# Patient Record
Sex: FEMALE | Race: WHITE | NOT HISPANIC OR LATINO | Employment: OTHER | URBAN - METROPOLITAN AREA
[De-identification: names, ages, dates, MRNs, and addresses within clinical notes are randomized per-mention and may not be internally consistent; named-entity substitution may affect disease eponyms.]

---

## 2017-07-25 ENCOUNTER — HISTORICAL (OUTPATIENT)
Dept: CARDIOLOGY | Facility: HOSPITAL | Age: 82
End: 2017-07-25

## 2022-04-11 ENCOUNTER — HISTORICAL (OUTPATIENT)
Dept: ADMINISTRATIVE | Facility: HOSPITAL | Age: 87
End: 2022-04-11

## 2022-04-29 VITALS
BODY MASS INDEX: 31.24 KG/M2 | HEIGHT: 64 IN | DIASTOLIC BLOOD PRESSURE: 84 MMHG | SYSTOLIC BLOOD PRESSURE: 118 MMHG | WEIGHT: 183 LBS | OXYGEN SATURATION: 98 %

## 2022-07-11 LAB
LEFT EYE DM RETINOPATHY: NEGATIVE
RIGHT EYE DM RETINOPATHY: NEGATIVE

## 2022-07-12 ENCOUNTER — DOCUMENTATION ONLY (OUTPATIENT)
Dept: INTERNAL MEDICINE | Facility: CLINIC | Age: 87
End: 2022-07-12

## 2022-07-12 LAB
LEFT EYE DM RETINOPATHY: NEGATIVE
RIGHT EYE DM RETINOPATHY: NEGATIVE

## 2022-09-01 DIAGNOSIS — M19.90 OSTEOARTHRITIS, UNSPECIFIED OSTEOARTHRITIS TYPE, UNSPECIFIED SITE: ICD-10-CM

## 2022-09-01 DIAGNOSIS — E11.9 TYPE 2 DIABETES MELLITUS WITHOUT COMPLICATION, UNSPECIFIED WHETHER LONG TERM INSULIN USE: ICD-10-CM

## 2022-09-01 DIAGNOSIS — E78.5 HYPERLIPIDEMIA, UNSPECIFIED HYPERLIPIDEMIA TYPE: ICD-10-CM

## 2022-09-01 DIAGNOSIS — E53.8 B12 DEFICIENCY: Primary | ICD-10-CM

## 2022-09-01 DIAGNOSIS — I10 HYPERTENSION, UNSPECIFIED TYPE: ICD-10-CM

## 2022-09-01 DIAGNOSIS — M10.9 GOUT, UNSPECIFIED CAUSE, UNSPECIFIED CHRONICITY, UNSPECIFIED SITE: ICD-10-CM

## 2022-09-12 ENCOUNTER — DOCUMENTATION ONLY (OUTPATIENT)
Dept: INTERNAL MEDICINE | Facility: CLINIC | Age: 87
End: 2022-09-12
Payer: MEDICARE

## 2022-09-13 ENCOUNTER — OFFICE VISIT (OUTPATIENT)
Dept: INTERNAL MEDICINE | Facility: CLINIC | Age: 87
End: 2022-09-13
Payer: MEDICARE

## 2022-09-13 VITALS
OXYGEN SATURATION: 97 % | DIASTOLIC BLOOD PRESSURE: 78 MMHG | BODY MASS INDEX: 29.23 KG/M2 | SYSTOLIC BLOOD PRESSURE: 124 MMHG | RESPIRATION RATE: 18 BRPM | HEIGHT: 63 IN | HEART RATE: 69 BPM | WEIGHT: 165 LBS

## 2022-09-13 DIAGNOSIS — I10 HYPERTENSION, UNSPECIFIED TYPE: ICD-10-CM

## 2022-09-13 DIAGNOSIS — E03.9 HYPOTHYROIDISM, UNSPECIFIED TYPE: ICD-10-CM

## 2022-09-13 DIAGNOSIS — M10.9 GOUT, UNSPECIFIED CAUSE, UNSPECIFIED CHRONICITY, UNSPECIFIED SITE: ICD-10-CM

## 2022-09-13 DIAGNOSIS — Z00.00 WELLNESS EXAMINATION: Primary | ICD-10-CM

## 2022-09-13 DIAGNOSIS — E11.9 TYPE 2 DIABETES MELLITUS WITHOUT COMPLICATION, UNSPECIFIED WHETHER LONG TERM INSULIN USE: ICD-10-CM

## 2022-09-13 DIAGNOSIS — E78.5 HYPERLIPIDEMIA, UNSPECIFIED HYPERLIPIDEMIA TYPE: ICD-10-CM

## 2022-09-13 DIAGNOSIS — I50.9 CONGESTIVE HEART FAILURE, UNSPECIFIED HF CHRONICITY, UNSPECIFIED HEART FAILURE TYPE: ICD-10-CM

## 2022-09-13 PROCEDURE — G0439 PPPS, SUBSEQ VISIT: HCPCS | Mod: ,,, | Performed by: INTERNAL MEDICINE

## 2022-09-13 PROCEDURE — G0439 PR MEDICARE ANNUAL WELLNESS SUBSEQUENT VISIT: ICD-10-PCS | Mod: ,,, | Performed by: INTERNAL MEDICINE

## 2022-09-13 RX ORDER — CARVEDILOL 12.5 MG/1
12.5 TABLET ORAL 2 TIMES DAILY
COMMUNITY
Start: 2022-09-05

## 2022-09-13 RX ORDER — TRAMADOL HYDROCHLORIDE 50 MG/1
50 TABLET ORAL EVERY 12 HOURS PRN
Qty: 30 TABLET | Refills: 5 | Status: ON HOLD | OUTPATIENT
Start: 2022-09-13 | End: 2023-03-16 | Stop reason: HOSPADM

## 2022-09-13 RX ORDER — LEVOTHYROXINE SODIUM 137 UG/1
137 TABLET ORAL DAILY
COMMUNITY
Start: 2022-03-21 | End: 2023-06-29 | Stop reason: SDUPTHER

## 2022-09-13 RX ORDER — WARFARIN 1 MG/1
TABLET ORAL
Status: ON HOLD | COMMUNITY
Start: 2022-08-05 | End: 2023-03-16 | Stop reason: HOSPADM

## 2022-09-13 RX ORDER — TRAMADOL HYDROCHLORIDE 50 MG/1
50 TABLET ORAL 2 TIMES DAILY PRN
COMMUNITY
Start: 2022-08-05 | End: 2022-09-13 | Stop reason: SDUPTHER

## 2022-09-13 RX ORDER — CYANOCOBALAMIN 1000 UG/ML
INJECTION, SOLUTION INTRAMUSCULAR; SUBCUTANEOUS
Qty: 1 ML | Refills: 11 | Status: SHIPPED | OUTPATIENT
Start: 2022-09-13 | End: 2023-09-07

## 2022-09-13 RX ORDER — ALLOPURINOL 100 MG/1
100 TABLET ORAL DAILY
Qty: 90 TABLET | Refills: 11 | Status: SHIPPED | OUTPATIENT
Start: 2022-09-13

## 2022-09-13 RX ORDER — LEVOTHYROXINE SODIUM 150 UG/1
150 TABLET ORAL DAILY
COMMUNITY
Start: 2022-09-05 | End: 2023-02-13

## 2022-09-13 RX ORDER — LOVASTATIN 20 MG/1
20 TABLET ORAL DAILY
COMMUNITY
Start: 2022-03-21

## 2022-09-13 RX ORDER — SACUBITRIL AND VALSARTAN 24; 26 MG/1; MG/1
1 TABLET, FILM COATED ORAL 2 TIMES DAILY
COMMUNITY
Start: 2022-09-05 | End: 2023-03-30

## 2022-09-13 NOTE — PROGRESS NOTES
"Subjective:      Patient Care Team:  Franklyn Brito MD as PCP - General (Internal Medicine)  Franklyn Brito MD      Patient ID: Homa Jaquez is a 86 y.o. female.    Chief Complaint: Follow-up      THE PATIENT IS 87-YEAR-OLD WOMAN IN FOR WELLNESS CHECK.  SHE FEELS OKAY CURRENTLY.  SHE DID SUFFER A FALL A FEW WEEKS BACK IN THE SHOWER.  SHE WAS ASSESSED AND HAD NO BROKEN BONES AND JUST HAD BUMPS AND BRUISES HAS RECOVERED COMPLETELY.  SHE FEELS THAT SHE HAS DONE WELL CONSIDERING HER AGE AND MULTIPLE MEDICAL PROBLEMS.    Follow-up    Review of Systems     Current Outpatient Medications on File Prior to Visit   Medication Sig Dispense Refill    carvediloL (COREG) 12.5 MG tablet Take 12.5 mg by mouth 2 (two) times daily.      ENTRESTO 24-26 mg per tablet Take 1 tablet by mouth 2 (two) times daily.      levothyroxine (SYNTHROID) 137 MCG Tab tablet Take 137 mcg by mouth once daily.      levothyroxine (SYNTHROID) 150 MCG tablet Take 150 mcg by mouth once daily.      lovastatin (MEVACOR) 20 MG tablet Take 20 mg by mouth once daily.      warfarin (COUMADIN) 1 MG tablet Take by mouth.      warfarin sodium (COUMADIN ORAL) Take 2 mg by mouth.      [DISCONTINUED] cyanocobalamin 1,000 mcg/mL injection 1ML (1000MCG) INTRAMUSCULARY ONCE MONTHLY 1 mL 11    [DISCONTINUED] traMADoL (ULTRAM) 50 mg tablet Take 50 mg by mouth 2 (two) times daily as needed.       No current facility-administered medications on file prior to visit.       Patient Reported Health Risk Assessment       Opioid Screening: Patient medication list reviewed, patient is not taking prescription opioids. Patient is using additional opioids than prescribed. Patient is not at low risk of substance abuse based on this opioid use history.       Objective:      /78 (BP Location: Right arm, Patient Position: Sitting, BP Method: Large (Manual))   Pulse 69   Resp 18   Ht 5' 3" (1.6 m)   Wt 74.8 kg (165 lb)   SpO2 97%   BMI 29.23 kg/m²     Physical " Exam  Vitals reviewed.   Constitutional:       Appearance: Normal appearance.   HENT:      Head: Normocephalic.      Nose: Nose normal.      Mouth/Throat:      Pharynx: Oropharynx is clear.   Eyes:      Pupils: Pupils are equal, round, and reactive to light.   Neck:      Thyroid: No thyromegaly.   Cardiovascular:      Rate and Rhythm: Normal rate. Rhythm irregular.      Pulses: Normal pulses.      Comments: HEART RHYTHM IS IRREGULARLY IRREGULAR.  NO S3 GALLOP APPRECIATED.  Abdominal:      General: Abdomen is flat. Bowel sounds are normal.      Palpations: Abdomen is soft. There is no hepatomegaly, splenomegaly or mass.      Tenderness: There is no abdominal tenderness.   Musculoskeletal:      Cervical back: Neck supple.   Lymphadenopathy:      Cervical: No cervical adenopathy.   Skin:     General: Skin is warm and dry.   Neurological:      Mental Status: She is alert.       LABORATORY STUDIES REVIEWED.  NUMBERS OKAY EXCEPT FOR LOW ALBUMIN.    No flowsheet data found.  Fall Risk Assessment - Outpatient 9/13/2022   Mobility Status Ambulatory w/ assistance   Number of falls 1   Identified as fall risk 0                Assessment:       1. Wellness examination    2. Type 2 diabetes mellitus without complication, unspecified whether long term insulin use    3. Gout, unspecified cause, unspecified chronicity, unspecified site    4. Hyperlipidemia, unspecified hyperlipidemia type    5. Hypertension, unspecified type    6. Congestive heart failure, unspecified HF chronicity, unspecified heart failure type    7. Hypothyroidism, unspecified type        2. EXCELLENT CONTROL OFF MEDS     3. MILD AND STABLE.      4. CONTROLLED    5. HYPERTENSION CONTROLLED    6. SYSTOLIC DYSFUNCTION.  DOING WELL ON ENTRESTO    7. HYPERTHYROIDISM.  CORRECTED     8. GOUT WITH RECENT ATTACK.  THIS MAY BE WHY SHE IS LIMITING HER MEAT AND HIGH PROTEIN IN THE DIET.    9. PERNICIOUS ANEMIA.  STABLE  Plan:       RECOMMEND THAT SHE TAKE ALLOPURINOL  DAILY.  INCREASE PROTEIN IN DIET.  FOLLOW-UP WITH ME 6 MONTHS WITH CBC CMP LIPID TSH A1C PRIOR.  WILL ALSO CHECK A URIC ACID THEN.        Medicare Annual Wellness and Personalized Prevention Plan:   Fall Risk + Home Safety + Hearing Impairment + Depression Screen + Cognitive Impairment Screen + Health Risk Assessment all reviewed.       Health Maintenance Topics with due status: Not Due       Topic Last Completion Date    Lipid Panel 03/19/2022      The patient's Health Maintenance was reviewed and the following appears to be due at this time:   Health Maintenance Due   Topic Date Due    TETANUS VACCINE  Never done    DEXA Scan  Never done    Shingles Vaccine (1 of 2) Never done    Pneumococcal Vaccines (Age 65+) (2 - PCV) 02/20/2016    COVID-19 Vaccine (2 - Pfizer series) 09/08/2021    Influenza Vaccine (1) 09/01/2022         Advance Care Planning   I attest to discussing Advance Care Planning with patient and/or family member.  Education was provided including the importance of the Health Care Power of , Advance Directives, and/or [place] documentation.  The patient expressed understanding to the importance of this information and discussion.  Length of ACP conversation in minutes:          Follow up in about 6 months (around 3/13/2023). In addition to their scheduled follow up, the patient has also been instructed to follow up on as needed basis.

## 2023-02-13 DIAGNOSIS — E03.9 HYPOTHYROIDISM, UNSPECIFIED TYPE: Primary | ICD-10-CM

## 2023-02-13 RX ORDER — LEVOTHYROXINE SODIUM 150 UG/1
TABLET ORAL
Qty: 30 TABLET | Refills: 11 | Status: ON HOLD | OUTPATIENT
Start: 2023-02-13 | End: 2023-03-16 | Stop reason: HOSPADM

## 2023-03-01 ENCOUNTER — TELEPHONE (OUTPATIENT)
Dept: INTERNAL MEDICINE | Facility: CLINIC | Age: 88
End: 2023-03-01
Payer: MEDICARE

## 2023-03-01 NOTE — TELEPHONE ENCOUNTER
Spoke to son, Av, regarding patient's issues with R hip pain. States pain starts in the R LB and radiates to knee. Requesting guidance on pain management. She takes Tylenol and very occ ibuprofen.  Also, taking Tramadol. Requesting PT referral per HH due to inc sedentary lifestyle. Will refer to ATTILA Meyer, please refer patient.

## 2023-03-02 DIAGNOSIS — R62.7 FAILURE TO THRIVE IN ADULT: Primary | ICD-10-CM

## 2023-03-02 DIAGNOSIS — I50.9 CONGESTIVE HEART FAILURE, UNSPECIFIED HF CHRONICITY, UNSPECIFIED HEART FAILURE TYPE: ICD-10-CM

## 2023-03-02 DIAGNOSIS — R53.1 WEAK: ICD-10-CM

## 2023-03-02 NOTE — TELEPHONE ENCOUNTER
Patient has not been seen in office since September of last year--Next OV 03/13/23.  She may have to be seen before [facility] will accept her due to chart notes not within 30-90 days

## 2023-03-06 ENCOUNTER — TELEPHONE (OUTPATIENT)
Dept: INTERNAL MEDICINE | Facility: CLINIC | Age: 88
End: 2023-03-06
Payer: MEDICARE

## 2023-03-06 NOTE — TELEPHONE ENCOUNTER
----- Message from Kenisha Pearson LPN sent at 3/6/2023  1:27 PM CST -----  Regarding: chata Vallejo 3/13 @1:20  Fasting labs needed.

## 2023-03-07 PROCEDURE — G0180 PR HOME HEALTH MD CERTIFICATION: ICD-10-PCS | Mod: ,,, | Performed by: INTERNAL MEDICINE

## 2023-03-07 PROCEDURE — G0180 MD CERTIFICATION HHA PATIENT: HCPCS | Mod: ,,, | Performed by: INTERNAL MEDICINE

## 2023-03-10 LAB
CHOLEST SERPL-MSCNC: 114 MG/DL (ref 0–200)
HBA1C MFR BLD: 6.5 % (ref 4–6)
HDLC SERPL-MCNC: 38 MG/DL (ref 35–70)
LDLC SERPL CALC-MCNC: 60 MG/DL (ref 0–160)
TRIGL SERPL-MCNC: 81 MG/DL (ref 40–160)

## 2023-03-13 ENCOUNTER — HOSPITAL ENCOUNTER (INPATIENT)
Facility: HOSPITAL | Age: 88
LOS: 3 days | Discharge: HOME HEALTH | DRG: 552 | End: 2023-03-16
Attending: EMERGENCY MEDICINE | Admitting: INTERNAL MEDICINE
Payer: MEDICARE

## 2023-03-13 DIAGNOSIS — R52 PAIN: ICD-10-CM

## 2023-03-13 DIAGNOSIS — M54.50 ACUTE RIGHT-SIDED LOW BACK PAIN, UNSPECIFIED WHETHER SCIATICA PRESENT: ICD-10-CM

## 2023-03-13 DIAGNOSIS — M25.551 RIGHT HIP PAIN: Primary | ICD-10-CM

## 2023-03-13 DIAGNOSIS — R26.89 DECREASED FUNCTIONAL MOBILITY: ICD-10-CM

## 2023-03-13 LAB
ALBUMIN SERPL-MCNC: 3.1 G/DL (ref 3.4–4.8)
ALBUMIN/GLOB SERPL: 0.8 RATIO (ref 1.1–2)
ALP SERPL-CCNC: 119 UNIT/L (ref 40–150)
ALT SERPL-CCNC: 11 UNIT/L (ref 0–55)
AST SERPL-CCNC: 27 UNIT/L (ref 5–34)
BASOPHILS # BLD AUTO: 0.04 X10(3)/MCL (ref 0–0.2)
BASOPHILS NFR BLD AUTO: 0.6 %
BILIRUBIN DIRECT+TOT PNL SERPL-MCNC: 1.8 MG/DL
BUN SERPL-MCNC: 20 MG/DL (ref 9.8–20.1)
CALCIUM SERPL-MCNC: 9 MG/DL (ref 8.4–10.2)
CHLORIDE SERPL-SCNC: 103 MMOL/L (ref 98–107)
CO2 SERPL-SCNC: 26 MMOL/L (ref 23–31)
CREAT SERPL-MCNC: 1.13 MG/DL (ref 0.55–1.02)
EOSINOPHIL # BLD AUTO: 0.11 X10(3)/MCL (ref 0–0.9)
EOSINOPHIL NFR BLD AUTO: 1.8 %
ERYTHROCYTE [DISTWIDTH] IN BLOOD BY AUTOMATED COUNT: 14.8 % (ref 11.5–17)
GFR SERPLBLD CREATININE-BSD FMLA CKD-EPI: 47 MLS/MIN/1.73/M2
GLOBULIN SER-MCNC: 4 GM/DL (ref 2.4–3.5)
GLUCOSE SERPL-MCNC: 148 MG/DL (ref 82–115)
HCT VFR BLD AUTO: 50.1 % (ref 37–47)
HGB BLD-MCNC: 16.1 G/DL (ref 12–16)
IMM GRANULOCYTES # BLD AUTO: 0.02 X10(3)/MCL (ref 0–0.04)
IMM GRANULOCYTES NFR BLD AUTO: 0.3 %
LYMPHOCYTES # BLD AUTO: 0.91 X10(3)/MCL (ref 0.6–4.6)
LYMPHOCYTES NFR BLD AUTO: 14.7 %
MCH RBC QN AUTO: 31.3 PG
MCHC RBC AUTO-ENTMCNC: 32.1 G/DL (ref 33–36)
MCV RBC AUTO: 97.3 FL (ref 80–94)
MONOCYTES # BLD AUTO: 0.71 X10(3)/MCL (ref 0.1–1.3)
MONOCYTES NFR BLD AUTO: 11.5 %
NEUTROPHILS # BLD AUTO: 4.41 X10(3)/MCL (ref 2.1–9.2)
NEUTROPHILS NFR BLD AUTO: 71.1 %
NRBC BLD AUTO-RTO: 0 %
PLATELET # BLD AUTO: 176 X10(3)/MCL (ref 130–400)
PMV BLD AUTO: 10.6 FL (ref 7.4–10.4)
POTASSIUM SERPL-SCNC: 4.6 MMOL/L (ref 3.5–5.1)
PROT SERPL-MCNC: 7.1 GM/DL (ref 5.8–7.6)
RBC # BLD AUTO: 5.15 X10(6)/MCL (ref 4.2–5.4)
SODIUM SERPL-SCNC: 138 MMOL/L (ref 136–145)
WBC # SPEC AUTO: 6.2 X10(3)/MCL (ref 4.5–11.5)

## 2023-03-13 PROCEDURE — 11000001 HC ACUTE MED/SURG PRIVATE ROOM

## 2023-03-13 PROCEDURE — 85025 COMPLETE CBC W/AUTO DIFF WBC: CPT | Performed by: EMERGENCY MEDICINE

## 2023-03-13 PROCEDURE — 25000003 PHARM REV CODE 250: Performed by: EMERGENCY MEDICINE

## 2023-03-13 PROCEDURE — 99285 EMERGENCY DEPT VISIT HI MDM: CPT | Mod: 25

## 2023-03-13 PROCEDURE — 80053 COMPREHEN METABOLIC PANEL: CPT | Performed by: EMERGENCY MEDICINE

## 2023-03-13 RX ORDER — SODIUM CHLORIDE 0.9 % (FLUSH) 0.9 %
10 SYRINGE (ML) INJECTION
Status: DISCONTINUED | OUTPATIENT
Start: 2023-03-13 | End: 2023-03-16 | Stop reason: HOSPADM

## 2023-03-13 RX ORDER — SODIUM CHLORIDE 9 MG/ML
1000 INJECTION, SOLUTION INTRAVENOUS
Status: COMPLETED | OUTPATIENT
Start: 2023-03-13 | End: 2023-03-14

## 2023-03-13 RX ORDER — TALC
6 POWDER (GRAM) TOPICAL NIGHTLY PRN
Status: DISCONTINUED | OUTPATIENT
Start: 2023-03-13 | End: 2023-03-16 | Stop reason: HOSPADM

## 2023-03-13 RX ORDER — TRAMADOL HYDROCHLORIDE 50 MG/1
50 TABLET ORAL
Status: COMPLETED | OUTPATIENT
Start: 2023-03-13 | End: 2023-03-13

## 2023-03-13 RX ORDER — SODIUM CHLORIDE 9 MG/ML
INJECTION, SOLUTION INTRAVENOUS CONTINUOUS
Status: DISCONTINUED | OUTPATIENT
Start: 2023-03-13 | End: 2023-03-14

## 2023-03-13 RX ADMIN — SODIUM CHLORIDE: 9 INJECTION, SOLUTION INTRAVENOUS at 11:03

## 2023-03-13 RX ADMIN — SODIUM CHLORIDE 1000 ML: 9 INJECTION, SOLUTION INTRAVENOUS at 02:03

## 2023-03-13 RX ADMIN — TRAMADOL HYDROCHLORIDE 50 MG: 50 TABLET, COATED ORAL at 02:03

## 2023-03-13 NOTE — ED PROVIDER NOTES
Encounter Date: 3/13/2023    SCRIBE #1 NOTE: I, Radha Epstein, am scribing for, and in the presence of,  Franklyn Ventura MD. I have scribed the following portions of the note - Other sections scribed: HPI, ROS, PE.     History     Chief Complaint   Patient presents with    Hip Pain     Pt c/o severe right hip pain that radiates to back x 1 month.  Pt states worse over the last 2 days and unable to ambulate today.      87 year old female w/ hx of graves disease, HTN, hypothyroidism, Afib, and spinal stenosis presents to ED for hip pain. Pt states for the past 3-4 weeks she had pain in her her R hip joint that has been worsening to the point where her son who's a PA got her a cortisone shot. She states the shot relieved the pain for ~1 day before returning. Now, she says the pain is also in her back and she cant even get out of bed or walk. She last walked yesterday to get to the bathroom. She expresses back and hip pain when her daughter rolls her over to change her diaper. She states she fell in the shower slipping on soap and her  had to call 911 to pick her back up. She states she last had tramadol at 0930 this morning, had some relief with it.     Her PCP is Franklyn Brito MD.    The history is provided by the patient. No  was used.   Hip Pain  Pertinent negatives include no chest pain, no abdominal pain and no shortness of breath.   Review of patient's allergies indicates:   Allergen Reactions    Cefadroxil      Other reaction(s): Nausea or vomiting    Cefuroxime axetil     Cephalexin      Other reaction(s): NAUSEA AND VOMITING    Ciprofloxacin     Enoxaparin     Methenamine hippurate      Other reaction(s): Unknown    Promethazine      Past Medical History:   Diagnosis Date    A-fib     Chronic cystitis     GERD (gastroesophageal reflux disease)     Graves disease     Hypertension     Hypothyroidism, unspecified     Spinal stenosis      Past Surgical History:   Procedure  Laterality Date    APPENDECTOMY      BLADDER SUSPENSION      CARDIOVERSION  04/01/2015    CATARACT EXTRACTION      HYSTERECTOMY      LAPAROSCOPIC CHOLECYSTECTOMY  01/12/2016    SPHINCTEROTOMY OF URETHRA  01/11/2016    TONSILLECTOMY AND ADENOIDECTOMY       Family History   Problem Relation Age of Onset    Cancer Father     Heart attack Father     Stroke Father      Social History     Tobacco Use    Smoking status: Former     Types: Cigarettes    Smokeless tobacco: Never   Substance Use Topics    Alcohol use: Yes    Drug use: Never     Review of Systems   Constitutional:  Negative for chills and fever.   HENT:  Negative for sinus pain.    Respiratory:  Negative for cough, chest tightness and shortness of breath.    Cardiovascular:  Negative for chest pain.   Gastrointestinal:  Negative for abdominal pain, diarrhea, nausea and vomiting.   Genitourinary:  Negative for dysuria.   Musculoskeletal:  Positive for arthralgias (R hip pain), back pain and gait problem.   Skin:  Negative for rash.   Neurological:  Negative for syncope and weakness.   All other systems reviewed and are negative.    Physical Exam     Initial Vitals [03/13/23 1309]   BP Pulse Resp Temp SpO2   (!) 163/80 (!) 58 18 98.1 °F (36.7 °C) 95 %      MAP       --         Physical Exam    Nursing note and vitals reviewed.  Constitutional: She appears well-developed and well-nourished.   Frail female.   HENT:   Head: Normocephalic and atraumatic.   Mouth/Throat: Oropharynx is clear and moist. Mucous membranes are dry.   Eyes: Pupils are equal, round, and reactive to light.   Neck: Neck supple.   Normal range of motion.  Cardiovascular:  Normal rate, regular rhythm, normal heart sounds and intact distal pulses.           Pulses:       Dorsalis pedis pulses are 2+ on the right side and 2+ on the left side.   Pulmonary/Chest: Breath sounds normal.   Abdominal: Abdomen is soft. Bowel sounds are normal. There is no abdominal tenderness. There is no rebound.    Musculoskeletal:         General: No tenderness. Normal range of motion.      Cervical back: Normal range of motion and neck supple.      Comments: Tenderness to the R lower lumbar/sacral area.  Active and passive ROM intact of the R hip.      Neurological: She is alert and oriented to person, place, and time. She has normal strength. No sensory deficit. GCS score is 15. GCS eye subscore is 4. GCS verbal subscore is 5. GCS motor subscore is 6.   Skin: Skin is warm and dry.   Psychiatric: She has a normal mood and affect.       ED Course   Procedures  Labs Reviewed   COMPREHENSIVE METABOLIC PANEL - Abnormal; Notable for the following components:       Result Value    Glucose Level 148 (*)     Creatinine 1.13 (*)     Albumin Level 3.1 (*)     Globulin 4.0 (*)     Albumin/Globulin Ratio 0.8 (*)     Bilirubin Total 1.8 (*)     All other components within normal limits   CBC WITH DIFFERENTIAL - Abnormal; Notable for the following components:    Hgb 16.1 (*)     Hct 50.1 (*)     MCV 97.3 (*)     MCHC 32.1 (*)     MPV 10.6 (*)     All other components within normal limits   CBC W/ AUTO DIFFERENTIAL    Narrative:     The following orders were created for panel order CBC auto differential.  Procedure                               Abnormality         Status                     ---------                               -----------         ------                     CBC with Differential[[id number]]        Abnormal            Final result                 Please view results for these tests on the individual orders.   URINALYSIS, REFLEX TO URINE CULTURE          Imaging Results              CT Lumbar Spine Without Contrast (In process)                      CT Hip Without Contrast Right (In process)  Result time 03/13/23 16:52:13                     X-Ray Lumbar Spine Ap And Lateral (Final result)  Result time 03/13/23 15:31:59      Final result by Fabian Dior MD (03/13/23 15:31:59)                   Impression:      Scoliosis  and degenerative changes common acute fracture is not demonstrated.      Electronically signed by: Fabian Dior MD  Date:    03/13/2023  Time:    15:31               Narrative:    EXAMINATION:  XR LUMBAR SPINE AP AND LATERAL    CLINICAL HISTORY:  Severe pain;    TECHNIQUE:  AP, lateral and spot images were performed of the lumbar spine.    COMPARISON:  None    FINDINGS:  There is scoliosis present with convexity to the left.  There degenerative changes at L1-2.  There is dense vascular calcification noted.  There degenerative changes at L4-5 and L5-S1                                       X-Ray Hip 2 or 3 views Right (with Pelvis when performed) (Final result)  Result time 03/13/23 15:31:12      Final result by Fabian Dior MD (03/13/23 15:31:12)                   Impression:      No acute abnormalities are seen      Electronically signed by: Fabian Dior MD  Date:    03/13/2023  Time:    15:31               Narrative:    EXAMINATION:  XR HIP WITH PELVIS WHEN PERFORMED, 2 OR 3  VIEWS RIGHT    CLINICAL HISTORY:  Pain, unspecified    TECHNIQUE:  AP view of the pelvis and frog leg lateral view of the right hip were performed.    COMPARISON:  None    FINDINGS:  There are no acute fractures seen.  There is no dislocation.  There are prominent lateral margins of the acetabulum bilaterally.                                       Medications   traMADoL tablet 50 mg (50 mg Oral Given 3/13/23 1417)   0.9%  NaCl infusion (1,000 mLs Intravenous New Bag 3/13/23 1417)        Medical Decision Making  Differential diagnosis include, but are not limited to: degenerative joint disease of the hip, degenerative joint disease of the spine, hip fracture, pelvic fracture, sciatica, lumbar radiculopathy, lumbar compression.     Amount and/or Complexity of Data Reviewed  Labs: ordered. Decision-making details documented in ED Course.     Details: Patient with slightly elevated creatinine  Radiology: ordered. Decision-making details  documented in ED Course.    Risk  Decision regarding hospitalization.  Risk Details: Discussed with on-call for Dr. Brito, Dr. Begum. Will admit, she will let him know of the admission to see this evening.  Patient with such impaired mobility at this time, she is not able to stay at home, despite unremarkable x-rays.  CT scans are pending, patient will at least need some PT evaluation possible further consultation and or workup.              Scribe Attestation:   Scribe #1: I performed the above scribed service and the documentation accurately describes the services I performed. I attest to the accuracy of the note.    Attending Attestation:           Physician Attestation for Scribe:  Physician Attestation Statement for Scribe #1: I, Franklyn Ventura MD, reviewed documentation, as scribed by Radha Epstein in my presence, and it is both accurate and complete.           ED Course as of 03/13/23 1658   Mon Mar 13, 2023   1638 Paged Dr. Brito.  [AA]      ED Course User Index  [AA] Lucian Garcia                 Clinical Impression:   Final diagnoses:  [R52] Pain  [M25.551] Right hip pain (Primary)  [M54.50] Acute right-sided low back pain, unspecified whether sciatica present  [R26.89] Decreased functional mobility        ED Disposition Condition    Admit Stable                Franklyn Ventura MD  03/13/23 1700

## 2023-03-14 ENCOUNTER — DOCUMENTATION ONLY (OUTPATIENT)
Dept: INTERNAL MEDICINE | Facility: CLINIC | Age: 88
End: 2023-03-14

## 2023-03-14 PROBLEM — I10 PRIMARY HYPERTENSION: Status: ACTIVE | Noted: 2023-03-14

## 2023-03-14 PROBLEM — E53.8 COBALAMIN DEFICIENCY: Status: ACTIVE | Noted: 2023-03-14

## 2023-03-14 PROBLEM — I65.29 STENOSIS OF CAROTID ARTERY: Status: ACTIVE | Noted: 2023-03-14

## 2023-03-14 PROBLEM — M25.551 RIGHT HIP PAIN: Status: ACTIVE | Noted: 2023-03-14

## 2023-03-14 PROBLEM — M10.9 GOUT: Status: ACTIVE | Noted: 2023-03-14

## 2023-03-14 PROBLEM — E78.5 HYPERLIPIDEMIA: Status: ACTIVE | Noted: 2023-03-14

## 2023-03-14 PROBLEM — M19.90 OSTEOARTHRITIS: Status: ACTIVE | Noted: 2023-03-14

## 2023-03-14 PROBLEM — I48.91 ATRIAL FIBRILLATION: Status: ACTIVE | Noted: 2023-03-14

## 2023-03-14 PROBLEM — E11.9 DIABETES MELLITUS: Status: ACTIVE | Noted: 2023-03-14

## 2023-03-14 PROBLEM — I50.23 ACUTE ON CHRONIC SYSTOLIC HEART FAILURE: Status: ACTIVE | Noted: 2023-03-14

## 2023-03-14 LAB
ANION GAP SERPL CALC-SCNC: 9 MEQ/L
BASOPHILS # BLD AUTO: 0.06 X10(3)/MCL (ref 0–0.2)
BASOPHILS NFR BLD AUTO: 0.9 %
BUN SERPL-MCNC: 16.6 MG/DL (ref 9.8–20.1)
CALCIUM SERPL-MCNC: 9.2 MG/DL (ref 8.4–10.2)
CHLORIDE SERPL-SCNC: 105 MMOL/L (ref 98–107)
CO2 SERPL-SCNC: 26 MMOL/L (ref 23–31)
CREAT SERPL-MCNC: 0.9 MG/DL (ref 0.55–1.02)
CREAT/UREA NIT SERPL: 18
EOSINOPHIL # BLD AUTO: 0.17 X10(3)/MCL (ref 0–0.9)
EOSINOPHIL NFR BLD AUTO: 2.4 %
ERYTHROCYTE [DISTWIDTH] IN BLOOD BY AUTOMATED COUNT: 14.7 % (ref 11.5–17)
GFR SERPLBLD CREATININE-BSD FMLA CKD-EPI: >60 MLS/MIN/1.73/M2
GLUCOSE SERPL-MCNC: 127 MG/DL (ref 82–115)
HCT VFR BLD AUTO: 51.3 % (ref 37–47)
HGB BLD-MCNC: 16.1 G/DL (ref 12–16)
IMM GRANULOCYTES # BLD AUTO: 0.03 X10(3)/MCL (ref 0–0.04)
IMM GRANULOCYTES NFR BLD AUTO: 0.4 %
INR BLD: 2.87 (ref 0–1.3)
LYMPHOCYTES # BLD AUTO: 0.97 X10(3)/MCL (ref 0.6–4.6)
LYMPHOCYTES NFR BLD AUTO: 13.8 %
MCH RBC QN AUTO: 31.1 PG
MCHC RBC AUTO-ENTMCNC: 31.4 G/DL (ref 33–36)
MCV RBC AUTO: 99 FL (ref 80–94)
MONOCYTES # BLD AUTO: 0.82 X10(3)/MCL (ref 0.1–1.3)
MONOCYTES NFR BLD AUTO: 11.7 %
NEUTROPHILS # BLD AUTO: 4.97 X10(3)/MCL (ref 2.1–9.2)
NEUTROPHILS NFR BLD AUTO: 70.8 %
NRBC BLD AUTO-RTO: 0 %
PLATELET # BLD AUTO: 183 X10(3)/MCL (ref 130–400)
PMV BLD AUTO: 10.6 FL (ref 7.4–10.4)
POTASSIUM SERPL-SCNC: 4.5 MMOL/L (ref 3.5–5.1)
PROTHROMBIN TIME: 29.5 SECONDS (ref 12.5–14.5)
RBC # BLD AUTO: 5.18 X10(6)/MCL (ref 4.2–5.4)
SODIUM SERPL-SCNC: 140 MMOL/L (ref 136–145)
TSH SERPL-ACNC: 3.85 UIU/ML (ref 0.35–4.94)
WBC # SPEC AUTO: 7 X10(3)/MCL (ref 4.5–11.5)

## 2023-03-14 PROCEDURE — 11000001 HC ACUTE MED/SURG PRIVATE ROOM

## 2023-03-14 PROCEDURE — 99223 PR INITIAL HOSPITAL CARE,LEVL III: ICD-10-PCS | Mod: ,,, | Performed by: ORTHOPAEDIC SURGERY

## 2023-03-14 PROCEDURE — 85025 COMPLETE CBC W/AUTO DIFF WBC: CPT | Performed by: INTERNAL MEDICINE

## 2023-03-14 PROCEDURE — 99223 1ST HOSP IP/OBS HIGH 75: CPT | Mod: AI,,, | Performed by: INTERNAL MEDICINE

## 2023-03-14 PROCEDURE — 99223 1ST HOSP IP/OBS HIGH 75: CPT | Mod: ,,, | Performed by: ORTHOPAEDIC SURGERY

## 2023-03-14 PROCEDURE — 84443 ASSAY THYROID STIM HORMONE: CPT | Performed by: INTERNAL MEDICINE

## 2023-03-14 PROCEDURE — 99223 PR INITIAL HOSPITAL CARE,LEVL III: ICD-10-PCS | Mod: AI,,, | Performed by: INTERNAL MEDICINE

## 2023-03-14 PROCEDURE — 94761 N-INVAS EAR/PLS OXIMETRY MLT: CPT

## 2023-03-14 PROCEDURE — 25000003 PHARM REV CODE 250: Performed by: INTERNAL MEDICINE

## 2023-03-14 PROCEDURE — 63600175 PHARM REV CODE 636 W HCPCS: Performed by: INTERNAL MEDICINE

## 2023-03-14 PROCEDURE — C9113 INJ PANTOPRAZOLE SODIUM, VIA: HCPCS | Performed by: INTERNAL MEDICINE

## 2023-03-14 PROCEDURE — 80048 BASIC METABOLIC PNL TOTAL CA: CPT | Performed by: INTERNAL MEDICINE

## 2023-03-14 PROCEDURE — 85610 PROTHROMBIN TIME: CPT | Performed by: INTERNAL MEDICINE

## 2023-03-14 RX ORDER — SENNOSIDES 8.6 MG/1
8.6 TABLET ORAL DAILY PRN
Status: DISCONTINUED | OUTPATIENT
Start: 2023-03-14 | End: 2023-03-16 | Stop reason: HOSPADM

## 2023-03-14 RX ORDER — ONDANSETRON 2 MG/ML
4 INJECTION INTRAMUSCULAR; INTRAVENOUS EVERY 6 HOURS PRN
Status: DISCONTINUED | OUTPATIENT
Start: 2023-03-14 | End: 2023-03-16 | Stop reason: HOSPADM

## 2023-03-14 RX ORDER — ONDANSETRON 4 MG/1
4 TABLET, ORALLY DISINTEGRATING ORAL EVERY 6 HOURS PRN
Status: DISCONTINUED | OUTPATIENT
Start: 2023-03-14 | End: 2023-03-16 | Stop reason: HOSPADM

## 2023-03-14 RX ORDER — ACETAMINOPHEN 325 MG/1
650 TABLET ORAL 3 TIMES DAILY
Status: DISCONTINUED | OUTPATIENT
Start: 2023-03-14 | End: 2023-03-16 | Stop reason: HOSPADM

## 2023-03-14 RX ORDER — LEVOTHYROXINE SODIUM 150 UG/1
150 TABLET ORAL DAILY
Status: DISCONTINUED | OUTPATIENT
Start: 2023-03-14 | End: 2023-03-14

## 2023-03-14 RX ORDER — FAMOTIDINE 20 MG/1
20 TABLET, FILM COATED ORAL
Status: COMPLETED | OUTPATIENT
Start: 2023-03-14 | End: 2023-03-14

## 2023-03-14 RX ORDER — CARVEDILOL 12.5 MG/1
12.5 TABLET ORAL 2 TIMES DAILY
Status: DISCONTINUED | OUTPATIENT
Start: 2023-03-14 | End: 2023-03-16 | Stop reason: HOSPADM

## 2023-03-14 RX ORDER — PANTOPRAZOLE SODIUM 40 MG/10ML
40 INJECTION, POWDER, LYOPHILIZED, FOR SOLUTION INTRAVENOUS DAILY
Status: DISCONTINUED | OUTPATIENT
Start: 2023-03-14 | End: 2023-03-16

## 2023-03-14 RX ORDER — FAMOTIDINE 20 MG/1
20 TABLET, FILM COATED ORAL 2 TIMES DAILY
Status: DISCONTINUED | OUTPATIENT
Start: 2023-03-14 | End: 2023-03-14

## 2023-03-14 RX ORDER — HYDRALAZINE HYDROCHLORIDE 20 MG/ML
10 INJECTION INTRAMUSCULAR; INTRAVENOUS
Status: DISCONTINUED | OUTPATIENT
Start: 2023-03-14 | End: 2023-03-16 | Stop reason: HOSPADM

## 2023-03-14 RX ORDER — TRAMADOL HYDROCHLORIDE 50 MG/1
50 TABLET ORAL EVERY 6 HOURS PRN
Status: DISCONTINUED | OUTPATIENT
Start: 2023-03-14 | End: 2023-03-16 | Stop reason: HOSPADM

## 2023-03-14 RX ORDER — PRAVASTATIN SODIUM 10 MG/1
20 TABLET ORAL NIGHTLY
Status: DISCONTINUED | OUTPATIENT
Start: 2023-03-14 | End: 2023-03-16 | Stop reason: HOSPADM

## 2023-03-14 RX ORDER — HEPARIN SODIUM 5000 [USP'U]/ML
5000 INJECTION, SOLUTION INTRAVENOUS; SUBCUTANEOUS EVERY 8 HOURS
Status: DISCONTINUED | OUTPATIENT
Start: 2023-03-14 | End: 2023-03-15

## 2023-03-14 RX ORDER — FAMOTIDINE 20 MG/1
20 TABLET, FILM COATED ORAL DAILY
Status: DISCONTINUED | OUTPATIENT
Start: 2023-03-14 | End: 2023-03-16 | Stop reason: HOSPADM

## 2023-03-14 RX ADMIN — SACUBITRIL AND VALSARTAN 1 TABLET: 24; 26 TABLET, FILM COATED ORAL at 09:03

## 2023-03-14 RX ADMIN — CARVEDILOL 12.5 MG: 12.5 TABLET, FILM COATED ORAL at 09:03

## 2023-03-14 RX ADMIN — PRAVASTATIN SODIUM 20 MG: 10 TABLET ORAL at 09:03

## 2023-03-14 RX ADMIN — HEPARIN SODIUM 5000 UNITS: 5000 INJECTION, SOLUTION INTRAVENOUS; SUBCUTANEOUS at 09:03

## 2023-03-14 RX ADMIN — ONDANSETRON 4 MG: 2 INJECTION INTRAMUSCULAR; INTRAVENOUS at 05:03

## 2023-03-14 RX ADMIN — LEVOTHYROXINE SODIUM 137 MCG: 25 TABLET ORAL at 09:03

## 2023-03-14 RX ADMIN — ACETAMINOPHEN 325MG 650 MG: 325 TABLET ORAL at 09:03

## 2023-03-14 RX ADMIN — ACETAMINOPHEN 325MG 650 MG: 325 TABLET ORAL at 02:03

## 2023-03-14 RX ADMIN — HEPARIN SODIUM 5000 UNITS: 5000 INJECTION, SOLUTION INTRAVENOUS; SUBCUTANEOUS at 02:03

## 2023-03-14 RX ADMIN — TRAMADOL HYDROCHLORIDE 50 MG: 50 TABLET, COATED ORAL at 09:03

## 2023-03-14 RX ADMIN — FAMOTIDINE 20 MG: 20 TABLET, FILM COATED ORAL at 03:03

## 2023-03-14 RX ADMIN — TRAMADOL HYDROCHLORIDE 50 MG: 50 TABLET, COATED ORAL at 05:03

## 2023-03-14 RX ADMIN — FAMOTIDINE 20 MG: 20 TABLET, FILM COATED ORAL at 09:03

## 2023-03-14 RX ADMIN — PANTOPRAZOLE SODIUM 40 MG: 40 INJECTION, POWDER, FOR SOLUTION INTRAVENOUS at 09:03

## 2023-03-14 NOTE — PT/OT/SLP PROGRESS
Physical Therapy      Patient Name:  Homa Jaquez   MRN:  [id number]    Patient not seen today secondary to MD hold (Comment) (bone scan today to assess possible fx, patient for sx tomorrow pending scans). Will follow-up when appropriate.

## 2023-03-14 NOTE — PLAN OF CARE
03/14/23 1128   Discharge Assessment   Assessment Type Discharge Planning Assessment   Confirmed/corrected address, phone number and insurance Yes   Confirmed Demographics Correct on Facesheet   Source of Information patient   Communicated AURELIO with patient/caregiver Date not available/Unable to determine   Reason For Admission Dx: Severe right hip pain that radiates to back x 1 month.  Pt states worse over the last 2 days and unable to ambulate today. 87 year old female w/ hx of graves disease, HTN, hypothyroidism, Afib, and spinal stenosis presents to ED for hip pain. Pt states for the past 3-4 weeks she had pain in her her R hip joint that has been worsening to the point where her son who's a PA got her a cortisone shot.   People in Home spouse  (: Levi Jaquez [phone] and Adult Son: Av Jaquez: [phone].)   Facility Arrived From: Private residence.   Do you expect to return to your current living situation? Yes   Do you have help at home or someone to help you manage your care at home? Yes   Who are your caregiver(s) and their phone number(s)? : Levi Jaquez [phone] and Adult Son: Av Jaquez: [phone].   Prior to hospitilization cognitive status: Alert/Oriented   Current cognitive status: Alert/Oriented   Walking or Climbing Stairs ambulation difficulty, requires equipment   Mobility Management Uses a rollator for ambulation.   Dressing/Bathing bathing difficulty, assistance 1 person   Dressing/Bathing Management Family assists with bathing & showering needs PRN.   Home Accessibility stairs within home   Stairs, Within Home, Primary There is a ramp upon entering her home with an upstairs & downstairs. States she lives downstairs.   Number of Stairs, Within Home, Primary eight   Surface of Stairs, Within Home, Primary hardwood   Stair Railings, Within Home, Primary railings safe and in good condition   Stairs Comment, Within Home, Primary States she lives down  stairs and does not climb stairs to go up to the 2nd level.   Home Layout Able to live on 1st floor   Equipment Currently Used at Home bath bench;shower chair;rollator;wheelchair   Readmission within 30 days? No   Patient currently being followed by outpatient case management? No   Do you currently have service(s) that help you manage your care at home? Yes   Name and Contact number of agency [facility] states she has only received 1 visit.   Is the pt/caregiver preference to resume services with current agency Yes  ([facility] [place]   Do you take prescription medications? Yes   Do you have prescription coverage? Yes   Coverage Payor:  MEDICARE - MEDICARE PART A & B   Do you have any problems affording any of your prescribed medications? No   Is the patient taking medications as prescribed? yes   Who is going to help you get home at discharge? : Levi Jaquez [phone] and Adult Son: Av Jaquez: [phone]. Also, presently active with [facility].   How do you get to doctors appointments? family or friend will provide   Are you on dialysis? No   Do you take coumadin? No   Discharge Plan A Home Health   Discharge Plan B Home with family   DME Needed Upon Discharge  none   Discharge Plan discussed with: Patient   Discharge Barriers Identified None   Social Connections   Are you , , , , never , or living with a partner?   ( x65 years.)   Alcohol Use   Q1: How often do you have a drink containing alcohol? 4 or more ti  (States she has 1-2 Amaretto drinks 2-3 times/week.)   Q2: How many drinks containing alcohol do you have on a typical day when you are drinking? 1 or 2   Q3: How often do you have six or more drinks on one occasion? Daily   OTHER   Name(s) of People in Home : Levi Jaquez [phone] and Adult Son: Av Jaquez: [phone].

## 2023-03-14 NOTE — PROGRESS NOTES
Pt having Right hip pain. Bone scan today. CT scan shows subtle signs of fracture. We will evaluate pt for possible surgery.  OR would be tomorrow if bone scan is positive.     NPO midnight    Full consult to follow    Stan

## 2023-03-14 NOTE — PROGRESS NOTES
Pharmacist Renal Dose Adjustment Note    Homa Jaquez is a 87 y.o. female being treated with the medication famotidine    Patient Data:    Vital Signs (Most Recent):  Temp: 97.7 °F (36.5 °C) (03/14/23 0444)  Pulse: 76 (03/14/23 0444)  Resp: 18 (03/13/23 1417)  BP: (!) 160/92 (03/14/23 0444)  SpO2: (!) 92 % (03/14/23 0444)   Vital Signs (72h Range):  Temp:  [97.7 °F (36.5 °C)-98.1 °F (36.7 °C)]   Pulse:  [58-88]   Resp:  [18]   BP: (107-168)/()   SpO2:  [92 %-96 %]      Recent Labs   Lab 03/13/23  1407   CREATININE 1.13*     Serum creatinine: 1.13 mg/dL (H) 03/13/23 1407  Estimated creatinine clearance: 35.7 mL/min (A)    Medication:famotidine dose: 20mg frequency q12h will be changed to medication:famotidine dose:20mg frequency:q24h    Pharmacist's Name: Iraj He  Pharmacist's Extension: 0413

## 2023-03-14 NOTE — CONSULTS
[facility]  Orthopedic Trauma  Consult Note    Patient Name: Homa Jaquez  MRN: [id number]  Admission Date: 3/13/2023  Hospital Length of Stay: 1 days  Attending Provider: Franklyn Brito MD  Primary Care Provider: Franklyn Brito MD        Inpatient consult to Orthopedics  Consult performed by: Steve Pierce DO  Consult ordered by: Franklyn Brito MD      Subjective:         Chief Complaint:   Chief Complaint   Patient presents with    Hip Pain     Pt c/o severe right hip pain that radiates to back x 1 month.  Pt states worse over the last 2 days and unable to ambulate today.         HPI:  Patient is a 7-year-old female with right sided pain that radiates past her hip.  Consulted for possible occult femoral neck fracture.  No fracture seen.  No no groin pain dull achy pain that originates up in her spine.  No bowel or bladder incontinence no numbness tingling    Past Medical History:   Diagnosis Date    A-fib     Chronic cystitis     GERD (gastroesophageal reflux disease)     Graves disease     Hypertension     Hypothyroidism, unspecified     Spinal stenosis        Past Surgical History:   Procedure Laterality Date    APPENDECTOMY      BLADDER SUSPENSION      CARDIOVERSION  04/01/2015    CATARACT EXTRACTION      HYSTERECTOMY      LAPAROSCOPIC CHOLECYSTECTOMY  01/12/2016    SPHINCTEROTOMY OF URETHRA  01/11/2016    TONSILLECTOMY AND ADENOIDECTOMY         Review of patient's allergies indicates:   Allergen Reactions    Cefadroxil      Other reaction(s): Nausea or vomiting    Cefuroxime axetil     Cephalexin      Other reaction(s): NAUSEA AND VOMITING    Ciprofloxacin     Enoxaparin     Methenamine hippurate      Other reaction(s): Unknown    Promethazine        Current Facility-Administered Medications   Medication    acetaminophen tablet 650 mg    carvediloL tablet 12.5 mg    famotidine tablet 20 mg    heparin (porcine) injection 5,000 Units    hydrALAZINE  "injection 10 mg    levothyroxine tablet 137 mcg    melatonin tablet 6 mg    ondansetron disintegrating tablet 4 mg    ondansetron injection 4 mg    pantoprazole injection 40 mg    pravastatin tablet 20 mg    sacubitriL-valsartan 24-26 mg per tablet 1 tablet    senna tablet 8.6 mg    sodium chloride 0.9% flush 10 mL    traMADoL tablet 50 mg     Family History       Problem Relation (Age of Onset)    Cancer Father    Heart attack Father    Stroke Father          Tobacco Use    Smoking status: Former     Types: Cigarettes    Smokeless tobacco: Never   Substance and Sexual Activity    Alcohol use: Yes    Drug use: Never    Sexual activity: Not Currently       ROS:  Constitutional: Denies fever chills  Eyes: No change in vision  ENT: No ringing or current infections  CV: No chest pain  Resp: No labored breathing  MSK: Pain evident at site of injury located in HPI,   Integ: No signs of abrasions or lacerations  Neuro: No numbness or tingling  Lymphatic: No swelling outside the area of injury   Objective:     Vital Signs (Most Recent):  Temp: 97.5 °F (36.4 °C) (03/14/23 1135)  Pulse: 61 (03/14/23 1135)  Resp: 16 (03/14/23 1135)  BP: (!) 144/73 (03/14/23 1135)  SpO2: (!) 93 % (03/14/23 0828)   Vital Signs (24h Range):  Temp:  [97.5 °F (36.4 °C)-97.9 °F (36.6 °C)] 97.5 °F (36.4 °C)  Pulse:  [61-88] 61  Resp:  [16-18] 16  SpO2:  [92 %-96 %] 93 %  BP: (107-168)/() 144/73     Weight: 70 kg (154 lb 5.2 oz)  Height: 5' 5" (165.1 cm)  Body mass index is 25.68 kg/m².      Intake/Output Summary (Last 24 hours) at 3/14/2023 1512  Last data filed at 3/14/2023 0400  Gross per 24 hour   Intake --   Output 200 ml   Net -200 ml       Ortho/SPM Exam  General the patient is alert and oriented x3 no acute distress nontoxic-appearing appropriate affect.    Constitutional: Vital signs are examined and stable.  Resp: No signs of labored breathing               LLE: -Skin:No signs of new abrasions or lacerations, no scars           -MSK: " Hip and Knee F/E, EHL/FHL, Gastroc/Tib anterior Strength 5/5           -Neuro:  Sensation intact to light touch L3-S1 dermatomes           -Lymphatic: No signs of lymphadenopathy           -CV: Capillary refill is less than 2 seconds. DP/PT pulses 2/4. Compartments soft and compressible                      RLE: -Skin: no signs of new abrasions or lacerations, no scars           -MSK: : Hip and Knee F/E, EHL/FHL, Gastroc/Tib anterior Strength 5/5           -Neuro:  Sensation intact to light touch L3-S1 dermatomes           -Lymphatic: No signs of lymphadenopathy           -CV: Capillary refill is less than 2 seconds. DP/PT pulses  2/4. Compartments soft and compressible.     Significant Labs:   Recent Lab Results         03/14/23  0825        Anion Gap 9.0       Baso # 0.06       Basophil % 0.9       BUN 16.6       BUN/CREAT RATIO 18       Calcium 9.2       Chloride 105       CO2 26       Creatinine 0.90       eGFR >60       Eos # 0.17       Eosinophil % 2.4       Glucose 127       Hematocrit 51.3       Hemoglobin 16.1       Immature Grans (Abs) 0.03       Immature Granulocytes 0.4       INR 2.87       Lymph # 0.97       LYMPH % 13.8       MCH 31.1       MCHC 31.4       MCV 99.0       Mono # 0.82       Mono % 11.7       MPV 10.6       Neut # 4.97       Neut % 70.8       nRBC 0.0       Platelets 183       Potassium 4.5       Protime 29.5       RBC 5.18       RDW 14.7       Sodium 140       Thyroid Stimulating Hormone 3.849       WBC 7.0             All pertinent labs within the past 24 hours have been reviewed.  Recent Lab Results         03/14/23  0825   03/13/23  1407        Albumin/Globulin Ratio   0.8       Albumin   3.1       Alkaline Phosphatase   119       ALT   11       Anion Gap 9.0         AST   27       Baso # 0.06   0.04       Basophil % 0.9   0.6       BILIRUBIN TOTAL   1.8       BUN 16.6   20.0       BUN/CREAT RATIO 18         Calcium 9.2   9.0       Chloride 105   103       CO2 26   26       Creatinine  0.90   1.13       eGFR >60   47       Eos # 0.17   0.11       Eosinophil % 2.4   1.8       Globulin, Total   4.0       Glucose 127   148       Hematocrit 51.3   50.1       Hemoglobin 16.1   16.1       Immature Grans (Abs) 0.03   0.02       Immature Granulocytes 0.4   0.3       INR 2.87         Lymph # 0.97   0.91       LYMPH % 13.8   14.7       MCH 31.1   31.3       MCHC 31.4   32.1       MCV 99.0   97.3       Mono # 0.82   0.71       Mono % 11.7   11.5       MPV 10.6   10.6       Neut # 4.97   4.41       Neut % 70.8   71.1       nRBC 0.0   0.0       Platelets 183   176       Potassium 4.5   4.6       PROTEIN TOTAL   7.1       Protime 29.5         RBC 5.18   5.15       RDW 14.7   14.8       Sodium 140   138       Thyroid Stimulating Hormone 3.849         WBC 7.0   6.2                Significant Imaging: I have reviewed all pertinent imaging results/findings.  X-Ray Lumbar Spine Ap And Lateral    Result Date: 3/13/2023  EXAMINATION: XR LUMBAR SPINE AP AND LATERAL CLINICAL HISTORY: Severe pain; TECHNIQUE: AP, lateral and spot images were performed of the lumbar spine. COMPARISON: None FINDINGS: There is scoliosis present with convexity to the left.  There degenerative changes at L1-2.  There is dense vascular calcification noted.  There degenerative changes at L4-5 and L5-S1     Scoliosis and degenerative changes common acute fracture is not demonstrated. Electronically signed by: Fabian Dior MD Date:    03/13/2023 Time:    15:31    X-Ray Hip 2 or 3 views Right (with Pelvis when performed)    Result Date: 3/13/2023  EXAMINATION: XR HIP WITH PELVIS WHEN PERFORMED, 2 OR 3  VIEWS RIGHT CLINICAL HISTORY: Pain, unspecified TECHNIQUE: AP view of the pelvis and frog leg lateral view of the right hip were performed. COMPARISON: None FINDINGS: There are no acute fractures seen.  There is no dislocation.  There are prominent lateral margins of the acetabulum bilaterally.     No acute abnormalities are seen Electronically  signed by: Fabian Dior MD Date:    03/13/2023 Time:    15:31    CT Lumbar Spine Without Contrast    Result Date: 3/13/2023  EXAMINATION: CT LUMBAR SPINE WITHOUT CONTRAST CLINICAL HISTORY: Lumbar radiculopathy, symptoms persist with conservative treatment; TECHNIQUE: Low-dose axial images with sagittal and coronal reformations are obtained through the lumbar spine.  Contrast was not administered.  Automatic exposure control (AEC) is utilized to reduce patient radiation exposure. COMPARISON: Plain film of the lumbar spine from 03/13/2023 FINDINGS: The vertebral body heights are well maintained.  There is evidence of levoscoliosis of the lumbar spine.  The bones are diffusely osteoporotic.  No acute fracture is seen.  No dislocation is seen. At the level of L1-L2 there is endplate sclerosis and loss of disc height.  There is moderate facet arthropathy seen.  There is a broad-based disc osteophyte complex seen which causes bilateral foraminal stenosis. At L2-L3 there is endplate sclerosis and some loss of disc height.  There is moderate facet arthropathy bilaterally.  There appears to be a broad-based disc osteophyte complex which causes bilateral foraminal stenosis. At L3-L4 moderate facet arthropathy seen bilaterally.  Another broad-based disc bulge is seen.  There is bowel foraminal stenosis. At L4-5 there is endplate sclerosis seen.  Moderate to severe facet arthropathy seen bilaterally.  There is a broad-based disc osteophyte complex seen.  There is bilateral foraminal stenosis. At L5-S1 mild facet arthropathy is seen bilaterally.  There is some foraminal stenosis seen on the left.     Multilevel degenerative changes seen as outlined above Levoscoliosis Electronically signed by: Godfrey Archer Date:    03/13/2023 Time:    17:43    NM Bone Scan Whole Body    Result Date: 3/14/2023  EXAMINATION: NM BONE SCAN WHOLE BODY CLINICAL HISTORY: Bone mass or bone pain, leg, benign features on xray; TECHNIQUE: Whole body  skeletal scintigraphy was performed following intravenous administration of 26.9 mCi of technetium-99m MDP. COMPARISON: CT of right hip and lumbar spine March 13, 2023. FINDINGS: Skeletal scintigraphy is without abnormal uptake within the right proximal femoral metaphysis with abnormality described on the previous CT report.  There is lumbar levoscoliotic curvature and multilevel degenerative related related uptakes. There is more diffuse uptake within T9 or T10 vertebral body with pattern which suggests underlying compression deformity of indeterminate cause.  There is also thoracic kyphoscoliosis and additional mild degenerative related uptakes.     1. No right proximal femoral abnormality with bone scintigraphy identified. 2. Thoracolumbar compound scoliosis and multilevel degenerative related uptakes 3. T9 or T10 vertebral body more diffuse uptake suggests underlying compression deformity of indeterminate cause.  Please correlate clinically and further assess with CT of the thoracic spine. Electronically signed by: Omid Rodriguez Date:    03/14/2023 Time:    14:05    CT Hip Without Contrast Right    Result Date: 3/13/2023  EXAMINATION: CT HIP WITHOUT CONTRAST RIGHT CLINICAL HISTORY: Hip pain, stress fracture suspected, neg xray; TECHNIQUE: Multiple axial images were obtained of the right hip and sagittal and coronal reconstructions were performed. COMPARISON: None FINDINGS: The bones are diffusely osteoporotic.  No obvious fracture seen but is cortical lucency seen in the proximal femoral metaphysis posteriorly and some periosteal elevation the proximal femoral metaphysis posteriorly.  Bone scan correlation is recommended.  No mass is seen.  No fluid collection is seen.  No abnormal lymphadenopathy seen.     Cortical irregularity and some mild periosteal elevation seen at the proximal femoral metaphysis posteriorly.  Bone scan or MRI correlation is recommended to exclude inflammatory/infectious etiology. This  report was flagged in Epic as abnormal. Electronically signed by: Godfrey Archer Date:    03/13/2023 Time:    16:59       Assessment/Plan:     Active Diagnoses:    Diagnosis Date Noted POA    Right hip pain [M25.551] 03/14/2023 Unknown    Diabetes mellitus [E11.9] 03/14/2023 Yes    Atrial fibrillation [I48.91] 03/14/2023 Yes    Primary hypertension [I10] 03/14/2023 Yes      Problems Resolved During this Admission:     Patient is a 87-year-old female bone scan is negative for orthopedic problem.  Recommend evaluation for her spine.  Patient is able to straight leg raise off the bed no groin pain.  Weightbearing as tolerated physical therapy non op.                    This note/OR report was created with the assistance of  voice recognition software or phone  dictation.  There may be transcription errors as a result of using this technology however minimal. Effort has been made to assure accuracy of transcription but any obvious errors or omissions should be clarified with the author of the document.       Steve Pierce, DO   Orthopedic Trauma Surgery  [facility] - 4th Floor Medical Telemetry

## 2023-03-14 NOTE — PROGRESS NOTES
Inpatient Nutrition Assessment    Admit Date: 3/13/2023   Total duration of encounter: 1 day     Nutrition Recommendation/Prescription     Continue oral diet as tolerated.  Encouraged intake.  Add Boost Glucose Control (provides 250 kcal, 14 g protein per serving).    Communication of Recommendations: reviewed with patient/caregiver and reviewed with nurse    Nutrition Assessment     Malnutrition Assessment/Nutrition-Focused Physical Exam    Malnutrition in the context of chronic illness  Degree of Malnutrition: non-severe (moderate) malnutrition  Energy Intake: </= 75% of estimated energy requirement for >/= 1 month  Interpretation of Weight Loss: 20% in 1 year  Body Fat: mild depletion  Area of Body Fat Loss: orbital region  and upper arm region - triceps / biceps  Muscle Mass Loss: mild depletion  Area of Muscle Mass Loss: temple region - temporalis muscle and clavicle bone region - pectoralis major, deltoid, trapezius muscles  Fluid Accumulation: does not meet criteria  Edema: does not meet criteria  Reduced  Strength: unable to obtain  A minimum of two characteristics is recommended for diagnosis of either severe or non-severe malnutrition.    Chart Review    Reason Seen: continuous nutrition monitoring and malnutrition screening tool (MST)    Malnutrition Screening Tool Results   Have you recently lost weight without trying?: Yes: 34 lbs or more  Have you been eating poorly because of a decreased appetite?: Yes   MST Score: 5     Diagnosis:  Hip pain with suspected nondisplaced fracture  Heart disease  Controlled hypertension  Corrected hypothyroidism  Recent nausea, vomiting, poor appetite    Relevant Medical History: chronic atrial fibrillation, congestive heart failure, pacemaker, HTN, hyperlipidemia, DM, Graves disease, gout, pernicious anemia    Nutrition-Related Medications: famotidine, pantoprazole  Calorie Containing IV Medications: no significant kcals from medications at this  "time    Nutrition-Related Labs:  3/14/23 Glu 127    Diet/PN Order: Diet Adult Regular  Diet NPO  Oral Supplement Order: none  Tube Feeding Order: none  Appetite/Oral Intake: fair/25-50% of meals  Factors Affecting Nutritional Intake: decreased appetite  Food/Moravian/Cultural Preferences: none reported  Food Allergies: no known food allergies       Wound(s):   none noted    Comments    3/14/23 Patient reports decreased appetite and significant (20%) weight loss over the past year, drinks vanilla [place] Instant Breakfast at home, agreeable to Boost.    Anthropometrics    Height: 5' 5" (165.1 cm)    Last Weight: 70 kg (154 lb 5.2 oz) (23 1042) Weight Method: Standard Scale  BMI (Calculated): 25.7  BMI Classification: overweight (BMI 25-29.9)     Ideal Body Weight (IBW), Female: 125 lb     % Ideal Body Weight, Female (lb): 128 %                    Usual Body Weight (UBW), k.72 kg  % Usual Body Weight: 80.17     Usual Weight Provided By: patient    Wt Readings from Last 5 Encounters:   23 70 kg (154 lb 5.2 oz)   22 74.8 kg (165 lb)   21 83 kg (182 lb 15.7 oz)     Weight Change(s) Since Admission:  Admit Weight: 72.6 kg (160 lb) (23 1309)    Estimated Needs    Weight Used For Calorie Calculations: 70 kg (154 lb 5.2 oz)  Energy Calorie Requirements (kcal): 1597, 1.4 stress factor  Energy Need Method: Hale- Jeor  Weight Used For Protein Calculations: 70 kg (154 lb 5.2 oz)  Protein Requirements: 70-84 g, 1.0-1.2 g/kg  Fluid Requirements (mL): 1597, 1 ml/kcal  Temp: 97.9 °F (36.6 °C)       Enteral Nutrition    Patient not receiving enteral nutrition at this time.    Parenteral Nutrition    Patient not receiving parenteral nutrition support at this time.    Evaluation of Received Nutrient Intake    Calories: not meeting estimated needs  Protein: not meeting estimated needs    Patient Education    Not applicable.    Nutrition Diagnosis     PES: Malnutrition related to inadequate " energy intake as evidenced by <75% needs met >1 month, mild fat depletion, mild muscle depletion, and 20% weight loss in 1 year. (new)    Interventions/Goals     Intervention(s): general/healthful diet, commercial beverage, and collaboration with other providers  Goal: Meet greater than 75% of nutritional needs by follow-up. (new)    Monitoring & Evaluation     Dietitian will monitor food and beverage intake, weight, and glucose/endocrine profile.  Nutrition Risk/Follow-Up: high (follow-up in 1-4 days)   Please consult if re-assessment needed sooner.

## 2023-03-14 NOTE — H&P
Complaint:   debilitating pain in right hip and lower back    The patient is an 87-year-old woman with numerous medical problems who began having right-sided hip pain about 3 weeks ago.  Her son who is a physician's assistant gave an injection of lidocaine and Celestone and that seemed to help for a few days.  He thought she had a trochanteric bursitis.  The pain however progressed and began to involve the lower back as well and became completely debilitating.  She was no longer able to get out of bed and walk.  Prior to that she was walking around with a rolling walker all around her house.  She is also had a reduction in appetite as well as the development of some nausea and occasional vomiting in the past few days.  There was no recent fall in the only documented fall was some 6 months ago that did not seem to involve her hip.  Workup in the emergency department was suspicious for a nondisplaced fracture of the right femur.  The patient has a pacemaker defibrillator in an MRI is contraindicated.    Home medications are Coumadin 1 or 2 mg daily based on pro times, Synthroid 137 mcg daily, lovastatin 20 at bedtime, B12 1 cc IM monthly, tramadol 50 b.I.d. p.r.n. pain, Senokot daily, per DM daily, Coreg 12.5 b.I.d., Entresto 24-26 1 tablet b.I.d..      Allergies to Lovenox which caused a local reaction of her skin, Cipro, Phenergan, Keflex and cephalosporins, and  methenamine hippurate    Past medical history notable for congestive heart failure.  She has a history of systolic dysfunction and chronic atrial fibrillation.  Years ago she had a left atrial thrombus.  She is status post pacemaker defibrillator placement.  She has a history of hypertension and hyperlipidemia.    She has a history of mild diabetes mellitus no longer requiring medication.Also history of Graves disease status post radiation therapy now on replacement hormone.  History of gout and pernicious anemia.  She is status post pacemaker defibrillator  placement.  Status post cholecystectomy appendectomy hysterectomy bladder surgery.    She does not smoke nor drink alcohol.      He lives at home with her  and their son's house.  Her  is failing as well in general.    Review of systems.  She is not sure if he is lost weight.  No fevers chills or sweats.  No trouble swallowing and no history of unusual headaches.  No recent chest pain.  No undue shortness of breath.  No orthopnea PND.  She does have dependent rubor at least cyanotic toes when her down and they pink and when they are up.  Minimal cough.  No wheezing.  Some nausea and vomiting recently of yellowish mucus or liquid.  Some constipation recently.  She takes laxative chronically.  No blood in the stools or melanotic stools.      Physical exam reveals stable vital signs.  Most recently blood pressure 160/92 heart rate 76 and respiratory rate 18.  O2 sat 92% on room air.  General appearance is a frail elderly woman of average build in no distress.  HEENT exam was grossly unremarkable.  Neck is supple without thyromegaly or adenopathy.  Carotid pulses 1+ bilaterally.  ( Does have history of carotid stenosis).  Heart has a slightly irregular rhythm with normal rate.  No S3 gallop heard.  Lungs clear.  Abdomen nontender.  Extremities without clubbing cyanosis or edema.  Foot pulses normal.  Some minor varicosities noted.    Laboratory studies include an unremarkable CBC except for some mild hemoconcentration, chemistry profile notable for slightly high creatinine at 1.13, blood sugar 148, albumin low at 3.1.    X-rays of the hip and lumbar spine .  CT lumbar spine show degenerative changes.  CT of the hip was suspicious for possible hip fracture or infection or tumor.    Assessment:  1.  Debilitating hip pain with suspected nondisplaced fracture.      2.  History of heart disease including chronic atrial fibrillation, congestive heart failure from systolic dysfunction, with pacemaker  defibrillator    3. Controlled hypertension     4.  Mild diabetes mellitus     5.  Corrected hypothyroidism (history of Graves disease status post radiation treatment)     6. History of pernicious anemia.  Stable on treatment    7. History of gout    8. Recent nausea vomiting poor appetite etc..     Plan:  Will proceed with a nuclear bone scan to better assess the abnormality seen on CT scanning.  MRI included because of the pacemaker defibrillator.  We will get Orthopedic consultation.  We will get Physical therapy involved.  Will add proton pump inhibitor therapy as well as H2 blocker therapy because of the nausea vomiting.  Will also add Zofran.  Will also hold her anticoagulation with Coumadin and replace it temporarily with  heparin in case something surgical needs to be done.

## 2023-03-15 LAB
ANION GAP SERPL CALC-SCNC: 10 MEQ/L
BASOPHILS # BLD AUTO: 0.06 X10(3)/MCL (ref 0–0.2)
BASOPHILS NFR BLD AUTO: 1.6 %
BNP BLD-MCNC: 848.3 PG/ML
BUN SERPL-MCNC: 17.2 MG/DL (ref 9.8–20.1)
CALCIUM SERPL-MCNC: 9.2 MG/DL (ref 8.4–10.2)
CHLORIDE SERPL-SCNC: 101 MMOL/L (ref 98–107)
CO2 SERPL-SCNC: 28 MMOL/L (ref 23–31)
CREAT SERPL-MCNC: 0.93 MG/DL (ref 0.55–1.02)
CREAT/UREA NIT SERPL: 18
EOSINOPHIL # BLD AUTO: 0.17 X10(3)/MCL (ref 0–0.9)
EOSINOPHIL NFR BLD AUTO: 4.6 %
ERYTHROCYTE [DISTWIDTH] IN BLOOD BY AUTOMATED COUNT: 14.6 % (ref 11.5–17)
ERYTHROCYTE [SEDIMENTATION RATE] IN BLOOD: 87 MM/HR (ref 0–20)
GFR SERPLBLD CREATININE-BSD FMLA CKD-EPI: 60 MLS/MIN/1.73/M2
GLUCOSE SERPL-MCNC: 92 MG/DL (ref 82–115)
HCT VFR BLD AUTO: 49.1 % (ref 37–47)
HGB BLD-MCNC: 15.7 G/DL (ref 12–16)
IMM GRANULOCYTES # BLD AUTO: 0.01 X10(3)/MCL (ref 0–0.04)
IMM GRANULOCYTES NFR BLD AUTO: 0.3 %
INR BLD: 3.1 (ref 0–1.3)
LYMPHOCYTES # BLD AUTO: 0.99 X10(3)/MCL (ref 0.6–4.6)
LYMPHOCYTES NFR BLD AUTO: 26.8 %
MCH RBC QN AUTO: 31.7 PG
MCHC RBC AUTO-ENTMCNC: 32 G/DL (ref 33–36)
MCV RBC AUTO: 99 FL (ref 80–94)
MONOCYTES # BLD AUTO: 0.4 X10(3)/MCL (ref 0.1–1.3)
MONOCYTES NFR BLD AUTO: 10.8 %
NEUTROPHILS # BLD AUTO: 2.07 X10(3)/MCL (ref 2.1–9.2)
NEUTROPHILS NFR BLD AUTO: 55.9 %
NRBC BLD AUTO-RTO: 0 %
PLATELET # BLD AUTO: 179 X10(3)/MCL (ref 130–400)
PMV BLD AUTO: 10.7 FL (ref 7.4–10.4)
POTASSIUM SERPL-SCNC: 4.6 MMOL/L (ref 3.5–5.1)
PROTHROMBIN TIME: 31.3 SECONDS (ref 12.5–14.5)
RBC # BLD AUTO: 4.96 X10(6)/MCL (ref 4.2–5.4)
SODIUM SERPL-SCNC: 139 MMOL/L (ref 136–145)
WBC # SPEC AUTO: 3.7 X10(3)/MCL (ref 4.5–11.5)

## 2023-03-15 PROCEDURE — 63600175 PHARM REV CODE 636 W HCPCS: Performed by: INTERNAL MEDICINE

## 2023-03-15 PROCEDURE — 85610 PROTHROMBIN TIME: CPT | Performed by: INTERNAL MEDICINE

## 2023-03-15 PROCEDURE — 97166 OT EVAL MOD COMPLEX 45 MIN: CPT

## 2023-03-15 PROCEDURE — 83880 ASSAY OF NATRIURETIC PEPTIDE: CPT | Performed by: INTERNAL MEDICINE

## 2023-03-15 PROCEDURE — 99233 PR SUBSEQUENT HOSPITAL CARE,LEVL III: ICD-10-PCS | Mod: ,,, | Performed by: INTERNAL MEDICINE

## 2023-03-15 PROCEDURE — C9113 INJ PANTOPRAZOLE SODIUM, VIA: HCPCS | Performed by: INTERNAL MEDICINE

## 2023-03-15 PROCEDURE — 25000003 PHARM REV CODE 250: Performed by: INTERNAL MEDICINE

## 2023-03-15 PROCEDURE — 85025 COMPLETE CBC W/AUTO DIFF WBC: CPT | Performed by: INTERNAL MEDICINE

## 2023-03-15 PROCEDURE — 80048 BASIC METABOLIC PNL TOTAL CA: CPT | Performed by: INTERNAL MEDICINE

## 2023-03-15 PROCEDURE — 97535 SELF CARE MNGMENT TRAINING: CPT

## 2023-03-15 PROCEDURE — 85651 RBC SED RATE NONAUTOMATED: CPT | Performed by: INTERNAL MEDICINE

## 2023-03-15 PROCEDURE — 97162 PT EVAL MOD COMPLEX 30 MIN: CPT

## 2023-03-15 PROCEDURE — 11000001 HC ACUTE MED/SURG PRIVATE ROOM

## 2023-03-15 PROCEDURE — 99233 SBSQ HOSP IP/OBS HIGH 50: CPT | Mod: ,,, | Performed by: INTERNAL MEDICINE

## 2023-03-15 RX ORDER — WARFARIN 1 MG/1
1 TABLET ORAL DAILY
Status: DISCONTINUED | OUTPATIENT
Start: 2023-03-15 | End: 2023-03-16 | Stop reason: HOSPADM

## 2023-03-15 RX ADMIN — PANTOPRAZOLE SODIUM 40 MG: 40 INJECTION, POWDER, FOR SOLUTION INTRAVENOUS at 08:03

## 2023-03-15 RX ADMIN — WARFARIN SODIUM 1 MG: 1 TABLET ORAL at 04:03

## 2023-03-15 RX ADMIN — HEPARIN SODIUM 5000 UNITS: 5000 INJECTION, SOLUTION INTRAVENOUS; SUBCUTANEOUS at 06:03

## 2023-03-15 RX ADMIN — ONDANSETRON 4 MG: 4 TABLET, ORALLY DISINTEGRATING ORAL at 10:03

## 2023-03-15 RX ADMIN — SACUBITRIL AND VALSARTAN 1 TABLET: 49; 51 TABLET, FILM COATED ORAL at 09:03

## 2023-03-15 RX ADMIN — ACETAMINOPHEN 325MG 650 MG: 325 TABLET ORAL at 04:03

## 2023-03-15 RX ADMIN — SACUBITRIL AND VALSARTAN 1 TABLET: 49; 51 TABLET, FILM COATED ORAL at 08:03

## 2023-03-15 RX ADMIN — LEVOTHYROXINE SODIUM 137 MCG: 25 TABLET ORAL at 06:03

## 2023-03-15 RX ADMIN — ACETAMINOPHEN 325MG 650 MG: 325 TABLET ORAL at 09:03

## 2023-03-15 RX ADMIN — FAMOTIDINE 20 MG: 20 TABLET, FILM COATED ORAL at 08:03

## 2023-03-15 RX ADMIN — PRAVASTATIN SODIUM 20 MG: 10 TABLET ORAL at 09:03

## 2023-03-15 RX ADMIN — ACETAMINOPHEN 325MG 650 MG: 325 TABLET ORAL at 08:03

## 2023-03-15 RX ADMIN — CARVEDILOL 12.5 MG: 12.5 TABLET, FILM COATED ORAL at 08:03

## 2023-03-15 NOTE — NURSING
Nurses Note -- 4 Eyes      3/15/2023   12:47 PM      Skin assessed during: Daily Assessment      [] No Pressure Injuries Present    []Prevention Measures Documented      [] Yes- Altered Skin Integrity Present or Discovered   [x] LDA Added if Not in Epic (Describe Wound)   [x] New Altered Skin Integrity was Present on Admit and Documented in LDA   [] Wound Image Taken  *Pt has multiple bruises all over body*     Wound Care Consulted? No    Attending Nurse:  Jen Rincon LPN     Second RN/Staff Member:  Monisha Devi RN

## 2023-03-15 NOTE — PT/OT/SLP EVAL
Occupational Therapy   Evaluation    Name: Homa Jaquez  MRN: [id number]  Admitting Diagnosis: <principal problem not specified>  Recent Surgery: * No surgery found *      Recommendations:     Discharge Recommendations: home with home health  Discharge Equipment Recommendations:     Barriers to discharge:       Assessment:     Homa Jaquez is a 87 y.o. female with a medical diagnosis of debilitating back and hip pain.  She presents with the following performance deficits affecting function: weakness, impaired endurance, impaired self care skills, impaired functional mobility, gait instability, impaired balance.    Pt lives at home with , uses a rollator, slight assist for ADLs from . Pt ambulated to bathroom with RW, increased time to ambulate, no LOB. Recommend home with HH.     Rehab Prognosis: Good; patient would benefit from acute skilled OT services to address these deficits and reach maximum level of function.       Plan:     Patient to be seen 5 x/week to address the above listed problems via self-care/home management, therapeutic activities, therapeutic exercises  Plan of Care Expires:    Plan of Care Reviewed with: patient, spouse    Subjective       Occupational Profile:  Living Environment: 1 story, walk in shower  Previous level of function: slight assist for ADLs  Roles and Routines: -  Equipment Used at Home: rollator, bath bench, shower chair, wheelchair  Assistance upon Discharge:     Pain/Comfort:  Pain Rating 1: 3/10  Location - Side 1: Right  Location 1:  (buttocks)  Pain Addressed 1: Reposition    Patients cultural, spiritual, Denominational conflicts given the current situation:      Objective:     Communicated with: nrsg prior to session.  Patient found up in chair with   upon OT entry to room.    General Precautions: Standard,    Orthopedic Precautions:    Braces:    Respiratory Status: Room air    Occupational Performance:    Bed Mobility:    Patient completed Sit to  Supine with minimum assistance    Functional Mobility/Transfers:  Patient completed Toilet Transfer Step Transfer technique with contact guard assistance with  rolling walker  Functional Mobility: pt ambulated to bathroom with CGA using RW; no LOB; increased time     Activities of Daily Living:  Lower Body Dressing: moderate assistance -pt unable to thread BLE socks/underwear/pants  Toileting: contact guard assistance using grab bars and RW    Cognitive/Visual Perceptual:  Cognitive/Psychosocial Skills:     -       Oriented to: Person, Place, Time, and Situation     Physical Exam:  Upper Extremity Strength:    -       Right Upper Extremity: WFL  -       Left Upper Extremity: WFL        Patient left HOB elevated with all lines intact, call button in reach, and  present    GOALS:   Multidisciplinary Problems       Occupational Therapy Goals          Problem: Occupational Therapy    Goal Priority Disciplines Outcome Interventions   Occupational Therapy Goal     OT, PT/OT Ongoing, Progressing    Description: Goals to be met by: 3/29/2023     Patient will increase functional independence with ADLs by performing:    LE Dressing with Modified [place].  Grooming while standing with Modified [place].  Toileting from toilet with Modified [place] for hygiene and clothing management.   Toilet transfer to toilet with Modified [place].                         History:     Past Medical History:   Diagnosis Date    A-fib     Chronic cystitis     GERD (gastroesophageal reflux disease)     Graves disease     Hypertension     Hypothyroidism, unspecified     Spinal stenosis          Past Surgical History:   Procedure Laterality Date    APPENDECTOMY      BLADDER SUSPENSION      CARDIOVERSION  04/01/2015    CATARACT EXTRACTION      HYSTERECTOMY      LAPAROSCOPIC CHOLECYSTECTOMY  01/12/2016    SPHINCTEROTOMY OF URETHRA  01/11/2016    TONSILLECTOMY AND ADENOIDECTOMY         Time Tracking:     OT Date of  Treatment:    OT Start Time: 1324  OT Stop Time: 1355  OT Total Time (min): 31 min    Billable Minutes:Evaluation 21  Self Care/Home Management 10    3/15/2023

## 2023-03-15 NOTE — PROGRESS NOTES
Subjective:  The patient has no complaints this morning.  She did move much yesterday except for her studies.  Therapy was not done because of those pending results and concerns for fracture.  The family states that she does seem better on a routine schedule a pain medicines.  They also state she had 1 brief episode of confusion yesterday.      Objective:  She is afebrile.  Blood pressure has been consistently but mildly elevated over the past several checks.  Otherwise vital signs stable with normal O2 sats    General appearance is unremarkable.  She is alert and pleasant.  She remains hard of hearing.  She has hearing aids but does not use them.  Heart has an irregular rhythm with normal rate.  Lungs clear anteriorly.  Abdomen nontender.  Extremities without clubbing cyanosis or edema.    No new laboratory results to review.  Bone scan was done yesterday to assess the possible fracture of the right hip.  It was negative for any type of abnormality there but there was increase intake at T9 or T10 suspicious for possible fracture.  CT thoracic spine was recommended and performed and showed no evidence of fracture but only diffuse degenerative disc disease and arthritic changes.    Assessment:  1. Debilitating back and hip pain.  Likely arthritic in origin.  No evidence of fracture infection or tumor.    2. Hypertension.  Mildly elevated     3. History of heart failure with systolic dysfunction.      4. Chronic atrial fib with pacemaker defibrillator    5. Mild diabetes mellitus.  On no therapy     6. Corrected hypothyroidism    7. Recent nausea poor appetite etc..  That seems to be improving     Plan:  We will resume physical therapy today and see how she performs.  Hopefully her activity levels will improve the point that we can send her home in the near future.    I will increase the dose of Entresto because of the relatively high blood pressure.  I will discontinue the heparin injections and resume Coumadin since  there be no surgical intervention.

## 2023-03-15 NOTE — PLAN OF CARE
Problem: Occupational Therapy  Goal: Occupational Therapy Goal  Description: Goals to be met by: 3/29/2023     Patient will increase functional independence with ADLs by performing:    LE Dressing with Modified [place].  Grooming while standing with Modified [place].  Toileting from toilet with Modified [place] for hygiene and clothing management.   Toilet transfer to toilet with Modified [place].    Outcome: Ongoing, Progressing

## 2023-03-15 NOTE — PHYSICIAN QUERY
"PT Name: Homa Jaquez  MR #: [id number]    DOCUMENTATION CLARIFICATION     CDS/: Abhi Maier, RN, BSN   Contact information: [email or web address]     This form is a permanent document in the medical record.     Query Date: March 15, 2023    By submitting this query, we are merely seeking further clarification of documentation.. Please utilize your independent clinical judgment when addressing the question(s) below.    The medical record contains the following:   Indicators  Supporting Clinical Findings Location in Medical Record   X Energy Intake Energy Intake: </= 75% of estimated energy requirement for >/= 1 month Registered Dietitian PN 3/14/2023     X Weight Loss Interpretation of Weight Loss: 20% in 1 year Registered Dietitian PN 3/14/2023     X Fat Loss Body Fat: mild depletion  Area of Body Fat Loss: orbital region  and upper arm region - triceps / biceps   Registered Dietitian PN 3/14/2023     X Muscle Loss Muscle Mass Loss: mild depletion  Area of Muscle Mass Loss: temple region - temporalis muscle and clavicle bone region - pectoralis major, deltoid, trapezius muscles   Registered Dietitian PN 3/14/2023      Edema/Fluid Accumulation      Reduced  Strength (by dynamometer)     X Weight, BMI, Usual Body Weight Anthropometrics     Height: 5' 5" (165.1 cm)    Last Weight: 70 kg (154 lb 5.2 oz) (23 1042)     BMI (Calculated): 25.7  BMI Classification: overweight (BMI 25-29.9)    Ideal Body Weight (IBW), Female: 125 lb  % Ideal Body Weight, Female (lb): 128 %  Usual Body Weight (UBW), k.72 kg  % Usual Body Weight: 80.17  Usual Weight Provided By: patient     Wt Readings from Last 5 Encounters:   23 70 kg (154 lb 5.2 oz)   22 74.8 kg (165 lb)   21 83 kg (182 lb 15.7 oz)      Weight Change(s) Since Admission:  Admit Weight: 72.6 kg (160 lb) (23 1309)   Registered Dietitian PN 3/14/2023      Delayed Wound Healing     X Registered Dietician Diagnosis Malnutrition in " the context of chronic illness  Degree of Malnutrition: non-severe (moderate) malnutrition    PES: Malnutrition related to inadequate energy intake as evidenced by <75% needs met >1 month, mild fat depletion, mild muscle depletion, and 20% weight loss in 1 year. (new)     Registered Dietitian PN 3/14/2023     X Acute or Chronic Illness Diagnosis:  Hip pain with suspected nondisplaced fracture  Heart disease  Controlled hypertension  Corrected hypothyroidism  Recent nausea, vomiting, poor appetite     Relevant Medical History: chronic atrial fibrillation, congestive heart failure, pacemaker, HTN, hyperlipidemia, DM, Graves disease, gout, pernicious anemia   Registered Dietitian PN 3/14/2023      Social or Environmental Circumstances     X Treatment Nutrition recommendation/Prescription:  Continue oral diet as tolerated.  Encouraged intake.  Add Boost Glucose Control (provides 250 kcal, 14 g protein per serving).    Dietitian will monitor food and beverage intake, weight, and glucose/endocrine profile.  Nutrition Risk/Follow-Up: high (follow-up in 1-4 days)    Registered Dietitian PN 3/14/2023     X Other  -- She is also had a reduction in appetite as well as the development of some nausea and occasional vomiting in the past few days.       -- albumin low at 3.1.     -- Will add proton pump inhibitor therapy as well as H2 blocker therapy because of the nausea vomiting.  Will also add Zofran.                  -- Appetite/Oral Intake: fair/25-50% of meals  Factors Affecting Nutritional Intake: decreased appetite   -- Patient reports decreased appetite and significant (20%) weight loss over the past year, drinks vanilla [place] Instant Breakfast at home, agreeable to Boost.         Interventions/Goals:  Intervention(s): general/healthful diet, commercial beverage, and collaboration with other providers  Goal: Meet greater than 75% of nutritional needs by follow-up. (new)          Recent nausea poor appetite etc..  That  seems to be improving    Hospital Medicine H&P  3/14/2023                              Registered Dietitian PN 3/14/2023                    Registered Dietitian PN 3/14/2023                Hospital Medicine PN  3/15/2023       Academy of Nutrition and Dietetics (Academy) and the American Society for Parenteral and Enteral Nutrition (A.S.P.E.N.) Clinical Characteristics to support Malnutrition   Malnutrition in the Context of Acute Illness or Injury Malnutrition in the Context of Chronic Illness or Injury Malnutrition in the Context of Social or Environmental Circumstances   Malnutrition Level Moderate Severe Moderate Severe   Moderate   Severe   Energy Intake <75%                   >7 days <50%                 >5 days <75%           >1 month <75%                      >1 month   <75% for >3 months   <50% for >1 month   Weight Loss   1-2% in 1 week >2% in 1 week 5% in 1 month >5% in 1 month 5% in 1 month >5% in 1 month    5% in 1 month >5% in 1 month 7.5% in 3 months >7.5% in 3 months 7.5% in 3 months >7.5% in 3 months    7.5% in 3 months >7.5% in 3 months 10% in 6 months >10% in 6 months 10% in 6 months >10% in 6 months        20% in 1 year                    >20% in 1 year                                                                  20% in 1 year                            >20% in 1 year                                                  Subcutaneous Fat Loss Mild  Moderate  Mild  Severe    Mild   Severe   Muscle Loss Mild  Moderate  Mild  Severe    Mild   Severe   Edema/Fluid Accumulation Mild Moderate to severe  Mild  Severe   Mild   Severe   Reduced  Strength         (based on standards supplied by  of dynamometer) N/A Measurably reduced N/A Measurably reduced N/A Measurably reduced     Criteria for mild malnutrition is defined as 1 characteristic outlined above within the established moderate or severe parameters.  A minimum of 2 out of the 6 characteristics noted above are recommended for a  diagnosis of moderate or severe malnutrition.  Chronic illness/injury is a disease/condition lasting 3 months or longer.    The noted clinical guidelines are only system guidelines and do not replace the providers clinical judgment.      Provider, please specify diagnosis or diagnoses associated with above clinical findings:      [  ] Mild Malnutrition - 1 characteristic outlined above within the established moderate or severe parameters     [  x] Moderate Malnutrition - a minimum of 2 of the 6 moderate malnutrition characteristics noted above      [  ] Severe Malnutrition - a minimum of 2 of the 6 severe malnutrition characteristics noted above      [  ] Malnutrition, Unspecified degree     [  ] Other Nutritional Diagnosis (please specify): _______________________________     [  ] Clinically Undetermined     Please document in your progress notes daily for the duration of treatment until resolved and  include in your discharge summary.      References:    BERTHA Gurrola, & DAVID Love (2022, April). Assessment and management of anorexia and cachexia in palliative care. Retrieved May 23, 2022, from https://www.[employer]/contents/assessment-and-management-of-anorexia-and-cachexia-in-palliative-care?qcbemCci=2338&source=see_link     DUC Grimaldo, PhD, RD, Atul CARSON P., PhD, RN, JOSEFA Marie MD, PhD, Lou RICHARDSON A., MS, RD, [facility], ALEA Mendoza, MS, RD, The Academy Malnutrition Work Group, The A.S.P.E.N. Board of Directors. (2012). Consensus Statement: Academy of Nutrition and Dietetics and American Society for Parenteral and Enteral Nutrition: Characteristics Recommended for the Identification and Documentation of Adult Malnutrition (Undernutrition). Journal of Parenteral and Enteral Nutrition, 36(3), 275-283. doi:10.1177/3449900783411284     Form No. 17719

## 2023-03-15 NOTE — PT/OT/SLP EVAL
Physical Therapy Evaluation/Re-Evaluation    Patient Name:  Homa Jaquez   MRN:  [id number]    Recommendations:     Discharge Recommendations: home with home health   Discharge Equipment Recommendations: walker, rolling   Barriers to discharge:  pain, impaired mobility    Assessment:     Homa Jaquez is a 87 y.o. female admitted with a medical diagnosis of severe R hip pain.  She presents with the following impairments/functional limitations: impaired endurance, impaired functional mobility, gait instability, impaired balance, pain, decreased safety awareness. Patient tolerated PT evaluation well, mobilizing with Tate and RW. Patient with limited endurance, required seated rest after approx. 30ft. Pt is appropriate for continued acute PT services with recommended d/c home with family care and HH PT services.     Rehab Prognosis: Good; patient would benefit from acute skilled PT services to address these deficits and reach maximum level of function.    Recent Surgery: * No surgery found *      Plan:     During this hospitalization, patient to be seen 5 x/week to address the identified rehab impairments via gait training and progress toward the following goals:    Plan of Care Expires:  04/14/23    Subjective     Chief Complaint: none stated  Patient/Family Comments/goals: to get stronger  Pain/Comfort:  Pain Rating 1: 6/10  Location - Side 1: Right  Location 1: hip    Patients cultural, spiritual, Anabaptism conflicts given the current situation: no    Living Environment:  Pt lives with  in [facility] with no steps to enter.  Prior to admission, patients level of function was independent.  Equipment used at home: rollator.  DME owned (not currently used): none.  Upon discharge, patient will have assistance from family.    Objective:     Communicated with RN prior to session. Patient found supine with peripheral IV, SCD  upon PT entry to room.    General Precautions: Standard, fall  Orthopedic Precautions:Full  weight bearing   Braces: N/A  Respiratory Status: Room air      Exams:  Cognitive Exam:  Patient is oriented to Person, Place, Time, and Situation  RLE ROM: WNL  RLE Strength: WNL  LLE ROM: WNL  LLE Strength: WNL  Skin integrity: Visible skin intact      Functional Mobility:  Bed Mobility:  Supine to Sit: minimum assistance  Transfers:  Sit to Stand:  minimum assistance with rolling walker  Gait: pt ambulated 30ft with Tate and RW, decreased gait speed, step-to gait pattern.       AM-PAC 6 CLICK MOBILITY  Total Score:18       Treatment & Education:    Patient provided with verbal education regarding PT POC, safety with mobility.  Understanding was verbalized.     Patient left up in chair with all lines intact, call button in reach, and RN notified.    GOALS:   Multidisciplinary Problems       Physical Therapy Goals          Problem: Physical Therapy    Goal Priority Disciplines Outcome Goal Variances Interventions   Physical Therapy Goal     PT, PT/OT Ongoing, Progressing     Description: Goals to be met by: 04/15/23     Patient will increase functional independence with mobility by performin. Supine to sit with Modified [place]  2. Sit to stand transfer with Modified [place]  3. Gait  x 200 feet with Modified [place] using Rolling Walker.                          History:     Past Medical History:   Diagnosis Date    A-fib     Chronic cystitis     GERD (gastroesophageal reflux disease)     Graves disease     Hypertension     Hypothyroidism, unspecified     Spinal stenosis        Past Surgical History:   Procedure Laterality Date    APPENDECTOMY      BLADDER SUSPENSION      CARDIOVERSION  2015    CATARACT EXTRACTION      HYSTERECTOMY      LAPAROSCOPIC CHOLECYSTECTOMY  2016    SPHINCTEROTOMY OF URETHRA  2016    TONSILLECTOMY AND ADENOIDECTOMY         Time Tracking:     PT Received On: 03/15/23  PT Start Time: 1032     PT Stop Time: 1050  PT Total Time (min): 18 min     Billable  Minutes: Evaluation Mod complexity      03/15/2023

## 2023-03-15 NOTE — PLAN OF CARE
Problem: Physical Therapy  Goal: Physical Therapy Goal  Description: Goals to be met by: 04/15/23     Patient will increase functional independence with mobility by performin. Supine to sit with Modified [place]  2. Sit to stand transfer with Modified [place]  3. Gait  x 200 feet with Modified [place] using Rolling Walker.     Outcome: Ongoing, Progressing

## 2023-03-16 VITALS
OXYGEN SATURATION: 95 % | BODY MASS INDEX: 25.71 KG/M2 | HEIGHT: 65 IN | TEMPERATURE: 98 F | WEIGHT: 154.31 LBS | HEART RATE: 78 BPM | DIASTOLIC BLOOD PRESSURE: 67 MMHG | RESPIRATION RATE: 18 BRPM | SYSTOLIC BLOOD PRESSURE: 145 MMHG

## 2023-03-16 PROBLEM — R26.89 DECREASED FUNCTIONAL MOBILITY: Status: ACTIVE | Noted: 2023-03-16

## 2023-03-16 LAB
INR BLD: 3.31 (ref 0–1.3)
PROTHROMBIN TIME: 32.9 SECONDS (ref 12.5–14.5)

## 2023-03-16 PROCEDURE — 99239 PR HOSPITAL DISCHARGE DAY,>30 MIN: ICD-10-PCS | Mod: ,,, | Performed by: INTERNAL MEDICINE

## 2023-03-16 PROCEDURE — 85610 PROTHROMBIN TIME: CPT | Performed by: INTERNAL MEDICINE

## 2023-03-16 PROCEDURE — 99239 HOSP IP/OBS DSCHRG MGMT >30: CPT | Mod: ,,, | Performed by: INTERNAL MEDICINE

## 2023-03-16 PROCEDURE — 97530 THERAPEUTIC ACTIVITIES: CPT

## 2023-03-16 PROCEDURE — 25000003 PHARM REV CODE 250: Performed by: INTERNAL MEDICINE

## 2023-03-16 PROCEDURE — 97535 SELF CARE MNGMENT TRAINING: CPT | Mod: CO

## 2023-03-16 RX ORDER — SENNOSIDES 8.6 MG/1
1 TABLET ORAL DAILY PRN
Qty: 30 TABLET | Refills: 1
Start: 2023-03-16

## 2023-03-16 RX ORDER — ACETAMINOPHEN 500 MG
1000 TABLET ORAL 3 TIMES DAILY
Qty: 180 TABLET | Refills: 11
Start: 2023-03-16

## 2023-03-16 RX ORDER — TRAMADOL HYDROCHLORIDE 50 MG/1
50 TABLET ORAL EVERY 6 HOURS PRN
Qty: 40 TABLET | Refills: 1 | Status: SHIPPED | OUTPATIENT
Start: 2023-03-16

## 2023-03-16 RX ORDER — BISACODYL 10 MG
10 SUPPOSITORY, RECTAL RECTAL DAILY PRN
Status: DISCONTINUED | OUTPATIENT
Start: 2023-03-16 | End: 2023-03-16 | Stop reason: HOSPADM

## 2023-03-16 RX ORDER — ONDANSETRON 8 MG/1
8 TABLET, ORALLY DISINTEGRATING ORAL EVERY 8 HOURS PRN
Qty: 15 TABLET | Refills: 3 | Status: SHIPPED | OUTPATIENT
Start: 2023-03-16

## 2023-03-16 RX ORDER — PANTOPRAZOLE SODIUM 40 MG/1
40 TABLET, DELAYED RELEASE ORAL DAILY
Status: DISCONTINUED | OUTPATIENT
Start: 2023-03-16 | End: 2023-03-16 | Stop reason: HOSPADM

## 2023-03-16 RX ORDER — PANTOPRAZOLE SODIUM 40 MG/1
40 TABLET, DELAYED RELEASE ORAL DAILY
Qty: 30 TABLET | Refills: 11 | Status: SHIPPED | OUTPATIENT
Start: 2023-03-16 | End: 2024-03-15

## 2023-03-16 RX ADMIN — SACUBITRIL AND VALSARTAN 1 TABLET: 49; 51 TABLET, FILM COATED ORAL at 09:03

## 2023-03-16 RX ADMIN — LEVOTHYROXINE SODIUM 137 MCG: 25 TABLET ORAL at 06:03

## 2023-03-16 RX ADMIN — FAMOTIDINE 20 MG: 20 TABLET, FILM COATED ORAL at 08:03

## 2023-03-16 RX ADMIN — CARVEDILOL 12.5 MG: 12.5 TABLET, FILM COATED ORAL at 08:03

## 2023-03-16 RX ADMIN — ACETAMINOPHEN 325MG 650 MG: 325 TABLET ORAL at 08:03

## 2023-03-16 NOTE — PT/OT/SLP PROGRESS
Physical Therapy Treatment    Patient Name:  Homa Jaquez   MRN:  [id number]    Recommendations:     Discharge Recommendations: home health PT  Discharge Equipment Recommendations: walker, rolling  Barriers to discharge:  impaired mobility    Assessment:     Homa Jaquez is a 87 y.o. female admitted with a medical diagnosis of R hip pain.  She presents with the following impairments/functional limitations: weakness, impaired self care skills, impaired functional mobility, gait instability, decreased lower extremity function, pain. Patient tolerated PT treatment session well. Patient able to perform bed mobility and toilet transfer with Tate. Patient unable to complete BM during session despite increased time on toilet, CNA present to assist patient as needed. Patient continues to be appropriate for skilled acute PT with PT recommending home with home health and RW upon discharge.     Rehab Prognosis: Good; patient would benefit from acute skilled PT services to address these deficits and reach maximum level of function.    Recent Surgery: * No surgery found *      Plan:     During this hospitalization, patient to be seen 5 x/week to address the identified rehab impairments via gait training, therapeutic activities, therapeutic exercises, neuromuscular re-education and progress toward the following goals:    Plan of Care Expires:  04/16/23    Subjective     Chief Complaint: pain in RLE with transfers  Patient/Family Comments/goals: to go home  Pain/Comfort:  Pain Rating 1: 0/10 (at rest, increase in R hip pain with transfers)      Objective:     Communicated with RN prior to session.  Patient found HOB elevated with PureWick, peripheral IV, telemetry upon PT entry to room.     General Precautions: Standard, fall  Orthopedic Precautions: Full weight bearing  Braces: N/A  Respiratory Status: Room air     AM-PAC 6 CLICK MOBILITY  Turning over in bed (including adjusting bedclothes, sheets and blankets)?:  3  Sitting down on and standing up from a chair with arms (e.g., wheelchair, bedside commode, etc.): 3  Moving from lying on back to sitting on the side of the bed?: 3  Moving to and from a bed to a chair (including a wheelchair)?: 3  Need to walk in hospital room?: 3  Climbing 3-5 steps with a railing?: 3  Basic Mobility Total Score: 18       Treatment & Education:  Patient tolerated PT treatment session well. Patient reported no hip pain in supine position. Patient able to roll to B directions with Tate for BLE placement. Patient transitioned supine<>sit with Tate and multiple breaks with repositioning due to increased R hip pain. Patient required moderate verbal cues to achieve upright sitting EOB position due to increased pain in R hip in sitting position. Patient transitioned sit<>stand with Tate and verbal cues for hand placement on bed and RW to increase efficiency and independence with transfer. Patient ambulated x12ft to bathroom to perform stepping transfer to toilet, patient ambulated with RW, Tate, and shortened/cautious steps with BLEs. Patient performed sit<>stand x3 from toilet to readjust and increase ability to perform BM. Patient constantly re-adjusting seated position on toilet due to increased R hip pain in this position. Patient unable to complete BM during session, CNA present to assist patient as needed. Patient continues to be appropriate for skilled acute PT to increase independence with functional mobility.     Patient left  on toilet  with  RN notified and CNA present.    GOALS:   Multidisciplinary Problems       Physical Therapy Goals          Problem: Physical Therapy    Goal Priority Disciplines Outcome Goal Variances Interventions   Physical Therapy Goal     PT, PT/OT Ongoing, Progressing     Description: Goals to be met by: 04/15/23     Patient will increase functional independence with mobility by performin. Supine to sit with Modified [place]  2. Sit to stand transfer  with Modified [place]  3. Gait  x 200 feet with Modified [place] using Rolling Walker.                          Time Tracking:     PT Received On: 03/16/23  PT Start Time: 1049     PT Stop Time: 1123  PT Total Time (min): 34 min     Billable Minutes: Therapeutic Activity 34 minutes (2 units)    Note signed by supervising PT: Francine Espino PT.     Treatment Type: Treatment  PT/PTA: PT     Number of PTA visits since last PT visit: 1 03/16/2023

## 2023-03-16 NOTE — DISCHARGE SUMMARY
The patient is an 87-year-old woman who presented to the emergency room with debilitating pain in her right hip and right lower back.  She would had chronic problems there for several weeks but became debilitating a few days prior to admission.  She became totally bed-bound.  She began having problems with poor appetite poor oral intake as well as nausea.  There been no recent falls to account for the worsening pain.  Physical exam at time of admission revealed no acute findings.  For further details of her presenting signs and symptoms I refer the reader to my admit note dictated 03/14/2023.    Laboratory studies included unremarkable CBC except for high red cell count.  It was 16 and 50 H&H on admission and on the day prior to discharge down to 15.7 and 49.  White count was normal and platelet count was normal.  INR was 2.87 sed rate was 87.  Electrolytes unremarkable.  BUN creatinine unremarkable except for creatinine slightly high 1.13 and with hydration went up to 0.9.  Blood sugar was essentially normal.  Albumin was low at 3.1.  BNP was elevated at 848.  TSH 3.8.  That was normal.    X-ray studies included plain x-rays of the hips and spine which showed evidence of degenerative changes only.  A CT of the hip without contrast did show cortical irregularity with some periosteal elevation consistent with fracture versus inflammatory changes.  A nuclear bone scan was recommended than done and showed no increased uptake in the region of the hip but did show increased uptake at either T9 or T10.  A CT scan of the thoracic spine was done and showed scoliosis and multilevel degenerative changes but no evidence of fracture or other pathology in the spine.      The patient was seen by the orthopedic doctors who recommended conservative treatment.  She received Tylenol round-the-clock and p.r.n. tramadol.  She was treated with physical therapy and occupational therapy.  She did improve significantly.      She was also  treated with Protonix for possible reflux and p.r.n. Zofran.  Her nausea improved the vomiting resolved.  She began to eat more.      She did improve with physical therapy and overall is doing much better.  She will be discharged back to home today after therapy is done.  She will follow-up with me in about 2 weeks.  She will continue to have home health and they will provide physical therapy and occupational therapy.    Of note is the fact that her blood pressure was somewhat elevated during the hospital stay.  I did increase her Entresto dose for few days and a pressure did improve but we are reluctant to keep her on the high dose for now as her blood pressure at home prior to admission was consistently normal.    Overall she is stable and discharged home later today.      Discharge diagnosis:  1. Debilitating low back and right hip pain secondary to severe arthritis.  There is no evidence of fracture tumor or infection.  She has clinically improved     2. Nausea and poor appetite.  Likewise improving.  Likely some degree of gastritis    3. Compensated congestive heart failure.  She has a history of systolic dysfunction as well as chronic atrial fibrillation with pacemaker defibrillator     4. Hypertension.  Somewhat elevated during this stay, now improved will continue to monitor at home and if it goes back up will increase the Entresto again.      5. Diet-controlled diabetes mellitus    6. Corrected hypothyroidism    7. History of gout    9. History of pernicious anemia on B12 treatment    Discharge meds will be Tylenol 1000 mg t.i.d. around the clock and tramadol 50 Q 6 hours p.r.n. pain.  She will also have Zofran 8 mg ODT q.8 hours as needed, Protonix 40 daily and senna tablets for constipation as needed.  She will continue Coumadin at her prior dose, Synthroid 137 mcg daily, allopurinol 100 daily, Coreg 12.5 b.i.d., lovastatin 20 at bedtime, Entresto 04/20/2026 b.i.d., B12 1000 mcg IM monthly.      Diet  low-sodium.  Activity as tolerated.  Follow-up with me 2 weeks.

## 2023-03-16 NOTE — PLAN OF CARE
03/16/23 0955   Final Note   Assessment Type Final Discharge Note   Anticipated Discharge Disposition Home-Health  ([facility] services.)   Hospital Resources/Appts/Education Provided Post-Acute resouces added to AVS   Post-Acute Status   Post-Acute Authorization Home Health   Home Health Status Set-up Complete/Auth obtained   Discharge Delays None known at this time     D/C info. Sent to [facility] ED via [email or web address].

## 2023-03-16 NOTE — PT/OT/SLP PROGRESS
Occupational Therapy  Treatment    Homa Jaquez   MRN: [id number]   Admitting Diagnosis: Decreased functional mobility    OT Date of Treatment: 03/16/23   OT Start Time: 1429  OT Stop Time: 1452  OT Total Time (min): 23 min     Billable Minutes:  Self Care/Home Management 23  Total Minutes: 23     OT/EVY: EVY     Number of EVY visits since last OT visit: 1    General Precautions: Standard, fall  Orthopedic Precautions: Full weight bearing  Braces:      Spiritual, Cultural Beliefs, Gnosticism Practices, Values that Affect Care: no    Subjective:  Communicated with RN prior to session.  Pt UIC upon entry and agreeable to OT session.      Objective:  Patient found with: telemetry, peripheral IV    Functional Mobility:  Bed Mobility:   Supine to sit: Activity did not occur   Sit to supine: Activity did not occur   Rolling: Activity did not occur   Scooting: Activity did not occur    Grooming:  Pt completed hand washing standing at sink with CGA for balance.     Toilet Training:  Sit>stand from BS chair Min A. Functional mobility to bathroom with CGA. Completed toilet t/f and brief management in standing with CGA and good standing balance noted.     Balance:   Static Sit: NORMAL: No deviations seen in posture held statically  Dynamic Sit:  GOOD+: Maintains balance through MAXIMAL excursions of active trunk motion  Static Stand: GOOD-: Takes MODERATE challenges from all directions inconsistently  Dynamic stand: FAIR: Needs CONTACT GUARD during gait    Additional Treatment:      Patient left up in chair with all lines intact, call button in reach, and family present    ASSESSMENT:  Homa Jaquez is a 87 y.o. female with a medical diagnosis of Decreased functional mobility and presents with good ax tolerance. Provided education on scope of OT. Discussed DME and areas of concern before possible d/c today. Pt reported having help at home and the needed equipment to complete ADLs safely. Pt and family reported no concerns.      Rehab potential is excellent    Activity tolerance: Excellent    Discharge recommendations: home with home health     Equipment recommendations:       GOALS:   Multidisciplinary Problems       Occupational Therapy Goals          Problem: Occupational Therapy    Goal Priority Disciplines Outcome Interventions   Occupational Therapy Goal     OT, PT/OT Ongoing, Progressing    Description: Goals to be met by: 3/29/2023     Patient will increase functional independence with ADLs by performing:    LE Dressing with Modified [place].  Grooming while standing with Modified [place].  Toileting from toilet with Modified [place] for hygiene and clothing management.   Toilet transfer to toilet with Modified [place].                         Plan:  Patient to be seen 5 x/week to address the above listed problems via self-care/home management, therapeutic activities, therapeutic exercises  Plan of Care expires:    Plan of Care reviewed with: patient, family         03/16/2023

## 2023-03-16 NOTE — NURSING
Pt and pt family at bedside at time of discharge. Discharge instructions and medications disclosed to family. All questions and concerns discussed with family and pt. Pt discharged with home health to home via private vehicle in no acute distress.

## 2023-03-17 ENCOUNTER — PATIENT OUTREACH (OUTPATIENT)
Dept: ADMINISTRATIVE | Facility: CLINIC | Age: 88
End: 2023-03-17
Payer: MEDICARE

## 2023-03-17 NOTE — PROGRESS NOTES
C3 nurse spoke with patient's  for a TCC post hospital discharge follow up call. The patient has a scheduled HOSFU appointment with Franklyn Brito MD  on 03/31/2023 @ 0940 am.  The patient does not have a scheduled HOSFU appointment with Franklyn Brito MD  within 5-7 days post hospital discharge date 03/16/2023.     Message sent to PCP staff requesting they contact patient and schedule follow up appointment.

## 2023-03-23 ENCOUNTER — TELEPHONE (OUTPATIENT)
Dept: INTERNAL MEDICINE | Facility: CLINIC | Age: 88
End: 2023-03-23
Payer: MEDICARE

## 2023-03-23 NOTE — TELEPHONE ENCOUNTER
----- Message from Kenisha Pearson LPN sent at 3/22/2023  7:38 AM CDT -----  Regarding: chata Vallejo 3/30 @2:40  No labs needed.

## 2023-03-30 ENCOUNTER — OFFICE VISIT (OUTPATIENT)
Dept: INTERNAL MEDICINE | Facility: CLINIC | Age: 88
End: 2023-03-30
Payer: MEDICARE

## 2023-03-30 VITALS
HEIGHT: 65 IN | DIASTOLIC BLOOD PRESSURE: 70 MMHG | WEIGHT: 156.81 LBS | OXYGEN SATURATION: 96 % | BODY MASS INDEX: 26.13 KG/M2 | SYSTOLIC BLOOD PRESSURE: 130 MMHG | HEART RATE: 76 BPM

## 2023-03-30 DIAGNOSIS — I10 HYPERTENSION, UNSPECIFIED TYPE: ICD-10-CM

## 2023-03-30 DIAGNOSIS — R62.7 FAILURE TO THRIVE IN ADULT: ICD-10-CM

## 2023-03-30 DIAGNOSIS — E78.5 HYPERLIPIDEMIA, UNSPECIFIED HYPERLIPIDEMIA TYPE: ICD-10-CM

## 2023-03-30 DIAGNOSIS — I50.9 CONGESTIVE HEART FAILURE, UNSPECIFIED HF CHRONICITY, UNSPECIFIED HEART FAILURE TYPE: Primary | ICD-10-CM

## 2023-03-30 DIAGNOSIS — E11.9 TYPE 2 DIABETES MELLITUS WITHOUT COMPLICATION, WITHOUT LONG-TERM CURRENT USE OF INSULIN: ICD-10-CM

## 2023-03-30 DIAGNOSIS — M47.816 LUMBAR SPONDYLOSIS: ICD-10-CM

## 2023-03-30 DIAGNOSIS — E03.9 HYPOTHYROIDISM, UNSPECIFIED TYPE: ICD-10-CM

## 2023-03-30 PROCEDURE — 99214 OFFICE O/P EST MOD 30 MIN: CPT | Mod: ,,, | Performed by: INTERNAL MEDICINE

## 2023-03-30 PROCEDURE — 99214 PR OFFICE/OUTPT VISIT, EST, LEVL IV, 30-39 MIN: ICD-10-PCS | Mod: ,,, | Performed by: INTERNAL MEDICINE

## 2023-03-30 RX ORDER — SACUBITRIL AND VALSARTAN 49; 51 MG/1; MG/1
1 TABLET, FILM COATED ORAL 2 TIMES DAILY
Qty: 60 TABLET | Refills: 11 | Status: SHIPPED | OUTPATIENT
Start: 2023-03-30 | End: 2024-02-07

## 2023-03-30 NOTE — PROGRESS NOTES
Subjective:       Patient ID: Homa Jaquez is a 87 y.o. female.    Chief Complaint: Hospital Follow Up (Right hip pain is about the same as it was prior. Still taking the tylenol and ultram. Has been taking BP at home brought paper. )      The patient is an 87-year-old woman who seen in hospital follow-up.  She was hospitalized about 3 weeks ago for debilitating back pain and profound weakness.  She is also having nausea and vomiting.  She had a fairly extensive workup for the back pain including plain x-rays, CT scans of thoracic and lumbar spine, and nuclear bone scans.  There were early concerns that might be a compression fracture.  This was not borne out by further studies.  The pain appear to be a result of severe degenerative disc disease as well as arthritic changes of the spine along with severe foraminal stenosis at multiple levels.  No compression fractures or significant spinal stenosis noted.    Her abdominal pain abated with the use of proton pump inhibitor therapy.  It was thought there was probably some degree of gastritis.      Her heart failure remain controlled but her blood pressure was consistently elevated in the hospital.  She did tolerate the increased dose of Entresto there but I went back to the home dose because of reports of normal blood pressure consistently at home prior to admission.  However since discharge from the hospital blood pressure has remained generally mildly elevated.    Overall she is improved or at least stable from time of discharge.  No further nausea or vomiting and she is eating well.  However the back pain continues and worse when she moves.  It is not bad when she is at rest including sitting and laying down.    Review of Systems     Current Outpatient Medications on File Prior to Visit   Medication Sig Dispense Refill    acetaminophen (TYLENOL EXTRA STRENGTH) 500 MG tablet Take 2 tablets (1,000 mg total) by mouth 3 (three) times daily. 180 tablet 11     "allopurinoL (ZYLOPRIM) 100 MG tablet Take 1 tablet (100 mg total) by mouth once daily. 90 tablet 11    carvediloL (COREG) 12.5 MG tablet Take 12.5 mg by mouth 2 (two) times daily.      cyanocobalamin 1,000 mcg/mL injection 1ML (1000MCG) INTRAMUSCULARY ONCE MONTHLY  Strength: 1,000 mcg/mL 1 mL 11    levothyroxine (SYNTHROID) 137 MCG Tab tablet Take 137 mcg by mouth once daily.      lovastatin (MEVACOR) 20 MG tablet Take 20 mg by mouth once daily.      ondansetron (ZOFRAN-ODT) 8 MG TbDL Take 1 tablet (8 mg total) by mouth every 8 (eight) hours as needed. 15 tablet 3    pantoprazole (PROTONIX) 40 MG tablet Take 1 tablet (40 mg total) by mouth once daily. 30 tablet 11    senna (SENOKOT) 8.6 mg tablet Take 1 tablet by mouth daily as needed for Constipation. 30 tablet 1    traMADoL (ULTRAM) 50 mg tablet Take 1 tablet (50 mg total) by mouth every 6 (six) hours as needed for Pain. 40 tablet 1    warfarin sodium (COUMADIN ORAL) Take 2 mg by mouth.      [DISCONTINUED] ENTRESTO 24-26 mg per tablet Take 1 tablet by mouth 2 (two) times daily.       No current facility-administered medications on file prior to visit.     Objective:      /70   Pulse 76   Ht 5' 5" (1.651 m)   Wt 71.1 kg (156 lb 12.8 oz)   SpO2 96%   BMI 26.09 kg/m²     Physical Exam  Vitals reviewed.   Constitutional:       Appearance: Normal appearance.   HENT:      Head: Normocephalic.      Nose: Nose normal.      Mouth/Throat:      Pharynx: Oropharynx is clear.   Eyes:      Pupils: Pupils are equal, round, and reactive to light.   Neck:      Thyroid: No thyromegaly.   Cardiovascular:      Rate and Rhythm: Normal rate and regular rhythm.      Pulses: Normal pulses.      Comments: Heart rhythm is irregular.  Rate is normal.  Abdominal:      General: Abdomen is flat. Bowel sounds are normal.      Palpations: Abdomen is soft. There is no hepatomegaly, splenomegaly or mass.      Tenderness: There is no abdominal tenderness.   Musculoskeletal:      Cervical " back: Neck supple.      Comments: No edema.   Lymphadenopathy:      Cervical: No cervical adenopathy.   Skin:     General: Skin is warm and dry.   Neurological:      Mental Status: She is alert.     Protime done last week at home was 2.6.  She self monitors  Assessment:        1. Status post hospital stay for debilitating back pain along with nausea vomiting and generalized weakness.  Overall much improved .  She is being treated conservatively with Tylenol tramadol physical therapy etc..    2. Compensated congestive heart failure.  She has systolic dysfunction     3. Hypertension.  Blood pressure remains mildly elevated     4. Lumbar spondylosis    5. Diet-controlled diabetes mellitus.  No need for meds     6. Corrected hypothyroidism.    7. Adult failure to thrive.  Somewhat improved but prognosis guarded       Plan:     1. Increase Entresto to 49/51, 1 p.o. b.i.d.    2. Home health to check CBC BMP BNP in about 2 weeks     3. Follow-up with me 2 months

## 2023-04-26 ENCOUNTER — EXTERNAL HOME HEALTH (OUTPATIENT)
Dept: HOME HEALTH SERVICES | Facility: HOSPITAL | Age: 88
End: 2023-04-26
Payer: MEDICARE

## 2023-04-27 ENCOUNTER — DOCUMENT SCAN (OUTPATIENT)
Dept: HOME HEALTH SERVICES | Facility: HOSPITAL | Age: 88
End: 2023-04-27
Payer: MEDICARE

## 2023-05-01 ENCOUNTER — TELEPHONE (OUTPATIENT)
Dept: INTERNAL MEDICINE | Facility: CLINIC | Age: 88
End: 2023-05-01
Payer: MEDICARE

## 2023-05-01 RX ORDER — HYDROCODONE BITARTRATE AND ACETAMINOPHEN 5; 325 MG/1; MG/1
TABLET ORAL
Qty: 20 TABLET | Refills: 0 | Status: SHIPPED | OUTPATIENT
Start: 2023-05-01

## 2023-05-01 NOTE — TELEPHONE ENCOUNTER
Called and spoke to Patient and  that a Rx of norco was sent to Pharmacy to be taken 1 hour before therapy only. Verbalized understanding and thanked for calling

## 2023-05-06 PROCEDURE — G0179 PR HOME HEALTH MD RECERTIFICATION: ICD-10-PCS | Mod: ,,, | Performed by: INTERNAL MEDICINE

## 2023-05-06 PROCEDURE — G0179 MD RECERTIFICATION HHA PT: HCPCS | Mod: ,,, | Performed by: INTERNAL MEDICINE

## 2023-05-19 ENCOUNTER — DOCUMENT SCAN (OUTPATIENT)
Dept: HOME HEALTH SERVICES | Facility: HOSPITAL | Age: 88
End: 2023-05-19
Payer: MEDICARE

## 2023-05-22 ENCOUNTER — TELEPHONE (OUTPATIENT)
Dept: INTERNAL MEDICINE | Facility: CLINIC | Age: 88
End: 2023-05-22
Payer: MEDICARE

## 2023-05-22 NOTE — TELEPHONE ENCOUNTER
----- Message from Kenisha Pearson LPN sent at 5/22/2023  1:17 PM CDT -----  Regarding: chata Vallejo 5/30 @2:20  Non fasting labs needed.

## 2023-06-15 ENCOUNTER — EXTERNAL HOME HEALTH (OUTPATIENT)
Dept: HOME HEALTH SERVICES | Facility: HOSPITAL | Age: 88
End: 2023-06-15
Payer: MEDICARE

## 2023-06-22 ENCOUNTER — TELEPHONE (OUTPATIENT)
Dept: INTERNAL MEDICINE | Facility: CLINIC | Age: 88
End: 2023-06-22
Payer: MEDICARE

## 2023-06-22 NOTE — TELEPHONE ENCOUNTER
----- Message from Kenisha Pearson LPN sent at 6/20/2023  9:42 AM CDT -----  Regarding: chata Vallejo 6/29 @3:00  Non fasting labs needed.

## 2023-06-28 ENCOUNTER — DOCUMENTATION ONLY (OUTPATIENT)
Dept: INTERNAL MEDICINE | Facility: CLINIC | Age: 88
End: 2023-06-28
Payer: MEDICARE

## 2023-06-29 ENCOUNTER — OFFICE VISIT (OUTPATIENT)
Dept: INTERNAL MEDICINE | Facility: CLINIC | Age: 88
End: 2023-06-29
Payer: MEDICARE

## 2023-06-29 VITALS
SYSTOLIC BLOOD PRESSURE: 127 MMHG | HEIGHT: 65 IN | WEIGHT: 147.81 LBS | RESPIRATION RATE: 18 BRPM | OXYGEN SATURATION: 95 % | HEART RATE: 83 BPM | BODY MASS INDEX: 24.63 KG/M2 | DIASTOLIC BLOOD PRESSURE: 81 MMHG

## 2023-06-29 DIAGNOSIS — I48.91 ATRIAL FIBRILLATION, UNSPECIFIED TYPE: ICD-10-CM

## 2023-06-29 DIAGNOSIS — I10 HYPERTENSION, UNSPECIFIED TYPE: ICD-10-CM

## 2023-06-29 DIAGNOSIS — E03.9 HYPOTHYROIDISM, UNSPECIFIED TYPE: ICD-10-CM

## 2023-06-29 DIAGNOSIS — I50.9 CONGESTIVE HEART FAILURE, UNSPECIFIED HF CHRONICITY, UNSPECIFIED HEART FAILURE TYPE: Primary | ICD-10-CM

## 2023-06-29 DIAGNOSIS — E11.9 TYPE 2 DIABETES MELLITUS WITHOUT COMPLICATION, WITHOUT LONG-TERM CURRENT USE OF INSULIN: ICD-10-CM

## 2023-06-29 DIAGNOSIS — E78.5 HYPERLIPIDEMIA, UNSPECIFIED HYPERLIPIDEMIA TYPE: ICD-10-CM

## 2023-06-29 DIAGNOSIS — R62.7 FAILURE TO THRIVE IN ADULT: ICD-10-CM

## 2023-06-29 PROCEDURE — 99214 PR OFFICE/OUTPT VISIT, EST, LEVL IV, 30-39 MIN: ICD-10-PCS | Mod: ,,, | Performed by: INTERNAL MEDICINE

## 2023-06-29 PROCEDURE — 99214 OFFICE O/P EST MOD 30 MIN: CPT | Mod: ,,, | Performed by: INTERNAL MEDICINE

## 2023-06-29 RX ORDER — LEVOTHYROXINE SODIUM 125 UG/1
125 TABLET ORAL DAILY
Qty: 90 TABLET | Refills: 3 | Status: SHIPPED | OUTPATIENT
Start: 2023-06-29

## 2023-06-29 NOTE — PROGRESS NOTES
Subjective:       Patient ID: Homa Jaquez is a 87 y.o. female.    Chief Complaint: Follow-up      The patient is an 87-year-old woman in for follow-up of multiple problems.  She was here about 3 months ago we increased her dose of Entresto.  She does have systolic dysfunction and has been responding well to Entresto.  Her blood pressure at home has been normal and occasionally mildly elevated.  Swelling and breathing issues are stable.  She really had no complaints.  She did see her electrophysiologist recently and he thought she was doing well and schedule her for follow-up in 1 year.  She does have a defibrillator pacemaker and has chronic atrial fib.    Follow-up    Review of Systems     Current Outpatient Medications on File Prior to Visit   Medication Sig Dispense Refill    acetaminophen (TYLENOL EXTRA STRENGTH) 500 MG tablet Take 2 tablets (1,000 mg total) by mouth 3 (three) times daily. 180 tablet 11    allopurinoL (ZYLOPRIM) 100 MG tablet Take 1 tablet (100 mg total) by mouth once daily. 90 tablet 11    carvediloL (COREG) 12.5 MG tablet Take 12.5 mg by mouth 2 (two) times daily.      cyanocobalamin 1,000 mcg/mL injection 1ML (1000MCG) INTRAMUSCULARY ONCE MONTHLY  Strength: 1,000 mcg/mL 1 mL 11    HYDROcodone-acetaminophen (NORCO) 5-325 mg per tablet To be taken one hour before physical therapy only 20 tablet 0    lovastatin (MEVACOR) 20 MG tablet Take 20 mg by mouth once daily.      ondansetron (ZOFRAN-ODT) 8 MG TbDL Take 1 tablet (8 mg total) by mouth every 8 (eight) hours as needed. 15 tablet 3    pantoprazole (PROTONIX) 40 MG tablet Take 1 tablet (40 mg total) by mouth once daily. 30 tablet 11    sacubitriL-valsartan (ENTRESTO) 49-51 mg per tablet Take 1 tablet by mouth 2 (two) times daily. 60 tablet 11    senna (SENOKOT) 8.6 mg tablet Take 1 tablet by mouth daily as needed for Constipation. 30 tablet 1    traMADoL (ULTRAM) 50 mg tablet Take 1 tablet (50 mg total) by mouth every 6 (six) hours as  "needed for Pain. 40 tablet 1    warfarin sodium (COUMADIN ORAL) Take 2 mg by mouth.      [DISCONTINUED] levothyroxine (SYNTHROID) 137 MCG Tab tablet Take 137 mcg by mouth once daily.       No current facility-administered medications on file prior to visit.     Objective:      /81 (BP Location: Right arm, Patient Position: Sitting, BP Method: Large (Manual))   Pulse 83   Resp 18   Ht 5' 5" (1.651 m)   Wt 67 kg (147 lb 12.8 oz)   SpO2 95%   BMI 24.60 kg/m²     Physical Exam  Vitals reviewed.   Constitutional:       Appearance: Normal appearance.   HENT:      Head: Normocephalic.      Nose: Nose normal.      Mouth/Throat:      Pharynx: Oropharynx is clear.   Eyes:      Pupils: Pupils are equal, round, and reactive to light.   Neck:      Thyroid: No thyromegaly.      Comments: No jugular venous distention.  Cardiovascular:      Rate and Rhythm: Normal rate. Rhythm irregular.      Pulses: Normal pulses.   Abdominal:      General: Abdomen is flat. Bowel sounds are normal.      Palpations: Abdomen is soft. There is no hepatomegaly, splenomegaly or mass.      Tenderness: There is no abdominal tenderness.   Musculoskeletal:      Cervical back: Neck supple.      Comments: There is no edema.   Lymphadenopathy:      Cervical: No cervical adenopathy.   Skin:     General: Skin is warm and dry.   Neurological:      Mental Status: She is alert.       Assessment:       1. Congestive heart failure, unspecified HF chronicity, unspecified heart failure type    2. Hypertension, unspecified type    3. Type 2 diabetes mellitus without complication, without long-term current use of insulin    4. Hypothyroidism, unspecified type    5. Hyperlipidemia, unspecified hyperlipidemia type    6. Failure to thrive in adult    7. Atrial fibrillation, unspecified type      1. Chronic heart failure with systolic dysfunction.  Improved on Entresto    2. Hypertension.  Overall acceptable control    3. Diet-controlled diabetes mellitus    4. " Over corrected hypothyroidism.      5. Hyperlipidemia.  Stable     6. Chronic atrial fib with pacemaker defibrillator.    Plan:     Reduce Synthroid to 125 mcg daily.  New prescription sent to pharmacy.  Follow-up with me 3 months with CBC CMP lipid A1c TSH prior

## 2023-08-22 ENCOUNTER — TELEPHONE (OUTPATIENT)
Dept: INTERNAL MEDICINE | Facility: CLINIC | Age: 88
End: 2023-08-22
Payer: MEDICARE

## 2023-08-22 NOTE — TELEPHONE ENCOUNTER
----- Message from Juliana Gamez sent at 8/22/2023  9:58 AM CDT -----  .Type:  Needs Medical Advice    Who Called: pt  Symptoms (please be specific):    How long has patient had these symptoms:    Pharmacy name and phone #:    Would the patient rather a call back or a response via [facility]?   Best Call Back Number: [phone]  Additional Information: pt needs copy for last month lab results  please fax top him

## 2023-09-07 DIAGNOSIS — R53.1 WEAK: Primary | ICD-10-CM

## 2023-09-07 RX ORDER — CYANOCOBALAMIN 1000 UG/ML
INJECTION, SOLUTION INTRAMUSCULAR; SUBCUTANEOUS
Qty: 1 ML | Refills: 11 | Status: SHIPPED | OUTPATIENT
Start: 2023-09-07

## 2023-10-18 ENCOUNTER — TELEPHONE (OUTPATIENT)
Dept: INTERNAL MEDICINE | Facility: CLINIC | Age: 88
End: 2023-10-18
Payer: MEDICARE

## 2023-10-18 NOTE — TELEPHONE ENCOUNTER
----- Message from Kenisha Pearson LPN sent at 10/18/2023 11:21 AM CDT -----  Regarding: chata Vallejo 10/26 @2:20  Fasting labs needed.

## 2023-10-25 ENCOUNTER — TELEPHONE (OUTPATIENT)
Dept: INTERNAL MEDICINE | Facility: CLINIC | Age: 88
End: 2023-10-25
Payer: MEDICARE

## 2023-10-25 NOTE — TELEPHONE ENCOUNTER
----- Message from Kenisha Pearson LPN sent at 10/25/2023 11:57 AM CDT -----  Regarding: chata Liu 11/2 @3:40  Fasting labs needed.

## 2024-01-03 ENCOUNTER — DOCUMENTATION ONLY (OUTPATIENT)
Dept: INTERNAL MEDICINE | Facility: CLINIC | Age: 89
End: 2024-01-03
Payer: MEDICARE

## 2024-01-03 LAB
CHOLEST SERPL-MSCNC: 143 MG/DL (ref 0–200)
CHOLEST SERPL-MSCNC: 143 MG/DL (ref 0–200)
HBA1C MFR BLD: 5.6 % (ref 4–6)
HBA1C MFR BLD: 5.6 % (ref 4–6)
HDLC SERPL-MCNC: 52 MG/DL (ref 35–70)
HDLC SERPL-MCNC: 52 MG/DL (ref 35–70)
LDLC SERPL CALC-MCNC: 70 MG/DL (ref 0–160)
LDLC SERPL CALC-MCNC: 70 MG/DL (ref 0–160)
TRIGL SERPL-MCNC: 107 MG/DL (ref 40–160)
TRIGL SERPL-MCNC: 107 MG/DL (ref 40–160)

## 2024-01-09 ENCOUNTER — TELEPHONE (OUTPATIENT)
Dept: INTERNAL MEDICINE | Facility: CLINIC | Age: 89
End: 2024-01-09
Payer: MEDICARE

## 2024-01-09 NOTE — TELEPHONE ENCOUNTER
----- Message from Kenisha Pearson LPN sent at 1/9/2024 10:42 AM CST -----  Regarding: chata Vallejo 1/17 @3:00  No labs needed.

## 2024-01-18 ENCOUNTER — DOCUMENTATION ONLY (OUTPATIENT)
Dept: INTERNAL MEDICINE | Facility: CLINIC | Age: 89
End: 2024-01-18
Payer: MEDICARE

## 2024-02-07 DIAGNOSIS — I50.23 ACUTE ON CHRONIC SYSTOLIC HEART FAILURE: Primary | ICD-10-CM

## 2024-02-07 RX ORDER — SACUBITRIL AND VALSARTAN 49; 51 MG/1; MG/1
1 TABLET, FILM COATED ORAL 2 TIMES DAILY
Qty: 45 TABLET | Refills: 13 | Status: SHIPPED | OUTPATIENT
Start: 2024-02-07

## 2024-06-07 DIAGNOSIS — E03.9 HYPOTHYROIDISM, UNSPECIFIED TYPE: Primary | ICD-10-CM

## 2024-06-10 RX ORDER — LEVOTHYROXINE SODIUM 125 UG/1
125 TABLET ORAL
Qty: 16 TABLET | Refills: 13 | Status: SHIPPED | OUTPATIENT
Start: 2024-06-10

## 2024-07-02 ENCOUNTER — HOSPITAL ENCOUNTER (INPATIENT)
Facility: HOSPITAL | Age: 89
LOS: 7 days | Discharge: NURSING, REHAB OR LONG-TERM CARE | DRG: 480 | End: 2024-07-09
Attending: EMERGENCY MEDICINE | Admitting: INTERNAL MEDICINE
Payer: MEDICARE

## 2024-07-02 DIAGNOSIS — Z01.818 PRE-OP TESTING: ICD-10-CM
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
## 2024-07-02 DIAGNOSIS — S72.142A CLOSED DISPLACED INTERTROCHANTERIC FRACTURE OF LEFT FEMUR, INITIAL ENCOUNTER: Primary | ICD-10-CM

## 2024-07-02 DIAGNOSIS — S72.142A CLOSED COMMINUTED INTERTROCHANTERIC FRACTURE OF LEFT FEMUR, INITIAL ENCOUNTER: ICD-10-CM

## 2024-07-02 DIAGNOSIS — Z01.818 PRE-OP EVALUATION: ICD-10-CM

## 2024-07-02 LAB
ALBUMIN SERPL-MCNC: 3.7 G/DL (ref 3.4–4.8)
ALBUMIN/GLOB SERPL: 0.9 RATIO (ref 1.1–2)
ALP SERPL-CCNC: 122 UNIT/L (ref 40–150)
ALT SERPL-CCNC: 16 UNIT/L (ref 0–55)
ANION GAP SERPL CALC-SCNC: 12 MEQ/L
APTT PPP: 37.2 SECONDS (ref 23.2–33.7)
AST SERPL-CCNC: 29 UNIT/L (ref 5–34)
BASOPHILS # BLD AUTO: 0.07 X10(3)/MCL
BASOPHILS NFR BLD AUTO: 1.3 %
BILIRUB SERPL-MCNC: 0.6 MG/DL
BUN SERPL-MCNC: 25.6 MG/DL (ref 9.8–20.1)
CALCIUM SERPL-MCNC: 9.2 MG/DL (ref 8.4–10.2)
CHLORIDE SERPL-SCNC: 110 MMOL/L (ref 98–107)
CO2 SERPL-SCNC: 19 MMOL/L (ref 23–31)
CREAT SERPL-MCNC: 1.45 MG/DL (ref 0.55–1.02)
CREAT/UREA NIT SERPL: 18
EOSINOPHIL # BLD AUTO: 0.21 X10(3)/MCL (ref 0–0.9)
EOSINOPHIL NFR BLD AUTO: 3.8 %
ERYTHROCYTE [DISTWIDTH] IN BLOOD BY AUTOMATED COUNT: 13.9 % (ref 11.5–17)
GFR SERPLBLD CREATININE-BSD FMLA CKD-EPI: 35 ML/MIN/1.73/M2
GLOBULIN SER-MCNC: 4.3 GM/DL (ref 2.4–3.5)
GLUCOSE SERPL-MCNC: 147 MG/DL (ref 82–115)
GROUP & RH: NORMAL
HCT VFR BLD AUTO: 48.7 % (ref 37–47)
HGB BLD-MCNC: 15.8 G/DL (ref 12–16)
IMM GRANULOCYTES # BLD AUTO: 0.02 X10(3)/MCL (ref 0–0.04)
IMM GRANULOCYTES NFR BLD AUTO: 0.4 %
INDIRECT COOMBS: NORMAL
INR PPP: 2.5
LYMPHOCYTES # BLD AUTO: 1.38 X10(3)/MCL (ref 0.6–4.6)
LYMPHOCYTES NFR BLD AUTO: 24.7 %
MCH RBC QN AUTO: 32.4 PG (ref 27–31)
MCHC RBC AUTO-ENTMCNC: 32.4 G/DL (ref 33–36)
MCV RBC AUTO: 100 FL (ref 80–94)
MONOCYTES # BLD AUTO: 0.5 X10(3)/MCL (ref 0.1–1.3)
MONOCYTES NFR BLD AUTO: 8.9 %
NEUTROPHILS # BLD AUTO: 3.41 X10(3)/MCL (ref 2.1–9.2)
NEUTROPHILS NFR BLD AUTO: 60.9 %
NRBC BLD AUTO-RTO: 0 %
OHS QRS DURATION: 126 MS
OHS QTC CALCULATION: 492 MS
PLATELET # BLD AUTO: 165 X10(3)/MCL (ref 130–400)
PMV BLD AUTO: 10.3 FL (ref 7.4–10.4)
POTASSIUM SERPL-SCNC: 4 MMOL/L (ref 3.5–5.1)
PROT SERPL-MCNC: 8 GM/DL (ref 5.8–7.6)
PROTHROMBIN TIME: 27.4 SECONDS (ref 12.5–14.5)
RBC # BLD AUTO: 4.87 X10(6)/MCL (ref 4.2–5.4)
SODIUM SERPL-SCNC: 141 MMOL/L (ref 136–145)
SPECIMEN OUTDATE: NORMAL
WBC # BLD AUTO: 5.59 X10(3)/MCL (ref 4.5–11.5)

## 2024-07-02 PROCEDURE — 63600175 PHARM REV CODE 636 W HCPCS: Performed by: EMERGENCY MEDICINE

## 2024-07-02 PROCEDURE — 85610 PROTHROMBIN TIME: CPT | Performed by: EMERGENCY MEDICINE

## 2024-07-02 PROCEDURE — 85025 COMPLETE CBC W/AUTO DIFF WBC: CPT | Performed by: PHYSICIAN ASSISTANT

## 2024-07-02 PROCEDURE — 93010 ELECTROCARDIOGRAM REPORT: CPT | Mod: ,,, | Performed by: INTERNAL MEDICINE

## 2024-07-02 PROCEDURE — 99285 EMERGENCY DEPT VISIT HI MDM: CPT | Mod: 25

## 2024-07-02 PROCEDURE — 80053 COMPREHEN METABOLIC PANEL: CPT | Performed by: PHYSICIAN ASSISTANT

## 2024-07-02 PROCEDURE — 51702 INSERT TEMP BLADDER CATH: CPT

## 2024-07-02 PROCEDURE — 96374 THER/PROPH/DIAG INJ IV PUSH: CPT

## 2024-07-02 PROCEDURE — 11000001 HC ACUTE MED/SURG PRIVATE ROOM

## 2024-07-02 PROCEDURE — 85730 THROMBOPLASTIN TIME PARTIAL: CPT | Performed by: EMERGENCY MEDICINE

## 2024-07-02 PROCEDURE — 86900 BLOOD TYPING SEROLOGIC ABO: CPT | Performed by: ORTHOPAEDIC SURGERY

## 2024-07-02 PROCEDURE — 93005 ELECTROCARDIOGRAM TRACING: CPT

## 2024-07-02 RX ORDER — HYDROMORPHONE HYDROCHLORIDE 2 MG/ML
0.5 INJECTION, SOLUTION INTRAMUSCULAR; INTRAVENOUS; SUBCUTANEOUS EVERY 4 HOURS PRN
Status: DISCONTINUED | OUTPATIENT
Start: 2024-07-02 | End: 2024-07-09 | Stop reason: HOSPADM

## 2024-07-02 RX ORDER — TALC
6 POWDER (GRAM) TOPICAL NIGHTLY PRN
Status: DISCONTINUED | OUTPATIENT
Start: 2024-07-02 | End: 2024-07-09 | Stop reason: HOSPADM

## 2024-07-02 RX ORDER — HYDROCODONE BITARTRATE AND ACETAMINOPHEN 7.5; 325 MG/1; MG/1
1 TABLET ORAL EVERY 8 HOURS PRN
Status: DISCONTINUED | OUTPATIENT
Start: 2024-07-03 | End: 2024-07-03

## 2024-07-02 RX ORDER — FENTANYL CITRATE 50 UG/ML
50 INJECTION, SOLUTION INTRAMUSCULAR; INTRAVENOUS
Status: COMPLETED | OUTPATIENT
Start: 2024-07-02 | End: 2024-07-02

## 2024-07-02 RX ORDER — METHOCARBAMOL 100 MG/ML
1000 INJECTION, SOLUTION INTRAMUSCULAR; INTRAVENOUS ONCE
Status: COMPLETED | OUTPATIENT
Start: 2024-07-02 | End: 2024-07-02

## 2024-07-02 RX ORDER — SODIUM CHLORIDE 9 MG/ML
INJECTION, SOLUTION INTRAVENOUS CONTINUOUS
Status: DISCONTINUED | OUTPATIENT
Start: 2024-07-03 | End: 2024-07-08

## 2024-07-02 RX ORDER — HYDROMORPHONE HYDROCHLORIDE 2 MG/ML
1 INJECTION, SOLUTION INTRAMUSCULAR; INTRAVENOUS; SUBCUTANEOUS EVERY 4 HOURS PRN
Status: DISCONTINUED | OUTPATIENT
Start: 2024-07-02 | End: 2024-07-09 | Stop reason: HOSPADM

## 2024-07-02 RX ORDER — SODIUM CHLORIDE 0.9 % (FLUSH) 0.9 %
10 SYRINGE (ML) INJECTION
Status: DISCONTINUED | OUTPATIENT
Start: 2024-07-02 | End: 2024-07-09 | Stop reason: HOSPADM

## 2024-07-02 RX ORDER — ONDANSETRON HYDROCHLORIDE 2 MG/ML
4 INJECTION, SOLUTION INTRAVENOUS EVERY 8 HOURS PRN
Status: DISCONTINUED | OUTPATIENT
Start: 2024-07-02 | End: 2024-07-09 | Stop reason: HOSPADM

## 2024-07-02 RX ADMIN — SODIUM CHLORIDE: 9 INJECTION, SOLUTION INTRAVENOUS at 11:07

## 2024-07-02 RX ADMIN — HYDROCODONE BITARTRATE AND ACETAMINOPHEN 1 TABLET: 7.5; 325 TABLET ORAL at 11:07

## 2024-07-02 RX ADMIN — FENTANYL CITRATE 50 MCG: 50 INJECTION, SOLUTION INTRAMUSCULAR; INTRAVENOUS at 06:07

## 2024-07-02 RX ADMIN — HYDROMORPHONE HYDROCHLORIDE 1 MG: 2 INJECTION INTRAMUSCULAR; INTRAVENOUS; SUBCUTANEOUS at 08:07

## 2024-07-02 RX ADMIN — METHOCARBAMOL 1000 MG: 100 INJECTION INTRAMUSCULAR; INTRAVENOUS at 10:07

## 2024-07-02 RX ADMIN — ONDANSETRON 4 MG: 2 INJECTION INTRAMUSCULAR; INTRAVENOUS at 10:07

## 2024-07-02 NOTE — ED PROVIDER NOTES
Encounter Date: 7/2/2024    SCRIBE #1 NOTE: I, Amanda Luz, am scribing for, and in the presence of,  Inderjit Humphrey MD. I have scribed the following portions of the note - Other sections scribed: HPI, ROS, PE.       History     Chief Complaint   Patient presents with    Fall     Trip and fall at [facility]. Complaints of L hip pain. L leg shortened and rotated externally. +2 pedal pulse noted.      Patient is a 88 year old female with a hx of a-fib, GERD, chronic cystitis, HTN, spinal stenosis, and hypothyroidism presents to the ED following a fall PTA. Patient reports she lost control of her walker and fell away from it. Patient reports she fell on her left side and reports left hip pain. She says her left hip has always been the weakest. Patient reports she taking coumadin. Her PCP is Dr. Brito. Her cardiologist is Dr. Mukherjee.   Patient's caregiver reports that patient cannot tolerate Lovenox or Eliquis. Caregiver reports patient is allergic to phenergan.     The history is provided by the patient and medical records. No  was used.     Review of patient's allergies indicates:   Allergen Reactions    Cefadroxil      Other reaction(s): Nausea or vomiting    Cefuroxime axetil     Cephalexin      Other reaction(s): NAUSEA AND VOMITING    Ciprofloxacin     Enoxaparin     Methenamine hippurate      Other reaction(s): Unknown    Promethazine      Past Medical History:   Diagnosis Date    A-fib     Chronic cystitis     GERD (gastroesophageal reflux disease)     Graves disease     Hypertension     Hypothyroidism, unspecified     Spinal stenosis      Past Surgical History:   Procedure Laterality Date    APPENDECTOMY      BLADDER SUSPENSION      CARDIOVERSION  04/01/2015    CATARACT EXTRACTION      HYSTERECTOMY      LAPAROSCOPIC CHOLECYSTECTOMY  01/12/2016    SPHINCTEROTOMY OF URETHRA  01/11/2016    TONSILLECTOMY AND ADENOIDECTOMY       Family History   Problem Relation Name Age of Onset     Cancer Father      Heart attack Father      Stroke Father       Social History     Tobacco Use    Smoking status: Former     Types: Cigarettes    Smokeless tobacco: Never   Substance Use Topics    Alcohol use: Yes    Drug use: Never     Review of Systems   Constitutional:  Negative for fatigue, fever and unexpected weight change.   HENT:  Negative for congestion and rhinorrhea.    Eyes:  Negative for pain.   Respiratory:  Negative for chest tightness, shortness of breath and wheezing.    Cardiovascular:  Negative for chest pain.   Gastrointestinal:  Negative for abdominal pain, constipation, diarrhea, nausea and vomiting.   Genitourinary:  Negative for dysuria.   Musculoskeletal:  Negative for back pain and neck pain.        Positive for left hip pain   Skin:  Negative for rash.   Allergic/Immunologic: Negative for environmental allergies, food allergies and immunocompromised state.   Neurological:  Positive for weakness (left hip). Negative for dizziness and speech difficulty.   Hematological:  Does not bruise/bleed easily.   Psychiatric/Behavioral:  Negative for sleep disturbance and suicidal ideas.        Physical Exam     Initial Vitals [07/02/24 1738]   BP Pulse Resp Temp SpO2   (!) 160/86 75 16 97.7 °F (36.5 °C) (!) 94 %      MAP       --         Physical Exam    Nursing note and vitals reviewed.  Constitutional: She appears well-developed and well-nourished.   HENT:   Head: Normocephalic and atraumatic.   Right Ear: External ear normal.   Left Ear: External ear normal.   Eyes: Conjunctivae and EOM are normal. Pupils are equal, round, and reactive to light.   Neck: Neck supple.   Normal range of motion.  Cardiovascular:  Normal rate, normal heart sounds and intact distal pulses. A regularly irregular rhythm present.           Pulmonary/Chest: Breath sounds normal.   Abdominal: Abdomen is soft. Bowel sounds are normal.   Musculoskeletal:         General: Normal range of motion.      Cervical back: Normal  range of motion and neck supple.      Comments: Shortened externally rotated left leg; Left hip tenderness to palpation      Neurological: She is alert and oriented to person, place, and time. GCS score is 15. GCS eye subscore is 4. GCS verbal subscore is 5. GCS motor subscore is 6.   Skin: Skin is warm and dry. Capillary refill takes less than 2 seconds.   Psychiatric: She has a normal mood and affect. Her behavior is normal. Judgment and thought content normal.         ED Course   Procedures  Labs Reviewed   COMPREHENSIVE METABOLIC PANEL - Abnormal; Notable for the following components:       Result Value    Chloride 110 (*)     CO2 19 (*)     Glucose 147 (*)     Blood Urea Nitrogen 25.6 (*)     Creatinine 1.45 (*)     Protein Total 8.0 (*)     Globulin 4.3 (*)     Albumin/Globulin Ratio 0.9 (*)     All other components within normal limits   CBC WITH DIFFERENTIAL - Abnormal; Notable for the following components:    Hct 48.7 (*)     .0 (*)     MCH 32.4 (*)     MCHC 32.4 (*)     All other components within normal limits   CBC W/ AUTO DIFFERENTIAL    Narrative:     The following orders were created for panel order CBC auto differential.  Procedure                               Abnormality         Status                     ---------                               -----------         ------                     CBC with Differential[[id number]]       Abnormal            Final result                 Please view results for these tests on the individual orders.   APTT   PROTIME-INR   TYPE & SCREEN     EKG Readings: (Independently Interpreted)   Initial Reading: No STEMI. Rhythm: Atrial Fibrillation. Heart Rate: 78. Ectopy: PVCs. Conduction: nonspecific intraventricular conduction delay. ST Segments: Normal ST Segments. T Waves Flipped: I, AVL and V6. Axis: Normal. Clinical Impression: Atrial Fibrillation with PVCs       Imaging Results              X-Ray Chest 1 View (Final result)  Result time 07/02/24 19:06:12       Final result by Karla Sims MD (07/02/24 19:06:12)                   Impression:      Prominence of the interstitium may be related to interstitial edema in the setting of enlarged cardiac silhouette.      Electronically signed by: Karla Sims  Date:    07/02/2024  Time:    19:06               Narrative:    EXAMINATION:  XR CHEST 1 VIEW    CLINICAL HISTORY:  Unspecified fall, initial encounter    TECHNIQUE:  Single frontal view of the chest was performed.    COMPARISON:  10/22/2018    FINDINGS:  LINES AND TUBES: Left subclavian defibrillator lead projects over the right ventricle.    MEDIASTINUM AND GINO: Cardiac silhouette is enlarged. Aortic atherosclerosis.    LUNGS: Mild prominence of the interstitium.    PLEURA:No pleural effusion. No pneumothorax.    OTHER: Probable mild asymmetry of the right 1st rib costochondral junction.  Suggest follow-up PA and lateral radiographs after resolution of patient's acute illness to reassess and exclude underlying nodule.                                       X-Ray Hip 2 or 3 views Left with Pelvis when performed (Final result)  Result time 07/02/24 19:04:28      Final result by Karla Sims MD (07/02/24 19:04:28)                   Impression:      Left femur intertrochanteric fracture      Electronically signed by: Karla Sims  Date:    07/02/2024  Time:    19:04               Narrative:    EXAMINATION:  XR HIP WITH PELVIS WHEN PERFORMED 2 OR 3 VIEWS LEFT    CLINICAL HISTORY:  Unspecified fall, initial encounter    TECHNIQUE:  AP view of the pelvis and AP and frog leg lateral view of the left hip were performed.    COMPARISON:  03/13/2023    FINDINGS:  BONE: Mildly comminuted intertrochanteric fracture of the left femur with mild displacement.    SOFT TISSUES: Unremarkable.                                       Medications   sodium chloride 0.9% flush 10 mL (has no administration in time range)   melatonin tablet 6 mg (has no administration  in time range)   HYDROmorphone (PF) injection 0.5 mg (has no administration in time range)   HYDROmorphone (PF) injection 1 mg (has no administration in time range)   ondansetron injection 4 mg (has no administration in time range)   fentaNYL injection 50 mcg (50 mcg Intravenous Given 7/2/24 1849)     Medical Decision Making  The differential diagnosis includes, but is not limited to, fracture, dislocation, and muscle strain.    Amount and/or Complexity of Data Reviewed  Independent Historian: caregiver     Details: Patient's caregiver reports that patient cannot tolerate Lovenox or Eliquis. Caregiver reports patient is allergic to phenergan.   Labs: ordered.  Radiology: ordered and independent interpretation performed.    Risk  Prescription drug management.  Decision regarding hospitalization.            Scribe Attestation:   Scribe #1: I performed the above scribed service and the documentation accurately describes the services I performed. I attest to the accuracy of the note.    Attending Attestation:           Physician Attestation for Scribe:  Physician Attestation Statement for Scribe #1: I, Inderjit Humphrey MD, reviewed documentation, as scribed by Amanda Luz in my presence, and it is both accurate and complete.             ED Course as of 07/02/24 1952 Tue Jul 02, 2024 1904 Paged on call for Dr. Brito [MB]   1931 Discussed with on call for Dr. Brito [MB]   1933 I notified Dr. Pierce (ortho) regarding injury.  I spoke with Dr. Dubose (on call for Dr. Brito) regarding admission. [CL]      ED Course User Index  [CL] Inderjit Humphrey MD  [MB] Amanda Luz                           Clinical Impression:  Final diagnoses:  [W19.XXXA] Fall  [S72.142A] Closed displaced intertrochanteric fracture of left femur, initial encounter (Primary)  [Z01.818] Pre-op testing          ED Disposition Condition    Admit Stable                Inderjit Humphrey MD  07/02/24 1937        Inderjit Humphrey MD  07/02/24 1952

## 2024-07-02 NOTE — FIRST PROVIDER EVALUATION
"Medical screening examination initiated.  I have conducted a focused provider triage encounter, findings are as follows:  Chief Complaint   Patient presents with    Fall     Trip and fall at [facility]. Complaints of L hip pain. L leg shortened and rotated externally. +2 pedal pulse noted.        Brief history of present illness:  88-year-old female presents to ED for evaluation after trip and fall.  Complains of left hip pain.  Left leg shortened and rotated    Vitals:    07/02/24 1738   BP: (!) 160/86   Pulse: 75   Resp: 16   Temp: 97.7 °F (36.5 °C)   TempSrc: Oral   SpO2: (!) 94%   Weight: 70.3 kg (155 lb)   Height: 5' 2" (1.575 m)       Pertinent physical exam:  Patient lying on stretcher awake with left leg shortened and rotated    Brief workup plan:  Labs, x-ray    Preliminary workup initiated; this workup will be continued and followed by the physician or advanced practice provider that is assigned to the patient when roomed.  "

## 2024-07-03 ENCOUNTER — ANESTHESIA (OUTPATIENT)
Dept: SURGERY | Facility: HOSPITAL | Age: 89
End: 2024-07-03
Payer: MEDICARE

## 2024-07-03 ENCOUNTER — ANESTHESIA EVENT (OUTPATIENT)
Dept: SURGERY | Facility: HOSPITAL | Age: 89
End: 2024-07-03
Payer: MEDICARE
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
## 2024-07-03 PROBLEM — S72.142A CLOSED COMMINUTED INTERTROCHANTERIC FRACTURE OF LEFT FEMUR: Status: ACTIVE | Noted: 2024-07-03

## 2024-07-03 PROCEDURE — 99223 1ST HOSP IP/OBS HIGH 75: CPT | Mod: 57,ICN,, | Performed by: ORTHOPAEDIC SURGERY

## 2024-07-03 PROCEDURE — C1769 GUIDE WIRE: HCPCS | Performed by: ORTHOPAEDIC SURGERY

## 2024-07-03 PROCEDURE — 63600175 PHARM REV CODE 636 W HCPCS

## 2024-07-03 PROCEDURE — 99223 1ST HOSP IP/OBS HIGH 75: CPT | Mod: ,,, | Performed by: INTERNAL MEDICINE

## 2024-07-03 PROCEDURE — 25000003 PHARM REV CODE 250

## 2024-07-03 PROCEDURE — 25000003 PHARM REV CODE 250: Performed by: PHYSICIAN ASSISTANT

## 2024-07-03 PROCEDURE — 63600175 PHARM REV CODE 636 W HCPCS: Mod: JZ,JG | Performed by: INTERNAL MEDICINE

## 2024-07-03 PROCEDURE — 36000711: Performed by: ORTHOPAEDIC SURGERY

## 2024-07-03 PROCEDURE — 63600175 PHARM REV CODE 636 W HCPCS: Performed by: ANESTHESIOLOGY

## 2024-07-03 PROCEDURE — 25000003 PHARM REV CODE 250: Performed by: ORTHOPAEDIC SURGERY

## 2024-07-03 PROCEDURE — 27201423 OPTIME MED/SURG SUP & DEVICES STERILE SUPPLY: Performed by: ORTHOPAEDIC SURGERY

## 2024-07-03 PROCEDURE — 25000003 PHARM REV CODE 250: Performed by: INTERNAL MEDICINE

## 2024-07-03 PROCEDURE — 0QS706Z REPOSITION LEFT UPPER FEMUR WITH INTRAMEDULLARY INTERNAL FIXATION DEVICE, OPEN APPROACH: ICD-10-PCS | Performed by: ORTHOPAEDIC SURGERY

## 2024-07-03 PROCEDURE — P9047 ALBUMIN (HUMAN), 25%, 50ML: HCPCS | Mod: JZ,JG

## 2024-07-03 PROCEDURE — 27245 TREAT THIGH FRACTURE: CPT | Mod: AS,LT,, | Performed by: PHYSICIAN ASSISTANT

## 2024-07-03 PROCEDURE — 63600175 PHARM REV CODE 636 W HCPCS: Mod: JZ,JG | Performed by: PHYSICIAN ASSISTANT

## 2024-07-03 PROCEDURE — 27245 TREAT THIGH FRACTURE: CPT | Mod: LT,,, | Performed by: ORTHOPAEDIC SURGERY

## 2024-07-03 PROCEDURE — 36000710: Performed by: ORTHOPAEDIC SURGERY

## 2024-07-03 PROCEDURE — 37000009 HC ANESTHESIA EA ADD 15 MINS: Performed by: ORTHOPAEDIC SURGERY

## 2024-07-03 PROCEDURE — 37000008 HC ANESTHESIA 1ST 15 MINUTES: Performed by: ORTHOPAEDIC SURGERY

## 2024-07-03 PROCEDURE — C1713 ANCHOR/SCREW BN/BN,TIS/BN: HCPCS | Performed by: ORTHOPAEDIC SURGERY

## 2024-07-03 PROCEDURE — 11000001 HC ACUTE MED/SURG PRIVATE ROOM

## 2024-07-03 PROCEDURE — 71000033 HC RECOVERY, INTIAL HOUR: Performed by: ORTHOPAEDIC SURGERY

## 2024-07-03 PROCEDURE — 63600175 PHARM REV CODE 636 W HCPCS: Performed by: ORTHOPAEDIC SURGERY

## 2024-07-03 DEVICE — BLADE HELICAL PERF GOLD 100MM
Type: IMPLANTABLE DEVICE | Site: FEMUR | Status: NON-FUNCTIONAL
Removed: 2024-07-15

## 2024-07-03 DEVICE — SCREW XL25 IM NAIL 42X5MM
Type: IMPLANTABLE DEVICE | Site: FEMUR | Status: NON-FUNCTIONAL
Removed: 2024-07-15

## 2024-07-03 DEVICE — SCREW LOCK XL25 5X46MM
Type: IMPLANTABLE DEVICE | Site: FEMUR | Status: NON-FUNCTIONAL
Removed: 2024-07-15

## 2024-07-03 DEVICE — IMPLANTABLE DEVICE
Type: IMPLANTABLE DEVICE | Site: FEMUR | Status: NON-FUNCTIONAL
Removed: 2024-07-15

## 2024-07-03 RX ORDER — DEXAMETHASONE SODIUM PHOSPHATE 4 MG/ML
INJECTION, SOLUTION INTRA-ARTICULAR; INTRALESIONAL; INTRAMUSCULAR; INTRAVENOUS; SOFT TISSUE
Status: DISCONTINUED | OUTPATIENT
Start: 2024-07-03 | End: 2024-07-03

## 2024-07-03 RX ORDER — ONDANSETRON HYDROCHLORIDE 2 MG/ML
INJECTION, SOLUTION INTRAVENOUS
Status: DISCONTINUED | OUTPATIENT
Start: 2024-07-03 | End: 2024-07-03

## 2024-07-03 RX ORDER — ONDANSETRON HYDROCHLORIDE 2 MG/ML
4 INJECTION, SOLUTION INTRAVENOUS DAILY PRN
Status: DISCONTINUED | OUTPATIENT
Start: 2024-07-03 | End: 2024-07-03 | Stop reason: HOSPADM

## 2024-07-03 RX ORDER — ALBUMIN HUMAN 250 G/1000ML
SOLUTION INTRAVENOUS
Status: DISCONTINUED | OUTPATIENT
Start: 2024-07-03 | End: 2024-07-03

## 2024-07-03 RX ORDER — SODIUM CHLORIDE, SODIUM GLUCONATE, SODIUM ACETATE, POTASSIUM CHLORIDE AND MAGNESIUM CHLORIDE 30; 37; 368; 526; 502 MG/100ML; MG/100ML; MG/100ML; MG/100ML; MG/100ML
INJECTION, SOLUTION INTRAVENOUS CONTINUOUS
Status: CANCELLED | OUTPATIENT
Start: 2024-07-03 | End: 2024-08-02

## 2024-07-03 RX ORDER — MORPHINE SULFATE 4 MG/ML
4 INJECTION, SOLUTION INTRAMUSCULAR; INTRAVENOUS EVERY 6 HOURS PRN
Status: DISCONTINUED | OUTPATIENT
Start: 2024-07-03 | End: 2024-07-09 | Stop reason: HOSPADM

## 2024-07-03 RX ORDER — METHOCARBAMOL 100 MG/ML
500 INJECTION, SOLUTION INTRAMUSCULAR; INTRAVENOUS ONCE
Status: COMPLETED | OUTPATIENT
Start: 2024-07-03 | End: 2024-07-03

## 2024-07-03 RX ORDER — MIDAZOLAM HYDROCHLORIDE 2 MG/2ML
2 INJECTION, SOLUTION INTRAMUSCULAR; INTRAVENOUS ONCE AS NEEDED
Status: CANCELLED | OUTPATIENT
Start: 2024-07-03 | End: 2035-11-29

## 2024-07-03 RX ORDER — HYDROMORPHONE HYDROCHLORIDE 2 MG/ML
0.2 INJECTION, SOLUTION INTRAMUSCULAR; INTRAVENOUS; SUBCUTANEOUS EVERY 5 MIN PRN
Status: DISCONTINUED | OUTPATIENT
Start: 2024-07-03 | End: 2024-07-03 | Stop reason: HOSPADM

## 2024-07-03 RX ORDER — SODIUM CHLORIDE 0.9 % (FLUSH) 0.9 %
10 SYRINGE (ML) INJECTION
Status: DISCONTINUED | OUTPATIENT
Start: 2024-07-03 | End: 2024-07-03 | Stop reason: HOSPADM

## 2024-07-03 RX ORDER — POLYETHYLENE GLYCOL 3350 17 G/17G
17 POWDER, FOR SOLUTION ORAL 2 TIMES DAILY
Status: DISCONTINUED | OUTPATIENT
Start: 2024-07-03 | End: 2024-07-09 | Stop reason: HOSPADM

## 2024-07-03 RX ORDER — METHOCARBAMOL 500 MG/1
500 TABLET, FILM COATED ORAL 3 TIMES DAILY
Status: DISCONTINUED | OUTPATIENT
Start: 2024-07-03 | End: 2024-07-09 | Stop reason: HOSPADM

## 2024-07-03 RX ORDER — OXYCODONE AND ACETAMINOPHEN 5; 325 MG/1; MG/1
1 TABLET ORAL EVERY 4 HOURS PRN
Status: DISCONTINUED | OUTPATIENT
Start: 2024-07-03 | End: 2024-07-09 | Stop reason: HOSPADM

## 2024-07-03 RX ORDER — ACETAMINOPHEN 500 MG
1000 TABLET ORAL 3 TIMES DAILY
Status: DISCONTINUED | OUTPATIENT
Start: 2024-07-03 | End: 2024-07-09 | Stop reason: HOSPADM

## 2024-07-03 RX ORDER — PROPOFOL 10 MG/ML
VIAL (ML) INTRAVENOUS
Status: DISCONTINUED | OUTPATIENT
Start: 2024-07-03 | End: 2024-07-03

## 2024-07-03 RX ORDER — TRANEXAMIC ACID 100 MG/ML
INJECTION, SOLUTION INTRAVENOUS
Status: DISCONTINUED | OUTPATIENT
Start: 2024-07-03 | End: 2024-07-03

## 2024-07-03 RX ORDER — VASOPRESSIN 20 [USP'U]/ML
INJECTION, SOLUTION INTRAMUSCULAR; SUBCUTANEOUS
Status: DISCONTINUED | OUTPATIENT
Start: 2024-07-03 | End: 2024-07-03

## 2024-07-03 RX ORDER — KETOROLAC TROMETHAMINE 30 MG/ML
INJECTION, SOLUTION INTRAMUSCULAR; INTRAVENOUS
Status: DISCONTINUED | OUTPATIENT
Start: 2024-07-03 | End: 2024-07-03

## 2024-07-03 RX ORDER — CARVEDILOL 12.5 MG/1
12.5 TABLET ORAL 2 TIMES DAILY
Status: DISCONTINUED | OUTPATIENT
Start: 2024-07-03 | End: 2024-07-09 | Stop reason: HOSPADM

## 2024-07-03 RX ORDER — OXYCODONE AND ACETAMINOPHEN 10; 325 MG/1; MG/1
1 TABLET ORAL EVERY 4 HOURS PRN
Status: DISCONTINUED | OUTPATIENT
Start: 2024-07-03 | End: 2024-07-09 | Stop reason: HOSPADM

## 2024-07-03 RX ORDER — ROCURONIUM BROMIDE 10 MG/ML
INJECTION, SOLUTION INTRAVENOUS
Status: DISCONTINUED | OUTPATIENT
Start: 2024-07-03 | End: 2024-07-03

## 2024-07-03 RX ORDER — SODIUM CHLORIDE, SODIUM LACTATE, POTASSIUM CHLORIDE, CALCIUM CHLORIDE 600; 310; 30; 20 MG/100ML; MG/100ML; MG/100ML; MG/100ML
INJECTION, SOLUTION INTRAVENOUS CONTINUOUS
Status: CANCELLED | OUTPATIENT
Start: 2024-07-03

## 2024-07-03 RX ORDER — ACETAMINOPHEN 10 MG/ML
INJECTION, SOLUTION INTRAVENOUS
Status: DISCONTINUED | OUTPATIENT
Start: 2024-07-03 | End: 2024-07-03

## 2024-07-03 RX ORDER — ESMOLOL HYDROCHLORIDE 10 MG/ML
INJECTION INTRAVENOUS
Status: DISCONTINUED | OUTPATIENT
Start: 2024-07-03 | End: 2024-07-03

## 2024-07-03 RX ORDER — SODIUM CHLORIDE, SODIUM GLUCONATE, SODIUM ACETATE, POTASSIUM CHLORIDE AND MAGNESIUM CHLORIDE 30; 37; 368; 526; 502 MG/100ML; MG/100ML; MG/100ML; MG/100ML; MG/100ML
INJECTION, SOLUTION INTRAVENOUS CONTINUOUS
Status: DISCONTINUED | OUTPATIENT
Start: 2024-07-03 | End: 2024-07-08

## 2024-07-03 RX ORDER — ATORVASTATIN CALCIUM 10 MG/1
20 TABLET, FILM COATED ORAL DAILY
Status: DISCONTINUED | OUTPATIENT
Start: 2024-07-04 | End: 2024-07-09 | Stop reason: HOSPADM

## 2024-07-03 RX ORDER — LEVOTHYROXINE SODIUM 125 UG/1
125 TABLET ORAL
Status: DISCONTINUED | OUTPATIENT
Start: 2024-07-04 | End: 2024-07-09 | Stop reason: HOSPADM

## 2024-07-03 RX ORDER — CLINDAMYCIN PHOSPHATE 900 MG/50ML
900 INJECTION, SOLUTION INTRAVENOUS
Status: COMPLETED | OUTPATIENT
Start: 2024-07-03 | End: 2024-07-04

## 2024-07-03 RX ORDER — MUPIROCIN 20 MG/G
OINTMENT TOPICAL 2 TIMES DAILY
Status: DISPENSED | OUTPATIENT
Start: 2024-07-03 | End: 2024-07-08

## 2024-07-03 RX ORDER — FENTANYL CITRATE 50 UG/ML
INJECTION, SOLUTION INTRAMUSCULAR; INTRAVENOUS
Status: DISCONTINUED | OUTPATIENT
Start: 2024-07-03 | End: 2024-07-03

## 2024-07-03 RX ORDER — PHENYLEPHRINE HYDROCHLORIDE 10 MG/ML
INJECTION INTRAVENOUS
Status: DISCONTINUED | OUTPATIENT
Start: 2024-07-03 | End: 2024-07-03

## 2024-07-03 RX ADMIN — SACUBITRIL AND VALSARTAN 1 TABLET: 49; 51 TABLET, FILM COATED ORAL at 08:07

## 2024-07-03 RX ADMIN — SUGAMMADEX 200 MG: 100 INJECTION, SOLUTION INTRAVENOUS at 10:07

## 2024-07-03 RX ADMIN — PHENYLEPHRINE HYDROCHLORIDE 100 MCG: 10 INJECTION INTRAVENOUS at 10:07

## 2024-07-03 RX ADMIN — HYDROMORPHONE HYDROCHLORIDE 0.2 MG: 2 INJECTION, SOLUTION INTRAMUSCULAR; INTRAVENOUS; SUBCUTANEOUS at 11:07

## 2024-07-03 RX ADMIN — ESMOLOL HYDROCHLORIDE 5 MG: 100 INJECTION, SOLUTION INTRAVENOUS at 09:07

## 2024-07-03 RX ADMIN — CARVEDILOL 12.5 MG: 12.5 TABLET, FILM COATED ORAL at 08:07

## 2024-07-03 RX ADMIN — VASOPRESSIN 1 UNITS: 20 INJECTION INTRAVENOUS at 10:07

## 2024-07-03 RX ADMIN — ROCURONIUM BROMIDE 50 MG: 10 SOLUTION INTRAVENOUS at 09:07

## 2024-07-03 RX ADMIN — SODIUM CHLORIDE, SODIUM GLUCONATE, SODIUM ACETATE, POTASSIUM CHLORIDE AND MAGNESIUM CHLORIDE: 526; 502; 368; 37; 30 INJECTION, SOLUTION INTRAVENOUS at 09:07

## 2024-07-03 RX ADMIN — PHENYLEPHRINE HYDROCHLORIDE 100 MCG: 10 INJECTION INTRAVENOUS at 09:07

## 2024-07-03 RX ADMIN — METHOCARBAMOL 500 MG: 500 TABLET ORAL at 08:07

## 2024-07-03 RX ADMIN — POLYETHYLENE GLYCOL 3350 17 G: 17 POWDER, FOR SOLUTION ORAL at 08:07

## 2024-07-03 RX ADMIN — ACETAMINOPHEN 1000 MG: 10 INJECTION, SOLUTION INTRAVENOUS at 10:07

## 2024-07-03 RX ADMIN — ESMOLOL HYDROCHLORIDE 2.5 MG: 100 INJECTION, SOLUTION INTRAVENOUS at 10:07

## 2024-07-03 RX ADMIN — PHENYLEPHRINE HYDROCHLORIDE 50 MCG: 10 INJECTION INTRAVENOUS at 10:07

## 2024-07-03 RX ADMIN — MUPIROCIN: 20 OINTMENT TOPICAL at 12:07

## 2024-07-03 RX ADMIN — TRANEXAMIC ACID 700 MG: 100 INJECTION, SOLUTION INTRAVENOUS at 09:07

## 2024-07-03 RX ADMIN — KETOROLAC TROMETHAMINE 15 MG: 30 INJECTION, SOLUTION INTRAMUSCULAR; INTRAVENOUS at 10:07

## 2024-07-03 RX ADMIN — CLINDAMYCIN PHOSPHATE 900 MG: 900 INJECTION, SOLUTION INTRAVENOUS at 09:07

## 2024-07-03 RX ADMIN — ONDANSETRON 4 MG: 2 INJECTION INTRAMUSCULAR; INTRAVENOUS at 10:07

## 2024-07-03 RX ADMIN — ALBUMIN (HUMAN) 100 ML: 12.5 SOLUTION INTRAVENOUS at 09:07

## 2024-07-03 RX ADMIN — CLINDAMYCIN PHOSPHATE 900 MG: 900 INJECTION, SOLUTION INTRAVENOUS at 08:07

## 2024-07-03 RX ADMIN — FENTANYL CITRATE 12.5 MCG: 50 INJECTION, SOLUTION INTRAMUSCULAR; INTRAVENOUS at 10:07

## 2024-07-03 RX ADMIN — SODIUM CHLORIDE: 9 INJECTION, SOLUTION INTRAVENOUS at 08:07

## 2024-07-03 RX ADMIN — ACETAMINOPHEN 1000 MG: 500 TABLET ORAL at 08:07

## 2024-07-03 RX ADMIN — MUPIROCIN: 20 OINTMENT TOPICAL at 08:07

## 2024-07-03 RX ADMIN — DEXAMETHASONE SODIUM PHOSPHATE 4 MG: 4 INJECTION, SOLUTION INTRA-ARTICULAR; INTRALESIONAL; INTRAMUSCULAR; INTRAVENOUS; SOFT TISSUE at 09:07

## 2024-07-03 RX ADMIN — FENTANYL CITRATE 25 MCG: 50 INJECTION, SOLUTION INTRAMUSCULAR; INTRAVENOUS at 09:07

## 2024-07-03 RX ADMIN — METHOCARBAMOL 500 MG: 100 INJECTION, SOLUTION INTRAMUSCULAR; INTRAVENOUS at 11:07

## 2024-07-03 RX ADMIN — PROPOFOL 50 MG: 10 INJECTION, EMULSION INTRAVENOUS at 09:07

## 2024-07-03 NOTE — ANESTHESIA PROCEDURE NOTES
Intubation    Date/Time: 7/3/2024 9:46 AM    Performed by: Radha Sam CRNA  Authorized by: Jayde Kaur MD    Intubation:     Induction:  Intravenous    Intubated:  Postinduction    Mask Ventilation:  Easy mask    Attempts:  1    Attempted By:  Student    Method of Intubation:  Direct    Blade:  Rhea 3    Laryngeal View Grade: Grade I - full view of cords      Difficult Airway Encountered?: No      Complications:  None    Airway Device:  Oral endotracheal tube    Airway Device Size:  7.0    Style/Cuff Inflation:  Cuffed (inflated to minimal occlusive pressure)    Inflation Amount (mL):  6    Tube secured:  21    Secured at:  The lips    Placement Verified By:  Capnometry    Complicating Factors:  None    Findings Post-Intubation:  BS equal bilateral and atraumatic/condition of teeth unchanged

## 2024-07-03 NOTE — PROGRESS NOTES
Pt has left hip fx. We will take pt to OR tomorrow for fixation    This note/OR report was created with the assistance of  voice recognition software or phone  dictation.  There may be transcription errors as a result of using this technology however minimal. Effort has been made to assure accuracy of transcription but any obvious errors or omissions should be clarified with the author of the document.       Steve Pierce, DO  Orthopedic Trauma Surgery

## 2024-07-03 NOTE — ASSESSMENT & PLAN NOTE
Currently rate controlled   Normally she is on Coumadin  Will follow up on restart Coumadin postop.

## 2024-07-03 NOTE — PT/OT/SLP PROGRESS
"Physical Therapy      Patient Name:  Homa Jaquez   MRN:  [id number]    Patient declined PT eval 2/2 still feeling effects from anesthesia. Reported having "foggy head". Requested therapy to follow up tomorrow. Will follow-up then.     "

## 2024-07-03 NOTE — TRANSFER OF CARE
"Anesthesia Transfer of Care Note    Patient: Homa Jaquez    Procedure(s) Performed: Procedure(s) (LRB):  INSERTION, INTRAMEDULLARY MELANIE, FEMUR (Left)    Patient location: PACU    Anesthesia Type: general    Transport from OR: Transported from OR on room air with adequate spontaneous ventilation    Post pain: adequate analgesia    Post assessment: no apparent anesthetic complications    Post vital signs: stable    Level of consciousness: awake, alert and oriented    Nausea/Vomiting: no nausea/vomiting    Complications: none    Transfer of care protocol was followed      Last vitals: Visit Vitals  /68 (BP Location: Right arm, Patient Position: Lying)   Pulse 89   Temp 36.2 °C (97.2 °F) (Skin)   Resp 17   Ht 5' 2.01" (1.575 m)   Wt 70.3 kg (155 lb)   SpO2 96%   BMI 28.34 kg/m²     "

## 2024-07-03 NOTE — SUBJECTIVE & OBJECTIVE
Past Medical History:   Diagnosis Date    A-fib     Chronic cystitis     GERD (gastroesophageal reflux disease)     Graves disease     Hypertension     Hypothyroidism, unspecified     Spinal stenosis        Past Surgical History:   Procedure Laterality Date    APPENDECTOMY      BLADDER SUSPENSION      CARDIOVERSION  04/01/2015    CATARACT EXTRACTION      HYSTERECTOMY      LAPAROSCOPIC CHOLECYSTECTOMY  01/12/2016    SPHINCTEROTOMY OF URETHRA  01/11/2016    TONSILLECTOMY AND ADENOIDECTOMY         Review of patient's allergies indicates:   Allergen Reactions    Cefadroxil      Other reaction(s): Nausea or vomiting    Cefuroxime axetil     Cephalexin      Other reaction(s): NAUSEA AND VOMITING    Ciprofloxacin     Enoxaparin     Methenamine hippurate      Other reaction(s): Unknown    Promethazine        No current facility-administered medications on file prior to encounter.     Current Outpatient Medications on File Prior to Encounter   Medication Sig    acetaminophen (TYLENOL EXTRA STRENGTH) 500 MG tablet Take 2 tablets (1,000 mg total) by mouth 3 (three) times daily.    allopurinoL (ZYLOPRIM) 100 MG tablet Take 1 tablet (100 mg total) by mouth once daily.    carvediloL (COREG) 12.5 MG tablet Take 12.5 mg by mouth 2 (two) times daily.    cyanocobalamin 1,000 mcg/mL injection INJECT 1000MCG (1ML) INTRAMUSCULARY ONCE MONTHLY    ENTRESTO 49-51 mg per tablet GIVE ONE TABLET BY MOUTH TWICE DAILY    HYDROcodone-acetaminophen (NORCO) 5-325 mg per tablet To be taken one hour before physical therapy only    levothyroxine (SYNTHROID) 125 MCG tablet TAKE 1 TABLET BY MOUTH ONCE DAILY    lovastatin (MEVACOR) 20 MG tablet Take 20 mg by mouth once daily.    ondansetron (ZOFRAN-ODT) 8 MG TbDL Take 1 tablet (8 mg total) by mouth every 8 (eight) hours as needed.    pantoprazole (PROTONIX) 40 MG tablet Take 1 tablet (40 mg total) by mouth once daily.    senna (SENOKOT) 8.6 mg tablet Take 1 tablet by mouth daily as needed for  Constipation.    traMADoL (ULTRAM) 50 mg tablet Take 1 tablet (50 mg total) by mouth every 6 (six) hours as needed for Pain.    warfarin sodium (COUMADIN ORAL) Take 2 mg by mouth.     Family History       Problem Relation (Age of Onset)    Cancer Father    Heart attack Father    Stroke Father          Tobacco Use    Smoking status: Former     Types: Cigarettes    Smokeless tobacco: Never   Substance and Sexual Activity    Alcohol use: Yes    Drug use: Never    Sexual activity: Not Currently     Review of Systems   Constitutional: Negative.  Negative for chills and fever.   HENT: Negative.     Eyes: Negative.    Respiratory: Negative.  Negative for cough and shortness of breath.    Cardiovascular: Negative.  Negative for chest pain, palpitations and leg swelling.   Gastrointestinal: Negative.  Negative for abdominal distention, abdominal pain, constipation, diarrhea, nausea and vomiting.   Endocrine: Negative.    Genitourinary: Negative.  Negative for dysuria and hematuria.   Musculoskeletal:  Positive for arthralgias and joint swelling. Negative for back pain.   Skin: Negative.  Negative for rash.   Allergic/Immunologic: Negative.    Neurological: Negative.  Negative for dizziness, seizures and headaches.   Hematological: Negative.    Psychiatric/Behavioral: Negative.       Objective:     Vital Signs (Most Recent):  Temp: 98 °F (36.7 °C) (07/03/24 1545)  Pulse: 76 (07/03/24 1545)  Resp: 18 (07/03/24 1639)  BP: 107/68 (07/03/24 1545)  SpO2: 96 % (07/03/24 1545) Vital Signs (24h Range):  Temp:  [97.2 °F (36.2 °C)-98 °F (36.7 °C)] 98 °F (36.7 °C)  Pulse:  [70-96] 76  Resp:  [10-24] 18  SpO2:  [86 %-100 %] 96 %  BP: ()/() 107/68     Weight: 70.3 kg (155 lb)  Body mass index is 28.34 kg/m².     Physical Exam  Eyes:      Pupils: Pupils are equal, round, and reactive to light.   Cardiovascular:      Rate and Rhythm: Normal rate.      Pulses: Normal pulses.      Heart sounds: No murmur heard.  Pulmonary:       Effort: Pulmonary effort is normal.      Breath sounds: Normal breath sounds.   Abdominal:      General: Abdomen is flat. Bowel sounds are normal. There is no distension.      Palpations: Abdomen is soft.      Tenderness: There is no guarding.   Musculoskeletal:         General: Normal range of motion.      Cervical back: Normal range of motion and neck supple.   Skin:     General: Skin is warm.   Neurological:      General: No focal deficit present.      Mental Status: She is alert.   Psychiatric:         Mood and Affect: Mood normal.         Behavior: Behavior normal.              CRANIAL NERVES     CN III, IV, VI   Pupils are equal, round, and reactive to light.       Significant Labs: All pertinent labs within the past 24 hours have been reviewed.  Recent Lab Results         07/02/24 2012 07/02/24  1938   07/02/24  1913   07/02/24  1804        Albumin/Globulin Ratio       0.9       Albumin       3.7       ALP       122       ALT       16       Anion Gap       12.0       PTT   37.2  Comment: For Minimal Heparin Infusion, the goal aPTT 64-85 seconds corresponds to an anti-Xa of 0.3-0.5.    For Low Intensity and High Intensity Heparin, the goal aPTT  seconds corresponds to an anti-Xa of 0.3-0.7           AST       29       Baso #       0.07       Basophil %       1.3       BILIRUBIN TOTAL       0.6       BUN       25.6       BUN/CREAT RATIO       18       Calcium       9.2       Chloride       110       CO2       19       Creatinine       1.45       eGFR       35       Eos #       0.21       Eos %       3.8       Globulin, Total       4.3       Glucose       147       Group & Rh A POS             Hematocrit       48.7       Hemoglobin       15.8       Immature Grans (Abs)       0.02       Immature Granulocytes       0.4       Indirect Debra GEL NEG             INR   2.5           Lymph #       1.38       LYMPH %       24.7       MCH       32.4       MCHC       32.4       MCV       100.0       Mono #        0.50       Mono %       8.9       MPV       10.3       Neut #       3.41       Neut %       60.9       nRBC       0.0       QRS Duration     126         [facility] QTC Calculation     492         Platelet Count       165       Potassium       4.0       PROTEIN TOTAL       8.0       PT   27.4           RBC       4.87       RDW       13.9       Sodium       141       Specimen Outdate 07/05/2024 23:59             WBC       5.59               Significant Imaging: I have reviewed all pertinent imaging results/findings within the past 24 hours.

## 2024-07-03 NOTE — CONSULTS
[facility] - Orth[facility] Neuro  Orthopedic Trauma  Consult Note    Patient Name: Homa Jaquez  MRN: [id number]  Admission Date: 7/2/2024  Hospital Length of Stay: 1 days  Attending Provider: Franklyn Brito MD  Primary Care Provider: Franklyn Brito MD        Inpatient consult to Orthopedic Surgery  Consult performed by: Steve Pierce DO  Consult ordered by: Inderjit Humphrey MD        Subjective:         Chief Complaint:   Chief Complaint   Patient presents with    Fall     Trip and fall at [facility]. Complaints of L hip pain. L leg shortened and rotated externally. +2 pedal pulse noted.         HPI:  Patient has Left hip pain status post ground level fall.  She is diagnosed with a intertrochanteric proximal femur fracture dull achy pain to the hip without radiation no previous injury.  Patient has no numbness or tingling.  Pain medication makes it better rest makes it better movement makes it worse.    Past Medical History:   Diagnosis Date    A-fib     Chronic cystitis     GERD (gastroesophageal reflux disease)     Graves disease     Hypertension     Hypothyroidism, unspecified     Spinal stenosis        Past Surgical History:   Procedure Laterality Date    APPENDECTOMY      BLADDER SUSPENSION      CARDIOVERSION  04/01/2015    CATARACT EXTRACTION      HYSTERECTOMY      LAPAROSCOPIC CHOLECYSTECTOMY  01/12/2016    SPHINCTEROTOMY OF URETHRA  01/11/2016    TONSILLECTOMY AND ADENOIDECTOMY         Review of patient's allergies indicates:   Allergen Reactions    Cefadroxil      Other reaction(s): Nausea or vomiting    Cefuroxime axetil     Cephalexin      Other reaction(s): NAUSEA AND VOMITING    Ciprofloxacin     Enoxaparin     Methenamine hippurate      Other reaction(s): Unknown    Promethazine        Current Facility-Administered Medications   Medication    0.9%  NaCl infusion    HYDROcodone-acetaminophen 7.5-325 mg per tablet 1 tablet    HYDROmorphone (PF) injection 0.5 mg     "HYDROmorphone (PF) injection 1 mg    melatonin tablet 6 mg    ondansetron injection 4 mg    sodium chloride 0.9% flush 10 mL     Family History       Problem Relation (Age of Onset)    Cancer Father    Heart attack Father    Stroke Father          Tobacco Use    Smoking status: Former     Types: Cigarettes    Smokeless tobacco: Never   Substance and Sexual Activity    Alcohol use: Yes    Drug use: Never    Sexual activity: Not Currently       ROS:  Constitutional: Denies fever chills  Eyes: No change in vision  ENT: No ringing or current infections  CV: No chest pain  Resp: No labored breathing  MSK: Pain evident at site of injury located in HPI,   Integ: No signs of abrasions or lacerations  Neuro: No numbness or tingling  Lymphatic: No swelling outside the area of injury   Objective:     Vital Signs (Most Recent):  Temp: 97.6 °F (36.4 °C) (07/03/24 0507)  Pulse: 87 (07/03/24 0507)  Resp: 16 (07/03/24 0507)  BP: 96/62 (07/03/24 0507)  SpO2: 97 % (07/03/24 0507) Vital Signs (24h Range):  Temp:  [97.6 °F (36.4 °C)-98 °F (36.7 °C)] 97.6 °F (36.4 °C)  Pulse:  [70-96] 87  Resp:  [12-24] 16  SpO2:  [94 %-100 %] 97 %  BP: ()/() 96/62     Weight: 70.3 kg (155 lb)  Height: 5' 2.01" (157.5 cm)  Body mass index is 28.34 kg/m².      Intake/Output Summary (Last 24 hours) at 7/3/2024 0713  Last data filed at 7/3/2024 0536  Gross per 24 hour   Intake 310.04 ml   Output --   Net 310.04 ml       Ortho/SPM Exam  General the patient is alert and oriented x3 no acute distress nontoxic-appearing appropriate affect.    Constitutional: Vital signs are examined and stable.  Resp: No signs of labored breathing               LLE: -Skin:  Painful logroll painful axial compression test           -MSK:  EHL/FHL, Gastroc/Tib anterior Strength 5/5           -Neuro:  Sensation intact to light touch L3-S1 dermatomes           -Lymphatic: No signs of lymphadenopathy           -CV: Capillary refill is less than 2 seconds. DP/PT pulses " 2/4. Compartments soft and compressible                          Significant Labs:   Recent Lab Results         07/02/24 2012 07/02/24 1938 07/02/24 1913 07/02/24  1804        Albumin/Globulin Ratio       0.9       Albumin       3.7       ALP       122       ALT       16       Anion Gap       12.0       PTT   37.2  Comment: For Minimal Heparin Infusion, the goal aPTT 64-85 seconds corresponds to an anti-Xa of 0.3-0.5.    For Low Intensity and High Intensity Heparin, the goal aPTT  seconds corresponds to an anti-Xa of 0.3-0.7           AST       29       Baso #       0.07       Basophil %       1.3       BILIRUBIN TOTAL       0.6       BUN       25.6       BUN/CREAT RATIO       18       Calcium       9.2       Chloride       110       CO2       19       Creatinine       1.45       eGFR       35       Eos #       0.21       Eos %       3.8       Globulin, Total       4.3       Glucose       147       Group & Rh A POS             Hematocrit       48.7       Hemoglobin       15.8       Immature Grans (Abs)       0.02       Immature Granulocytes       0.4       Indirect Debra GEL NEG             INR   2.5           Lymph #       1.38       LYMPH %       24.7       MCH       32.4       MCHC       32.4       MCV       100.0       Mono #       0.50       Mono %       8.9       MPV       10.3       Neut #       3.41       Neut %       60.9       nRBC       0.0       QRS Duration     126         [facility] QTC Calculation     492         Platelet Count       165       Potassium       4.0       PROTEIN TOTAL       8.0       PT   27.4           RBC       4.87       RDW       13.9       Sodium       141       Specimen Outdate 07/05/2024 23:59             WBC       5.59             All pertinent labs within the past 24 hours have been reviewed.  Recent Lab Results         07/02/24 2012 07/02/24 1938 07/02/24 1913 07/02/24  1804        Albumin/Globulin Ratio       0.9       Albumin       3.7       ALP       122        ALT       16       Anion Gap       12.0       PTT   37.2  Comment: For Minimal Heparin Infusion, the goal aPTT 64-85 seconds corresponds to an anti-Xa of 0.3-0.5.    For Low Intensity and High Intensity Heparin, the goal aPTT  seconds corresponds to an anti-Xa of 0.3-0.7           AST       29       Baso #       0.07       Basophil %       1.3       BILIRUBIN TOTAL       0.6       BUN       25.6       BUN/CREAT RATIO       18       Calcium       9.2       Chloride       110       CO2       19       Creatinine       1.45       eGFR       35       Eos #       0.21       Eos %       3.8       Globulin, Total       4.3       Glucose       147       Group & Rh A POS             Hematocrit       48.7       Hemoglobin       15.8       Immature Grans (Abs)       0.02       Immature Granulocytes       0.4       Indirect Debra GEL NEG             INR   2.5           Lymph #       1.38       LYMPH %       24.7       MCH       32.4       MCHC       32.4       MCV       100.0       Mono #       0.50       Mono %       8.9       MPV       10.3       Neut #       3.41       Neut %       60.9       nRBC       0.0       QRS Duration     126         [facility] QTC Calculation     492         Platelet Count       165       Potassium       4.0       PROTEIN TOTAL       8.0       PT   27.4           RBC       4.87       RDW       13.9       Sodium       141       Specimen Outdate 07/05/2024 23:59             WBC       5.59                Significant Imaging: I have reviewed all pertinent imaging results/findings.  X-Ray Chest 1 View    Result Date: 7/2/2024  EXAMINATION: XR CHEST 1 VIEW CLINICAL HISTORY: Unspecified fall, initial encounter TECHNIQUE: Single frontal view of the chest was performed. COMPARISON: 10/22/2018 FINDINGS: LINES AND TUBES: Left subclavian defibrillator lead projects over the right ventricle. MEDIASTINUM AND GINO: Cardiac silhouette is enlarged. Aortic atherosclerosis. LUNGS: Mild prominence of the  interstitium. PLEURA:No pleural effusion. No pneumothorax. OTHER: Probable mild asymmetry of the right 1st rib costochondral junction.  Suggest follow-up PA and lateral radiographs after resolution of patient's acute illness to reassess and exclude underlying nodule.     Prominence of the interstitium may be related to interstitial edema in the setting of enlarged cardiac silhouette. Electronically signed by: Karla Sims Date:    07/02/2024 Time:    19:06    X-Ray Hip 2 or 3 views Left with Pelvis when performed    Result Date: 7/2/2024  EXAMINATION: XR HIP WITH PELVIS WHEN PERFORMED 2 OR 3 VIEWS LEFT CLINICAL HISTORY: Unspecified fall, initial encounter TECHNIQUE: AP view of the pelvis and AP and frog leg lateral view of the left hip were performed. COMPARISON: 03/13/2023 FINDINGS: BONE: Mildly comminuted intertrochanteric fracture of the left femur with mild displacement. SOFT TISSUES: Unremarkable.     Left femur intertrochanteric fracture Electronically signed by: Karla Sims Date:    07/02/2024 Time:    19:04       Assessment/Plan:     Active Diagnoses:    Diagnosis Date Noted POA    PRINCIPAL PROBLEM:  Closed comminuted intertrochanteric fracture of left femur [S72.142A] 07/03/2024 Yes      Problems Resolved During this Admission:         Independent Radiology ordered by other provider:  Three views left hip  Left intertrochanteric femur fracture with displacement    Pt has acute injury with risk of severe bodily function with their injury.            Patient has a left intertrochanteric femur fracture meeting surgical indications for stabilization.  Patient and family understand risks best procedure stated below.  This will allow immediate weight-bearing and excellent pain control.  We will get the patient to surgery within 24 hour window.   I explained that surgery and the nature of their condition are not without risks. These include, but are not limited to, bleeding, infection, neurovascular  compromise, malunion, nonunion, hardware complications, wound complications, scarring, cosmetic defects, need for later and/or repeated surgeries, pain, loss of ROM, loss of function, PTOA, deformity, stance/gait and/or functional abnormalities, thromboembolic complications, compartment syndrome, loss of limb, loss of life, anesthetic complications, and other imponderables. I explained that these can occur despite the adequacy of treatments rendered, and that their risks are heightened given the nature of their condition. They verbalized understanding. They would like to continue with surgery at this time. If appropriate family was involved with surgical discussion.           This note/OR report was created with the assistance of  voice recognition software or phone  dictation.  There may be transcription errors as a result of using this technology however minimal. Effort has been made to assure accuracy of transcription but any obvious errors or omissions should be clarified with the author of the document.       Steve Pierce, DO   Orthopedic Trauma Surgery  [facility] - [facility]

## 2024-07-03 NOTE — ASSESSMENT & PLAN NOTE
Chronic, controlled. Latest blood pressure and vitals reviewed-     Temp:  [97.2 °F (36.2 °C)-98 °F (36.7 °C)]   Pulse:  [70-96]   Resp:  [10-24]   BP: ()/()   SpO2:  [86 %-100 %] .   Home meds for hypertension were reviewed and noted below.   Hypertension Medications               carvediloL (COREG) 12.5 MG tablet Take 12.5 mg by mouth 2 (two) times daily.    ENTRESTO 49-51 mg per tablet GIVE ONE TABLET BY MOUTH TWICE DAILY            While in the hospital, will manage blood pressure as follows; Continue home antihypertensive regimen    Will utilize p.r.n. blood pressure medication only if patient's blood pressure greater than 180/110 and she develops symptoms such as worsening chest pain or shortness of breath.

## 2024-07-03 NOTE — PT/OT/SLP PROGRESS
"Occupational Therapy      Patient Name:  Homa Jaquez   MRN:  [id number]    Patient declined OT eval 2/2 still feeling effects from anesthesia. Reported having "foggy head". Requested therapy to follow up tomorrow. Will follow-up then.    7/3/2024  "

## 2024-07-03 NOTE — NURSING
Nurses Note -- 4 Eyes      7/3/2024   2:26 AM      Skin assessed during: Admit      [] No Altered Skin Integrity Present    []Prevention Measures Documented      [x] Yes- Altered Skin Integrity Present or Discovered   [x] LDA Added if Not in Epic (Describe Wound)   [x] New Altered Skin Integrity was Present on Admit and Documented in LDA   [x] Wound Image Taken    Wound Care Consulted? Yes    Attending Nurse:  Frances Quiroz RN/Staff Member:   LAURA Olsen

## 2024-07-03 NOTE — PROGRESS NOTES
"Inpatient Nutrition Evaluation    Admit Date: 7/2/2024   Total duration of encounter: 1 day    Nutrition Recommendation/Prescription     Advance oral diet as medically appropriate; goal diet regular    Nutrition Assessment     Chart Review    Reason Seen: continuous nutrition monitoring    Malnutrition Screening Tool Results   Have you recently lost weight without trying?: No  Have you been eating poorly because of a decreased appetite?: No   MST Score: 0     Diagnosis:  Closed comminuted intertrochanteric fracture of left femur     Relevant Medical History:    A-fib      Chronic cystitis      GERD (gastroesophageal reflux disease)      Graves disease      Hypertension      Hypothyroidism, unspecified      Spinal stenosis        Nutrition-Related Medications: Scheduled Medications:  clindamycin IV (PEDS and ADULTS), 900 mg, Q8H  methocarbamoL, 500 mg, TID  polyethylene glycol, 17 g, BID    Continuous Infusions:  0.9% NaCl, Last Rate: 50 mL/hr at 07/03/24 0536    PRN Medications:    clindamycin in D5W 900 mg/50 mL IVPB 900 mg    methocarbamoL tablet 500 mg    polyethylene glycol packet 17 g        Nutrition-Related Labs:  Recent Labs   Lab 07/02/24  1804      K 4.0   CALCIUM 9.2   CO2 19*   BUN 25.6*   CREATININE 1.45*   EGFRNORACEVR 35   GLUCOSE 147*   BILITOT 0.6   ALKPHOS 122   ALT 16   AST 29   ALBUMIN 3.7   WBC 5.59   HGB 15.8   HCT 48.7*        Diet Order: Diet Clear Liquid  Oral Supplement Order: none  Appetite/Oral Intake: not applicable/not applicable  Factors Affecting Nutritional Intake: NPO  Food/Scientologist/Cultural Preferences: unable to obtain  Food Allergies: no known food allergies    Skin Integrity: bruised (ecchymotic), skin tear  Wound(s):       Comments    7/3/24; pt NPO, out of room for surgery. No weight loss or appetite loss reported prior to admit; weight appears overall stable in EMR    Anthropometrics    Height: 5' 2.01" (157.5 cm) Height Method: Stated  Last Weight: 70.3 kg (155 lb) " (07/03/24 0126) Weight Method: Stated  BMI (Calculated): 28.3  BMI Classification: overweight (BMI 25-29.9)     Ideal Body Weight (IBW), Female: 110.05 lb     % Ideal Body Weight, Female (lb): 140.85 %                             Usual Weight Provided By: EMR weight history    Wt Readings from Last 5 Encounters:   07/03/24 70.3 kg (155 lb)   06/29/23 67 kg (147 lb 12.8 oz)   03/30/23 71.1 kg (156 lb 12.8 oz)   03/14/23 70 kg (154 lb 5.2 oz)   09/13/22 74.8 kg (165 lb)     Weight Change(s) Since Admission:  Admit Weight: 70.3 kg (155 lb) (07/02/24 3958)      Patient Education    Not applicable.    Monitoring & Evaluation     Dietitian will monitor food and beverage intake and weight change.  Nutrition Risk/Follow-Up: low (follow-up in 5-7 days)  Patients assigned 'low nutrition risk' status do not qualify for a full nutritional assessment but will be monitored and re-evaluated in a 5-7 day time period. Please consult if re-evaluation needed sooner.

## 2024-07-03 NOTE — OP NOTE
OPERATIVE REPORT    Patient: Homa Jaquez   : 1935    MRN: [id number]  Date: 2024      Surgeon:Steve Pierce DO  Assistant: Mitch Davis was essential, part of the procedure including deep hardware placement and deep closure.  No senior assistant was availible  Preoperative Diagnosis: : Closed left intertrochanteric femur fracture with severe comminution  Postoperative Diagnosis: Same  Procedure:  Treatment left intertrochanteric femur fracture with intramedullary nail CPT 51049  Anesthesiologist: Jayde Kaur MD  OR Staff: * No surgical staff found *  Implants:   Implant Name Type Inv. Item Serial No.  Lot No. LRB No. Used Action   MELANIE REAM BALL TP [id number] - [id number]  MELANIE REAM BALL TP [id number]  [employer] [id number] Left 1 Implanted and Explanted   WIRE GUIDE 3.2MM 400MM - [id number]  WIRE GUIDE 3.2MM 400MM  [employer]  Left 2 Implanted and Explanted   NAIL IM ADEEL 130 DEG 12X360 L - [id number]  NAIL IM ADEEL 130 DEG 12X360 L  [employer] [id number] Left 1 Implanted   BLADE HELICAL PERF GOLD 100MM - [id number]  BLADE HELICAL PERF GOLD 100MM  [employer] [id number] Left 1 Implanted   SCREW XL25 IM NAIL 38X5MM - [id number]  SCREW XL25 IM NAIL 38X5MM  [employer] [id number] Left 1 Implanted and Explanted   SCREW XL25 IM NAIL 42X5MM - [id number]  SCREW XL25 IM NAIL 42X5MM  [employer] [id number] Left 1 Implanted   SCREW LOCK XL25 5X46MM - [id number]  SCREW LOCK XL25 5X46MM  [employer] [id number] Left 1 Implanted     EBL: 250cc  Complications: None  Disposition: To PACU, stable    Indications: Homa Jaquez is a 88 y.o. female presenting with the aforementioned injuries. The procedure is indicated to best obtain and maintain stability of the femur while allowing early ROM.  The patient is awake and alert. After thorough discussion of the risks, benefits, expected outcomes, and alternatives to surgical intervention, the patient agreed to proceed with surgical treatment.   Specific risks discussed included, but were not limited to: superficial or deep infection, wound healing complications, DVT/PE, significant bleeding requiring transfusion, damage to named anatomic structures in the immediate area including named neurovascular structures, implant failure, and general risks of anesthesia.  The patient voiced understanding and written as well as verbal consent was obtained by myself prior to the procedure.    Patient has severe comminution of the left proximal femur fracture and displacement.  I do have concerns is leading to total hip arthroplasty in the future.  We will proceed with the reduction and fixation today.    Procedure in Detail:  The patient's left lower extremity was marked by me in the preoperative area.  She was taken to the operating room, and after satisfactory induction of anesthesia, the patient was placed in the supine position with a small bump under the ipsilateral hip.   The ipsilateral lower extremity was then sterilely prepped and draped in the usual sterile fashion.  Standard time out procedure was performed confirming the correct surgeon, site, side, patient ID, procedure, and administration of antibiotics.       A 3 cm longitudinal incision was made just proximal to the greater trochanter.  The subcutaneous tissue and gluteal fascia were incised.  We used a reduction device medially to hold the femoral neck and position.  A threaded guide pin was then placed in the tip of the greater trochanter and extended and introduced into the proximal femur under AP and lateral fluoroscopy.  The Synthes one-step reamer was then used to ream proximally. A ball-tipped guide azael was then placed through the entry site down to the physeal scar of the femur.  This was measured and then was reamed .  A Synthes TFN  appropiate size  was then passed over the guidewire, which was subsequently removed.  The proximal interlocking device was then placed and a 2-cm longitudinal  incision was made over the lateral aspect of the proximal thigh and the fascia edis was incised.  A threaded guide pin was then placed through the lateral cortex of the femur through the femoral neck and into the femoral head in the center-center position.  A helical blade was then placed under fluoroscopy and satisfactory position was noted on AP and lateral fluoroscopy and the setscrew was then set. The proximal interlocking device was then removed.    Distal interlocking screws were done with  two single lateral to medial interlocking screw under fluoroscopic guidance.  All wounds were then thoroughly irrigated.  Subcutaneous tissues were closed with interrupted inverted of 2-0 Monocryl.  Skin was approximated using skin staples.  Sterile dressing was applied.  General anesthesia was reversed and she was returned to the Postanesthesia Care Unit in stable condition.      All sponge and needle counts were correct at the end of the case.  I was present and participated in all aspects of the procedure.    Prognosis:  The patient will be kept WBAT on the ipsilateral extremity.  DVT prophylaxis is indicated for this patient and procedure.  The patient is at risk of pain, infection, and nonunion, and we will watch for all of these, amongst other issues, as the patient continues to heal.  Patient is a risk of nonunion and loss of reduction. Pt also at risk for hematoma.      This note/OR report was created with the assistance of  voice recognition software or phone  dictation.  There may be transcription errors as a result of using this technology however minimal. Effort has been made to assure accuracy of transcription but any obvious errors or omissions should be clarified with the author of the document.       Steve Pierce,   Orthopedic Trauma Surgery

## 2024-07-03 NOTE — ANESTHESIA PREPROCEDURE EVALUATION
"                                                                                                             07/03/2024  Homa Jaquez is a 88 y.o., female with   -------------------------------------    A-fib    Chronic cystitis    GERD (gastroesophageal reflux disease)    Graves disease    Hypertension    Hypothyroidism, unspecified    Spinal stenosis       And   ----------------------------    Appendectomy    Bladder suspension    Cardioversion    Cataract extraction    Hysterectomy    Laparoscopic cholecystectomy    Sphincterotomy of urethra    Tonsillectomy and adenoidectomy       Presents for left femur IM Kevin admitted through ER s/p fall  She is on coumadin  "     Expand All Collapse All    Encounter Date: 7/2/2024     SCRIBE #1 NOTE: I, Amanda Luz, am scribing for, and in the presence of,  Inderjit Humphrey MD. I have scribed the following portions of the note - Other sections scribed: HPI, ROS, PE.         History           Chief Complaint   Patient presents with    Fall       Trip and fall at [facility]. Complaints of L hip pain. L leg shortened and rotated externally. +2 pedal pulse noted.       Patient is a 88 year old female with a hx of a-fib, GERD, chronic cystitis, HTN, spinal stenosis, and hypothyroidism presents to the ED following a fall PTA. Patient reports she lost control of her walker and fell away from it. Patient reports she fell on her left side and reports left hip pain. She says her left hip has always been the weakest. Patient reports she taking coumadin. Her PCP is Dr. Brito. Her cardiologist is Dr. Mukherjee.   Patient's caregiver reports that patient cannot tolerate Lovenox or Eliquis. Caregiver reports patient is allergic to phenergan.          "   Latest Reference Range & Units Most Recent   WBC 4.50 - 11.50 x10(3)/mcL 5.59  7/2/24 18:04   Hemoglobin 12.0 - 16.0 g/dL 15.8  7/2/24 18:04   Hematocrit 37.0 - 47.0 % 48.7 (H)  7/2/24 18:04   Platelet Count 130 - 400 " x10(3)/mcL 165  7/2/24 18:04   PT 12.5 - 14.5 seconds 27.4 (H)  7/2/24 19:38   INR <=1.3  2.5 (H)  7/2/24 19:38   PTT 23.2 - 33.7 seconds 37.2 (H)  7/2/24 19:38   Sodium 136 - 145 mmol/L 141  7/2/24 18:04   Potassium 3.5 - 5.1 mmol/L 4.0  7/2/24 18:04   Chloride 98 - 107 mmol/L 110 (H)  7/2/24 18:04   CO2 23 - 31 mmol/L 19 (L)  7/2/24 18:04   Anion Gap mEq/L 12.0  7/2/24 18:04   BUN 9.8 - 20.1 mg/dL 25.6 (H)  7/2/24 18:04   Creatinine 0.55 - 1.02 mg/dL 1.45 (H)  7/2/24 18:04   BUN/CREAT RATIO  18  7/2/24 18:04   eGFR mL/min/1.73/m2 35  7/2/24 18:04   eGFR if non African American mL/min/1.73 m2 39  10/24/18 04:50   eGFR if African American mL/min/1.73 m2 47  10/24/18 04:50   Glucose 82 - 115 mg/dL 147 (H)  7/2/24 18:04   (H): Data is abnormally high  (L): Data is abnormally low      Pre-op Assessment    I have reviewed the NPO Status.      Review of Systems  Cardiovascular:     Hypertension                                  Hypertension     Atrial Fibrillation     Hepatic/GI:     GERD      Gerd          Musculoskeletal:  Arthritis        Arthritis          Neurological:      Arthritis                           Endocrine:  Diabetes Hypothyroidism   Diabetes                   Hypothyroidism              Physical Exam  General: Well nourished, Cooperative, Alert and Oriented  Frail hard of hearing but answers all questions appropriately  Airway:  Mallampati: II   Mouth Opening: Normal  TM Distance: Normal  Tongue: Normal  Neck ROM: Normal ROM    Dental:  Intact    Chest/Lungs:  Clear to auscultation, Normal Respiratory Rate    Heart:  Rate: Normal  Rhythm: Regular Rhythm        Anesthesia Plan  Type of Anesthesia, risks & benefits discussed:    Anesthesia Type: Gen ETT  Intra-op Monitoring Plan: Standard ASA Monitors  Post Op Pain Control Plan: IV/PO Opioids PRN and multimodal analgesia  Induction:  IV  Airway Plan: Direct  Informed Consent: Informed consent signed with the Patient and all parties understand the risks  and agree with anesthesia plan.  All questions answered. Patient consented to blood products? No  ASA Score: 3  Day of Surgery Review of History & Physical: H&P Update referred to the surgeon/provider.  Anesthesia Plan Notes: Premedication with Midazolam  Technique: GETA - pt on coumadin and elevated INR so is not a candidate for spinal block  ofirmev and toradol 15mg IV if none in last 4-6 hours   PONV Prophylaxis   PACU Postop       Ready For Surgery From Anesthesia Perspective.     .

## 2024-07-03 NOTE — ANESTHESIA POSTPROCEDURE EVALUATION
Anesthesia Post Evaluation    Patient: Homa Jaquez    Procedure(s) Performed: Procedure(s) (LRB):  INSERTION, INTRAMEDULLARY MELANIE, FEMUR (Left)    Final Anesthesia Type: general      Patient location during evaluation: PACU  Patient participation: Yes- Able to Participate  Level of consciousness: awake and alert  Post-procedure vital signs: reviewed and stable  Pain management: adequate  Airway patency: patent    PONV status at discharge: No PONV  Anesthetic complications: no      Cardiovascular status: hemodynamically stable  Respiratory status: unassisted  Hydration status: euvolemic  Follow-up not needed.              Vitals Value Taken Time   BP 93/51 07/03/24 1151   Temp 36.3 °C (97.4 °F) 07/03/24 1150   Pulse 80 07/03/24 1151   Resp 26 07/03/24 1151   SpO2 99 % 07/03/24 1151   Vitals shown include unfiled device data.      Event Time   Out of Recovery 07/03/2024 11:50:00         Pain/Mo Score: Pain Rating Prior to Med Admin: 8 (7/3/2024 11:20 AM)  Pain Rating Post Med Admin: 1 (7/3/2024 12:59 AM)  Mo Score: 10 (7/3/2024 11:50 AM)

## 2024-07-03 NOTE — NURSING
Initial visit attempted  regarding consult for LUE wound and buttocks bruising. Introduced self to patient and family members X 2 at bedside, explained purpose of visit. Son at bedside reports he is a PA and provides care for his mother, and that the bruising over her buttocks is due to her sitting too much and being on coumadin, noting also LUE dressed with tranpsarent dressing over gauze and is clean and dry. Son declined assessment of buttocks and LUE, as it would be too painful to mobilize patient at this time for assessment as she is awaiting call to OR  for surgical repair of hip fracture. Discussed same with assigned nurse Ivonne SANCHEZ, recommended wound care to LUE when appropriate and pressure injury prevention measures to include use of low air loss bed as appropriate . Will revisit post op.

## 2024-07-03 NOTE — HPI
The patient was an 88-year-old female patient of Dr. Brito's had an accidental trip and fall prior to admission.  She was walking with a walker and stated that she made in left pivot turn in the momentum just kept her going.  She has a history of corrected hypothyroidism compensated heart failure on Entresto and beta-blocker.  She is anticoagulated as well.  Coumadin currently on hold.  She underwent ORIF this morning good spirits currently currently pain-free she was awake alert oriented x3 her vital signs are little soft.  Her blood pressure is little on the low side but she is asymptomatic.  Otherwise being admitted with accidental fall with left hip fracture.

## 2024-07-03 NOTE — H&P
[facility] Medicine  History & Physical    Patient Name: Homa Jaquez  MRN: [id number]  Patient Class: IP- Inpatient  Admission Date: 7/2/2024  Attending Physician: Franklyn Brito MD   Primary Care Provider: Franklyn Brito MD         Patient information was obtained from relative(s) and ER records.     Subjective:     Principal Problem:Closed comminuted intertrochanteric fracture of left femur    Chief Complaint:   Chief Complaint   Patient presents with    Fall     Trip and fall at [facility]. Complaints of L hip pain. L leg shortened and rotated externally. +2 pedal pulse noted.         HPI: The patient was an 88-year-old female patient of Dr. Brito's had an accidental trip and fall prior to admission.  She was walking with a walker and stated that she made in left pivot turn in the momentum just kept her going.  She has a history of corrected hypothyroidism compensated heart failure on Entresto and beta-blocker.  She is anticoagulated as well.  Coumadin currently on hold.  She underwent ORIF this morning good spirits currently currently pain-free she was awake alert oriented x3 her vital signs are little soft.  Her blood pressure is little on the low side but she is asymptomatic.  Otherwise being admitted with accidental fall with left hip fracture.    Past Medical History:   Diagnosis Date    A-fib     Chronic cystitis     GERD (gastroesophageal reflux disease)     Graves disease     Hypertension     Hypothyroidism, unspecified     Spinal stenosis        Past Surgical History:   Procedure Laterality Date    APPENDECTOMY      BLADDER SUSPENSION      CARDIOVERSION  04/01/2015    CATARACT EXTRACTION      HYSTERECTOMY      LAPAROSCOPIC CHOLECYSTECTOMY  01/12/2016    SPHINCTEROTOMY OF URETHRA  01/11/2016    TONSILLECTOMY AND ADENOIDECTOMY         Review of patient's allergies indicates:   Allergen Reactions    Cefadroxil      Other reaction(s): Nausea or  vomiting    Cefuroxime axetil     Cephalexin      Other reaction(s): NAUSEA AND VOMITING    Ciprofloxacin     Enoxaparin     Methenamine hippurate      Other reaction(s): Unknown    Promethazine        No current facility-administered medications on file prior to encounter.     Current Outpatient Medications on File Prior to Encounter   Medication Sig    acetaminophen (TYLENOL EXTRA STRENGTH) 500 MG tablet Take 2 tablets (1,000 mg total) by mouth 3 (three) times daily.    allopurinoL (ZYLOPRIM) 100 MG tablet Take 1 tablet (100 mg total) by mouth once daily.    carvediloL (COREG) 12.5 MG tablet Take 12.5 mg by mouth 2 (two) times daily.    cyanocobalamin 1,000 mcg/mL injection INJECT 1000MCG (1ML) INTRAMUSCULARY ONCE MONTHLY    ENTRESTO 49-51 mg per tablet GIVE ONE TABLET BY MOUTH TWICE DAILY    HYDROcodone-acetaminophen (NORCO) 5-325 mg per tablet To be taken one hour before physical therapy only    levothyroxine (SYNTHROID) 125 MCG tablet TAKE 1 TABLET BY MOUTH ONCE DAILY    lovastatin (MEVACOR) 20 MG tablet Take 20 mg by mouth once daily.    ondansetron (ZOFRAN-ODT) 8 MG TbDL Take 1 tablet (8 mg total) by mouth every 8 (eight) hours as needed.    pantoprazole (PROTONIX) 40 MG tablet Take 1 tablet (40 mg total) by mouth once daily.    senna (SENOKOT) 8.6 mg tablet Take 1 tablet by mouth daily as needed for Constipation.    traMADoL (ULTRAM) 50 mg tablet Take 1 tablet (50 mg total) by mouth every 6 (six) hours as needed for Pain.    warfarin sodium (COUMADIN ORAL) Take 2 mg by mouth.     Family History       Problem Relation (Age of Onset)    Cancer Father    Heart attack Father    Stroke Father          Tobacco Use    Smoking status: Former     Types: Cigarettes    Smokeless tobacco: Never   Substance and Sexual Activity    Alcohol use: Yes    Drug use: Never    Sexual activity: Not Currently     Review of Systems   Constitutional: Negative.  Negative for chills and fever.   HENT: Negative.     Eyes: Negative.     Respiratory: Negative.  Negative for cough and shortness of breath.    Cardiovascular: Negative.  Negative for chest pain, palpitations and leg swelling.   Gastrointestinal: Negative.  Negative for abdominal distention, abdominal pain, constipation, diarrhea, nausea and vomiting.   Endocrine: Negative.    Genitourinary: Negative.  Negative for dysuria and hematuria.   Musculoskeletal:  Positive for arthralgias and joint swelling. Negative for back pain.   Skin: Negative.  Negative for rash.   Allergic/Immunologic: Negative.    Neurological: Negative.  Negative for dizziness, seizures and headaches.   Hematological: Negative.    Psychiatric/Behavioral: Negative.       Objective:     Vital Signs (Most Recent):  Temp: 98 °F (36.7 °C) (07/03/24 1545)  Pulse: 76 (07/03/24 1545)  Resp: 18 (07/03/24 1639)  BP: 107/68 (07/03/24 1545)  SpO2: 96 % (07/03/24 1545) Vital Signs (24h Range):  Temp:  [97.2 °F (36.2 °C)-98 °F (36.7 °C)] 98 °F (36.7 °C)  Pulse:  [70-96] 76  Resp:  [10-24] 18  SpO2:  [86 %-100 %] 96 %  BP: ()/() 107/68     Weight: 70.3 kg (155 lb)  Body mass index is 28.34 kg/m².     Physical Exam  Eyes:      Pupils: Pupils are equal, round, and reactive to light.   Cardiovascular:      Rate and Rhythm: Normal rate.      Pulses: Normal pulses.      Heart sounds: No murmur heard.  Pulmonary:      Effort: Pulmonary effort is normal.      Breath sounds: Normal breath sounds.   Abdominal:      General: Abdomen is flat. Bowel sounds are normal. There is no distension.      Palpations: Abdomen is soft.      Tenderness: There is no guarding.   Musculoskeletal:         General: Normal range of motion.      Cervical back: Normal range of motion and neck supple.   Skin:     General: Skin is warm.   Neurological:      General: No focal deficit present.      Mental Status: She is alert.   Psychiatric:         Mood and Affect: Mood normal.         Behavior: Behavior normal.              CRANIAL NERVES     CN III,  IV, VI   Pupils are equal, round, and reactive to light.       Significant Labs: All pertinent labs within the past 24 hours have been reviewed.  Recent Lab Results         07/02/24 2012 07/02/24  1938   07/02/24  1913   07/02/24  1804        Albumin/Globulin Ratio       0.9       Albumin       3.7       ALP       122       ALT       16       Anion Gap       12.0       PTT   37.2  Comment: For Minimal Heparin Infusion, the goal aPTT 64-85 seconds corresponds to an anti-Xa of 0.3-0.5.    For Low Intensity and High Intensity Heparin, the goal aPTT  seconds corresponds to an anti-Xa of 0.3-0.7           AST       29       Baso #       0.07       Basophil %       1.3       BILIRUBIN TOTAL       0.6       BUN       25.6       BUN/CREAT RATIO       18       Calcium       9.2       Chloride       110       CO2       19       Creatinine       1.45       eGFR       35       Eos #       0.21       Eos %       3.8       Globulin, Total       4.3       Glucose       147       Group & Rh A POS             Hematocrit       48.7       Hemoglobin       15.8       Immature Grans (Abs)       0.02       Immature Granulocytes       0.4       Indirect Debra GEL NEG             INR   2.5           Lymph #       1.38       LYMPH %       24.7       MCH       32.4       MCHC       32.4       MCV       100.0       Mono #       0.50       Mono %       8.9       MPV       10.3       Neut #       3.41       Neut %       60.9       nRBC       0.0       QRS Duration     126         [facility] QTC Calculation     492         Platelet Count       165       Potassium       4.0       PROTEIN TOTAL       8.0       PT   27.4           RBC       4.87       RDW       13.9       Sodium       141       Specimen Outdate 07/05/2024 23:59             WBC       5.59               Significant Imaging: I have reviewed all pertinent imaging results/findings within the past 24 hours.  Assessment/Plan:     * Closed comminuted intertrochanteric fracture of left  femur  Accidental trip and fall   Status post ORIF of the left hip today.    Will continue physical therapy and may need rehab stay.    Will follow up on when to restart Coumadin.      Fall  With left hip fracture.  No syncope or near-syncope  Mechanical accidental fall.      Primary hypertension  Chronic, controlled. Latest blood pressure and vitals reviewed-     Temp:  [97.2 °F (36.2 °C)-98 °F (36.7 °C)]   Pulse:  [70-96]   Resp:  [10-24]   BP: ()/()   SpO2:  [86 %-100 %] .   Home meds for hypertension were reviewed and noted below.   Hypertension Medications               carvediloL (COREG) 12.5 MG tablet Take 12.5 mg by mouth 2 (two) times daily.    ENTRESTO 49-51 mg per tablet GIVE ONE TABLET BY MOUTH TWICE DAILY            While in the hospital, will manage blood pressure as follows; Continue home antihypertensive regimen    Will utilize p.r.n. blood pressure medication only if patient's blood pressure greater than 180/110 and she develops symptoms such as worsening chest pain or shortness of breath.    Atrial fibrillation  Currently rate controlled   Normally she is on Coumadin  Will follow up on restart Coumadin postop.    Acute on chronic systolic heart failure  Compensated continue current therapy with Entresto        VTE Risk Mitigation (From admission, onward)           Ordered     IP VTE HIGH RISK PATIENT  Once         07/02/24 1938     Place sequential compression device  Until discontinued         07/02/24 1938     Reason for No Pharmacological VTE Prophylaxis  Once        Question:  Reasons:  Answer:  Already adequately anticoagulated on oral Anticoagulants    07/02/24 1938                               Inpatient Nutrition Evaluation    Admit Date: 7/2/2024   Total duration of encounter: 1 day    Nutrition Recommendation/Prescription     Advance oral diet as medically appropriate; goal diet regular    Nutrition Assessment     Chart Review    Reason Seen: continuous nutrition  "monitoring    Malnutrition Screening Tool Results   Have you recently lost weight without trying?: No  Have you been eating poorly because of a decreased appetite?: No   MST Score: 0     Diagnosis:  Closed comminuted intertrochanteric fracture of left femur     Relevant Medical History:    A-fib      Chronic cystitis      GERD (gastroesophageal reflux disease)      Graves disease      Hypertension      Hypothyroidism, unspecified      Spinal stenosis        Nutrition-Related Medications: Scheduled Medications:  clindamycin IV (PEDS and ADULTS), 900 mg, Q8H  methocarbamoL, 500 mg, TID  polyethylene glycol, 17 g, BID    Continuous Infusions:  0.9% NaCl, Last Rate: 50 mL/hr at 07/03/24 0536    PRN Medications:    clindamycin in D5W 900 mg/50 mL IVPB 900 mg    methocarbamoL tablet 500 mg    polyethylene glycol packet 17 g        Nutrition-Related Labs:  Recent Labs   Lab 07/02/24  1804      K 4.0   CALCIUM 9.2   CO2 19*   BUN 25.6*   CREATININE 1.45*   EGFRNORACEVR 35   GLUCOSE 147*   BILITOT 0.6   ALKPHOS 122   ALT 16   AST 29   ALBUMIN 3.7   WBC 5.59   HGB 15.8   HCT 48.7*        Diet Order: Diet Clear Liquid  Oral Supplement Order: none  Appetite/Oral Intake: not applicable/not applicable  Factors Affecting Nutritional Intake: NPO  Food/Voodoo/Cultural Preferences: unable to obtain  Food Allergies: no known food allergies    Skin Integrity: bruised (ecchymotic), skin tear  Wound(s):       Comments    7/3/24; pt NPO, out of room for surgery. No weight loss or appetite loss reported prior to admit; weight appears overall stable in EMR    Anthropometrics    Height: 5' 2.01" (157.5 cm) Height Method: Stated  Last Weight: 70.3 kg (155 lb) (07/03/24 0126) Weight Method: Stated  BMI (Calculated): 28.3  BMI Classification: overweight (BMI 25-29.9)     Ideal Body Weight (IBW), Female: 110.05 lb     % Ideal Body Weight, Female (lb): 140.85 %                             Usual Weight Provided By: EMR weight " history    Wt Readings from Last 5 Encounters:   07/03/24 70.3 kg (155 lb)   06/29/23 67 kg (147 lb 12.8 oz)   03/30/23 71.1 kg (156 lb 12.8 oz)   03/14/23 70 kg (154 lb 5.2 oz)   09/13/22 74.8 kg (165 lb)     Weight Change(s) Since Admission:  Admit Weight: 70.3 kg (155 lb) (07/02/24 1738)      Patient Education    Not applicable.    Monitoring & Evaluation     Dietitian will monitor food and beverage intake and weight change.  Nutrition Risk/Follow-Up: low (follow-up in 5-7 days)  Patients assigned 'low nutrition risk' status do not qualify for a full nutritional assessment but will be monitored and re-evaluated in a 5-7 day time period. Please consult if re-evaluation needed sooner.        ABIMBOLA FISHER MD  Department of Hospital Medicine  [facility]

## 2024-07-03 NOTE — PLAN OF CARE
07/03/24 1520   Discharge Assessment   Assessment Type Discharge Planning Assessment   Confirmed/corrected address, phone number and insurance Yes   Confirmed Demographics Correct on Facesheet   Source of Information patient   Communicated AURELIO with patient/caregiver Yes   Reason For Admission fall   People in Home spouse   Do you expect to return to your current living situation? Other (see comments)  (tbd)   Do you have help at home or someone to help you manage your care at home? Yes   Who are your caregiver(s) and their phone number(s)? yann dailey, spouse, [phone]   Prior to hospitilization cognitive status: Alert/Oriented   Current cognitive status: Alert/Oriented   Home Accessibility wheelchair accessible   Home Layout Able to live on 1st floor   Equipment Currently Used at Home wheelchair;rollator;shower chair   Readmission within 30 days? No   Patient currently being followed by outpatient case management? No   Do you currently have service(s) that help you manage your care at home? No   Do you take prescription medications? Yes   Do you have prescription coverage? Yes   Coverage mcr a&b, BCBS mcr   Do you have any problems affording any of your prescribed medications? No   Is the patient taking medications as prescribed? yes   Who is going to help you get home at discharge? family   How do you get to doctors appointments? family or friend will provide   Are you on dialysis? No   Do you take coumadin? No   Discharge Plan A Other  (tbd)   Discharge Plan B Other  (tbd)   DME Needed Upon Discharge  other (see comments)  (tbd)   Discharge Plan discussed with: Patient   Transition of Care Barriers None   OTHER   Name(s) of People in Home spouse     Completed assessment with pt at bedside. Introduced self and explained role as SW. Pt verb understanding to all questions asked. PCP is Franklyn Brito and pharmacy is [facility]. D/c dispo pending post-therapy evdinesh.    Marie Gomez LCSW

## 2024-07-03 NOTE — ASSESSMENT & PLAN NOTE
Accidental trip and fall   Status post ORIF of the left hip today.    Will continue physical therapy and may need rehab stay.    Will follow up on when to restart Coumadin.

## 2024-07-04 LAB
ALBUMIN SERPL-MCNC: 3.1 G/DL (ref 3.4–4.8)
ALBUMIN/GLOB SERPL: 1.3 RATIO (ref 1.1–2)
ALP SERPL-CCNC: 76 UNIT/L (ref 40–150)
ALT SERPL-CCNC: 11 UNIT/L (ref 0–55)
ANION GAP SERPL CALC-SCNC: 12 MEQ/L
AST SERPL-CCNC: 19 UNIT/L (ref 5–34)
BASOPHILS # BLD AUTO: 0.04 X10(3)/MCL
BASOPHILS NFR BLD AUTO: 0.4 %
BILIRUB SERPL-MCNC: 0.7 MG/DL
BUN SERPL-MCNC: 38.9 MG/DL (ref 9.8–20.1)
CALCIUM SERPL-MCNC: 7.9 MG/DL (ref 8.4–10.2)
CHLORIDE SERPL-SCNC: 113 MMOL/L (ref 98–107)
CO2 SERPL-SCNC: 16 MMOL/L (ref 23–31)
CREAT SERPL-MCNC: 2.17 MG/DL (ref 0.55–1.02)
CREAT/UREA NIT SERPL: 18
EOSINOPHIL # BLD AUTO: 0 X10(3)/MCL (ref 0–0.9)
EOSINOPHIL NFR BLD AUTO: 0 %
ERYTHROCYTE [DISTWIDTH] IN BLOOD BY AUTOMATED COUNT: 14.2 % (ref 11.5–17)
GFR SERPLBLD CREATININE-BSD FMLA CKD-EPI: 22 ML/MIN/1.73/M2
GLOBULIN SER-MCNC: 2.4 GM/DL (ref 2.4–3.5)
GLUCOSE SERPL-MCNC: 192 MG/DL (ref 82–115)
HCT VFR BLD AUTO: 27.7 % (ref 37–47)
HGB BLD-MCNC: 9 G/DL (ref 12–16)
IMM GRANULOCYTES # BLD AUTO: 0.09 X10(3)/MCL (ref 0–0.04)
IMM GRANULOCYTES NFR BLD AUTO: 0.8 %
LYMPHOCYTES # BLD AUTO: 0.9 X10(3)/MCL (ref 0.6–4.6)
LYMPHOCYTES NFR BLD AUTO: 8.1 %
MCH RBC QN AUTO: 32.6 PG (ref 27–31)
MCHC RBC AUTO-ENTMCNC: 32.5 G/DL (ref 33–36)
MCV RBC AUTO: 100.4 FL (ref 80–94)
MONOCYTES # BLD AUTO: 0.84 X10(3)/MCL (ref 0.1–1.3)
MONOCYTES NFR BLD AUTO: 7.5 %
NEUTROPHILS # BLD AUTO: 9.3 X10(3)/MCL (ref 2.1–9.2)
NEUTROPHILS NFR BLD AUTO: 83.2 %
NRBC BLD AUTO-RTO: 0 %
PLATELET # BLD AUTO: 105 X10(3)/MCL (ref 130–400)
PLATELETS.RETICULATED NFR BLD AUTO: 4.5 % (ref 0.9–11.2)
PMV BLD AUTO: 10.6 FL (ref 7.4–10.4)
POTASSIUM SERPL-SCNC: 4.9 MMOL/L (ref 3.5–5.1)
PROT SERPL-MCNC: 5.5 GM/DL (ref 5.8–7.6)
RBC # BLD AUTO: 2.76 X10(6)/MCL (ref 4.2–5.4)
SODIUM SERPL-SCNC: 141 MMOL/L (ref 136–145)
WBC # BLD AUTO: 11.17 X10(3)/MCL (ref 4.5–11.5)

## 2024-07-04 PROCEDURE — 85025 COMPLETE CBC W/AUTO DIFF WBC: CPT | Performed by: INTERNAL MEDICINE

## 2024-07-04 PROCEDURE — 99900035 HC TECH TIME PER 15 MIN (STAT)

## 2024-07-04 PROCEDURE — 25000003 PHARM REV CODE 250: Performed by: INTERNAL MEDICINE

## 2024-07-04 PROCEDURE — 99233 SBSQ HOSP IP/OBS HIGH 50: CPT | Mod: 95,,, | Performed by: INTERNAL MEDICINE

## 2024-07-04 PROCEDURE — 11000001 HC ACUTE MED/SURG PRIVATE ROOM

## 2024-07-04 PROCEDURE — 36415 COLL VENOUS BLD VENIPUNCTURE: CPT | Performed by: INTERNAL MEDICINE

## 2024-07-04 PROCEDURE — 63600175 PHARM REV CODE 636 W HCPCS: Mod: JZ,JG | Performed by: INTERNAL MEDICINE

## 2024-07-04 PROCEDURE — 94799 UNLISTED PULMONARY SVC/PX: CPT

## 2024-07-04 PROCEDURE — 97166 OT EVAL MOD COMPLEX 45 MIN: CPT

## 2024-07-04 PROCEDURE — 97162 PT EVAL MOD COMPLEX 30 MIN: CPT

## 2024-07-04 PROCEDURE — 80053 COMPREHEN METABOLIC PANEL: CPT | Performed by: INTERNAL MEDICINE

## 2024-07-04 RX ORDER — WARFARIN 1 MG/1
2 TABLET ORAL DAILY
Status: DISCONTINUED | OUTPATIENT
Start: 2024-07-04 | End: 2024-07-06

## 2024-07-04 RX ADMIN — LEVOTHYROXINE SODIUM 125 MCG: 125 TABLET ORAL at 06:07

## 2024-07-04 RX ADMIN — MUPIROCIN: 20 OINTMENT TOPICAL at 09:07

## 2024-07-04 RX ADMIN — OXYCODONE HYDROCHLORIDE AND ACETAMINOPHEN 1 TABLET: 5; 325 TABLET ORAL at 04:07

## 2024-07-04 RX ADMIN — ATORVASTATIN CALCIUM 20 MG: 10 TABLET, FILM COATED ORAL at 08:07

## 2024-07-04 RX ADMIN — CARVEDILOL 12.5 MG: 12.5 TABLET, FILM COATED ORAL at 09:07

## 2024-07-04 RX ADMIN — METHOCARBAMOL 500 MG: 500 TABLET ORAL at 08:07

## 2024-07-04 RX ADMIN — ACETAMINOPHEN 1000 MG: 500 TABLET ORAL at 09:07

## 2024-07-04 RX ADMIN — METHOCARBAMOL 500 MG: 500 TABLET ORAL at 02:07

## 2024-07-04 RX ADMIN — POLYETHYLENE GLYCOL 3350 17 G: 17 POWDER, FOR SOLUTION ORAL at 09:07

## 2024-07-04 RX ADMIN — METHOCARBAMOL 500 MG: 500 TABLET ORAL at 09:07

## 2024-07-04 RX ADMIN — CARVEDILOL 12.5 MG: 12.5 TABLET, FILM COATED ORAL at 08:07

## 2024-07-04 RX ADMIN — WARFARIN SODIUM 2 MG: 1 TABLET ORAL at 06:07

## 2024-07-04 RX ADMIN — SACUBITRIL AND VALSARTAN 1 TABLET: 49; 51 TABLET, FILM COATED ORAL at 08:07

## 2024-07-04 RX ADMIN — CLINDAMYCIN PHOSPHATE 900 MG: 900 INJECTION, SOLUTION INTRAVENOUS at 04:07

## 2024-07-04 NOTE — PLAN OF CARE
Problem: Occupational Therapy  Goal: Occupational Therapy Goal  Description: Goals to be met by: 8/1/24     Patient will increase functional independence with ADLs by performing:    LE Dressing with Modified Motley.  Grooming while standing with Modified Motley.  Toileting from toilet with Modified Motley for hygiene and clothing management.   Toilet transfer to toilet with Modified Motley. Progress from bedside commode    Outcome: Progressing

## 2024-07-04 NOTE — PT/OT/SLP EVAL
Physical Therapy Evaluation    Patient Name:  Homa Jaquez   MRN:  [id number]    Recommendations:     Discharge therapy intensity: High Intensity Therapy   Discharge Equipment Recommendations: none   Barriers to discharge: Decreased caregiver support, Impaired mobility, and Ongoing medical needs    Assessment:     Homa Jaquez is a 88 y.o. female admitted with a medical diagnosis of closed L intertrochanteric femur fracture with severe comminution s/p IM nail 7/3/24. Pt experienced fall while out to dinner.  She presents with the following impairments/functional limitations: weakness, impaired endurance, impaired self care skills, impaired functional mobility, gait instability, impaired balance, decreased lower extremity function, impaired cardiopulmonary response to activity. Pt with good effort throughout session but unable to transfer out of bed due to symptomatic hypotension following stand. Pt's BP monitored throughout but pt asymptomatic supine and seated EOB, however following stand pt reported progressive dizziness and unable to achieve reading, returned to supine in bed and pt recovered. Nurse and MD aware. PT anticipates pt to progress well once medically optimized as she is motivated to move, get stronger, and return to PLOF. PT recommending high intensity placement upon discharge.    Rehab Prognosis: Good; patient would benefit from acute skilled PT services to address these deficits and reach maximum level of function.    Recent Surgery: Procedure(s) (LRB):  INSERTION, INTRAMEDULLARY MELANIE, FEMUR (Left) 1 Day Post-Op    Plan:     During this hospitalization, patient would benefit from acute PT services 6 x/week to address the identified rehab impairments via gait training, therapeutic activities, therapeutic exercises, neuromuscular re-education, wheelchair management/training and progress toward the following goals:    Plan of Care Expires:  08/04/24    Subjective     Chief Complaint: dizziness  "following stand  Patient/Family Comments/goals: get stronger and return home  Pain/Comfort:  Pain Rating 1: 0/10    Patients cultural, spiritual, Congregational conflicts given the current situation: no    Living Environment:  Pt lives with spouse in "in law suite" next to son's home. She stays on the first floor of multilevel apartment. She used rollator for mobility and has walk in shower room with seat. She was independent with dressing and bathing. Son lives next door but works full time.Equipment available at home: wheelchair, shower chair, bedside commode, rollator (ramp, bed rail).  DME owned (not currently used): none.  Upon discharge, patient will have assistance from facility staff then family at home.    Objective:     Communicated with nurse prior to session.  Patient found HOB elevated with peripheral IV, telemetry  upon PT entry to room.    General Precautions: Standard, fall  Orthopedic Precautions:LLE weight bearing as tolerated   Braces: N/A  Respiratory Status: Room air  Blood Pressure:   - supine: 88/49, 97/60 after AROM  - seated: 67/47, 88/57  - stand: unable to obtain reading      Exams:  Cognitive Exam:  Patient is oriented to Person, Place, Time, and Situation  Gross Motor Coordination:  WFL  RLE ROM: WFL  RLE Strength: WFL  LLE ROM: WFL  LLE Strength: grossly 3+/5  Skin integrity:  surgical incision      Functional Mobility:  Bed Mobility:     Rolling Right: minimum assistance  Scooting: minimum assistance  Supine to Sit: minimum assistance  Sit to Supine: moderate assistance  Transfers:     Sit to Stand:  minimum assistance with rolling walker  Stand step: deferred due to onset of symptoms while setting up chair for transfer  Pre- Gait: lateral weight shifts performed with min A, 3 alternating steps in place        AM-PAC 6 CLICK MOBILITY  Total Score:12         Patient provided with verbal education and demonstrations education regarding PT role/goals/POC, post-op precautions, fall prevention, " safety awareness, and discharge/DME recommendations.  Understanding was verbalized, however additional teaching warranted.     Patient left HOB elevated with all lines intact, call button in reach, and nurse notified.    GOALS:   Multidisciplinary Problems       Physical Therapy Goals          Problem: Physical Therapy    Goal Priority Disciplines Outcome Goal Variances Interventions   Physical Therapy Goal     PT, PT/OT Progressing     Description: Goals to be met by: 2024     Patient will increase functional independence with mobility by performin. Supine to sit with Stand-by Assistance  2. Rolling to Left and Right with Stand-by Assistance.  3. Sit to stand transfer with Stand-by Assistance  4. Bed to chair transfer with Stand-by Assistance using Rolling Walker  5. Gait  x 150 feet with Stand-by Assistance using Rolling Walker.                          History:     Past Medical History:   Diagnosis Date    A-fib     Chronic cystitis     GERD (gastroesophageal reflux disease)     Graves disease     Hypertension     Hypothyroidism, unspecified     Spinal stenosis        Past Surgical History:   Procedure Laterality Date    APPENDECTOMY      BLADDER SUSPENSION      CARDIOVERSION  2015    CATARACT EXTRACTION      HYSTERECTOMY      LAPAROSCOPIC CHOLECYSTECTOMY  2016    SPHINCTEROTOMY OF URETHRA  2016    TONSILLECTOMY AND ADENOIDECTOMY         Time Tracking:     PT Received On: 24  PT Start Time: 1025     PT Stop Time: 1101  PT Total Time (min): 36 min     Billable Minutes: Evaluation moderate      2024

## 2024-07-04 NOTE — PROGRESS NOTES
[facility] Neuro  Orthopedics  Progress Note    Patient Name: Homa Jaquez  MRN: [id number]  Admission Date: 7/2/2024  Hospital Length of Stay: 2 days  Attending Provider: Franklyn Brito MD  Primary Care Provider: Franklyn Brito MD  Follow-up For: Procedure(s) (LRB):  INSERTION, INTRAMEDULLARY MELANIE, FEMUR (Left)    Post-Operative Day: 1 Day Post-Op  Subjective:     Principal Problem:Closed comminuted intertrochanteric fracture of left femur    Principal Orthopedic Problem: 1 Day Post-Op   Intramedullary nail of the left intertrochanteric femur fracture    Interval History:  She is resting comfortably this morning.  Nursing staff states she has needed minimal pain control overnight.  She has not yet been up to work with physical therapy.  Her dressings remain in place clean and dry. Has not had bowel movement in a couple of days.  Mild pain at the knee.  No numbness or tingling distally    Review of patient's allergies indicates:   Allergen Reactions    Cefadroxil      Other reaction(s): Nausea or vomiting    Cefuroxime axetil     Cephalexin      Other reaction(s): NAUSEA AND VOMITING    Ciprofloxacin     Enoxaparin     Methenamine hippurate      Other reaction(s): Unknown    Promethazine        Current Facility-Administered Medications   Medication    0.9%  NaCl infusion    acetaminophen tablet 1,000 mg    atorvastatin tablet 20 mg    carvediloL tablet 12.5 mg    electrolyte-A infusion    HYDROmorphone (PF) injection 0.5 mg    HYDROmorphone (PF) injection 1 mg    levothyroxine tablet 125 mcg    melatonin tablet 6 mg    methocarbamoL tablet 500 mg    morphine injection 4 mg    mupirocin 2 % ointment    ondansetron injection 4 mg    oxyCODONE-acetaminophen  mg per tablet 1 tablet    oxyCODONE-acetaminophen 5-325 mg per tablet 1 tablet    polyethylene glycol packet 17 g    sacubitriL-valsartan 49-51 mg per tablet 1 tablet    sodium chloride 0.9% flush 10 mL     Objective:  "    Vital Signs (Most Recent):  Temp: 97.4 °F (36.3 °C) (07/04/24 0436)  Pulse: 81 (07/04/24 0615)  Resp: 18 (07/04/24 0415)  BP: 91/60 (07/04/24 0615)  SpO2: 99 % (07/04/24 0436) Vital Signs (24h Range):  Temp:  [97.2 °F (36.2 °C)-98 °F (36.7 °C)] 97.4 °F (36.3 °C)  Pulse:  [71-94] 81  Resp:  [10-21] 18  SpO2:  [86 %-100 %] 99 %  BP: ()/(47-76) 91/60     Weight: 70.3 kg (155 lb)  Height: 5' 2.01" (157.5 cm)  Body mass index is 28.34 kg/m².      Intake/Output Summary (Last 24 hours) at 7/4/2024 0709  Last data filed at 7/4/2024 0630  Gross per 24 hour   Intake 1605.78 ml   Output 650 ml   Net 955.78 ml       Physical Exam:   General the patient is alert and oriented x3 no acute distress nontoxic-appearing appropriate affect.    Constitutional: Vital signs are examined and stable.  Resp: No signs of labored breathing               LLE: -Skin: Dressing CDI .   Without shadowing or strike through., No signs of new abrasions or lacerations, no scars           -MSK: Hip and Knee F/E, EHL/FHL, Gastroc/Tib anterior Strength 5/5; tolerates gentle passive range of motion of the hip and knee although does have moderate knee pain.  No effusion palpable.           -Neuro:  Sensation intact to light touch L3-S1 dermatomes           -Lymphatic: No signs of lymphadenopathy           -CV: Capillary refill is less than 2 seconds. DP/PT pulses 2/4. Compartments soft and compressible          Diagnostic Findings:   Significant Labs:   Recent Lab Results  (Last 5 results in the past 72 hours)        07/04/24 0345 07/02/24 2012 07/02/24  1938   07/02/24  1913   07/02/24  1804        Immature Platelet Fraction 4.5               Albumin/Globulin Ratio 1.3         0.9       Albumin 3.1         3.7       ALP 76         122       ALT 11         16       Anion Gap 12.0         12.0       PTT     37.2  Comment: For Minimal Heparin Infusion, the goal aPTT 64-85 seconds corresponds to an anti-Xa of 0.3-0.5.    For Low Intensity and " High Intensity Heparin, the goal aPTT  seconds corresponds to an anti-Xa of 0.3-0.7           AST 19         29       Baso # 0.04         0.07       Basophil % 0.4         1.3       BILIRUBIN TOTAL 0.7         0.6       BUN 38.9         25.6       BUN/CREAT RATIO 18         18       Calcium 7.9         9.2       Chloride 113         110       CO2 16         19       Creatinine 2.17         1.45       eGFR 22         35       Eos # 0.00         0.21       Eos % 0.0         3.8       Globulin, Total 2.4         4.3       Glucose 192         147       Group & Rh   A POS             Hematocrit 27.7         48.7       Hemoglobin 9.0         15.8       Immature Grans (Abs) 0.09         0.02       Immature Granulocytes 0.8         0.4       Indirect Debra GEL   NEG             INR     2.5           Lymph # 0.90         1.38       LYMPH % 8.1         24.7       MCH 32.6         32.4       MCHC 32.5         32.4       .4         100.0       Mono # 0.84         0.50       Mono % 7.5         8.9       MPV 10.6         10.3       Neut # 9.30         3.41       Neut % 83.2         60.9       nRBC 0.0         0.0       QRS Duration       126         OHS QTC Calculation       492         Platelet Count 105         165       Potassium 4.9         4.0       PROTEIN TOTAL 5.5         8.0       PT     27.4           RBC 2.76         4.87       RDW 14.2         13.9       Sodium 141         141       Specimen Outdate   07/05/2024 23:59             WBC 11.17         5.59                               Significant Imaging: I have reviewed and interpreted all pertinent imaging results/findings.     Assessment/Plan:     Active Diagnoses:    Diagnosis Date Noted POA    PRINCIPAL PROBLEM:  Closed comminuted intertrochanteric fracture of left femur [S72.142A] 07/03/2024 Yes    Fall [W19.XXXA] 07/03/2024 Unknown    Acute on chronic systolic heart failure [I50.23] 03/14/2023 Yes    Atrial fibrillation [I48.91] 03/14/2023 Yes     Primary hypertension [I10] 03/14/2023 Yes      Problems Resolved During this Admission:   88-year-old female postop day 1 intramedullary nail left IT femur fracture    She is anticoagulated on Coumadin.  H and H is down slightly but stable consistent with acute blood loss anemia.  We will continue to monitor.  Daily dry dressing changes beginning tomorrow as needed.  Work with therapy today weight-bearing as tolerated range of motion as tolerated.  Continue multimodal pain control.  Continue Coumadin for DVT prophylaxis as she is previously scheduled.  -we will continue to follow through her stay.  Discharge home with home health versus rehab pending mobilization with physical therapy.    -follow up with Dr. Pierce/Alice Davis in 3 weeks for wound check and repeat x rays. Please call with questions or concerns.      The above findings, diagnostics, and treatment plan were discussed with Dr. Pierce who is in agreement with the plan of care except as stated in additional documentation.      Alice Davis PA-C    Orthopedic Trauma Surgery  [facility]

## 2024-07-04 NOTE — PLAN OF CARE
Problem: Adult Inpatient Plan of Care  Goal: Plan of Care Review  Outcome: Progressing  Goal: Patient-Specific Goal (Individualized)  Outcome: Progressing  Goal: Absence of Hospital-Acquired Illness or Injury  Outcome: Progressing  Goal: Optimal Comfort and Wellbeing  Outcome: Progressing  Goal: Readiness for Transition of Care  Outcome: Progressing     Problem: Diabetes Comorbidity  Goal: Blood Glucose Level Within Targeted Range  Outcome: Progressing     Problem: Infection  Goal: Absence of Infection Signs and Symptoms  Outcome: Progressing     Problem: Skin Injury Risk Increased  Goal: Skin Health and Integrity  Outcome: Progressing     Problem: Wound  Goal: Optimal Coping  Outcome: Progressing  Goal: Optimal Functional Ability  Outcome: Progressing  Goal: Absence of Infection Signs and Symptoms  Outcome: Progressing  Goal: Improved Oral Intake  Outcome: Progressing  Goal: Optimal Pain Control and Function  Outcome: Progressing  Goal: Skin Health and Integrity  Outcome: Progressing  Goal: Optimal Wound Healing  Outcome: Progressing

## 2024-07-04 NOTE — PT/OT/SLP EVAL
Occupational Therapy  Evaluation    Name: Homa Jaquez  MRN: [id number]  Admitting Diagnosis: s/p fall  wthi L femur fx  Recent Surgery: Procedure(s) (LRB):  INSERTION, INTRAMEDULLARY MELANIE, FEMUR (Left) 1 Day Post-Op    Recommendations:     Discharge therapy intensity: High Intensity Therapy   Discharge Equipment Recommendations:  none  Barriers to discharge:  None    Assessment:     Homa Jaquez is a 88 y.o. female with a medical diagnosis of s/p fall while at [facility] with L femur fx s/p IMR.  She presents with good effort with activity, able to perform supine to sit with mod assist and sit to stand briefly with min assist. Pt initially with BP 88/49 sitting up in bed then improved to 97/60, found to be hypotensive initially in sitting with some c/o dizziness and  BP 67/47 and improving to 88/57 with increased time, upon standing pt was able to perform some wt. Shifts and marches then noted to become slightly pale and dizzy and u/a to get BP reading then returned to sitting and supine with symptoms resolving. Per son, pt's is noted to be hypotensive in the mornings with much improvement in the afternoon. Recommending High intensity therapy at this time to maximize pt safety and ADL/mobility performance. She also presents with the following performance deficits affecting function: weakness, impaired endurance, impaired self care skills, impaired functional mobility, decreased lower extremity function, decreased ROM.     Rehab Prognosis: good; patient would benefit from acute skilled OT services to address these deficits and reach maximum level of function.       Plan:     Patient to be seen 6 x/week to address the above listed problems via self-care/home management, therapeutic activities, therapeutic exercises  Plan of Care Expires: 08/01/24  Plan of Care Reviewed with: patient, spouse, son    Subjective     Chief Complaint: some dizziness/wooziness in sitting up EOB  Patient/Family Comments/goals: return to  PLOF and home    Occupational Profile:  Living Environment: lives with  in parents suite next to son's home, walk in shower with chair and GB's, has rail on the bed, portable toilet seat/shower chair, raised commode, ramp for entry  Previous level of function: ambulated with rollator and could perform BADL's and toilet transfers with mod I  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      Patient was found supine with telemetry, peripheral IV upon OT entry to room.    General Precautions: Standard, fall  Orthopedic Precautions: LLE weight bearing as tolerated  Braces:      Vital Signs: see above assessment    Bed Mobility:    Sup to sit with mod assist    Functional Mobility/Transfers:  Sit to stand with min assist  Functional Mobility: performed standing wt. Shifts and marches eOB with min assist, noted buckling after brief standing activity    Activities of Daily Living:  Socks with total assist    AMPAC 6 Click ADL:  AMPAC Total Score:      Functional Cognition:  intact    Visual Perceptual Skills:  intact    Upper Extremity Function:  Right Upper Extremity:   wfl    Left Upper Extremity:  wfl    Balance:   SBA sitting EOB  Min assist standing with RW        Additional Treatment:      Patient Education:  Patient, spouse were, and son/s were provided with verbal education education regarding OT role/goals/POC, fall prevention, safety awareness, and Discharge/DME recommendations.  Understanding was verbalized.     Patient left HOB elevated with all lines intact, call button in reach, and son and spouse present.    GOALS:   Multidisciplinary Problems       Occupational Therapy Goals           Problem: Occupational Therapy    Goal Priority Disciplines Outcome Interventions   Occupational Therapy Goal     OT, PT/OT Progressing    Description: Goals to be met by: 8/1/24     Patient will increase functional independence with ADLs by performing:    LE Dressing with Modified Jeff Davis.  Grooming while standing with Modified Jeff Davis.  Toileting from toilet with Modified Jeff Davis for hygiene and clothing management.   Toilet transfer to toilet with Modified Jeff Davis. Progress from bedside commode                         History:     Past Medical History:   Diagnosis Date    A-fib     Chronic cystitis     GERD (gastroesophageal reflux disease)     Graves disease     Hypertension     Hypothyroidism, unspecified     Spinal stenosis          Past Surgical History:   Procedure Laterality Date    APPENDECTOMY      BLADDER SUSPENSION      CARDIOVERSION  04/01/2015    CATARACT EXTRACTION      HYSTERECTOMY      LAPAROSCOPIC CHOLECYSTECTOMY  01/12/2016    SPHINCTEROTOMY OF URETHRA  01/11/2016    TONSILLECTOMY AND ADENOIDECTOMY         Time Tracking:     OT Date of Treatment: 07/04/24  OT Start Time: 1025  OT Stop Time: 1101  OT Total Time (min): 36 min    Billable Minutes:Evaluation mod complexity    7/4/2024

## 2024-07-04 NOTE — PLAN OF CARE
Problem: Physical Therapy  Goal: Physical Therapy Goal  Description: Goals to be met by: 2024     Patient will increase functional independence with mobility by performin. Supine to sit with Stand-by Assistance  2. Rolling to Left and Right with Stand-by Assistance.  3. Sit to stand transfer with Stand-by Assistance  4. Bed to chair transfer with Stand-by Assistance using Rolling Walker  5. Gait  x 150 feet with Stand-by Assistance using Rolling Walker.     Outcome: Progressing

## 2024-07-04 NOTE — PROGRESS NOTES
"Progress Note  Hospital Medicine    Patient Name: Homa Jaquez  YOB: 1935    Admit Date: 7/2/2024                     LOS: 2    SUBJECTIVE:     Reason for Admission:  Closed comminuted intertrochanteric fracture of left femur  See H&P for detailed presentating history and ROS.      Interval history: uneventul noght , pain is tolerable , no Bms  yet   Labs  reviewed and  discuss with patient  and family , advice to increase water intake       OBJECTIVE:     Vital Signs Range (Last 24H):  Temp:  [97.2 °F (36.2 °C)-98 °F (36.7 °C)]   Pulse:  [71-93]   Resp:  [10-21]   BP: ()/(47-76)   SpO2:  [86 %-100 %] Body mass index is 28.34 kg/m².  Wt Readings from Last 3 Encounters:   07/03/24 0126 70.3 kg (155 lb)   07/02/24 1738 70.3 kg (155 lb)   06/29/23 1510 67 kg (147 lb 12.8 oz)   03/30/23 1433 71.1 kg (156 lb 12.8 oz)       I & O (Last 24H):  Intake/Output Summary (Last 24 hours) at 7/4/2024 0955  Last data filed at 7/4/2024 0630  Gross per 24 hour   Intake 1455.78 ml   Output 650 ml   Net 805.78 ml       Physical Exam:  Alert O x 3    In no distress , pleasant lady    HEEnt  wnl    HEART  irregular irregular   LUNGS  CTA   ABD  benign    EXT  no cce     Diagnostic Results:  Lab Results   Component Value Date    WBC 11.17 07/04/2024    HGB 9.0 (L) 07/04/2024    HCT 27.7 (L) 07/04/2024    .4 (H) 07/04/2024     (L) 07/04/2024     Recent Labs   Lab 07/04/24  0345      K 4.9   *   CO2 16*   BUN 38.9*   CREATININE 2.17*   CALCIUM 7.9*     Lab Results   Component Value Date    INR 2.5 (H) 07/02/2024    INR 3.31 (H) 03/16/2023    INR 3.10 (H) 03/15/2023     Lab Results   Component Value Date    HGBA1C 5.6 01/03/2024    HGBA1C 5.6 01/03/2024     No results for input(s): "POCTGLUCOSE" in the last 72 hours.    ASSESSMENT/PLAN:     Active Hospital Problems    Diagnosis  POA    *Closed comminuted intertrochanteric fracture of left femur [S72.142A]  Yes    Fall [W19.XXXA]  Unknown "    Acute on chronic systolic heart failure [I50.23]  Yes    Atrial fibrillation [I48.91]  Yes    Primary hypertension [I10]  Yes      Resolved Hospital Problems   No resolved problems to display.        Problems Addressed Today:    Closed comminuted intertrochanteric fracture of left femur  Accidental trip and fall   Status post ORIF of the left hip today.    Will continue physical therapy and may need rehab stay.    Will follow up on when to restart Coumadin.      Fall  With left hip fracture.  No syncope or near-syncope  Mechanical accidental fall.      Acute on chronic systolic heart failure  Compensated continue current therapy with Entresto      Atrial fibrillation  Currently rate controlled   Normally she is on Coumadin  Will follow up on restart Coumadin postop.    Primary hypertension  Chronic, controlled. Latest blood pressure and vitals reviewed-     Temp:  [97.2 °F (36.2 °C)-98 °F (36.7 °C)]   Pulse:  [70-96]   Resp:  [10-24]   BP: ()/()   SpO2:  [86 %-100 %] .   Home meds for hypertension were reviewed and noted below.   Hypertension Medications               carvediloL (COREG) 12.5 MG tablet Take 12.5 mg by mouth 2 (two) times daily.    ENTRESTO 49-51 mg per tablet GIVE ONE TABLET BY MOUTH TWICE DAILY            While in the hospital, will manage blood pressure as follows; Continue home antihypertensive regimen    Will utilize p.r.n. blood pressure medication only if patient's blood pressure greater than 180/110 and she develops symptoms such as worsening chest pain or shortness of breath.      DISCHARGE PLANNING:     Continue IVFs  repeat BMP in am   Advice to increase water intake   Resume coumadin  Signing Physician:  Quinton Kennedy MD

## 2024-07-05 PROBLEM — N17.9 AKI (ACUTE KIDNEY INJURY): Status: ACTIVE | Noted: 2024-07-05

## 2024-07-05 LAB
ALBUMIN SERPL-MCNC: 3 G/DL (ref 3.4–4.8)
ALBUMIN/GLOB SERPL: 1.2 RATIO (ref 1.1–2)
ALP SERPL-CCNC: 72 UNIT/L (ref 40–150)
ALT SERPL-CCNC: 11 UNIT/L (ref 0–55)
ANION GAP SERPL CALC-SCNC: 10 MEQ/L
AST SERPL-CCNC: 20 UNIT/L (ref 5–34)
BASOPHILS # BLD AUTO: 0.03 X10(3)/MCL
BASOPHILS NFR BLD AUTO: 0.3 %
BILIRUB SERPL-MCNC: 0.6 MG/DL
BUN SERPL-MCNC: 52.5 MG/DL (ref 9.8–20.1)
CALCIUM SERPL-MCNC: 7.7 MG/DL (ref 8.4–10.2)
CHLORIDE SERPL-SCNC: 108 MMOL/L (ref 98–107)
CO2 SERPL-SCNC: 19 MMOL/L (ref 23–31)
CREAT SERPL-MCNC: 2.7 MG/DL (ref 0.55–1.02)
CREAT/UREA NIT SERPL: 19
EOSINOPHIL # BLD AUTO: 0.03 X10(3)/MCL (ref 0–0.9)
EOSINOPHIL NFR BLD AUTO: 0.3 %
ERYTHROCYTE [DISTWIDTH] IN BLOOD BY AUTOMATED COUNT: 14.6 % (ref 11.5–17)
GFR SERPLBLD CREATININE-BSD FMLA CKD-EPI: 17 ML/MIN/1.73/M2
GLOBULIN SER-MCNC: 2.6 GM/DL (ref 2.4–3.5)
GLUCOSE SERPL-MCNC: 127 MG/DL (ref 82–115)
HCT VFR BLD AUTO: 24.3 % (ref 37–47)
HGB BLD-MCNC: 8 G/DL (ref 12–16)
IMM GRANULOCYTES # BLD AUTO: 0.05 X10(3)/MCL (ref 0–0.04)
IMM GRANULOCYTES NFR BLD AUTO: 0.5 %
LYMPHOCYTES # BLD AUTO: 1.18 X10(3)/MCL (ref 0.6–4.6)
LYMPHOCYTES NFR BLD AUTO: 11.7 %
MCH RBC QN AUTO: 32.9 PG (ref 27–31)
MCHC RBC AUTO-ENTMCNC: 32.9 G/DL (ref 33–36)
MCV RBC AUTO: 100 FL (ref 80–94)
MONOCYTES # BLD AUTO: 0.77 X10(3)/MCL (ref 0.1–1.3)
MONOCYTES NFR BLD AUTO: 7.6 %
NEUTROPHILS # BLD AUTO: 8.01 X10(3)/MCL (ref 2.1–9.2)
NEUTROPHILS NFR BLD AUTO: 79.6 %
NRBC BLD AUTO-RTO: 0 %
PLATELET # BLD AUTO: 122 X10(3)/MCL (ref 130–400)
PMV BLD AUTO: 10.5 FL (ref 7.4–10.4)
POTASSIUM SERPL-SCNC: 5.4 MMOL/L (ref 3.5–5.1)
PROT SERPL-MCNC: 5.6 GM/DL (ref 5.8–7.6)
RBC # BLD AUTO: 2.43 X10(6)/MCL (ref 4.2–5.4)
SODIUM SERPL-SCNC: 137 MMOL/L (ref 136–145)
WBC # BLD AUTO: 10.07 X10(3)/MCL (ref 4.5–11.5)

## 2024-07-05 PROCEDURE — 25000003 PHARM REV CODE 250: Performed by: INTERNAL MEDICINE

## 2024-07-05 PROCEDURE — 36415 COLL VENOUS BLD VENIPUNCTURE: CPT | Performed by: INTERNAL MEDICINE

## 2024-07-05 PROCEDURE — 97530 THERAPEUTIC ACTIVITIES: CPT | Mod: CO

## 2024-07-05 PROCEDURE — 97530 THERAPEUTIC ACTIVITIES: CPT | Mod: CQ

## 2024-07-05 PROCEDURE — 99900035 HC TECH TIME PER 15 MIN (STAT)

## 2024-07-05 PROCEDURE — 85025 COMPLETE CBC W/AUTO DIFF WBC: CPT | Performed by: INTERNAL MEDICINE

## 2024-07-05 PROCEDURE — 99232 SBSQ HOSP IP/OBS MODERATE 35: CPT | Mod: ,,, | Performed by: INTERNAL MEDICINE

## 2024-07-05 PROCEDURE — 97116 GAIT TRAINING THERAPY: CPT | Mod: CQ

## 2024-07-05 PROCEDURE — 11000001 HC ACUTE MED/SURG PRIVATE ROOM

## 2024-07-05 PROCEDURE — 99900031 HC PATIENT EDUCATION (STAT)

## 2024-07-05 PROCEDURE — 80053 COMPREHEN METABOLIC PANEL: CPT | Performed by: INTERNAL MEDICINE

## 2024-07-05 PROCEDURE — 94799 UNLISTED PULMONARY SVC/PX: CPT

## 2024-07-05 RX ADMIN — POLYETHYLENE GLYCOL 3350 17 G: 17 POWDER, FOR SOLUTION ORAL at 09:07

## 2024-07-05 RX ADMIN — LEVOTHYROXINE SODIUM 125 MCG: 125 TABLET ORAL at 06:07

## 2024-07-05 RX ADMIN — SACUBITRIL AND VALSARTAN 1 TABLET: 49; 51 TABLET, FILM COATED ORAL at 09:07

## 2024-07-05 RX ADMIN — WARFARIN SODIUM 2 MG: 1 TABLET ORAL at 04:07

## 2024-07-05 RX ADMIN — OXYCODONE HYDROCHLORIDE AND ACETAMINOPHEN 1 TABLET: 5; 325 TABLET ORAL at 06:07

## 2024-07-05 RX ADMIN — CARVEDILOL 12.5 MG: 12.5 TABLET, FILM COATED ORAL at 09:07

## 2024-07-05 RX ADMIN — MUPIROCIN: 20 OINTMENT TOPICAL at 09:07

## 2024-07-05 RX ADMIN — ATORVASTATIN CALCIUM 20 MG: 10 TABLET, FILM COATED ORAL at 09:07

## 2024-07-05 RX ADMIN — ACETAMINOPHEN 1000 MG: 500 TABLET ORAL at 09:07

## 2024-07-05 NOTE — ASSESSMENT & PLAN NOTE
Chronic, controlled. Latest blood pressure and vitals reviewed-     Temp:  [97.3 °F (36.3 °C)-98.1 °F (36.7 °C)]   Pulse:  [77-84]   Resp:  [16-18]   BP: (78-93)/(50-59)   SpO2:  [96 %-99 %] .   Home meds for hypertension were reviewed and noted below.   Hypertension Medications               carvediloL (COREG) 12.5 MG tablet Take 12.5 mg by mouth 2 (two) times daily.    ENTRESTO 49-51 mg per tablet GIVE ONE TABLET BY MOUTH TWICE DAILY            While in the hospital, will manage blood pressure as follows; Continue home antihypertensive regimen    Will utilize p.r.n. blood pressure medication only if patient's blood pressure greater than 180/110 and she develops symptoms such as worsening chest pain or shortness of breath.      Will hold Entresto for the meantime.  Otherwise she is asymptomatic

## 2024-07-05 NOTE — SUBJECTIVE & OBJECTIVE
Interval History:  Overall stable blood pressure is little on the low side.  She was given fluids at 75 cc/hour.  Also noted to have a serum creatinine of 2.7 today.  Entresto was on hold.  H&H is dropping she was on Coumadin.  Otherwise she is asymptomatic overall stable    Review of Systems   Constitutional: Negative.  Negative for chills and fever.   HENT: Negative.     Eyes: Negative.    Respiratory: Negative.  Negative for cough and shortness of breath.    Cardiovascular: Negative.  Negative for chest pain, palpitations and leg swelling.   Gastrointestinal: Negative.  Negative for abdominal distention, abdominal pain, constipation, diarrhea, nausea and vomiting.   Endocrine: Negative.    Genitourinary: Negative.  Negative for dysuria and hematuria.   Musculoskeletal: Negative.  Negative for arthralgias, back pain and joint swelling.   Skin: Negative.  Negative for rash.   Allergic/Immunologic: Negative.    Neurological: Negative.  Negative for dizziness, seizures and headaches.   Hematological: Negative.    Psychiatric/Behavioral: Negative.       Objective:     Vital Signs (Most Recent):  Temp: 98.1 °F (36.7 °C) (07/05/24 1130)  Pulse: 82 (07/05/24 1130)  Resp: 18 (07/05/24 1130)  BP: (!) 78/50 (07/05/24 1130)  SpO2: 96 % (07/05/24 1130) Vital Signs (24h Range):  Temp:  [97.3 °F (36.3 °C)-98.1 °F (36.7 °C)] 98.1 °F (36.7 °C)  Pulse:  [77-84] 82  Resp:  [16-18] 18  SpO2:  [96 %-99 %] 96 %  BP: (78-93)/(50-59) 78/50     Weight: 70.3 kg (155 lb)  Body mass index is 28.34 kg/m².    Intake/Output Summary (Last 24 hours) at 7/5/2024 1222  Last data filed at 7/4/2024 1500  Gross per 24 hour   Intake 640 ml   Output 300 ml   Net 340 ml         Physical Exam  Eyes:      Pupils: Pupils are equal, round, and reactive to light.   Cardiovascular:      Rate and Rhythm: Normal rate.      Pulses: Normal pulses.      Heart sounds: No murmur heard.  Pulmonary:      Effort: Pulmonary effort is normal.      Breath sounds: Normal  breath sounds.   Abdominal:      General: Abdomen is flat. Bowel sounds are normal. There is no distension.      Palpations: Abdomen is soft.      Tenderness: There is no guarding.   Musculoskeletal:         General: Normal range of motion.      Cervical back: Normal range of motion and neck supple.   Skin:     General: Skin is warm.   Neurological:      General: No focal deficit present.      Mental Status: She is alert.   Psychiatric:         Mood and Affect: Mood normal.         Behavior: Behavior normal.             Significant Labs: All pertinent labs within the past 24 hours have been reviewed.  Recent Lab Results         07/05/24  0720        Albumin/Globulin Ratio 1.2       Albumin 3.0       ALP 72       ALT 11       Anion Gap 10.0       AST 20       Baso # 0.03       Basophil % 0.3       BILIRUBIN TOTAL 0.6       BUN 52.5       BUN/CREAT RATIO 19       Calcium 7.7       Chloride 108       CO2 19       Creatinine 2.70       eGFR 17       Eos # 0.03       Eos % 0.3       Globulin, Total 2.6       Glucose 127       Hematocrit 24.3       Hemoglobin 8.0       Immature Grans (Abs) 0.05       Immature Granulocytes 0.5       Lymph # 1.18       LYMPH % 11.7       MCH 32.9       MCHC 32.9       .0       Mono # 0.77       Mono % 7.6       MPV 10.5       Neut # 8.01       Neut % 79.6       nRBC 0.0       Platelet Count 122       Potassium 5.4       PROTEIN TOTAL 5.6       RBC 2.43       RDW 14.6       Sodium 137       WBC 10.07               Significant Imaging: I have reviewed all pertinent imaging results/findings within the past 24 hours.

## 2024-07-05 NOTE — PLAN OF CARE
Met with pt , son and daughter who are at bedside. List of acute rehabs provided. Family will let me know via text if they decide a location over the weekend.   We discussed high intensity rehab that is recommended vs moderate intensity. Acute rehab vs skilled.   Will generate referral when family confirms location choice

## 2024-07-05 NOTE — ASSESSMENT & PLAN NOTE
Patient with acute kidney injury/acute renal failure likely due to pre-renal azotemia due to IVVD BARRY is currently worsening. Baseline creatinine unknown - Labs reviewed- Renal function/electrolytes with Estimated Creatinine Clearance: 13.5 mL/min (A) (based on SCr of 2.7 mg/dL (H)). according to latest data. Monitor urine output and serial BMP and adjust therapy as needed. Avoid nephrotoxins and renally dose meds for GFR listed above.  Continue with IV fluids repeat labs in the morning if still elevated will ask renal to see.

## 2024-07-05 NOTE — PROGRESS NOTES
[facility] - Ortho Neuro  Orthopedics  Progress Note    Patient Name: Homa Jaquez  MRN: [id number]  Admission Date: 7/2/2024  Hospital Length of Stay: 3 days  Attending Provider: Franklyn Brito MD  Primary Care Provider: Franklyn Brito MD  Follow-up For: Procedure(s) (LRB):  INSERTION, INTRAMEDULLARY MELANIE, FEMUR (Left)    Post-Operative Day: 1 Day Post-Op  Subjective:     Principal Problem:Closed comminuted intertrochanteric fracture of left femur    Principal Orthopedic Problem: 2 Days Post-Op   Intramedullary nail of the left intertrochanteric femur fracture    Interval History:  She is resting comfortably this morning.  Family at bedside. Doing well, pain controlled.     Review of patient's allergies indicates:   Allergen Reactions    Cefadroxil      Other reaction(s): Nausea or vomiting    Cefuroxime axetil     Cephalexin      Other reaction(s): NAUSEA AND VOMITING    Ciprofloxacin     Enoxaparin     Methenamine hippurate      Other reaction(s): Unknown    Promethazine        Current Facility-Administered Medications   Medication    0.9%  NaCl infusion    acetaminophen tablet 1,000 mg    atorvastatin tablet 20 mg    carvediloL tablet 12.5 mg    electrolyte-A infusion    HYDROmorphone (PF) injection 0.5 mg    HYDROmorphone (PF) injection 1 mg    levothyroxine tablet 125 mcg    melatonin tablet 6 mg    methocarbamoL tablet 500 mg    morphine injection 4 mg    mupirocin 2 % ointment    ondansetron injection 4 mg    oxyCODONE-acetaminophen  mg per tablet 1 tablet    oxyCODONE-acetaminophen 5-325 mg per tablet 1 tablet    polyethylene glycol packet 17 g    sacubitriL-valsartan 49-51 mg per tablet 1 tablet    sodium chloride 0.9% flush 10 mL    warfarin (COUMADIN) tablet 2 mg     Objective:     Vital Signs (Most Recent):  Temp: 98.1 °F (36.7 °C) (07/05/24 1130)  Pulse: 82 (07/05/24 1130)  Resp: 18 (07/05/24 1130)  BP: (!) 78/50 (07/05/24 1130)  SpO2: 96 % (07/05/24 1130) Vital  "Signs (24h Range):  Temp:  [97.3 °F (36.3 °C)-98.1 °F (36.7 °C)] 98.1 °F (36.7 °C)  Pulse:  [77-84] 82  Resp:  [16-18] 18  SpO2:  [96 %-99 %] 96 %  BP: (78-93)/(50-59) 78/50     Weight: 70.3 kg (155 lb)  Height: 5' 2.01" (157.5 cm)  Body mass index is 28.34 kg/m².      Intake/Output Summary (Last 24 hours) at 7/5/2024 1156  Last data filed at 7/4/2024 1500  Gross per 24 hour   Intake 640 ml   Output 300 ml   Net 340 ml       Physical Exam:   Constitutional: Vital signs are examined and stable.  Resp: No signs of labored breathing               LLE: -Skin: Dressing CDI .   Without shadowing or strike through., No signs of new abrasions or lacerations, no scars           -MSK: Hip and Knee F/E, EHL/FHL, Gastroc/Tib anterior Strength 5/5; tolerates gentle passive range of motion of the hip and knee although does have moderate knee pain.  No effusion palpable.           -Neuro:  Sensation intact to light touch L3-S1 dermatomes           -Lymphatic: No signs of lymphadenopathy           -CV: Capillary refill is less than 2 seconds. Compartments soft and compressible          Diagnostic Findings:   Significant Labs:   Recent Lab Results  (Last 5 results in the past 72 hours)        07/05/24  0720   07/04/24  0345   07/02/24 2012 07/02/24  1938   07/02/24  1913        Immature Platelet Fraction   4.5             Albumin/Globulin Ratio 1.2   1.3             Albumin 3.0   3.1             ALP 72   76             ALT 11   11             Anion Gap 10.0   12.0             PTT       37.2  Comment: For Minimal Heparin Infusion, the goal aPTT 64-85 seconds corresponds to an anti-Xa of 0.3-0.5.    For Low Intensity and High Intensity Heparin, the goal aPTT  seconds corresponds to an anti-Xa of 0.3-0.7         AST 20   19             Baso # 0.03   0.04             Basophil % 0.3   0.4             BILIRUBIN TOTAL 0.6   0.7             BUN 52.5   38.9             BUN/CREAT RATIO 19   18             Calcium 7.7   7.9          "    Chloride 108   113             CO2 19   16             Creatinine 2.70   2.17             eGFR 17   22             Eos # 0.03   0.00             Eos % 0.3   0.0             Globulin, Total 2.6   2.4             Glucose 127   192             Group & Rh     A POS           Hematocrit 24.3   27.7             Hemoglobin 8.0   9.0             Immature Grans (Abs) 0.05   0.09             Immature Granulocytes 0.5   0.8             Indirect Debra GEL     NEG           INR       2.5         Lymph # 1.18   0.90             LYMPH % 11.7   8.1             MCH 32.9   32.6             MCHC 32.9   32.5             .0   100.4             Mono # 0.77   0.84             Mono % 7.6   7.5             MPV 10.5   10.6             Neut # 8.01   9.30             Neut % 79.6   83.2             nRBC 0.0   0.0             QRS Duration         126       [facility] QTC Calculation         492       Platelet Count 122   105             Potassium 5.4   4.9             PROTEIN TOTAL 5.6   5.5             PT       27.4         RBC 2.43   2.76             RDW 14.6   14.2             Sodium 137   141             Specimen Outdate     07/05/2024 23:59           WBC 10.07   11.17                                     Significant Imaging: I have reviewed and interpreted all pertinent imaging results/findings.     Assessment/Plan:     Active Diagnoses:    Diagnosis Date Noted POA    PRINCIPAL PROBLEM:  Closed comminuted intertrochanteric fracture of left femur [S72.142A] 07/03/2024 Yes    Fall [W19.XXXA] 07/03/2024 Unknown    Acute on chronic systolic heart failure [I50.23] 03/14/2023 Yes    Atrial fibrillation [I48.91] 03/14/2023 Yes    Primary hypertension [I10] 03/14/2023 Yes      Problems Resolved During this Admission:   88-year-old female postop day 2 intramedullary nail left IT femur fracture    She is anticoagulated on Coumadin.  H and H is down slightly but stable consistent with acute blood loss anemia.  We will continue to monitor.  Daily  dry dressing changes beginning today and as needed.  Work with therapy-weight-bearing as tolerated range of motion as tolerated.  Continue multimodal pain control.    Continue Coumadin for DVT prophylaxis as she is previously scheduled.  Knee film negative for fracture    -follow up with Dr. Pierce/Mitch Davis in 3 weeks for wound check and repeat x rays. Please call with questions or concerns.      The above findings, diagnostics, and treatment plan were discussed with Dr. Pierce who is in agreement with the plan of care except as stated in additional documentation.      [facility]  Orthopedic Trauma Surgery  [facility]

## 2024-07-05 NOTE — PT/OT/SLP PROGRESS
Occupational Therapy   Treatment    Name: Homa Jaquez  MRN: [id number]  Admitting Diagnosis:  Closed comminuted intertrochanteric fracture of left femur  2 Days Post-Op    Recommendations:     Recommended therapy intensity at discharge: High Intensity Therapy   Discharge Equipment Recommendations:  none  Barriers to discharge:       Assessment:     Homa Jaquez is a 88 y.o. female with a medical diagnosis of Closed comminuted intertrochanteric fracture of left femur.  She presents with improved balance and endurance, BP within range during session, pain noted in L LE. Recommending High intensity therapy pending progress. Performance deficits affecting function are weakness, impaired endurance, impaired self care skills, impaired functional mobility, impaired balance.     Rehab Prognosis:  Good; patient would benefit from acute skilled OT services to address these deficits and reach maximum level of function.       Plan:     Patient to be seen 6 x/week to address the above listed problems via self-care/home management, therapeutic activities, therapeutic exercises  Plan of Care Expires: 08/01/24  Plan of Care Reviewed with: patient    Subjective     Pain/Comfort:  L LE (6/10)    Objective:     Communicated with: RN prior to session.  Patient found HOB elevated with   upon OT entry to room.    General Precautions: Standard, fall    Orthopedic Precautions:LLE weight bearing as tolerated  Braces:    Respiratory Status: Room air  Vital Signs: Orthostatics: Supine 102/58, Sitting 102/55, [id number]     Occupational Performance:   (Bed Mobility- Mod A)  (Sitting balance- SBA)  (Sit to stand- Mod A)  Pt. Requiring Mod A during stand step t/f to BS chair from EOB using RW for UE support with balance, increased cueing required for step progression.  Pt. Repositioned in chair appropriately, left with all needs within reach.    Therapeutic Positioning    OT interventions performed during the course of today's session  in an effort to prevent and/or reduce acquired pressure injuries:   Therapeutic positioning was provided at the conclusion of session to offload all bony prominences for the prevention and/or reduction of pressure injuries      [facility] 6 Click ADL:      Patient Education:  Patient provided with verbal education education regarding fall prevention, safety awareness, and pressure ulcer prevention.  Additional teaching is warranted.      Patient left up in chair with all lines intact and call button in reach.    GOALS:   Multidisciplinary Problems       Occupational Therapy Goals          Problem: Occupational Therapy    Goal Priority Disciplines Outcome Interventions   Occupational Therapy Goal     OT, PT/OT Progressing    Description: Goals to be met by: 8/1/24     Patient will increase functional independence with ADLs by performing:    LE Dressing with Modified [place].  Grooming while standing with Modified [place].  Toileting from toilet with Modified [place] for hygiene and clothing management.   Toilet transfer to toilet with Modified [place]. Progress from bedside commode                         Time Tracking:     OT Date of Treatment: 07/05/24  OT Start Time: 1302  OT Stop Time: 1329  OT Total Time (min): 27 min    Billable Minutes:Therapeutic Activity 2    OT/EVY: EVY     Number of EVY visits since last OT visit: 1 7/5/2024

## 2024-07-05 NOTE — PT/OT/SLP PROGRESS
Physical Therapy Treatment    Patient Name:  Homa Jaquez   MRN:  [id number]    Recommendations:     Discharge therapy intensity: High Intensity Therapy   Discharge Equipment Recommendations: none  Barriers to discharge: Decreased caregiver support and Impaired mobility    Assessment:     Homa Jaquez is a 88 y.o. female.  She presents with the following impairments/functional limitations: weakness, impaired endurance, impaired self care skills, impaired functional mobility, gait instability, impaired balance, decreased lower extremity function, impaired cardiopulmonary response to activity.    Rehab Prognosis: Good; patient would benefit from acute skilled PT services to address these deficits and reach maximum level of function.    Recent Surgery: Procedure(s) (LRB):  INSERTION, INTRAMEDULLARY MELANIE, FEMUR (Left) 2 Days Post-Op    Plan:     During this hospitalization, patient would benefit from acute PT services 6 x/week to address the identified rehab impairments via gait training, therapeutic activities, therapeutic exercises, neuromuscular re-education, wheelchair management/training and progress toward the following goals:    Plan of Care Expires:  08/04/24    Subjective     Chief Complaint: c/o hypotension with BP 93/53 sitting    Objective:     Communicated with nurse prior to session.  Patient found up in chair with peripheral IV, telemetry upon PT entry to room.     General Precautions: Standard, fall  Orthopedic Precautions: LLE weight bearing as tolerated  Braces: N/A  Respiratory Status:   Blood Pressure: 93/53  Skin Integrity: Visible skin intact      Functional Mobility:  Mod assist STS and min assist transfers  Gait 3 ft RW mod assist WBAT with cues for sequence.      Patient left supine with all lines intact and call button in reach    GOALS:   Multidisciplinary Problems       Physical Therapy Goals          Problem: Physical Therapy    Goal Priority Disciplines Outcome Goal Variances  Interventions   Physical Therapy Goal     PT, PT/OT Progressing     Description: Goals to be met by: 2024     Patient will increase functional independence with mobility by performin. Supine to sit with Stand-by Assistance  2. Rolling to Left and Right with Stand-by Assistance.  3. Sit to stand transfer with Stand-by Assistance  4. Bed to chair transfer with Stand-by Assistance using Rolling Walker  5. Gait  x 150 feet with Stand-by Assistance using Rolling Walker.                          Time Tracking:     Billable Minutes: Gait Training 12 and Therapeutic Activity 12    Treatment Type: Treatment  PT/PTA: PTA     Number of PTA visits since last PT visit: 2024

## 2024-07-05 NOTE — PROGRESS NOTES
[facility] Hospital Medicine  Progress Note    Patient Name: Homa Jaquez  MRN: [id number]  Patient Class: IP- Inpatient   Admission Date: 7/2/2024  Length of Stay: 3 days  Attending Physician: Franklyn Brito MD  Primary Care Provider: Franklyn Brito MD        Subjective:     Principal Problem:Closed comminuted intertrochanteric fracture of left femur        HPI:  The patient was an 88-year-old female patient of Dr. Brito's had an accidental trip and fall prior to admission.  She was walking with a walker and stated that she made in left pivot turn in the momentum just kept her going.  She has a history of corrected hypothyroidism compensated heart failure on Entresto and beta-blocker.  She is anticoagulated as well.  Coumadin currently on hold.  She underwent ORIF this morning good spirits currently currently pain-free she was awake alert oriented x3 her vital signs are little soft.  Her blood pressure is little on the low side but she is asymptomatic.  Otherwise being admitted with accidental fall with left hip fracture.    Overview/Hospital Course:  No notes on file    Interval History:  Overall stable blood pressure is little on the low side.  She was given fluids at 75 cc/hour.  Also noted to have a serum creatinine of 2.7 today.  Entresto was on hold.  H&H is dropping she was on Coumadin.  Otherwise she is asymptomatic overall stable    Review of Systems   Constitutional: Negative.  Negative for chills and fever.   HENT: Negative.     Eyes: Negative.    Respiratory: Negative.  Negative for cough and shortness of breath.    Cardiovascular: Negative.  Negative for chest pain, palpitations and leg swelling.   Gastrointestinal: Negative.  Negative for abdominal distention, abdominal pain, constipation, diarrhea, nausea and vomiting.   Endocrine: Negative.    Genitourinary: Negative.  Negative for dysuria and hematuria.   Musculoskeletal: Negative.  Negative for  arthralgias, back pain and joint swelling.   Skin: Negative.  Negative for rash.   Allergic/Immunologic: Negative.    Neurological: Negative.  Negative for dizziness, seizures and headaches.   Hematological: Negative.    Psychiatric/Behavioral: Negative.       Objective:     Vital Signs (Most Recent):  Temp: 98.1 °F (36.7 °C) (07/05/24 1130)  Pulse: 82 (07/05/24 1130)  Resp: 18 (07/05/24 1130)  BP: (!) 78/50 (07/05/24 1130)  SpO2: 96 % (07/05/24 1130) Vital Signs (24h Range):  Temp:  [97.3 °F (36.3 °C)-98.1 °F (36.7 °C)] 98.1 °F (36.7 °C)  Pulse:  [77-84] 82  Resp:  [16-18] 18  SpO2:  [96 %-99 %] 96 %  BP: (78-93)/(50-59) 78/50     Weight: 70.3 kg (155 lb)  Body mass index is 28.34 kg/m².    Intake/Output Summary (Last 24 hours) at 7/5/2024 1222  Last data filed at 7/4/2024 1500  Gross per 24 hour   Intake 640 ml   Output 300 ml   Net 340 ml         Physical Exam  Eyes:      Pupils: Pupils are equal, round, and reactive to light.   Cardiovascular:      Rate and Rhythm: Normal rate.      Pulses: Normal pulses.      Heart sounds: No murmur heard.  Pulmonary:      Effort: Pulmonary effort is normal.      Breath sounds: Normal breath sounds.   Abdominal:      General: Abdomen is flat. Bowel sounds are normal. There is no distension.      Palpations: Abdomen is soft.      Tenderness: There is no guarding.   Musculoskeletal:         General: Normal range of motion.      Cervical back: Normal range of motion and neck supple.   Skin:     General: Skin is warm.   Neurological:      General: No focal deficit present.      Mental Status: She is alert.   Psychiatric:         Mood and Affect: Mood normal.         Behavior: Behavior normal.             Significant Labs: All pertinent labs within the past 24 hours have been reviewed.  Recent Lab Results         07/05/24  0720        Albumin/Globulin Ratio 1.2       Albumin 3.0       ALP 72       ALT 11       Anion Gap 10.0       AST 20       Baso # 0.03       Basophil % 0.3        BILIRUBIN TOTAL 0.6       BUN 52.5       BUN/CREAT RATIO 19       Calcium 7.7       Chloride 108       CO2 19       Creatinine 2.70       eGFR 17       Eos # 0.03       Eos % 0.3       Globulin, Total 2.6       Glucose 127       Hematocrit 24.3       Hemoglobin 8.0       Immature Grans (Abs) 0.05       Immature Granulocytes 0.5       Lymph # 1.18       LYMPH % 11.7       MCH 32.9       MCHC 32.9       .0       Mono # 0.77       Mono % 7.6       MPV 10.5       Neut # 8.01       Neut % 79.6       nRBC 0.0       Platelet Count 122       Potassium 5.4       PROTEIN TOTAL 5.6       RBC 2.43       RDW 14.6       Sodium 137       WBC 10.07               Significant Imaging: I have reviewed all pertinent imaging results/findings within the past 24 hours.    Assessment/Plan:      * Closed comminuted intertrochanteric fracture of left femur  Accidental trip and fall   Status post ORIF of the left hip today.    Will continue physical therapy and may need rehab stay.    Will follow up on when to restart Coumadin.      BARRY (acute kidney injury)  Patient with acute kidney injury/acute renal failure likely due to pre-renal azotemia due to IVVD BARRY is currently worsening. Baseline creatinine unknown - Labs reviewed- Renal function/electrolytes with Estimated Creatinine Clearance: 13.5 mL/min (A) (based on SCr of 2.7 mg/dL (H)). according to latest data. Monitor urine output and serial BMP and adjust therapy as needed. Avoid nephrotoxins and renally dose meds for GFR listed above.  Continue with IV fluids repeat labs in the morning if still elevated will ask renal to see.    Fall  With left hip fracture.  No syncope or near-syncope  Mechanical accidental fall.      Primary hypertension  Chronic, controlled. Latest blood pressure and vitals reviewed-     Temp:  [97.3 °F (36.3 °C)-98.1 °F (36.7 °C)]   Pulse:  [77-84]   Resp:  [16-18]   BP: (78-93)/(50-59)   SpO2:  [96 %-99 %] .   Home meds for hypertension were reviewed and  noted below.   Hypertension Medications               carvediloL (COREG) 12.5 MG tablet Take 12.5 mg by mouth 2 (two) times daily.    ENTRESTO 49-51 mg per tablet GIVE ONE TABLET BY MOUTH TWICE DAILY            While in the hospital, will manage blood pressure as follows; Continue home antihypertensive regimen    Will utilize p.r.n. blood pressure medication only if patient's blood pressure greater than 180/110 and she develops symptoms such as worsening chest pain or shortness of breath.      Will hold Entresto for the meantime.  Otherwise she is asymptomatic    Atrial fibrillation  Currently rate controlled   Normally she is on Coumadin  Will follow up on restart Coumadin postop.    Acute on chronic systolic heart failure  Compensated continue current therapy with Entresto        VTE Risk Mitigation (From admission, onward)           Ordered     warfarin (COUMADIN) tablet 2 mg  Daily         07/04/24 1001     IP VTE HIGH RISK PATIENT  Once         07/02/24 1938     Place sequential compression device  Until discontinued         07/02/24 1938     Reason for No Pharmacological VTE Prophylaxis  Once        Question:  Reasons:  Answer:  Already adequately anticoagulated on oral Anticoagulants    07/02/24 1938                    Discharge Planning   AURELIO:      Code Status: Full Code   Is the patient medically ready for discharge?:     Reason for patient still in hospital (select all that apply): Treatment  Discharge Plan A: Other (tbd)        Placement pending continue current therapy.          ABIMBOLA FISHER MD  Department of Hospital Medicine   [facility]

## 2024-07-05 NOTE — NURSING
[facility]  Wound Care    Patient Name:  Homa Jaquez   MRN:  [id number]  Date: 7/5/2024  Diagnosis: Closed comminuted intertrochanteric fracture of left femur    History:     Past Medical History:   Diagnosis Date    A-fib     Chronic cystitis     GERD (gastroesophageal reflux disease)     Graves disease     Hypertension     Hypothyroidism, unspecified     Spinal stenosis        Social History     Socioeconomic History    Marital status:    Tobacco Use    Smoking status: Former     Types: Cigarettes    Smokeless tobacco: Never   Substance and Sexual Activity    Alcohol use: Yes    Drug use: Never    Sexual activity: Not Currently     Social Determinants of Health     Financial Resource Strain: Low Risk  (7/4/2024)    Overall Financial Resource Strain (CARDIA)     Difficulty of Paying Living Expenses: Not hard at all   Food Insecurity: No Food Insecurity (7/4/2024)    Hunger Vital Sign     Worried About Running Out of Food in the Last Year: Never true     Ran Out of Food in the Last Year: Never true   Transportation Needs: No Transportation Needs (7/4/2024)    TRANSPORTATION NEEDS     Transportation : No   Stress: No Stress Concern Present (7/4/2024)    [employer] [facility] [employer] - Occupational Stress Questionnaire     Feeling of Stress : Not at all   Housing Stability: Low Risk  (7/4/2024)    Housing Stability Vital Sign     Unable to Pay for Housing in the Last Year: No     Homeless in the Last Year: No       Precautions:     Allergies as of 07/02/2024 - Reviewed 07/02/2024   Allergen Reaction Noted    Cefadroxil  09/08/2022    Cefuroxime axetil  09/08/2022    Cephalexin  09/08/2022    Ciprofloxacin  09/08/2022    Enoxaparin  09/08/2022    Methenamine hippurate  09/08/2022    Promethazine  09/08/2022       [facility] Assessment Details/Treatment   Patient seen as follow up to assessment from 7/03/24. Son in attendance, states he is a PA. Patient resting on specialty  mattress, awake and alert. Patient states she has not been turning that much. LUE skin tear with flap intact, able to reapproximate flap to edges with normal saline. Noted sacrum with stage one injury, noted purple discoloration to lateral right gluteal, area blanches, son states she has had the discoloration for many months.      07/05/24 0840   WOCN Assessment   WOCN Total Time (mins) 60   Visit Date 07/05/24   Visit Time 0840   Consult Type New   WOCN Speciality Wound   Wound skin tear;pressure   Number of Wounds 2   Intervention assessed;changed;applied;chart review;orders   Skin Interventions   Device Skin Pressure Protection absorbent pad utilized/changed   Pressure Reduction Devices specialty bed utilized   Positioning   Body Position 30 degrees   Head of Bed (HOB) Positioning HOB at 30 degrees   Positioning/Transfer Devices pillows        Wound 07/02/24 2230 Skin Tear Left anterior;proximal;upper Arm   Date First Assessed/Time First Assessed: 07/02/24 2230   Present on Original Admission: Yes  Primary Wound Type: Skin Tear  Side: Left  Orientation: anterior;proximal;upper  Location: Arm   Wound Image     Dressing Appearance Intact   Drainage Amount Small   Drainage Characteristics/Odor Serosanguineous   Appearance Red;Not granulating   Tissue loss description Full thickness   Red (%), Wound Tissue Color 100 %   Periwound Area Intact;Ecchymotic   Wound Edges Defined   Wound Length (cm) 1.5 cm   Wound Width (cm) 1 cm   Wound Depth (cm) 0.2 cm   Wound Volume (cm^3) 0.3 cm^3   Wound Surface Area (cm^2) 1.5 cm^2   Care Wound cleanser   Dressing Applied  (adaptic, bordered foam)        Wound 07/05/24 0840 Pressure Injury Sacral spine   Date First Assessed/Time First Assessed: 07/05/24 0840   Present on Original Admission: Yes  Primary Wound Type: Pressure Injury  Location: Sacral spine   Wound Image    Pressure Injury Stage 1   Drainage Amount None   Appearance Intact   Tissue loss description Not applicable    Periwound Area Intact;Ecchymotic   Wound Edges Defined   Wound Length (cm) 2 cm  (non blaching redness)   Wound Width (cm) 0.3 cm   Wound Depth (cm) 0 cm   Wound Volume (cm^3) 0 cm^3   Wound Surface Area (cm^2) 0.6 cm^2   Care Sterile normal saline   Dressing Applied  (sacral bordered foam)         Recommendations made to primary team for wound care for LUE with adaptic and bordered foam, sacrum with sacral bordered foam, continue pressure relief measures, with specialty mattress, turn q2 hours, utilize wedge and heel boots, no adult briefs or diapers . Orders placed.     07/05/2024

## 2024-07-05 NOTE — PLAN OF CARE
Problem: Adult Inpatient Plan of Care  Goal: Plan of Care Review  Outcome: Met  Goal: Patient-Specific Goal (Individualized)  Outcome: Met  Goal: Absence of Hospital-Acquired Illness or Injury  Outcome: Met  Goal: Optimal Comfort and Wellbeing  Outcome: Met  Goal: Readiness for Transition of Care  Outcome: Met     Problem: Diabetes Comorbidity  Goal: Blood Glucose Level Within Targeted Range  Outcome: Met     Problem: Infection  Goal: Absence of Infection Signs and Symptoms  Outcome: Met     Problem: Skin Injury Risk Increased  Goal: Skin Health and Integrity  Outcome: Met     Problem: Wound  Goal: Optimal Coping  Outcome: Met  Goal: Optimal Functional Ability  Outcome: Met  Goal: Absence of Infection Signs and Symptoms  Outcome: Met  Goal: Improved Oral Intake  Outcome: Met  Goal: Optimal Pain Control and Function  Outcome: Met  Goal: Skin Health and Integrity  Outcome: Met  Goal: Optimal Wound Healing  Outcome: Met     Problem: Fall Injury Risk  Goal: Absence of Fall and Fall-Related Injury  Outcome: Met

## 2024-07-05 NOTE — PROGRESS NOTES
Inpatient Nutrition Evaluation    Admit Date: 7/2/2024   Total duration of encounter: 3 days    Nutrition Recommendation/Prescription     Continue regular diet   Will add Boost Plus; provides 360 kcal, 14 gm protein per container    Nutrition Assessment     Chart Review    Reason Seen: continuous nutrition monitoring    Malnutrition Screening Tool Results   Have you recently lost weight without trying?: No  Have you been eating poorly because of a decreased appetite?: No   MST Score: 0     Diagnosis:  Closed comminuted intertrochanteric fracture of left femur     Relevant Medical History:    A-fib      Chronic cystitis      GERD (gastroesophageal reflux disease)      Graves disease      Hypertension      Hypothyroidism, unspecified      Spinal stenosis        Nutrition-Related Medications: Scheduled Medications:  acetaminophen, 1,000 mg, TID  atorvastatin, 20 mg, Daily  carvediloL, 12.5 mg, BID  levothyroxine, 125 mcg, Before breakfast  methocarbamoL, 500 mg, TID  mupirocin, , BID  polyethylene glycol, 17 g, BID  warfarin, 2 mg, Daily    Continuous Infusions:  0.9% NaCl, Last Rate: 75 mL/hr at 07/05/24 1312  electrolyte-A    PRN Medications:    acetaminophen tablet 1,000 mg    atorvastatin tablet 20 mg    carvediloL tablet 12.5 mg    levothyroxine tablet 125 mcg    methocarbamoL tablet 500 mg    mupirocin 2 % ointment    polyethylene glycol packet 17 g    warfarin (COUMADIN) tablet 2 mg        Nutrition-Related Labs:  Recent Labs   Lab 07/02/24  1804 07/04/24  0345 07/05/24  0720    141 137   K 4.0 4.9 5.4*   CALCIUM 9.2 7.9* 7.7*   CO2 19* 16* 19*   BUN 25.6* 38.9* 52.5*   CREATININE 1.45* 2.17* 2.70*   EGFRNORACEVR 35 22 17   GLUCOSE 147* 192* 127*   BILITOT 0.6 0.7 0.6   ALKPHOS 122 76 72   ALT 16 11 11   AST 29 19 20   ALBUMIN 3.7 3.1* 3.0*   WBC 5.59 11.17 10.07   HGB 15.8 9.0* 8.0*   HCT 48.7* 27.7* 24.3*        Diet Order: Diet Adult Regular  Oral Supplement Order: none  Appetite/Oral Intake: poor/0-25%  "of meals  Factors Affecting Nutritional Intake: decreased appetite  Food/Jew/Cultural Preferences: unable to obtain  Food Allergies: no known food allergies    Skin Integrity: bruised (ecchymotic), incision, skin tear  Wound(s):      Wound 07/05/24 0840 Pressure Injury Sacral spine-Tissue loss description: Not applicable       Wound 07/02/24 2230 Skin Tear Left anterior;proximal;upper Arm-Tissue loss description: Full thickness     Comments    7/3/24; pt NPO, out of room for surgery. No weight loss or appetite loss reported prior to admit; weight appears overall stable in EMR    7/5/24 pt reports decreased appetite and intake; ate a few bites of each food from brk and lunch tray; agreeable to adding oral supplement    Anthropometrics    Height: 5' 2.01" (157.5 cm) Height Method: Stated  Last Weight: 70.3 kg (155 lb) (07/03/24 0126) Weight Method: Bed Scale  BMI (Calculated): 28.3  BMI Classification: overweight (BMI 25-29.9)     Ideal Body Weight (IBW), Female: 110.05 lb     % Ideal Body Weight, Female (lb): 140.85 %                             Usual Weight Provided By: EMR weight history    Wt Readings from Last 5 Encounters:   07/03/24 70.3 kg (155 lb)   06/29/23 67 kg (147 lb 12.8 oz)   03/30/23 71.1 kg (156 lb 12.8 oz)   03/14/23 70 kg (154 lb 5.2 oz)   09/13/22 74.8 kg (165 lb)     Weight Change(s) Since Admission:  Admit Weight: 70.3 kg (155 lb) (07/02/24 1738)      Patient Education    Not applicable.    Monitoring & Evaluation     Dietitian will monitor food and beverage intake and weight change.  Nutrition Risk/Follow-Up: low (follow-up in 5-7 days)  Patients assigned 'low nutrition risk' status do not qualify for a full nutritional assessment but will be monitored and re-evaluated in a 5-7 day time period. Please consult if re-evaluation needed sooner.   "

## 2024-07-06 LAB
ALBUMIN SERPL-MCNC: 2.6 G/DL (ref 3.4–4.8)
ALBUMIN/GLOB SERPL: 1.1 RATIO (ref 1.1–2)
ALP SERPL-CCNC: 74 UNIT/L (ref 40–150)
ALT SERPL-CCNC: 12 UNIT/L (ref 0–55)
ANION GAP SERPL CALC-SCNC: 6 MEQ/L
AST SERPL-CCNC: 21 UNIT/L (ref 5–34)
BASOPHILS # BLD AUTO: 0.04 X10(3)/MCL
BASOPHILS NFR BLD AUTO: 0.6 %
BILIRUB SERPL-MCNC: 0.6 MG/DL
BUN SERPL-MCNC: 48 MG/DL (ref 9.8–20.1)
CALCIUM SERPL-MCNC: 7.4 MG/DL (ref 8.4–10.2)
CHLORIDE SERPL-SCNC: 111 MMOL/L (ref 98–107)
CO2 SERPL-SCNC: 17 MMOL/L (ref 23–31)
CREAT SERPL-MCNC: 2.15 MG/DL (ref 0.55–1.02)
CREAT/UREA NIT SERPL: 22
EOSINOPHIL # BLD AUTO: 0.34 X10(3)/MCL (ref 0–0.9)
EOSINOPHIL NFR BLD AUTO: 5 %
ERYTHROCYTE [DISTWIDTH] IN BLOOD BY AUTOMATED COUNT: 14.3 % (ref 11.5–17)
GFR SERPLBLD CREATININE-BSD FMLA CKD-EPI: 22 ML/MIN/1.73/M2
GLOBULIN SER-MCNC: 2.3 GM/DL (ref 2.4–3.5)
GLUCOSE SERPL-MCNC: 120 MG/DL (ref 82–115)
HCT VFR BLD AUTO: 23.3 % (ref 37–47)
HGB BLD-MCNC: 7.7 G/DL (ref 12–16)
IMM GRANULOCYTES # BLD AUTO: 0.02 X10(3)/MCL (ref 0–0.04)
IMM GRANULOCYTES NFR BLD AUTO: 0.3 %
INR PPP: 12.3
LYMPHOCYTES # BLD AUTO: 1.26 X10(3)/MCL (ref 0.6–4.6)
LYMPHOCYTES NFR BLD AUTO: 18.7 %
MCH RBC QN AUTO: 32.2 PG (ref 27–31)
MCHC RBC AUTO-ENTMCNC: 33 G/DL (ref 33–36)
MCV RBC AUTO: 97.5 FL (ref 80–94)
MONOCYTES # BLD AUTO: 0.62 X10(3)/MCL (ref 0.1–1.3)
MONOCYTES NFR BLD AUTO: 9.2 %
NEUTROPHILS # BLD AUTO: 4.47 X10(3)/MCL (ref 2.1–9.2)
NEUTROPHILS NFR BLD AUTO: 66.2 %
NRBC BLD AUTO-RTO: 0 %
PLATELET # BLD AUTO: 131 X10(3)/MCL (ref 130–400)
PMV BLD AUTO: 10.8 FL (ref 7.4–10.4)
POTASSIUM SERPL-SCNC: 4.7 MMOL/L (ref 3.5–5.1)
PROT SERPL-MCNC: 4.9 GM/DL (ref 5.8–7.6)
PROTHROMBIN TIME: 93.7 SECONDS (ref 12.5–14.5)
RBC # BLD AUTO: 2.39 X10(6)/MCL (ref 4.2–5.4)
SODIUM SERPL-SCNC: 134 MMOL/L (ref 136–145)
WBC # BLD AUTO: 6.75 X10(3)/MCL (ref 4.5–11.5)

## 2024-07-06 PROCEDURE — 11000001 HC ACUTE MED/SURG PRIVATE ROOM

## 2024-07-06 PROCEDURE — 36415 COLL VENOUS BLD VENIPUNCTURE: CPT | Performed by: INTERNAL MEDICINE

## 2024-07-06 PROCEDURE — 80053 COMPREHEN METABOLIC PANEL: CPT | Performed by: INTERNAL MEDICINE

## 2024-07-06 PROCEDURE — 25000003 PHARM REV CODE 250: Performed by: INTERNAL MEDICINE

## 2024-07-06 PROCEDURE — 99233 SBSQ HOSP IP/OBS HIGH 50: CPT | Mod: ,,, | Performed by: STUDENT IN AN ORGANIZED HEALTH CARE EDUCATION/TRAINING PROGRAM

## 2024-07-06 PROCEDURE — 97530 THERAPEUTIC ACTIVITIES: CPT | Mod: CQ

## 2024-07-06 PROCEDURE — 63600175 PHARM REV CODE 636 W HCPCS: Performed by: INTERNAL MEDICINE

## 2024-07-06 PROCEDURE — 85025 COMPLETE CBC W/AUTO DIFF WBC: CPT | Performed by: INTERNAL MEDICINE

## 2024-07-06 PROCEDURE — 85610 PROTHROMBIN TIME: CPT | Performed by: INTERNAL MEDICINE

## 2024-07-06 RX ADMIN — PHYTONADIONE 10 MG: 10 INJECTION, EMULSION INTRAMUSCULAR; INTRAVENOUS; SUBCUTANEOUS at 11:07

## 2024-07-06 RX ADMIN — ACETAMINOPHEN 1000 MG: 500 TABLET ORAL at 08:07

## 2024-07-06 RX ADMIN — OXYCODONE HYDROCHLORIDE AND ACETAMINOPHEN 1 TABLET: 5; 325 TABLET ORAL at 02:07

## 2024-07-06 RX ADMIN — POLYETHYLENE GLYCOL 3350 17 G: 17 POWDER, FOR SOLUTION ORAL at 08:07

## 2024-07-06 RX ADMIN — SODIUM CHLORIDE: 9 INJECTION, SOLUTION INTRAVENOUS at 01:07

## 2024-07-06 RX ADMIN — ATORVASTATIN CALCIUM 20 MG: 10 TABLET, FILM COATED ORAL at 08:07

## 2024-07-06 RX ADMIN — MUPIROCIN: 20 OINTMENT TOPICAL at 08:07

## 2024-07-06 RX ADMIN — LEVOTHYROXINE SODIUM 125 MCG: 125 TABLET ORAL at 06:07

## 2024-07-06 RX ADMIN — CARVEDILOL 12.5 MG: 12.5 TABLET, FILM COATED ORAL at 08:07

## 2024-07-06 NOTE — PROGRESS NOTES
[facility] Hospital Medicine  Progress Note    Patient Name: Homa Jaquez  MRN: [id number]  Patient Class: IP- Inpatient   Admission Date: 7/2/2024  Length of Stay: 4 days  Attending Physician: Franklyn Brito MD  Primary Care Provider: Franklyn Brito MD        Subjective:     Principal Problem:Closed comminuted intertrochanteric fracture of left femur        HPI:  The patient was an 88-year-old female patient of Dr. Brito's had an accidental trip and fall prior to admission.  She was walking with a walker and stated that she made in left pivot turn in the momentum just kept her going.  She has a history of corrected hypothyroidism compensated heart failure on Entresto and beta-blocker.  She is anticoagulated as well.  Coumadin currently on hold.  She underwent ORIF this morning good spirits currently currently pain-free she was awake alert oriented x3 her vital signs are little soft.  Her blood pressure is little on the low side but she is asymptomatic.  Otherwise being admitted with accidental fall with left hip fracture.    Overview/Hospital Course:  No notes on file    Interval History: Stable, hypotensive overnight but BP improved today, on NS 75 cc/h. INR of 12/1 this morning, one dose of vitamin K given and warfarin will be held today. No signs of active bleeding. Patient presents with episodes where she has difficulty finding words, family is concerned about possible TIA, CT head ordered, results pending     Review of Systems   Constitutional:  Negative for chills, fatigue and fever.   HENT:  Negative for congestion, rhinorrhea and sore throat.    Respiratory:  Negative for cough, chest tightness and shortness of breath.    Cardiovascular:  Negative for chest pain, palpitations and leg swelling.   Gastrointestinal:  Negative for abdominal distention, abdominal pain, constipation, diarrhea, nausea and vomiting.   Genitourinary:  Negative for dysuria.    Musculoskeletal:  Negative for arthralgias, back pain and joint swelling.   Neurological:  Negative for dizziness and headaches.   Psychiatric/Behavioral:  Negative for agitation and confusion.      Objective:     Vital Signs (Most Recent):  Temp: 98.6 °F (37 °C) (07/06/24 1152)  Pulse: 84 (07/06/24 1152)  Resp: 20 (07/06/24 1152)  BP: 114/67 (07/06/24 1152)  SpO2: 98 % (07/06/24 1218) Vital Signs (24h Range):  Temp:  [97.5 °F (36.4 °C)-98.6 °F (37 °C)] 98.6 °F (37 °C)  Pulse:  [45-89] 84  Resp:  [18-20] 20  SpO2:  [93 %-98 %] 98 %  BP: ()/(36-68) 114/67     Weight: 70.3 kg (155 lb)  Body mass index is 28.34 kg/m².    Intake/Output Summary (Last 24 hours) at 7/6/2024 1344  Last data filed at 7/6/2024 1200  Gross per 24 hour   Intake 500 ml   Output 700 ml   Net -200 ml         Physical Exam  Constitutional:       General: She is not in acute distress.     Appearance: Normal appearance.   HENT:      Head: Normocephalic and atraumatic.   Eyes:      Extraocular Movements: Extraocular movements intact.      Pupils: Pupils are equal, round, and reactive to light.   Cardiovascular:      Rate and Rhythm: Normal rate and regular rhythm.      Pulses: Normal pulses.      Heart sounds: Normal heart sounds.   Pulmonary:      Effort: Pulmonary effort is normal.      Breath sounds: Normal breath sounds.   Abdominal:      General: Abdomen is flat. Bowel sounds are normal. There is no distension.      Palpations: Abdomen is soft.      Tenderness: There is no abdominal tenderness.   Musculoskeletal:         General: Normal range of motion.      Cervical back: Normal range of motion and neck supple.      Right lower leg: No edema.      Left lower leg: No edema.   Skin:     Findings: No lesion or rash.   Neurological:      General: No focal deficit present.      Mental Status: She is alert and oriented to person, place, and time.      Cranial Nerves: No cranial nerve deficit.      Sensory: No sensory deficit.      Motor: No  weakness.             Significant Labs: All pertinent labs within the past 24 hours have been reviewed.    Significant Imaging: I have reviewed all pertinent imaging results/findings within the past 24 hours.    Assessment/Plan:      * Closed comminuted intertrochanteric fracture of left femur  Accidental trip and fall   Status post ORIF of the left hip 07/03   PT following, possible rehab vs SNF      BARRY (acute kidney injury)  Creatinine slightly trending down, 2.15 this morning  Continue IVF normal saline 75 cc/h       Fall  With left hip fracture.  No syncope or near-syncope  Mechanical accidental fall.      Primary hypertension  Neeraj been hypotensive overnight, improved today  Holding Entresto  Continue carvedilol     Atrial fibrillation  Rate controlled   INR of 12.1 this morning, holding warfarin for now   One dose of vitamin K given, will check INR daily     Acute on chronic systolic heart failure  Compensated   Holding Entresto due to hypotension  Continue carvedilol         VTE Risk Mitigation (From admission, onward)           Ordered     IP VTE HIGH RISK PATIENT  Once         07/02/24 1938     Place sequential compression device  Until discontinued         07/02/24 1938     Reason for No Pharmacological VTE Prophylaxis  Once        Question:  Reasons:  Answer:  Already adequately anticoagulated on oral Anticoagulants    07/02/24 1938                    Discharge Planning   AURELIO:      Code Status: Full Code   Is the patient medically ready for discharge?:     Reason for patient still in hospital (select all that apply): Treatment  Discharge Plan A: Other (tbd)                  Eden Michel MD  Department of Hospital Medicine   [facility]

## 2024-07-06 NOTE — PT/OT/SLP PROGRESS
Physical Therapy Treatment    Patient Name:  Homa Jaquez   MRN:  [id number]    Recommendations:     Discharge therapy intensity: High Intensity Therapy   Discharge Equipment Recommendations: none  Barriers to discharge: Decreased caregiver support, Impaired mobility, and Ongoing medical needs    Assessment:     Homa Jaquez is a 88 y.o. female admitted with a medical diagnosis of closed L intertrochanteric femur fracture with severe comminution s/p IM nail 7/3/24. Pt experienced fall while out to dinner.  She presents with the following impairments/functional limitations: weakness, impaired endurance, impaired self care skills, impaired functional mobility, gait instability, impaired balance, decreased lower extremity function, impaired cardiopulmonary response to activity. Pt was very fearful of falling, requesting a 'man' (therapist). PTA have reassurance given however pt remained fearful.      Rehab Prognosis: Good; patient would benefit from acute skilled PT services to address these deficits and reach maximum level of function.    Recent Surgery: Procedure(s) (LRB):  INSERTION, INTRAMEDULLARY MELANIE, FEMUR (Left) 3 Days Post-Op    Plan:     During this hospitalization, patient would benefit from acute PT services 6 x/week to address the identified rehab impairments via gait training, therapeutic activities, therapeutic exercises, neuromuscular re-education, wheelchair management/training and progress toward the following goals:    Plan of Care Expires:  08/04/24    Subjective     Chief Complaint: pain of LLE, 'I'm afraid to fall'  Patient/Family Comments/goals:   Pain/Comfort:         Objective:     Communicated with nursing prior to session.  Patient found HOB elevated with telemetry, peripheral IV, PureWick, pulse ox (continuous) upon PT entry to room.     General Precautions: Standard, fall  Orthopedic Precautions: LLE weight bearing as tolerated  Braces: N/A  Respiratory Status: Room air  Blood  Pressure: NT    Functional Mobility:  Bed Mobility:     Supine to Sit: moderate assistance  Sit to Supine: maximal assistance  Transfers:     Sit to Stand:  moderate assistance with rolling walker    Therapeutic Activities/Exercises:  Pt sat EOB, pt able to stand EOB ~ 30 secs, pt very fearful of falling. Once seated, moderate posterior lean noted, VC to correct pt unable to. Pt laid back down.     Education:  Patient provided with verbal education education regarding PT role/goals/POC, fall prevention, and safety awareness.  Understanding was verbalized.     Patient left supine with all lines intact, call button in reach, nurse notified, and daughter present    GOALS:   Multidisciplinary Problems       Physical Therapy Goals          Problem: Physical Therapy    Goal Priority Disciplines Outcome Goal Variances Interventions   Physical Therapy Goal     PT, PT/OT Progressing     Description: Goals to be met by: 2024     Patient will increase functional independence with mobility by performin. Supine to sit with Stand-by Assistance  2. Rolling to Left and Right with Stand-by Assistance.  3. Sit to stand transfer with Stand-by Assistance  4. Bed to chair transfer with Stand-by Assistance using Rolling Walker  5. Gait  x 150 feet with Stand-by Assistance using Rolling Walker.                          Time Tracking:     PT Received On: 24  PT Start Time: 1413     PT Stop Time: 1432  PT Total Time (min): 19 min     Billable Minutes: Therapeutic Activity 19    Treatment Type: Treatment  PT/PTA: PTA     Number of PTA visits since last PT visit: 2     2024

## 2024-07-06 NOTE — ASSESSMENT & PLAN NOTE
Rate controlled   INR of 12.1 this morning, holding warfarin for now   One dose of vitamin K given, will check INR daily

## 2024-07-06 NOTE — ASSESSMENT & PLAN NOTE
Accidental trip and fall   Status post ORIF of the left hip 07/03   PT following, possible rehab vs SNF

## 2024-07-06 NOTE — SUBJECTIVE & OBJECTIVE
Interval History: Stable, hypotensive overnight but BP improved today, on NS 75 cc/h. INR of 12/1 this morning, one dose of vitamin K given and warfarin will be held today. No signs of active bleeding. Patient presents with episodes where she has difficulty finding words, family is concerned about possible TIA, CT head ordered, results pending     Review of Systems   Constitutional:  Negative for chills, fatigue and fever.   HENT:  Negative for congestion, rhinorrhea and sore throat.    Respiratory:  Negative for cough, chest tightness and shortness of breath.    Cardiovascular:  Negative for chest pain, palpitations and leg swelling.   Gastrointestinal:  Negative for abdominal distention, abdominal pain, constipation, diarrhea, nausea and vomiting.   Genitourinary:  Negative for dysuria.   Musculoskeletal:  Negative for arthralgias, back pain and joint swelling.   Neurological:  Negative for dizziness and headaches.   Psychiatric/Behavioral:  Negative for agitation and confusion.      Objective:     Vital Signs (Most Recent):  Temp: 98.6 °F (37 °C) (07/06/24 1152)  Pulse: 84 (07/06/24 1152)  Resp: 20 (07/06/24 1152)  BP: 114/67 (07/06/24 1152)  SpO2: 98 % (07/06/24 1218) Vital Signs (24h Range):  Temp:  [97.5 °F (36.4 °C)-98.6 °F (37 °C)] 98.6 °F (37 °C)  Pulse:  [45-89] 84  Resp:  [18-20] 20  SpO2:  [93 %-98 %] 98 %  BP: ()/(36-68) 114/67     Weight: 70.3 kg (155 lb)  Body mass index is 28.34 kg/m².    Intake/Output Summary (Last 24 hours) at 7/6/2024 1344  Last data filed at 7/6/2024 1200  Gross per 24 hour   Intake 500 ml   Output 700 ml   Net -200 ml         Physical Exam  Constitutional:       General: She is not in acute distress.     Appearance: Normal appearance.   HENT:      Head: Normocephalic and atraumatic.   Eyes:      Extraocular Movements: Extraocular movements intact.      Pupils: Pupils are equal, round, and reactive to light.   Cardiovascular:      Rate and Rhythm: Normal rate and regular  rhythm.      Pulses: Normal pulses.      Heart sounds: Normal heart sounds.   Pulmonary:      Effort: Pulmonary effort is normal.      Breath sounds: Normal breath sounds.   Abdominal:      General: Abdomen is flat. Bowel sounds are normal. There is no distension.      Palpations: Abdomen is soft.      Tenderness: There is no abdominal tenderness.   Musculoskeletal:         General: Normal range of motion.      Cervical back: Normal range of motion and neck supple.      Right lower leg: No edema.      Left lower leg: No edema.   Skin:     Findings: No lesion or rash.   Neurological:      General: No focal deficit present.      Mental Status: She is alert and oriented to person, place, and time.      Cranial Nerves: No cranial nerve deficit.      Sensory: No sensory deficit.      Motor: No weakness.             Significant Labs: All pertinent labs within the past 24 hours have been reviewed.    Significant Imaging: I have reviewed all pertinent imaging results/findings within the past 24 hours.

## 2024-07-07 LAB
BASOPHILS # BLD AUTO: 0.05 X10(3)/MCL
BASOPHILS NFR BLD AUTO: 0.7 %
EOSINOPHIL # BLD AUTO: 0.4 X10(3)/MCL (ref 0–0.9)
EOSINOPHIL NFR BLD AUTO: 5.5 %
ERYTHROCYTE [DISTWIDTH] IN BLOOD BY AUTOMATED COUNT: 14.6 % (ref 11.5–17)
HCT VFR BLD AUTO: 23.8 % (ref 37–47)
HGB BLD-MCNC: 7.8 G/DL (ref 12–16)
IMM GRANULOCYTES # BLD AUTO: 0.03 X10(3)/MCL (ref 0–0.04)
IMM GRANULOCYTES NFR BLD AUTO: 0.4 %
INR PPP: 1.6
LYMPHOCYTES # BLD AUTO: 0.95 X10(3)/MCL (ref 0.6–4.6)
LYMPHOCYTES NFR BLD AUTO: 13.2 %
MCH RBC QN AUTO: 32.9 PG (ref 27–31)
MCHC RBC AUTO-ENTMCNC: 32.8 G/DL (ref 33–36)
MCV RBC AUTO: 100.4 FL (ref 80–94)
MONOCYTES # BLD AUTO: 0.62 X10(3)/MCL (ref 0.1–1.3)
MONOCYTES NFR BLD AUTO: 8.6 %
NEUTROPHILS # BLD AUTO: 5.16 X10(3)/MCL (ref 2.1–9.2)
NEUTROPHILS NFR BLD AUTO: 71.6 %
NRBC BLD AUTO-RTO: 0 %
PLATELET # BLD AUTO: 149 X10(3)/MCL (ref 130–400)
PMV BLD AUTO: 10.4 FL (ref 7.4–10.4)
PROTHROMBIN TIME: 18.4 SECONDS (ref 12.5–14.5)
RBC # BLD AUTO: 2.37 X10(6)/MCL (ref 4.2–5.4)
WBC # BLD AUTO: 7.21 X10(3)/MCL (ref 4.5–11.5)

## 2024-07-07 PROCEDURE — 25000003 PHARM REV CODE 250: Performed by: INTERNAL MEDICINE

## 2024-07-07 PROCEDURE — 25000003 PHARM REV CODE 250: Performed by: STUDENT IN AN ORGANIZED HEALTH CARE EDUCATION/TRAINING PROGRAM

## 2024-07-07 PROCEDURE — 85025 COMPLETE CBC W/AUTO DIFF WBC: CPT | Performed by: STUDENT IN AN ORGANIZED HEALTH CARE EDUCATION/TRAINING PROGRAM

## 2024-07-07 PROCEDURE — 97535 SELF CARE MNGMENT TRAINING: CPT | Mod: CO

## 2024-07-07 PROCEDURE — 97530 THERAPEUTIC ACTIVITIES: CPT | Mod: CO

## 2024-07-07 PROCEDURE — 36415 COLL VENOUS BLD VENIPUNCTURE: CPT | Performed by: STUDENT IN AN ORGANIZED HEALTH CARE EDUCATION/TRAINING PROGRAM

## 2024-07-07 PROCEDURE — 85610 PROTHROMBIN TIME: CPT | Performed by: STUDENT IN AN ORGANIZED HEALTH CARE EDUCATION/TRAINING PROGRAM

## 2024-07-07 PROCEDURE — 99233 SBSQ HOSP IP/OBS HIGH 50: CPT | Mod: ,,, | Performed by: STUDENT IN AN ORGANIZED HEALTH CARE EDUCATION/TRAINING PROGRAM

## 2024-07-07 PROCEDURE — 11000001 HC ACUTE MED/SURG PRIVATE ROOM

## 2024-07-07 RX ORDER — WARFARIN 1 MG/1
2 TABLET ORAL DAILY
Status: DISCONTINUED | OUTPATIENT
Start: 2024-07-07 | End: 2024-07-09 | Stop reason: HOSPADM

## 2024-07-07 RX ADMIN — CARVEDILOL 12.5 MG: 12.5 TABLET, FILM COATED ORAL at 08:07

## 2024-07-07 RX ADMIN — LEVOTHYROXINE SODIUM 125 MCG: 125 TABLET ORAL at 05:07

## 2024-07-07 RX ADMIN — ATORVASTATIN CALCIUM 20 MG: 10 TABLET, FILM COATED ORAL at 08:07

## 2024-07-07 RX ADMIN — METHOCARBAMOL 500 MG: 500 TABLET ORAL at 02:07

## 2024-07-07 RX ADMIN — ACETAMINOPHEN 1000 MG: 500 TABLET ORAL at 08:07

## 2024-07-07 RX ADMIN — MUPIROCIN: 20 OINTMENT TOPICAL at 08:07

## 2024-07-07 RX ADMIN — Medication 6 MG: at 08:07

## 2024-07-07 RX ADMIN — ACETAMINOPHEN 1000 MG: 500 TABLET ORAL at 02:07

## 2024-07-07 RX ADMIN — POLYETHYLENE GLYCOL 3350 17 G: 17 POWDER, FOR SOLUTION ORAL at 08:07

## 2024-07-07 RX ADMIN — WARFARIN SODIUM 2 MG: 1 TABLET ORAL at 05:07

## 2024-07-07 NOTE — PROGRESS NOTES
[facility] Medicine  Progress Note    Patient Name: Homa Jaquez  MRN: [id number]  Patient Class: IP- Inpatient   Admission Date: 7/2/2024  Length of Stay: 5 days  Attending Physician: Franklyn Brito MD  Primary Care Provider: Franklyn Brito MD        Subjective:     Principal Problem:Closed comminuted intertrochanteric fracture of left femur        HPI:  The patient was an 88-year-old female patient of Dr. Brito's had an accidental trip and fall prior to admission.  She was walking with a walker and stated that she made in left pivot turn in the momentum just kept her going.  She has a history of corrected hypothyroidism compensated heart failure on Entresto and beta-blocker.  She is anticoagulated as well.  Coumadin currently on hold.  She underwent ORIF this morning good spirits currently currently pain-free she was awake alert oriented x3 her vital signs are little soft.  Her blood pressure is little on the low side but she is asymptomatic.  Otherwise being admitted with accidental fall with left hip fracture.    Overview/Hospital Course:  No notes on file    Interval History: Blood pressure has improved, has not taken Entresto today.     Review of Systems   Constitutional:  Negative for chills, fatigue and fever.   HENT:  Negative for congestion, rhinorrhea and sore throat.    Respiratory:  Negative for cough, chest tightness and shortness of breath.    Cardiovascular:  Negative for chest pain, palpitations and leg swelling.   Gastrointestinal:  Negative for abdominal distention, abdominal pain, constipation, diarrhea, nausea and vomiting.   Genitourinary:  Negative for dysuria.   Musculoskeletal:  Negative for arthralgias, back pain and joint swelling.   Neurological:  Negative for dizziness and headaches.   Psychiatric/Behavioral:  Negative for agitation and confusion.      Objective:     Vital Signs (Most Recent):  Temp: 98.2 °F (36.8 °C) (07/07/24  1214)  Pulse: 69 (07/07/24 1214)  Resp: 16 (07/07/24 1214)  BP: 115/65 (07/07/24 1214)  SpO2: 98 % (07/07/24 1415) Vital Signs (24h Range):  Temp:  [97.9 °F (36.6 °C)-98.9 °F (37.2 °C)] 98.2 °F (36.8 °C)  Pulse:  [69-84] 69  Resp:  [16-20] 16  SpO2:  [95 %-98 %] 98 %  BP: (105-118)/(59-70) 115/65     Weight: 70.3 kg (155 lb)  Body mass index is 28.34 kg/m².    Intake/Output Summary (Last 24 hours) at 7/7/2024 1422  Last data filed at 7/7/2024 1057  Gross per 24 hour   Intake 1000 ml   Output 250 ml   Net 750 ml         Physical Exam  Constitutional:       General: She is not in acute distress.     Appearance: Normal appearance.   HENT:      Head: Normocephalic and atraumatic.   Eyes:      Extraocular Movements: Extraocular movements intact.      Pupils: Pupils are equal, round, and reactive to light.   Cardiovascular:      Rate and Rhythm: Normal rate and regular rhythm.      Pulses: Normal pulses.      Heart sounds: Normal heart sounds.   Pulmonary:      Effort: Pulmonary effort is normal.      Breath sounds: Normal breath sounds.   Abdominal:      General: Abdomen is flat. Bowel sounds are normal. There is no distension.      Palpations: Abdomen is soft.      Tenderness: There is no abdominal tenderness.   Musculoskeletal:      Cervical back: Normal range of motion and neck supple.      Right lower leg: No edema.      Left lower leg: No edema.   Skin:     Findings: No lesion or rash.   Neurological:      General: No focal deficit present.      Mental Status: She is alert and oriented to person, place, and time.             Significant Labs: All pertinent labs within the past 24 hours have been reviewed.    Significant Imaging: I have reviewed all pertinent imaging results/findings within the past 24 hours.    Assessment/Plan:      * Closed comminuted intertrochanteric fracture of left femur  Accidental trip and fall   Status post ORIF of the left hip 07/03   PT following, possible rehab vs SNF      BARRY (acute  kidney injury)  Creatinine slightly trending down, 2.15 07/06  Continue IVF normal saline 75 cc/h       Fall  With left hip fracture.  No syncope or near-syncope  Mechanical accidental fall.      Primary hypertension  Blood pressure improved   Holding Entresto  Continue carvedilol     Atrial fibrillation  Rate controlled   INR down to 1.6 from 12.1 after one dose of vitamin K  Will resume warfarin tonight       Acute on chronic systolic heart failure  Compensated   Holding Entresto due to hypotension  Continue carvedilol         VTE Risk Mitigation (From admission, onward)           Ordered     warfarin (COUMADIN) tablet 2 mg  Daily         07/07/24 1125     IP VTE HIGH RISK PATIENT  Once         07/02/24 1938     Place sequential compression device  Until discontinued         07/02/24 1938     Reason for No Pharmacological VTE Prophylaxis  Once        Question:  Reasons:  Answer:  Already adequately anticoagulated on oral Anticoagulants    07/02/24 1938                    Discharge Planning   AURELIO:      Code Status: Full Code   Is the patient medically ready for discharge?:     Reason for patient still in hospital (select all that apply): Treatment  Discharge Plan A: Other (tbd)                  Eden Michel MD  Department of Hospital Medicine   [facility]

## 2024-07-07 NOTE — ASSESSMENT & PLAN NOTE
Rate controlled   INR down to 1.6 from 12.1 after one dose of vitamin K  Will resume warfarin tonight

## 2024-07-07 NOTE — PLAN OF CARE
Problem: Acute Kidney Injury/Impairment  Goal: Fluid and Electrolyte Balance  Outcome: Progressing  Goal: Improved Oral Intake  Outcome: Progressing  Goal: Effective Renal Function  Outcome: Progressing     Problem: Acute Kidney Injury/Impairment  Goal: Fluid and Electrolyte Balance  Outcome: Progressing  Goal: Improved Oral Intake  Outcome: Progressing  Goal: Effective Renal Function  Outcome: Progressing

## 2024-07-07 NOTE — SUBJECTIVE & OBJECTIVE
Interval History: Blood pressure has improved, has not taken Entresto today.     Review of Systems   Constitutional:  Negative for chills, fatigue and fever.   HENT:  Negative for congestion, rhinorrhea and sore throat.    Respiratory:  Negative for cough, chest tightness and shortness of breath.    Cardiovascular:  Negative for chest pain, palpitations and leg swelling.   Gastrointestinal:  Negative for abdominal distention, abdominal pain, constipation, diarrhea, nausea and vomiting.   Genitourinary:  Negative for dysuria.   Musculoskeletal:  Negative for arthralgias, back pain and joint swelling.   Neurological:  Negative for dizziness and headaches.   Psychiatric/Behavioral:  Negative for agitation and confusion.      Objective:     Vital Signs (Most Recent):  Temp: 98.2 °F (36.8 °C) (07/07/24 1214)  Pulse: 69 (07/07/24 1214)  Resp: 16 (07/07/24 1214)  BP: 115/65 (07/07/24 1214)  SpO2: 98 % (07/07/24 1415) Vital Signs (24h Range):  Temp:  [97.9 °F (36.6 °C)-98.9 °F (37.2 °C)] 98.2 °F (36.8 °C)  Pulse:  [69-84] 69  Resp:  [16-20] 16  SpO2:  [95 %-98 %] 98 %  BP: (105-118)/(59-70) 115/65     Weight: 70.3 kg (155 lb)  Body mass index is 28.34 kg/m².    Intake/Output Summary (Last 24 hours) at 7/7/2024 1422  Last data filed at 7/7/2024 1057  Gross per 24 hour   Intake 1000 ml   Output 250 ml   Net 750 ml         Physical Exam  Constitutional:       General: She is not in acute distress.     Appearance: Normal appearance.   HENT:      Head: Normocephalic and atraumatic.   Eyes:      Extraocular Movements: Extraocular movements intact.      Pupils: Pupils are equal, round, and reactive to light.   Cardiovascular:      Rate and Rhythm: Normal rate and regular rhythm.      Pulses: Normal pulses.      Heart sounds: Normal heart sounds.   Pulmonary:      Effort: Pulmonary effort is normal.      Breath sounds: Normal breath sounds.   Abdominal:      General: Abdomen is flat. Bowel sounds are normal. There is no distension.       Palpations: Abdomen is soft.      Tenderness: There is no abdominal tenderness.   Musculoskeletal:      Cervical back: Normal range of motion and neck supple.      Right lower leg: No edema.      Left lower leg: No edema.   Skin:     Findings: No lesion or rash.   Neurological:      General: No focal deficit present.      Mental Status: She is alert and oriented to person, place, and time.             Significant Labs: All pertinent labs within the past 24 hours have been reviewed.    Significant Imaging: I have reviewed all pertinent imaging results/findings within the past 24 hours.

## 2024-07-07 NOTE — PT/OT/SLP PROGRESS
Occupational Therapy   Treatment    Name: Homa Jaquez  MRN: [id number]  Admitting Diagnosis:  Closed comminuted intertrochanteric fracture of left femur  4 Days Post-Op    Recommendations:     Recommended therapy intensity at discharge: High Intensity Therapy   Discharge Equipment Recommendations:  none  Barriers to discharge:       Assessment:     Homa Jaquez is a 88 y.o. female with a medical diagnosis of Closed comminuted intertrochanteric fracture of left femur.  She presents with hesitation to participate, but ultimately able to be persuaded. Performance deficits affecting function are weakness, impaired endurance, impaired self care skills, impaired functional mobility, impaired balance.     Rehab Prognosis:  Good; patient would benefit from acute skilled OT services to address these deficits and reach maximum level of function.       Plan:     Patient to be seen 6 x/week to address the above listed problems via self-care/home management, therapeutic activities, therapeutic exercises  Plan of Care Expires: 08/01/24  Plan of Care Reviewed with: patient    Subjective     Pain/Comfort:  Pain Rating 1: 5/10  Location - Side 1: Left  Location - Orientation 1: upper  Location 1: leg  Pain Addressed 1:  (had Tylenol, per pt, confirmed by son)    Objective:     Communicated with: Nurse prior to session.  Patient found supine with son present  upon OT entry to room.    General Precautions: Standard, fall    Orthopedic Precautions:LLE weight bearing as tolerated  Braces:    Respiratory Status: Room air       Occupational Performance: Although participatory, patient required significant encouragement from staff and son.     Bed Mobility:    Patient completed Rolling/Turning to Left with  moderate assistance  Patient completed Rolling/Turning to Right with moderate assistance  Patient completed Supine to Sit with moderate assistance  Patient completed Sit to Supine with maximal assistance due to pain at end of  session    Functional Mobility/Transfers:  Patient completed Sit <> Stand Transfer with moderate assistance  with  rolling walker   Patient completed stand pivot with RW, EOB > BSC with Mod-Max A d/t pain and fear.  After increased time on BSC, pt required Max A for sit>stand, then Max A with step transfer back to EOB.     Activities of Daily Living:  Lower Body Dressing: minimum assistance with sock aide at EOB. BATES educates on increasing ADLs with use of reacher, sock aide, dressing stick, and sponge.  Toileting: maximal assistance x2 on BSC for BM. Patient required increased time for toilet task; 2-person assist as pt required Mod A to maintain safe stand while tech cleaned patient. Unable to attempt reacher use to don new brief d/t pain and poor sitting posture on BSC.   Once patient was returned to supine position in bed, new PureWick was placed.       Therapeutic Positioning    OT interventions performed during the course of today's session in an effort to prevent and/or reduce acquired pressure injuries:   Education was provided on benefits of and recommendations for therapeutic positioning. SCDs and pressure relief boots were both replaced. Positioning of head, knees of bed adjusted to patient's liking.       Patient Education:  Patient and son/s were provided with verbal education and demonstrations education regarding OT role/goals/POC, safety awareness, and hip kit tools .  Understanding was verbalized.      Patient left supine with all lines intact, call button in reach, and son present.    GOALS:   Multidisciplinary Problems       Occupational Therapy Goals          Problem: Occupational Therapy    Goal Priority Disciplines Outcome Interventions   Occupational Therapy Goal     OT, PT/OT Progressing    Description: Goals to be met by: 8/1/24     Patient will increase functional independence with ADLs by performing:    LE Dressing with Modified [place].  Grooming while standing with Modified  [place].  Toileting from toilet with Modified [place] for hygiene and clothing management.   Toilet transfer to toilet with Modified [place]. Progress from bedside commode                         Time Tracking:     OT Date of Treatment: 07/07/24  OT Start Time: 1023  OT Stop Time: 1115  OT Total Time (min): 52 min    Billable Minutes:Self Care/Home Management 34  Therapeutic Activity 18    OT/EVY: EVY     Number of EVY visits since last OT visit: 2    7/7/2024

## 2024-07-08 LAB
ABO + RH BLD: NORMAL
ABO + RH BLD: NORMAL
ALBUMIN SERPL-MCNC: 2.2 G/DL (ref 3.4–4.8)
ALBUMIN/GLOB SERPL: 0.8 RATIO (ref 1.1–2)
ALP SERPL-CCNC: 87 UNIT/L (ref 40–150)
ALT SERPL-CCNC: 10 UNIT/L (ref 0–55)
ANION GAP SERPL CALC-SCNC: 4 MEQ/L
AST SERPL-CCNC: 20 UNIT/L (ref 5–34)
BASOPHILS # BLD AUTO: 0.05 X10(3)/MCL
BASOPHILS NFR BLD AUTO: 0.8 %
BILIRUB SERPL-MCNC: 0.9 MG/DL
BLD PROD TYP BPU: NORMAL
BLD PROD TYP BPU: NORMAL
BLOOD UNIT EXPIRATION DATE: NORMAL
BLOOD UNIT EXPIRATION DATE: NORMAL
BLOOD UNIT TYPE CODE: 6200
BLOOD UNIT TYPE CODE: 6200
BUN SERPL-MCNC: 36.2 MG/DL (ref 9.8–20.1)
CALCIUM SERPL-MCNC: 7.8 MG/DL (ref 8.4–10.2)
CHLORIDE SERPL-SCNC: 117 MMOL/L (ref 98–107)
CO2 SERPL-SCNC: 17 MMOL/L (ref 23–31)
CREAT SERPL-MCNC: 1.29 MG/DL (ref 0.55–1.02)
CREAT/UREA NIT SERPL: 28
CROSSMATCH INTERPRETATION: NORMAL
CROSSMATCH INTERPRETATION: NORMAL
DISPENSE STATUS: NORMAL
DISPENSE STATUS: NORMAL
EOSINOPHIL # BLD AUTO: 0.41 X10(3)/MCL (ref 0–0.9)
EOSINOPHIL NFR BLD AUTO: 6.6 %
ERYTHROCYTE [DISTWIDTH] IN BLOOD BY AUTOMATED COUNT: 14.8 % (ref 11.5–17)
GFR SERPLBLD CREATININE-BSD FMLA CKD-EPI: 40 ML/MIN/1.73/M2
GLOBULIN SER-MCNC: 2.8 GM/DL (ref 2.4–3.5)
GLUCOSE SERPL-MCNC: 122 MG/DL (ref 82–115)
GROUP & RH: NORMAL
HCT VFR BLD AUTO: 24.1 % (ref 37–47)
HGB BLD-MCNC: 7.3 G/DL (ref 12–16)
IMM GRANULOCYTES # BLD AUTO: 0.04 X10(3)/MCL (ref 0–0.04)
IMM GRANULOCYTES NFR BLD AUTO: 0.6 %
INDIRECT COOMBS: NORMAL
INR PPP: 1.6
LYMPHOCYTES # BLD AUTO: 1.61 X10(3)/MCL (ref 0.6–4.6)
LYMPHOCYTES NFR BLD AUTO: 25.8 %
MCH RBC QN AUTO: 33 PG (ref 27–31)
MCHC RBC AUTO-ENTMCNC: 30.3 G/DL (ref 33–36)
MCV RBC AUTO: 109 FL (ref 80–94)
MONOCYTES # BLD AUTO: 0.67 X10(3)/MCL (ref 0.1–1.3)
MONOCYTES NFR BLD AUTO: 10.8 %
NEUTROPHILS # BLD AUTO: 3.45 X10(3)/MCL (ref 2.1–9.2)
NEUTROPHILS NFR BLD AUTO: 55.4 %
NRBC BLD AUTO-RTO: 0.3 %
PLATELET # BLD AUTO: 156 X10(3)/MCL (ref 130–400)
PMV BLD AUTO: 10.3 FL (ref 7.4–10.4)
POTASSIUM SERPL-SCNC: 4.8 MMOL/L (ref 3.5–5.1)
PROT SERPL-MCNC: 5 GM/DL (ref 5.8–7.6)
PROTHROMBIN TIME: 19.2 SECONDS (ref 12.5–14.5)
RBC # BLD AUTO: 2.21 X10(6)/MCL (ref 4.2–5.4)
SODIUM SERPL-SCNC: 138 MMOL/L (ref 136–145)
SPECIMEN OUTDATE: NORMAL
UNIT NUMBER: NORMAL
UNIT NUMBER: NORMAL
WBC # BLD AUTO: 6.23 X10(3)/MCL (ref 4.5–11.5)

## 2024-07-08 PROCEDURE — 99233 SBSQ HOSP IP/OBS HIGH 50: CPT | Mod: ,,, | Performed by: INTERNAL MEDICINE

## 2024-07-08 PROCEDURE — 11000001 HC ACUTE MED/SURG PRIVATE ROOM

## 2024-07-08 PROCEDURE — 94799 UNLISTED PULMONARY SVC/PX: CPT

## 2024-07-08 PROCEDURE — 86923 COMPATIBILITY TEST ELECTRIC: CPT | Performed by: INTERNAL MEDICINE

## 2024-07-08 PROCEDURE — 80053 COMPREHEN METABOLIC PANEL: CPT | Performed by: STUDENT IN AN ORGANIZED HEALTH CARE EDUCATION/TRAINING PROGRAM

## 2024-07-08 PROCEDURE — 63600175 PHARM REV CODE 636 W HCPCS: Performed by: INTERNAL MEDICINE

## 2024-07-08 PROCEDURE — 86900 BLOOD TYPING SEROLOGIC ABO: CPT | Performed by: INTERNAL MEDICINE

## 2024-07-08 PROCEDURE — 25000003 PHARM REV CODE 250: Performed by: INTERNAL MEDICINE

## 2024-07-08 PROCEDURE — 25000003 PHARM REV CODE 250: Performed by: STUDENT IN AN ORGANIZED HEALTH CARE EDUCATION/TRAINING PROGRAM

## 2024-07-08 PROCEDURE — 85025 COMPLETE CBC W/AUTO DIFF WBC: CPT | Performed by: STUDENT IN AN ORGANIZED HEALTH CARE EDUCATION/TRAINING PROGRAM

## 2024-07-08 PROCEDURE — 36415 COLL VENOUS BLD VENIPUNCTURE: CPT | Performed by: INTERNAL MEDICINE

## 2024-07-08 PROCEDURE — 30233N1 TRANSFUSION OF NONAUTOLOGOUS RED BLOOD CELLS INTO PERIPHERAL VEIN, PERCUTANEOUS APPROACH: ICD-10-PCS | Performed by: INTERNAL MEDICINE

## 2024-07-08 PROCEDURE — 97530 THERAPEUTIC ACTIVITIES: CPT | Mod: CQ

## 2024-07-08 PROCEDURE — 97110 THERAPEUTIC EXERCISES: CPT

## 2024-07-08 PROCEDURE — P9016 RBC LEUKOCYTES REDUCED: HCPCS | Performed by: INTERNAL MEDICINE

## 2024-07-08 PROCEDURE — 36415 COLL VENOUS BLD VENIPUNCTURE: CPT | Performed by: STUDENT IN AN ORGANIZED HEALTH CARE EDUCATION/TRAINING PROGRAM

## 2024-07-08 PROCEDURE — 85610 PROTHROMBIN TIME: CPT | Performed by: INTERNAL MEDICINE

## 2024-07-08 PROCEDURE — 36430 TRANSFUSION BLD/BLD COMPNT: CPT

## 2024-07-08 RX ORDER — FUROSEMIDE 10 MG/ML
40 INJECTION INTRAMUSCULAR; INTRAVENOUS ONCE
Status: COMPLETED | OUTPATIENT
Start: 2024-07-08 | End: 2024-07-08

## 2024-07-08 RX ORDER — HYDROCODONE BITARTRATE AND ACETAMINOPHEN 500; 5 MG/1; MG/1
TABLET ORAL
Status: DISCONTINUED | OUTPATIENT
Start: 2024-07-08 | End: 2024-07-09 | Stop reason: HOSPADM

## 2024-07-08 RX ADMIN — ACETAMINOPHEN 1000 MG: 500 TABLET ORAL at 08:07

## 2024-07-08 RX ADMIN — ATORVASTATIN CALCIUM 20 MG: 10 TABLET, FILM COATED ORAL at 08:07

## 2024-07-08 RX ADMIN — ACETAMINOPHEN 1000 MG: 500 TABLET ORAL at 09:07

## 2024-07-08 RX ADMIN — POLYETHYLENE GLYCOL 3350 17 G: 17 POWDER, FOR SOLUTION ORAL at 08:07

## 2024-07-08 RX ADMIN — WARFARIN SODIUM 2 MG: 1 TABLET ORAL at 05:07

## 2024-07-08 RX ADMIN — METHOCARBAMOL 500 MG: 500 TABLET ORAL at 08:07

## 2024-07-08 RX ADMIN — LEVOTHYROXINE SODIUM 125 MCG: 125 TABLET ORAL at 05:07

## 2024-07-08 RX ADMIN — FUROSEMIDE 40 MG: 10 INJECTION, SOLUTION INTRAMUSCULAR; INTRAVENOUS at 08:07

## 2024-07-08 RX ADMIN — CARVEDILOL 12.5 MG: 12.5 TABLET, FILM COATED ORAL at 09:07

## 2024-07-08 RX ADMIN — CARVEDILOL 12.5 MG: 12.5 TABLET, FILM COATED ORAL at 08:07

## 2024-07-08 NOTE — PROGRESS NOTES
Chart reviewed and patient interviewed and examined.  The patient was admitted several days ago following a fall at home that left her with a hip fracture that was treated surgically.  Postop she was had problems with azotemia and hypotension requiring discontinuation of heart failure meds.  She did improve with IV fluids and currently still on 75 mL/hr of normal saline.  She did have significant anemia postop but it was not been transfused.    Subjective:  She feels okay.  Her pain is controlled.  She denies chest pain and shortness a breath.    Objective:  She was afebrile.  Blood pressure 118/66.  The lowest overnight was 97/55.  Heart rate 78 respiratory rate 18 and O2 sat 97% room air.    General appearance is unremarkable.  She was in no distress.  She was very pale.  Neck exam notable for jugular venous pressure at 10+ cm water.  Heart has an irregular rhythm with normal rate.  Lungs clear anteriorly and laterally.  Abdomen nontender.  Extremities without obvious edema.    Laboratory studies included an abnormal H&H 7.3 and 24.1.  White count 6200 and platelets 156.  INR yesterday was 1.6.  Chemistry profile notable for bicarb of 17, BUN 36 and creatinine of 1.29.  This azotemia is better than yesterday.  Blood sugar 122.    Assessment: 1.  Significant postop anemia.  No evidence active bleeding     2. Status post hip fracture, status post  ORIF 5 days ago.      3. Congestive heart failure.  She was significant systolic dysfunction and was doing well with the Entresto prior to admission     4. History of hypertension.  Currently low     5. History of chronic atrial fib with pacemaker defibrillator     6. Recent Coumadin toxicity.  Corrected     7. Corrected hypothyroidism     8. Mild diabetes mellitus diet-controlled    9. History of pernicious anemia on B12    Plan: Transfused 2 units of packed red blood cells today.  We will give a dose of Lasix after the 1st unit.  Continue therapies.

## 2024-07-08 NOTE — PT/OT/SLP PROGRESS
Physical Therapy Treatment    Patient Name:  Homa Jaquez   MRN:  [id number]    Recommendations:     Discharge therapy intensity: High Intensity Therapy   Discharge Equipment Recommendations: none  Barriers to discharge: Ongoing medical needs    Assessment:     Homa Jaquez is a 88 y.o. female admitted with a medical diagnosis of f closed L intertrochanteric femur fracture with severe comminution s/p IM nail 7/3/24 .  She presents with the following impairments/functional limitations: weakness, impaired endurance, impaired self care skills, impaired functional mobility, gait instability, impaired balance, decreased lower extremity function, impaired cardiopulmonary response to activity.    Rehab Prognosis: Good; patient would benefit from acute skilled PT services to address these deficits and reach maximum level of function.    Recent Surgery: Procedure(s) (LRB):  INSERTION, INTRAMEDULLARY MELANIE, FEMUR (Left) 5 Days Post-Op    Plan:     During this hospitalization, patient would benefit from acute PT services 6 x/week to address the identified rehab impairments via gait training, therapeutic activities, therapeutic exercises, neuromuscular re-education, wheelchair management/training and progress toward the following goals:    Plan of Care Expires:  08/04/24    Subjective     Chief Complaint: none stated  Patient/Family Comments/goals: none stated  Pain/Comfort:  Pain Rating 1: 8/10  Location - Side 1: Left  Location - Orientation 1: generalized  Location 1: leg      Objective:     Communicated with nurse prior to session.  Patient found HOB elevated with telemetry, peripheral IV, PureWick, pulse ox (continuous) upon PT entry to room.     General Precautions: Standard, fall  Orthopedic Precautions: LLE weight bearing as tolerated  Braces: N/A  Respiratory Status: Room air  Blood Pressure: WNL  Skin Integrity: Visible skin intact      Functional Mobility:  Bed Mobility:     Scooting: moderate assistance  Supine  to Sit: moderate assistance  Sit to Supine: moderate assistance  Transfers:     Sit to Stand:  minimum assistance with rolling walker  Gait: patient took side steps toward HOB 2ftx1 trials with minAx2 with rolling walker. Patient unable to clear feet during pre gait steps- patient pivots feet to advance lower extremities.     Education:  Patient provided with verbal education education regarding PT role/goals/POC, fall prevention, and safety awareness.  Understanding was verbalized.     Patient left HOB elevated with all lines intact and call button in reach    GOALS:   Multidisciplinary Problems       Physical Therapy Goals          Problem: Physical Therapy    Goal Priority Disciplines Outcome Goal Variances Interventions   Physical Therapy Goal     PT, PT/OT Progressing     Description: Goals to be met by: 2024     Patient will increase functional independence with mobility by performin. Supine to sit with Stand-by Assistance  2. Rolling to Left and Right with Stand-by Assistance.  3. Sit to stand transfer with Stand-by Assistance  4. Bed to chair transfer with Stand-by Assistance using Rolling Walker  5. Gait  x 150 feet with Stand-by Assistance using Rolling Walker.                          Time Tracking:     PT Received On: 24  PT Start Time: 1411     PT Stop Time: 1439  PT Total Time (min): 28 min     Billable Minutes: Therapeutic Activity 28    Treatment Type: Treatment  PT/PTA: PTA     Number of PTA visits since last PT visit: 3     2024

## 2024-07-08 NOTE — PLAN OF CARE
Family communicated to me over weekend 1 st choice was [facility] and 2nd choice would be skilled at [facility].   SSC aware

## 2024-07-08 NOTE — PLAN OF CARE
SSC sent referral to pt's choice of [facility] via [facility]. Spoke with Hailey @ Ortho, who states pt is a good candidate for rehab and can admit once stable. Pt receiving blood today.

## 2024-07-08 NOTE — PT/OT/SLP PROGRESS
Occupational Therapy   Treatment    Name: Homa Jaquez  MRN: [id number]  Admitting Diagnosis:  Closed comminuted intertrochanteric fracture of left femur  5 Days Post-Op    Recommendations:     Recommended therapy intensity at discharge: High Intensity Therapy   Discharge Equipment Recommendations:  none  Barriers to discharge:  None    Assessment:     Homa Jaquez is a 88 y.o. female with a medical diagnosis of Closed comminuted intertrochanteric fracture of left femur.  She presents with good effort but with generalized weakness and decreased activity tolerance, but noted improvement taking steps along EOB. Performance deficits affecting function are weakness, impaired endurance, impaired self care skills, impaired functional mobility, impaired balance.     Rehab Prognosis:  good; patient would benefit from acute skilled OT services to address these deficits and reach maximum level of function.       Plan:     Patient to be seen 6 x/week to address the above listed problems via self-care/home management, therapeutic activities, therapeutic exercises  Plan of Care Expires: 08/01/24  Plan of Care Reviewed with: patient    Subjective     Pain/Comfort:  Pain Rating 1: 8/10 (with mobility/standing)  Location - Side 1: Left  Location - Orientation 1: upper  Location 1: leg  Pain Addressed 1: Distraction, Cessation of Activity, Reposition    Objective:     Communicated with: nsg and PtA prior to session.  Patient found supine with telemetry, peripheral IV,upon OT entry to room.    General Precautions: Standard, fall    Orthopedic Precautions:LLE weight bearing as tolerated  Braces:    Respiratory Status:   Vital Signs:      Occupational Performance:     Bed Mobility:    Sup to sit with mod assist x 2     Functional Mobility/Transfers:  Sit to stand with mod and then min assist x 2, reps, RW  Functional Mobility: able to take steps forward and back and along EOB with min assist x 2, RW    Activities of Daily  "Living:  Socks with total assist  Simple grooming setup assist    Therapeutic Activities:  As above     Therapeutic Exercise:  BLE AROM exercises all jt's for improvement in LB dressing and mobility for ADL"s    Patient Education:  Patient/spouse provided with verbal education education regarding OT role/goals/POC, Discharge/DME recommendations, pressure ulcer prevention, and home exercise program .  Understanding was verbalized.      Patient left HOB elevated with all lines intact, call button in reach, and  present.    GOALS:   Multidisciplinary Problems       Occupational Therapy Goals          Problem: Occupational Therapy    Goal Priority Disciplines Outcome Interventions   Occupational Therapy Goal     OT, PT/OT Progressing    Description: Goals to be met by: 8/1/24     Patient will increase functional independence with ADLs by performing:    LE Dressing with Modified Bud.  Grooming while standing with Modified Bud.  Toileting from toilet with Modified Bud for hygiene and clothing management.   Toilet transfer to toilet with Modified Bud. Progress from bedside commode                         Time Tracking:     OT Date of Treatment: 07/08/24  OT Start Time: 1411  OT Stop Time: 1439  OT Total Time (min): 28 min    Billable Minutes:Therapeutic Exercise 28 min    OT/EVY: OT     Number of EVY visits since last OT visit: 3    7/8/2024     "

## 2024-07-09 ENCOUNTER — HOSPITAL ENCOUNTER (INPATIENT)
Facility: HOSPITAL | Age: 89
LOS: 4 days | Discharge: STILL ADMITTED OR ADMINISTRATIVE | DRG: 560 | End: 2024-07-13
Attending: INTERNAL MEDICINE | Admitting: INTERNAL MEDICINE
Payer: MEDICARE

## 2024-07-09 VITALS
BODY MASS INDEX: 28.52 KG/M2 | SYSTOLIC BLOOD PRESSURE: 157 MMHG | TEMPERATURE: 98 F | HEART RATE: 78 BPM | DIASTOLIC BLOOD PRESSURE: 76 MMHG | HEIGHT: 62 IN | OXYGEN SATURATION: 98 % | WEIGHT: 155 LBS | RESPIRATION RATE: 19 BRPM

## 2024-07-09 DIAGNOSIS — S72.142A CLOSED COMMINUTED INTERTROCHANTERIC FRACTURE OF LEFT FEMUR: ICD-10-CM

## 2024-07-09 LAB
ALBUMIN SERPL-MCNC: 2.5 G/DL (ref 3.4–4.8)
ALBUMIN/GLOB SERPL: 0.9 RATIO (ref 1.1–2)
ALP SERPL-CCNC: 111 UNIT/L (ref 40–150)
ALT SERPL-CCNC: 13 UNIT/L (ref 0–55)
ANION GAP SERPL CALC-SCNC: 9 MEQ/L
AST SERPL-CCNC: 24 UNIT/L (ref 5–34)
BASOPHILS # BLD AUTO: 0.04 X10(3)/MCL
BASOPHILS NFR BLD AUTO: 0.4 %
BILIRUB SERPL-MCNC: 1.9 MG/DL
BUN SERPL-MCNC: 29.3 MG/DL (ref 9.8–20.1)
CALCIUM SERPL-MCNC: 8.3 MG/DL (ref 8.4–10.2)
CHLORIDE SERPL-SCNC: 113 MMOL/L (ref 98–107)
CO2 SERPL-SCNC: 16 MMOL/L (ref 23–31)
CREAT SERPL-MCNC: 1.26 MG/DL (ref 0.55–1.02)
CREAT/UREA NIT SERPL: 23
EOSINOPHIL # BLD AUTO: 0.38 X10(3)/MCL (ref 0–0.9)
EOSINOPHIL NFR BLD AUTO: 4.2 %
ERYTHROCYTE [DISTWIDTH] IN BLOOD BY AUTOMATED COUNT: 17.2 % (ref 11.5–17)
GFR SERPLBLD CREATININE-BSD FMLA CKD-EPI: 41 ML/MIN/1.73/M2
GLOBULIN SER-MCNC: 2.8 GM/DL (ref 2.4–3.5)
GLUCOSE SERPL-MCNC: 116 MG/DL (ref 82–115)
HCT VFR BLD AUTO: 34.5 % (ref 37–47)
HGB BLD-MCNC: 11.7 G/DL (ref 12–16)
IMM GRANULOCYTES # BLD AUTO: 0.07 X10(3)/MCL (ref 0–0.04)
IMM GRANULOCYTES NFR BLD AUTO: 0.8 %
INR PPP: 1.7
LYMPHOCYTES # BLD AUTO: 1.46 X10(3)/MCL (ref 0.6–4.6)
LYMPHOCYTES NFR BLD AUTO: 16 %
MCH RBC QN AUTO: 32 PG (ref 27–31)
MCHC RBC AUTO-ENTMCNC: 33.9 G/DL (ref 33–36)
MCV RBC AUTO: 94.3 FL (ref 80–94)
MONOCYTES # BLD AUTO: 1.12 X10(3)/MCL (ref 0.1–1.3)
MONOCYTES NFR BLD AUTO: 12.3 %
NEUTROPHILS # BLD AUTO: 6.06 X10(3)/MCL (ref 2.1–9.2)
NEUTROPHILS NFR BLD AUTO: 66.3 %
NRBC BLD AUTO-RTO: 0 %
PLATELET # BLD AUTO: 196 X10(3)/MCL (ref 130–400)
PMV BLD AUTO: 9.9 FL (ref 7.4–10.4)
POTASSIUM SERPL-SCNC: 4.9 MMOL/L (ref 3.5–5.1)
PROT SERPL-MCNC: 5.3 GM/DL (ref 5.8–7.6)
PROTHROMBIN TIME: 19.3 SECONDS (ref 12.5–14.5)
RBC # BLD AUTO: 3.66 X10(6)/MCL (ref 4.2–5.4)
SODIUM SERPL-SCNC: 138 MMOL/L (ref 136–145)
WBC # BLD AUTO: 9.13 X10(3)/MCL (ref 4.5–11.5)

## 2024-07-09 PROCEDURE — 25000003 PHARM REV CODE 250: Performed by: NURSE PRACTITIONER

## 2024-07-09 PROCEDURE — 25000003 PHARM REV CODE 250: Performed by: INTERNAL MEDICINE

## 2024-07-09 PROCEDURE — 85610 PROTHROMBIN TIME: CPT | Performed by: INTERNAL MEDICINE

## 2024-07-09 PROCEDURE — 36415 COLL VENOUS BLD VENIPUNCTURE: CPT | Performed by: STUDENT IN AN ORGANIZED HEALTH CARE EDUCATION/TRAINING PROGRAM

## 2024-07-09 PROCEDURE — 99900035 HC TECH TIME PER 15 MIN (STAT)

## 2024-07-09 PROCEDURE — 94761 N-INVAS EAR/PLS OXIMETRY MLT: CPT

## 2024-07-09 PROCEDURE — 11800000 HC REHAB PRIVATE ROOM

## 2024-07-09 PROCEDURE — 94760 N-INVAS EAR/PLS OXIMETRY 1: CPT

## 2024-07-09 PROCEDURE — 99900031 HC PATIENT EDUCATION (STAT)

## 2024-07-09 PROCEDURE — 80053 COMPREHEN METABOLIC PANEL: CPT | Performed by: STUDENT IN AN ORGANIZED HEALTH CARE EDUCATION/TRAINING PROGRAM

## 2024-07-09 PROCEDURE — 94799 UNLISTED PULMONARY SVC/PX: CPT

## 2024-07-09 PROCEDURE — 99223 1ST HOSP IP/OBS HIGH 75: CPT | Mod: ,,, | Performed by: NURSE PRACTITIONER

## 2024-07-09 PROCEDURE — 99239 HOSP IP/OBS DSCHRG MGMT >30: CPT | Mod: ,,, | Performed by: INTERNAL MEDICINE

## 2024-07-09 PROCEDURE — 85025 COMPLETE CBC W/AUTO DIFF WBC: CPT | Performed by: STUDENT IN AN ORGANIZED HEALTH CARE EDUCATION/TRAINING PROGRAM

## 2024-07-09 RX ORDER — ACETAMINOPHEN 500 MG
1000 TABLET ORAL 3 TIMES DAILY
Status: DISCONTINUED | OUTPATIENT
Start: 2024-07-09 | End: 2024-07-09

## 2024-07-09 RX ORDER — ATORVASTATIN CALCIUM 10 MG/1
20 TABLET, FILM COATED ORAL DAILY
Status: DISCONTINUED | OUTPATIENT
Start: 2024-07-10 | End: 2024-07-13 | Stop reason: HOSPADM

## 2024-07-09 RX ORDER — MENTHOL AND ZINC OXIDE .44; 20.625 G/100G; G/100G
OINTMENT TOPICAL 2 TIMES DAILY
Status: DISCONTINUED | OUTPATIENT
Start: 2024-07-09 | End: 2024-07-13 | Stop reason: HOSPADM

## 2024-07-09 RX ORDER — METOPROLOL TARTRATE 1 MG/ML
10 INJECTION, SOLUTION INTRAVENOUS
Status: DISCONTINUED | OUTPATIENT
Start: 2024-07-09 | End: 2024-07-13 | Stop reason: HOSPADM

## 2024-07-09 RX ORDER — NITROGLYCERIN 0.4 MG/1
0.4 TABLET SUBLINGUAL EVERY 5 MIN PRN
Status: DISCONTINUED | OUTPATIENT
Start: 2024-07-09 | End: 2024-07-13 | Stop reason: HOSPADM

## 2024-07-09 RX ORDER — POLYETHYLENE GLYCOL 3350 17 G/17G
17 POWDER, FOR SOLUTION ORAL 2 TIMES DAILY
Status: COMPLETED | OUTPATIENT
Start: 2024-07-09 | End: 2024-07-09

## 2024-07-09 RX ORDER — ONDANSETRON HYDROCHLORIDE 2 MG/ML
4 INJECTION, SOLUTION INTRAVENOUS EVERY 8 HOURS PRN
Status: DISCONTINUED | OUTPATIENT
Start: 2024-07-09 | End: 2024-07-13 | Stop reason: HOSPADM

## 2024-07-09 RX ORDER — HYDRALAZINE HYDROCHLORIDE 20 MG/ML
10 INJECTION INTRAMUSCULAR; INTRAVENOUS EVERY 4 HOURS PRN
Status: DISCONTINUED | OUTPATIENT
Start: 2024-07-09 | End: 2024-07-13 | Stop reason: HOSPADM

## 2024-07-09 RX ORDER — ALLOPURINOL 100 MG/1
100 TABLET ORAL DAILY
Status: DISCONTINUED | OUTPATIENT
Start: 2024-07-10 | End: 2024-07-13 | Stop reason: HOSPADM

## 2024-07-09 RX ORDER — ACETAMINOPHEN 325 MG/1
650 TABLET ORAL EVERY 6 HOURS PRN
Status: DISCONTINUED | OUTPATIENT
Start: 2024-07-09 | End: 2024-07-09

## 2024-07-09 RX ORDER — CARVEDILOL 6.25 MG/1
12.5 TABLET ORAL 2 TIMES DAILY
Status: DISCONTINUED | OUTPATIENT
Start: 2024-07-09 | End: 2024-07-11

## 2024-07-09 RX ORDER — ONDANSETRON 4 MG/1
8 TABLET, ORALLY DISINTEGRATING ORAL EVERY 8 HOURS PRN
Status: DISCONTINUED | OUTPATIENT
Start: 2024-07-09 | End: 2024-07-13 | Stop reason: HOSPADM

## 2024-07-09 RX ORDER — HYDROXYZINE PAMOATE 50 MG/1
50 CAPSULE ORAL NIGHTLY PRN
Status: DISCONTINUED | OUTPATIENT
Start: 2024-07-09 | End: 2024-07-13 | Stop reason: HOSPADM

## 2024-07-09 RX ORDER — BENZONATATE 100 MG/1
100 CAPSULE ORAL 3 TIMES DAILY PRN
Status: DISCONTINUED | OUTPATIENT
Start: 2024-07-09 | End: 2024-07-13 | Stop reason: HOSPADM

## 2024-07-09 RX ORDER — OXYCODONE HYDROCHLORIDE 5 MG/1
5 TABLET ORAL EVERY 6 HOURS PRN
Status: DISCONTINUED | OUTPATIENT
Start: 2024-07-09 | End: 2024-07-12

## 2024-07-09 RX ORDER — POLYETHYLENE GLYCOL 3350 17 G/17G
17 POWDER, FOR SOLUTION ORAL 2 TIMES DAILY PRN
Status: ON HOLD
Start: 2024-07-09

## 2024-07-09 RX ORDER — OXYCODONE AND ACETAMINOPHEN 10; 325 MG/1; MG/1
1 TABLET ORAL EVERY 4 HOURS PRN
Status: DISCONTINUED | OUTPATIENT
Start: 2024-07-09 | End: 2024-07-09

## 2024-07-09 RX ORDER — LABETALOL HCL 20 MG/4 ML
10 SYRINGE (ML) INTRAVENOUS EVERY 4 HOURS PRN
Status: DISCONTINUED | OUTPATIENT
Start: 2024-07-09 | End: 2024-07-13 | Stop reason: HOSPADM

## 2024-07-09 RX ORDER — METHOCARBAMOL 500 MG/1
500 TABLET, FILM COATED ORAL 3 TIMES DAILY
Status: DISCONTINUED | OUTPATIENT
Start: 2024-07-09 | End: 2024-07-11

## 2024-07-09 RX ORDER — LEVOTHYROXINE SODIUM 125 UG/1
125 TABLET ORAL
Status: DISCONTINUED | OUTPATIENT
Start: 2024-07-10 | End: 2024-07-13 | Stop reason: HOSPADM

## 2024-07-09 RX ORDER — ACETAMINOPHEN 500 MG
1000 TABLET ORAL 2 TIMES DAILY
Status: DISCONTINUED | OUTPATIENT
Start: 2024-07-09 | End: 2024-07-11

## 2024-07-09 RX ORDER — TALC
6 POWDER (GRAM) TOPICAL NIGHTLY PRN
Status: DISCONTINUED | OUTPATIENT
Start: 2024-07-09 | End: 2024-07-13 | Stop reason: HOSPADM

## 2024-07-09 RX ORDER — ONDANSETRON 4 MG/1
4 TABLET, ORALLY DISINTEGRATING ORAL EVERY 6 HOURS PRN
Status: DISCONTINUED | OUTPATIENT
Start: 2024-07-09 | End: 2024-07-13 | Stop reason: HOSPADM

## 2024-07-09 RX ORDER — TRAMADOL HYDROCHLORIDE 50 MG/1
50 TABLET ORAL EVERY 6 HOURS PRN
Status: DISCONTINUED | OUTPATIENT
Start: 2024-07-09 | End: 2024-07-13 | Stop reason: HOSPADM

## 2024-07-09 RX ORDER — METHOCARBAMOL 500 MG/1
500 TABLET, FILM COATED ORAL 3 TIMES DAILY
Qty: 30 TABLET | Refills: 0 | Status: ON HOLD | OUTPATIENT
Start: 2024-07-09 | End: 2024-07-19

## 2024-07-09 RX ORDER — WARFARIN 2 MG/1
2 TABLET ORAL DAILY
Status: DISCONTINUED | OUTPATIENT
Start: 2024-07-09 | End: 2024-07-13

## 2024-07-09 RX ORDER — DOCUSATE SODIUM 100 MG/1
100 CAPSULE, LIQUID FILLED ORAL 2 TIMES DAILY
Status: DISCONTINUED | OUTPATIENT
Start: 2024-07-09 | End: 2024-07-13 | Stop reason: HOSPADM

## 2024-07-09 RX ORDER — ONDANSETRON HYDROCHLORIDE 2 MG/ML
4 INJECTION, SOLUTION INTRAVENOUS EVERY 8 HOURS PRN
Status: ON HOLD
Start: 2024-07-09

## 2024-07-09 RX ORDER — ACETAMINOPHEN 500 MG
500 TABLET ORAL 2 TIMES DAILY PRN
Status: DISCONTINUED | OUTPATIENT
Start: 2024-07-09 | End: 2024-07-13 | Stop reason: HOSPADM

## 2024-07-09 RX ADMIN — ACETAMINOPHEN 1000 MG: 500 TABLET ORAL at 09:07

## 2024-07-09 RX ADMIN — METHOCARBAMOL 500 MG: 500 TABLET ORAL at 03:07

## 2024-07-09 RX ADMIN — CARVEDILOL 12.5 MG: 12.5 TABLET, FILM COATED ORAL at 08:07

## 2024-07-09 RX ADMIN — WARFARIN SODIUM 2 MG: 2 TABLET ORAL at 04:07

## 2024-07-09 RX ADMIN — POLYETHYLENE GLYCOL 3350 17 G: 17 POWDER, FOR SOLUTION ORAL at 08:07

## 2024-07-09 RX ADMIN — ATORVASTATIN CALCIUM 20 MG: 10 TABLET, FILM COATED ORAL at 08:07

## 2024-07-09 RX ADMIN — METHOCARBAMOL 500 MG: 500 TABLET ORAL at 08:07

## 2024-07-09 RX ADMIN — SACUBITRIL AND VALSARTAN 1 TABLET: 24; 26 TABLET, FILM COATED ORAL at 08:07

## 2024-07-09 RX ADMIN — LEVOTHYROXINE SODIUM 125 MCG: 125 TABLET ORAL at 06:07

## 2024-07-09 RX ADMIN — CARVEDILOL 12.5 MG: 6.25 TABLET, FILM COATED ORAL at 08:07

## 2024-07-09 RX ADMIN — DOCUSATE SODIUM 100 MG: 100 CAPSULE, LIQUID FILLED ORAL at 08:07

## 2024-07-09 NOTE — PROGRESS NOTES
[facility] - Rehab Inpatient Services  Wound Care    Patient Name:  Homa Jaquez   MRN:  [id number]  Date: 7/9/2024  Diagnosis: Closed comminuted intertrochanteric fracture of left femur    History:     Past Medical History:   Diagnosis Date    A-fib     Chronic cystitis     GERD (gastroesophageal reflux disease)     Graves disease     Hypertension     Hypothyroidism, unspecified     Spinal stenosis        Social History     Socioeconomic History    Marital status:    Tobacco Use    Smoking status: Former     Types: Cigarettes    Smokeless tobacco: Never   Substance and Sexual Activity    Alcohol use: Yes    Drug use: Never    Sexual activity: Not Currently     Social Determinants of Health     Financial Resource Strain: Low Risk  (7/4/2024)    Overall Financial Resource Strain (CARDIA)     Difficulty of Paying Living Expenses: Not hard at all   Food Insecurity: No Food Insecurity (7/4/2024)    Hunger Vital Sign     Worried About Running Out of Food in the Last Year: Never true     Ran Out of Food in the Last Year: Never true   Transportation Needs: No Transportation Needs (7/9/2024)    PRAPARE - Transportation     Lack of Transportation (Medical): No     Lack of Transportation (Non-Medical): No   Stress: No Stress Concern Present (7/4/2024)    [employer] - Occupational Stress Questionnaire     Feeling of Stress : Not at all   Housing Stability: Low Risk  (7/4/2024)    Housing Stability Vital Sign     Unable to Pay for Housing in the Last Year: No     Homeless in the Last Year: No       Precautions:     Allergies as of 07/09/2024 - Reviewed 07/09/2024   Allergen Reaction Noted    Cefadroxil  09/08/2022    Cefuroxime axetil  09/08/2022    Cephalexin  09/08/2022    Ciprofloxacin  09/08/2022    Enoxaparin  09/08/2022    Methenamine hippurate  09/08/2022    Promethazine  09/08/2022       WOC Assessment Details/Treatment      07/09/24 1513        Wound 07/03/24  1056 Incision Left lateral Thigh   Date First Assessed/Time First Assessed: 07/03/24 1056   Primary Wound Type: Incision  Side: Left  Orientation: lateral  Location: Thigh   Dressing Appearance Moist drainage   Drainage Amount Moderate   Drainage Characteristics/Odor Serous   Appearance Gail intact   Wound Edges Approximated   Care Staples   Dressing Change Due 07/10/24        Wound 07/02/24 2230 Skin Tear Left anterior;proximal;upper Arm   Date First Assessed/Time First Assessed: 07/02/24 2230   Present on Original Admission: Yes  Primary Wound Type: Skin Tear  Side: Left  Orientation: anterior;proximal;upper  Location: Arm   Dressing Appearance Moist drainage   Drainage Amount Small   Drainage Characteristics/Odor Serosanguineous   Tissue loss description Full thickness   Wound Length (cm) 1.5 cm   Wound Width (cm) 1 cm   Wound Surface Area (cm^2) 1.5 cm^2   Care Cleansed with:;Sterile normal saline   Dressing Non-adherent   Dressing Change Due 07/12/24        Wound 07/05/24 0840 Pressure Injury Sacral spine   Date First Assessed/Time First Assessed: 07/05/24 0840   Present on Original Admission: Yes  Primary Wound Type: Pressure Injury  Location: Sacral spine   Pressure Injury Stage DTPI   Appearance Maroon   Wound Length (cm) 1 cm   Wound Width (cm) 1 cm   Wound Surface Area (cm^2) 1 cm^2   Care Applied:   Dressing Foam   Dressing Change Due 07/12/24        Wound 07/09/24 1546 Intertrigo Right Groin   Date First Assessed/Time First Assessed: 07/09/24 1546   Present on Original Admission: Yes  Primary Wound Type: Intertrigo  Side: Right  Location: Groin   Appearance Pink   Wound Length (cm) 0.1 cm   Wound Width (cm) 4 cm   Wound Surface Area (cm^2) 0.4 cm^2   Care Applied:;Skin Barrier        Wound 07/09/24 1553 Pressure Injury Left Heel   Date First Assessed/Time First Assessed: 07/09/24 1553   Present on Original Admission: Yes  Primary Wound Type: Pressure Injury  Side: Left  Location: Heel  Wound  Approximate Age at First Assessment (Weeks): 1 weeks   Pressure Injury Stage DTPI   Appearance Maroon   Wound Length (cm) 3 cm   Wound Width (cm) 4 cm   Wound Surface Area (cm^2) 12 cm^2   Care Applied:   Dressing   (heel boots)   Safety   Safety Precautions emergency equipment at bedside   Safety Promotion   Safety Promotion/Fall Prevention assistive device/personal item within reach   Safety Management   Patient Rounds bed in low position   Safety Bands on Patient Fall Risk Band   Positioning   Body Position 30 degrees     See photos and flow sheet for description of wounds.  Sacral wound is healed at present.  Bilateral buttocks:SDTI:  Each buttock has a 1 x 1 maroon area.  Left heel has SDTI.  See new orders placed.   STRICT TURNING AND OFFLOADING OF HEELS.  Heel boots applied and wedge placed in patient's bed.

## 2024-07-09 NOTE — PHYSICIAN QUERY
Question: Please clarify the integumentary diagnosis related to the documentation outlined in the query message.    Provider Query Response:  Pressure Injury/Decubitus Ulcer, Stage 1

## 2024-07-09 NOTE — PLAN OF CARE
SSC spoke with Hailey @ [facility], who states they can accept pt today. Pt will transport via [employer] @ 12:30pm. Information given to Hailey for report.

## 2024-07-09 NOTE — ACP (ADVANCE CARE PLANNING)
Advance Directives:   Living Will: Yes        Copy on chart: No        Oral Declaration: Yes  LaPOST: No    Do Not Resuscitate Status: No    Medical Power of : Yes        Oral Declaration: Yes  Agent's Name:  Alyssa Jaquez (son and spouse)    Decision Making:  Patient answered questions  Goals of Care: The patient endorses that what is most important right now is to focus on extending life as long as possible, even it it means sacrificing quality    Accordingly, we have decided that the best plan to meet the patient's goals includes continuing with treatment    Witnesses present:  Patient and provider  ACP 2/2 new patient   ACP discussed voluntarily    Total time spent: 16 minutes

## 2024-07-09 NOTE — PROGRESS NOTES
[facility]  Orthopedics  Progress Note    Patient Name: Homa Jaquez  MRN: [id number]  Admission Date: 7/2/2024  Hospital Length of Stay: 7 days  Attending Provider: Franklyn Brito MD  Primary Care Provider: Franklyn Brito MD  Follow-up For: Procedure(s) (LRB):  INSERTION, INTRAMEDULLARY MELANIE, FEMUR (Left)    Post-Operative Day: 6 Day Post-Op  Subjective:     Principal Problem:Closed comminuted intertrochanteric fracture of left femur    Principal Orthopedic Problem: 6 Days Post-Op   Intramedullary nail of the left intertrochanteric femur fracture    Interval History:  She is resting comfortably this morning.  Family at bedside. Eager to transfer to rehab. Working well with physical therapy. No numbness or tingling distally. Incisions are clean and dry.    Review of patient's allergies indicates:   Allergen Reactions    Cefadroxil      Other reaction(s): Nausea or vomiting    Cefuroxime axetil     Cephalexin      Other reaction(s): NAUSEA AND VOMITING    Ciprofloxacin     Enoxaparin     Methenamine hippurate      Other reaction(s): Unknown    Promethazine        Current Facility-Administered Medications   Medication    0.9%  NaCl infusion (for blood administration)    acetaminophen tablet 1,000 mg    atorvastatin tablet 20 mg    carvediloL tablet 12.5 mg    HYDROmorphone (PF) injection 0.5 mg    HYDROmorphone (PF) injection 1 mg    levothyroxine tablet 125 mcg    melatonin tablet 6 mg    methocarbamoL tablet 500 mg    morphine injection 4 mg    ondansetron injection 4 mg    oxyCODONE-acetaminophen  mg per tablet 1 tablet    oxyCODONE-acetaminophen 5-325 mg per tablet 1 tablet    polyethylene glycol packet 17 g    sacubitriL-valsartan 24-26 mg per tablet 1 tablet    sodium chloride 0.9% flush 10 mL    warfarin (COUMADIN) tablet 2 mg     Objective:     Vital Signs (Most Recent):  Temp: 97.5 °F (36.4 °C) (07/09/24 1133)  Pulse: 78 (07/09/24 1133)  Resp: 19 (07/09/24  "1133)  BP: (!) 157/76 (07/09/24 1133)  SpO2: 98 % (07/09/24 1133) Vital Signs (24h Range):  Temp:  [97.5 °F (36.4 °C)-98.8 °F (37.1 °C)] 97.5 °F (36.4 °C)  Pulse:  [78-91] 78  Resp:  [16-20] 19  SpO2:  [93 %-98 %] 98 %  BP: (136-163)/(70-85) 157/76     Weight: 70.3 kg (155 lb)  Height: 5' 2.01" (157.5 cm)  Body mass index is 28.34 kg/m².      Intake/Output Summary (Last 24 hours) at 7/9/2024 1215  Last data filed at 7/9/2024 1039  Gross per 24 hour   Intake 715 ml   Output 1700 ml   Net -985 ml       Physical Exam:   Constitutional: Vital signs are examined and stable.  Resp: No signs of labored breathing               LLE: -Skin: Dressing CDI changed today, no drainage, incisions are clean and dry., No signs of new abrasions or lacerations, no scars           -MSK: Hip and Knee F/E, EHL/FHL, Gastroc/Tib anterior Strength 5/5; tolerates gentle passive range of motion of the hip and knee  No effusion palpable.           -Neuro:  Sensation intact to light touch L3-S1 dermatomes           -Lymphatic: No signs of lymphadenopathy           -CV: Capillary refill is less than 2 seconds. Compartments soft and compressible          Diagnostic Findings:   Significant Labs:   Recent Lab Results  (Last 5 results in the past 72 hours)        07/09/24  0725   07/09/24  0506   07/08/24  0809   07/08/24  0434   07/07/24  1009        Unit Blood Type Code     6200                6200           Unit Expiration     [id number]                [id number]           Albumin/Globulin Ratio   0.9     0.8         Albumin   2.5     2.2         ALP   111     87         ALT   13     10         Anion Gap   9.0     4.0         AST   24     20         Baso # 0.04       0.05   0.05       Basophil % 0.4       0.8   0.7       BILIRUBIN TOTAL   1.9     0.9         BUN   29.3     36.2         BUN/CREAT RATIO   23     28         Calcium   8.3     7.8         Chloride   113     117         CO2   16     17         Creatinine   1.26     1.29         " Crossmatch Interpretation     Compatible                Compatible           DISPENSE STATUS     Transfused                Transfused           eGFR   41     40         Eos # 0.38       0.41   0.40       Eos % 4.2       6.6   5.5       Globulin, Total   2.8     2.8         Glucose   116     122         Group & Rh     A POS           Hematocrit 34.5       24.1   23.8       Hemoglobin 11.7       7.3   7.8       Immature Grans (Abs) 0.07       0.04   0.03       Immature Granulocytes 0.8       0.6   0.4       Indirect Debra GEL     NEG           INR   1.7   1.6     1.6       Lymph # 1.46       1.61   0.95       LYMPH % 16.0       25.8   13.2       MCH 32.0       33.0   32.9       MCHC 33.9       30.3   32.8       MCV 94.3       109.0   100.4       Mono # 1.12       0.67   0.62       Mono % 12.3       10.8   8.6       MPV 9.9       10.3   10.4       Neut # 6.06       3.45   5.16       Neut % 66.3       55.4   71.6       nRBC 0.0       0.3   0.0       Platelet Count 196       156   149       Potassium   4.9     4.8         Product Code     [id number]                [id number]           PROTEIN TOTAL   5.3     5.0         PT   19.3   19.2     18.4       RBC 3.66       2.21   2.37       RDW 17.2       14.8   14.6       Sodium   138     138         Specimen Outdate     07/11/2024 23:59           Unit ABO/Rh     A POS                A POS           UNIT NUMBER     [id number]                [id number]           WBC 9.13       6.23   7.21                               Significant Imaging: I have reviewed and interpreted all pertinent imaging results/findings.     Assessment/Plan:     Active Diagnoses:    Diagnosis Date Noted POA    PRINCIPAL PROBLEM:  Closed comminuted intertrochanteric fracture of left femur [S72.142A] 07/03/2024 Yes    BARRY (acute kidney injury) [N17.9] 07/05/2024 Yes    Fall [W19.XXXA] 07/03/2024 Yes    Acute on chronic systolic heart failure [I50.23] 03/14/2023 Yes    Atrial fibrillation [I48.91]  03/14/2023 Yes    Primary hypertension [I10] 03/14/2023 Yes      Problems Resolved During this Admission:   88-year-old female postop day 2 intramedullary nail left IT femur fracture    She is anticoagulated on Coumadin.  H and H is stable.ABLA improved.  We will continue to monitor.  Daily dry dressing changes; may leave open to air if no drainage.   Work with therapy-weight-bearing as tolerated range of motion as tolerated. Likely DC to rehab today.   Continue multimodal pain control.    Continue Coumadin for DVT prophylaxis as she is previously scheduled.  Will follow up at rehab at [facility] in the coming weeks  -follow up with Dr. Pierce/Mitch Davis in 3 weeks for wound check and repeat x rays. Please call with questions or concerns.      The above findings, diagnostics, and treatment plan were discussed with Dr. Pierce who is in agreement with the plan of care except as stated in additional documentation.      Alice Banegas  Orthopedic Trauma Surgery  [facility]

## 2024-07-09 NOTE — NURSING
Report given to Nurse at [facility]. All questions answered, verbalized understanding. IV left in per facility request. Vitals stable. Patient left via [facility] wheelchair van.

## 2024-07-09 NOTE — DISCHARGE SUMMARY
The patient was admitted on July 2nd and discharged on July 9, 2024.      The patient is an 88-year-old woman who tripped at home and fell resulting in a hip fracture.  She was no medical issues prior to this event.  She was numerous problems but they were all stable.  Physical exam at time of admission was essentially unremarkable.  For further details of her presenting signs and symptoms I refer the reader to the admit note done by my associate Dr. Araujo.    Laboratory studies included a normal H&H at 15 and 48.  White count 5500 and platelet count 165.  INR was 2.5, electrolytes were notable for a low bicarb at 19 but otherwise unremarkable.  BUN creatinine were okay at 25 and 1.45.  These later bumped up to as high as 52 and 2.70.  Blood sugar 147 and later bumped up to 192 and then came down.  Azotemia also improved by time of discharge it was 29 and 1.26 for the BUN and creatinine respectively.  The patient was did develop a significant anemia with a hemoglobin dropping down to 7.3 and hematocrit down to 23.3.  He was eventually transfused in the day prior to discharge with a repeat CBC currently pending.    X-ray studies included a CT of the head which showed chronic age-related changes, x-ray of the knee left so no acute findings.  There was an intramedullary azael in the femur.  Chest x-ray showed no acute findings.  There was a question of mild asymmetry right 1st rib costochondral junction with suggestion to do a follow-up two-view chest x-ray later after acute illness to exclude underlying nodule.  Mild interstitial edema was noted.  X-ray hip showed left intertrochanteric fracture    She underwent ORIF of the fractured hip by Dr. Steve perry on the 2nd hospital day.  She tolerated this well except for the development of significant anemia and also Coumadin toxicity.  Her INR jumped up to 12.    Otherwise her hospital course was unremarkable.  Her blood pressure was relatively low in her Entresto had to be  held.  Her beta-blocker was continued.  She did feel much better after being transfused and currently feels fine with plans to transfer her to the rehab unit later today if her pending lab work is acceptable.  She did have some redness on the sacral area noted at time of admission.  The skin nurse rated it a stage I skin irritation.  It was treated with foam protectant.    The patient was currently stable and will be transferred to the rehab unit.    Discharge diagnosis 1. Acute left-sided hip fracture.  Secondary to simple fall     2. Status post ORIF left hip fracture by Dr. Pierec    3. Significant postop anemia.  Status post transfusion 2 units packed cells     4. Transient Coumadin toxicity.  Resolved     5. Dilated cardiomyopathy.  She has been doing very well on Entresto at home.  This was held because of hypotension during her hospital stay.  Now that she was receive blood her blood pressure is going up in the Entresto will be resumed at a reduced dose     6. History of chronic atrial fib with pacemaker defibrillator.  On chronic anticoagulation.  Slightly subtherapeutic now with an INR of 1.7 but that is coming up and I do believe she did receive some vitamin K a few days ago.      7. Corrected hypothyroidism     8. Mild diabetes mellitus.  Diet control     9. History of pernicious anemia on B12 monthly     Discharge medications to be:  Allopurinol 100 daily, B12 1 mL IM monthly, Norco 5 1 hour before physical therapy, Zofran 8 mg tablets p.r.n. nausea, Protonix 40 daily, Senokot daily p.r.n. constipation, tramadol 50 q.i.d. p.r.n. pain, Tylenol extra-strength 1 g t.i.d., Coreg 12.5 b.i.d., Entresto 04/20/2026 p.o. b.i.d., Synthroid 125 mcg daily, Mevacor 20 mg daily, Coumadin 2 mg daily, Robaxin 500 t.i.d. for 10 days, and p.r.n. Zofran IV 4 mg q.8h p.r.n. and MiraLax 17 g b.i.d. p.r.n. constipation.    Follow-up with me in 2-3 weeks when she gets out of rehab.

## 2024-07-09 NOTE — PLAN OF CARE
Homa Jaquez age: 88   *WBAT LLE      Dx: Fall with closed left intertrochanteric femur fracture with severe comminution s/p IM nailing 7/3/2024. Pt. Has a past medical history of a.fib, GERD, chronic cystitis, multiple UTIs, Graves' disease, HTN, spinal stenosis, and hypothyroidism. *See chart for full and complete medical history*        Hx mental health/substance abuse: Denies mental health or substance abuse history     Insurance:  Medicare Part A&B/BCBS Medicare Supplement      Is there evidence of an acute change in mental status from the patients baseline? No     Education//Work Hx: Pt. Completed college. She has no  history. Pt. Is a retired nurse (RN).     Pt. Is  to Levi Leonid ([phone]) Levi is retired. Pt. And spouse live in an apartment attached to the sons home. The patient will have family help once discharged from rehab.      Children: (5) /Friends/Family: 1) Av Jaquez son ([phone]) 2) Levi Jaquez, spouse ([phone])      Power of  (yes-Av and Levi Gomez)/Living Will (yes)     Prior Level of Fx: Independent with ADLs. Pt. Does not drive. She has a  for cleaning. The patients son cooks, does laundry, grocery shops, and provides transportation to appointments. She uses a Rollator for mobility. She required assistance with medication and financial management from her son and . They hire someone for lawn management. Av states that pt. Has a key fob that she keeps with her. It is attached to the alarm system and will call 911 if she presses it.      Residence: Pt. Lives with her spouse in [place] in an apartment attached to the sons home with a ramp to enter with bilateral railings to enter the residence. Pt. Has a walk-in shower with a removable bench, HHS, and grab bars. She does not have an elevated toilet with bilateral grab bars.      DME: Rollator, RW, shower bench, wheelchair, grab bars, and HHS.  Will use  [employer] for DME.     HH/OP TX: Pt. Has a history of HH with [facility]. Will use [facility] for [facility].     FOC obtained for [facility] for DME and [facility] for HH.     Pharmacy: [facility] (institutional pharmacy of [place]) in [place]   / (has prescription drug coverage)       MD(s): PCP: Dr. Franklyn Brito ([phone]) Follow up in 2 weeks. Cardiologist: Dr. Mukherjee

## 2024-07-09 NOTE — PHYSICIAN QUERY
Question: Please clarify the integumentary diagnosis related to the documentation outlined in the query message.    Provider Query Response:

## 2024-07-09 NOTE — PHYSICIAN QUERY
Please clarify the conflicting documentation.      Acute on Chronic Diastolic Heart Failure (HFpEF)

## 2024-07-09 NOTE — H&P
[facility] ([facility])  Rehab Admission H&P    Patient Name: Homa Jaquez  MRN: [id number]  Age: 88 y.o. Sex: female  : 1935  Hospital Length of Stay: 0 days  Date of Service: 2024   Chief Complaint:  Left intertrochanteric femur fracture with severe comminution 2/2 fall s/p IM nailing on 2024    Subjective:   History of Present Illness   88-year-old female presented to [facility] on  with complaints of left hip pain after falling at home.  PMH significant for atrial fibrillation, chronic cystitis, GERD, Graves disease, hypertension, hypothyroidism, spinal stenosis.  Workup significant for left intertrochanteric femur fracture.  On  tolerated IM nailing without perioperative complications.  Restarted Coumadin on  without complications.  Initiated IV fluid resuscitation 2/2 BARRY on .  Initiated 1 dose of vitamin K 2/2 INR 12.1.  Supratherapeutic INR resolved. Tolerated transfer to [facility] inpatient rehab unit on  without incident.    Tolerated transfer.  Sitting in chair comfortably.  Reports good sleep and appetite.  Last BM .  Vital signs at goal with no recent recorded fevers.  Most recent labs and imaging reviewed and documented below.    Past Medical History: Left intertrochanteric femur fracture with severe comminution 2/2 fall, atrial fibrillation, chronic cystitis, GERD, Graves disease, hypertension, hypothyroidism, spinal stenosis  Procedure history:  IM nailing of left femur fracture on , Appendectomy, bladder suspension surgery, cardioversion, cataract extraction, hysterectomy, cholecystectomy, sphincterotomy of urethra, tonsillectomy, adenoidectomy  Family History:  Father:  at unknown age +cancer + mi +CVA  Social History:  Retired nurse.   to Levi.   is retired.  Patient and spouse live in an apartment attached to son's home.  Has a key fob that is attached to an alarm system and will call 911 if  pressed.  (-) TOB.  Former smoker    (-) ETOH.   (-) Illicit drug use.   Prior level of function: Independent in ADLs.  Does not drive.  Has a  for cleaning.  Son cooks, does laundry, grocery shops, provides transportation to appointments.  Uses a Rollator for mobility.  Required assistance with medication and financial management from son and .  Hired someone for lawn management.  Residence:  Lives with spouse and [place] in an apartment attached to son's home with a ramp to enter with bilateral railing to enter the residence.  Has a walk-in shower with a removable bench, HHS, grab bars.  Does not have an elevated toilet with bilateral grab bars.  DME:  Rollator, rolling walker, wheelchair, shower bench, grab bars, HHS  Anticipated discharge destination:  Home with home health    Review of patient's allergies indicates:   Allergen Reactions    Cefadroxil      Other reaction(s): Nausea or vomiting    Cefuroxime axetil     Cephalexin      Other reaction(s): NAUSEA AND VOMITING    Ciprofloxacin     Enoxaparin     Methenamine hippurate      Other reaction(s): Unknown    Promethazine         Current Facility-Administered Medications:     acetaminophen tablet 1,000 mg, 1,000 mg, Oral, BID, Calli Salazar, TAIP    acetaminophen tablet 500 mg, 500 mg, Oral, BID PRN, Brandy Paredes FNP    [START ON 7/10/2024] atorvastatin tablet 20 mg, 20 mg, Oral, Daily, Calli Salazar, DEIDRA    benzonatate capsule 100 mg, 100 mg, Oral, TID PRN, Calli Salazar FNP    carvediloL tablet 12.5 mg, 12.5 mg, Oral, BID, Calli Salazar, TAIP    docusate sodium capsule 100 mg, 100 mg, Oral, BID, Calli Salazar, TAIP    hydrALAZINE injection 10 mg, 10 mg, Intravenous, Q4H PRN, Calli Salazar FNP    hydrOXYzine pamoate capsule 50 mg, 50 mg, Oral, Nightly PRN, Calli Salazar, DEIDRA    labetalol 20 mg/4 mL (5 mg/mL) IV syring, 10 mg, Intravenous, Q4H PRN, Calli Salazar FNP    [START ON 7/10/2024]  "levothyroxine tablet 125 mcg, 125 mcg, Oral, Before breakfast, Calli Salazar FNP    melatonin tablet 6 mg, 6 mg, Oral, Nightly PRN, Calli Salazar FNP    methocarbamoL tablet 500 mg, 500 mg, Oral, TID, Calli Salazar, TAIP    metoprolol injection 10 mg, 10 mg, Intravenous, Q2H PRN, Calli Salazar, TAIP    nitroGLYCERIN SL tablet 0.4 mg, 0.4 mg, Sublingual, Q5 Min PRN, Calli Salazar, DEIDRA    ondansetron disintegrating tablet 4 mg, 4 mg, Oral, Q6H PRN, Calli Salazar, DEIDRA    ondansetron disintegrating tablet 8 mg, 8 mg, Oral, Q8H PRN, Calli Salazar, DEIDRA    ondansetron injection 4 mg, 4 mg, Intravenous, Q8H PRN, Calli Salazar, DEIDRA    oxyCODONE immediate release tablet 5 mg, 5 mg, Oral, Q6H PRN, Calli Salazar, TAIP    polyethylene glycol packet 17 g, 17 g, Oral, BID, Calli Salazar, DEIDRA    sacubitriL-valsartan 24-26 mg per tablet 1 tablet, 1 tablet, Oral, BID, Calli Salazar FNP    traMADoL tablet 50 mg, 50 mg, Oral, Q6H PRN, Calli Salazar, TAIP    warfarin (COUMADIN) tablet 2 mg, 2 mg, Oral, Daily, Calli Salazar, TAIP     Review of Systems   Complete 12-point review of symptoms negative except for what's mentioned in HPI     Objective:     BP (!) 162/75   Pulse 81   Temp 97.4 °F (36.3 °C) (Oral)   Resp 18   Ht 5' 4" (1.626 m)   Wt 79.3 kg (174 lb 13.2 oz)   SpO2 99%   BMI 30.01 kg/m²     Physical Exam  Vitals reviewed.   Eyes:      Pupils: Pupils are equal, round, and reactive to light.   Cardiovascular:      Rate and Rhythm: Normal rate and regular rhythm.      Heart sounds: Normal heart sounds.   Pulmonary:      Effort: Pulmonary effort is normal.      Breath sounds: Normal breath sounds.   Abdominal:      General: Bowel sounds are normal.   Musculoskeletal:         General: Normal range of motion.   Skin:     General: Skin is warm.      Comments: Left femur incision dry and intact   Neurological:      General: No focal deficit present.      Mental Status: She is " alert and oriented to person, place, and time.      Motor: Weakness present.   Psychiatric:         Mood and Affect: Mood normal.     *MD performed and documented physical examination       Lines/Drains/Airways       Peripheral Intravenous Line  Duration                  Peripheral IV - Single Lumen 07/03/24 0851 20 G Distal;Left;Lateral Forearm 6 days                    Labs  Recent Results (from the past 24 hour(s))   Comprehensive Metabolic Panel    Collection Time: 07/09/24  5:06 AM   Result Value Ref Range    Sodium 138 136 - 145 mmol/L    Potassium 4.9 3.5 - 5.1 mmol/L    Chloride 113 (H) 98 - 107 mmol/L    CO2 16 (L) 23 - 31 mmol/L    Glucose 116 (H) 82 - 115 mg/dL    Blood Urea Nitrogen 29.3 (H) 9.8 - 20.1 mg/dL    Creatinine 1.26 (H) 0.55 - 1.02 mg/dL    Calcium 8.3 (L) 8.4 - 10.2 mg/dL    Protein Total 5.3 (L) 5.8 - 7.6 gm/dL    Albumin 2.5 (L) 3.4 - 4.8 g/dL    Globulin 2.8 2.4 - 3.5 gm/dL    Albumin/Globulin Ratio 0.9 (L) 1.1 - 2.0 ratio    Bilirubin Total 1.9 (H) <=1.5 mg/dL     40 - 150 unit/L    ALT 13 0 - 55 unit/L    AST 24 5 - 34 unit/L    eGFR 41 mL/min/1.73/m2    Anion Gap 9.0 mEq/L    BUN/Creatinine Ratio 23    Protime-INR    Collection Time: 07/09/24  5:06 AM   Result Value Ref Range    PT 19.3 (H) 12.5 - 14.5 seconds    INR 1.7 (H) <=1.3   CBC with Differential    Collection Time: 07/09/24  7:25 AM   Result Value Ref Range    WBC 9.13 4.50 - 11.50 x10(3)/mcL    RBC 3.66 (L) 4.20 - 5.40 x10(6)/mcL    Hgb 11.7 (L) 12.0 - 16.0 g/dL    Hct 34.5 (L) 37.0 - 47.0 %    MCV 94.3 (H) 80.0 - 94.0 fL    MCH 32.0 (H) 27.0 - 31.0 pg    MCHC 33.9 33.0 - 36.0 g/dL    RDW 17.2 (H) 11.5 - 17.0 %    Platelet 196 130 - 400 x10(3)/mcL    MPV 9.9 7.4 - 10.4 fL    Neut % 66.3 %    Lymph % 16.0 %    Mono % 12.3 %    Eos % 4.2 %    Basophil % 0.4 %    Lymph # 1.46 0.6 - 4.6 x10(3)/mcL    Neut # 6.06 2.1 - 9.2 x10(3)/mcL    Mono # 1.12 0.1 - 1.3 x10(3)/mcL    Eos # 0.38 0 - 0.9 x10(3)/mcL    Baso # 0.04 <=0.2  x10(3)/mcL    IG# 0.07 (H) 0 - 0.04 x10(3)/mcL    IG% 0.8 %    NRBC% 0.0 %     Radiology  Left femur x-ray 7/3/24 Impression: Screw and intramedullary azael identified fixating a fracture of the proximal femur some displaced fragments are identified position and alignment is otherwise close to anatomical hardware appears to be intact   Assessment/Plan:     88 y.o. WF admitted on 7/9/2024    Left intertrochanteric femur fracture with severe comminution 2/2 fall   - s/p IM nailing on 07/03/2024  - WBAT LLE  - continue     Robaxin 500 mg t.i.d.      Tylenol 1000 mg b.i.d.  - follow-up with orthopedic surgery on 7/25 for wound check and repeat x-rays    Dilated cardiomyopathy  - no PCI or CABG  - denies recent chest pain or discomfort  - continue     Entresto 24-26 mg b.i.d.      Atorvastatin 20 mg daily      Coreg 12.5 mg b.i.d.    - ECG and Nitro PRN  - continue cardiac diet  - to follow-up with cardiology outpatient    Atrial fibrillation  - s/p pacemaker/defibrillator  - controlled ventricular rate without associated bleeding  - continue   Coumadin 2 mg daily    Anemia  - asymptomatic  - s/p transfusion   x2 units PRBC on 7/8/2024  - H/H stable   - no evidence of active bleeds  - will closely monitor and transfuse if needed     HTN  - blood pressures at goal  - continue     Entresto 24-26 mg b.i.d.      Coreg 12.5 mg b.i.d.    Hydralazine 10 mg every 2 hours as needed for BP > 160/90   Labetalol 10 mg every 2 hours as needed for BP > 160/90  - low sodium diet    HLD  - FLP outpatient  - continue   Atorvastatin 20 mg daily    Gout  - stable  - continue     Allopurinol 100 mg daily    DM type II  - HgA1c with next labs  - diet-controlled  - continue   ISS   - CBGs AC/HS    Constipation  - stable  - continue     Colace 100 mg b.i.d.     MiraLax 17 g b.i.d.    Hypothyroidism  - TSH in a.m.  - continue     Levothyroxine 125 mcg before breakfas    MEDICAL PLAN:  - Admit to [facility]  Rehabilitation Unit  - Medical reconciliation completed and included in the electronic health record  - Draw admit labs including CBC, CMP, and Prealbumin  - Maintain weightbearing status as ordered  - Monitor postoperative surgical incision changing dressing daily  - Teach skin protection and wound prevention techniques  - Initiate fall precaution  - Initiate bowel training program  - Initiate bladder training as indicated  - Pain management  - IS q2 hours while awake    THERAPEUTIC PLAN:  - Physical therapy 60-90 minutes 5 to week to increase functional mobility, maintain weightbearing precautions, increase lower extremity restrengthening, increase overall activity tolerance, teach balance and safety measures as well as fall precautions, make equipment and orthotic recommendations, be involved in family training and discharge planning, monitor pain levels and vital signs during therapy adjusting treatment accordingly  - Occupational therapy 60-90 minutes 5 times a week to increase functional independence with activities of daily living, maintain weightbearing precautions, teach use of adaptive equipment, increase upper extremity restrengthening, increase overall activity tolerance, teach balance and safety measures as well as fall precautions, make equipment and orthotic recommendations, be involved in family training and discharge planning, monitor pain levels and vital signs during therapy adjusting treatment accordingly  - Speech and language pathology will do an in-depth evaluation of patient's cognition, phonation, and swallowing. They will initiate therapy 30- 60 minutes 5 times a week as indicated  - Recreational therapy will incorporate all patient's functional abilities  into recreational activities that will require visual, spatial, and perceptual abilities; gross and fine motor coordination skills; the cognition to follow multistep commands; dynamic and static balance and reaching abilities. They will  also incorporate animal assisted therapy into their weekly program  - Rehabilitation nursing will act as patient family physician liaison. They will integrate patient's functional abilities, after discussions with therapist, into everyday activities with the CNA's,  LPN's and RNs that will include but are not limited to dressing, bathing, feeding, grooming, toileting, transfers, hygiene etc. They will administer medications watching for wanted as well as unwanted effects. They will initiate DVT/VTE prophylaxis as ordered. Will monitor vital signs, lab values, and pain levels, making appropriate physician notification. They will monitor skin integrity including postop incision, making daily dressing changes. They will teach skin protection and wound prevention techniques. They will initiate bowel training They will initiate bladder training as indicated. They will initiate fall precautions  - Rehabilitation counseling/case management will act as patient and family liaison. They will set up family conferences, family training, aid in discharge planning, and aid in the procurement of assistive devices  - Patient will have 24 hour availability to physiatric care  - Patient will have nutritional services involved, monitoring oral input and visceral protein stores. They will make dietary and supplement recommendations and follow-up as necessary  - Patient will have weekly interdisciplinary team conferences  - Patient will have initial family conference and follow up conferences as indicated  - Patient will have family training prior to discharge     Estimated length of stay - 14-17 days  Anticipated discharge destination - Home with   Medical status - stabilizing postoperatively; will need to monitor pain levels, postoperative skin integrity, watch for evidence of deep vein thrombosis, monitor postoperative H&H, monitor vital signs closely.  Medical prognosis - Good for post-op healing and functional  improvement  Previous functional Status:  Independent in ADLs.  Does not drive.  Has a  for cleaning.  Son cooks, does laundry, grocery shops, provides transportation to appointments.  Uses a Rollator for mobility.  Required assistance with medication and financial management from son and .  Hired someone for lawn management.  Present Functional Status:  Min to mod assist    VTE Prophylaxis:  Coumadin 2 mg daily    POA: yes- Av and Levi  Living will:  Yes  Contacts:  Av Jaquez (son) [phone]     Levi Jaquez (spouse) [phone]    CODE STATUS: Full  Internal Medicine (attending): Ttao Holley MD  Physiatry (consulting):  Alfred Soto MD    OUTPATIENT PROVIDERS  PCP: Franklyn Brito MD  Cardiology: Yefri Mukherjee MD   Orthopedic surgery: Steve Pierce D.O.    DISPOSITION: Condition stable. Tolerated transfer.  Recent labs and imaging reviewed.  Rehab admission orders initiated.  Med reconciliation completed.  Consult physiatry for rehab and pain management.  PT/OT/RT/ST to eval and treat.  Bowel management, sleep hygiene, and appetite at goal.    PHYSICIAN ATTESTATION: I have been involved in the patient's rehabilitation prescreening process and I have now completed the post admission physician evaluation. Patient is in need of, appropriate for, and should tolerate the above outlined inpatient rehabilitation plan. I believe this is necessary to reach maximal postoperative healing and maximal functional abilities. I do not believe this would be possible in a timely fashion in a less intensive setting      Calli Salazar NP conducted independent physical examination and assisted with medical documentation.    Total time spent on this encounter including chart review and direct MD + NP 1-on-1 patient interaction: 98 minutes   Over 50% of this time was spent in counseling and coordination of care

## 2024-07-09 NOTE — CONSULTS
[facility] - Rehab Inpatient Services  Inpatient Rehab Prescreen    PATIENT INFORMATION     Assessment date/time: 7/9/24 0903    Name: Homa Jaquez Phone: [phone]   Address:   [address] [place] [id number] SSN:    YOB: 1935 Age: 88 y.o. Gender: female   Race: White   Marital Status:    Advance Directives: Full code     COVERAGE INFORMATION     Patient Medicare #:  [id number]     Primary Insurance Type: MEDICARE/MEDICARE PART A & B  effective 10/1/01 /  Secondary Insurance Type: BLUE CROSS BLUE SHIELD/MEDICARE SUPPLEMENT [place] OF [place]  /    Policy #:   Policy #:  [id number]    Insurance contact name/number:  Insurance contact name/number:    Authorization #:  Authorization #:    Verified Coverage: Insurance Carrier   Pending Coverage:    Prescription Coverage:  Insurance details/comments:      PHYSICIAN/REFERRAL INFORMATION     Primary Care Physician: Franklyn Brito MD Attending Physician: Tato Holley MD   Consulting physician/specialist:  Referring Physician:    Referring facility:  Referring contact name/phone:    Physician details/comments:      CONTACT INFORMATION   Extended Emergency Contact Information  Primary Emergency Contact: Av Jaquez  Mobile Phone: [phone]  Relation: Son  Preferred language: English   needed? No  Secondary Emergency Contact: Levi Jaquez  Mobile Phone: [phone]  Relation: Spouse  Preferred language: English   needed? No    PRIOR LIVING SITUATION    Patient lives with  in an apartment attached to son's home with a ramp to enter with bilateral railing     Bedroom location/setup: single story    Bathroom location/setup: walk-in shower with bench and railings, and a normal height commode with bilateral handrails   Equipment at home: rollator walker, rolling walker, and wheelchair    Prior device use:  rollator walker     PRIOR LEVEL OF FUNCTION     Did a helper need to  assist with the following activities prior to the current illness, exacerbation, or injury?   Self-care:  No, independent    Indoor mobility:  No, modified independent    Stairs: unknown    Functional Cognition: Yes, patient did require supervision for finances and medication management    Comments: Patient was modified independent for mobility with use of rollator walker, and independent for ADLs. Patient did require supervision/setup for finances and medication management    Caregivers providing assistance: Av Jaquez- son- [phone], Levi Jaquez- - [phone]   Pre-hospital home care service: none  Name of Agency: Did have [facility] in the past, but not current   Home/personal responsibilities: personal ADLs. Patient does not drive, has a  for cleaning, and son cooks.    Pre-hospital vocational category: Retired for age   Pre-hospital vocational effort: Retired for age   Occupation/profession:  Return to work/school plan:    Educational history:    Hobbies/leisure activities:  Resources used prior to admission:    Available resources:  Resource information comments:      REHABILITATION DIAGNOSIS     History of present illness: Patient is an 88 year old female with a hx of a-fib, GERD, chronic cystitis, multiple UTIs, Graves disease, HTN, spinal stenosis, and hypothyroidism who presented to the ED at [facility] on 7/2/24 following a fall PTA. Patient reported she lost control of her walker and fell away from it. Patient reported she fell on her left side and reported left hip pain. She reported her left hip has always been the weakest. Patient reported on coumadin. Her PCP is Dr. Brito. Her cardiologist is Dr. Mukherjee. Patient's caregiver reported that patient cannot tolerate Lovenox or Eliquis, and is allergic to phenergan. BP on arrival was elevated at 160/86. Chest XR showed prominence of the interstitium may be related to interstitial edema in the setting of enlarged cardiac  silhouette. Hip XR showed left femur intertrochanteric fracture with mild displacement. Orthopedic surgeon was consulted with recommendation for surgical intervention needed. On 7/3, patient underwent treatment of the left intertrochanteric femur fracture with IM nailing by Dr. JESUSITA Pierce. Patient was placed WBAT to LLE. On 7/4, labs showed low H&H of 9.0 & 27.7, low PLT of 105, elevated chloride of 113, low CO2 of 16, elevated BUN/Cr of 38.9/ 2.17, elevated glucose of 192, low calcium of 7.9, and low albumin of 3.1. PT/OT evals completed with deficits noted with recommendation for high intensity therapy needed. Wound care consulted for skin tear to left upper arm which was a result from fall prior to admission with recommendation for wound cleaser and applied adaptic, bordered foam, pressure injury also noted to sacral spine present on admission with recommendation to cleanse with normal saline and applied sacral bordered foam. On 7/5, daily dry dressing changes started and as needed. Labs showed drop in H&H to 8.0 & 24.3. Blood pressure was ranging on low side so was given IVF @ 75cc/hr and Entresto was held. On 7/6, BP improved on NS @ 75cc/hr. INR of 12.3 noted so was given 1 dose of vitamin K, and warfarin was held. CT of head done due to word finding problems with no acute findings, chronic age related changes, and sinusitis. On 7/7, labs showed low H&H of 7.8 & 23.8, PT/INR of 18.4/ 1.6. Patient had a BM. On 7/8, labs showed low H&H of 7.3 & 24.1 so patient was transfused 2 units of PRBCs. On 7/9, labs showed low H&H of 11.7 & 34.5, elevated chloride of 113, elevated BUN/Cr of 29.3/ 1.26 (trending down), elevated glucose of 116, low albumin of 2.5. Patient reported no pain to left hip at rest, but does have some tightness and soreness with therapy. Entresto resumed at a lower dose. Patient will need followup with Dr Brito (PCP) in 2-3 weeks when out of rehab. Prior to hospitalization, patient was living with   in an apartment attached to son's home with a ramp to enter with bilateral railing, has a walk-in shower with bench and railings, and a normal height commode with bilateral handrails. Patient was modified independent with use of rollator walker for mobility, and independent for ADLs. Patient's son and  did manage medications and finances,  for cleaning, son cooks and transports to appointments. Currently, patient is AAOx4; setup/supervision for eating, moderate assist for bed mobility, minimal to moderate assist for transfer with RW, minimal assist x2 for walking 2ft with RW, setup assist for grooming, total assist for lower body dressing, and max assist for toileting. Pt. Requires acute inpatient rehab admission with 24-hour nursing and active physician oversight to monitor and manage acute medical comorbid conditions, labs, pain, and functional deficits.  Patient/family will also require teaching and integration of improving functional skills into daily living.  She will also require an individualized, interdisciplinary approach to her care, receiving PT, OT services 3 hours per day, 5 days per week.    Required care cannot be provided at a lower level of care. Patient is anticipated to require approximately 10-12 days LOS with expected discharge home with spouse and sons with HH   Impairment group (IGC):   Unilateral Hip Fracture 08.11 Etiologic diagnosis/description: Fall with closed left intertrochanteric femur fracture with severe comminution s/p IM nailing   Date of onset:  7/2/24 Date of surgery: 7/3/24   Allergies: Cefadroxil, Cefuroxime axetil, Cephalexin, Ciprofloxacin, Enoxaparin, Methenamine hippurate, and Promethazine   Comorbid condition: Anemia, Atrial fibrillation, Diabetes, Hypertension, and Hypothyroidism   Medical/functional conditions requiring inpatient rehabilitation: This patient requires medical management/24-hour nursing of complex   comorbidities ( Fall with  closed left intertrochanteric femur fracture with severe comminution s/p IM nailing, Anemia, Atrial fibrillation, Diabetes, Hypertension, and Hypothyroidism, pressure wound to sacral area, skin tear to left upper arm ), labs, medications (see medications list), pain, sleep   hygiene, anticoagulation, nutrition, hydration, neurological,   pulmonary, cardiac status, and preventive healthcare.      This patient requires intense therapy and an integrated,   interdisciplinary approach to address safety, impaired mobility,   impaired ADL's (dressing, toileting, grooming, showering), surgical wound care, pulmonary insufficiency, bowel/bladder problems,   preventive healthcare, medication management, integration of   improving functional skills into daily living, and home caregiver   support and training.     Risk for medical/clinical complications: This patient is at risk for the following complications: DVT/PE, pneumonia,   malnutrition, neurological decline, respiratory insufficiency,   worsening activity intolerance, complications from anticoagulation,   Infection , skin breakdown, inadequate sleep, stroke, and constipation.       SPECIAL REHABILITATION NEEDS     IV: 20G left lateral FA Hearing impaired and Preferred Language: english         Peripheral IV - Single Lumen 07/03/24 0851 20 G Distal;Left;Lateral Forearm (Active)   Site Assessment Clean;Dry;Intact;No redness;No swelling 07/09/24 0740   Extremity Assessment Distal to IV No abnormal discoloration;No redness;No swelling;No warmth 07/09/24 0740   Line Status Infusing 07/09/24 0740   Dressing Status Clean;Dry;Intact 07/09/24 0740   Dressing Intervention Integrity maintained 07/09/24 0740   Reason Not Rotated Not due 07/05/24 1908          PRECAUTIONS     Safety/fall precautions: High fall risk and Weight-bearing status: Weight-bearing as tolerated: LLE     PAST MEDICAL, SOCIAL, FAMILY HISTORY     Pertinent past medical history:   Past Medical History:  "  Diagnosis Date    A-fib     Chronic cystitis     GERD (gastroesophageal reflux disease)     Graves disease     Hypertension     Hypothyroidism, unspecified     Spinal stenosis       Has the patient had two or more falls in the past year or any fall with injury in the past year: Yes    Has the patient had major surgery during the 100 days prior to admission: Yes    Family Medical History:   Family History   Problem Relation Name Age of Onset    Cancer Father      Heart attack Father      Stroke Father        Substance use history:   Social History     Substance and Sexual Activity   Alcohol Use Yes     Social History     Tobacco Use   Smoking Status Former    Types: Cigarettes   Smokeless Tobacco Never     Social History     Substance and Sexual Activity   Drug Use Never      VITALS   BP:  (!) 163/75     Temp: 97.6 °F (36.4 °C)     HR: 82     Resp: 18     SpO2: 97 %     Height: 5' 2.01" (1.575 m)     Weight: 70.3 kg (155 lb)     BMI: 28.3          MED/LABS/DIAGNOSITICS   No current facility-administered medications for this encounter.     Current Outpatient Medications   Medication Sig Dispense Refill    methocarbamoL (ROBAXIN) 500 MG Tab Take 1 tablet (500 mg total) by mouth 3 (three) times daily. for 10 days 30 tablet 0    ondansetron 4 mg/2 mL Soln Inject 4 mg into the vein every 8 (eight) hours as needed.      polyethylene glycol (GLYCOLAX) 17 gram PwPk Take 17 g by mouth 2 (two) times daily as needed for Constipation.      sacubitriL-valsartan (ENTRESTO) 24-26 mg per tablet Take 1 tablet by mouth 2 (two) times daily.       Facility-Administered Medications Ordered in Other Encounters   Medication Dose Route Frequency Provider Last Rate Last Admin    0.9%  NaCl infusion (for blood administration)   Intravenous Q24H PRN Franklyn Brito MD        acetaminophen tablet 1,000 mg  1,000 mg Oral TID Adrian Dubose II, MD   1,000 mg at 07/08/24 2101    atorvastatin tablet 20 mg  20 mg Oral Daily Gracy, " Adrian NUNEZ II, MD   20 mg at 07/09/24 0853    carvediloL tablet 12.5 mg  12.5 mg Oral BID Adrian Dubose II, MD   12.5 mg at 07/09/24 0853    HYDROmorphone (PF) injection 0.5 mg  0.5 mg Intravenous Q4H PRN Adrian Dubose II, MD        HYDROmorphone (PF) injection 1 mg  1 mg Intravenous Q4H PRN Adrian Dubose II, MD   1 mg at 07/02/24 2009    levothyroxine tablet 125 mcg  125 mcg Oral Before breakfast Adrian Dubose II, MD   125 mcg at 07/09/24 0600    melatonin tablet 6 mg  6 mg Oral Nightly PRN Adrian Dubose II, MD   6 mg at 07/07/24 2030    methocarbamoL tablet 500 mg  500 mg Oral TID Adrian Dubose II, MD   500 mg at 07/08/24 0840    morphine injection 4 mg  4 mg Intravenous Q6H PRN Adrian Dubose II, MD        ondansetron injection 4 mg  4 mg Intravenous Q8H PRN Adrian Dubose II, MD   4 mg at 07/02/24 2253    oxyCODONE-acetaminophen  mg per tablet 1 tablet  1 tablet Oral Q4H PRN Adrian Dubose II, MD        oxyCODONE-acetaminophen 5-325 mg per tablet 1 tablet  1 tablet Oral Q4H PRN Adrian Dubose II, MD   1 tablet at 07/06/24 1419    polyethylene glycol packet 17 g  17 g Oral BID Adrian Dubose II, MD   17 g at 07/09/24 0853    sacubitriL-valsartan 24-26 mg per tablet 1 tablet  1 tablet Oral BID Franklyn Brito MD   1 tablet at 07/09/24 0853    sodium chloride 0.9% flush 10 mL  10 mL Intravenous PRN Adrian Dubose II, MD        warfarin (COUMADIN) tablet 2 mg  2 mg Oral Daily Eden Moss MD   2 mg at 07/08/24 1730      Pertinent lab results:   Lab Results   Component Value Date    WBC 9.13 07/09/2024    HGB 11.7 (L) 07/09/2024    HCT 34.5 (L) 07/09/2024     07/09/2024     07/09/2024    K 4.9 07/09/2024    BUN 29.3 (H) 07/09/2024    CO2 16 (L) 07/09/2024     (H) 07/09/2024      Pertinent diagnostic studies:    Additional labs and diagnostic studies required prior to admission:      QI: GG Care Tool     QI: GG  Care Tool  Therapy Evaluation   Swallowing/  Nutritional Status   Diet/Feeding/  Swallowing Setup/ supervision   Eating       Oral Hygiene   Grooming Setup assist for simple grooming   Toileting Hygiene       Shower/Bathe   Bathing    Upper Body Dressing   Dressing Upper    Lower Body Dressing   Dressing Lower Socks with total assist   minimum assistance with sock aide at EOB. BATES educates on increasing ADLs with use of reacher, sock aide, dressing stick, and sponge.    Putting On/Taking Off Footwear       Toilet Transfer   Toileting maximal assistance x2 on BSC for BM. Patient required increased time for toilet task; 2-person assist as pt required Mod A to maintain safe stand while tech cleaned patient.    Bladder Continence Status   Bladder     Bowel Continence Status   Bowel     Roll Left and Right   Bed Mobility Scooting: moderate assistance  Supine to Sit: moderate assistance  Sit to Supine: moderate assistance   Sit to Lying       Lying to Sitting on Side of Bed       Sit to Stand       Chair/Bed-to- Chair   Transfers Sit to Stand:  minimum assistance with rolling walker     Car Transfer       Walk 10 Feet   Equipment      Walk 50 Feet with Two Turns   Balance SBA sitting EOB  Min assist standing with RW   Walk 150 Feet   Endurance    Walk 10 Feet on Uneven Surfaces   Gait patient took side steps toward HOB 2ftx1 trials with minAx2 with rolling walker. Patient unable to clear feet during pre gait steps- patient pivots feet to advance lower extremities.    Picking up an Object       Wheel 50 Feet with Two Turns   Wheelchair    Wheel 150 Feet       1 Step (Curb)   Stairs    4 Steps       12 Steps       Expression of Ideas and Wants   Communication Independent    Understanding  Verbal and Non-Verbal Content       Cognitive Patterns   Cognition Independent       Safety Precautions       Lower Extremity      Strength RLE Strength: WFL  LLE Strength: grossly 3+/5      ROM RLE ROM: WFL  LLE ROM: WFL      Upper  Extremity      Strength Right Upper Extremity:   wfl     Left Upper Extremity:  wfl      ROM Right Upper Extremity:   wfl     Left Upper Extremity:  wfl     Care Score Value Definitions  6: Independent. [place] provides no assistance with tasks. A device may or may not have been used.  5: Set-up or clean-up assistance. [place] sets up or cleans up, but does not assist with tasks. [place] may have assisted prior to or following the activity.  4: Supervision or touching assistance. [place] provides verbal cues or touching/steadying or contact guard assistance. Assistance may be provided throughout the activity or intermittently.  3: Partial/moderate assistance. [place] does less than half the effort. [place] lifts, holds, or supports trunk or limbs, but provides less than half the effort.  2: Substantial/maximal assistance. [place] does more than half the effort. [place] lifts or holds trunk or limbs, and provides more than half the effort.  1: Dependent. [place] does all of the effort, or the assistance of two or more helpers is required for the patient to complete the activity.  Care Score Activity Not Attempted Value Definitions  7: Patient refused.  9: Not applicable. Not attempted and the patient did not perform this activity prior to the current illness, exacerbation, or injury.  10: Not attempted due to environmental limitations (e.g., lack of equipment, weather constraints).  88: Not attempted due to medical condition or safety concerns.    DISCHARGE GOALS/ANTICPATED INTERVENTIONS/SERVICES     Expected Level of Improvement for Safe Discharge: Patient/caregivers anticipate discharge home at or near baseline level of functioning and/or decreased burden of care.   Patient/Family/Caregiver Goals: Patient desires to increase current level of functioning to modified independence for mobility and all ADLs so that she is able to discharge home safely with  and sons   Required Treatments/Services: Rehabilitation Nursing,  Dietitian/nutrition, Social , and Case Management   Required Therapy Therapy Type Min/Day Days/Week Duration of Therapy   Physical Therapy 90 5 10-12 days    Occupational Therapy 90 5 10-12 days    Speech and Language Pathology      Prosthetic/Orthotics      Recreational Therapy      Anticipated Services upon Discharge:  home health    Additional rehabilitation needs:  none    Expected Discharge Destination:  home with  and son   Barriers to Discharge: None   Discharge Support: Patient has a caregiver available, Discharge plan has been verified with patient's caregiver, and Caregiver is in agreement with the discharge plan   Patient/Family/Caregiver Orientation: Patient/family/caregiver oriented and agreeable to inpatient rehabilitation plan   Estimated Length of Stay:10-12 days    Projected Admission Date: 7/9/24   Medical Prognosis: good   Physicians Review and Admission Determination: I have discussed patient with Nurse Liaison. I have reviewed the prescreen. Patient is in need of, appropriate for and should tolerate and benefit from an aggressive IRF stay

## 2024-07-09 NOTE — CONSULTS
Chief Complaint  left femur intertrochanteric fracture 2/2 fall    Reason for Consultation  Physiatry    History of Present Illness  Admit MD: Tato Holley MD  Consult Physiatry: Alfred Soto MD  HPI:  89 yo WF with a PMH of A-fib, GERD, chronic cystitis, multiple UTIs, Graves disease, HTN, spinal stenosis, and hypothyroidism presented to the ED at [facility] on 7/2/24 following a fall. Patient reported she lost control of her walker and fell away from it. She fell on her left side and reported left hip pain. She reported her left hip has always been the weakest. Patient on coumadin. Patient's caregiver reported that patient cannot tolerate Lovenox or Eliquis, and is allergic to phenergan. BP on arrival was elevated at 160/86. Chest XR showed prominence of the interstitium may be related to interstitial edema in the setting of enlarged cardiac silhouette. Hip XR showed left femur intertrochanteric fracture with mild displacement. Orthopedic surgeon was consulted with recommendation for surgical intervention needed. On 7/3, patient underwent treatment of the left intertrochanteric femur fracture with IM nailing by Dr. JESUSITA Pierce. Patient was placed WBAT to LLE. On 7/4, labs showed low H&H of 9.0 & 27.7, low PLT of 105, elevated chloride of 113, low CO2 of 16, elevated BUN/Cr of 38.9/ 2.17, elevated glucose of 192, low calcium of 7.9, and low albumin of 3.1. PT/OT evals completed with deficits noted with recommendation for high intensity therapy needed. Wound care consulted for skin tear to left upper arm which was a result from fall prior to admission with recommendation for wound cleaser and applied adaptic, bordered foam, pressure injury also noted to sacral spine present on admission with recommendation to cleanse with normal saline and applied sacral bordered foam. On 7/5, daily dry dressing changes started and as needed. Labs showed drop in H&H to 8.0 & 24.3. Blood pressure was ranging on low side so was given IVF  @ 75cc/hr and Entresto was held. On 7/6, BP improved on NS @ 75cc/hr. INR of 12.3 noted so was given 1 dose of vitamin K, and warfarin was held. CT of head done due to word finding problems with no acute findings, chronic age related changes, and sinusitis. On 7/7, labs showed low H&H of 7.8 & 23.8, PT/INR of 18.4/ 1.6. Patient had a BM. On 7/8, labs showed low H&H of 7.3 & 24.1 so patient was transfused 2 units of PRBCs. On 7/9, labs showed low H&H of 11.7 & 34.5, elevated chloride of 113, elevated BUN/Cr of 29.3/ 1.26 (trending down), elevated glucose of 116, low albumin of 2.5. Patient reported no pain to left hip at rest, but does have some tightness and soreness with therapy. Entresto resumed at a lower dose. Patient will need followup with Dr Brito (PCP) in 2-3 weeks when out of rehab. Patient is AAOx4.   Participating with therapy. Functional status includes setup/supervision needed for eating, moderate assist for bed mobility, minimal to moderate assist for transfer with RW, minimal assist x2 for walking 2ft with RW, setup assist for grooming, total assist for lower body dressing, and max assist for toileting. Patient was evaluated, accepted, and admitted to inpatient rehab to improve functional status. Transferred to [facility] on 7/9 without incident.   7/10: Seen in patient room, seated in bed. Reports good night's sleep without pain complaints. Medications adjusted on arrival by IM MD. Pain illicited with movement. Monitor in therapy. Reports appetite has been fair and bowels are moving. Encouraged intake for healing. Therapy to evaluate. VSSAF with slight SBP elevation last night. Resolved this morning. IM evaluating as well.            Review of Systems  Barriers to Discharge:  Social: Completed college. She has no  history. 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 Pt. And spouse live in an apartment attached to the sons home. The patient will have family help once discharged from  rehab.  Children: (5).   Medical:  Left intertrochanteric femur fracture with severe comminution 2/2 fall, atrial fibrillation, chronic cystitis, GERD, Graves disease, hypertension, hypothyroidism, spinal stenosis     Functional:   Prior Level of Fx: Independent with ADLs. Pt. Does not drive. She has a  for cleaning. The patients son cooks, does laundry, grocery shops, and provides transportation to appointments. She uses a Rollator for mobility. She required assistance with medication and financial management from her son and . They hire someone for lawn management. Av states that pt. Has a key fob that she keeps with her. It is attached to the alarm system and will call 911 if she presses it.    Residence:  Lives with her spouse in [place] in an apartment attached to the sons home with a ramp to enter with bilateral railings to enter the residence. Pt. Has a walk-in shower with a removable bench, HHS, and grab bars. She does not have an elevated toilet with bilateral grab bars.    DME: Rollator, RW, shower bench, wheelchair, grab bars, and HHS    Psychiatric: Denies mental health or substance abuse history     Depression/Anxiety: no complaints     Pain: LLE with movement  acetaminophen tablet 1,000 mg BID  methocarbamoL tablet 500 mg TID  acetaminophen tablet 500 mg BID PRN mild pain  traMADoL tablet 50 mg q6h PRN mod pain  oxyCODONE immediate release tablet 5 mg q6h PRN severe pain    Bowels/Bladder: last BM 7/8 per patient   Appetite: fair     Sleep: good            Physical Exam  General: well-developed, well-nourished, in no acute distress  Eye: EOMI, clear conjunctiva, eyelids normal  HENT: normocephalic, oropharynx and nasal mucosal surfaces moist  Neck: full range of motion, supple  Respiratory: equal chest rise, no SOB, no audible wheeze  Cardiovascular: regular rate and rhythm, no edema  Gastrointestinal: soft, non-tender, non-distended   Musculoskeletal: decreased ROM/strength to  LLE  Integumentary: left hip incision x 3-staples, dressing-c/d/I, LUE skin tear, buttocks-reddened, right groin-Intertrigo  Neurologic: cranial nerves intact, no signs of peripheral neurological deficit, motor/sensory function intact  *MD performed and documented physical examination           Labs:   Latest Reference Range & Units 07/10/24 05:23   WBC 4.50 - 11.50 x10(3)/mcL 8.58   RBC 4.20 - 5.40 x10(6)/mcL 3.34 (L)   Hemoglobin 12.0 - 16.0 g/dL 10.9 (L)   Hematocrit 37.0 - 47.0 % 32.4 (L)   MCV 80.0 - 94.0 fL 97.0 (H)   MCH 27.0 - 31.0 pg 32.6 (H)   MCHC 33.0 - 36.0 g/dL 33.6   RDW 11.5 - 17.0 % 16.8   Platelet Count 130 - 400 x10(3)/mcL 195   MPV 7.4 - 10.4 fL 9.5   Neut % % 57.7   LYMPH % % 22.0   Mono % % 14.1   Eos % % 5.1   Basophil % % 0.3   Immature Granulocytes % 0.8   Neut # 2.1 - 9.2 x10(3)/mcL 4.94   Lymph # 0.6 - 4.6 x10(3)/mcL 1.89   Mono # 0.1 - 1.3 x10(3)/mcL 1.21   Eos # 0 - 0.9 x10(3)/mcL 0.44   Baso # <=0.2 x10(3)/mcL 0.03   Immature Grans (Abs) 0 - 0.04 x10(3)/mcL 0.07 (H)   nRBC % 0.0   PT 11.7 - 14.5 seconds 23.3 (H)   INR 2.0 - 3.0  2.0   Sodium 136 - 145 mmol/L 138   Potassium 3.5 - 5.1 mmol/L 4.2   Chloride 98 - 107 mmol/L 110 (H)   CO2 23 - 31 mmol/L 21 (L)   Anion Gap mEq/L 7.0   BUN 9.8 - 20.1 mg/dL 26.9 (H)   Creatinine 0.55 - 1.02 mg/dL 1.10 (H)   BUN/CREAT RATIO  24   eGFR mL/min/1.73/m2 49   Glucose 82 - 115 mg/dL 119 (H)   Calcium 8.4 - 10.2 mg/dL 8.2 (L)   ALP 40 - 150 unit/L 92   PROTEIN TOTAL 5.8 - 7.6 gm/dL 5.3 (L)   Albumin 3.4 - 4.8 g/dL 2.2 (L)   Albumin/Globulin Ratio 1.1 - 2.0 ratio 0.7 (L)   Prealbumin 14.0 - 37.0 mg/dL 7.2 (L)   BILIRUBIN TOTAL <=1.5 mg/dL 1.8 (H)   AST 5 - 34 unit/L 17   ALT 0 - 55 unit/L 11   Globulin, Total 2.4 - 3.5 gm/dL 3.1   Hemoglobin A1C External <=7.0 % 5.2   Estimated Avg Glucose mg/dL 102.5   TSH 0.350 - 4.940 uIU/mL 1.155   (L): Data is abnormally low  (H): Data is abnormally high   Most Recent   Group & Rh A POS  7/8/24 08:09   Indirect  Debra GEL NEG  7/8/24 08:09   Specimen Outdate 07/11/2024 23:59  7/8/24 08:09               Diagnostics:  XR HIP WITH PELVIS WHEN PERFORMED 2 OR 3 VIEWS LEFT   Impression:  Left femur intertrochanteric fracture  Date:                                            07/02/2024  Time:                                           19:04      XR CHEST 1 VIEW   Impression:  Prominence of the interstitium may be related to interstitial edema in the setting of enlarged cardiac silhouette.  Date:                                            07/02/2024  Time:                                           19:06      XR KNEE 1 OR 2 VIEW LEFT   FINDINGS:  The bones and joints are in good anatomic alignment with no evidence of acute fracture or dislocation seen.  There is intramedullary azael seen in the femur.  There is some surgical staples seen in the lateral aspect of the distal thigh.  Impression:  Postsurgical changes as outlined above no fracture seen  Date:                                            07/04/2024  Time:                                           12:16      CT HEAD WITHOUT CONTRAST   Impression:  Sinusitis  Chronic age related changes  Otherwise unremarkable  Date:                                            07/06/2024  Time:                                           16:27        Assessment/Plan  Hospital   Acute on chronic systolic heart failure   Closed comminuted intertrochanteric fracture of left femur   Fall   History of recent blood transfusion     Non-Hospital   Right hip pain   Atrial fibrillation   Cobalamin deficiency   Diabetes mellitus   Gout   Hyperlipidemia   Primary hypertension   Osteoarthritis   Stenosis of carotid artery   Decreased functional mobility   BARRY (acute kidney injury)   Pacemaker   Hypothyroidism, unspecified   GERD (gastroesophageal reflux disease)   Graves disease   Spinal stenosis   Chronic cystitis       Wounds: left hip incision x 3-staples, dressing-c/d/I, LUE skin tear, buttocks-reddened,  right groin-Intertrigo  On 7/3, patient underwent treatment of the left intertrochanteric femur fracture with IM nailing by Dr. JESUSITA Pierce.   Precautions: WBAT to LLE.  Bracing/AD: RW  Swallowing: Regular Diet  Function: Tolerating therapy. Continue PT/OT  VTE Prophylaxis:   warfarin (COUMADIN) tablet 2 mg qd  Code Status: FULL CODE   Discharge: Lives with her spouse in [place] in an apartment attached to the sons home with a ramp to enter with bilateral railings to enter the residence. Completed college. She has no  history. 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 Pt. And spouse live in an apartment attached to the sons home. The patient will have family help once discharged from rehab.  Children: (5). Date pending.   Dos 7/9/24  I have evaluated the patient on initial IRF admit. I have been involved in rehab prescreen. I have reviewed records.I have reviewed admit orders.I have discussed case with IM team.  I find the patient appropriate for, in need of and should tolerate an aggressive IRF program with good potential for functional improvement.  I am in agreement with initial Plan of Care  I have been involved in the creation of this initial PM&R consult with Micaela Paredes NP, conducted additional independent physical examination and assisted with medical documentation.

## 2024-07-09 NOTE — NURSING
Nurses Note -- 4 Eyes      7/9/2024   2:16 PM      Skin assessed during: Admit      [] No Altered Skin Integrity Present    []Prevention Measures Documented      [x] Yes- Altered Skin Integrity Present or Discovered   [] LDA Added if Not in Epic (Describe Wound)   [x] New Altered Skin Integrity was Present on Admit and Documented in LDA   [x] Wound Image Taken    Wound Care Consulted? Yes    Attending Nurse:  Maura Quiroz RN/Staff Member:   Jyoti Houston Rn

## 2024-07-10 PROBLEM — Z95.0 PACEMAKER: Status: ACTIVE | Noted: 2024-07-10

## 2024-07-10 PROBLEM — Z92.89 HISTORY OF RECENT BLOOD TRANSFUSION: Status: ACTIVE | Noted: 2024-07-10

## 2024-07-10 LAB
ALBUMIN SERPL-MCNC: 2.2 G/DL (ref 3.4–4.8)
ALBUMIN/GLOB SERPL: 0.7 RATIO (ref 1.1–2)
ALP SERPL-CCNC: 92 UNIT/L (ref 40–150)
ALT SERPL-CCNC: 11 UNIT/L (ref 0–55)
ANION GAP SERPL CALC-SCNC: 7 MEQ/L
AST SERPL-CCNC: 17 UNIT/L (ref 5–34)
BASOPHILS # BLD AUTO: 0.03 X10(3)/MCL
BASOPHILS NFR BLD AUTO: 0.3 %
BILIRUB SERPL-MCNC: 1.8 MG/DL
BUN SERPL-MCNC: 26.9 MG/DL (ref 9.8–20.1)
CALCIUM SERPL-MCNC: 8.2 MG/DL (ref 8.4–10.2)
CHLORIDE SERPL-SCNC: 110 MMOL/L (ref 98–107)
CO2 SERPL-SCNC: 21 MMOL/L (ref 23–31)
CREAT SERPL-MCNC: 1.1 MG/DL (ref 0.55–1.02)
CREAT/UREA NIT SERPL: 24
EOSINOPHIL # BLD AUTO: 0.44 X10(3)/MCL (ref 0–0.9)
EOSINOPHIL NFR BLD AUTO: 5.1 %
ERYTHROCYTE [DISTWIDTH] IN BLOOD BY AUTOMATED COUNT: 16.8 % (ref 11.5–17)
EST. AVERAGE GLUCOSE BLD GHB EST-MCNC: 102.5 MG/DL
FERRITIN SERPL-MCNC: 188.88 NG/ML (ref 4.63–204)
GFR SERPLBLD CREATININE-BSD FMLA CKD-EPI: 49 ML/MIN/1.73/M2
GLOBULIN SER-MCNC: 3.1 GM/DL (ref 2.4–3.5)
GLUCOSE SERPL-MCNC: 119 MG/DL (ref 82–115)
HBA1C MFR BLD: 5.2 %
HCT VFR BLD AUTO: 32.4 % (ref 37–47)
HGB BLD-MCNC: 10.9 G/DL (ref 12–16)
IMM GRANULOCYTES # BLD AUTO: 0.07 X10(3)/MCL (ref 0–0.04)
IMM GRANULOCYTES NFR BLD AUTO: 0.8 %
INR PPP: 2 (ref 2–3)
IRON SATN MFR SERPL: 16 % (ref 20–50)
IRON SERPL-MCNC: 25 UG/DL (ref 50–170)
LYMPHOCYTES # BLD AUTO: 1.89 X10(3)/MCL (ref 0.6–4.6)
LYMPHOCYTES NFR BLD AUTO: 22 %
MCH RBC QN AUTO: 32.6 PG (ref 27–31)
MCHC RBC AUTO-ENTMCNC: 33.6 G/DL (ref 33–36)
MCV RBC AUTO: 97 FL (ref 80–94)
MONOCYTES # BLD AUTO: 1.21 X10(3)/MCL (ref 0.1–1.3)
MONOCYTES NFR BLD AUTO: 14.1 %
NEUTROPHILS # BLD AUTO: 4.94 X10(3)/MCL (ref 2.1–9.2)
NEUTROPHILS NFR BLD AUTO: 57.7 %
NRBC BLD AUTO-RTO: 0 %
PLATELET # BLD AUTO: 195 X10(3)/MCL (ref 130–400)
PMV BLD AUTO: 9.5 FL (ref 7.4–10.4)
POTASSIUM SERPL-SCNC: 4.2 MMOL/L (ref 3.5–5.1)
PREALB SERPL-MCNC: 7.2 MG/DL (ref 14–37)
PROT SERPL-MCNC: 5.3 GM/DL (ref 5.8–7.6)
PROTHROMBIN TIME: 23.3 SECONDS (ref 11.7–14.5)
RBC # BLD AUTO: 3.34 X10(6)/MCL (ref 4.2–5.4)
SODIUM SERPL-SCNC: 138 MMOL/L (ref 136–145)
TIBC SERPL-MCNC: 128 UG/DL (ref 70–310)
TIBC SERPL-MCNC: 153 UG/DL (ref 250–450)
TRANSFERRIN SERPL-MCNC: 134 MG/DL
TSH SERPL-ACNC: 1.16 UIU/ML (ref 0.35–4.94)
WBC # BLD AUTO: 8.58 X10(3)/MCL (ref 4.5–11.5)

## 2024-07-10 PROCEDURE — 80053 COMPREHEN METABOLIC PANEL: CPT | Performed by: NURSE PRACTITIONER

## 2024-07-10 PROCEDURE — 97535 SELF CARE MNGMENT TRAINING: CPT

## 2024-07-10 PROCEDURE — 97530 THERAPEUTIC ACTIVITIES: CPT

## 2024-07-10 PROCEDURE — 97542 WHEELCHAIR MNGMENT TRAINING: CPT

## 2024-07-10 PROCEDURE — 99900031 HC PATIENT EDUCATION (STAT)

## 2024-07-10 PROCEDURE — 92610 EVALUATE SWALLOWING FUNCTION: CPT

## 2024-07-10 PROCEDURE — 97110 THERAPEUTIC EXERCISES: CPT

## 2024-07-10 PROCEDURE — 84443 ASSAY THYROID STIM HORMONE: CPT | Performed by: NURSE PRACTITIONER

## 2024-07-10 PROCEDURE — 82728 ASSAY OF FERRITIN: CPT | Performed by: NURSE PRACTITIONER

## 2024-07-10 PROCEDURE — 84134 ASSAY OF PREALBUMIN: CPT | Performed by: NURSE PRACTITIONER

## 2024-07-10 PROCEDURE — 94761 N-INVAS EAR/PLS OXIMETRY MLT: CPT

## 2024-07-10 PROCEDURE — 94799 UNLISTED PULMONARY SVC/PX: CPT

## 2024-07-10 PROCEDURE — 83550 IRON BINDING TEST: CPT | Performed by: NURSE PRACTITIONER

## 2024-07-10 PROCEDURE — 97162 PT EVAL MOD COMPLEX 30 MIN: CPT

## 2024-07-10 PROCEDURE — 83540 ASSAY OF IRON: CPT | Performed by: NURSE PRACTITIONER

## 2024-07-10 PROCEDURE — 25000003 PHARM REV CODE 250: Performed by: NURSE PRACTITIONER

## 2024-07-10 PROCEDURE — 85610 PROTHROMBIN TIME: CPT | Performed by: NURSE PRACTITIONER

## 2024-07-10 PROCEDURE — 85025 COMPLETE CBC W/AUTO DIFF WBC: CPT | Performed by: NURSE PRACTITIONER

## 2024-07-10 PROCEDURE — 83036 HEMOGLOBIN GLYCOSYLATED A1C: CPT | Performed by: NURSE PRACTITIONER

## 2024-07-10 PROCEDURE — 11800000 HC REHAB PRIVATE ROOM

## 2024-07-10 PROCEDURE — 36415 COLL VENOUS BLD VENIPUNCTURE: CPT | Performed by: NURSE PRACTITIONER

## 2024-07-10 PROCEDURE — 97166 OT EVAL MOD COMPLEX 45 MIN: CPT

## 2024-07-10 RX ADMIN — CARVEDILOL 12.5 MG: 6.25 TABLET, FILM COATED ORAL at 08:07

## 2024-07-10 RX ADMIN — ALLOPURINOL 100 MG: 100 TABLET ORAL at 08:07

## 2024-07-10 RX ADMIN — DOCUSATE SODIUM 100 MG: 100 CAPSULE, LIQUID FILLED ORAL at 08:07

## 2024-07-10 RX ADMIN — LEVOTHYROXINE SODIUM 125 MCG: 125 TABLET ORAL at 05:07

## 2024-07-10 RX ADMIN — Medication: at 08:07

## 2024-07-10 RX ADMIN — ACETAMINOPHEN 1000 MG: 500 TABLET ORAL at 08:07

## 2024-07-10 RX ADMIN — METHOCARBAMOL 500 MG: 500 TABLET ORAL at 05:07

## 2024-07-10 RX ADMIN — WARFARIN SODIUM 2 MG: 2 TABLET ORAL at 04:07

## 2024-07-10 RX ADMIN — METHOCARBAMOL 500 MG: 500 TABLET ORAL at 02:07

## 2024-07-10 RX ADMIN — METHOCARBAMOL 500 MG: 500 TABLET ORAL at 09:07

## 2024-07-10 RX ADMIN — ATORVASTATIN CALCIUM 20 MG: 10 TABLET, FILM COATED ORAL at 08:07

## 2024-07-10 RX ADMIN — TRAMADOL HYDROCHLORIDE 50 MG: 50 TABLET, COATED ORAL at 11:07

## 2024-07-10 RX ADMIN — SACUBITRIL AND VALSARTAN 1 TABLET: 24; 26 TABLET, FILM COATED ORAL at 08:07

## 2024-07-10 NOTE — PROGRESS NOTES
[facility] ([facility])  Rehab Progress Note    Patient Name: Homa Jaquez  MRN: [id number]  Age: 88 y.o. Sex: female  : 1935  Hospital Length of Stay: 1 days  Date of Service: 7/10/2024   Chief Complaint:  Left intertrochanteric femur fracture with severe comminution 2/2 fall s/p IM nailing on 2024     Subjective:     Basic Information  Admit Information: 88-year-old female presented to [facility] on  with complaints of left hip pain after falling at home.  PMH significant for atrial fibrillation, chronic cystitis, GERD, Graves disease, hypertension, hypothyroidism, spinal stenosis.  Workup significant for left intertrochanteric femur fracture.  On  tolerated IM nailing without perioperative complications.  Restarted Coumadin on  without complications.  Initiated IV fluid resuscitation 2/2 BARRY on .  Initiated 1 dose of vitamin K 2/2 INR 12.1.  Supratherapeutic INR resolved. Tolerated transfer to [facility] inpatient rehab unit on  without incident.   Today's Information: No acute events overnight.  Sitting in chair comfortably.  Reports good sleep and appetite.  Last BM .  Vital signs at goal with no recent recorded fevers.  Labs reviewed, H&H stable, INR 2, A1c 5.2, TSH at goal, metabolic acidosis trending up with improvement, renal indices trending down with improvement, albumin 2.2, pre-albumin 7.2, otherwise unremarkable.  No imaging today.    Review of patient's allergies indicates:   Allergen Reactions    Cefadroxil      Other reaction(s): Nausea or vomiting    Cefuroxime axetil     Cephalexin      Other reaction(s): NAUSEA AND VOMITING    Ciprofloxacin     Enoxaparin     Methenamine hippurate      Other reaction(s): Unknown    Promethazine         Current Facility-Administered Medications:     acetaminophen tablet 1,000 mg, 1,000 mg, Oral, BID, Calli Salazar FNP, 1,000 mg at 07/10/24 0826    acetaminophen tablet 500 mg, 500 mg, Oral, BID PRN,  Brandy Paredes FNP    allopurinoL tablet 100 mg, 100 mg, Oral, Daily, Calli Salazar, FNP, 100 mg at 07/10/24 0826    atorvastatin tablet 20 mg, 20 mg, Oral, Daily, Calli Salazar, FNP, 20 mg at 07/10/24 0826    benzonatate capsule 100 mg, 100 mg, Oral, TID PRN, Calli Salazar, FNP    carvediloL tablet 12.5 mg, 12.5 mg, Oral, BID, Calli Salazar, FNP, 12.5 mg at 07/10/24 0825    docusate sodium capsule 100 mg, 100 mg, Oral, BID, Calli Salazar, FNP, 100 mg at 07/10/24 0826    hydrALAZINE injection 10 mg, 10 mg, Intravenous, Q4H PRN, Calli Salazar, FNP    hydrOXYzine pamoate capsule 50 mg, 50 mg, Oral, Nightly PRN, Calli Salazar, FNP    labetalol 20 mg/4 mL (5 mg/mL) IV syring, 10 mg, Intravenous, Q4H PRN, Calli Salazar, FNP    levothyroxine tablet 125 mcg, 125 mcg, Oral, Before breakfast, Calli Salazar FNP, 125 mcg at 07/10/24 0509    melatonin tablet 6 mg, 6 mg, Oral, Nightly PRN, Calli Salazar, FNP    menthol-zinc oxide 0.44-20.6 % Oint, , Topical (Top), BID, Brandy Paredes FNP, Given at 07/10/24 0829    methocarbamoL tablet 500 mg, 500 mg, Oral, TID, Calli Salazar, FNP, 500 mg at 07/10/24 0509    metoprolol injection 10 mg, 10 mg, Intravenous, Q2H PRN, Calli Salazar, TAIP    nitroGLYCERIN SL tablet 0.4 mg, 0.4 mg, Sublingual, Q5 Min PRN, Calli Salazar, FNP    ondansetron disintegrating tablet 4 mg, 4 mg, Oral, Q6H PRN, Calli Salazar, FNP    ondansetron disintegrating tablet 8 mg, 8 mg, Oral, Q8H PRN, Calli Salazar, TAIP    ondansetron injection 4 mg, 4 mg, Intravenous, Q8H PRN, Calli Salazar FNP    oxyCODONE immediate release tablet 5 mg, 5 mg, Oral, Q6H PRN, Calli Salazar, TAIP    sacubitriL-valsartan 24-26 mg per tablet 1 tablet, 1 tablet, Oral, BID, Calli Salazar FNP, 1 tablet at 07/10/24 0825    traMADoL tablet 50 mg, 50 mg, Oral, Q6H PRN, Calli Salazar FNP, 50 mg at 07/10/24 1102    warfarin (COUMADIN) tablet 2 mg, 2 mg, Oral,  "Daily, Calli Salazar, FNP, 2 mg at 07/09/24 1651     Review of Systems   Complete 12-point review of symptoms negative except for what's mentioned in HPI     Objective:     /73   Pulse 85   Temp 98.3 °F (36.8 °C) (Oral)   Resp 18   Ht 5' 4" (1.626 m)   Wt 79.3 kg (174 lb 13.2 oz)   SpO2 (!) 94%   BMI 30.01 kg/m²      Physical Exam  Vitals reviewed.   Eyes:      Pupils: Pupils are equal, round, and reactive to light.   Cardiovascular:      Rate and Rhythm: Normal rate and regular rhythm.      Heart sounds: Normal heart sounds.   Pulmonary:      Effort: Pulmonary effort is normal.      Breath sounds: Normal breath sounds.   Abdominal:      General: Bowel sounds are normal.   Musculoskeletal:         General: Normal range of motion.   Skin:     General: Skin is warm.      Comments: Left femur incision dry and intact    Neurological:      General: No focal deficit present.      Mental Status: She is alert and oriented to person, place, and time.      Motor: Weakness present.   Psychiatric:         Mood and Affect: Mood normal.     *MD performed and documented physical examination       Lines/Drains/Airways       Peripheral Intravenous Line  Duration                  Peripheral IV - Single Lumen 07/03/24 0851 20 G Distal;Left;Lateral Forearm 7 days                    Labs  Admission on 07/09/2024   Component Date Value Ref Range Status    TSH 07/10/2024 1.155  0.350 - 4.940 uIU/mL Final    PT 07/10/2024 23.3 (H)  11.7 - 14.5 seconds Final    INR 07/10/2024 2.0  2.0 - 3.0 Final    Sodium 07/10/2024 138  136 - 145 mmol/L Final    Potassium 07/10/2024 4.2  3.5 - 5.1 mmol/L Final    Chloride 07/10/2024 110 (H)  98 - 107 mmol/L Final    CO2 07/10/2024 21 (L)  23 - 31 mmol/L Final    Glucose 07/10/2024 119 (H)  82 - 115 mg/dL Final    Blood Urea Nitrogen 07/10/2024 26.9 (H)  9.8 - 20.1 mg/dL Final    Creatinine 07/10/2024 1.10 (H)  0.55 - 1.02 mg/dL Final    Calcium 07/10/2024 8.2 (L)  8.4 - 10.2 mg/dL Final "    Protein Total 07/10/2024 5.3 (L)  5.8 - 7.6 gm/dL Final    Albumin 07/10/2024 2.2 (L)  3.4 - 4.8 g/dL Final    Globulin 07/10/2024 3.1  2.4 - 3.5 gm/dL Final    Albumin/Globulin Ratio 07/10/2024 0.7 (L)  1.1 - 2.0 ratio Final    Bilirubin Total 07/10/2024 1.8 (H)  <=1.5 mg/dL Final    ALP 07/10/2024 92  40 - 150 unit/L Final    ALT 07/10/2024 11  0 - 55 unit/L Final    AST 07/10/2024 17  5 - 34 unit/L Final    eGFR 07/10/2024 49  mL/min/1.73/m2 Final    Anion Gap 07/10/2024 7.0  mEq/L Final    BUN/Creatinine Ratio 07/10/2024 24   Final    Ferritin Level 07/10/2024 188.88  4.63 - 204.00 ng/mL Final    Iron Binding Capacity Unsaturated 07/10/2024 128  70 - 310 ug/dL Final    Iron Level 07/10/2024 25 (L)  50 - 170 ug/dL Final    Transferrin 07/10/2024 134  mg/dL Final    Iron Binding Capacity Total 07/10/2024 153 (L)  250 - 450 ug/dL Final    Iron Saturation 07/10/2024 16 (L)  20 - 50 % Final    Prealbumin 07/10/2024 7.2 (L)  14.0 - 37.0 mg/dL Final    Hemoglobin A1c 07/10/2024 5.2  <=7.0 % Final    Estimated Average Glucose 07/10/2024 102.5  mg/dL Final    WBC 07/10/2024 8.58  4.50 - 11.50 x10(3)/mcL Final    RBC 07/10/2024 3.34 (L)  4.20 - 5.40 x10(6)/mcL Final    Hgb 07/10/2024 10.9 (L)  12.0 - 16.0 g/dL Final    Hct 07/10/2024 32.4 (L)  37.0 - 47.0 % Final    MCV 07/10/2024 97.0 (H)  80.0 - 94.0 fL Final    MCH 07/10/2024 32.6 (H)  27.0 - 31.0 pg Final    MCHC 07/10/2024 33.6  33.0 - 36.0 g/dL Final    RDW 07/10/2024 16.8  11.5 - 17.0 % Final    Platelet 07/10/2024 195  130 - 400 x10(3)/mcL Final    MPV 07/10/2024 9.5  7.4 - 10.4 fL Final    Neut % 07/10/2024 57.7  % Final    Lymph % 07/10/2024 22.0  % Final    Mono % 07/10/2024 14.1  % Final    Eos % 07/10/2024 5.1  % Final    Basophil % 07/10/2024 0.3  % Final    Lymph # 07/10/2024 1.89  0.6 - 4.6 x10(3)/mcL Final    Neut # 07/10/2024 4.94  2.1 - 9.2 x10(3)/mcL Final    Mono # 07/10/2024 1.21  0.1 - 1.3 x10(3)/mcL Final    Eos # 07/10/2024 0.44  0 - 0.9  x10(3)/mcL Final    Baso # 07/10/2024 0.03  <=0.2 x10(3)/mcL Final    IG# 07/10/2024 0.07 (H)  0 - 0.04 x10(3)/mcL Final    IG% 07/10/2024 0.8  % Final    NRBC% 07/10/2024 0.0  % Final     Radiology  Left femur x-ray 7/3/24 Impression: Screw and intramedullary azael identified fixating a fracture of the proximal femur some displaced fragments are identified position and alignment is otherwise close to anatomical hardware appears to be intact     Assessment/Plan:     88 y.o. WF admitted on 7/9/2024     Left intertrochanteric femur fracture with severe comminution 2/2 fall   - s/p IM nailing on 07/03/2024  - WBAT LLE  - continue                 Robaxin 500 mg t.i.d.                  Tylenol 1000 mg b.i.d.  - follow-up with orthopedic surgery on 7/25 for wound check and repeat x-rays     Dilated cardiomyopathy  - no PCI or CABG  - denies recent chest pain or discomfort  - continue                 Entresto 24-26 mg b.i.d.                  Atorvastatin 20 mg daily                  Coreg 12.5 mg b.i.d.    - ECG and Nitro PRN  - continue cardiac diet  - to follow-up with cardiology outpatient     Atrial fibrillation  - s/p pacemaker/defibrillator  - controlled ventricular rate without associated bleeding  - continue                Coumadin 2 mg daily     Anemia  - asymptomatic  - s/p transfusion                x2 units PRBC on 7/8/2024  - H/H stable   - no evidence of active bleeds  - will closely monitor and transfuse if needed      HTN  - blood pressures at goal  - continue                 Entresto 24-26 mg b.i.d.                  Coreg 12.5 mg b.i.d.                 Hydralazine 10 mg every 2 hours as needed for BP > 160/90                Labetalol 10 mg every 2 hours as needed for BP > 160/90  - low sodium diet     HLD  - FLP outpatient  - continue                Atorvastatin 20 mg daily     Gout  - stable  - continue                 Allopurinol 100 mg daily     DM type II  - HgA1c 5.2  - diet-controlled  - continue                 ISS   - CBGs AC/HS     Constipation  - stable  - continue                 Colace 100 mg b.i.d.                 MiraLax 17 g b.i.d.     Hypothyroidism  - TSH at goal  - continue                 Levothyroxine 125 mcg before breakfas     VTE Prophylaxis:  Coumadin 2 mg daily     POA: yes- Alyssa  Living will:  Yes  Contacts:  Av Jaquez (son) [phone]                     Levi Jaquez (spouse) [phone]     CODE STATUS: Full  Internal Medicine (attending): Tato Holley MD  Physiatry (consulting):  Alfred Soto MD     OUTPATIENT PROVIDERS  PCP: Franklyn Brito MD  Cardiology: Yefri Mukherjee MD   Orthopedic surgery: Steve Pierce D.O.     DISPOSITION:  Vital signs at goal.  Labs reviewed.  H&H stable.  INR therapeutic.  A1c 5.2.  TSH at goal.  Albumin 2.2, pre-albumin 7.2.  Metabolic acidosis trending up with improvement.  Renal indices trending down with improvement.  Bowel management, sleep hygiene, and appetite at goal.  Continues to progress well with therapies.  Monitor closely.  Notify of any acute changes.     Calli Salazar NP conducted independent physical examination and assisted with medical documentation.     Total time spent on this encounter including chart review and direct MD + NP 1-on-1 patient interaction: 52 minutes   Over 50% of this time was spent in counseling and coordination of care

## 2024-07-10 NOTE — PT/OT/SLP EVAL
[facility]  Speech Language Pathology Department  Clinical Swallow Evaluation    Patient Name:  Homa Jaquez   MRN:  [id number]    Recommendations     General recommendations:  SLP intervention not indicated  Solid texture recommendation:   Regular Solids - IDDSI Level 7  Liquid consistency recommendation:  Thin Liquids - IDDSI Level 0   Medications: per patient preference  Swallow strategies/precautions: small bites/sips and slow rate  Precautions:  standard    History     Homa Jaquez is a/n 88 y.o. female admitted for closed comminuted intertrochanteric fracture of left femur. Per nursing, pt with difficulty swallowing pills this morning and sucking on pills and holding them in her mouth to allow them to dissolve. Pt's son reports doing this at home as well. SLP consulted for evaluation of current swallowing function.    Past Medical History:   Diagnosis Date    A-fib     Chronic cystitis     GERD (gastroesophageal reflux disease)     Graves disease     Hypertension     Hypothyroidism, unspecified     Spinal stenosis      Past Surgical History:   Procedure Laterality Date    APPENDECTOMY      BLADDER SUSPENSION      CARDIOVERSION  04/01/2015    CATARACT EXTRACTION      HYSTERECTOMY      INTRAMEDULLARY RODDING OF FEMUR Left 7/3/2024    Procedure: INSERTION, INTRAMEDULLARY MELANIE, FEMUR;  Surgeon: Steve Pierce DO;  Location: [facility];  Service: Orthopedics;  Laterality: Left;  supine hana table c arm synthes    LAPAROSCOPIC CHOLECYSTECTOMY  01/12/2016    SPHINCTEROTOMY OF URETHRA  01/11/2016    TONSILLECTOMY AND ADENOIDECTOMY       Home diet texture/consistency: Regular and thin liquids  Current method of nutrition: PO diet (regular and thin liquids)    Patient complaint: none stated    Subjective     Patient awake, alert, and cooperative.    Patient goals: none stated   Spiritual/Cultural/Sikh Beliefs/Practices that affect care:  no    Pain/Comfort:   0/10    Respiratory Status:  room air    Restraints/positioning devices: none    Objective     ORAL MUSCULATURE  Dentition: own teeth  Secretion Management: adequate  Mucosal Quality: good  Facial Movement: WFL  Buccal Strength & Mobility: WFL  Mandibular Strength & Mobility: WFL  Oral Labial Strength & Mobility: WFL  Lingual Strength & Mobility: WFL  Vocal Quality: adequate    PO TRIALS  Consistency Fed By Oral Symptoms Pharyngeal Symptoms   Thin liquid by straw Self None None   Chewable solid Self Prolonged bolus formation/mastication (harder consistency; self corrected to smaller bites with mastication time WFL) None     Assessment     Pt did not demonstrate any overt s/sx of aspiration during this session. Pt with prolonged mastication on one trial 2/2 increased hardness of food item. Pt able to independently initiate smaller bites of item and mastication time returned to normal limits. Pt independently checking oral cavity for oral reside and/or pocketing and has strategies in place in event residue noted.     Pt did verbalize she has always had difficulty with swallowing pills and is usually able to take them whole, one at a time, in liquids at home with extra time. She reports she is trying to be efficient and swallow pills quickly as she knows nursing staff is tending to other pts as well.     Oropharyngeal swallow appears to be WFL and no skilled SLP intervention is warranted at this time. SLP to sign off. Please reconsult as warranted.     Education     Patient provided with verbal education regarding SLP POC.  Understanding was verbalized.    Plan     SLP Follow-Up:   No   Plan of Care reviewed with:   patient     Time Tracking     SLP Treatment Date:   07/10/24  Speech Start Time:  1225  Speech Stop Time:  1240     Speech Total Time (min):  15 min    Billable minutes:  Swallow and Oral Function Evaluation, 15 minutes     07/10/2024

## 2024-07-10 NOTE — CONSULTS
Inpatient Nutrition Assessment    Admit Date: 7/9/2024   Total duration of encounter: 1 day   Patient Age: 88 y.o.    Nutrition Recommendation/Prescription     Continue regular diet as tolerated. Will add coumadin modifier  Will provide Boost Original BID due to increased needs (240 kcal and 10 gm protein per serving)  Weekly weights    Communication of Recommendations:  EMR    Nutrition Assessment     Malnutrition Assessment/Nutrition-Focused Physical Exam    Malnutrition Context: other (see comments) (Does not meet criteria) (07/10/24 1433)                                                           A minimum of two characteristics is recommended for diagnosis of either severe or non-severe malnutrition.    Chart Review    Reason Seen: physician consult for evaluate and treat    Malnutrition Screening Tool Results   Have you recently lost weight without trying?: No  Have you been eating poorly because of a decreased appetite?: No   MST Score: 0   Diagnosis:  Left intertrochanteric femur fx with severe comminution 2/2 fall s/p IM nailing on 7/3    Relevant Medical History:        A-fib [I48.91]     Hypertension [I10]     GERD (gastroesophageal reflux disease) [K21.9]     Hypothyroidism, unspecified [E03.9]     Spinal stenosis [M48.00]     Graves disease [E05.00]     Chronic cystitis [N30.20]           Scheduled Medications:  acetaminophen, 1,000 mg, BID  allopurinol, 100 mg, Daily  atorvastatin, 20 mg, Daily  carvediloL, 12.5 mg, BID  docusate sodium, 100 mg, BID  levothyroxine, 125 mcg, Before breakfast  menthol-zinc oxide, , BID  methocarbamoL, 500 mg, TID  sacubitriL-valsartan, 1 tablet, BID  warfarin, 2 mg, Daily    Continuous Infusions:   PRN Medications:  acetaminophen, 500 mg, BID PRN  benzonatate, 100 mg, TID PRN  hydrALAZINE, 10 mg, Q4H PRN  hydrOXYzine pamoate, 50 mg, Nightly PRN  labetalol, 10 mg, Q4H PRN  melatonin, 6 mg, Nightly PRN  metoprolol, 10 mg, Q2H PRN  nitroGLYCERIN, 0.4 mg, Q5 Min  "PRN  ondansetron, 4 mg, Q6H PRN  ondansetron, 8 mg, Q8H PRN  ondansetron, 4 mg, Q8H PRN  oxyCODONE, 5 mg, Q6H PRN  tramadol, 50 mg, Q6H PRN    Calorie Containing IV Medications: no significant kcals from medications at this time    Recent Labs   Lab 07/04/24  0345 07/05/24  0720 07/06/24  0344 07/07/24  1009 07/08/24  0434 07/09/24  0506 07/09/24  0725 07/10/24  0523    137 134*  --  138 138  --  138   K 4.9 5.4* 4.7  --  4.8 4.9  --  4.2   CALCIUM 7.9* 7.7* 7.4*  --  7.8* 8.3*  --  8.2*   CO2 16* 19* 17*  --  17* 16*  --  21*   BUN 38.9* 52.5* 48.0*  --  36.2* 29.3*  --  26.9*   CREATININE 2.17* 2.70* 2.15*  --  1.29* 1.26*  --  1.10*   EGFRNORACEVR 22 17 22  --  40 41  --  49   GLUCOSE 192* 127* 120*  --  122* 116*  --  119*   BILITOT 0.7 0.6 0.6  --  0.9 1.9*  --  1.8*   ALKPHOS 76 72 74  --  87 111  --  92   ALT 11 11 12  --  10 13  --  11   AST 19 20 21  --  20 24  --  17   ALBUMIN 3.1* 3.0* 2.6*  --  2.2* 2.5*  --  2.2*   PREALB  --   --   --   --   --   --   --  7.2*   HGBA1C  --   --   --   --   --   --   --  5.2   WBC 11.17 10.07 6.75 7.21 6.23  --  9.13 8.58   HGB 9.0* 8.0* 7.7* 7.8* 7.3*  --  11.7* 10.9*   HCT 27.7* 24.3* 23.3* 23.8* 24.1*  --  34.5* 32.4*     Nutrition Orders:  Diet Adult Regular      Appetite/Oral Intake: good/% of meals  Factors Affecting Nutritional Intake: none identified  Social Needs Impacting Access to Food: none identified  Food/Yazdanism/Cultural Preferences: none reported  Food Allergies: none reported  Last Bowel Movement: 07/07/24  Wound(s):     Wound 07/02/24 2230 Skin Tear Left anterior;proximal;upper Arm-Tissue loss description: Full thickness     Comments    (7/10) Pt with good appetite and intake. Consumes 75% of meals. No reports of N/V/C/D. No difficulties chewing or swallowing. Med/labs reviewed. Pre-albumin 7.2(L) BUN/Cr elevated, however, renal indices are trending down. UBW: 155#     Anthropometrics    Height: 5' 4" (162.6 cm), Height Method: " Stated  Last Weight: 79.3 kg (174 lb 13.2 oz) (24 1430), Weight Method: Bed Scale  BMI (Calculated): 30  BMI Classification: obese grade I (BMI 30-34.9)     Ideal Body Weight (IBW), Female: 120 lb     % Ideal Body Weight, Female (lb): 145.69 %                    Usual Body Weight (UBW), k.5 kg  % Usual Body Weight: 112.72     Usual Weight Provided By: patient denies unintentional weight loss    Wt Readings from Last 5 Encounters:   24 79.3 kg (174 lb 13.2 oz)   24 70.3 kg (155 lb)   23 67 kg (147 lb 12.8 oz)   23 71.1 kg (156 lb 12.8 oz)   23 70 kg (154 lb 5.2 oz)     Weight Change(s) Since Admission:   Wt Readings from Last 1 Encounters:   24 1430 79.3 kg (174 lb 13.2 oz)   Admit Weight: 79.3 kg (174 lb 13.2 oz) (24 1430), Weight Method: Bed Scale    Estimated Needs    Weight Used For Calorie Calculations: 79.3 kg (174 lb 13.2 oz)  Energy Calorie Requirements (kcal): 1983 kcal (25 kcal/kg/BW)  Energy Need Method: Kcal/kg  Weight Used For Protein Calculations: 79.3 kg (174 lb 13.2 oz)  Protein Requirements: 103 gm (1.3 gm/kg/BW)  Fluid Requirements (mL): 1983 ml (1 ml/kcal)        Enteral Nutrition     Patient not receiving enteral nutrition at this time.    Parenteral Nutrition     Patient not receiving parenteral nutrition support at this time.    Evaluation of Received Nutrient Intake    Calories: not meeting estimated needs  Protein: not meeting estimated needs    Patient Education     Not applicable.    Nutrition Diagnosis     PES: Increased nutrient needs (protein/energy) related to increased protein energy demand for wound healing as evidenced by wound 24 Skin Tear Left anterior;proximal;upper Arm-Tissue loss description: Full thickness. (new)         Nutrition Interventions     Intervention(s): general/healthful diet, commercial beverage, and collaboration with other providers    Goal: Meet greater than 80% of nutritional needs by follow-up.  (new)  Goal: Consume % of oral supplements by follow-up. (new)    Nutrition Goals & Monitoring     Dietitian will monitor: energy intake, weight, electrolyte/renal panel, glucose/endocrine profile, and gastrointestinal profile  Discharge planning: resume home regimen  Nutrition Risk/Follow-Up: low (follow-up in 5-7 days)   Please consult if re-assessment needed sooner.

## 2024-07-10 NOTE — PT/OT/SLP EVAL
"Occupational Therapy Inpatient Rehab Evaluation    Name: Homa Jaquez  MRN: [id number]    Recommendations:     Discharge Recommendations:  Low Intensity Therapy   Discharge Equipment Recommendations: bedside commode   Barriers to discharge:  Decreased FM and self-care; weakness     Assessment:  Homa Jaquez is a 88 y.o. female admitted with a medical diagnosis of Closed comminuted intertrochanteric fracture of left femur.  She presents with the following impairments/functional limitations:  weakness, impaired endurance, impaired sensation, impaired self care skills, impaired functional mobility, gait instability, impaired balance, decreased upper extremity function, decreased lower extremity function, pain, decreased ROM, impaired skin, orthopedic precautions .    General Precautions: Standard, fall     Orthopedic Precautions:LLE weight bearing as tolerated     Braces: N/A    Rehab Prognosis: Fair; patient would benefit from acute skilled OT services to address these deficits and reach maximum level of function.      History:     Past Medical History:   Diagnosis Date    A-fib     Chronic cystitis     GERD (gastroesophageal reflux disease)     Graves disease     Hypertension     Hypothyroidism, unspecified     Spinal stenosis        Past Surgical History:   Procedure Laterality Date    APPENDECTOMY      BLADDER SUSPENSION      CARDIOVERSION  04/01/2015    CATARACT EXTRACTION      HYSTERECTOMY      INTRAMEDULLARY RODDING OF FEMUR Left 7/3/2024    Procedure: INSERTION, INTRAMEDULLARY MELANIE, FEMUR;  Surgeon: Steve Pierce DO;  Location: [facility];  Service: Orthopedics;  Laterality: Left;  supine hana table c arm synthes    LAPAROSCOPIC CHOLECYSTECTOMY  01/12/2016    SPHINCTEROTOMY OF URETHRA  01/11/2016    TONSILLECTOMY AND ADENOIDECTOMY         Subjective     Orientation: Oriented x4    Chief Complaint: Pt. Fearful of movement 2* increased pain    Patient/Family Comments/goals: "I want to walk again" " "    Vitals  Vitals at Rest  /73   HR 85   O2 Sat (!) 94 %   Pain Pain Rating 1: 7/10  Location - Side 1: Left  Location - Orientation 1: upper  Location 1: leg  Pain Addressed 1: Reposition, Pre-medicate for activity  Pain Rating Post-Intervention 1: 8/10  Pain Rating 2:  (with movement)         Respiratory Status: Room air    Patients cultural, spiritual, Uatsdin conflicts given the current situation: yes       Living Environment   Living Environment  People in Home: spouse  Shower Setup: Walk-in shower    Prior Level of Function  BADL: Independent ~ per chart    IADL: Activity did not occur     Equipment used at home:  .  DME owned (not currently used): shower chair and 4 wheel walker .      Upon discharge, patient will have assistance from her son and .    Objective:     Patient found HOB elevated with peripheral IV  upon OT entry to room.    Mobility   Patient completed:  Rolling/Turning to Right with maximal assistance  Supine to Sit with maximal assistance  Sit to Stand Transfer with maximal assistance with rolling walker  Stand to Sit Transfer with moderate assistance with rolling walker and v/c's for hand placement   Toilet Transfer Stand Pivot technique with maximal assistance and of 2 persons with  rolling walker and bedside commode    Functional Mobility   Pt. Limited by pain and [place] IRA transfers only    ADLs     Current Status   Eating 5   Oral Hygiene 88 seated in w/c set up    Shower, Bathe Self 2 sponge bath   Upper Body Dressing 4   Lower Body Dressing 1   Toileting Hygiene 1   Toilet Transfer 2   Putting On, Taking Off Footwear 1     Limiting Factors for ADLs: motor, sensory, endurance, limited ROM, balance, weakness, safety awareness, pain, and [place] IRA and decreased AROM in B shoulders      Exams     ROM:          -       WFL, except shoulders 2 * "Bursitis"     Hand Dominance: Right    ROM Hand  Left Hand: WFL  Right Hand: WFL    Strength  Overall Strength: Pt. Limited by fear; decreased " "AROM and weakened core       Sensation  Pt. States that B feet are "sensitive and numb" B hands "feel normal"     Coordination:      -       Intact    Tone  Left: WNL  Right: WNL    Visual/Perceptual  Intact and wears reading glasses    Cognition:   WFL, except repeated v/c's required for hand placement c sit <> stand and functional transfers; Pt. Is Hard of Hearing.     Balance    Sitting  Sitting Surface: EOB  Static: No UE Support, Good (-)  Dynamic: Pt. C decreased core strength    Standing  Static: Bilateral UE Extremity Support, Fair (-) Max A to maintain       Righting Reaction:   Delayed     Posture/Deviations  Forward flexed c standing     Additional Treatments   Sponge bath, toileting c sm. BM. Pt. Unable to assist.     LifeStyle Change and Education     Patient left up in chair with call button in reach and chair alarm on.    Education provided: Roles and goals of OT, ADLs, transfer training, bed mobility, body mechanics, assistive device, wheelchair precautions, sequencing, safety precautions, post-op precautions, equipment recommendations, and home safety    Multidisciplinary Problems       Occupational Therapy Goals          Problem: Occupational Therapy    Goal Priority Disciplines Outcome Interventions   Occupational Therapy Goal     OT, PT/OT Progressing    Description: Quality Indicators:   Admission Eval 07/10/2024 Goal Current  By Re-Eval;   Pt. Will be Independent c eating    Oral Hygiene Pt. Will be Mod A c oral care while standing at sink and SV/Touch A by D/C.    Toileting Hygiene Pt. Will be Max A c toileting and Mod A by D/C     Shower/Bathe Self Pt. Will be Mod A c bathing and SVN/Touch A c AE by D/C    UBD Pt. Will be SBA by Re-Eval    LBD Pt. Will be Mod A by Re-Eval and Touch A by D/C c AE     On/Off Footwear Mod A c AE by Re-Eval and Touch A ?SVN by D/C      Toilet Transfer Mod A by Re-Eval and Touch A by D/C                              Plan     During this hospitalization, patient to " be seen  (5-7 x's weekly) to address the identified rehab impairments via self-care/home management, therapeutic activities, therapeutic exercises, wheelchair management/training and progress toward the following goals:    Plan of Care Expires:  07/16/24    Time Tracking     OT Received On: 07/10/24  Time In 0830     Time Out 1000  Total Time 90 min  Therapy Time: OT Individual: 90  Missed Time:    Missed Time Reason:      Billable Minutes: Evaluation 30 and Self Care/Home Management 60    07/10/2024

## 2024-07-10 NOTE — PROGRESS NOTES
"   07/10/24 1300   Rec Therapy Time Calculation   Date of Treatment 07/10/24   Rec Start Time 1300   Rec Stop Time 1330   Rec Total Time (min) 30 min   Time   Treatment time 2 units   Charges   $Therapeutic Exercise 2 units   Precautions   General Precautions fall;hearing impaired   Orthopedic Precautions  LLE weight bearing as tolerated   Braces N/A   Pain/Comfort   Pain Rating 1 5/10   Location - Side 1 Left   Location - Orientation 1 lower   Location 1 hip   Pain Addressed 1 Reposition   OTHER   Treatment Diagnosis Fall, closed L intertrochanteric femur fx, severe comminution, s/p IM nailing, a-fib, GERD, chronic cystilis, UTI, Graves Disease, HTN, spinal stenosis, hypothyroidism   Rehab identified problem list/impairments weakness;impaired endurance;impaired functional mobility;gait instability;decreased coordination;decreased upper extremity function;decreased lower extremity function;decreased safety awareness;decreased ROM;impaired coordination   Values/Beliefs/Spiritual Care   Spiritual, Cultural Beliefs, Hindu Practices, Values that Affect Care no   Prior Living/ADLs   Admit Date 07/10/24   People in Home spouse   Living Arrangements apartment   Patient responsibilites: Leisure/play/hobbies   Number of Children 5   Occupation Retired: School Nurse   Previous Hand Domiance Right   Current Hand Dominance Right   Overall Level of Functioning   Activity Tolerance Independent   Dynamic Sitting Balance/Reaching Min A   Dynamic Standing Balance/Reaching Does not occur   Right UE Coodination/Dexterity Independent   Left UE Coordination/Dexterity Min A   Problem Solving/Sequencing Skills Standby Assist   Memory Recall Standby Assist   R/L Neglect/Inattention Does not occur   Attention Span Mod Indep   Social Interaction Independent   Patient Goals   Patient Goal 1 "I want to walk again."   Recreational Therapy Short Term Goals   Short Term Goal 1 Will increase activity tolerance to supervision   Short Term Goal " 1 Progression Initiated   Short Term Goal 2 Will improve dynamic sitting balance/reaching to supervision   Short Term Goal 2 Progression Initiated   Recreational Therapy Long Term Goals   Long Term Goal 1 Will increase sit to stand to supervision   Long Term Goal 1 Progression Initiated   Long Term Goal 2 Will increase standing tolerance to 5 minutes   Long Term Goal 2 Progression Initiated   Long Term Goal 3 Will improve dynamic standing balance/reaching to supervision   Plan   Patient to be seen Daily   Planned Duration 2 weeks   Treatments Planned Coordination;Energy conservation training;Safety education   Treatment plan/goals estblished with Patient/Caregiver Yes

## 2024-07-10 NOTE — PLAN OF CARE
Admission PHQ completed (score=0 negative).  No mood issues.    Relayed my role in CM and discharge planning.  Offered encouragement.

## 2024-07-10 NOTE — PT/OT/SLP EVAL
Physical Therapy Rehab Evaluation    Patient Name:  Homa Jaquez   MRN:  [id number]    Recommendations:     Discharge Recommendations:  Moderate Intensity Therapy (pending progress)   Discharge Equipment Recommendations:     Barriers to discharge: Increased level of assist, Impaired functional mobility , and Severity of Deficits     Assessment:     Homa Jaquez is a 88 y.o. female admitted with a medical diagnosis of Closed comminuted intertrochanteric fracture of left femur.  She presents with the following impairments/functional limitations:  weakness, impaired endurance, impaired sensation, impaired self care skills, impaired functional mobility, decreased lower extremity function, decreased safety awareness, pain, edema.    Rehab Diagnosis: fall with L femur fx s/p IM nail    General Precautions: Standard, fall, other (see comments) (hard of hearing)     Orthopedic Precautions: LLE weight bearing as tolerated     Braces: N/A    Rehab Prognosis: Good; patient would benefit from acute skilled PT services to address these deficits and reach maximum level of function.      History:     Past Medical History:   Diagnosis Date    A-fib     Chronic cystitis     GERD (gastroesophageal reflux disease)     Graves disease     Hypertension     Hypothyroidism, unspecified     Spinal stenosis        Past Surgical History:   Procedure Laterality Date    APPENDECTOMY      BLADDER SUSPENSION      CARDIOVERSION  04/01/2015    CATARACT EXTRACTION      HYSTERECTOMY      INTRAMEDULLARY RODDING OF FEMUR Left 7/3/2024    Procedure: INSERTION, INTRAMEDULLARY MELANIE, FEMUR;  Surgeon: Steve Pierce, ;  Location: [facility];  Service: Orthopedics;  Laterality: Left;  supine hana table c arm synthes    LAPAROSCOPIC CHOLECYSTECTOMY  01/12/2016    SPHINCTEROTOMY OF URETHRA  01/11/2016    TONSILLECTOMY AND ADENOIDECTOMY         Subjective     Patient Goal: I want to go home to     Patient Comments: pt agreeable to participate with  PT    Patients cultural, spiritual, Yarsani conflicts given the current situation: no       Living Environment  People in Home: spouse  Home Accessibility: wheelchair accessible  Number of Stairs, Main Entrance: none (ramp)  Home Layout: Able to live on 1st floor    Prior Level of Function  Ambulation Skills: needs device  Stairs:  (not performed prior to admission)  Transfer Skills: needs device    Upon discharge, patient will have assistance from family.    Objective:     Communicated with pt prior to session.  Patient found up in chair upon PT entry to room at start of both sessions.    Vitals   /62   HR 65   O2 Sat     Pain Pain Rating 1: 8/10  Location - Side 1: Left  Location 1: leg  Pain Addressed 1: Reposition, Cessation of Activity, Nurse notified  Pain Rating Post-Intervention 1: 5/10     Respiratory Status: Room air    Exams  Cognitive Exam:  Patient is oriented to Person, Place, Time, and Situation    Functional Mobility   Admit Current   Status Goal   Functional Area: Care Score: Care Score: Care Score:   Roll Left and Right 7 7  Pt refused d/t pain Partial/moderate assistance   Sit to Lying 7 7  Pt refused d/t pain during AM session    Performed in PM session, requiring mod A x1 person for B LE assist + min A x1 person for trunk assist Partial/moderate assistance   Lying to Sitting on Side of Bed 7 7  Pt refused d/t pain Partial/moderate assistance   Sit to Stand 1 1  Completed in AM session multiple times with mod A x2 with use of RW.     Completed in PM session x2 completions with max A x1 with use of RW. Partial/moderate assistance   Chair/Bed-to-Chair Transfer 1 1  Stand-step transfer performed with RW x2 completions with mod A x1 + min A x1; stand-pivot transfer performed with physical assist from PT and total A x1. Partial/moderate assistance   Car Transfer 88 88 Partial/moderate assistance   Walk 10 Feet 88 88 Partial/moderate assistance   Walk 50 Feet with Two Turns 88 88  Partial/moderate assistance   Walk 150 Feet 88 88 Not attempted due to medical/safety concerns   Walk 10 Feet Uneven Surface 88 88 Partial/moderate assistance   1 Step (Curb) 88 88 Partial/moderate assistance   4 Steps 9 9 Not attempted due to medical/safety concerns   12 Steps 9 9 Not attempted due to medical/safety concerns   Picking Up Object 88 88 Partial/moderate assistance   Wheel 50 Feet with Two Turns 9 9 Set-up/clean-up   Wheel 150 Feet 9 9  Education provided on w/c mobility/management. Pt able to manually propel w/c SBA with B ' + 150'. Slow speed with small B pushes. Set-up/clean-up     Therapeutic Activities and Exercises:  Patient educated on safety while in hospital including calling nurse for mobility  Patient educated about importance of OOB mobility and remaining up in chair most of the day.    Multiple sit<>stands performed in // bars, mod-max A provided for completion. VC needed for technique and hand placement.    Multiple sit<>stands performed with use of RW, max A provided for completion. VC needed for technique and hand placement.    Activity Tolerance: Poor    Patient left up in chair with call button in reach at end of AM session and supine in bed with call button in reach at end of PM session.    Education Provided: roles and goals of PT/PTA, transfer training, bed mob, safety awareness, body mechanics, assistive device, strengthening exercises, and fall prevention    Expected compliance: Moderate compliance      Plan:     During this hospitalization, patient to be seen  (5-7x/week) to address the identified rehab impairments via gait training, therapeutic activities, therapeutic exercises, neuromuscular re-education, wheelchair management/training and progress toward the following goals:    GOALS:   Multidisciplinary Problems       Physical Therapy Goals          Problem: Physical Therapy    Goal Priority Disciplines Outcome Goal Variances Interventions   Physical Therapy Goal      PT, PT/OT Progressing     Description: Bed Mobility:  Roll left and right with partial/moderate assist.   Sit to supine transfer with partial/moderate assist.   Supine to sit transfer with partial/moderate assist.     Transfers:  Sit to stand transfer with partial/moderate assist using RW.   Bed to chair transfer with partial/moderate assist Stand Pivot  using RW.   Car transfer with partial/moderate assist using RW.    an object from the ground in standing position with partial/moderate assist using RW and reacher.     Mobility:  Ambulate 10 feet with partial/moderate assist using RW vs Parallel bars.  Manual wheelchair 200 feet with supervision/touching assist using Bilateral upper extremity.                          Plan of Care Expires:     PT Next Visit Date: 07/16/24  Plan of Care reviewed with: patient    Additional Infomation:     Pt seen [id number] and [id number]    Time Tracking:     Therapy Time   PT Start Time:  (1030; 1400)  PT Stop Time:  (1200; 1430)  PT Individual: 120  Missed Time:    Time Missed due to:      Billable Minutes: Evaluation 30 min, Therapeutic Activity 60 min, and Train/Wheelchair Management 30 min    07/10/2024

## 2024-07-10 NOTE — PLAN OF CARE
Problem: Fall Injury Risk  Goal: Absence of Fall and Fall-Related Injury  Outcome: Progressing  Intervention: Identify and Manage Contributors  Flowsheets (Taken 7/10/2024 1137)  Self-Care Promotion:   independence encouraged   BADL personal objects within reach   BADL personal routines maintained  Medication Review/Management: medications reviewed  Intervention: Promote Injury-Free Environment  Flowsheets (Taken 7/10/2024 1137)  Safety Promotion/Fall Prevention:   assistive device/personal item within reach   Fall Risk reviewed with patient/family   Fall Risk signage in place   gait belt with ambulation   nonskid shoes/socks when out of bed

## 2024-07-10 NOTE — PT/OT/SLP EVAL
Recreational Therapy Evaluation      Date of Treatment: 07/10/24  Start Time: 1300  Stop Time: 1330  Total Time: 30 min  Missed Time:    Assessment      Homa Jaquez is a 88 y.o. female admitted with a medical diagnosis of Closed comminuted intertrochanteric fracture of left femur.  She presents with the following impairments/functional limitations:  weakness, impaired endurance, impaired functional mobility, gait instability, decreased coordination, decreased upper extremity function, decreased lower extremity function, decreased safety awareness, decreased ROM, impaired coordination .    Rehab Diagnosis:     Recent Surgery:     General Precautions: Standard, fall, hearing impaired     Orthopedic Precautions:LLE weight bearing as tolerated     Braces: N/A    Rehab Prognosis: Fair; patient would benefit from acute skilled Recreational Therapy services to address these deficits and reach maximum level of function.      Impairments: Coordination deficits, Decreased knowledge of condition, Endurance deficits, Mobility deficits, Pain, Safety awareness deficits, and Strength deficits  Rehab Potential: Fair, due to: Reduced endurance during therapy   Treatment Recommendations: Continue with Skill TR Service to facilitate functional independence and address impairments/limitations   Treatment Diagnosis: Fall, closed L intertrochanteric femur fx, severe comminution, s/p IM nailing, a-fib, GERD, chronic cystilis, UTI, Graves Disease, HTN, spinal stenosis, hypothyroidism  Orientation: Oriented x4  Affect/Behavior: Cooperative, Anxious, and Flat  Safety/Judgement: intact   Basic Command Following: intact  Spiritual Cultural: no        History     Past Medical History:   Diagnosis Date    A-fib     Chronic cystitis     GERD (gastroesophageal reflux disease)     Graves disease     Hypertension     Hypothyroidism, unspecified     Spinal stenosis        Past Surgical History:   Procedure Laterality Date    APPENDECTOMY       "BLADDER SUSPENSION      CARDIOVERSION  04/01/2015    CATARACT EXTRACTION      HYSTERECTOMY      INTRAMEDULLARY RODDING OF FEMUR Left 7/3/2024    Procedure: INSERTION, INTRAMEDULLARY MELANIE, FEMUR;  Surgeon: Steve Pierce DO;  Location: [facility];  Service: Orthopedics;  Laterality: Left;  supine hana table c arm synthes    LAPAROSCOPIC CHOLECYSTECTOMY  01/12/2016    SPHINCTEROTOMY OF URETHRA  01/11/2016    TONSILLECTOMY AND ADENOIDECTOMY         Home Environment     Admit Date: 07/10/24  Living Situation  People in Home: spouse  Lives in: apartment  Patients Responsibilities: Leisure/play/hobbies  Number of Children: 5  Occupation:Retired: School Nurse    Instrumental Activities of Daily Living     Previous Hand Dominance: Right Current Hand Dominance: Right     Other iADL Information:        Cognitive Skills Building         Cognitive Observation Activity Assist Position Equipment Response            Comment:      Dynamic Activities      Activity Assist Position Equipment Response   Activity 1 Bocce ball contact guard assistance and minimum assistance Sitting Bocce balls fair   Comment: Dynamic sitting balance/reaching was contact guard. Sitting tolerance was 3 minutes.  Verbal cues needed to sit away from back of w/c.  RUE coordination was setup and LUE coordination was min.  Problem solving and sequencing skills were supervision.  Affect was flat       Fine Motor Activities      Activity Assist Position Equipment Response           Comment:        Goals     Patient Goals  Patient Goal 1: "I want to walk again."    Short Term Goals    Goal  Goal Status   Will increase activity tolerance to supervision Initiated   Will improve dynamic sitting balance/reaching to supervision Initiated                 Long Term Goals    Goal Goal Status   Will increase sit to stand to supervision Initiated   Will increase standing tolerance to 5 minutes Initiated   Will improve dynamic standing balance/reaching to supervision         "         Plan       Patient to be seen: Daily  Duration: 2 weeks  Treatments planned: Coordination, Energy conservation training, Safety education  Treatment plan/goals established with Patient/Caregiver: Yes

## 2024-07-10 NOTE — PLAN OF CARE
Problem: Occupational Therapy  Goal: Occupational Therapy Goal  Description: Quality Indicators:   Admission Eval 07/10/2024 Goal Current  By Re-Eval;   Pt. Will be Independent c eating    Oral Hygiene Pt. Will be Mod A c oral care while standing at sink and SV/Touch A by D/C.    Toileting Hygiene Pt. Will be Max A c toileting and Mod A by D/C     Shower/Bathe Self Pt. Will be Mod A c bathing and SVN/Touch A c AE by D/C    UBD Pt. Will be SBA by Re-Eval    LBD Pt. Will be Mod A by Re-Eval and Touch A by D/C c AE     On/Off Footwear Mod A c AE by Re-Eval and Touch A ?SVN by D/C      Toilet Transfer Mod A by Re-Eval and Touch A by D/C         Outcome: Progressing

## 2024-07-11 LAB
INR PPP: 2.4 (ref 2–3)
PROTHROMBIN TIME: 27 SECONDS (ref 11.7–14.5)

## 2024-07-11 PROCEDURE — 85610 PROTHROMBIN TIME: CPT | Performed by: NURSE PRACTITIONER

## 2024-07-11 PROCEDURE — 99233 SBSQ HOSP IP/OBS HIGH 50: CPT | Mod: ,,, | Performed by: NURSE PRACTITIONER

## 2024-07-11 PROCEDURE — 99900031 HC PATIENT EDUCATION (STAT)

## 2024-07-11 PROCEDURE — 97535 SELF CARE MNGMENT TRAINING: CPT

## 2024-07-11 PROCEDURE — 97530 THERAPEUTIC ACTIVITIES: CPT | Mod: CQ

## 2024-07-11 PROCEDURE — 94799 UNLISTED PULMONARY SVC/PX: CPT

## 2024-07-11 PROCEDURE — 36415 COLL VENOUS BLD VENIPUNCTURE: CPT | Performed by: NURSE PRACTITIONER

## 2024-07-11 PROCEDURE — 25000003 PHARM REV CODE 250: Performed by: NURSE PRACTITIONER

## 2024-07-11 PROCEDURE — 97530 THERAPEUTIC ACTIVITIES: CPT

## 2024-07-11 PROCEDURE — 97110 THERAPEUTIC EXERCISES: CPT

## 2024-07-11 PROCEDURE — 11800000 HC REHAB PRIVATE ROOM

## 2024-07-11 PROCEDURE — 94761 N-INVAS EAR/PLS OXIMETRY MLT: CPT

## 2024-07-11 RX ORDER — TRAMADOL HYDROCHLORIDE 50 MG/1
50 TABLET ORAL
Status: DISCONTINUED | OUTPATIENT
Start: 2024-07-11 | End: 2024-07-13 | Stop reason: HOSPADM

## 2024-07-11 RX ORDER — CARVEDILOL 6.25 MG/1
6.25 TABLET ORAL 2 TIMES DAILY
Status: DISCONTINUED | OUTPATIENT
Start: 2024-07-11 | End: 2024-07-13 | Stop reason: HOSPADM

## 2024-07-11 RX ORDER — ACETAMINOPHEN 325 MG/1
650 TABLET ORAL
Status: DISCONTINUED | OUTPATIENT
Start: 2024-07-11 | End: 2024-07-13 | Stop reason: HOSPADM

## 2024-07-11 RX ORDER — METHOCARBAMOL 500 MG/1
500 TABLET, FILM COATED ORAL 3 TIMES DAILY PRN
Status: DISCONTINUED | OUTPATIENT
Start: 2024-07-11 | End: 2024-07-13 | Stop reason: HOSPADM

## 2024-07-11 RX ADMIN — TRAMADOL HYDROCHLORIDE 50 MG: 50 TABLET, COATED ORAL at 11:07

## 2024-07-11 RX ADMIN — LEVOTHYROXINE SODIUM 125 MCG: 125 TABLET ORAL at 05:07

## 2024-07-11 RX ADMIN — CARVEDILOL 12.5 MG: 6.25 TABLET, FILM COATED ORAL at 08:07

## 2024-07-11 RX ADMIN — SODIUM CHLORIDE 250 ML: 9 INJECTION, SOLUTION INTRAVENOUS at 11:07

## 2024-07-11 RX ADMIN — DOCUSATE SODIUM 100 MG: 100 CAPSULE, LIQUID FILLED ORAL at 08:07

## 2024-07-11 RX ADMIN — CARVEDILOL 6.25 MG: 6.25 TABLET, FILM COATED ORAL at 08:07

## 2024-07-11 RX ADMIN — Medication: at 08:07

## 2024-07-11 RX ADMIN — SACUBITRIL AND VALSARTAN 1 TABLET: 24; 26 TABLET, FILM COATED ORAL at 08:07

## 2024-07-11 RX ADMIN — METHOCARBAMOL 500 MG: 500 TABLET ORAL at 05:07

## 2024-07-11 RX ADMIN — ATORVASTATIN CALCIUM 20 MG: 10 TABLET, FILM COATED ORAL at 08:07

## 2024-07-11 RX ADMIN — TRAMADOL HYDROCHLORIDE 50 MG: 50 TABLET, COATED ORAL at 04:07

## 2024-07-11 RX ADMIN — ACETAMINOPHEN 1000 MG: 500 TABLET ORAL at 08:07

## 2024-07-11 RX ADMIN — ALLOPURINOL 100 MG: 100 TABLET ORAL at 08:07

## 2024-07-11 RX ADMIN — TRAMADOL HYDROCHLORIDE 50 MG: 50 TABLET, COATED ORAL at 08:07

## 2024-07-11 RX ADMIN — WARFARIN SODIUM 2 MG: 2 TABLET ORAL at 04:07

## 2024-07-11 RX ADMIN — TRAMADOL HYDROCHLORIDE 50 MG: 50 TABLET, COATED ORAL at 02:07

## 2024-07-11 NOTE — PLAN OF CARE
Problem: Diabetes Comorbidity  Goal: Blood Glucose Level Within Targeted Range  Outcome: Progressing     Problem: Fall Injury Risk  Goal: Absence of Fall and Fall-Related Injury  Outcome: Progressing

## 2024-07-11 NOTE — PT/OT/SLP PROGRESS
Recreational Therapy Treatment    Date of Treatment: 07/11/24  Start Time: 1300  Stop Time: 1330  Total Time: 30 min  Missed Time:    General Precautions: fall, hearing impaired  Ortho Precautions: LLE weight bearing as tolerated  Braces: N/A    Vitals   Vitals at Rest  BP    HR    O2 Sat    Pain      Vitals With Activity  BP    HR    O2 Sat    Pain        Treatment     Cognitive Skills Building   Cognitive Observation Activity Assist Position Equipment Response            Comment:          Dynamic Activities   Activity Assist Position Equipment Response   Activity 1 Horseshoes supervision Sitting Rubber horseshoes fair   Comment: Dynamic sitting balance/reaching was supervision/setup.  Sitting tolerance was 5 minutes.  UE coordination was I. Problem solving skills were setup.  Some delay noted in responses. Flat but cooperative       Fine Motor Activities   Activity Assist Position Equipment Response           Comment:          Additional Info: Pt's sitting tolerance improved today    Goals     Short Term Goals    Goal  Goal Status   Will increase activity tolerance to supervision Met   Will improve dynamic sitting balance/reaching to supervision Met                 Long Term Goals    Goal Goal Status   Will increase sit to stand to supervision Progressing   Will increase standing tolerance to 5 minutes Progressing   Will improve dynamic standing balance/reaching to supervision Progressing

## 2024-07-11 NOTE — PROGRESS NOTES
Subjective  HPI:  87 yo WF with a PMH of A-fib, GERD, chronic cystitis, multiple UTIs, Graves disease, HTN, spinal stenosis, and hypothyroidism presented to the ED at [facility] on 7/2/24 following a fall. Patient reported she lost control of her walker and fell away from it. She fell on her left side and reported left hip pain. She reported her left hip has always been the weakest. Patient on coumadin. Patient's caregiver reported that patient cannot tolerate Lovenox or Eliquis, and is allergic to phenergan. BP on arrival was elevated at 160/86. Chest XR showed prominence of the interstitium may be related to interstitial edema in the setting of enlarged cardiac silhouette. Hip XR showed left femur intertrochanteric fracture with mild displacement. Orthopedic surgeon was consulted with recommendation for surgical intervention needed. On 7/3, patient underwent treatment of the left intertrochanteric femur fracture with IM nailing by Dr. JESUSITA Pierce. Patient was placed WBAT to LLE. On 7/4, labs showed low H&H of 9.0 & 27.7, low PLT of 105, elevated chloride of 113, low CO2 of 16, elevated BUN/Cr of 38.9/ 2.17, elevated glucose of 192, low calcium of 7.9, and low albumin of 3.1. PT/OT evals completed with deficits noted with recommendation for high intensity therapy needed. Wound care consulted for skin tear to left upper arm which was a result from fall prior to admission with recommendation for wound cleaser and applied adaptic, bordered foam, pressure injury also noted to sacral spine present on admission with recommendation to cleanse with normal saline and applied sacral bordered foam. On 7/5, daily dry dressing changes started and as needed. Labs showed drop in H&H to 8.0 & 24.3. Blood pressure was ranging on low side so was given IVF @ 75cc/hr and Entresto was held. On 7/6, BP improved on NS @ 75cc/hr. INR of 12.3 noted so was given 1 dose of vitamin K, and warfarin was held. CT of head done due to word finding problems  with no acute findings, chronic age related changes, and sinusitis. On 7/7, labs showed low H&H of 7.8 & 23.8, PT/INR of 18.4/ 1.6. Patient had a BM. On 7/8, labs showed low H&H of 7.3 & 24.1 so patient was transfused 2 units of PRBCs. On 7/9, labs showed low H&H of 11.7 & 34.5, elevated chloride of 113, elevated BUN/Cr of 29.3/ 1.26 (trending down), elevated glucose of 116, low albumin of 2.5. Patient reported no pain to left hip at rest, but does have some tightness and soreness with therapy. Entresto resumed at a lower dose. Patient will need followup with Dr Brito (PCP) in 2-3 weeks when out of rehab. Patient is AAOx4.   Participating with therapy. Functional status includes setup/supervision needed for eating, moderate assist for bed mobility, minimal to moderate assist for transfer with RW, minimal assist x2 for walking 2ft with RW, setup assist for grooming, total assist for lower body dressing, and max assist for toileting. Patient was evaluated, accepted, and admitted to inpatient rehab to improve functional status. Transferred to [facility] on 7/9 without incident.   7/11: Seen with PT, seated in WC within parallel bars. PT reports some complaints of dizziness with gait. At rest, /71 HR 51. Once standing, BP drops to 99/47 and HR 73. Denies dizziness, but complaints of pain. Hip pain not bad at rest. Rates 7/10 when standing and increases with movements. Change Robaxin to PRN with hypotension, and schedule Tramadol as patient reluctant to ask for pain medication. VSSAF with above mentioned. Discussed with IM. 250mL IVF bolus. Medications being adjusted.            Review of Systems  Depression/Anxiety: no complaints     Pain: LLE with movement  acetaminophen tablet 1,000 mg BID. Change 650mg TID AC  methocarbamoL tablet 500 mg TID. Change to PRN  ADD traMADoL tablet 50 mg TID AC  acetaminophen tablet 500 mg BID PRN mild pain  traMADoL tablet 50 mg q6h PRN mod pain  oxyCODONE immediate release tablet 5  mg q6h PRN severe pain    Bowels/Bladder: last BM 7/10  Appetite: fair     Sleep: good            Physical Exam  General: well-developed, well-nourished, in no acute distress  Respiratory: equal chest rise, no SOB, no audible wheeze  Cardiovascular: regular rate and rhythm, no edema  Gastrointestinal: soft, non-tender, non-distended   Musculoskeletal: decreased ROM/strength to LLE  Integumentary: left hip incision x 3-staples, dressing-c/d/I, LUE skin tear, buttocks-reddened, right groin-Intertrigo  Neurologic: cranial nerves intact, no signs of peripheral neurological deficit, motor/sensory function intact          Labs:   Latest Reference Range & Units 07/11/24 04:59   PT 11.7 - 14.5 seconds 27.0 (H)   INR 2.0 - 3.0  2.4   (H): Data is abnormally high          Assessment/Plan  Hospital   Acute on chronic systolic heart failure   Closed comminuted intertrochanteric fracture of left femur   Fall   History of recent blood transfusion     Non-Hospital   Right hip pain   Atrial fibrillation   Cobalamin deficiency   Diabetes mellitus   Gout   Hyperlipidemia   Primary hypertension   Osteoarthritis   Stenosis of carotid artery   Decreased functional mobility   BARRY (acute kidney injury)   Pacemaker   Hypothyroidism, unspecified   GERD (gastroesophageal reflux disease)   Graves disease   Spinal stenosis   Chronic cystitis       Wounds: left hip incision x 3-staples, dressing-c/d/I, LUE skin tear, buttocks-reddened, right groin-Intertrigo  On 7/3, patient underwent treatment of the left intertrochanteric femur fracture with IM nailing by Dr. JESUSITA Pierce.   Precautions: WBAT to LLE.  Bracing/AD: RW  Swallowing: Regular Diet  Function: Tolerating therapy. Continue PT/OT  VTE Prophylaxis:   warfarin (COUMADIN) tablet 2 mg qd  Code Status: FULL CODE   Discharge: Lives with her spouse in [place] in an apartment attached to the sons home with a ramp to enter with bilateral railings to enter the residence. Completed college. She has  no  history. 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 Pt. And spouse live in an apartment attached to the sons home. The patient will have family help once discharged from rehab.  Children: (5). Date pending.

## 2024-07-11 NOTE — PROGRESS NOTES
[facility] - Rehab Inpatient Services  Wound Care    Patient Name:  Homa Jaquez   MRN:  [id number]  Date: 7/11/2024  Diagnosis: Closed comminuted intertrochanteric fracture of left femur    History:     Past Medical History:   Diagnosis Date    A-fib     Chronic cystitis     GERD (gastroesophageal reflux disease)     Graves disease     Hypertension     Hypothyroidism, unspecified     Spinal stenosis        Social History     Socioeconomic History    Marital status:    Tobacco Use    Smoking status: Former     Types: Cigarettes    Smokeless tobacco: Never   Substance and Sexual Activity    Alcohol use: Yes    Drug use: Never    Sexual activity: Not Currently     Social Determinants of Health     Financial Resource Strain: Low Risk  (7/4/2024)    Overall Financial Resource Strain (CARDIA)     Difficulty of Paying Living Expenses: Not hard at all   Food Insecurity: No Food Insecurity (7/4/2024)    Hunger Vital Sign     Worried About Running Out of Food in the Last Year: Never true     Ran Out of Food in the Last Year: Never true   Transportation Needs: No Transportation Needs (7/9/2024)    PRAPARE - Transportation     Lack of Transportation (Medical): No     Lack of Transportation (Non-Medical): No   Stress: No Stress Concern Present (7/4/2024)    [employer] [place] [employer] - Occupational Stress Questionnaire     Feeling of Stress : Not at all   Housing Stability: Low Risk  (7/4/2024)    Housing Stability Vital Sign     Unable to Pay for Housing in the Last Year: No     Homeless in the Last Year: No       Precautions:     Allergies as of 07/09/2024 - Reviewed 07/09/2024   Allergen Reaction Noted    Cefadroxil  09/08/2022    Cefuroxime axetil  09/08/2022    Cephalexin  09/08/2022    Ciprofloxacin  09/08/2022    Enoxaparin  09/08/2022    Methenamine hippurate  09/08/2022    Promethazine  09/08/2022       WOC Assessment Details/Treatment     Follow up visit regarding affected  area over buttocks and left heel. Patient is seated in wheel chair with geomatt chair cushion in use, she was able to stand for assessment with assistance from physical therapy and her walker in use ,she required minimal assistance to stand and remain standing for assessment. Noting area at right buttock suggestive of deep tissue injury versus chronic pigment changes of skin,she is able to off load well while seated in chair. Also, noting left heel skin intact however pigment changes to foot suggestive of dependent rubra versus PAD, no open wound wound noted , she is able to off load her feet and  mobilize while seated in chair. She has calmoseptine at bedside for use in groin and perineal and perianal areas.      07/11/24 0807   Pain Reassessment   Pain Rating Prior to Med Admin 3        Wound 07/05/24 0840 Pressure Injury Buttocks   Date First Assessed/Time First Assessed: 07/05/24 0840   Present on Original Admission: Yes  Primary Wound Type: Pressure Injury  Location: (c) Buttocks   Wound Image    Pressure Injury Stage DTPI   Dressing Appearance   (DTI versus chronic pigment changes)   Drainage Amount None   Appearance Purple   Tissue loss description Not applicable   Periwound Area Intact;Dry   Wound Edges Defined   Wound Length (cm) 6 cm   Wound Width (cm) 6 cm   Wound Surface Area (cm^2) 36 cm^2   Care Soap and water   Off Loading   (Geomatt cushion for chair. patient mobilizes self fair in chair for off loading.)        Wound 07/09/24 1553 Pressure Injury Left Heel   Date First Assessed/Time First Assessed: 07/09/24 1553   Present on Original Admission: Yes  Primary Wound Type: Pressure Injury  Side: Left  Location: Heel  Wound Approximate Age at First Assessment (Weeks): 1 weeks   Appearance Intact  (suggestive of dependent rubra)   Tissue loss description Not applicable   Wound Edges Undefined         Recommendations made to primary team for moinitoring affected areas and pressure injury prevention and  hygiene areas  .     07/11/2024

## 2024-07-11 NOTE — PT/OT/SLP PROGRESS
Physical Therapy Inpatient Rehab Treatment    Patient Name:  Homa Jaquez   MRN:  [id number]    Recommendations:     Discharge Recommendations:  Moderate Intensity Therapy (pending progress)   Discharge Equipment Recommendations:     Barriers to discharge: Decreased caregiver support    Assessment:     Homa Jaquez is a 88 y.o. female admitted with a medical diagnosis of Closed comminuted intertrochanteric fracture of left femur.  She presents with the following impairments/functional limitations:  weakness, impaired endurance, impaired self care skills, impaired functional mobility, gait instability, impaired balance, decreased lower extremity function, decreased coordination, pain, decreased safety awareness, decreased ROM, impaired coordination, impaired skin, impaired fine motor, edema, impaired muscle length, orthopedic precautions .    Rehab Diagnosis: fall with L femur fx s/p IM nail    General Precautions: Standard, fall, hearing impaired     Orthopedic Precautions:LLE weight bearing as tolerated     Braces: N/A    Rehab Prognosis: Fair; patient would benefit from acute skilled PT services to address these deficits and reach maximum level of function.      History:     Past Medical History:   Diagnosis Date    A-fib     Chronic cystitis     GERD (gastroesophageal reflux disease)     Graves disease     Hypertension     Hypothyroidism, unspecified     Spinal stenosis        Past Surgical History:   Procedure Laterality Date    APPENDECTOMY      BLADDER SUSPENSION      CARDIOVERSION  04/01/2015    CATARACT EXTRACTION      HYSTERECTOMY      INTRAMEDULLARY RODDING OF FEMUR Left 7/3/2024    Procedure: INSERTION, INTRAMEDULLARY MELANIE, FEMUR;  Surgeon: Steve Pierce DO;  Location: [facility];  Service: Orthopedics;  Laterality: Left;  supine hana table c arm synthes    LAPAROSCOPIC CHOLECYSTECTOMY  01/12/2016    SPHINCTEROTOMY OF URETHRA  01/11/2016    TONSILLECTOMY AND ADENOIDECTOMY         Subjective     Chief  "Complaint: "I'm very tired "    Respiratory Status: Room air    Patients cultural, spiritual, Temple conflicts given the current situation: no      Objective:     Communicated with nursing  prior to session.  Patient found up in chair with peripheral IV, Other (comments) (pt in recliner)  upon PT entry to room.    Pt is Oriented x3 and Alert and Cooperative.  Functional Mobility:      Current   Status  Discharge   Goal   Functional Area: Care Score:    Roll Left and Right   Partial/moderate assistance   Sit to Lying 3  Educated on use of lle leg  pt still required min a with lle into bed slow transition secondary to pain  Partial/moderate assistance   Lying to Sitting on Side of Bed   Partial/moderate assistance   Sit to Stand 4  Use of rw cga slow transition vc for safety  Partial/moderate assistance   Chair/Bed-to-Chair Transfer 4  Use of rw cga vc for standing posture  Partial/moderate assistance   Car Transfer   Partial/moderate assistance   Walk 10 Feet   Partial/moderate assistance   Walk 50 Feet with Two Turns   Partial/moderate assistance   Walk 150 Feet   Not attempted due to medical/safety concerns   Walk 10 Feet Uneven Surface   Partial/moderate assistance   1 Step (Curb)   Partial/moderate assistance   4 Steps   Not attempted due to medical/safety concerns   12 Steps   Not attempted due to medical/safety concerns   Picking Up Object   Partial/moderate assistance   Wheel 50 Feet with Two Turns 4 Set-up/clean-up   Wheel 150 Feet 4  BUE only 150ft, 100ft, 50ft vc for managing wc parts  Set-up/clean-up       Therapeutic Activities and Exercises:  Pt performed standing trials use of rw cga stood ~3min use of rw cga with wc behind vc for standing posture     Activity Tolerance: Good    Patient left HOB elevated with call button in reach, CNA  present, and pt carina tx pt fatigued ,pt  fatigues quickly during tx. Pt able to stand with use of rw stated felt too tired to take steps . Pt returned to bed " after tx secondary to fatigue  .    Education provided: transfer training, bed mob, balance training, safety awareness, body mechanics, assistive device, wheelchair management, and fall prevention    Expected compliance: High compliance    GOALS:   Multidisciplinary Problems       Physical Therapy Goals          Problem: Physical Therapy    Goal Priority Disciplines Outcome Goal Variances Interventions   Physical Therapy Goal     PT, PT/OT Progressing     Description: Bed Mobility:  Roll left and right with partial/moderate assist.   Sit to supine transfer with partial/moderate assist.   Supine to sit transfer with partial/moderate assist.     Transfers:  Sit to stand transfer with partial/moderate assist using RW.   Bed to chair transfer with partial/moderate assist Stand Pivot  using RW.   Car transfer with partial/moderate assist using RW.    an object from the ground in standing position with partial/moderate assist using RW and reacher.     Mobility:  Ambulate 10 feet with partial/moderate assist using RW vs Parallel bars.  Manual wheelchair 200 feet with supervision/touching assist using Bilateral upper extremity.                          Plan:     During this hospitalization, patient to be seen  (5-7x/week) to address the identified rehab impairments via gait training, therapeutic activities, therapeutic exercises, neuromuscular re-education, wheelchair management/training and progress toward the following goals:    Plan of Care Expires:     PT Next Visit Date: 07/16/24  Plan of Care reviewed with: patient    Additional Information:         Time Tracking:     Therapy Time  PT Received On: 07/11/24  PT Start Time: 1400  PT Stop Time: 1500  PT Total Time (min): 60 min   PT Individual: 60  Missed Time:    Time Missed due to:      Billable Minutes: Therapeutic Activity 60min    07/11/2024

## 2024-07-11 NOTE — PT/OT/SLP PROGRESS
Physical Therapy Inpatient Rehab Treatment    Patient Name:  Homa Jaquez   MRN:  [id number]    Recommendations:     Discharge Recommendations:  Moderate Intensity Therapy (pending progress)   Discharge Equipment Recommendations:     Barriers to discharge: Impaired functional mobility     Assessment:     Homa Jaquez is a 88 y.o. female admitted with a medical diagnosis of Closed comminuted intertrochanteric fracture of left femur.  She presents with the following impairments/functional limitations:  weakness, impaired endurance, impaired functional mobility, gait instability, decreased coordination, decreased upper extremity function, decreased lower extremity function, decreased safety awareness, decreased ROM, impaired coordination.    Rehab Diagnosis: fall with L femur fx s/p IM nail    General Precautions: Standard, fall, other (see comments) (hard of hearing)     Orthopedic Precautions:LLE weight bearing as tolerated     Braces: N/A    Rehab Prognosis: Good; patient would benefit from acute skilled PT services to address these deficits and reach maximum level of function.      History:     Past Medical History:   Diagnosis Date    A-fib     Chronic cystitis     GERD (gastroesophageal reflux disease)     Graves disease     Hypertension     Hypothyroidism, unspecified     Spinal stenosis        Past Surgical History:   Procedure Laterality Date    APPENDECTOMY      BLADDER SUSPENSION      CARDIOVERSION  04/01/2015    CATARACT EXTRACTION      HYSTERECTOMY      INTRAMEDULLARY RODDING OF FEMUR Left 7/3/2024    Procedure: INSERTION, INTRAMEDULLARY MELANIE, FEMUR;  Surgeon: Steve Pierce, ;  Location: [facility];  Service: Orthopedics;  Laterality: Left;  supine hana table c arm synthes    LAPAROSCOPIC CHOLECYSTECTOMY  01/12/2016    SPHINCTEROTOMY OF URETHRA  01/11/2016    TONSILLECTOMY AND ADENOIDECTOMY         Subjective     Patient comments: pt agreeable to participate with PT    Respiratory Status: Room  air    Patients cultural, spiritual, Sikhism conflicts given the current situation: no    Objective:     Communicated with pt prior to session.  Patient found up in chair with peripheral IV  upon PT entry to room.    Pt is Oriented x3 and Alert, Cooperative, and Motivated.    Vitals   Pt reported some dizziness after second trial after ambulation in // bars. Vitals assessed in sittin/71, 51 bpm. Vitals then assessed in standing, but had significant difficulty getting a reading and required x2 attempts + increased time to get one: 99/47, 73 bpm. NP Micaela present and aware.    Pain Pain Rating 1: 810  Location - Side 1: Left  Location 1: leg  Pain Addressed 1: Reposition, Cessation of Activity, Other (see comments) (NOEMY Hinojosa notified)  Pain Rating Post-Intervention 1: 10       Functional Mobility:    Current   Status  Discharge   Goal   Functional Area: Care Score:    Roll Left and Right   Partial/moderate assistance   Sit to Lying   Partial/moderate assistance   Lying to Sitting on Side of Bed   Partial/moderate assistance   Sit to Stand 3  With RW and // bars. Pt required decreased physical assistance from PT this AM and reported decreased pain initially, although it did increase in severity as session continued. NSG/NP aware. Partial/moderate assistance   Chair/Bed-to-Chair Transfer   Partial/moderate assistance   Car Transfer   Partial/moderate assistance   Walk 10 Feet   Partial/moderate assistance   Walk 50 Feet with Two Turns   Partial/moderate assistance   Walk 150 Feet   Not attempted due to medical/safety concerns   Walk 10 Feet Uneven Surface   Partial/moderate assistance   1 Step (Curb)   Partial/moderate assistance   4 Steps   Not attempted due to medical/safety concerns   12 Steps   Not attempted due to medical/safety concerns   Picking Up Object   Partial/moderate assistance   Wheel 50 Feet with Two Turns   Set-up/clean-up   Wheel 150 Feet   Set-up/clean-up       Therapeutic Activities and  Exercises:  Patient educated on safety while in hospital including calling nurse for mobility  Patient educated about importance of OOB mobility and remaining up in chair most of the day.    Multiple completions of sit<>stand and weight shifting performed in // bars, progressed to ambulation. Two trials of gait in // bars performed: 4' + 3' with mod A from PT. Very slow speed and very small step sizes. First distance limited by pain, second distance limited by pain and c/o dizziness. Vitals assessed, requiring multiple attempts, detailed above.    Increased time needed for completion of all tasks to facilitate increased functional independence d/t slow speed. Extended seated rest breaks needed throughout session to allow for pain relief.    Activity Tolerance: Fair    Patient left up in chair with call button in reach. Denied dizziness after rest break and verbalized feeling well except for L LE pain. NSG staff aware of pt status.    Education provided: roles and goals of PT/PTA, transfer training, gait training, safety awareness, body mechanics, and fall prevention    Expected compliance: High compliance    Plan:     During this hospitalization, patient to be seen  (5-7x/week) to address the identified rehab impairments via gait training, therapeutic activities, therapeutic exercises, neuromuscular re-education, wheelchair management/training and progress toward the following goals:    GOALS:   Multidisciplinary Problems       Physical Therapy Goals          Problem: Physical Therapy    Goal Priority Disciplines Outcome Goal Variances Interventions   Physical Therapy Goal     PT, PT/OT Progressing     Description: Bed Mobility:  Roll left and right with partial/moderate assist.   Sit to supine transfer with partial/moderate assist.   Supine to sit transfer with partial/moderate assist.     Transfers:  Sit to stand transfer with partial/moderate assist using RW.   Bed to chair transfer with partial/moderate assist  Stand Pivot  using RW.   Car transfer with partial/moderate assist using RW.    an object from the ground in standing position with partial/moderate assist using RW and reacher.     Mobility:  Ambulate 10 feet with partial/moderate assist using RW vs Parallel bars.  Manual wheelchair 200 feet with supervision/touching assist using Bilateral upper extremity.                          Plan of Care Expires:     PT Next Visit Date: 07/16/24  Plan of Care reviewed with: patient    Additional Information:         Time Tracking:     Therapy Time  PT Start Time: 0830  PT Stop Time: 0930  PT Total Time (min): 60 min   PT Individual: 60  Missed Time:    Time Missed due to:      Billable Minutes: Therapeutic Activity 60 min    07/11/2024

## 2024-07-11 NOTE — PLAN OF CARE
Problem: Fall Injury Risk  Goal: Absence of Fall and Fall-Related Injury  Outcome: Progressing  Intervention: Promote Injury-Free Environment  Flowsheets (Taken 7/10/2024 2139)  Safety Promotion/Fall Prevention:   assistive device/personal item within reach   bed alarm set   Fall Risk signage in place   lighting adjusted   nonskid shoes/socks when out of bed   side rails raised x 3   instructed to call staff for mobility

## 2024-07-11 NOTE — PT/OT/SLP PROGRESS
"Occupational Therapy Inpatient Rehab Treatment    Name: Homa Jaquez  MRN: [id number]    Assessment:  Homa Jaquez is a 88 y.o. female admitted with a medical diagnosis of Closed comminuted intertrochanteric fracture of left femur.  She presents with the following impairments/functional limitations:  weakness, impaired endurance, impaired self care skills, impaired functional mobility, decreased upper extremity function, decreased coordination, decreased lower extremity function, pain, decreased ROM, orthopedic precautions ..    General Precautions: Standard, fall, hearing impaired     Orthopedic Precautions:LLE weight bearing as tolerated     Braces: N/A    Rehab Prognosis: Fair; patient would benefit from acute skilled OT services to address these deficits and reach maximum level of function.      History:     Past Medical History:   Diagnosis Date    A-fib     Chronic cystitis     GERD (gastroesophageal reflux disease)     Graves disease     Hypertension     Hypothyroidism, unspecified     Spinal stenosis        Past Surgical History:   Procedure Laterality Date    APPENDECTOMY      BLADDER SUSPENSION      CARDIOVERSION  04/01/2015    CATARACT EXTRACTION      HYSTERECTOMY      INTRAMEDULLARY RODDING OF FEMUR Left 7/3/2024    Procedure: INSERTION, INTRAMEDULLARY MELANIE, FEMUR;  Surgeon: Steve Pierce DO;  Location: [facility];  Service: Orthopedics;  Laterality: Left;  supine hana table c arm synthes    LAPAROSCOPIC CHOLECYSTECTOMY  01/12/2016    SPHINCTEROTOMY OF URETHRA  01/11/2016    TONSILLECTOMY AND ADENOIDECTOMY         Subjective     Orientation: Oriented x4    Chief Complaint: "My leg hurts when I move"     Patient/Family Comments/goals: "Go home to my "     Vitals   Vitals at Rest  /61   HR 67   O2 Sat 97   Pain 6/10     Vitals With Activity  /64   HR 64   O2 Sat    Pain 8/10     Respiratory Status: Room air    Patients cultural, spiritual, Orthodox conflicts given the current " "situation: yes       Objective:     Patient found up in chair with peripheral IV  upon OT entry to room.    Mobility   Patient completed:  Pt. Required v/c's and t/c's to remain static sitting away from backrest.     Functional Mobility  Pt. Refused to try to stand; "dizzy and lightheaded" and LBP     ADLs   Current Status   Eating     Oral Hygiene     Shower, Bathe Self     Upper Body Dressing     Lower Body Dressing     Toileting Hygiene     Toilet Transfer     Putting On, Taking Off Footwear 3     Limiting Factors for ADLs: motor, endurance, limited ROM, balance, weakness, and pain     IADLs: Pt. C tremors noted performed handwriting Ax of writing a grocery list. Pt. C 1 # wrist weights donned c legible but very shaky handwriting.     Therapeutic Activities  Pt. C 1 # wrist weight donned performed grasping, in hand manipulation and threading of blocks following a pattern. Pt. Selected correct color but not correct shapes. Pt. C brief rest breaks sitting back but encouraged to sit upright in w/c away from backrest. Pt. C graded clips placed anteriorly performed AROM/pinching c B Ue's. Tremors increased at extension.     Therapeutic Exercise  Pt. Performed B UE AROM on UBE at 1.5 mccarty x's 5 min forward. Pt. Required rest break following. Pt. C 1# wrist weight and 1 # soft FW performed AROM x's 20 reps biceps curls, 15 reps Chest press; 15 reps B shoulder abd/duction and 12 reps each sh. Flexion. Rest breaks throughout.       Additional Treatments: Pt. Drank 1 cup of water     LifeStyle Change and Education:             Patient left up in chair with  BEA Fitzgerald present.     Education provided: ADLs, body mechanics, assistive device, and sequencing    Multidisciplinary Problems       Occupational Therapy Goals          Problem: Occupational Therapy    Goal Priority Disciplines Outcome Interventions   Occupational Therapy Goal     OT, PT/OT Progressing    Description: Quality Indicators:   Admission Eval " 07/10/2024 Goal Current  By Marisol;   Pt. Will be Independent c eating    Oral Hygiene Pt. Will be Mod A c oral care while standing at sink and SV/Touch A by D/C.    Toileting Hygiene Pt. Will be Max A c toileting and Mod A by D/C     Shower/Bathe Self Pt. Will be Mod A c bathing and SVN/Touch A c AE by D/C    UBD Pt. Will be SBA by Re-Eval    LBD Pt. Will be Mod A by Re-Eval and Touch A by D/C c AE     On/Off Footwear Mod A c AE by Re-Eval and Touch A ?SVN by D/C      Toilet Transfer Mod A by Re-Eval and Touch A by D/C                              Time Tracking     OT Received On: 07/11/24  Time In 1000     Time Out 1130  Total Time 90 min  Therapy Time: OT Individual: 90  Missed Time:    Missed Time Reason:      Billable Minutes: Self Care/Home Management 15, Therapeutic Activity 35, and Therapeutic Exercise 10    07/11/2024

## 2024-07-11 NOTE — PROGRESS NOTES
07/11/24 1300   Rec Therapy Time Calculation   Date of Treatment 07/11/24   Rec Start Time 1300   Rec Stop Time 1330   Rec Total Time (min) 30 min   Time   Treatment time 2 units   Charges   $Therapeutic Exercise 2 units   Precautions   General Precautions fall;hearing impaired   Orthopedic Precautions  LLE weight bearing as tolerated   Braces N/A   Pain/Comfort   Pain Rating 1 3/10   Location - Side 1 Left   Location - Orientation 1 lower   Location 1 leg   Pain Addressed 1 Reposition   OTHER   Rehab identified problem list/impairments weakness;impaired endurance;impaired functional mobility;gait instability;decreased lower extremity function   Values/Beliefs/Spiritual Care   Spiritual, Cultural Beliefs, Zoroastrian Practices, Values that Affect Care no   Overall Level of Functioning   Activity Tolerance Independent   Dynamic Sitting Balance/Reaching Mod Indep   Dynamic Standing Balance/Reaching Does not occur   Right UE Coodination/Dexterity Independent   Left UE Coordination/Dexterity Independent   Problem Solving/Sequencing Skills Standby Assist   Memory Recall Standby Assist   R/L Neglect/Inattention Does not occur   Attention Span Mod Indep   Social Interaction Independent   Recreational Therapy Short Term Goals   Short Term Goal 1 Progression Met   Short Term Goal 2 Progression Met   Recreational Therapy Long Term Goals   Long Term Goal 1 Progression Progressing   Long Term Goal 2 Progression Progressing   Long Term Goal 3 Progression Progressing   Plan   Patient to be seen Daily   Planned Duration 2 weeks   Treatments Planned Energy conservation training   Treatment plan/goals estblished with Patient/Caregiver Yes

## 2024-07-11 NOTE — PROGRESS NOTES
[facility] ([facility])  Rehab Progress Note    Patient Name: Homa Jaquez  MRN: [id number]  Age: 88 y.o. Sex: female  : 1935  Hospital Length of Stay: 2 days  Date of Service: 2024   Chief Complaint:  Left intertrochanteric femur fracture with severe comminution 2/2 fall s/p IM nailing on 2024     Subjective:     Basic Information  Admit Information: 88-year-old female presented to [facility] on  with complaints of left hip pain after falling at home.  PMH significant for atrial fibrillation, chronic cystitis, GERD, Graves disease, hypertension, hypothyroidism, spinal stenosis.  Workup significant for left intertrochanteric femur fracture.  On  tolerated IM nailing without perioperative complications.  Restarted Coumadin on  without complications.  Initiated IV fluid resuscitation 2/2 BARRY on .  Initiated 1 dose of vitamin K 2/ INR 12.1.  Supratherapeutic INR resolved. Tolerated transfer to [facility] inpatient rehab unit on  without incident.   Today's Information: No acute events overnight.  Sitting in chair comfortably working with therapy.  Reports good sleep and appetite.  Last BM 7/10.  Vital signs at goal with no recent recorded fevers.  Reported orthostatic hypotension this a.m..  Resolved with normal saline 250 mL bolus once.  Labs reviewed, INR 2.4, otherwise unremarkable.  No imaging today.    Review of patient's allergies indicates:   Allergen Reactions    Cefadroxil      Other reaction(s): Nausea or vomiting    Cefuroxime axetil     Cephalexin      Other reaction(s): NAUSEA AND VOMITING    Ciprofloxacin     Enoxaparin     Methenamine hippurate      Other reaction(s): Unknown    Promethazine         Current Facility-Administered Medications:     acetaminophen tablet 1,000 mg, 1,000 mg, Oral, BID, Calli Salazar FNP, 1,000 mg at 24 0807    acetaminophen tablet 500 mg, 500 mg, Oral, BID PRN, Brandy Paredes FNP    allopurinoL  tablet 100 mg, 100 mg, Oral, Daily, Calli Salazar, FNP, 100 mg at 07/11/24 0807    atorvastatin tablet 20 mg, 20 mg, Oral, Daily, Calli Salazar, FNP, 20 mg at 07/11/24 0807    benzonatate capsule 100 mg, 100 mg, Oral, TID PRN, Calli Salazar, FNP    carvediloL tablet 12.5 mg, 12.5 mg, Oral, BID, Calli Salazar B, FNP, 12.5 mg at 07/11/24 0807    docusate sodium capsule 100 mg, 100 mg, Oral, BID, Calli Salazar, FNP, 100 mg at 07/11/24 0800    hydrALAZINE injection 10 mg, 10 mg, Intravenous, Q4H PRN, Calli Salazar B, FNP    hydrOXYzine pamoate capsule 50 mg, 50 mg, Oral, Nightly PRN, Calli Salazar, FNP    labetalol 20 mg/4 mL (5 mg/mL) IV syring, 10 mg, Intravenous, Q4H PRN, Calli Salazar B, FNP    levothyroxine tablet 125 mcg, 125 mcg, Oral, Before breakfast, Calli Salazar, FNP, 125 mcg at 07/11/24 0512    melatonin tablet 6 mg, 6 mg, Oral, Nightly PRN, Calli Salazar B, FNP    menthol-zinc oxide 0.44-20.6 % Oint, , Topical (Top), BID, Brandy Paredes FNP, Given at 07/11/24 0800    methocarbamoL tablet 500 mg, 500 mg, Oral, TID, Calli Salazar B, FNP, 500 mg at 07/11/24 0512    metoprolol injection 10 mg, 10 mg, Intravenous, Q2H PRN, Calli Salazar, FNP    nitroGLYCERIN SL tablet 0.4 mg, 0.4 mg, Sublingual, Q5 Min PRN, Calli Salazar, FNP    ondansetron disintegrating tablet 4 mg, 4 mg, Oral, Q6H PRN, Calli Salazar, FNP    ondansetron disintegrating tablet 8 mg, 8 mg, Oral, Q8H PRN, Calli Salazar, TAIP    ondansetron injection 4 mg, 4 mg, Intravenous, Q8H PRN, Calli Salazar FNP    oxyCODONE immediate release tablet 5 mg, 5 mg, Oral, Q6H PRN, Calli Salazar, TAIP    sacubitriL-valsartan 24-26 mg per tablet 1 tablet, 1 tablet, Oral, BID, Calli Salazar FNP, 1 tablet at 07/11/24 0807    traMADoL tablet 50 mg, 50 mg, Oral, Q6H PRN, Calli Salazar FNP, 50 mg at 07/11/24 0815    warfarin (COUMADIN) tablet 2 mg, 2 mg, Oral, Daily, Calli Salazar FNP, 2 mg at  "07/10/24 1644     Review of Systems   Complete 12-point review of symptoms negative except for what's mentioned in HPI     Objective:     /65   Pulse 73   Temp 97.5 °F (36.4 °C) (Oral)   Resp 18   Ht 5' 4" (1.626 m)   Wt 79.3 kg (174 lb 13.2 oz)   SpO2 95%   BMI 30.01 kg/m²      Physical Exam  Vitals reviewed.   Eyes:      Pupils: Pupils are equal, round, and reactive to light.   Cardiovascular:      Rate and Rhythm: Normal rate and regular rhythm.      Heart sounds: Normal heart sounds.   Pulmonary:      Effort: Pulmonary effort is normal.      Breath sounds: Normal breath sounds.   Abdominal:      General: Bowel sounds are normal.   Musculoskeletal:         General: Normal range of motion.   Skin:     General: Skin is warm.      Comments: Left femur incision dry and intact    Neurological:      General: No focal deficit present.      Mental Status: She is alert and oriented to person, place, and time.      Motor: Weakness present.   Psychiatric:         Mood and Affect: Mood normal.     *MD performed and documented physical examination       Lines/Drains/Airways       None                   Labs  Admission on 07/09/2024   Component Date Value Ref Range Status    TSH 07/10/2024 1.155  0.350 - 4.940 uIU/mL Final    PT 07/10/2024 23.3 (H)  11.7 - 14.5 seconds Final    INR 07/10/2024 2.0  2.0 - 3.0 Final    Sodium 07/10/2024 138  136 - 145 mmol/L Final    Potassium 07/10/2024 4.2  3.5 - 5.1 mmol/L Final    Chloride 07/10/2024 110 (H)  98 - 107 mmol/L Final    CO2 07/10/2024 21 (L)  23 - 31 mmol/L Final    Glucose 07/10/2024 119 (H)  82 - 115 mg/dL Final    Blood Urea Nitrogen 07/10/2024 26.9 (H)  9.8 - 20.1 mg/dL Final    Creatinine 07/10/2024 1.10 (H)  0.55 - 1.02 mg/dL Final    Calcium 07/10/2024 8.2 (L)  8.4 - 10.2 mg/dL Final    Protein Total 07/10/2024 5.3 (L)  5.8 - 7.6 gm/dL Final    Albumin 07/10/2024 2.2 (L)  3.4 - 4.8 g/dL Final    Globulin 07/10/2024 3.1  2.4 - 3.5 gm/dL Final    " Albumin/Globulin Ratio 07/10/2024 0.7 (L)  1.1 - 2.0 ratio Final    Bilirubin Total 07/10/2024 1.8 (H)  <=1.5 mg/dL Final    ALP 07/10/2024 92  40 - 150 unit/L Final    ALT 07/10/2024 11  0 - 55 unit/L Final    AST 07/10/2024 17  5 - 34 unit/L Final    eGFR 07/10/2024 49  mL/min/1.73/m2 Final    Anion Gap 07/10/2024 7.0  mEq/L Final    BUN/Creatinine Ratio 07/10/2024 24   Final    Ferritin Level 07/10/2024 188.88  4.63 - 204.00 ng/mL Final    Iron Binding Capacity Unsaturated 07/10/2024 128  70 - 310 ug/dL Final    Iron Level 07/10/2024 25 (L)  50 - 170 ug/dL Final    Transferrin 07/10/2024 134  mg/dL Final    Iron Binding Capacity Total 07/10/2024 153 (L)  250 - 450 ug/dL Final    Iron Saturation 07/10/2024 16 (L)  20 - 50 % Final    Prealbumin 07/10/2024 7.2 (L)  14.0 - 37.0 mg/dL Final    Hemoglobin A1c 07/10/2024 5.2  <=7.0 % Final    Estimated Average Glucose 07/10/2024 102.5  mg/dL Final    WBC 07/10/2024 8.58  4.50 - 11.50 x10(3)/mcL Final    RBC 07/10/2024 3.34 (L)  4.20 - 5.40 x10(6)/mcL Final    Hgb 07/10/2024 10.9 (L)  12.0 - 16.0 g/dL Final    Hct 07/10/2024 32.4 (L)  37.0 - 47.0 % Final    MCV 07/10/2024 97.0 (H)  80.0 - 94.0 fL Final    MCH 07/10/2024 32.6 (H)  27.0 - 31.0 pg Final    MCHC 07/10/2024 33.6  33.0 - 36.0 g/dL Final    RDW 07/10/2024 16.8  11.5 - 17.0 % Final    Platelet 07/10/2024 195  130 - 400 x10(3)/mcL Final    MPV 07/10/2024 9.5  7.4 - 10.4 fL Final    Neut % 07/10/2024 57.7  % Final    Lymph % 07/10/2024 22.0  % Final    Mono % 07/10/2024 14.1  % Final    Eos % 07/10/2024 5.1  % Final    Basophil % 07/10/2024 0.3  % Final    Lymph # 07/10/2024 1.89  0.6 - 4.6 x10(3)/mcL Final    Neut # 07/10/2024 4.94  2.1 - 9.2 x10(3)/mcL Final    Mono # 07/10/2024 1.21  0.1 - 1.3 x10(3)/mcL Final    Eos # 07/10/2024 0.44  0 - 0.9 x10(3)/mcL Final    Baso # 07/10/2024 0.03  <=0.2 x10(3)/mcL Final    IG# 07/10/2024 0.07 (H)  0 - 0.04 x10(3)/mcL Final    IG% 07/10/2024 0.8  % Final    NRBC%  07/10/2024 0.0  % Final    PT 07/11/2024 27.0 (H)  11.7 - 14.5 seconds Final    INR 07/11/2024 2.4  2.0 - 3.0 Final     Radiology  Left femur x-ray 7/3/24 Impression: Screw and intramedullary azael identified fixating a fracture of the proximal femur some displaced fragments are identified position and alignment is otherwise close to anatomical hardware appears to be intact     Assessment/Plan:     88 y.o. WF admitted on 7/9/2024     Left intertrochanteric femur fracture with severe comminution 2/2 fall   - s/p IM nailing on 07/03/2024  - WBAT LLE  - continue                 Robaxin 500 mg t.i.d.                  Tylenol 1000 mg b.i.d.  - follow-up with orthopedic surgery on 7/25 for wound check and repeat x-rays     Dilated cardiomyopathy  - no PCI or CABG  - denies recent chest pain or discomfort  - continue                 Entresto 24-26 mg b.i.d.                  Atorvastatin 20 mg daily                  Coreg 6.25 mg b.i.d. (decreased 7/11)   - ECG and Nitro PRN  - continue cardiac diet  - to follow-up with cardiology outpatient     Atrial fibrillation  - s/p pacemaker/defibrillator  - controlled ventricular rate without associated bleeding  - continue                Coumadin 2 mg daily     Anemia  - asymptomatic  - s/p transfusion                x2 units PRBC on 7/8/2024  - H/H stable   - no evidence of active bleeds  - will closely monitor and transfuse if needed      HTN  - blood pressures at goal  - continue                 Entresto 24-26 mg b.i.d.                  Coreg 6.25 mg b.i.d. (decreased 7/11)                Hydralazine 10 mg every 2 hours as needed for BP > 160/90                Labetalol 10 mg every 2 hours as needed for BP > 160/90  - low sodium diet     HLD  - FLP outpatient  - continue                Atorvastatin 20 mg daily     Gout  - stable  - continue                 Allopurinol 100 mg daily     DM type II  - HgA1c 5.2  - diet-controlled  - continue                ISS   - CBGs AC/HS      Constipation  - stable  - continue                 Colace 100 mg b.i.d.                 MiraLax 17 g b.i.d.     Hypothyroidism  - TSH at goal  - continue                 Levothyroxine 125 mcg before breakfast     VTE Prophylaxis:  Coumadin 2 mg daily     POA: yes- Alyssa  Living will:  Yes  Contacts:  Av Jaquez (son) [phone]                     Levi Jaquez (spouse) [phone]     CODE STATUS: Full  Internal Medicine (attending): Tato Holley MD  Physiatry (consulting):  Alfred Soto MD     OUTPATIENT PROVIDERS  PCP: Franklyn Brito MD  Cardiology: Yefri Mukherjee MD   Orthopedic surgery: Steve Pierce D.O.     DISPOSITION:  Vital signs at goal.  Orthostatic hypotension resolved with 250 mL normal saline bolus this a.m..  Decrease Coreg to 6.25 mg b.i.d..  Labs reviewed.  INR therapeutic.  Bowel management, sleep hygiene, and appetite at goal.  Continues to progress well with therapies.  Monitor closely.  Notify of any acute changes.     Calli Salazar NP conducted independent physical examination and assisted with medical documentation.     Total time spent on this encounter including chart review and direct MD + NP 1-on-1 patient interaction: 52 minutes   Over 50% of this time was spent in counseling and coordination of care

## 2024-07-12 PROCEDURE — 97530 THERAPEUTIC ACTIVITIES: CPT

## 2024-07-12 PROCEDURE — 99900031 HC PATIENT EDUCATION (STAT)

## 2024-07-12 PROCEDURE — 94761 N-INVAS EAR/PLS OXIMETRY MLT: CPT

## 2024-07-12 PROCEDURE — 97542 WHEELCHAIR MNGMENT TRAINING: CPT

## 2024-07-12 PROCEDURE — 97110 THERAPEUTIC EXERCISES: CPT

## 2024-07-12 PROCEDURE — 25000003 PHARM REV CODE 250: Performed by: NURSE PRACTITIONER

## 2024-07-12 PROCEDURE — 11800000 HC REHAB PRIVATE ROOM

## 2024-07-12 PROCEDURE — 97110 THERAPEUTIC EXERCISES: CPT | Mod: CQ

## 2024-07-12 PROCEDURE — 63600175 PHARM REV CODE 636 W HCPCS: Performed by: NURSE PRACTITIONER

## 2024-07-12 PROCEDURE — 94799 UNLISTED PULMONARY SVC/PX: CPT

## 2024-07-12 RX ORDER — DEXAMETHASONE SODIUM PHOSPHATE 4 MG/ML
8 INJECTION, SOLUTION INTRA-ARTICULAR; INTRALESIONAL; INTRAMUSCULAR; INTRAVENOUS; SOFT TISSUE ONCE
Status: COMPLETED | OUTPATIENT
Start: 2024-07-12 | End: 2024-07-12

## 2024-07-12 RX ORDER — OXYCODONE HYDROCHLORIDE 5 MG/1
5 TABLET ORAL EVERY 4 HOURS PRN
Status: DISCONTINUED | OUTPATIENT
Start: 2024-07-12 | End: 2024-07-13 | Stop reason: HOSPADM

## 2024-07-12 RX ADMIN — DEXAMETHASONE SODIUM PHOSPHATE 8 MG: 4 INJECTION, SOLUTION INTRA-ARTICULAR; INTRALESIONAL; INTRAMUSCULAR; INTRAVENOUS; SOFT TISSUE at 11:07

## 2024-07-12 RX ADMIN — ACETAMINOPHEN 650 MG: 325 TABLET ORAL at 05:07

## 2024-07-12 RX ADMIN — OXYCODONE 5 MG: 5 TABLET ORAL at 02:07

## 2024-07-12 RX ADMIN — OXYCODONE 5 MG: 5 TABLET ORAL at 07:07

## 2024-07-12 RX ADMIN — Medication: at 08:07

## 2024-07-12 RX ADMIN — ACETAMINOPHEN 650 MG: 325 TABLET ORAL at 11:07

## 2024-07-12 RX ADMIN — CARVEDILOL 6.25 MG: 6.25 TABLET, FILM COATED ORAL at 09:07

## 2024-07-12 RX ADMIN — CARVEDILOL 6.25 MG: 6.25 TABLET, FILM COATED ORAL at 07:07

## 2024-07-12 RX ADMIN — TRAMADOL HYDROCHLORIDE 50 MG: 50 TABLET, COATED ORAL at 05:07

## 2024-07-12 RX ADMIN — SACUBITRIL AND VALSARTAN 1 TABLET: 24; 26 TABLET, FILM COATED ORAL at 09:07

## 2024-07-12 RX ADMIN — WARFARIN SODIUM 2 MG: 2 TABLET ORAL at 05:07

## 2024-07-12 RX ADMIN — Medication: at 07:07

## 2024-07-12 RX ADMIN — DOCUSATE SODIUM 100 MG: 100 CAPSULE, LIQUID FILLED ORAL at 09:07

## 2024-07-12 RX ADMIN — SACUBITRIL AND VALSARTAN 1 TABLET: 24; 26 TABLET, FILM COATED ORAL at 07:07

## 2024-07-12 RX ADMIN — TRAMADOL HYDROCHLORIDE 50 MG: 50 TABLET, COATED ORAL at 11:07

## 2024-07-12 RX ADMIN — ATORVASTATIN CALCIUM 20 MG: 10 TABLET, FILM COATED ORAL at 07:07

## 2024-07-12 RX ADMIN — ALLOPURINOL 100 MG: 100 TABLET ORAL at 07:07

## 2024-07-12 RX ADMIN — LEVOTHYROXINE SODIUM 125 MCG: 125 TABLET ORAL at 05:07

## 2024-07-12 RX ADMIN — DOCUSATE SODIUM 100 MG: 100 CAPSULE, LIQUID FILLED ORAL at 07:07

## 2024-07-12 NOTE — PT/OT/SLP PROGRESS
"Occupational Therapy Inpatient Rehab Treatment    Name: Homa Jaquez  MRN: [id number]    Assessment:  Homa Jaquez is a 88 y.o. female admitted with a medical diagnosis of Closed comminuted intertrochanteric fracture of left femur.  She presents with the following impairments/functional limitations:  weakness, impaired endurance, impaired self care skills, impaired functional mobility, decreased coordination, decreased upper extremity function, decreased lower extremity function, pain, decreased ROM, impaired coordination, impaired fine motor, edema, orthopedic precautions .    General Precautions: Standard, fall     Orthopedic Precautions:LLE weight bearing as tolerated     Braces: N/A    Rehab Prognosis: Fair; patient would benefit from acute skilled OT services to address these deficits and reach maximum level of function.      History:     Past Medical History:   Diagnosis Date    A-fib     Chronic cystitis     GERD (gastroesophageal reflux disease)     Graves disease     Hypertension     Hypothyroidism, unspecified     Spinal stenosis        Past Surgical History:   Procedure Laterality Date    APPENDECTOMY      BLADDER SUSPENSION      CARDIOVERSION  04/01/2015    CATARACT EXTRACTION      HYSTERECTOMY      INTRAMEDULLARY RODDING OF FEMUR Left 7/3/2024    Procedure: INSERTION, INTRAMEDULLARY MELANIE, FEMUR;  Surgeon: Steve Pierce DO;  Location: [facility];  Service: Orthopedics;  Laterality: Left;  supine hana table c arm synthes    LAPAROSCOPIC CHOLECYSTECTOMY  01/12/2016    SPHINCTEROTOMY OF URETHRA  01/11/2016    TONSILLECTOMY AND ADENOIDECTOMY         Subjective     Orientation: Oriented x4    Chief Complaint: "My leg is hurting me so bad" Pt. C B hands to chest/teeth clenched in pain.     Patient/Family Comments/goals: Pt attempting to participate but is severe pain.     Vitals   Vitals at Rest  /50   HR 66   O2 Sat    Pain 9/10     Vitals With Activity  BP    HR    O2 Sat    Pain 10/10 "     Respiratory Status: Room air    Patients cultural, spiritual, Catholic conflicts given the current situation: yes       Objective:     Patient found up in chair with peripheral IV  upon OT entry to room.    Mobility   Patient completed:  Sit to Stand Transfer with total assistance and 2 persons with rolling walker  Stand to Sit Transfer with total assistance and 2 persons with rolling walker  Bed to Chair Transfer using Stand Pivot technique with total assistance and of 2 persons with rolling walker    Functional Mobility  Pt. Required total A for bed mobility via draw sheet and hospital bed        Limiting Factors for ADLs: endurance, limited ROM, weakness, and pain         Therapeutic Activities  Pt. C B UE AROM stacked cones on tabletop while seated c AAROM to fully extend UE's. Pt. Performed 2 sit <>stands c increased pain. Pt. Performed  During p.m. Tx, Pt. Performed B hand Ax c gloves on and theraputty    Therapeutic Exercise  Pt. In supine c yellow (light resistance) T band c OT providing anti-gravity resistance performed B UE AROM 2 x's 10 reps for Biceps curls, chest press and B shoulder abd/duction.       Additional Treatments: OT alerted RNRosa to Pt.'s extreme pain and Pt. Was returned to bed c Total A x's 2. Pt. Required assist to position in bed and B heel boots donned, pillow under L LE for comfort.     LifeStyle Change and Education:             Patient left HOB elevated with call button in reach.     Education provided: transfer training, body mechanics, and fall prevention    Multidisciplinary Problems       Occupational Therapy Goals          Problem: Occupational Therapy    Goal Priority Disciplines Outcome Interventions   Occupational Therapy Goal     OT, PT/OT Progressing    Description: Quality Indicators:   Admission Eval 07/10/2024 Goal Current  By Re-Ryan;   Pt. Will be Independent c eating    Oral Hygiene Pt. Will be Mod A c oral care while standing at sink and SV/Touch A by  D/C.    Toileting Hygiene Pt. Will be Max A c toileting and Mod A by D/C     Shower/Bathe Self Pt. Will be Mod A c bathing and SVN/Touch A c AE by D/C    UBD Pt. Will be SBA by Re-Eval    LBD Pt. Will be Mod A by Re-Eval and Touch A by D/C c AE     On/Off Footwear Mod A c AE by Re-Eval and Touch A ?SVN by D/C      Toilet Transfer Mod A by Re-Eval and Touch A by D/C                              Time Tracking     OT Received On: 07/12/24  Time In  (1100 & 1300)     Time Out  (1130 & 1330)  Total Time    Therapy Time: OT Individual: 90  Missed Time:    Missed Time Reason:      Billable Minutes: Therapeutic Activity 70 and Therapeutic Exercise 20    07/12/2024

## 2024-07-12 NOTE — PLAN OF CARE
Problem: Fall Injury Risk  Goal: Absence of Fall and Fall-Related Injury  Outcome: Progressing  Intervention: Promote Injury-Free Environment  Flowsheets (Taken 7/11/2024 4150)  Safety Promotion/Fall Prevention:   assistive device/personal item within reach   bed alarm set   Fall Risk signage in place   lighting adjusted   nonskid shoes/socks when out of bed   side rails raised x 3   supervised activity   instructed to call staff for mobility

## 2024-07-12 NOTE — PT/OT/SLP PROGRESS
Physical Therapy Inpatient Rehab Treatment    Patient Name:  Homa Jaquez   MRN:  [id number]      Per NP Micaela, pt to be kept NWB L LE; OK for slideboard t/fs only at this time awaiting ortho recommendations. STGs and precautions updated to reflect change in pt status.     PT Care conference held with PTA Yana Rueda. Plan of care updates discussed and reviewed with PTA. Short term goals updated appropriately.       GOALS:   Multidisciplinary Problems       Physical Therapy Goals          Problem: Physical Therapy    Goal Priority Disciplines Outcome Goal Variances Interventions   Physical Therapy Goal     PT, PT/OT Progressing     Description: Bed Mobility:  Roll left and right with partial/moderate assist.   Sit to supine transfer with partial/moderate assist.   Supine to sit transfer with partial/moderate assist.     Transfers:  DISCONTINUE - Sit to stand transfer with partial/moderate assist using RW.   DISCONTINUE - Bed to chair transfer with partial/moderate assist Stand Pivot  using RW.   DISCONTINUE - Car transfer with partial/moderate assist using RW.   DISCONTINUE -  an object from the ground in standing position with partial/moderate assist using RW and reacher.   Bed to chair transfer with partial/moderate assist Slide Board  using Slide board .  Car transfer with partial/moderate assist using Slide board .    Mobility:  DISCONTINUE - Ambulate 10 feet with partial/moderate assist using RW vs Parallel bars.  Manual wheelchair 200 feet with supervision/touching assist using Bilateral upper extremity.                          Plan of Care Expires:     PT Next Visit Date: 07/16/24  Plan of Care reviewed with: patient      07/12/2024

## 2024-07-12 NOTE — PT/OT/SLP PROGRESS
Physical Therapy Inpatient Rehab Treatment    Patient Name:  Homa Jaquez   MRN:  [id number]    Recommendations:     Discharge Recommendations:  Moderate Intensity Therapy (pending progress)   Discharge Equipment Recommendations:     Barriers to discharge: Decreased caregiver support    Assessment:     Homa Jaquez is a 88 y.o. female admitted with a medical diagnosis of Closed comminuted intertrochanteric fracture of left femur.  She presents with the following impairments/functional limitations:  weakness, impaired endurance, impaired self care skills, impaired functional mobility, gait instability, impaired balance, decreased lower extremity function, decreased coordination, pain, decreased safety awareness, decreased ROM, impaired coordination, impaired skin, impaired fine motor, edema, impaired muscle length, orthopedic precautions .    Rehab Diagnosis: fall with L femur fx s/p IM nail    General Precautions: Standard, fall     Orthopedic Precautions:LLE non weight bearing (NWB LLE with sliding  board for t/fs per NOEMY Hinojosa awaiting ortho recommendation)     Braces: N/A    Rehab Prognosis: Fair; patient would benefit from acute skilled PT services to address these deficits and reach maximum level of function.      History:     Past Medical History:   Diagnosis Date    A-fib     Chronic cystitis     GERD (gastroesophageal reflux disease)     Graves disease     Hypertension     Hypothyroidism, unspecified     Spinal stenosis        Past Surgical History:   Procedure Laterality Date    APPENDECTOMY      BLADDER SUSPENSION      CARDIOVERSION  04/01/2015    CATARACT EXTRACTION      HYSTERECTOMY      INTRAMEDULLARY RODDING OF FEMUR Left 7/3/2024    Procedure: INSERTION, INTRAMEDULLARY MELANIE, FEMUR;  Surgeon: Steve Pierce DO;  Location: [facility];  Service: Orthopedics;  Laterality: Left;  supine hana table c arm synthes    LAPAROSCOPIC CHOLECYSTECTOMY  01/12/2016    SPHINCTEROTOMY OF URETHRA  01/11/2016     "TONSILLECTOMY AND ADENOIDECTOMY         Subjective     Chief Complaint: "I can't put weight on my left leg it hurts"    Respiratory Status: Room air    Patients cultural, spiritual, Church conflicts given the current situation: no      Objective:     Communicated with nursing  prior to session.  Patient found HOB elevated with peripheral IV, Other (comments) (pt in recliner)  upon PT entry to room.    Pt is Oriented x3 and Alert and Cooperative.      Therapeutic Activities and Exercises:  Pt performed supine LE exercises with min a with lle slow movements secondary to pain 2 sets 10reps movements as tolerated.     Activity Tolerance: Fair    Patient left HOB elevated with call button in reach and pt in bed with cold pack to left hip area . Discussed with supervising PT NOEMY Arias - per NOEMY Hinojosa pt lle NWB and use of sliding board for t/fs only awaiting Ortho recommendation for lle wbing status .  .    Education provided: strengthening exercises    Expected compliance: High compliance    GOALS:   Multidisciplinary Problems       Physical Therapy Goals          Problem: Physical Therapy    Goal Priority Disciplines Outcome Goal Variances Interventions   Physical Therapy Goal     PT, PT/OT Progressing     Description: Bed Mobility:  Roll left and right with partial/moderate assist.   Sit to supine transfer with partial/moderate assist.   Supine to sit transfer with partial/moderate assist.     Transfers:  Sit to stand transfer with partial/moderate assist using RW.   Bed to chair transfer with partial/moderate assist Stand Pivot  using RW.   Car transfer with partial/moderate assist using RW.    an object from the ground in standing position with partial/moderate assist using RW and reacher.     Mobility:  Ambulate 10 feet with partial/moderate assist using RW vs Parallel bars.  Manual wheelchair 200 feet with supervision/touching assist using Bilateral upper extremity.                          Plan: "     During this hospitalization, patient to be seen  (5-7x/week) to address the identified rehab impairments via gait training, therapeutic activities, therapeutic exercises, neuromuscular re-education, wheelchair management/training and progress toward the following goals:    Plan of Care Expires:     PT Next Visit Date: 07/16/24  Plan of Care reviewed with: patient    Additional Information:         Time Tracking:     Therapy Time  PT Received On: 07/12/24  PT Start Time: 1440  PT Stop Time: 1510  PT Total Time (min): 30 min   PT Individual: 30  Missed Time:    Time Missed due to:      Billable Minutes: Therapeutic Exercise 30min    07/12/2024

## 2024-07-12 NOTE — PT/OT/SLP PROGRESS
Recreational Therapy Treatment    Date of Treatment: 07/12/24  Start Time: 0930  Stop Time: 1000  Total Time: 30 min  Missed Time:    General Precautions: fall  Ortho Precautions: LLE non weight bearing (NWB LLE with sliding  board for t/fs per NP Micaela awaiting ortho recommendation)  Braces: N/A    Vitals   Vitals at Rest  BP    HR    O2 Sat    Pain 8/10     Vitals With Activity  BP    HR    O2 Sat    Pain 8/10       Treatment     Cognitive Skills Building   Cognitive Observation Activity Assist Position Equipment Response            Comment:          Dynamic Activities   Activity Assist Position Equipment Response   Activity 1 Bowling supervision and minimum assistance Sitting Rubber bowling balls fair   Comment: Dynamic sitting balance/reaching was min.  Increased pain noted in LLE.  Foot rests were left up.  Sitting tolerance was less than 30 seconds.  RUE coordination was supervision.  Sequencing skills were setup.  Limited participation due to pain     Fine Motor Activities   Activity Assist Position Equipment Response           Comment:          Additional Info: Pt had increased pain in L hip.     Goals     Short Term Goals    Goal  Goal Status   Will increase activity tolerance to supervision Met   Will improve dynamic sitting balance/reaching to supervision Progressing                 Long Term Goals    Goal Goal Status   Will increase sit to stand to supervision Progressing   Will increase standing tolerance to 5 minutes Progressing   Will improve dynamic standing balance/reaching to supervision Progressing

## 2024-07-12 NOTE — PROGRESS NOTES
[facility] ([facility])  Rehab Progress Note    Patient Name: Homa Jaquez  MRN: [id number]  Age: 88 y.o. Sex: female  : 1935  Hospital Length of Stay: 3 days  Date of Service: 2024   Chief Complaint:  Left intertrochanteric femur fracture with severe comminution 2/2 fall s/p IM nailing on 2024     Subjective:     Basic Information  Admit Information: 88-year-old female presented to [facility] on  with complaints of left hip pain after falling at home.  PMH significant for atrial fibrillation, chronic cystitis, GERD, Graves disease, hypertension, hypothyroidism, spinal stenosis.  Workup significant for left intertrochanteric femur fracture.  On  tolerated IM nailing without perioperative complications.  Restarted Coumadin on  without complications.  Initiated IV fluid resuscitation 2/2 BARRY on .  Initiated 1 dose of vitamin K 2/2 INR 12.1.  Supratherapeutic INR resolved. Tolerated transfer to [facility] inpatient rehab unit on  without incident.   Today's Information: No acute events overnight.  Sitting in chair comfortably.  Reports good sleep and appetite.  Last BM 7/10.  Vital signs at goal with no recent recorded fevers.  No labs today.  Reports increased pain to left lower extremity.  Left x-ray significant for mild superior migration of the femoral head/neck screw.    Review of patient's allergies indicates:   Allergen Reactions    Cefadroxil      Other reaction(s): Nausea or vomiting    Cefuroxime axetil     Cephalexin      Other reaction(s): NAUSEA AND VOMITING    Ciprofloxacin     Enoxaparin     Methenamine hippurate      Other reaction(s): Unknown    Promethazine         Current Facility-Administered Medications:     acetaminophen tablet 500 mg, 500 mg, Oral, BID PRN, Onyx, Brandy A, FNP    acetaminophen tablet 650 mg, 650 mg, Oral, TID AC, Onyx, Brandy A, FNP, 650 mg at 24 1145    allopurinoL tablet 100 mg, 100 mg, Oral, Daily,  Calli Salazar FNP, 100 mg at 07/12/24 0756    atorvastatin tablet 20 mg, 20 mg, Oral, Daily, Calli Salazar FNP, 20 mg at 07/12/24 0756    benzonatate capsule 100 mg, 100 mg, Oral, TID PRN, Calli Salazar, TAIP    carvediloL tablet 6.25 mg, 6.25 mg, Oral, BID, Calli Salazar, TAIP, 6.25 mg at 07/12/24 0756    docusate sodium capsule 100 mg, 100 mg, Oral, BID, Calli Salazar, FNP, 100 mg at 07/12/24 0756    hydrALAZINE injection 10 mg, 10 mg, Intravenous, Q4H PRN, Calli Salazar FNP    hydrOXYzine pamoate capsule 50 mg, 50 mg, Oral, Nightly PRN, Calli Salazar, FNP    labetalol 20 mg/4 mL (5 mg/mL) IV syring, 10 mg, Intravenous, Q4H PRN, Calli Salazar, TAIP    levothyroxine tablet 125 mcg, 125 mcg, Oral, Before breakfast, Calli Salazar FNP, 125 mcg at 07/12/24 0520    melatonin tablet 6 mg, 6 mg, Oral, Nightly PRN, Calli Salazar FNP    menthol-zinc oxide 0.44-20.6 % Oint, , Topical (Top), BID, Brandy Paredes FNP, Given at 07/12/24 0756    methocarbamoL tablet 500 mg, 500 mg, Oral, TID PRN, Brandy Paredes, DEIDRA    metoprolol injection 10 mg, 10 mg, Intravenous, Q2H PRN, Calli Salazar, FNP    nitroGLYCERIN SL tablet 0.4 mg, 0.4 mg, Sublingual, Q5 Min PRN, Calli Salazar FNP    ondansetron disintegrating tablet 4 mg, 4 mg, Oral, Q6H PRN, Calli Salazar, FNP    ondansetron disintegrating tablet 8 mg, 8 mg, Oral, Q8H PRN, Calli Salazar, TAIP    ondansetron injection 4 mg, 4 mg, Intravenous, Q8H PRN, Calli Salazar FNP    oxyCODONE immediate release tablet 5 mg, 5 mg, Oral, Q4H PRN, Brandy Paredes FNP    sacubitriL-valsartan 24-26 mg per tablet 1 tablet, 1 tablet, Oral, BID, Calli Salazar FNP, 1 tablet at 07/12/24 0756    traMADoL tablet 50 mg, 50 mg, Oral, Q6H PRN, Calli Salazar FNP, 50 mg at 07/11/24 0815    traMADoL tablet 50 mg, 50 mg, Oral, TID AC, Brandy Paredes FNP, 50 mg at 07/12/24 1145    warfarin (COUMADIN) tablet 2 mg, 2 mg, Oral,  "Daily, Calli Salazar, FNP, 2 mg at 07/11/24 1602     Review of Systems   Complete 12-point review of symptoms negative except for what's mentioned in HPI     Objective:     /62   Pulse 64   Temp 97.5 °F (36.4 °C) (Oral)   Resp 20   Ht 5' 4" (1.626 m)   Wt 79.3 kg (174 lb 13.2 oz)   SpO2 99%   BMI 30.01 kg/m²      Physical Exam  Vitals reviewed.   Eyes:      Pupils: Pupils are equal, round, and reactive to light.   Cardiovascular:      Rate and Rhythm: Normal rate and regular rhythm.      Heart sounds: Normal heart sounds.   Pulmonary:      Effort: Pulmonary effort is normal.      Breath sounds: Normal breath sounds.   Abdominal:      General: Bowel sounds are normal.   Musculoskeletal:         General: Normal range of motion.   Skin:     General: Skin is warm.      Comments: Left femur incision dry and intact    Neurological:      General: No focal deficit present.      Mental Status: She is alert and oriented to person, place, and time.      Motor: Weakness present.   Psychiatric:         Mood and Affect: Mood normal.     *MD performed and documented physical examination       Lines/Drains/Airways       Peripheral Intravenous Line  Duration                  Peripheral IV - Single Lumen 07/11/24 20 G Anterior;Right Wrist 1 day                    Labs  No results found for this or any previous visit (from the past 24 hour(s)).  Radiology  Left femur x-ray 7/3/24 Impression: Screw and intramedullary azael identified fixating a fracture of the proximal femur some displaced fragments are identified position and alignment is otherwise close to anatomical hardware appears to be intact     Assessment/Plan:     88 y.o. WF admitted on 7/9/2024     Left intertrochanteric femur fracture with severe comminution 2/2 fall   - s/p IM nailing on 07/03/2024  - WBAT LLE  - continue                 Robaxin 500 mg t.i.d.                  Tylenol 1000 mg b.i.d.  - follow-up with orthopedic surgery on 7/25 for wound check " and repeat x-rays     Dilated cardiomyopathy  - no PCI or CABG  - denies recent chest pain or discomfort  - continue                 Entresto 24-26 mg b.i.d.                  Atorvastatin 20 mg daily                  Coreg 6.25 mg b.i.d. (decreased 7/11)   - ECG and Nitro PRN  - continue cardiac diet  - to follow-up with cardiology outpatient     Atrial fibrillation  - s/p pacemaker/defibrillator  - controlled ventricular rate without associated bleeding  - continue                Coumadin 2 mg daily     Anemia  - asymptomatic  - s/p transfusion                x2 units PRBC on 7/8/2024  - H/H stable   - no evidence of active bleeds  - will closely monitor and transfuse if needed      HTN  - blood pressures at goal  - continue                 Entresto 24-26 mg b.i.d.                  Coreg 6.25 mg b.i.d. (decreased 7/11)                Hydralazine 10 mg every 2 hours as needed for BP > 160/90                Labetalol 10 mg every 2 hours as needed for BP > 160/90  - low sodium diet     HLD  - FLP outpatient  - continue                Atorvastatin 20 mg daily     Gout  - stable  - continue                 Allopurinol 100 mg daily     DM type II  - HgA1c 5.2  - diet-controlled  - continue                ISS   - CBGs AC/HS     Constipation  - stable  - continue                 Colace 100 mg b.i.d.                 MiraLax 17 g b.i.d.     Hypothyroidism  - TSH at goal  - continue                 Levothyroxine 125 mcg before breakfast     VTE Prophylaxis:  Coumadin 2 mg daily     POA: yes- Alyssa  Living will:  Yes  Contacts:  Av Jaquez (son) [phone]                     Levi Jaquez (spouse) [phone]     CODE STATUS: Full  Internal Medicine (attending): Tato oHlley MD  Physiatry (consulting):  Alfred Soto MD     OUTPATIENT PROVIDERS  PCP: Franklyn Brito MD  Cardiology: Yefri Mukherjee MD   Orthopedic surgery: Steve Pierce D.O.     DISPOSITION:  Vital signs at goal.  No labs today.   Reported increased pain to left femur.  Left femur x-ray significant for mild superior migration of the femoral head/neck screw since 07/03/2024.  Orthopedic surgery recommended nonweightbearing and slide board transfers 2/2 continued pain.  Possible plans for total hip with Jonny Bains MD.  No acute transfer necessary.  Bowel management, sleep hygiene, and appetite at goal.  Continues to progress well with therapies.  Monitor closely.  Notify of any acute changes.     Calli Salazar NP conducted independent physical examination and assisted with medical documentation.     Total time spent on this encounter including chart review and direct MD + NP 1-on-1 patient interaction: 51 minutes   Over 50% of this time was spent in counseling and coordination of care

## 2024-07-12 NOTE — PROGRESS NOTES
07/12/24 0930   Rec Therapy Time Calculation   Date of Treatment 07/12/24   Rec Start Time 0930   Rec Stop Time 1000   Rec Total Time (min) 30 min   Time   Treatment time 2 units   Charges   $Therapeutic Exercise 2 units   Precautions   General Precautions fall   Orthopedic Precautions  LLE weight bearing as tolerated   Braces N/A   Pain/Comfort   Pain Rating 1 8/10   Location - Side 1 Left   Location - Orientation 1 lower   Location 1 hip   Pain Addressed 1 Reposition   OTHER   Rehab identified problem list/impairments weakness;impaired functional mobility;gait instability;decreased lower extremity function;impaired endurance;decreased upper extremity function   Values/Beliefs/Spiritual Care   Spiritual, Cultural Beliefs, Worship Practices, Values that Affect Care no   Overall Level of Functioning   Activity Tolerance Independent   Dynamic Sitting Balance/Reaching Min A   Dynamic Standing Balance/Reaching Does not occur   Right UE Coodination/Dexterity Mod Indep   Left UE Coordination/Dexterity Mod Indep   Problem Solving/Sequencing Skills Mod Indep   Memory Recall Mod Indep   R/L Neglect/Inattention Does not occur   Attention Span Mod Indep   Social Interaction Independent   Recreational Therapy Short Term Goals   Short Term Goal 1 Progression Met   Short Term Goal 2 Progression Progressing   Recreational Therapy Long Term Goals   Long Term Goal 1 Progression Progressing   Long Term Goal 2 Progression Progressing   Long Term Goal 3 Progression Progressing   Plan   Patient to be seen Daily   Planned Duration 2 weeks   Treatments Planned Energy conservation training   Treatment plan/goals estblished with Patient/Caregiver Yes

## 2024-07-12 NOTE — PT/OT/SLP PROGRESS
Physical Therapy Inpatient Rehab Treatment    Patient Name:  Homa Jaquez   MRN:  [id number]    Recommendations:     Discharge Recommendations:  Moderate Intensity Therapy (pending progress)   Discharge Equipment Recommendations:     Barriers to discharge: Impaired functional mobility     Assessment:     Homa Jaquez is a 88 y.o. female admitted with a medical diagnosis of Closed comminuted intertrochanteric fracture of left femur.  She presents with the following impairments/functional limitations:  weakness, impaired endurance, impaired self care skills, impaired functional mobility, gait instability, impaired balance, decreased lower extremity function, decreased coordination, pain, decreased safety awareness, decreased ROM, impaired coordination, impaired skin, impaired fine motor, edema, impaired muscle length, orthopedic precautions .    Rehab Diagnosis: fall with L femur fx s/p IM nail    General Precautions: Standard, fall, hearing impaired     Orthopedic Precautions:LLE weight bearing as tolerated     Braces: N/A    Rehab Prognosis: Good; patient would benefit from acute skilled PT services to address these deficits and reach maximum level of function.      History:     Past Medical History:   Diagnosis Date    A-fib     Chronic cystitis     GERD (gastroesophageal reflux disease)     Graves disease     Hypertension     Hypothyroidism, unspecified     Spinal stenosis        Past Surgical History:   Procedure Laterality Date    APPENDECTOMY      BLADDER SUSPENSION      CARDIOVERSION  04/01/2015    CATARACT EXTRACTION      HYSTERECTOMY      INTRAMEDULLARY RODDING OF FEMUR Left 7/3/2024    Procedure: INSERTION, INTRAMEDULLARY MELANIE, FEMUR;  Surgeon: Steve Pierce DO;  Location: [facility];  Service: Orthopedics;  Laterality: Left;  supine hana table c arm synthes    LAPAROSCOPIC CHOLECYSTECTOMY  01/12/2016    SPHINCTEROTOMY OF URETHRA  01/11/2016    TONSILLECTOMY AND ADENOIDECTOMY         Subjective      Patient comments: increased pain    Respiratory Status: Room air    Patients cultural, spiritual, Jainism conflicts given the current situation: no    Objective:   Patient found up in chair with peripheral IV, Other (comments) (pt in recliner)  upon PT entry to room.      Therapeutic Activities and Exercises:  Patient performed 1 set(s) of 15 repetitions of the following seated exercises: ankle pumps, long arc quads, marches, and hip abduction for right LE. Patient required skilled PT for instruction of exercises and appropriate cues to perform exercises safely and appropriately.    Pt attempted sit<>stand with rw, pt unable to perform due to increase pain.     Activity Tolerance: Fair    Patient left up in chair with all lines intact and call button in reach.    Education provided: roles and goals of PT/PTA and transfer training    Expected compliance: Moderate compliance    Plan:     During this hospitalization, patient to be seen  (5-7x/week) to address the identified rehab impairments via gait training, therapeutic activities, therapeutic exercises, neuromuscular re-education, wheelchair management/training and progress toward the following goals:    GOALS:   Multidisciplinary Problems       Physical Therapy Goals          Problem: Physical Therapy    Goal Priority Disciplines Outcome Goal Variances Interventions   Physical Therapy Goal     PT, PT/OT Progressing     Description: Bed Mobility:  Roll left and right with partial/moderate assist.   Sit to supine transfer with partial/moderate assist.   Supine to sit transfer with partial/moderate assist.     Transfers:  Sit to stand transfer with partial/moderate assist using RW.   Bed to chair transfer with partial/moderate assist Stand Pivot  using RW.   Car transfer with partial/moderate assist using RW.    an object from the ground in standing position with partial/moderate assist using RW and reacher.     Mobility:  Ambulate 10 feet with  partial/moderate assist using RW vs Parallel bars.  Manual wheelchair 200 feet with supervision/touching assist using Bilateral upper extremity.                          Plan of Care Expires:     PT Next Visit Date: 07/16/24  Plan of Care reviewed with: patient    Additional Information:         Time Tracking:     Therapy Time  PT Received On: 07/12/24  PT Start Time: 1000  PT Stop Time: 1030  PT Total Time (min): 30 min   PT Individual: 30  Missed Time:    Time Missed due to:      Billable Minutes: Therapeutic Exercise 30 07/12/2024

## 2024-07-12 NOTE — PT/OT/SLP PROGRESS
Physical Therapy Inpatient Rehab Treatment    Patient Name:  Homa Jaquez   MRN:  [id number]    Recommendations:     Discharge Recommendations:  Moderate Intensity Therapy (pending progress)   Discharge Equipment Recommendations:     Barriers to discharge: Increased level of assist, Impaired functional mobility , and Severity of Deficits     Assessment:     Homa Jaquez is a 88 y.o. female admitted with a medical diagnosis of Closed comminuted intertrochanteric fracture of left femur.  She presents with the following impairments/functional limitations:  weakness, impaired endurance, impaired self care skills, impaired functional mobility, gait instability, impaired balance, decreased lower extremity function, decreased coordination, pain, decreased safety awareness, decreased ROM, impaired coordination, impaired skin, impaired fine motor, edema, impaired muscle length, orthopedic precautions.    Rehab Diagnosis: fall with L femur fx s/p IM nail    General Precautions: Standard, fall, hearing impaired     Orthopedic Precautions:LLE weight bearing as tolerated     Braces: N/A    Rehab Prognosis: Good; patient would benefit from acute skilled PT services to address these deficits and reach maximum level of function.      History:     Past Medical History:   Diagnosis Date    A-fib     Chronic cystitis     GERD (gastroesophageal reflux disease)     Graves disease     Hypertension     Hypothyroidism, unspecified     Spinal stenosis        Past Surgical History:   Procedure Laterality Date    APPENDECTOMY      BLADDER SUSPENSION      CARDIOVERSION  04/01/2015    CATARACT EXTRACTION      HYSTERECTOMY      INTRAMEDULLARY RODDING OF FEMUR Left 7/3/2024    Procedure: INSERTION, INTRAMEDULLARY MELANIE, FEMUR;  Surgeon: Steve Pierce DO;  Location: [facility];  Service: Orthopedics;  Laterality: Left;  supine hana table c arm synthes    LAPAROSCOPIC CHOLECYSTECTOMY  01/12/2016    SPHINCTEROTOMY OF URETHRA  01/11/2016     TONSILLECTOMY AND ADENOIDECTOMY         Subjective     Patient comments: pt c/o significant pain on initial attempt at 0830; agreeable to attempt with PT at 0900 but continuing to c/o significant pain which severely limited pts ability to participate.    Respiratory Status: Room air    Patients cultural, spiritual, Faith conflicts given the current situation: no    Objective:     Communicated with pt and NSG prior to session.  Patient found up in chair upon PT entry to room.    Pt is Oriented x3 and Alert, Cooperative, and Motivated.    Vitals   0900: 123/62, 64 bpm    Pain Pain Rating 1: 9/10  Location - Side 1: Left  Location 1: leg  Pain Addressed 1: Reposition, Cessation of Activity, Nurse notified  Pain Rating Post-Intervention 1: 8/10       Functional Mobility:    Current   Status  Discharge   Goal   Functional Area: Care Score:    Roll Left and Right   Partial/moderate assistance   Sit to Lying   Partial/moderate assistance   Lying to Sitting on Side of Bed   Partial/moderate assistance   Sit to Stand   Partial/moderate assistance   Chair/Bed-to-Chair Transfer   Partial/moderate assistance   Car Transfer   Partial/moderate assistance   Walk 10 Feet   Partial/moderate assistance   Walk 50 Feet with Two Turns   Partial/moderate assistance   Walk 150 Feet   Not attempted due to medical/safety concerns   Walk 10 Feet Uneven Surface   Partial/moderate assistance   1 Step (Curb)   Partial/moderate assistance   4 Steps   Not attempted due to medical/safety concerns   12 Steps   Not attempted due to medical/safety concerns   Picking Up Object   Partial/moderate assistance   Wheel 50 Feet with Two Turns 4 Set-up/clean-up   Wheel 150 Feet 2  Pt manually propelled w/c 50' x3 completions with B UE. Very slow speed, pt c/o increased pain with all mobility which limited participation. NP/NSG staff aware. Set-up/clean-up       Therapeutic Activities and Exercises:  Patient educated on safety while in hospital including  calling nurse for mobility  Patient educated about importance of OOB mobility and remaining up in chair most of the day.    Activity Tolerance: Poor    Patient left up in chair with  CTRS Lia present.    Education provided: roles and goals of PT/PTA and wheelchair management    Expected compliance: High compliance    Plan:     During this hospitalization, patient to be seen  (5-7x/week) to address the identified rehab impairments via gait training, therapeutic activities, therapeutic exercises, neuromuscular re-education, wheelchair management/training and progress toward the following goals:    GOALS:   Multidisciplinary Problems       Physical Therapy Goals          Problem: Physical Therapy    Goal Priority Disciplines Outcome Goal Variances Interventions   Physical Therapy Goal     PT, PT/OT Progressing     Description: Bed Mobility:  Roll left and right with partial/moderate assist.   Sit to supine transfer with partial/moderate assist.   Supine to sit transfer with partial/moderate assist.     Transfers:  Sit to stand transfer with partial/moderate assist using RW.   Bed to chair transfer with partial/moderate assist Stand Pivot  using RW.   Car transfer with partial/moderate assist using RW.    an object from the ground in standing position with partial/moderate assist using RW and reacher.     Mobility:  Ambulate 10 feet with partial/moderate assist using RW vs Parallel bars.  Manual wheelchair 200 feet with supervision/touching assist using Bilateral upper extremity.                          Plan of Care Expires:     PT Next Visit Date: 07/16/24  Plan of Care reviewed with: patient    Additional Information:     Pt scheduled for PT [id number]. Pt politely refused [id number] d/t pain, stating that she had recently been given medication (~15-20 min prior to PT entry to room) and requested some time to allow for it to start working. Pt agreeable to attempt to participate with PT at 0900, however, still  severely limited by pain. NP/NSG staff aware.    Time Tracking:     Therapy Time  PT Start Time: 0830  PT Stop Time: 0930  PT Total Time (min): 60 min   PT Individual: 30  Missed Time: 30 Minutes  Time Missed due to: Pain, Patient unwilling to participate    Billable Minutes: Train/Wheelchair Management 30 min    07/12/2024

## 2024-07-13 ENCOUNTER — HOSPITAL ENCOUNTER (INPATIENT)
Facility: HOSPITAL | Age: 89
LOS: 13 days | Discharge: NURSING, REHAB OR LONG-TERM CARE | DRG: 522 | End: 2024-07-26
Attending: INTERNAL MEDICINE | Admitting: INTERNAL MEDICINE
Payer: MEDICARE

## 2024-07-13 VITALS
WEIGHT: 182.75 LBS | HEART RATE: 70 BPM | SYSTOLIC BLOOD PRESSURE: 130 MMHG | OXYGEN SATURATION: 99 % | HEIGHT: 64 IN | TEMPERATURE: 97 F | BODY MASS INDEX: 31.2 KG/M2 | RESPIRATION RATE: 17 BRPM | DIASTOLIC BLOOD PRESSURE: 71 MMHG

## 2024-07-13 DIAGNOSIS — M25.559 HIP PAIN: ICD-10-CM

## 2024-07-13 DIAGNOSIS — I48.91 A-FIB: ICD-10-CM

## 2024-07-13 DIAGNOSIS — S72.142K CLOSED DISPLACED INTERTROCHANTERIC FRACTURE OF LEFT FEMUR WITH NONUNION, SUBSEQUENT ENCOUNTER: Primary | ICD-10-CM

## 2024-07-13 DIAGNOSIS — S72.142D CLOSED DISPLACED INTERTROCHANTERIC FRACTURE OF LEFT FEMUR WITH ROUTINE HEALING, SUBSEQUENT ENCOUNTER: ICD-10-CM

## 2024-07-13 DIAGNOSIS — E87.5 HYPERKALEMIA: ICD-10-CM

## 2024-07-13 LAB
INR PPP: 2.7 (ref 2–3)
POCT GLUCOSE: 151 MG/DL (ref 70–110)
POCT GLUCOSE: 156 MG/DL (ref 70–110)
PROTHROMBIN TIME: 29.5 SECONDS (ref 11.7–14.5)

## 2024-07-13 PROCEDURE — 85610 PROTHROMBIN TIME: CPT | Performed by: ORTHOPAEDIC SURGERY

## 2024-07-13 PROCEDURE — 94761 N-INVAS EAR/PLS OXIMETRY MLT: CPT

## 2024-07-13 PROCEDURE — 25000003 PHARM REV CODE 250: Performed by: NURSE PRACTITIONER

## 2024-07-13 PROCEDURE — 99900031 HC PATIENT EDUCATION (STAT)

## 2024-07-13 PROCEDURE — 36415 COLL VENOUS BLD VENIPUNCTURE: CPT | Performed by: ORTHOPAEDIC SURGERY

## 2024-07-13 PROCEDURE — 11000001 HC ACUTE MED/SURG PRIVATE ROOM

## 2024-07-13 PROCEDURE — 99223 1ST HOSP IP/OBS HIGH 75: CPT | Mod: 57,,, | Performed by: ORTHOPAEDIC SURGERY

## 2024-07-13 PROCEDURE — 94799 UNLISTED PULMONARY SVC/PX: CPT

## 2024-07-13 RX ORDER — CARVEDILOL 6.25 MG/1
6.25 TABLET ORAL 2 TIMES DAILY
Status: DISCONTINUED | OUTPATIENT
Start: 2024-07-13 | End: 2024-07-15

## 2024-07-13 RX ORDER — TALC
6 POWDER (GRAM) TOPICAL NIGHTLY PRN
Status: CANCELLED | OUTPATIENT
Start: 2024-07-13

## 2024-07-13 RX ORDER — LEVOTHYROXINE SODIUM 125 UG/1
125 TABLET ORAL
Status: DISCONTINUED | OUTPATIENT
Start: 2024-07-14 | End: 2024-07-26 | Stop reason: HOSPADM

## 2024-07-13 RX ORDER — LABETALOL HCL 20 MG/4 ML
10 SYRINGE (ML) INTRAVENOUS EVERY 4 HOURS PRN
Status: CANCELLED | OUTPATIENT
Start: 2024-07-13

## 2024-07-13 RX ORDER — IBUPROFEN 200 MG
24 TABLET ORAL
Status: DISCONTINUED | OUTPATIENT
Start: 2024-07-13 | End: 2024-07-21

## 2024-07-13 RX ORDER — ONDANSETRON 4 MG/1
4 TABLET, ORALLY DISINTEGRATING ORAL EVERY 6 HOURS PRN
Status: CANCELLED | OUTPATIENT
Start: 2024-07-13

## 2024-07-13 RX ORDER — ONDANSETRON 4 MG/1
8 TABLET, ORALLY DISINTEGRATING ORAL EVERY 8 HOURS PRN
Status: CANCELLED | OUTPATIENT
Start: 2024-07-13

## 2024-07-13 RX ORDER — ACETAMINOPHEN 500 MG
500 TABLET ORAL 2 TIMES DAILY PRN
Status: DISCONTINUED | OUTPATIENT
Start: 2024-07-13 | End: 2024-07-15

## 2024-07-13 RX ORDER — DOCUSATE SODIUM 100 MG/1
100 CAPSULE, LIQUID FILLED ORAL 2 TIMES DAILY
Status: CANCELLED | OUTPATIENT
Start: 2024-07-13

## 2024-07-13 RX ORDER — INSULIN ASPART 100 [IU]/ML
0-5 INJECTION, SOLUTION INTRAVENOUS; SUBCUTANEOUS
Status: DISCONTINUED | OUTPATIENT
Start: 2024-07-13 | End: 2024-07-21

## 2024-07-13 RX ORDER — ATORVASTATIN CALCIUM 10 MG/1
20 TABLET, FILM COATED ORAL DAILY
Status: CANCELLED | OUTPATIENT
Start: 2024-07-14

## 2024-07-13 RX ORDER — MENTHOL AND ZINC OXIDE .44; 20.625 G/100G; G/100G
OINTMENT TOPICAL 2 TIMES DAILY
Status: DISCONTINUED | OUTPATIENT
Start: 2024-07-13 | End: 2024-07-26 | Stop reason: HOSPADM

## 2024-07-13 RX ORDER — CARVEDILOL 6.25 MG/1
6.25 TABLET ORAL 2 TIMES DAILY
Status: CANCELLED | OUTPATIENT
Start: 2024-07-13

## 2024-07-13 RX ORDER — ONDANSETRON 4 MG/1
8 TABLET, ORALLY DISINTEGRATING ORAL EVERY 8 HOURS PRN
Status: DISCONTINUED | OUTPATIENT
Start: 2024-07-13 | End: 2024-07-13 | Stop reason: SDUPTHER

## 2024-07-13 RX ORDER — ONDANSETRON 4 MG/1
4 TABLET, ORALLY DISINTEGRATING ORAL EVERY 6 HOURS PRN
Status: DISCONTINUED | OUTPATIENT
Start: 2024-07-13 | End: 2024-07-26 | Stop reason: HOSPADM

## 2024-07-13 RX ORDER — HYDROXYZINE PAMOATE 50 MG/1
50 CAPSULE ORAL NIGHTLY PRN
Status: DISCONTINUED | OUTPATIENT
Start: 2024-07-13 | End: 2024-07-21

## 2024-07-13 RX ORDER — ENOXAPARIN SODIUM 100 MG/ML
1 INJECTION SUBCUTANEOUS EVERY 12 HOURS
Status: CANCELLED | OUTPATIENT
Start: 2024-07-13

## 2024-07-13 RX ORDER — METHOCARBAMOL 500 MG/1
500 TABLET, FILM COATED ORAL 3 TIMES DAILY PRN
Status: CANCELLED | OUTPATIENT
Start: 2024-07-13

## 2024-07-13 RX ORDER — NITROGLYCERIN 0.4 MG/1
0.4 TABLET SUBLINGUAL EVERY 5 MIN PRN
Status: DISCONTINUED | OUTPATIENT
Start: 2024-07-13 | End: 2024-07-26 | Stop reason: HOSPADM

## 2024-07-13 RX ORDER — HYDROXYZINE PAMOATE 50 MG/1
50 CAPSULE ORAL NIGHTLY PRN
Status: CANCELLED | OUTPATIENT
Start: 2024-07-13

## 2024-07-13 RX ORDER — BENZONATATE 100 MG/1
100 CAPSULE ORAL 3 TIMES DAILY PRN
Status: DISCONTINUED | OUTPATIENT
Start: 2024-07-13 | End: 2024-07-26 | Stop reason: HOSPADM

## 2024-07-13 RX ORDER — TRAMADOL HYDROCHLORIDE 50 MG/1
50 TABLET ORAL EVERY 6 HOURS PRN
Status: CANCELLED | OUTPATIENT
Start: 2024-07-13

## 2024-07-13 RX ORDER — ENOXAPARIN SODIUM 100 MG/ML
1 INJECTION SUBCUTANEOUS EVERY 12 HOURS
Status: DISCONTINUED | OUTPATIENT
Start: 2024-07-13 | End: 2024-07-13 | Stop reason: HOSPADM

## 2024-07-13 RX ORDER — TRAMADOL HYDROCHLORIDE 50 MG/1
50 TABLET ORAL
Status: CANCELLED | OUTPATIENT
Start: 2024-07-13

## 2024-07-13 RX ORDER — ACETAMINOPHEN 500 MG
500 TABLET ORAL 2 TIMES DAILY PRN
Status: CANCELLED | OUTPATIENT
Start: 2024-07-13

## 2024-07-13 RX ORDER — METOPROLOL TARTRATE 1 MG/ML
10 INJECTION, SOLUTION INTRAVENOUS
Status: DISCONTINUED | OUTPATIENT
Start: 2024-07-13 | End: 2024-07-26 | Stop reason: HOSPADM

## 2024-07-13 RX ORDER — ATORVASTATIN CALCIUM 10 MG/1
20 TABLET, FILM COATED ORAL DAILY
Status: DISCONTINUED | OUTPATIENT
Start: 2024-07-14 | End: 2024-07-26 | Stop reason: HOSPADM

## 2024-07-13 RX ORDER — ONDANSETRON HYDROCHLORIDE 2 MG/ML
4 INJECTION, SOLUTION INTRAVENOUS EVERY 8 HOURS PRN
Status: CANCELLED | OUTPATIENT
Start: 2024-07-13

## 2024-07-13 RX ORDER — IBUPROFEN 200 MG
16 TABLET ORAL
Status: DISCONTINUED | OUTPATIENT
Start: 2024-07-13 | End: 2024-07-21

## 2024-07-13 RX ORDER — LABETALOL HYDROCHLORIDE 5 MG/ML
10 INJECTION, SOLUTION INTRAVENOUS EVERY 4 HOURS PRN
Status: DISCONTINUED | OUTPATIENT
Start: 2024-07-13 | End: 2024-07-16

## 2024-07-13 RX ORDER — METHOCARBAMOL 500 MG/1
500 TABLET, FILM COATED ORAL 3 TIMES DAILY PRN
Status: DISCONTINUED | OUTPATIENT
Start: 2024-07-13 | End: 2024-07-15

## 2024-07-13 RX ORDER — ACETAMINOPHEN 325 MG/1
650 TABLET ORAL
Status: CANCELLED | OUTPATIENT
Start: 2024-07-13

## 2024-07-13 RX ORDER — LEVOTHYROXINE SODIUM 125 UG/1
125 TABLET ORAL
Status: CANCELLED | OUTPATIENT
Start: 2024-07-14

## 2024-07-13 RX ORDER — TALC
6 POWDER (GRAM) TOPICAL NIGHTLY PRN
Status: DISCONTINUED | OUTPATIENT
Start: 2024-07-13 | End: 2024-07-15

## 2024-07-13 RX ORDER — ALLOPURINOL 100 MG/1
100 TABLET ORAL DAILY
Status: DISCONTINUED | OUTPATIENT
Start: 2024-07-14 | End: 2024-07-26 | Stop reason: HOSPADM

## 2024-07-13 RX ORDER — ONDANSETRON HYDROCHLORIDE 2 MG/ML
4 INJECTION, SOLUTION INTRAVENOUS EVERY 8 HOURS PRN
Status: DISCONTINUED | OUTPATIENT
Start: 2024-07-13 | End: 2024-07-26 | Stop reason: HOSPADM

## 2024-07-13 RX ORDER — NITROGLYCERIN 0.4 MG/1
0.4 TABLET SUBLINGUAL EVERY 5 MIN PRN
Status: CANCELLED | OUTPATIENT
Start: 2024-07-13

## 2024-07-13 RX ORDER — MENTHOL AND ZINC OXIDE .44; 20.625 G/100G; G/100G
OINTMENT TOPICAL 2 TIMES DAILY
Status: CANCELLED | OUTPATIENT
Start: 2024-07-13

## 2024-07-13 RX ORDER — TRAMADOL HYDROCHLORIDE 50 MG/1
50 TABLET ORAL
Status: DISCONTINUED | OUTPATIENT
Start: 2024-07-13 | End: 2024-07-18

## 2024-07-13 RX ORDER — ALLOPURINOL 100 MG/1
100 TABLET ORAL DAILY
Status: CANCELLED | OUTPATIENT
Start: 2024-07-14

## 2024-07-13 RX ORDER — HYDRALAZINE HYDROCHLORIDE 20 MG/ML
10 INJECTION INTRAMUSCULAR; INTRAVENOUS EVERY 4 HOURS PRN
Status: DISCONTINUED | OUTPATIENT
Start: 2024-07-13 | End: 2024-07-18

## 2024-07-13 RX ORDER — TRAMADOL HYDROCHLORIDE 50 MG/1
50 TABLET ORAL EVERY 6 HOURS PRN
Status: DISCONTINUED | OUTPATIENT
Start: 2024-07-13 | End: 2024-07-15

## 2024-07-13 RX ORDER — BENZONATATE 100 MG/1
100 CAPSULE ORAL 3 TIMES DAILY PRN
Status: CANCELLED | OUTPATIENT
Start: 2024-07-13

## 2024-07-13 RX ORDER — METOPROLOL TARTRATE 1 MG/ML
10 INJECTION, SOLUTION INTRAVENOUS
Status: CANCELLED | OUTPATIENT
Start: 2024-07-13

## 2024-07-13 RX ORDER — HYDRALAZINE HYDROCHLORIDE 20 MG/ML
10 INJECTION INTRAMUSCULAR; INTRAVENOUS EVERY 4 HOURS PRN
Status: CANCELLED | OUTPATIENT
Start: 2024-07-13

## 2024-07-13 RX ORDER — GLUCAGON 1 MG
1 KIT INJECTION
Status: DISCONTINUED | OUTPATIENT
Start: 2024-07-13 | End: 2024-07-21

## 2024-07-13 RX ORDER — OXYCODONE HYDROCHLORIDE 5 MG/1
5 TABLET ORAL EVERY 4 HOURS PRN
Status: DISCONTINUED | OUTPATIENT
Start: 2024-07-13 | End: 2024-07-15

## 2024-07-13 RX ORDER — OXYCODONE HYDROCHLORIDE 5 MG/1
5 TABLET ORAL EVERY 4 HOURS PRN
Status: CANCELLED | OUTPATIENT
Start: 2024-07-13

## 2024-07-13 RX ORDER — DOCUSATE SODIUM 100 MG/1
100 CAPSULE, LIQUID FILLED ORAL 2 TIMES DAILY
Status: DISCONTINUED | OUTPATIENT
Start: 2024-07-13 | End: 2024-07-26 | Stop reason: HOSPADM

## 2024-07-13 RX ORDER — ENOXAPARIN SODIUM 100 MG/ML
1 INJECTION SUBCUTANEOUS EVERY 12 HOURS
Status: DISCONTINUED | OUTPATIENT
Start: 2024-07-13 | End: 2024-07-13

## 2024-07-13 RX ORDER — ACETAMINOPHEN 325 MG/1
650 TABLET ORAL
Status: DISCONTINUED | OUTPATIENT
Start: 2024-07-13 | End: 2024-07-15

## 2024-07-13 RX ADMIN — SACUBITRIL AND VALSARTAN 1 TABLET: 24; 26 TABLET, FILM COATED ORAL at 09:07

## 2024-07-13 RX ADMIN — TRAMADOL HYDROCHLORIDE 50 MG: 50 TABLET, COATED ORAL at 03:07

## 2024-07-13 RX ADMIN — TRAMADOL HYDROCHLORIDE 50 MG: 50 TABLET, COATED ORAL at 10:07

## 2024-07-13 RX ADMIN — DOCUSATE SODIUM 100 MG: 100 CAPSULE, LIQUID FILLED ORAL at 09:07

## 2024-07-13 RX ADMIN — ATORVASTATIN CALCIUM 20 MG: 10 TABLET, FILM COATED ORAL at 07:07

## 2024-07-13 RX ADMIN — TRAMADOL HYDROCHLORIDE 50 MG: 50 TABLET, COATED ORAL at 05:07

## 2024-07-13 RX ADMIN — ALLOPURINOL 100 MG: 100 TABLET ORAL at 07:07

## 2024-07-13 RX ADMIN — Medication: at 07:07

## 2024-07-13 RX ADMIN — MENTHOL AND ZINC OXIDE: .44; 20.625 OINTMENT TOPICAL at 09:07

## 2024-07-13 RX ADMIN — DOCUSATE SODIUM 100 MG: 100 CAPSULE, LIQUID FILLED ORAL at 07:07

## 2024-07-13 RX ADMIN — CARVEDILOL 6.25 MG: 6.25 TABLET, FILM COATED ORAL at 07:07

## 2024-07-13 RX ADMIN — SACUBITRIL AND VALSARTAN 1 TABLET: 24; 26 TABLET, FILM COATED ORAL at 07:07

## 2024-07-13 RX ADMIN — CARVEDILOL 6.25 MG: 6.25 TABLET, FILM COATED ORAL at 09:07

## 2024-07-13 RX ADMIN — LEVOTHYROXINE SODIUM 125 MCG: 125 TABLET ORAL at 05:07

## 2024-07-13 RX ADMIN — ACETAMINOPHEN 650 MG: 325 TABLET ORAL at 03:07

## 2024-07-13 RX ADMIN — ACETAMINOPHEN 650 MG: 325 TABLET ORAL at 05:07

## 2024-07-13 RX ADMIN — ACETAMINOPHEN 650 MG: 325 TABLET ORAL at 10:07

## 2024-07-13 NOTE — CONSULTS
Chief Complaint: No chief complaint on file.      History of present illness:  Asked by Dr. Chavez to assist with the patient's care.  Patient has a history of a intertrochanteric femur fracture of the left side status post intramedullary nail last week.  Patient was transferred to rehab.  Had significantly worsening pain yesterday.  Repeat x-rays was obtained which shows significant change in fracture alignment and status.  Patient was complaints of pain this morning to the left hip.  Worse with activity.    Past Medical History:   Diagnosis Date    A-fib     Chronic cystitis     GERD (gastroesophageal reflux disease)     Graves disease     Hypertension     Hypothyroidism, unspecified     Spinal stenosis        Past Surgical History:   Procedure Laterality Date    APPENDECTOMY      BLADDER SUSPENSION      CARDIOVERSION  04/01/2015    CATARACT EXTRACTION      HYSTERECTOMY      INTRAMEDULLARY RODDING OF FEMUR Left 7/3/2024    Procedure: INSERTION, INTRAMEDULLARY MELANIE, FEMUR;  Surgeon: Steve Pierce DO;  Location: [facility];  Service: Orthopedics;  Laterality: Left;  supine hana table c arm synthes    LAPAROSCOPIC CHOLECYSTECTOMY  01/12/2016    SPHINCTEROTOMY OF URETHRA  01/11/2016    TONSILLECTOMY AND ADENOIDECTOMY         Current Facility-Administered Medications   Medication    acetaminophen tablet 500 mg    acetaminophen tablet 650 mg    allopurinoL tablet 100 mg    atorvastatin tablet 20 mg    benzonatate capsule 100 mg    carvediloL tablet 6.25 mg    docusate sodium capsule 100 mg    hydrALAZINE injection 10 mg    hydrOXYzine pamoate capsule 50 mg    labetalol 20 mg/4 mL (5 mg/mL) IV syring    levothyroxine tablet 125 mcg    melatonin tablet 6 mg    menthol-zinc oxide 0.44-20.6 % Oint    methocarbamoL tablet 500 mg    metoprolol injection 10 mg    nitroGLYCERIN SL tablet 0.4 mg    ondansetron disintegrating tablet 4 mg    ondansetron disintegrating tablet 8 mg    ondansetron injection 4 mg    oxyCODONE immediate  release tablet 5 mg    sacubitriL-valsartan 24-26 mg per tablet 1 tablet    traMADoL tablet 50 mg    traMADoL tablet 50 mg       Review of patient's allergies indicates:   Allergen Reactions    Cefadroxil      Other reaction(s): Nausea or vomiting    Cefuroxime axetil     Cephalexin      Other reaction(s): NAUSEA AND VOMITING    Ciprofloxacin     Enoxaparin     Methenamine hippurate      Other reaction(s): Unknown    Promethazine        Family History   Problem Relation Name Age of Onset    Cancer Father      Heart attack Father      Stroke Father         Social History     Socioeconomic History    Marital status:    Tobacco Use    Smoking status: Former     Types: Cigarettes    Smokeless tobacco: Never   Substance and Sexual Activity    Alcohol use: Yes    Drug use: Never    Sexual activity: Not Currently     Social Determinants of Health     Financial Resource Strain: Low Risk  (7/4/2024)    Overall Financial Resource Strain (CARDIA)     Difficulty of Paying Living Expenses: Not hard at all   Food Insecurity: No Food Insecurity (7/4/2024)    Hunger Vital Sign     Worried About Running Out of Food in the Last Year: Never true     Ran Out of Food in the Last Year: Never true   Transportation Needs: No Transportation Needs (7/9/2024)    PRAPARE - Transportation     Lack of Transportation (Medical): No     Lack of Transportation (Non-Medical): No   Stress: No Stress Concern Present (7/4/2024)    [employer] - Occupational Stress Questionnaire     Feeling of Stress : Not at all   Housing Stability: Low Risk  (7/4/2024)    Housing Stability Vital Sign     Unable to Pay for Housing in the Last Year: No     Homeless in the Last Year: No           Review of Systems:    Constitution: Negative for chills, fever, and sweats.  Negative for unexplained weight loss.    HENT:  Negative for headaches and blurry vision.    Cardiovascular:Negative for chest pain or irregular heart beat. Negative  for hypertension.    Respiratory:  Negative for cough and shortness of breath.    Gastrointestinal: Negative for abdominal pain, heartburn, melena, nausea, and vomitting.    Genitourinary:  Negative bladder incontinence and dysuria.    Musculoskeletal:  See HPI    Neurological: Negative for numbness.    Psychiatric/Behavioral: Negative for depression.  The patient is not nervous/anxious.      Endocrine: Negative for polyuria    Hematologic/Lymphatic: Negative for bleeding problem.  Does not bruise/bleed easily.    Skin: Negative for poor would healing and rash      Physical Examination:    Vital Signs:    Vitals:    07/13/24 0755   BP: 136/63   Pulse:    Resp:    Temp:        Body mass index is 31.37 kg/m².    General: No acute distress, alert and oriented, healthy appearing    HEENT: Head is atraumatic, mucous membranes are moist    Neck: Supples, no JVD    Cardiovascular: Palpable dorsalis pedis and posterior tibial pulses, regular rate and rhythm to those pulses    Lungs: Breathing non-labored    Skin: no rashes appreciated    Neurologic: Can flex and extend knees, ankles, and toes. Sensation is grossly intact    Left hip:  Patient's incision clean and dry.  Brisk cap refill distally sensation intact distally.  Range of motion left hip with significant severe pain to the left hip and groin    X-rays:  Three views left femur show that patient was intact hardware.  However her cephalomedullary screw is showing significant signs of cut out.  It was not perforated yet although it was very close.  Significant change in position of her fracture alignment from her immediate postoperative x-ray.     Assessment::  Status post intramedullary nail left hip with a failed intertrochanteric femur fracture    Plan:  Discussed all treatment with the patient was well as her son.  Patient with failing fracture fixation.  From an orthopedic standpoint, she was to be nonweightbearing of the left lower extremity.  Hold physical  therapy.  Patient will require conversion to total hip arthroplasty to the left hip.  Hold Coumadin.  We will recheck an INR today it was it was determine whether she needs vitamin K preoperatively.  Likely we will not need vitamin K given the surgery as planned for Monday.  Plan for conversion to total hip arthroplasty on Monday afternoon.  Plan for CT scan of the left hip for preoperative planning.    This note was created using [employer] voice recognition software that occasionally misinterpreted phrases or words.    Consult note is delivered via Epic messaging service.

## 2024-07-13 NOTE — NURSING
Nurses Note -- 4 Eyes      7/13/2024   1:00 PM      Skin assessed during: Transfer        [] No Altered Skin Integrity Present    []Prevention Measures Documented      [x] Yes- Altered Skin Integrity Present or Discovered   [x] LDA Added if Not in Epic (Describe Wound)   [x] New Altered Skin Integrity was Present on Admit and Documented in LDA   [x] Wound Image Taken    Wound Care Consulted? Yes    Attending Nurse:  Jessica Quiroz RN/Staff Member:  Marleni

## 2024-07-13 NOTE — NURSING
"This morning, Dr. Leyva informed me that Ms. Jaquez"s surgery for her left hip would be scheduled for Monday. He also gave me verbal orders to hold the patient's warfarin and to start Lovenox. I called the nurse practitioner (Calli) and let her know everything Dr. Leyva said. She gave orders to start Lovenox, but hold on Monday. She also informed me that the patient will be transferred to the surgery floor before the end of the day.  "

## 2024-07-13 NOTE — NURSING
Notified patient's son about surgery. Stated that he had already spoke to Dr. Leyva today and is aware of the situation. Informed him that pt will be moving to surgery floor and will update with any info.

## 2024-07-13 NOTE — PROGRESS NOTES
[facility] ([facility])  Rehab Progress Note    Patient Name: Homa Jaquez  MRN: [id number]  Age: 88 y.o. Sex: female  : 1935  Hospital Length of Stay: 4 days  Date of Service: 2024   Chief Complaint:  Left intertrochanteric femur fracture with severe comminution 2/2 fall s/p IM nailing on 2024     Subjective:     Basic Information  Admit Information: 88-year-old female presented to [facility] on  with complaints of left hip pain after falling at home.  PMH significant for atrial fibrillation, chronic cystitis, GERD, Graves disease, hypertension, hypothyroidism, spinal stenosis.  Workup significant for left intertrochanteric femur fracture.  On  tolerated IM nailing without perioperative complications.  Restarted Coumadin on  without complications.  Initiated IV fluid resuscitation 2/2 BARRY on .  Initiated 1 dose of vitamin K 2/2 INR 12.1.  Supratherapeutic INR resolved. Tolerated transfer to [facility] inpatient rehab unit on  without incident.   Today's Information: No acute events overnight.  Sitting in bed comfortably.  Reports good sleep and appetite.  Last BM 7/10.  Vital signs at goal with no recent recorded fevers.  No labs or imaging today.    Review of patient's allergies indicates:   Allergen Reactions    Cefadroxil      Other reaction(s): Nausea or vomiting    Cefuroxime axetil     Cephalexin      Other reaction(s): NAUSEA AND VOMITING    Ciprofloxacin     Enoxaparin     Methenamine hippurate      Other reaction(s): Unknown    Promethazine         Current Facility-Administered Medications:     acetaminophen tablet 500 mg, 500 mg, Oral, BID PRN, Karlee Paredeshryn A, FNP    acetaminophen tablet 650 mg, 650 mg, Oral, TID AC, Fer Paredesyn A, FNP, 650 mg at 24 0545    allopurinoL tablet 100 mg, 100 mg, Oral, Daily, Calli Salazar, TAIP, 100 mg at 24 4594    atorvastatin tablet 20 mg, 20 mg, Oral, Daily, Calli Salazar, TAIP, 20 mg  at 07/13/24 0754    benzonatate capsule 100 mg, 100 mg, Oral, TID PRN, Calli Salazar, TAIP    carvediloL tablet 6.25 mg, 6.25 mg, Oral, BID, Calli Salazar, TAIP, 6.25 mg at 07/13/24 0755    docusate sodium capsule 100 mg, 100 mg, Oral, BID, Calli Salazar, FNP, 100 mg at 07/13/24 0754    hydrALAZINE injection 10 mg, 10 mg, Intravenous, Q4H PRN, Calli Salazar, FNP    hydrOXYzine pamoate capsule 50 mg, 50 mg, Oral, Nightly PRN, Calli Salazar, TAIP    labetalol 20 mg/4 mL (5 mg/mL) IV syring, 10 mg, Intravenous, Q4H PRN, Calli Salazar, TAIP    levothyroxine tablet 125 mcg, 125 mcg, Oral, Before breakfast, Calli Salazar FNP, 125 mcg at 07/13/24 0545    melatonin tablet 6 mg, 6 mg, Oral, Nightly PRN, Calli Salazar, DEIDRA    menthol-zinc oxide 0.44-20.6 % Oint, , Topical (Top), BID, Brandy Paredes FNP, Given at 07/13/24 0756    methocarbamoL tablet 500 mg, 500 mg, Oral, TID PRN, Brandy Paredes FNP    metoprolol injection 10 mg, 10 mg, Intravenous, Q2H PRN, Calli Salazar FNP    nitroGLYCERIN SL tablet 0.4 mg, 0.4 mg, Sublingual, Q5 Min PRN, Calli Salazar FNP    ondansetron disintegrating tablet 4 mg, 4 mg, Oral, Q6H PRN, Calli Salazar FNP    ondansetron disintegrating tablet 8 mg, 8 mg, Oral, Q8H PRN, Calli Salazar, DEIDRA    ondansetron injection 4 mg, 4 mg, Intravenous, Q8H PRN, Calli Salazar, TAIP    oxyCODONE immediate release tablet 5 mg, 5 mg, Oral, Q4H PRN, Brandy Paredes FNP, 5 mg at 07/12/24 1413    sacubitriL-valsartan 24-26 mg per tablet 1 tablet, 1 tablet, Oral, BID, Calli Salazar FNP, 1 tablet at 07/13/24 0754    traMADoL tablet 50 mg, 50 mg, Oral, Q6H PRN, Calli Salazar FNP, 50 mg at 07/11/24 0815    traMADoL tablet 50 mg, 50 mg, Oral, TID AC, Brandy Paredes FNP, 50 mg at 07/13/24 0545    warfarin (COUMADIN) tablet 2 mg, 2 mg, Oral, Daily, Calli Salazar FNP, 2 mg at 07/12/24 1702     Review of Systems   Complete 12-point review of  "symptoms negative except for what's mentioned in HPI     Objective:     /63   Pulse 73   Temp 97.8 °F (36.6 °C) (Oral)   Resp 18   Ht 5' 4" (1.626 m)   Wt 82.9 kg (182 lb 12.2 oz)   SpO2 97%   BMI 31.37 kg/m²      Physical Exam  Vitals reviewed.   Eyes:      Pupils: Pupils are equal, round, and reactive to light.   Cardiovascular:      Rate and Rhythm: Normal rate and regular rhythm.      Heart sounds: Normal heart sounds.   Pulmonary:      Effort: Pulmonary effort is normal.      Breath sounds: Normal breath sounds.   Abdominal:      General: Bowel sounds are normal.   Musculoskeletal:         General: Normal range of motion.   Skin:     General: Skin is warm.      Comments: Left femur incision dry and intact    Neurological:      General: No focal deficit present.      Mental Status: She is alert and oriented to person, place, and time.      Motor: Weakness present.   Psychiatric:         Mood and Affect: Mood normal.         Lines/Drains/Airways       Peripheral Intravenous Line  Duration                  Peripheral IV - Single Lumen 07/11/24 20 G Anterior;Right Wrist 2 days                    Labs  No results found for this or any previous visit (from the past 24 hour(s)).  Radiology  Left femur x-ray 7/3/24 Impression: Screw and intramedullary azael identified fixating a fracture of the proximal femur some displaced fragments are identified position and alignment is otherwise close to anatomical hardware appears to be intact     Assessment/Plan:     88 y.o. WF admitted on 7/9/2024     Left intertrochanteric femur fracture with severe comminution 2/2 fall   - s/p IM nailing on 07/03/2024  - WBAT LLE  - continue                 Robaxin 500 mg t.i.d.                  Tylenol 1000 mg b.i.d.  - follow-up with orthopedic surgery on 7/25 for wound check and repeat x-rays     Dilated cardiomyopathy  - no PCI or CABG  - denies recent chest pain or discomfort  - continue                 Entresto 24-26 mg " b.i.d.                  Atorvastatin 20 mg daily                  Coreg 6.25 mg b.i.d. (decreased 7/11)   - ECG and Nitro PRN  - continue cardiac diet  - to follow-up with cardiology outpatient     Atrial fibrillation  - s/p pacemaker/defibrillator  - controlled ventricular rate without associated bleeding  - continue                Coumadin 2 mg daily     Anemia  - asymptomatic  - s/p transfusion                x2 units PRBC on 7/8/2024  - H/H stable   - no evidence of active bleeds  - will closely monitor and transfuse if needed      HTN  - blood pressures at goal  - continue                 Entresto 24-26 mg b.i.d.                  Coreg 6.25 mg b.i.d. (decreased 7/11)                Hydralazine 10 mg every 2 hours as needed for BP > 160/90                Labetalol 10 mg every 2 hours as needed for BP > 160/90  - low sodium diet     HLD  - FLP outpatient  - continue                Atorvastatin 20 mg daily     Gout  - stable  - continue                 Allopurinol 100 mg daily     DM type II  - HgA1c 5.2  - diet-controlled  - continue                ISS   - CBGs AC/HS     Constipation  - stable  - continue                 Colace 100 mg b.i.d.                 MiraLax 17 g b.i.d.     Hypothyroidism  - TSH at goal  - continue                 Levothyroxine 125 mcg before breakfast     VTE Prophylaxis:  Coumadin 2 mg daily     POA: yes- Av and Levi  Living will:  Yes  Contacts:  Av Jaquez (son) [phone]                     Levi Jaquez (spouse) [phone]     CODE STATUS: Full  Internal Medicine (attending): Tato Holley MD  Physiatry (consulting):  Alfred Soto MD     OUTPATIENT PROVIDERS  PCP: Franklyn Brito MD  Cardiology: Yefri Mukherjee MD   Orthopedic surgery: Steve Pierce D.O.     DISPOSITION:  Vital signs at goal.  No labs or imaging today.  Reports slight improvement in pain management.  Orthopedic surgery  Jonny Bains MD recommended total hip replacement 2/2 failed IM nailing.   Continue nonweightbearing and hold therapy.  Hold Coumadin and initiate full-dose Lovenox.  To obtain CT left hip.  Plans for conversion to total hip arthroplasty on Monday in the afternoon on 07/15.  Initiate transfer to 3rd floor for planned surgery on Monday.  Discussed with hospitalist, accepted patient.  Bowel management, sleep hygiene, and appetite at goal.  Continues to progress well with therapies.  Monitor closely.  Notify of any acute changes.    Total time spent on this encounter including chart review and direct 1-on-1 patient interaction: 52 minutes   Over 50% of this time was spent in counseling and coordination of care

## 2024-07-13 NOTE — H&P
[facility]    [facility] Medicine History & Physical Examination       Patient Name: Homa Jaquez  MRN: [id number]  Patient Class: IP- Inpatient   Admission Date: 7/13/2024   Admitting Physician: CT Service   Length of Stay: 0  Attending Physician: Mathieu Patel MD  Primary Care Provider: Franklyn Brito MD  Face-to-Face encounter date: 07/13/2024    Code Status: Prior    Chief Complaint: No chief complaint on file.          HISTORY OF PRESENT ILLNESS:   Homa Jaquez is a 88 y.o. female who  has a past medical history of A-fib, Chronic cystitis, GERD (gastroesophageal reflux disease), Graves disease, Hypertension, Hypothyroidism, unspecified, and Spinal stenosis.. The patient presented to [facility] on 7/13/2024 with a primary complaint of left hip pain.  Pt with a left femur fracture after a fall 7/2, underwent IMN 7/3 and transferred to [facility] rebab 7/9.  She reported increasing pain to hip, xrays showing failed intertrochanteric femur fracture.  Pt transferred to [facility] for revision on Monday.  She is currently comfortable no complaints    PAST MEDICAL HISTORY:     Past Medical History:   Diagnosis Date    A-fib     Chronic cystitis     GERD (gastroesophageal reflux disease)     Graves disease     Hypertension     Hypothyroidism, unspecified     Spinal stenosis        PAST SURGICAL HISTORY:     Past Surgical History:   Procedure Laterality Date    APPENDECTOMY      BLADDER SUSPENSION      CARDIOVERSION  04/01/2015    CATARACT EXTRACTION      HYSTERECTOMY      INTRAMEDULLARY RODDING OF FEMUR Left 7/3/2024    Procedure: INSERTION, INTRAMEDULLARY MELANIE, FEMUR;  Surgeon: Steve Pierce DO;  Location: [facility];  Service: Orthopedics;  Laterality: Left;  supine hana table c arm synthes    LAPAROSCOPIC CHOLECYSTECTOMY  01/12/2016    SPHINCTEROTOMY OF URETHRA  01/11/2016    TONSILLECTOMY AND ADENOIDECTOMY         ALLERGIES:   Cefadroxil, Cefuroxime axetil, Cephalexin,  Ciprofloxacin, Enoxaparin, Methenamine hippurate, and Promethazine    FAMILY HISTORY:   family history includes Cancer in her father; Heart attack in her father; Stroke in her father.    SOCIAL HISTORY:     Social History     Tobacco Use    Smoking status: Former     Types: Cigarettes    Smokeless tobacco: Never   Substance Use Topics    Alcohol use: Yes        HOME MEDICATIONS:     Prior to Admission medications    Medication Sig Start Date End Date Taking? Authorizing Provider   acetaminophen (TYLENOL EXTRA STRENGTH) 500 MG tablet Take 2 tablets (1,000 mg total) by mouth 3 (three) times daily. 3/16/23   Franklyn Brito MD   allopurinoL (ZYLOPRIM) 100 MG tablet Take 1 tablet (100 mg total) by mouth once daily. 9/13/22   Franklyn Brito MD   carvediloL (COREG) 12.5 MG tablet Take 12.5 mg by mouth 2 (two) times daily. 9/5/22   Provider, Historical   cyanocobalamin 1,000 mcg/mL injection INJECT 1000MCG (1ML) INTRAMUSCULARY ONCE MONTHLY 9/7/23   Franklyn Brito MD   HYDROcodone-acetaminophen (NORCO) 5-325 mg per tablet To be taken one hour before physical therapy only 5/1/23   Franklyn Brito MD   levothyroxine (SYNTHROID) 125 MCG tablet TAKE 1 TABLET BY MOUTH ONCE DAILY 6/10/24   Franklyn Brito MD   lovastatin (MEVACOR) 20 MG tablet Take 20 mg by mouth once daily. 3/21/22   Provider, Historical   methocarbamoL (ROBAXIN) 500 MG Tab Take 1 tablet (500 mg total) by mouth 3 (three) times daily. for 10 days 7/9/24 7/19/24  Franklyn Brito MD   ondansetron (ZOFRAN-ODT) 8 MG TbDL Take 1 tablet (8 mg total) by mouth every 8 (eight) hours as needed. 3/16/23   Franklyn Brito MD   ondansetron 4 mg/2 mL Soln Inject 4 mg into the vein every 8 (eight) hours as needed. 7/9/24   Franklyn Brito MD   pantoprazole (PROTONIX) 40 MG tablet Take 1 tablet (40 mg total) by mouth once daily. 3/16/23 3/15/24  Franklyn Brito MD   polyethylene glycol (GLYCOLAX) 17 gram PwPk Take 17 g by  mouth 2 (two) times daily as needed for Constipation. 7/9/24   Franklyn Brito MD   sacubitriL-valsartan (ENTRESTO) 24-26 mg per tablet Take 1 tablet by mouth 2 (two) times daily. 7/9/24   Franklyn Brito MD   senna (SENOKOT) 8.6 mg tablet Take 1 tablet by mouth daily as needed for Constipation. 3/16/23   Franklyn Brito MD   traMADoL (ULTRAM) 50 mg tablet Take 1 tablet (50 mg total) by mouth every 6 (six) hours as needed for Pain. 3/16/23   Franklyn Brito MD   warfarin sodium (COUMADIN ORAL) Take 2 mg by mouth. 9/21/21   Provider, Historical       REVIEW OF SYSTEMS:   Except as documented, all other systems reviewed and negative   ROS      PHYSICAL EXAM:     VITAL SIGNS: 24 HRS MIN & MAX LAST   Temp  Min: 97.2 °F (36.2 °C)  Max: 97.8 °F (36.6 °C)     BP  Min: 122/69  Max: 136/63     Pulse  Min: 70  Max: 98      Resp  Min: 17  Max: 20     SpO2  Min: 97 %  Max: 99 %         General appearance: Well-developed, well-nourished female in no apparent distress.  HENT: Atraumatic head. Moist mucous membranes of oral cavity.  Eyes: Normal extraocular movements.   Neck: Supple.   Lungs: Clear to auscultation bilaterally. No wheezing present.   Heart: Regular rate and rhythm. S1 and S2 present with no murmurs/gallop/rub.. No JVD present.   Abdomen: Soft, non-distended, non-tender. No rebound tenderness/guarding. Bowel sounds are normal.   Extremities: No cyanosis, clubbing.  Trace-1+edema  Skin: left femur incision intact  Neuro: no focal deficits  Psych/mental status: Appropriate mood and affect. Responds appropriately to questions.     LABS AND IMAGING:     Recent Labs   Lab 07/08/24  0434 07/09/24  0725 07/10/24  0523   WBC 6.23 9.13 8.58   RBC 2.21* 3.66* 3.34*   HGB 7.3* 11.7* 10.9*   HCT 24.1* 34.5* 32.4*   .0* 94.3* 97.0*   MCH 33.0* 32.0* 32.6*   MCHC 30.3* 33.9 33.6   RDW 14.8 17.2* 16.8    196 195   MPV 10.3 9.9 9.5       Recent Labs   Lab 07/08/24  0434 07/09/24  3923  07/10/24  0523    138 138   K 4.8 4.9 4.2   * 113* 110*   CO2 17* 16* 21*   BUN 36.2* 29.3* 26.9*   CREATININE 1.29* 1.26* 1.10*   CALCIUM 7.8* 8.3* 8.2*   ALBUMIN 2.2* 2.5* 2.2*   ALKPHOS 87 111 92   ALT 10 13 11   AST 20 24 17   BILITOT 0.9 1.9* 1.8*       Microbiology Results (last 7 days)       ** No results found for the last 168 hours. **             CT Hip w/o Contrast Left w/Binh Protocol  EXAMINATION  CT HIP WITHOUT CONTRAST LEFT W/BINH PROTOCOL    CLINICAL HISTORY  left femur fracture; Displaced intertrochanteric fracture of left femur, initial encounter for closed fracture    TECHNIQUE  Helical-acquisition CT images of the lower extremities were obtained without intravascular iodinated contrast media, utilizing preoperative robotic protocol.    COMPARISON  2 July 2024 radiographs    FINDINGS  There are notable degenerative changes of the included lower lumbar spine and throughout the bony pelvis.  Mild-to-moderate degenerative alterations of the knees are also appreciated bilaterally (right > left).  There is appearance of widespread bone demineralization.  Interval internal fixation of the previously visualized comminuted, displaced left proximal femur fracture is evident.  No other obvious fracture is identified. No intrinsic osseous lesion is appreciated. The regional musculature and subcutaneous tissues are without evidence of suspicious focal lesion.  There is regional subcutaneous edema overlying the left hip with associated disorganized fluid and gaseous foci likely representing postoperative residual..    Limited intrapelvic evaluation reveals no acute process.    IMPRESSION  1. Limited study obtained for presurgical planning purposes.  2. Interval internal fixation of the left hip with residual overlying subcutaneous fluid and air.  3. Additional nonacute secondary details discussed above.    RADIATION DOSE  Automated tube current modulation and/or weight-based exposure dosing is  utilized, when appropriate, to reach lowest reasonably achievable exposure rate.    DLP: 599 mGy*cm    Electronically signed by: Akil Grider  Date:    07/13/2024  Time:    11:25        __________________________________________________________________________  INPATIENT LIST OF MEDICATIONS     Scheduled Meds:   acetaminophen  650 mg Oral TID AC    [START ON 7/14/2024] allopurinol  100 mg Oral Daily    [START ON 7/14/2024] atorvastatin  20 mg Oral Daily    carvediloL  6.25 mg Oral BID    docusate sodium  100 mg Oral BID    enoxparin  1 mg/kg Subcutaneous Q12H (prophylaxis, 0900/2100)    [START ON 7/14/2024] levothyroxine  125 mcg Oral Before breakfast    menthol-zinc oxide   Topical (Top) BID    sacubitriL-valsartan  1 tablet Oral BID    tramadol  50 mg Oral TID AC     Continuous Infusions:  PRN Meds:  Current Facility-Administered Medications:     acetaminophen, 500 mg, Oral, BID PRN    benzonatate, 100 mg, Oral, TID PRN    hydrALAZINE, 10 mg, Intravenous, Q4H PRN    hydrOXYzine pamoate, 50 mg, Oral, Nightly PRN    labetalol, 10 mg, Intravenous, Q4H PRN    melatonin, 6 mg, Oral, Nightly PRN    methocarbamoL, 500 mg, Oral, TID PRN    metoprolol, 10 mg, Intravenous, Q2H PRN    nitroGLYCERIN, 0.4 mg, Sublingual, Q5 Min PRN    ondansetron, 4 mg, Oral, Q6H PRN    ondansetron, 8 mg, Oral, Q8H PRN    ondansetron, 4 mg, Intravenous, Q8H PRN    oxyCODONE, 5 mg, Oral, Q4H PRN    tramadol, 50 mg, Oral, Q6H PRN          ASSESSMENT & PLAN:   Left femur IMN 7/3, failed IMN  Dilated cardiomyopathy  A fib  Anemia 2u prbc 7/8  Htn  Hld  Gout  Dm  Constipation  Hypothyroid    Plan    Hold coumadin, EKG in am  Monitor inr  Consult ortho  Pain control  Bedrest   Home meds/iss  Labs in am    Dvt proph: coumadin, scd          Mathieu Patel MD   07/13/2024

## 2024-07-13 NOTE — PLAN OF CARE
Problem: Adult Inpatient Plan of Care  Goal: Plan of Care Review  Outcome: Progressing  Goal: Patient-Specific Goal (Individualized)  Outcome: Progressing  Goal: Absence of Hospital-Acquired Illness or Injury  Outcome: Progressing  Goal: Optimal Comfort and Wellbeing  Outcome: Progressing  Goal: Readiness for Transition of Care  Outcome: Progressing     Problem: Diabetes Comorbidity  Goal: Blood Glucose Level Within Targeted Range  Outcome: Progressing     Problem: Acute Kidney Injury/Impairment  Goal: Fluid and Electrolyte Balance  Outcome: Progressing  Goal: Improved Oral Intake  Outcome: Progressing  Goal: Effective Renal Function  Outcome: Progressing     Problem: Fall Injury Risk  Goal: Absence of Fall and Fall-Related Injury  Outcome: Progressing     Problem: Wound  Goal: Optimal Coping  Outcome: Progressing  Goal: Optimal Functional Ability  Outcome: Progressing  Goal: Absence of Infection Signs and Symptoms  Outcome: Progressing  Goal: Improved Oral Intake  Outcome: Progressing  Goal: Optimal Pain Control and Function  Outcome: Progressing  Goal: Skin Health and Integrity  Outcome: Progressing  Goal: Optimal Wound Healing  Outcome: Progressing     Problem: Comorbidity Management  Goal: Maintenance of Asthma Control  Outcome: Progressing  Goal: Maintenance of Behavioral Health Symptom Control  Outcome: Progressing  Goal: Maintenance of COPD Symptom Control  Outcome: Progressing  Goal: Maintenance of Heart Failure Symptom Control  Outcome: Progressing  Goal: Blood Pressure in Desired Range  Outcome: Progressing  Goal: Maintenance of Osteoarthritis Symptom Control  Outcome: Progressing  Goal: Bariatric Home Regimen Maintained  Outcome: Progressing  Goal: Maintenance of Seizure Control  Outcome: Progressing     Problem: Hip Arthroplasty  Goal: Optimal Coping  Outcome: Progressing  Goal: Absence of Bleeding  Outcome: Progressing  Goal: Effective Bowel Elimination  Outcome: Progressing  Goal: Fluid and  Electrolyte Balance  Outcome: Progressing  Goal: Optimal Functional Ability  Outcome: Progressing  Goal: Absence of Infection Signs and Symptoms  Outcome: Progressing  Goal: Intact Neurovascular Status  Outcome: Progressing  Goal: Anesthesia/Sedation Recovery  Outcome: Progressing  Goal: Acceptable Pain Control  Outcome: Progressing  Goal: Nausea and Vomiting Relief  Outcome: Progressing  Goal: Effective Urinary Elimination  Outcome: Progressing  Goal: Effective Oxygenation and Ventilation  Outcome: Progressing

## 2024-07-14 LAB
ANION GAP SERPL CALC-SCNC: 6 MEQ/L
BASOPHILS # BLD AUTO: 0.06 X10(3)/MCL
BASOPHILS NFR BLD AUTO: 0.6 %
BUN SERPL-MCNC: 33.6 MG/DL (ref 9.8–20.1)
CALCIUM SERPL-MCNC: 8.8 MG/DL (ref 8.4–10.2)
CHLORIDE SERPL-SCNC: 109 MMOL/L (ref 98–107)
CO2 SERPL-SCNC: 23 MMOL/L (ref 23–31)
CREAT SERPL-MCNC: 1.21 MG/DL (ref 0.55–1.02)
CREAT/UREA NIT SERPL: 28
EOSINOPHIL # BLD AUTO: 0.15 X10(3)/MCL (ref 0–0.9)
EOSINOPHIL NFR BLD AUTO: 1.4 %
ERYTHROCYTE [DISTWIDTH] IN BLOOD BY AUTOMATED COUNT: 16.8 % (ref 11.5–17)
GFR SERPLBLD CREATININE-BSD FMLA CKD-EPI: 43 ML/MIN/1.73/M2
GLUCOSE SERPL-MCNC: 111 MG/DL (ref 82–115)
GROUP & RH: NORMAL
HCT VFR BLD AUTO: 33.4 % (ref 37–47)
HGB BLD-MCNC: 10.9 G/DL (ref 12–16)
IMM GRANULOCYTES # BLD AUTO: 0.06 X10(3)/MCL (ref 0–0.04)
IMM GRANULOCYTES NFR BLD AUTO: 0.6 %
INDIRECT COOMBS: NORMAL
INR PPP: 2.9 (ref 2–3)
LYMPHOCYTES # BLD AUTO: 1.92 X10(3)/MCL (ref 0.6–4.6)
LYMPHOCYTES NFR BLD AUTO: 18.1 %
MCH RBC QN AUTO: 32.9 PG (ref 27–31)
MCHC RBC AUTO-ENTMCNC: 32.6 G/DL (ref 33–36)
MCV RBC AUTO: 100.9 FL (ref 80–94)
MONOCYTES # BLD AUTO: 0.74 X10(3)/MCL (ref 0.1–1.3)
MONOCYTES NFR BLD AUTO: 7 %
NEUTROPHILS # BLD AUTO: 7.67 X10(3)/MCL (ref 2.1–9.2)
NEUTROPHILS NFR BLD AUTO: 72.3 %
NRBC BLD AUTO-RTO: 0 %
PLATELET # BLD AUTO: 294 X10(3)/MCL (ref 130–400)
PMV BLD AUTO: 9.8 FL (ref 7.4–10.4)
POCT GLUCOSE: 105 MG/DL (ref 70–110)
POCT GLUCOSE: 115 MG/DL (ref 70–110)
POCT GLUCOSE: 146 MG/DL (ref 70–110)
POCT GLUCOSE: 153 MG/DL (ref 70–110)
POTASSIUM SERPL-SCNC: 5 MMOL/L (ref 3.5–5.1)
PROTHROMBIN TIME: 31.3 SECONDS (ref 11.7–14.5)
RBC # BLD AUTO: 3.31 X10(6)/MCL (ref 4.2–5.4)
SODIUM SERPL-SCNC: 138 MMOL/L (ref 136–145)
SPECIMEN OUTDATE: NORMAL
WBC # BLD AUTO: 10.6 X10(3)/MCL (ref 4.5–11.5)

## 2024-07-14 PROCEDURE — 86900 BLOOD TYPING SEROLOGIC ABO: CPT | Performed by: ORTHOPAEDIC SURGERY

## 2024-07-14 PROCEDURE — 25000003 PHARM REV CODE 250: Performed by: NURSE PRACTITIONER

## 2024-07-14 PROCEDURE — 94761 N-INVAS EAR/PLS OXIMETRY MLT: CPT

## 2024-07-14 PROCEDURE — 93005 ELECTROCARDIOGRAM TRACING: CPT

## 2024-07-14 PROCEDURE — 99900031 HC PATIENT EDUCATION (STAT)

## 2024-07-14 PROCEDURE — 85025 COMPLETE CBC W/AUTO DIFF WBC: CPT | Performed by: INTERNAL MEDICINE

## 2024-07-14 PROCEDURE — 11000001 HC ACUTE MED/SURG PRIVATE ROOM

## 2024-07-14 PROCEDURE — 36415 COLL VENOUS BLD VENIPUNCTURE: CPT | Performed by: INTERNAL MEDICINE

## 2024-07-14 PROCEDURE — 86901 BLOOD TYPING SEROLOGIC RH(D): CPT | Performed by: ORTHOPAEDIC SURGERY

## 2024-07-14 PROCEDURE — 85610 PROTHROMBIN TIME: CPT | Performed by: INTERNAL MEDICINE

## 2024-07-14 PROCEDURE — 80048 BASIC METABOLIC PNL TOTAL CA: CPT | Performed by: INTERNAL MEDICINE

## 2024-07-14 PROCEDURE — 86923 COMPATIBILITY TEST ELECTRIC: CPT | Performed by: ORTHOPAEDIC SURGERY

## 2024-07-14 PROCEDURE — 86850 RBC ANTIBODY SCREEN: CPT | Performed by: ORTHOPAEDIC SURGERY

## 2024-07-14 PROCEDURE — 93010 ELECTROCARDIOGRAM REPORT: CPT | Mod: ,,, | Performed by: INTERNAL MEDICINE

## 2024-07-14 RX ADMIN — DOCUSATE SODIUM 100 MG: 100 CAPSULE, LIQUID FILLED ORAL at 08:07

## 2024-07-14 RX ADMIN — LEVOTHYROXINE SODIUM 125 MCG: 125 TABLET ORAL at 05:07

## 2024-07-14 RX ADMIN — TRAMADOL HYDROCHLORIDE 50 MG: 50 TABLET, COATED ORAL at 05:07

## 2024-07-14 RX ADMIN — ACETAMINOPHEN 650 MG: 325 TABLET ORAL at 12:07

## 2024-07-14 RX ADMIN — SACUBITRIL AND VALSARTAN 1 TABLET: 24; 26 TABLET, FILM COATED ORAL at 08:07

## 2024-07-14 RX ADMIN — MENTHOL AND ZINC OXIDE: .44; 20.625 OINTMENT TOPICAL at 08:07

## 2024-07-14 RX ADMIN — TRAMADOL HYDROCHLORIDE 50 MG: 50 TABLET, COATED ORAL at 12:07

## 2024-07-14 RX ADMIN — ACETAMINOPHEN 650 MG: 325 TABLET ORAL at 04:07

## 2024-07-14 RX ADMIN — CARVEDILOL 6.25 MG: 6.25 TABLET, FILM COATED ORAL at 08:07

## 2024-07-14 RX ADMIN — ATORVASTATIN CALCIUM 20 MG: 10 TABLET, FILM COATED ORAL at 08:07

## 2024-07-14 RX ADMIN — ALLOPURINOL 100 MG: 100 TABLET ORAL at 08:07

## 2024-07-14 RX ADMIN — ACETAMINOPHEN 650 MG: 325 TABLET ORAL at 05:07

## 2024-07-14 RX ADMIN — TRAMADOL HYDROCHLORIDE 50 MG: 50 TABLET, COATED ORAL at 04:07

## 2024-07-14 NOTE — PROGRESS NOTES
[facility]  [facility] Medicine Progress Note      Patient Name: Homa Jaquez  MRN: [id number]  Admission Date: 7/13/2024   Length of Stay: 1  Attending Physician: Mathieu Patel MD  Primary Care Provider: Franklyn Brito MD  Face-to-Face encounter date: 07/14/2024    Code Status: Prior        Chief Complaint:   Hip pain        HPI:   Homa Jaquez is a 88 y.o. female who  has a past medical history of A-fib, Chronic cystitis, GERD (gastroesophageal reflux disease), Graves disease, Hypertension, Hypothyroidism, unspecified, and Spinal stenosis.. The patient presented to [facility] on 7/13/2024 with a primary complaint of left hip pain.  Pt with a left femur fracture after a fall 7/2, underwent IMN 7/3 and transferred to [facility] rebab 7/9.  She reported increasing pain to hip, xrays showing failed intertrochanteric femur fracture.  Pt transferred to [facility] for revision on Monday.  She is currently comfortable no complaints     Overview/Hospital Course:  No notes on file       Interval Hx:   No complaints    Review of Systems   All other systems reviewed and are negative.      Objective/physical exam:  General: In no acute distress, afebrile  Chest: Clear to auscultation bilaterally  Heart: irreg  Abdomen: Soft, nontender, BS +  MSK:trace-1+edema, left femur incision intact   Neurologic: no focal deficits     VITAL SIGNS: 24 HRS MIN & MAX LAST   Temp  Min: 97.2 °F (36.2 °C)  Max: 98.2 °F (36.8 °C) 97.4 °F (36.3 °C)   BP  Min: 115/68  Max: 155/78 (!) 144/80   Pulse  Min: 60  Max: 88  66   Resp  Min: 18  Max: 20 18   SpO2  Min: 94 %  Max: 99 % 98 %       Recent Labs   Lab 07/09/24  0725 07/10/24  0523 07/14/24  0609   WBC 9.13 8.58 10.60   RBC 3.66* 3.34* 3.31*   HGB 11.7* 10.9* 10.9*   HCT 34.5* 32.4* 33.4*   MCV 94.3* 97.0* 100.9*   MCH 32.0* 32.6* 32.9*   MCHC 33.9 33.6 32.6*   RDW 17.2* 16.8 16.8    195 294   MPV 9.9 9.5 9.8       Recent Labs   Lab  07/08/24  0434 07/09/24  0506 07/10/24  0523 07/14/24  0609    138 138 138   K 4.8 4.9 4.2 5.0   * 113* 110* 109*   CO2 17* 16* 21* 23   BUN 36.2* 29.3* 26.9* 33.6*   CREATININE 1.29* 1.26* 1.10* 1.21*   CALCIUM 7.8* 8.3* 8.2* 8.8   ALBUMIN 2.2* 2.5* 2.2*  --    ALKPHOS 87 111 92  --    ALT 10 13 11  --    AST 20 24 17  --    BILITOT 0.9 1.9* 1.8*  --         Microbiology Results (last 7 days)       ** No results found for the last 168 hours. **             Radiology:  CT Hip w/o Contrast Left w/Binh Protocol  EXAMINATION  CT HIP WITHOUT CONTRAST LEFT W/BINH PROTOCOL    CLINICAL HISTORY  left femur fracture; Displaced intertrochanteric fracture of left femur, initial encounter for closed fracture    TECHNIQUE  Helical-acquisition CT images of the lower extremities were obtained without intravascular iodinated contrast media, utilizing preoperative robotic protocol.    COMPARISON  2 July 2024 radiographs    FINDINGS  There are notable degenerative changes of the included lower lumbar spine and throughout the bony pelvis.  Mild-to-moderate degenerative alterations of the knees are also appreciated bilaterally (right > left).  There is appearance of widespread bone demineralization.  Interval internal fixation of the previously visualized comminuted, displaced left proximal femur fracture is evident.  No other obvious fracture is identified. No intrinsic osseous lesion is appreciated. The regional musculature and subcutaneous tissues are without evidence of suspicious focal lesion.  There is regional subcutaneous edema overlying the left hip with associated disorganized fluid and gaseous foci likely representing postoperative residual..    Limited intrapelvic evaluation reveals no acute process.    IMPRESSION  1. Limited study obtained for presurgical planning purposes.  2. Interval internal fixation of the left hip with residual overlying subcutaneous fluid and air.  3. Additional nonacute secondary  details discussed above.    RADIATION DOSE  Automated tube current modulation and/or weight-based exposure dosing is utilized, when appropriate, to reach lowest reasonably achievable exposure rate.    DLP: 599 mGy*cm    Electronically signed by: Akil Grider  Date:    07/13/2024  Time:    11:25      Scheduled Med:   acetaminophen  650 mg Oral TID AC    allopurinol  100 mg Oral Daily    atorvastatin  20 mg Oral Daily    carvediloL  6.25 mg Oral BID    docusate sodium  100 mg Oral BID    levothyroxine  125 mcg Oral Before breakfast    menthol-zinc oxide   Topical (Top) BID    sacubitriL-valsartan  1 tablet Oral BID    tramadol  50 mg Oral TID AC        Continuous Infusions:       PRN Meds:    Current Facility-Administered Medications:     acetaminophen, 500 mg, Oral, BID PRN    benzonatate, 100 mg, Oral, TID PRN    dextrose 10%, 12.5 g, Intravenous, PRN    dextrose 10%, 25 g, Intravenous, PRN    glucagon (human recombinant), 1 mg, Intramuscular, PRN    glucose, 16 g, Oral, PRN    glucose, 24 g, Oral, PRN    hydrALAZINE, 10 mg, Intravenous, Q4H PRN    hydrOXYzine pamoate, 50 mg, Oral, Nightly PRN    insulin aspart U-100, 0-5 Units, Subcutaneous, QID (AC + HS) PRN    labetalol, 10 mg, Intravenous, Q4H PRN    melatonin, 6 mg, Oral, Nightly PRN    methocarbamoL, 500 mg, Oral, TID PRN    metoprolol, 10 mg, Intravenous, Q2H PRN    nitroGLYCERIN, 0.4 mg, Sublingual, Q5 Min PRN    ondansetron, 4 mg, Oral, Q6H PRN    ondansetron, 4 mg, Intravenous, Q8H PRN    oxyCODONE, 5 mg, Oral, Q4H PRN    tramadol, 50 mg, Oral, Q6H PRN     Nutrition Status:      Assessment/Plan:  Left femur IMN 7/3, failed IMN  Dilated cardiomyopathy  A fib  Anemia 2u prbc 7/8  Htn  Hld  Gout  Dm  Constipation  Hypothyroid     Plan     Hold coumadin,    Monitor inr   ortho  Pain control  Bedrest   iss  Labs in am     Dvt proph: coumadin, scd                All diagnosis and differential diagnosis have been reviewed; assessment and plan has been documented;  I have personally reviewed the labs and test results that are presently available; I have reviewed the patients medication list; I have reviewed the consulting providers response and recommendations. I have reviewed or attempted to review medical records based upon their availability      _____________________________________________________________________            Mathieu Patel MD   07/14/2024

## 2024-07-14 NOTE — PROGRESS NOTES
"No acute events overnight.  Pain controlled.  Resting in bed. Afib overnight    Vital Signs  Temp: 97.5 °F (36.4 °C)  Temp Source: Oral  Pulse: 60  Heart Rate Source: Monitor  Resp: 18  SpO2: 99 %  Device (Oxygen Therapy): room air  BP: 131/74  BP Method: Automatic  Patient Position: Lying  Height and Weight  Height: 5' 4" (162.6 cm)  Weight: 82.9 kg (182 lb 12.2 oz)  Weight Method: Standard Scale  BSA (Calculated - sq m): 1.93 sq meters  BMI (Calculated): 31.4  Weight in (lb) to have BMI = 25: 145.3]    +FHL/EHL  BCR distally  Dressing c/d/i  SILT distally    Recent Lab Results         07/14/24  0609   07/13/24  2106   07/13/24  1610   07/13/24  1022        Anion Gap 6.0             Baso # 0.06             Basophil % 0.6             BUN 33.6             BUN/CREAT RATIO 28             Calcium 8.8             Chloride 109             CO2 23             Creatinine 1.21             eGFR 43             Eos # 0.15             Eos % 1.4             Glucose 111             Hematocrit 33.4             Hemoglobin 10.9             Immature Grans (Abs) 0.06             Immature Granulocytes 0.6             INR 2.9       2.7       Lymph # 1.92             LYMPH % 18.1             MCH 32.9             MCHC 32.6             .9             Mono # 0.74             Mono % 7.0             MPV 9.8             Neut # 7.67             Neut % 72.3             nRBC 0.0             Platelet Count 294             POCT Glucose   151   156         Potassium 5.0             PT 31.3       29.5       RBC 3.31             RDW 16.8             Sodium 138             WBC 10.60                     A/P:  Status post failed IT fx  Plan for conversion L SILVIA tomorrow  R/B/A discussed in detail with patient  NWTIFFANI LLE    "

## 2024-07-15 ENCOUNTER — ANESTHESIA (OUTPATIENT)
Dept: SURGERY | Facility: HOSPITAL | Age: 89
End: 2024-07-15
Payer: MEDICARE

## 2024-07-15 ENCOUNTER — ANESTHESIA EVENT (OUTPATIENT)
Dept: SURGERY | Facility: HOSPITAL | Age: 89
End: 2024-07-15
Payer: MEDICARE

## 2024-07-15 LAB
ANION GAP SERPL CALC-SCNC: 11 MEQ/L
ANION GAP SERPL CALC-SCNC: 5 MEQ/L
BASOPHILS # BLD AUTO: 0.09 X10(3)/MCL
BASOPHILS NFR BLD AUTO: 1.1 %
BUN SERPL-MCNC: 33.9 MG/DL (ref 9.8–20.1)
BUN SERPL-MCNC: 34.7 MG/DL (ref 9.8–20.1)
CALCIUM SERPL-MCNC: 8.1 MG/DL (ref 8.4–10.2)
CALCIUM SERPL-MCNC: 8.7 MG/DL (ref 8.4–10.2)
CHLORIDE SERPL-SCNC: 108 MMOL/L (ref 98–107)
CHLORIDE SERPL-SCNC: 109 MMOL/L (ref 98–107)
CO2 SERPL-SCNC: 19 MMOL/L (ref 23–31)
CO2 SERPL-SCNC: 26 MMOL/L (ref 23–31)
CREAT SERPL-MCNC: 1.29 MG/DL (ref 0.55–1.02)
CREAT SERPL-MCNC: 1.43 MG/DL (ref 0.55–1.02)
CREAT/UREA NIT SERPL: 24
CREAT/UREA NIT SERPL: 26
EOSINOPHIL # BLD AUTO: 0.41 X10(3)/MCL (ref 0–0.9)
EOSINOPHIL NFR BLD AUTO: 4.8 %
ERYTHROCYTE [DISTWIDTH] IN BLOOD BY AUTOMATED COUNT: 17.1 % (ref 11.5–17)
GFR SERPLBLD CREATININE-BSD FMLA CKD-EPI: 36 ML/MIN/1.73/M2
GFR SERPLBLD CREATININE-BSD FMLA CKD-EPI: 40 ML/MIN/1.73/M2
GLUCOSE SERPL-MCNC: 107 MG/DL (ref 82–115)
GLUCOSE SERPL-MCNC: 188 MG/DL (ref 82–115)
GRAM STN SPEC: NORMAL
HCT VFR BLD AUTO: 31.5 % (ref 37–47)
HCT VFR BLD AUTO: 32.6 % (ref 37–47)
HCT VFR BLD AUTO: 34.5 % (ref 37–47)
HGB BLD-MCNC: 10.1 G/DL (ref 12–16)
HGB BLD-MCNC: 10.2 G/DL (ref 12–16)
HGB BLD-MCNC: 10.9 G/DL (ref 12–16)
IMM GRANULOCYTES # BLD AUTO: 0.04 X10(3)/MCL (ref 0–0.04)
IMM GRANULOCYTES NFR BLD AUTO: 0.5 %
INR PPP: 2.4 (ref 2–3)
LYMPHOCYTES # BLD AUTO: 2.22 X10(3)/MCL (ref 0.6–4.6)
LYMPHOCYTES NFR BLD AUTO: 26.2 %
MCH RBC QN AUTO: 32.2 PG (ref 27–31)
MCHC RBC AUTO-ENTMCNC: 31.6 G/DL (ref 33–36)
MCV RBC AUTO: 102.1 FL (ref 80–94)
MONOCYTES # BLD AUTO: 0.75 X10(3)/MCL (ref 0.1–1.3)
MONOCYTES NFR BLD AUTO: 8.8 %
NEUTROPHILS # BLD AUTO: 4.97 X10(3)/MCL (ref 2.1–9.2)
NEUTROPHILS NFR BLD AUTO: 58.6 %
NRBC BLD AUTO-RTO: 0 %
OHS QRS DURATION: 120 MS
OHS QTC CALCULATION: 474 MS
PLATELET # BLD AUTO: 304 X10(3)/MCL (ref 130–400)
PMV BLD AUTO: 9.6 FL (ref 7.4–10.4)
POCT GLUCOSE: 132 MG/DL (ref 70–110)
POCT GLUCOSE: 167 MG/DL (ref 70–110)
POCT GLUCOSE: 190 MG/DL (ref 70–110)
POTASSIUM SERPL-SCNC: 6 MMOL/L (ref 3.5–5.1)
POTASSIUM SERPL-SCNC: 6.3 MMOL/L (ref 3.5–5.1)
PROTHROMBIN TIME: 27 SECONDS (ref 11.7–14.5)
RBC # BLD AUTO: 3.38 X10(6)/MCL (ref 4.2–5.4)
SODIUM SERPL-SCNC: 138 MMOL/L (ref 136–145)
SODIUM SERPL-SCNC: 140 MMOL/L (ref 136–145)
WBC # BLD AUTO: 8.48 X10(3)/MCL (ref 4.5–11.5)

## 2024-07-15 PROCEDURE — 94799 UNLISTED PULMONARY SVC/PX: CPT

## 2024-07-15 PROCEDURE — 37000008 HC ANESTHESIA 1ST 15 MINUTES: Performed by: ORTHOPAEDIC SURGERY

## 2024-07-15 PROCEDURE — 71000033 HC RECOVERY, INTIAL HOUR: Performed by: ORTHOPAEDIC SURGERY

## 2024-07-15 PROCEDURE — P9047 ALBUMIN (HUMAN), 25%, 50ML: HCPCS | Mod: JZ,JG

## 2024-07-15 PROCEDURE — 0QP704Z REMOVAL OF INTERNAL FIXATION DEVICE FROM LEFT UPPER FEMUR, OPEN APPROACH: ICD-10-PCS | Performed by: ORTHOPAEDIC SURGERY

## 2024-07-15 PROCEDURE — 25000003 PHARM REV CODE 250: Performed by: INTERNAL MEDICINE

## 2024-07-15 PROCEDURE — 63600175 PHARM REV CODE 636 W HCPCS: Performed by: ORTHOPAEDIC SURGERY

## 2024-07-15 PROCEDURE — 36415 COLL VENOUS BLD VENIPUNCTURE: CPT | Performed by: INTERNAL MEDICINE

## 2024-07-15 PROCEDURE — 94761 N-INVAS EAR/PLS OXIMETRY MLT: CPT

## 2024-07-15 PROCEDURE — 36000710: Performed by: ORTHOPAEDIC SURGERY

## 2024-07-15 PROCEDURE — D9220A PRA ANESTHESIA: Mod: CRNA,,,

## 2024-07-15 PROCEDURE — 0QS704Z REPOSITION LEFT UPPER FEMUR WITH INTERNAL FIXATION DEVICE, OPEN APPROACH: ICD-10-PCS | Performed by: ORTHOPAEDIC SURGERY

## 2024-07-15 PROCEDURE — 87205 SMEAR GRAM STAIN: CPT | Performed by: ORTHOPAEDIC SURGERY

## 2024-07-15 PROCEDURE — C1769 GUIDE WIRE: HCPCS | Performed by: ORTHOPAEDIC SURGERY

## 2024-07-15 PROCEDURE — 88300 SURGICAL PATH GROSS: CPT

## 2024-07-15 PROCEDURE — 25000003 PHARM REV CODE 250

## 2024-07-15 PROCEDURE — 27201423 OPTIME MED/SURG SUP & DEVICES STERILE SUPPLY: Performed by: ORTHOPAEDIC SURGERY

## 2024-07-15 PROCEDURE — 0SRB0JZ REPLACEMENT OF LEFT HIP JOINT WITH SYNTHETIC SUBSTITUTE, OPEN APPROACH: ICD-10-PCS | Performed by: ORTHOPAEDIC SURGERY

## 2024-07-15 PROCEDURE — 25000003 PHARM REV CODE 250: Performed by: NURSE PRACTITIONER

## 2024-07-15 PROCEDURE — 63600175 PHARM REV CODE 636 W HCPCS: Performed by: ANESTHESIOLOGY

## 2024-07-15 PROCEDURE — 87075 CULTR BACTERIA EXCEPT BLOOD: CPT | Performed by: ORTHOPAEDIC SURGERY

## 2024-07-15 PROCEDURE — 27236 TREAT THIGH FRACTURE: CPT | Mod: AS,78,LT, | Performed by: NURSE PRACTITIONER

## 2024-07-15 PROCEDURE — 85014 HEMATOCRIT: CPT | Performed by: ORTHOPAEDIC SURGERY

## 2024-07-15 PROCEDURE — 0SCB0ZZ EXTIRPATION OF MATTER FROM LEFT HIP JOINT, OPEN APPROACH: ICD-10-PCS | Performed by: ORTHOPAEDIC SURGERY

## 2024-07-15 PROCEDURE — 37000009 HC ANESTHESIA EA ADD 15 MINS: Performed by: ORTHOPAEDIC SURGERY

## 2024-07-15 PROCEDURE — 36415 COLL VENOUS BLD VENIPUNCTURE: CPT | Performed by: ORTHOPAEDIC SURGERY

## 2024-07-15 PROCEDURE — 87070 CULTURE OTHR SPECIMN AEROBIC: CPT | Performed by: ORTHOPAEDIC SURGERY

## 2024-07-15 PROCEDURE — D9220A PRA ANESTHESIA: Mod: ANES,,, | Performed by: ANESTHESIOLOGY

## 2024-07-15 PROCEDURE — 63600175 PHARM REV CODE 636 W HCPCS

## 2024-07-15 PROCEDURE — 88311 DECALCIFY TISSUE: CPT

## 2024-07-15 PROCEDURE — 88331 PATH CONSLTJ SURG 1 BLK 1SPC: CPT | Performed by: ORTHOPAEDIC SURGERY

## 2024-07-15 PROCEDURE — 30233N1 TRANSFUSION OF NONAUTOLOGOUS RED BLOOD CELLS INTO PERIPHERAL VEIN, PERCUTANEOUS APPROACH: ICD-10-PCS | Performed by: ORTHOPAEDIC SURGERY

## 2024-07-15 PROCEDURE — 11000001 HC ACUTE MED/SURG PRIVATE ROOM

## 2024-07-15 PROCEDURE — 85025 COMPLETE CBC W/AUTO DIFF WBC: CPT | Performed by: INTERNAL MEDICINE

## 2024-07-15 PROCEDURE — 85610 PROTHROMBIN TIME: CPT | Performed by: INTERNAL MEDICINE

## 2024-07-15 PROCEDURE — 88305 TISSUE EXAM BY PATHOLOGIST: CPT

## 2024-07-15 PROCEDURE — 25000003 PHARM REV CODE 250: Performed by: ORTHOPAEDIC SURGERY

## 2024-07-15 PROCEDURE — 80048 BASIC METABOLIC PNL TOTAL CA: CPT | Performed by: INTERNAL MEDICINE

## 2024-07-15 PROCEDURE — 99900031 HC PATIENT EDUCATION (STAT)

## 2024-07-15 PROCEDURE — 27236 TREAT THIGH FRACTURE: CPT | Mod: 78,LT,, | Performed by: ORTHOPAEDIC SURGERY

## 2024-07-15 PROCEDURE — C1713 ANCHOR/SCREW BN/BN,TIS/BN: HCPCS | Performed by: ORTHOPAEDIC SURGERY

## 2024-07-15 PROCEDURE — 36000711: Performed by: ORTHOPAEDIC SURGERY

## 2024-07-15 PROCEDURE — 99900035 HC TECH TIME PER 15 MIN (STAT)

## 2024-07-15 PROCEDURE — 51701 INSERT BLADDER CATHETER: CPT

## 2024-07-15 PROCEDURE — 51798 US URINE CAPACITY MEASURE: CPT

## 2024-07-15 PROCEDURE — C1776 JOINT DEVICE (IMPLANTABLE): HCPCS | Performed by: ORTHOPAEDIC SURGERY

## 2024-07-15 DEVICE — TROCHANTERIC GRIP PLATE WITH 2 CABLES
Type: IMPLANTABLE DEVICE | Site: FEMUR | Status: FUNCTIONAL
Brand: DALL-MILES

## 2024-07-15 DEVICE — IMPLANTABLE DEVICE: Type: IMPLANTABLE DEVICE | Site: FEMUR | Status: FUNCTIONAL

## 2024-07-15 DEVICE — CERAMIC V40 FEMORAL HEAD
Type: IMPLANTABLE DEVICE | Site: FEMUR | Status: FUNCTIONAL
Brand: BIOLOX

## 2024-07-15 DEVICE — BIPOLAR COMPONENT
Type: IMPLANTABLE DEVICE | Site: FEMUR | Status: FUNCTIONAL
Brand: UHR

## 2024-07-15 RX ORDER — VANCOMYCIN HYDROCHLORIDE 1 G/20ML
INJECTION, POWDER, LYOPHILIZED, FOR SOLUTION INTRAVENOUS
Status: DISCONTINUED | OUTPATIENT
Start: 2024-07-15 | End: 2024-07-15

## 2024-07-15 RX ORDER — CARVEDILOL 6.25 MG/1
12.5 TABLET ORAL 2 TIMES DAILY
Status: DISCONTINUED | OUTPATIENT
Start: 2024-07-15 | End: 2024-07-18

## 2024-07-15 RX ORDER — PROPOFOL 10 MG/ML
VIAL (ML) INTRAVENOUS
Status: DISCONTINUED | OUTPATIENT
Start: 2024-07-15 | End: 2024-07-15

## 2024-07-15 RX ORDER — POLYETHYLENE GLYCOL 3350 17 G/17G
17 POWDER, FOR SOLUTION ORAL 2 TIMES DAILY
Status: DISCONTINUED | OUTPATIENT
Start: 2024-07-15 | End: 2024-07-26 | Stop reason: HOSPADM

## 2024-07-15 RX ORDER — SODIUM CHLORIDE 0.9 % (FLUSH) 0.9 %
SYRINGE (ML) INJECTION
Status: DISCONTINUED
Start: 2024-07-15 | End: 2024-07-15 | Stop reason: WASHOUT

## 2024-07-15 RX ORDER — MORPHINE SULFATE 10 MG/ML
INJECTION INTRAMUSCULAR; INTRAVENOUS; SUBCUTANEOUS
Status: DISCONTINUED
Start: 2024-07-15 | End: 2024-07-15 | Stop reason: WASHOUT

## 2024-07-15 RX ORDER — TRANEXAMIC ACID 100 MG/ML
INJECTION, SOLUTION INTRAVENOUS
Status: DISCONTINUED | OUTPATIENT
Start: 2024-07-15 | End: 2024-07-15

## 2024-07-15 RX ORDER — MORPHINE SULFATE 4 MG/ML
2 INJECTION, SOLUTION INTRAMUSCULAR; INTRAVENOUS
Status: ACTIVE | OUTPATIENT
Start: 2024-07-15 | End: 2024-07-16

## 2024-07-15 RX ORDER — ONDANSETRON HYDROCHLORIDE 2 MG/ML
4 INJECTION, SOLUTION INTRAVENOUS ONCE AS NEEDED
Status: DISCONTINUED | OUTPATIENT
Start: 2024-07-15 | End: 2024-07-16

## 2024-07-15 RX ORDER — MIDAZOLAM HYDROCHLORIDE 1 MG/ML
INJECTION INTRAMUSCULAR; INTRAVENOUS
Status: DISCONTINUED | OUTPATIENT
Start: 2024-07-15 | End: 2024-07-15

## 2024-07-15 RX ORDER — FENTANYL CITRATE 50 UG/ML
INJECTION, SOLUTION INTRAMUSCULAR; INTRAVENOUS
Status: DISCONTINUED | OUTPATIENT
Start: 2024-07-15 | End: 2024-07-15

## 2024-07-15 RX ORDER — SODIUM CHLORIDE 9 MG/ML
INJECTION, SOLUTION INTRAVENOUS CONTINUOUS
Status: DISCONTINUED | OUTPATIENT
Start: 2024-07-15 | End: 2024-07-16

## 2024-07-15 RX ORDER — ACETAMINOPHEN 10 MG/ML
1000 INJECTION, SOLUTION INTRAVENOUS ONCE
Status: ACTIVE | OUTPATIENT
Start: 2024-07-15 | End: 2024-07-16

## 2024-07-15 RX ORDER — HYDROMORPHONE HYDROCHLORIDE 2 MG/ML
0.4 INJECTION, SOLUTION INTRAMUSCULAR; INTRAVENOUS; SUBCUTANEOUS EVERY 5 MIN PRN
Status: DISCONTINUED | OUTPATIENT
Start: 2024-07-15 | End: 2024-07-17

## 2024-07-15 RX ORDER — HYDROMORPHONE HYDROCHLORIDE 2 MG/ML
INJECTION, SOLUTION INTRAMUSCULAR; INTRAVENOUS; SUBCUTANEOUS
Status: DISPENSED
Start: 2024-07-15 | End: 2024-07-16

## 2024-07-15 RX ORDER — ALBUMIN HUMAN 250 G/1000ML
SOLUTION INTRAVENOUS
Status: COMPLETED
Start: 2024-07-15 | End: 2024-07-15

## 2024-07-15 RX ORDER — EPINEPHRINE 1 MG/ML
INJECTION, SOLUTION, CONCENTRATE INTRAVENOUS
Status: DISCONTINUED
Start: 2024-07-15 | End: 2024-07-15 | Stop reason: WASHOUT

## 2024-07-15 RX ORDER — ALUMINUM HYDROXIDE, MAGNESIUM HYDROXIDE, AND SIMETHICONE 1200; 120; 1200 MG/30ML; MG/30ML; MG/30ML
30 SUSPENSION ORAL EVERY 6 HOURS PRN
Status: DISCONTINUED | OUTPATIENT
Start: 2024-07-15 | End: 2024-07-26 | Stop reason: HOSPADM

## 2024-07-15 RX ORDER — ETOMIDATE 2 MG/ML
INJECTION INTRAVENOUS
Status: DISCONTINUED | OUTPATIENT
Start: 2024-07-15 | End: 2024-07-15

## 2024-07-15 RX ORDER — METHOCARBAMOL 500 MG/1
500 TABLET, FILM COATED ORAL EVERY 8 HOURS PRN
Status: DISCONTINUED | OUTPATIENT
Start: 2024-07-15 | End: 2024-07-26 | Stop reason: HOSPADM

## 2024-07-15 RX ORDER — GLYCOPYRROLATE 0.2 MG/ML
INJECTION INTRAMUSCULAR; INTRAVENOUS
Status: DISCONTINUED | OUTPATIENT
Start: 2024-07-15 | End: 2024-07-15

## 2024-07-15 RX ORDER — KETOROLAC TROMETHAMINE 30 MG/ML
INJECTION, SOLUTION INTRAMUSCULAR; INTRAVENOUS
Status: DISCONTINUED
Start: 2024-07-15 | End: 2024-07-15 | Stop reason: WASHOUT

## 2024-07-15 RX ORDER — MUPIROCIN 20 MG/G
OINTMENT TOPICAL 2 TIMES DAILY
Status: DISPENSED | OUTPATIENT
Start: 2024-07-15 | End: 2024-07-18

## 2024-07-15 RX ORDER — PHENYLEPHRINE HYDROCHLORIDE 10 MG/ML
INJECTION INTRAVENOUS
Status: DISCONTINUED | OUTPATIENT
Start: 2024-07-15 | End: 2024-07-15

## 2024-07-15 RX ORDER — BISACODYL 10 MG/1
10 SUPPOSITORY RECTAL DAILY
Status: ACTIVE | OUTPATIENT
Start: 2024-07-18 | End: 2024-07-19

## 2024-07-15 RX ORDER — ROPIVACAINE HYDROCHLORIDE 5 MG/ML
INJECTION, SOLUTION EPIDURAL; INFILTRATION; PERINEURAL
Status: DISCONTINUED
Start: 2024-07-15 | End: 2024-07-15 | Stop reason: WASHOUT

## 2024-07-15 RX ORDER — VANCOMYCIN HYDROCHLORIDE 1 G/20ML
INJECTION, POWDER, LYOPHILIZED, FOR SOLUTION INTRAVENOUS
Status: DISPENSED
Start: 2024-07-15 | End: 2024-07-16

## 2024-07-15 RX ORDER — AMOXICILLIN 250 MG
2 CAPSULE ORAL NIGHTLY
Status: DISCONTINUED | OUTPATIENT
Start: 2024-07-15 | End: 2024-07-22

## 2024-07-15 RX ORDER — KETOROLAC TROMETHAMINE 30 MG/ML
15 INJECTION, SOLUTION INTRAMUSCULAR; INTRAVENOUS EVERY 6 HOURS
Status: DISCONTINUED | OUTPATIENT
Start: 2024-07-15 | End: 2024-07-16

## 2024-07-15 RX ORDER — ONDANSETRON HYDROCHLORIDE 2 MG/ML
INJECTION, SOLUTION INTRAVENOUS
Status: DISCONTINUED | OUTPATIENT
Start: 2024-07-15 | End: 2024-07-15

## 2024-07-15 RX ORDER — METOCLOPRAMIDE HYDROCHLORIDE 5 MG/ML
10 INJECTION INTRAMUSCULAR; INTRAVENOUS EVERY 6 HOURS
Status: DISCONTINUED | OUTPATIENT
Start: 2024-07-15 | End: 2024-07-16

## 2024-07-15 RX ORDER — ENOXAPARIN SODIUM 100 MG/ML
40 INJECTION SUBCUTANEOUS EVERY 24 HOURS
Status: DISCONTINUED | OUTPATIENT
Start: 2024-07-16 | End: 2024-07-16

## 2024-07-15 RX ORDER — DEXAMETHASONE SODIUM PHOSPHATE 4 MG/ML
INJECTION, SOLUTION INTRA-ARTICULAR; INTRALESIONAL; INTRAMUSCULAR; INTRAVENOUS; SOFT TISSUE
Status: DISCONTINUED | OUTPATIENT
Start: 2024-07-15 | End: 2024-07-15

## 2024-07-15 RX ORDER — ACETAMINOPHEN 10 MG/ML
INJECTION, SOLUTION INTRAVENOUS
Status: DISPENSED
Start: 2024-07-15 | End: 2024-07-16

## 2024-07-15 RX ORDER — ROCURONIUM BROMIDE 10 MG/ML
INJECTION, SOLUTION INTRAVENOUS
Status: DISCONTINUED | OUTPATIENT
Start: 2024-07-15 | End: 2024-07-15

## 2024-07-15 RX ORDER — ONDANSETRON HYDROCHLORIDE 2 MG/ML
4 INJECTION, SOLUTION INTRAVENOUS EVERY 6 HOURS PRN
Status: DISCONTINUED | OUTPATIENT
Start: 2024-07-15 | End: 2024-07-18

## 2024-07-15 RX ORDER — TALC
6 POWDER (GRAM) TOPICAL NIGHTLY PRN
Status: DISCONTINUED | OUTPATIENT
Start: 2024-07-15 | End: 2024-07-26 | Stop reason: HOSPADM

## 2024-07-15 RX ORDER — ALBUMIN HUMAN 250 G/1000ML
SOLUTION INTRAVENOUS
Status: DISCONTINUED | OUTPATIENT
Start: 2024-07-15 | End: 2024-07-15

## 2024-07-15 RX ORDER — TRAMADOL HYDROCHLORIDE 50 MG/1
50 TABLET ORAL EVERY 4 HOURS PRN
Status: DISCONTINUED | OUTPATIENT
Start: 2024-07-15 | End: 2024-07-26 | Stop reason: HOSPADM

## 2024-07-15 RX ORDER — GLUCAGON 1 MG
1 KIT INJECTION
Status: DISCONTINUED | OUTPATIENT
Start: 2024-07-15 | End: 2024-07-21

## 2024-07-15 RX ORDER — CEFAZOLIN SODIUM 1 G/3ML
INJECTION, POWDER, FOR SOLUTION INTRAMUSCULAR; INTRAVENOUS
Status: DISCONTINUED | OUTPATIENT
Start: 2024-07-15 | End: 2024-07-15

## 2024-07-15 RX ORDER — LACTULOSE 10 G/15ML
20 SOLUTION ORAL EVERY 6 HOURS PRN
Status: DISCONTINUED | OUTPATIENT
Start: 2024-07-15 | End: 2024-07-26 | Stop reason: HOSPADM

## 2024-07-15 RX ORDER — ACETAMINOPHEN 500 MG
500 TABLET ORAL
Status: DISCONTINUED | OUTPATIENT
Start: 2024-07-15 | End: 2024-07-26 | Stop reason: HOSPADM

## 2024-07-15 RX ORDER — LIDOCAINE HYDROCHLORIDE 20 MG/ML
INJECTION INTRAVENOUS
Status: DISCONTINUED | OUTPATIENT
Start: 2024-07-15 | End: 2024-07-15

## 2024-07-15 RX ADMIN — FENTANYL CITRATE 50 MCG: 50 INJECTION, SOLUTION INTRAMUSCULAR; INTRAVENOUS at 03:07

## 2024-07-15 RX ADMIN — HYDROMORPHONE HYDROCHLORIDE 0.4 MG: 2 INJECTION, SOLUTION INTRAMUSCULAR; INTRAVENOUS; SUBCUTANEOUS at 04:07

## 2024-07-15 RX ADMIN — PHENYLEPHRINE HYDROCHLORIDE 100 MCG: 10 INJECTION INTRAVENOUS at 03:07

## 2024-07-15 RX ADMIN — DEXAMETHASONE SODIUM PHOSPHATE 4 MG: 4 INJECTION, SOLUTION INTRA-ARTICULAR; INTRALESIONAL; INTRAMUSCULAR; INTRAVENOUS; SOFT TISSUE at 01:07

## 2024-07-15 RX ADMIN — SENNOSIDES AND DOCUSATE SODIUM 2 TABLET: 50; 8.6 TABLET ORAL at 08:07

## 2024-07-15 RX ADMIN — GLYCOPYRROLATE 0.2 MG: 0.2 INJECTION INTRAMUSCULAR; INTRAVENOUS at 01:07

## 2024-07-15 RX ADMIN — LEVOTHYROXINE SODIUM 125 MCG: 125 TABLET ORAL at 05:07

## 2024-07-15 RX ADMIN — FENTANYL CITRATE 50 MCG: 50 INJECTION, SOLUTION INTRAMUSCULAR; INTRAVENOUS at 01:07

## 2024-07-15 RX ADMIN — SODIUM CHLORIDE, SODIUM GLUCONATE, SODIUM ACETATE, POTASSIUM CHLORIDE AND MAGNESIUM CHLORIDE: 526; 502; 368; 37; 30 INJECTION, SOLUTION INTRAVENOUS at 01:07

## 2024-07-15 RX ADMIN — MIDAZOLAM 0.5 MG: 1 INJECTION INTRAMUSCULAR; INTRAVENOUS at 01:07

## 2024-07-15 RX ADMIN — ETOMIDATE 12 MG: 2 INJECTION INTRAVENOUS at 01:07

## 2024-07-15 RX ADMIN — CEFAZOLIN 2 G: 330 INJECTION, POWDER, FOR SOLUTION INTRAMUSCULAR; INTRAVENOUS at 01:07

## 2024-07-15 RX ADMIN — KETOROLAC TROMETHAMINE 15 MG: 30 INJECTION, SOLUTION INTRAMUSCULAR at 08:07

## 2024-07-15 RX ADMIN — PHENYLEPHRINE HYDROCHLORIDE 100 MCG: 10 INJECTION INTRAVENOUS at 01:07

## 2024-07-15 RX ADMIN — FENTANYL CITRATE 50 MCG: 50 INJECTION, SOLUTION INTRAMUSCULAR; INTRAVENOUS at 02:07

## 2024-07-15 RX ADMIN — CEFAZOLIN 2 G: 2 INJECTION, POWDER, FOR SOLUTION INTRAMUSCULAR; INTRAVENOUS at 08:07

## 2024-07-15 RX ADMIN — ALBUMIN (HUMAN) 100 ML: 5 SOLUTION INTRAVENOUS at 01:07

## 2024-07-15 RX ADMIN — SUGAMMADEX 200 MG: 100 INJECTION, SOLUTION INTRAVENOUS at 03:07

## 2024-07-15 RX ADMIN — TRANEXAMIC ACID 1000 MG: 100 INJECTION, SOLUTION INTRAVENOUS at 02:07

## 2024-07-15 RX ADMIN — TRAMADOL HYDROCHLORIDE 50 MG: 50 TABLET, COATED ORAL at 05:07

## 2024-07-15 RX ADMIN — DOCUSATE SODIUM 100 MG: 100 CAPSULE, LIQUID FILLED ORAL at 08:07

## 2024-07-15 RX ADMIN — ONDANSETRON HYDROCHLORIDE 4 MG: 2 SOLUTION INTRAMUSCULAR; INTRAVENOUS at 03:07

## 2024-07-15 RX ADMIN — PHENYLEPHRINE HYDROCHLORIDE 100 MCG: 10 INJECTION INTRAVENOUS at 02:07

## 2024-07-15 RX ADMIN — TRAMADOL HYDROCHLORIDE 50 MG: 50 TABLET, COATED ORAL at 07:07

## 2024-07-15 RX ADMIN — SACUBITRIL AND VALSARTAN 1 TABLET: 24; 26 TABLET, FILM COATED ORAL at 08:07

## 2024-07-15 RX ADMIN — PHENYLEPHRINE HYDROCHLORIDE 20 MCG/MIN: 10 INJECTION INTRAVENOUS at 02:07

## 2024-07-15 RX ADMIN — METOCLOPRAMIDE 10 MG: 5 INJECTION, SOLUTION INTRAMUSCULAR; INTRAVENOUS at 08:07

## 2024-07-15 RX ADMIN — CARVEDILOL 6.25 MG: 6.25 TABLET, FILM COATED ORAL at 08:07

## 2024-07-15 RX ADMIN — PROPOFOL 30 MG: 10 INJECTION, EMULSION INTRAVENOUS at 01:07

## 2024-07-15 RX ADMIN — ACETAMINOPHEN 650 MG: 325 TABLET ORAL at 05:07

## 2024-07-15 RX ADMIN — LIDOCAINE HYDROCHLORIDE 50 MG: 20 INJECTION INTRAVENOUS at 01:07

## 2024-07-15 RX ADMIN — SODIUM ZIRCONIUM CYCLOSILICATE 10 G: 5 POWDER, FOR SUSPENSION ORAL at 08:07

## 2024-07-15 RX ADMIN — POLYETHYLENE GLYCOL 3350 17 G: 17 POWDER, FOR SOLUTION ORAL at 08:07

## 2024-07-15 RX ADMIN — MENTHOL AND ZINC OXIDE: .44; 20.625 OINTMENT TOPICAL at 08:07

## 2024-07-15 RX ADMIN — ACETAMINOPHEN 500 MG: 500 TABLET ORAL at 08:07

## 2024-07-15 RX ADMIN — ROCURONIUM BROMIDE 50 MG: 10 SOLUTION INTRAVENOUS at 01:07

## 2024-07-15 NOTE — ANESTHESIA PREPROCEDURE EVALUATION
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 female who  has a past medical history of A-fib, Chronic cystitis, GERD (gastroesophageal reflux disease), Graves disease, Hypertension, Hypothyroidism, unspecified, Dilated Cardiomyopathy, and Spinal stenosis.. The patient presented to [facility] on 7/13/2024 with a primary complaint of left hip pain.  Pt with a left femur fracture after a fall 7/2, underwent IMN 7/3 and transferred to in rebab 7/9.  She reported increasing pain to hip, xrays showing failed intertrochanteric femur fracture.  Pt transferred to [facility] for revision on Monday.  She is currently comfortable no complaints            Past Medical History:   Diagnosis Date    A-fib      Chronic cystitis      GERD (gastroesophageal reflux disease)      Graves disease      Hypertension      Hypothyroidism, unspecified      Spinal stenosis      LAB:      (H): Data is abnormally high            Pre-op Assessment    I have reviewed the Patient Summary Reports.     I have reviewed the Nursing Notes. I have reviewed the NPO Status.   I have reviewed the Medications.     Review of Systems  Anesthesia Hx:  No problems with previous Anesthesia             Denies Family Hx of Anesthesia complications.    Denies Personal Hx of Anesthesia complications.                     Hematology/Oncology:       -- Anemia:               Hematology Comments: Transfused 2 u pRBC's, 7/8                    EENT/Dental:   [place]          Cardiovascular:     Hypertension           hyperlipidemia    CAF  Chronic, compensated Systolic Heart Failure on Beta Blocker / Entresto / Coumadin  Dilated Cardiomyopathy                         Renal/:  Chronic Renal Disease   Acute on Chronic Renal Insufficiancy             Hepatic/GI:     GERD             Musculoskeletal:  Arthritis   GOUT            Neurological:           Spinal Stenosis                            Endocrine:  Diabetes Hypothyroidism  Graves Dz            Physical Exam  General: Alert and Oriented  Frail appearing / [place]  Airway:  Mallampati: II   Mouth Opening: Normal  TM Distance: Normal  Tongue: Normal  Neck ROM: Normal ROM    Dental:  Intact  Age Related Wear  Chest/Lungs:  Normal Respiratory Rate    Heart:  Rate: Normal  Rhythm: Regular Rhythm        Anesthesia Plan  Type of Anesthesia, risks & benefits discussed:    Anesthesia Type: Gen ETT  Intra-op Monitoring Plan: Standard ASA Monitors  Post Op Pain Control Plan: IV/PO Opioids PRN and multimodal analgesia  Induction:  IV  Airway Plan: Direct, Post-Induction  Informed Consent: Informed consent signed with the Patient and all parties understand the risks and agree with anesthesia plan.  All questions answered. Patient consented to blood products? Yes  ASA Score: 3  Day of Surgery Review of History & Physical: H&P Update referred to the surgeon/provider.  Anesthesia Plan Notes: T&M for 2u pRBC's    Ready For Surgery From Anesthesia Perspective.     .

## 2024-07-15 NOTE — ANESTHESIA POSTPROCEDURE EVALUATION
Anesthesia Post Evaluation    Patient: Homa Jaquez    Procedure(s) Performed: Procedure(s) (LRB):  CONVERSION ARTHROPLASTY, HIP, POSTERIOR APPROACH - valencia, cell saver, pathology (Left)    Final Anesthesia Type: general      Patient location during evaluation: PACU  Patient participation: Yes- Able to Participate  Level of consciousness: oriented and awake  Post-procedure vital signs: reviewed and stable  Pain management: adequate  Airway patency: patent    PONV status at discharge: No PONV  Anesthetic complications: no      Cardiovascular status: stable and hemodynamically stable  Respiratory status: spontaneous ventilation and unassisted  Hydration status: euvolemic  Follow-up not needed.  Comments: [facility]              Vitals Value Taken Time   /99 07/15/24 1558   Temp 36.8 °C (98.2 °F) 07/15/24 1558   Pulse 94 07/15/24 1601   Resp 21 07/15/24 1601   SpO2 100 % 07/15/24 1601   Vitals shown include unfiled device data.      No case tracking events are documented in the log.      Pain/Mo Score: Pain Rating Prior to Med Admin: 7 (7/15/2024  5:53 AM)  Pain Rating Post Med Admin: 0 (7/15/2024  6:53 AM)

## 2024-07-15 NOTE — OP NOTE
OPERATIVE REPORT      Patient: Homa Jaquez   : 1935    MRN: [id number]  Date: 07/15/2024      Surgeon: Jonny Bains MD  Assistant: Katheryn Bourgeois NP, [facility].  Certified first assist was necessary as a skilled set of hands and was necessary for proper patient positioning as well as assistance with closure in place with multiple implants.  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  OR Staff: Circulator: Beronica Cedillo RN  Nurse Practitioner: Katheryn Bourgeois FNP  Scrub Person: Kalani Guerrero ST; Malka Welch ST  Implants:   Implant Name Type Inv. Item Serial No.  Lot No. LRB No. Used Action   BLADE HELICAL PERF GOLD 100MM - [id number]  BLADE HELICAL PERF GOLD 100MM  SYNTHES [id number] Left 1 Explanted   NAIL IM ADEEL 130 DEG 12X360 L - [id number]  NAIL IM ADEEL 130 DEG 12X360 L  [employer] [id number] Left 1 Explanted   SCREW LOCK XL25 5X46MM - [id number]  SCREW LOCK XL25 5X46MM  SYNTHES [id number] Left 1 Explanted   SCREW XL25 IM NAIL 42X5MM - [id number]  SCREW XL25 IM NAIL 42X5MM  [employer] [id number] Left 1 Explanted   GUIDE WIRE 3.9R5546LV BALL TIP - [id number]  GUIDE WIRE 3.9T5517YO BALL TIP  KWESI KD[employer]F JANY. [id number] Left 1 Implanted and Explanted   GUIDE WIRE 3.1P1575SO BALL TIP - [id number]  GUIDE WIRE 3.1G7333MR BALL TIP  [employer] KD[employer] JANY. [id number] Left 1 Wasted   HEAD FEMORAL UHR 94Q63NG - [id number]  HEAD FEMORAL UHR 11P19YP  KWES[employer] KD[employer] JANY. [id number] Left 1 Implanted   HEAD V40 BIOLOX TAPR 28MM -4 - [id number]  HEAD V40 BIOLOX TAPR 28MM -4  KWESI KD[employer]F JANY. [id number] Left 1 Implanted   HEAD FEMORAL  UHR 65R40NT - [id number]  HEAD FEMORAL UHR 41Y77OR  KWES[employer] JANY. [id number] Left 1 Implanted   PLATE VIT TROCHNTRC MED 100MM - [id number]  PLATE VIT TROCHNTRC MED 100MM  [employer] JANY. [id number] Left 1 Implanted   DALL-MILES CABLE 2.0MM X 750MM    KWESI [id number] Left 1 Implanted   RESTORATION MODULAR HIP SYSTEM 155MM X 19MM    KWESI [id number] Left 1 Implanted   RESTORATION MODULAR HIP SYSTEM 23MM +20 V40    KWESI [id number] Left 1 Implanted     EBL: See anesthesia note  Complications: None  Disposition: To PACU, stable    Indications: Homa Jaquez is a 88 y.o. female presenting with the aforementioned injuries/findings. The procedure is indicated to stabilize left hip.  Patient with a history of intertrochanteric femur fracture treated by partner with open reduction with fixation.  Patient was had significant complaints of pain earlier last week.  X-rays revealed significant change in position and failure with a impending cut-out.  Discussed all treatment with the patient.  Risks, benefits alternatives to conversion to total hip arthroplasty were discussed in detail.  All questions answered to the patient's satisfaction.  No guarantees made.  Despite these risks he elected proceed with surgical intervention..  The patient is awake and alert. After thorough discussion of the risks, benefits, expected outcomes, and alternatives to surgical intervention, the patient agreed to proceed with surgical treatment.  Specific risks discussed included, but were not limited to: superficial or deep infection, wound healing complications, DVT/PE, significant bleeding requiring transfusion, damage to named anatomic structures in the immediate area including named neurovascular structures, infection, nonunion, malunion and general risks of anesthesia.  The patient voiced understanding and written as well as verbal consent was obtained by  myself prior to the procedure.    Procedure Note:  The patient was brought back to the OR and placed supine on the OR table. After successful induction of anesthesia by anesthesia staff, the patient was positioned in the lateral decubitus position and all bony prominences were padded appropriately.  The surgical field was then provisionally cleansed and then prepped and draped in the usual sterile fashion.    At this time a time-out was performed, with the correct patient, site, and procedure identified.  The universal time out as well as sign your site protocols were followed.  Preoperative antibiotics were verified as administered.     Patient previously had a cephalomedullary nail.  These incisions were connected.  It extended down through subcutaneous tissue down to fascia.  Fascia split in line with the fibers.  Patient was a large hematoma.  It was evacuated.  Carried through the ITB band.  Retractor was placed.  Identified the cephalomedullary screw.  Patient was a large fracture fragment of the greater trochanter.  It was freely floating and loose.  Also fragment of the lesser trochanter.  Also floating as well.  We identified the femoral head.  We released the short external rotators as well as hip capsule.  Femoral head was identified.  It was dislocated.  We then relocated.  We evaluated the nail.  Identified the locking screw.  It was removed.  We then removed the cephalomedullary screw.  We attached the extraction handle to the nail..  Removed the distal screws.  We were able to extract the nail.  We opened up the canal.  We placed a guidewire distally.  Reamed sequentially using flexible reamers to get up to about 15.  We then began using straight reamers.  We reamed sequentially up to a size 19.  Nineteen good solid chatter it was solid.  We impacted the 19 x 155 stem.  Came down to a firm and solid stop..  We then began trialing.  Trialed a 23 x 1 +20.  Patient was good stability.  Excellent range of  motion.  No significant instability felt.  It was implant was opened and impacted in position came down to a firm and solid stop.  Attached the set screw.  Reached trialed the head.  Settled on a 28- 4 Biolox head with a 48 bipolar head.  Reduced taken through full range of motion.  Excellent stability found.  No significant instability noted.  We then turned our attention towards fixation of the greater trochanter fracture.   plate was placed on the greater trochanter.  We placed 3 cables non beaded around the greater trochanter reducing the fracture.  It was sucking the plate down.  Afterwards moved as 1 unit in concert.  Tensioned and crimped long the plate holding it in position.    The incision(s) was/were then irrigated using copious sterile saline as well as Betadine lavage. The surgical wound was closed in layered fashion.  The surgical site(s) was/were were sterilely cleansed and dressed. Due to revision nature and poor wound healing capability, prevena wound vac applied to the incision to assist with wound closure and decrease infection risk.        The patient was then subsequently transferred to to PACU in a stable condition.    All sponge and needle counts were correct at the end of the case.  I was present and participated in all aspects of the procedure.    Prognosis:  The patient will be kept WBAT on the ipsilateral extremity .  Patient will receive DVT prophylaxis .       This note/OR report was created with the assistance of  voice recognition software or phone  dictation.  There may be transcription errors as a result of using this technology however minimal. Effort has been made to assure accuracy of transcription but any obvious errors or omissions should be clarified with the author of the document.

## 2024-07-15 NOTE — PROGRESS NOTES
"No acute events overnight.  Pain controlled.  Resting in bed. NPO for surgery today.    Vital Signs  Temp: 97.8 °F (36.6 °C)  Temp Source: Oral  Pulse: 67  Heart Rate Source: Monitor  Resp: 20  SpO2: 98 %  Device (Oxygen Therapy): room air  BP: (!) 151/69  BP Method: Automatic  Patient Position: Lying  Height and Weight  Height: 5' 4" (162.6 cm)  Weight: 82.9 kg (182 lb 12.2 oz)  Weight Method: Standard Scale  BSA (Calculated - sq m): 1.93 sq meters  BMI (Calculated): 31.4  Weight in (lb) to have BMI = 25: 145.3]    +FHL/EHL  BCR distally  Dressing c/d/i  SILT distally    Recent Lab Results  (Last 5 results in the past 24 hours)        07/15/24  0434   07/14/24  2029   07/14/24  1652   07/14/24  1213   07/14/24  0638        Anion Gap 5.0               Baso # 0.09               Basophil % 1.1               BUN 33.9               BUN/CREAT RATIO 26               Calcium 8.7               Chloride 109               CO2 26               Creatinine 1.29               eGFR 40               Eos # 0.41               Eos % 4.8               Glucose 107               Hematocrit 34.5               Hemoglobin 10.9               Immature Grans (Abs) 0.04               Immature Granulocytes 0.5               INR 2.4               Lymph # 2.22               LYMPH % 26.2               MCH 32.2               MCHC 31.6               .1               Mono # 0.75               Mono % 8.8               MPV 9.6               Neut # 4.97               Neut % 58.6               nRBC 0.0               Platelet Count 304               POCT Glucose   153   146   105   115       Potassium 6.0               PT 27.0               RBC 3.38               RDW 17.1               Sodium 140               WBC 8.48                                      A/P:  Status post IT fx with non-union  To OR today for conversion L SILVIA  All questions answered to patients satisfaction  No guarantees made    "

## 2024-07-15 NOTE — TRANSFER OF CARE
"Anesthesia Transfer of Care Note    Patient: Homa Jaquez    Procedure(s) Performed: Procedure(s) (LRB):  CONVERSION ARTHROPLASTY, HIP, POSTERIOR APPROACH - valencia, cell saver, pathology (Left)    Patient location: PACU    Anesthesia Type: general    Transport from OR: Transported from OR on room air with adequate spontaneous ventilation    Post pain: adequate analgesia    Post assessment: no apparent anesthetic complications and tolerated procedure well    Post vital signs: stable    Level of consciousness: sedated    Nausea/Vomiting: no nausea/vomiting    Complications: none    Transfer of care protocol was followed      Last vitals: Visit Vitals  BP (!) 141/99 (BP Location: Left arm, Patient Position: Lying)   Pulse 92   Temp 36.8 °C (98.2 °F)   Resp (!) 23   Ht 5' 4" (1.626 m)   Wt 82.9 kg (182 lb 12.2 oz)   SpO2 100%   BMI 31.37 kg/m²     "

## 2024-07-15 NOTE — BRIEF OP NOTE
[facility] - Periop Services  Brief Operative Note    SUMMARY     Surgery Date: 7/15/24     Surgeon(s) and Role:     * Jonny Bains MD - Primary    First Assist - Katheryn Bourgeois NP    Pre-op Diagnosis:    Post-Op Diagnosis Codes:     * Closed displaced intertrochanteric fracture of left femur with nonunion, subsequent encounter [S72.142K]    Post-op Diagnosis:    Post-Op Diagnosis Codes:     * Closed displaced intertrochanteric fracture of left femur with nonunion, subsequent encounter [S72.142K]    Procedure:  Conversion L SILVIA  ORIF L intertroch femur fracture  Wound vac left hip    Anesthesia: See Anesthesia Note    Operative Findings: see separately dictated op note    Estimated Blood Loss: 750 ml  250 cc GB via cell saver         Specimens:   Specimen (24h ago, onward)       Start     Ordered    07/15/24 1439  Specimen to Pathology Orthopedics  Once        References:    Click here for ordering Quick Tip   Question Answer Comment   Procedure Type: Orthopedics    Specimen Source Hip, Left    Clinical Information: explanted hardware x5 pieces- for id only    Release to patient Immediate    Procedure Type: Excision        07/15/24 1439    07/15/24 1431  Specimen to Pathology Orthopedics  Once        References:    Click here for ordering Quick Tip   Question Answer Comment   Procedure Type: Orthopedics    Specimen Source Hip, Left    Clinical Information: synovium- r/o #neutrophils per high powered field    Release to patient Immediate    Procedure Type: Resection        07/15/24 1431    07/15/24 1256  Specimen to Pathology  RELEASE UPON ORDERING        References:    Click here for ordering Quick Tip   Question:  Release to patient  Answer:  Immediate    07/15/24 1256                    [id number]

## 2024-07-15 NOTE — PT/OT/SLP DISCHARGE
Occupational Therapy Discharge Summary    Hmoa Jaquez  MRN: [id number]   Principal Problem: Intertrochanteric fracture of left femur      Patient Discharged from acute Occupational Therapy on 07/13/2024.  Please refer to prior OT note dated 07/12/2024 for functional status.    Assessment:      Patient transferred to lower level of care secondary to medical management    Objective:     GOALS:   Multidisciplinary Problems       Occupational Therapy Goals          Problem: Occupational Therapy    Goal Priority Disciplines Outcome Interventions   Occupational Therapy Goal     OT, PT/OT Progressing    Description: Quality Indicators:   Admission Eval 07/10/2024 Goal Current  By Re-Eval;   Pt. Will be Independent c eating    Oral Hygiene Pt. Will be Mod A c oral care while standing at sink and SV/Touch A by D/C.    Toileting Hygiene Pt. Will be Max A c toileting and Mod A by D/C     Shower/Bathe Self Pt. Will be Mod A c bathing and SVN/Touch A c AE by D/C    UBD Pt. Will be SBA by Re-Eval    LBD Pt. Will be Mod A by Re-Eval and Touch A by D/C c AE     On/Off Footwear Mod A c AE by Re-Eval and Touch A ?SVN by D/C      Toilet Transfer Mod A by Re-Eval and Touch A by D/C                              Care Scores:   Admission Assessment Current Status Discharge  Goal   Functional Area: Care Score:  Care Score:    Eating 5 5 Independent   Oral Hygiene 88 88 Set-up/clean-up   Toileting Hygiene 1 1 Partial/moderate assistance   Shower/Bathe Self 2 2 Supervision or touching assistance   Upper Body Dressing 4 4 Set-up/clean-up   Lower Body Dressing 1 1 Partial/moderate assistance   Putting On/Taking Off Footwear 1 3 Supervision or touching assistance   Toilet Transfer 2 2 Supervision or touching assistance     Reasons for Discontinuation of Therapy Services   Transfer to alternate level of care.      Plan:     Patient Discharged to:  acute orthopedic unit      7/15/2024

## 2024-07-15 NOTE — PROGRESS NOTES
Inpatient Nutrition Assessment    Admit Date: 7/13/2024   Total duration of encounter: 2 days   Patient Age: 88 y.o.    Nutrition Recommendation/Prescription     When medically appropriate, advance diet as tolerated to Regular  If po intake decreased, consider oral supplement to better meet increased nutrient needs for healing  Weekly wts    Communication of Recommendations:  EMR    Nutrition Assessment     Malnutrition Assessment/Nutrition-Focused Physical Exam                                                                A minimum of two characteristics is recommended for diagnosis of either severe or non-severe malnutrition.    Chart Review    Reason Seen: continuous nutrition monitoring    Malnutrition Screening Tool Results   Have you recently lost weight without trying?: No  Have you been eating poorly because of a decreased appetite?: No   MST Score: 0   Diagnosis:  Status post intramedullary nail left hip with a failed intertrochanteric femur fracture     Relevant Medical History:   Past Medical History:   Diagnosis Date    A-fib     Chronic cystitis     GERD (gastroesophageal reflux disease)     Graves disease     Hypertension     Hypothyroidism, unspecified     Spinal stenosis          Scheduled Medications:  acetaminophen, 650 mg, TID AC  albumin human 25%, ,   allopurinol, 100 mg, Daily  atorvastatin, 20 mg, Daily  carvediloL, 6.25 mg, BID  docusate sodium, 100 mg, BID  EPINEPHrine (PF), ,   ketorolac, ,   levothyroxine, 125 mcg, Before breakfast  menthol-zinc oxide, , BID  morphine, ,   ROPIvacaine 0.5% (PF), ,   sacubitriL-valsartan, 1 tablet, BID  sodium chloride 0.9%, ,   tramadol, 50 mg, TID AC  vancomycin, ,     Continuous Infusions:   PRN Medications:  acetaminophen, 500 mg, BID PRN  albumin human 25%, ,   benzonatate, 100 mg, TID PRN  dextrose 10%, 12.5 g, PRN  dextrose 10%, 25 g, PRN  EPINEPHrine (PF), ,   glucagon (human recombinant), 1 mg, PRN  glucose, 16 g, PRN  glucose, 24 g,  PRN  hydrALAZINE, 10 mg, Q4H PRN  hydrOXYzine pamoate, 50 mg, Nightly PRN  insulin aspart U-100, 0-5 Units, QID (AC + HS) PRN  ketorolac, ,   labetalol, 10 mg, Q4H PRN  melatonin, 6 mg, Nightly PRN  methocarbamoL, 500 mg, TID PRN  metoprolol, 10 mg, Q2H PRN  morphine, ,   nitroGLYCERIN, 0.4 mg, Q5 Min PRN  ondansetron, 4 mg, Q6H PRN  ondansetron, 4 mg, Q8H PRN  oxyCODONE, 5 mg, Q4H PRN  ROPIvacaine 0.5% (PF), ,   sodium chloride 0.9%, ,   tramadol, 50 mg, Q6H PRN  vancomycin, ,   vancomycin, , PRN    Calorie Containing IV Medications: no significant kcals from medications at this time    Recent Labs   Lab 07/09/24  0506 07/09/24  0725 07/10/24  0523 07/14/24  0609 07/15/24  0434     --  138 138 140   K 4.9  --  4.2 5.0 6.0*   CALCIUM 8.3*  --  8.2* 8.8 8.7   CO2 16*  --  21* 23 26   BUN 29.3*  --  26.9* 33.6* 33.9*   CREATININE 1.26*  --  1.10* 1.21* 1.29*   EGFRNORACEVR 41  --  49 43 40   GLUCOSE 116*  --  119* 111 107   BILITOT 1.9*  --  1.8*  --   --    ALKPHOS 111  --  92  --   --    ALT 13  --  11  --   --    AST 24  --  17  --   --    ALBUMIN 2.5*  --  2.2*  --   --    PREALB  --   --  7.2*  --   --    HGBA1C  --   --  5.2  --   --    WBC  --  9.13 8.58 10.60 8.48   HGB  --  11.7* 10.9* 10.9* 10.9*   HCT  --  34.5* 32.4* 33.4* 34.5*     Nutrition Orders:  Diet NPO Except for: Medication      Appetite/Oral Intake: NPO/NPO  Factors Affecting Nutritional Intake: NPO  Social Needs Impacting Access to Food: unable to assess at this time; will attempt on follow-up  Food/Gnosticism/Cultural Preferences: unable to obtain  Food Allergies: no known food allergies  Last Bowel Movement: 07/15/24  Wound(s):  Wound 07/02/24 2230 Skin Tear Left anterior;proximal;upper Arm-Tissue loss description: Full thickness     Comments    7/15:  Pt in surgery during rounds.  Pt transferred from rehab unit 2/2 L hip fx status.  Prior RD recorded pt with good appetite and intake during rehab stay.  New labs / meds reviewed - monitor  "renal indices    Anthropometrics    Height: 5' 4" (162.6 cm),    Last Weight: 82.9 kg (182 lb 12.2 oz) (24 1453), Weight Method: Standard Scale  BMI (Calculated): 31.4  BMI Classification: obese grade I (BMI 30-34.9)     Ideal Body Weight (IBW), Female: 120 lb     % Ideal Body Weight, Female (lb): 152.3 %                    Usual Body Weight (UBW), k.5 kg  % Usual Body Weight: 117.83     Usual Weight Provided By: EMR weight history    Wt Readings from Last 5 Encounters:   24 82.9 kg (182 lb 12.2 oz)   24 82.9 kg (182 lb 12.2 oz)   24 70.3 kg (155 lb)   23 67 kg (147 lb 12.8 oz)   23 71.1 kg (156 lb 12.8 oz)     Weight Change(s) Since Admission: new admit -- wt up significantly from rehab weights - monitor  Wt Readings from Last 1 Encounters:   24 1453 82.9 kg (182 lb 12.2 oz)   24 1452 82.9 kg (182 lb 12.2 oz)   Admit Weight: 82.9 kg (182 lb 12.2 oz) (24 1452), Weight Method: Standard Scale    Estimated Needs    Weight Used For Calorie Calculations: 82.9 kg (182 lb 12.2 oz)  Energy Calorie Requirements (kcal): 3991-9052 kcal/d (MSJ x 1.3 sf)  Energy Need Method: [facility]  Weight Used For Protein Calculations: 54.4 kg (120 lb) (IBW used for calculation)  Protein Requirements: 82 g Pro / d (1.5 g/kg IBW)  Fluid Requirements (mL): 2000 ml/d (25 ml/kg)        Enteral Nutrition     Patient not receiving enteral nutrition at this time.    Parenteral Nutrition     Patient not receiving parenteral nutrition support at this time.    Evaluation of Received Nutrient Intake    Calories: not meeting estimated needs  Protein: not meeting estimated needs    Patient Education     Not applicable.    Nutrition Diagnosis     PES: Inadequate oral intake related to acute illness as evidenced by NPO status for surgery. (new)       Nutrition Interventions     Intervention(s): general/healthful diet and collaboration with other providers    Goal: Meet greater than 80% of " nutritional needs by follow-up. (new)      Nutrition Goals & Monitoring     Dietitian will monitor: food and beverage intake, weight change, and electrolyte/renal panel  Discharge planning: too early to determine; pending clinical course  Nutrition Risk/Follow-Up: low (follow-up in 5-7 days)   Please consult if re-assessment needed sooner.

## 2024-07-15 NOTE — PT/OT/SLP DISCHARGE
Recreational Therapy Discharge      Date of Treatment: 0713/2024  Start Time:   Stop Time:   Total Time:   Missed Time:      Assessment      Homa Jaquez is a 88 y.o. female admitted with a medical diagnosis of Intertrochanteric fracture of left femur.  She presents with the following impairments/functional limitations:  weakness, impaired functional mobility, gait instability, decreased lower extremity function, impaired endurance, decreased upper extremity function .    Rehab Diagnosis:     Recent Surgery:     General Precautions: Standard, fall     Orthopedic Precautions:LLE non weight bearing (Slideboard t/fs only per NP Micaela; awaiting ortho recommendations.)     Braces: N/A    Rehab Prognosis: Fair; patient would benefit from acute skilled Recreational Therapy services to address these deficits and reach maximum level of function.      Impairments: Endurance deficits, Mobility deficits, Pain, and Strength deficits  Rehab Potential: Fair, due to: Severity of impairment   Treatment Recommendations: Complete discharge plan  Treatment Diagnosis: Fall, closed L intertrochanteric femur fx, severe comminution, s/p IM nailing, a-fib, GERD, chronic cystilis, UTI, Graves Disease, HTN, spinal stenosis, hypothyroidism  Orientation: Oriented x4  Affect/Behavior: Distracted  Safety/Judgement: intact   Basic Command Following: intact  Spiritual Cultural: no        History     Past Medical History:   Diagnosis Date    A-fib     Chronic cystitis     GERD (gastroesophageal reflux disease)     Graves disease     Hypertension     Hypothyroidism, unspecified     Spinal stenosis        Past Surgical History:   Procedure Laterality Date    APPENDECTOMY      BLADDER SUSPENSION      CARDIOVERSION  04/01/2015    CATARACT EXTRACTION      HYSTERECTOMY      INTRAMEDULLARY RODDING OF FEMUR Left 7/3/2024    Procedure: INSERTION, INTRAMEDULLARY MELANIE, FEMUR;  Surgeon: Steve Pierce DO;  Location: [facility];  Service: Orthopedics;   "Laterality: Left;  supine hana table c arm synthes    LAPAROSCOPIC CHOLECYSTECTOMY  01/12/2016    SPHINCTEROTOMY OF URETHRA  01/11/2016    TONSILLECTOMY AND ADENOIDECTOMY         Home Environment     Admit Date: 07/10/24  Living Situation  People in Home: spouse  Lives in: apartment  Patients Responsibilities: Leisure/play/hobbies  Number of Children: 5  Occupation:Retired: School Nurse    Instrumental Activities of Daily Living     Previous Hand Dominance: Right Current Hand Dominance: Right     Other iADL Information:        Cognitive Skills Building         Cognitive Observation Activity Assist Position Equipment Response            Comment:      Dynamic Activities      Activity Assist Position Equipment Response           Comment:        Fine Motor Activities      Activity Assist Position Equipment Response           Comment:        Goals     Patient Goals  Patient Goal 1: "I want to walk again."    Short Term Goals    Goal  Goal Status   Will increase activity tolerance to supervision Met   Will improve dynamic sitting balance/reaching to supervision Met                 Long Term Goals    Goal Goal Status   Will increase sit to stand to supervision Progressing   Will increase standing tolerance to 5 minutes Progressing   Will improve dynamic standing balance/reaching to supervision Progressing               Plan       Patient to be seen: Daily  Duration: 1 day.  Pt was transferred to Medical Floor due to pending surgery  Treatments planned: Energy conservation training  Treatment plan/goals established with Patient/Caregiver: Yes     "

## 2024-07-15 NOTE — NURSING
Nurses Note -- 4 Eyes      7/15/2024   6:49 AM      Skin assessed during: Q Shift Change      [] No Altered Skin Integrity Present    []Prevention Measures Documented      [x] Yes- Altered Skin Integrity Present or Discovered   [x] LDA Added if Not in Epic (Describe Wound)   [] New Altered Skin Integrity was Present on Admit and Documented in LDA   [] Wound Image Taken    Wound Care Consulted? Yes    Attending Nurse:  Deanna Quiroz RN/Staff Member:  Jessica

## 2024-07-15 NOTE — ANESTHESIA PROCEDURE NOTES
Intubation    Date/Time: 7/15/2024 1:41 PM    Performed by: Ruthy Irene CRNA  Authorized by: Anthony Gustafson MD    Intubation:     Induction:  Intravenous    Intubated:  Postinduction    Mask Ventilation:  Easy mask    Attempts:  1    Attempted By:  CRNA    Method of Intubation:  Direct    Blade:  Enamorado 2    Laryngeal View Grade: Grade IIA - cords partially seen      Difficult Airway Encountered?: No      Complications:  None    Airway Device:  Oral endotracheal tube    Airway Device Size:  7.0    Style/Cuff Inflation:  Cuffed (inflated to minimal occlusive pressure)    Inflation Amount (mL):  6    Tube secured:  22    Secured at:  The lips    Placement Verified By:  Capnometry    Complicating Factors:  None    Findings Post-Intubation:  BS equal bilateral and atraumatic/condition of teeth unchanged

## 2024-07-15 NOTE — PROGRESS NOTES
[facility]  [facility] Medicine Progress Note      Patient Name: Homa Jaquez  MRN: [id number]  Admission Date: 7/13/2024   Length of Stay: 2  Attending Physician: Olvin Huber MD  Primary Care Provider: Franklyn Brito MD  Face-to-Face encounter date: 07/15/2024    Code Status: Prior        Chief Complaint:   Hip pain        HPI:   Homa Jaquez is a 88 y.o. female who  has a past medical history of A-fib, Chronic cystitis, GERD (gastroesophageal reflux disease), Graves disease, Hypertension, Hypothyroidism, unspecified, and Spinal stenosis.. The patient presented to [facility] on 7/13/2024 with a primary complaint of left hip pain.  Pt with a left femur fracture after a fall 7/2, underwent IMN 7/3 and [facility]  She reported increasing pain to hip, xrays showing failed intertrochanteric femur fracture.  Pt transferred to [facility] for revision on Monday.  She is currently comfortable no complaints     Overview/Hospital Course:  No notes on file       Interval Hx:   The pt went to OR today. She has no c/o. Family at bedside.    Review of Systems   All other systems reviewed and are negative.      Objective/physical exam:  General: In no acute distress, afebrile  Chest: Clear to auscultation bilaterally  Heart: irreg  Abdomen: Soft, nontender, BS +  MSK:s/p left hip surgery  Neurologic: no focal deficits     VITAL SIGNS: 24 HRS MIN & MAX LAST   Temp  Min: 97.3 °F (36.3 °C)  Max: 98.2 °F (36.8 °C) 98.2 °F (36.8 °C)   BP  Min: 109/78  Max: 182/125 109/78   Pulse  Min: 67  Max: 125  102   Resp  Min: 15  Max: 27 16   SpO2  Min: 89 %  Max: 100 % (!) 89 %       Recent Labs   Lab 07/10/24  0523 07/14/24  0609 07/15/24  0434 07/15/24  1600   WBC 8.58 10.60 8.48  --    RBC 3.34* 3.31* 3.38*  --    HGB 10.9* 10.9* 10.9* 10.1*   HCT 32.4* 33.4* 34.5* 31.5*   MCV 97.0* 100.9* 102.1*  --    MCH 32.6* 32.9* 32.2*  --    MCHC 33.6 32.6* 31.6*  --    RDW 16.8  16.8 17.1*  --     294 304  --    MPV 9.5 9.8 9.6  --        Recent Labs   Lab 07/09/24  0506 07/10/24  0523 07/14/24  0609 07/15/24  0434    138 138 140   K 4.9 4.2 5.0 6.0*   * 110* 109* 109*   CO2 16* 21* 23 26   BUN 29.3* 26.9* 33.6* 33.9*   CREATININE 1.26* 1.10* 1.21* 1.29*   CALCIUM 8.3* 8.2* 8.8 8.7   ALBUMIN 2.5* 2.2*  --   --    ALKPHOS 111 92  --   --    ALT 13 11  --   --    AST 24 17  --   --    BILITOT 1.9* 1.8*  --   --         Microbiology Results (last 7 days)       Procedure Component Value Units Date/Time    Tissue Culture - Aerobic [[id number]] Collected: 07/15/24 1255    Order Status: Sent Specimen: Tissue from Hip, Left Updated: 07/15/24 1455    Fungal Culture [[id number]] Collected: 07/15/24 1255    Order Status: Sent Specimen: Tissue from Hip, Left Updated: 07/15/24 1455    Gram Stain [[id number]] Collected: 07/15/24 1255    Order Status: Sent Specimen: Tissue from Hip, Left Updated: 07/15/24 1455    Anaerobic Culture [[id number]] Collected: 07/15/24 1255    Order Status: Sent Specimen: Tissue from Hip, Left Updated: 07/15/24 1455    Tissue Culture - Aerobic [[id number]] Collected: 07/15/24 1256    Order Status: Sent Specimen: Tissue from Hip, Left Updated: 07/15/24 1455    Fungal Culture [[id number]] Collected: 07/15/24 1256    Order Status: Sent Specimen: Tissue from Hip, Left Updated: 07/15/24 1455    Gram Stain [[id number]] Collected: 07/15/24 1256    Order Status: Sent Specimen: Tissue from Hip, Left Updated: 07/15/24 1455    Anaerobic Culture [[id number]] Collected: 07/15/24 1256    Order Status: Sent Specimen: Tissue from Hip, Left Updated: 07/15/24 1455    Fungal Culture [[id number]]     Order Status: Sent Specimen: Tissue from Hip, Left     Gram Stain [[id number]]     Order Status: Sent Specimen: Tissue from Hip, Left     Anaerobic Culture [[id number]]     Order Status: Sent Specimen: Tissue from Hip, Left     Tissue Culture - Aerobic [[id number]]     Order Status:  Sent Specimen: Tissue from Hip, Left     Fungal Culture [[id number]]     Order Status: Sent Specimen: Tissue from Hip, Left     Gram Stain [[id number]]     Order Status: Sent Specimen: Tissue from Hip, Left     Anaerobic Culture [[id number]]     Order Status: Sent Specimen: Tissue from Hip, Left     Tissue Culture - Aerobic [[id number]]     Order Status: Sent Specimen: Tissue from Hip, Left              Radiology:  X-Ray Hip 2 or 3 views Left with Pelvis when performed  Narrative: EXAMINATION:  XR HIP WITH PELVIS WHEN PERFORMED 2 OR 3 VIEWS LEFT    CLINICAL HISTORY:  Postop.    TECHNIQUE:  One view    COMPARISON:  July 2, 2024.    FINDINGS:  Interval placement of left arthroplasty which is in satisfactory position and alignment.  Postsurgical soft tissue inflammation.  Skin staples.  Impression: As above.    Electronically signed by: Omid Rodriguez  Date:    07/15/2024  Time:    18:53      Scheduled Med:   acetaminophen        acetaminophen  1,000 mg Intravenous Once    acetaminophen  500 mg Oral 6 times per day    allopurinol  100 mg Oral Daily    atorvastatin  20 mg Oral Daily    [START ON 7/18/2024] bisacodyL  10 mg Rectal Daily    carvediloL  12.5 mg Oral BID    ceFAZolin (Ancef) IV (PEDS and ADULTS)  2 g Intravenous Q6H    docusate sodium  100 mg Oral BID    [START ON 7/16/2024] enoxparin  40 mg Subcutaneous Daily    HYDROmorphone (PF)        ketorolac  15 mg Intravenous Q6H    levothyroxine  125 mcg Oral Before breakfast    menthol-zinc oxide   Topical (Top) BID    metoclopramide  10 mg Intravenous Q6H    mupirocin   Nasal BID    polyethylene glycol  17 g Oral BID    sacubitriL-valsartan  1 tablet Oral BID    senna-docusate 8.6-50 mg  2 tablet Oral QHS    tramadol  50 mg Oral TID AC    vancomycin            Continuous Infusions:   0.9% NaCl   Intravenous Continuous            PRN Meds:    Current Facility-Administered Medications:     acetaminophen, , ,     aluminum-magnesium hydroxide-simethicone, 30 mL, Oral,  Q6H PRN    benzonatate, 100 mg, Oral, TID PRN    dextrose 10%, 12.5 g, Intravenous, PRN    dextrose 10%, 25 g, Intravenous, PRN    glucagon (human recombinant), 1 mg, Intramuscular, PRN    glucagon (human recombinant), 1 mg, Intramuscular, PRN    glucose, 16 g, Oral, PRN    glucose, 24 g, Oral, PRN    hydrALAZINE, 10 mg, Intravenous, Q4H PRN    HYDROmorphone (PF), , ,     HYDROmorphone, 0.4 mg, Intravenous, Q5 Min PRN    hydrOXYzine pamoate, 50 mg, Oral, Nightly PRN    insulin aspart U-100, 0-5 Units, Subcutaneous, QID (AC + HS) PRN    labetalol, 10 mg, Intravenous, Q4H PRN    lactulose, 20 g, Oral, Q6H PRN    melatonin, 6 mg, Oral, Nightly PRN    methocarbamoL, 500 mg, Oral, Q8H PRN    metoprolol, 10 mg, Intravenous, Q2H PRN    morphine, 2 mg, Intravenous, Q3H PRN    nitroGLYCERIN, 0.4 mg, Sublingual, Q5 Min PRN    ondansetron, 4 mg, Oral, Q6H PRN    ondansetron, 4 mg, Intravenous, Q8H PRN    ondansetron, 4 mg, Intravenous, Once PRN    ondansetron, 4 mg, Intravenous, Q6H PRN    traMADoL, 50 mg, Oral, Q4H PRN    vancomycin, , ,      Nutrition Status:      Assessment/Plan:  Left femur IMN 7/3, failed IMN  Dilated cardiomyopathy  A fib  Anemia 2u prbc 7/8  Htn  Hld  Gout  Dm  Constipation  Hypothyroid  Hyperkalemia     Plan  Repeat K  resume coumadin once ok with ortho  Monitor inr  Ortho following       Dvt proph: coumadin, scd                All diagnosis and differential diagnosis have been reviewed; assessment and plan has been documented; I have personally reviewed the labs and test results that are presently available; I have reviewed the patients medication list; I have reviewed the consulting providers response and recommendations. I have reviewed or attempted to review medical records based upon their availability      _____________________________________________________________________            Olvin Huber MD   07/15/2024

## 2024-07-16 LAB
ABO + RH BLD: NORMAL
ABO + RH BLD: NORMAL
ANION GAP SERPL CALC-SCNC: 11 MEQ/L
ANION GAP SERPL CALC-SCNC: 8 MEQ/L
BACTERIA #/AREA URNS AUTO: NORMAL /HPF
BILIRUB UR QL STRIP.AUTO: NEGATIVE
BLD PROD TYP BPU: NORMAL
BLD PROD TYP BPU: NORMAL
BLOOD UNIT EXPIRATION DATE: NORMAL
BLOOD UNIT EXPIRATION DATE: NORMAL
BLOOD UNIT TYPE CODE: 6200
BLOOD UNIT TYPE CODE: 6200
BUN SERPL-MCNC: 38.9 MG/DL (ref 9.8–20.1)
BUN SERPL-MCNC: 40.2 MG/DL (ref 9.8–20.1)
BUN SERPL-MCNC: 41 MG/DL (ref 9.8–20.1)
BUN SERPL-MCNC: 41.6 MG/DL (ref 9.8–20.1)
CALCIUM SERPL-MCNC: 7.6 MG/DL (ref 8.4–10.2)
CALCIUM SERPL-MCNC: 7.8 MG/DL (ref 8.4–10.2)
CALCIUM SERPL-MCNC: 8.2 MG/DL (ref 8.4–10.2)
CALCIUM SERPL-MCNC: 8.3 MG/DL (ref 8.4–10.2)
CHLORIDE SERPL-SCNC: 106 MMOL/L (ref 98–107)
CHLORIDE SERPL-SCNC: 107 MMOL/L (ref 98–107)
CHLORIDE SERPL-SCNC: 107 MMOL/L (ref 98–107)
CHLORIDE SERPL-SCNC: 108 MMOL/L (ref 98–107)
CLARITY UR: ABNORMAL
CO2 SERPL-SCNC: 17 MMOL/L (ref 23–31)
CO2 SERPL-SCNC: 19 MMOL/L (ref 23–31)
CO2 SERPL-SCNC: 21 MMOL/L (ref 23–31)
CO2 SERPL-SCNC: 22 MMOL/L (ref 23–31)
COLOR UR AUTO: ABNORMAL
CREAT SERPL-MCNC: 1.72 MG/DL (ref 0.55–1.02)
CREAT SERPL-MCNC: 1.88 MG/DL (ref 0.55–1.02)
CREAT SERPL-MCNC: 1.96 MG/DL (ref 0.55–1.02)
CREAT SERPL-MCNC: 2.09 MG/DL (ref 0.55–1.02)
CREAT/UREA NIT SERPL: 20
CREAT/UREA NIT SERPL: 21
CREAT/UREA NIT SERPL: 21
CREAT/UREA NIT SERPL: 23
CROSSMATCH INTERPRETATION: NORMAL
CROSSMATCH INTERPRETATION: NORMAL
DISPENSE STATUS: NORMAL
DISPENSE STATUS: NORMAL
ERYTHROCYTE [DISTWIDTH] IN BLOOD BY AUTOMATED COUNT: 16.8 % (ref 11.5–17)
GFR SERPLBLD CREATININE-BSD FMLA CKD-EPI: 23 ML/MIN/1.73/M2
GFR SERPLBLD CREATININE-BSD FMLA CKD-EPI: 24 ML/MIN/1.73/M2
GFR SERPLBLD CREATININE-BSD FMLA CKD-EPI: 26 ML/MIN/1.73/M2
GFR SERPLBLD CREATININE-BSD FMLA CKD-EPI: 29 ML/MIN/1.73/M2
GLUCOSE SERPL-MCNC: 161 MG/DL (ref 82–115)
GLUCOSE SERPL-MCNC: 164 MG/DL (ref 82–115)
GLUCOSE SERPL-MCNC: 211 MG/DL (ref 82–115)
GLUCOSE SERPL-MCNC: 355 MG/DL (ref 82–115)
GLUCOSE UR QL STRIP: NEGATIVE
HCT VFR BLD AUTO: 24.8 % (ref 37–47)
HCT VFR BLD AUTO: 27.9 % (ref 37–47)
HGB BLD-MCNC: 7.7 G/DL (ref 12–16)
HGB BLD-MCNC: 8.9 G/DL (ref 12–16)
HGB UR QL STRIP: NEGATIVE
INR PPP: 2.5 (ref 2–3)
KETONES UR QL STRIP: 15
LEUKOCYTE ESTERASE UR QL STRIP: ABNORMAL
MCH RBC QN AUTO: 32.6 PG (ref 27–31)
MCHC RBC AUTO-ENTMCNC: 31.9 G/DL (ref 33–36)
MCV RBC AUTO: 102.2 FL (ref 80–94)
NITRITE UR QL STRIP: NEGATIVE
NRBC BLD AUTO-RTO: 0 %
OHS QRS DURATION: 122 MS
OHS QTC CALCULATION: 504 MS
PH UR STRIP: 6 [PH]
PLATELET # BLD AUTO: 289 X10(3)/MCL (ref 130–400)
PMV BLD AUTO: 9.9 FL (ref 7.4–10.4)
POCT GLUCOSE: 276 MG/DL (ref 70–110)
POTASSIUM SERPL-SCNC: 5.1 MMOL/L (ref 3.5–5.1)
POTASSIUM SERPL-SCNC: 5.3 MMOL/L (ref 3.5–5.1)
POTASSIUM SERPL-SCNC: 5.7 MMOL/L (ref 3.5–5.1)
POTASSIUM SERPL-SCNC: 6.9 MMOL/L (ref 3.5–5.1)
PROT UR QL STRIP: NEGATIVE
PROTHROMBIN TIME: 27.9 SECONDS (ref 11.7–14.5)
RBC # BLD AUTO: 2.73 X10(6)/MCL (ref 4.2–5.4)
RBC #/AREA URNS AUTO: NORMAL /HPF
SODIUM SERPL-SCNC: 133 MMOL/L (ref 136–145)
SODIUM SERPL-SCNC: 135 MMOL/L (ref 136–145)
SODIUM SERPL-SCNC: 136 MMOL/L (ref 136–145)
SODIUM SERPL-SCNC: 138 MMOL/L (ref 136–145)
SP GR UR STRIP.AUTO: 1.01 (ref 1–1.03)
SQUAMOUS #/AREA URNS AUTO: NORMAL /HPF
UNIT NUMBER: NORMAL
UNIT NUMBER: NORMAL
UROBILINOGEN UR STRIP-ACNC: 0.2
WBC # BLD AUTO: 18.28 X10(3)/MCL (ref 4.5–11.5)
WBC #/AREA URNS AUTO: NORMAL /HPF

## 2024-07-16 PROCEDURE — 94644 CONT INHLJ TX 1ST HOUR: CPT

## 2024-07-16 PROCEDURE — 85014 HEMATOCRIT: CPT | Performed by: INTERNAL MEDICINE

## 2024-07-16 PROCEDURE — 85610 PROTHROMBIN TIME: CPT | Performed by: INTERNAL MEDICINE

## 2024-07-16 PROCEDURE — 94799 UNLISTED PULMONARY SVC/PX: CPT

## 2024-07-16 PROCEDURE — 93005 ELECTROCARDIOGRAM TRACING: CPT

## 2024-07-16 PROCEDURE — 21400001 HC TELEMETRY ROOM

## 2024-07-16 PROCEDURE — 25000242 PHARM REV CODE 250 ALT 637 W/ HCPCS: Performed by: INTERNAL MEDICINE

## 2024-07-16 PROCEDURE — 85027 COMPLETE CBC AUTOMATED: CPT | Performed by: ORTHOPAEDIC SURGERY

## 2024-07-16 PROCEDURE — 97162 PT EVAL MOD COMPLEX 30 MIN: CPT

## 2024-07-16 PROCEDURE — 36415 COLL VENOUS BLD VENIPUNCTURE: CPT | Performed by: INTERNAL MEDICINE

## 2024-07-16 PROCEDURE — 63600175 PHARM REV CODE 636 W HCPCS: Performed by: ORTHOPAEDIC SURGERY

## 2024-07-16 PROCEDURE — 27200966 HC CLOSED SUCTION SYSTEM

## 2024-07-16 PROCEDURE — 25000003 PHARM REV CODE 250: Performed by: ORTHOPAEDIC SURGERY

## 2024-07-16 PROCEDURE — 80048 BASIC METABOLIC PNL TOTAL CA: CPT | Performed by: ORTHOPAEDIC SURGERY

## 2024-07-16 PROCEDURE — 93010 ELECTROCARDIOGRAM REPORT: CPT | Mod: ,,, | Performed by: INTERNAL MEDICINE

## 2024-07-16 PROCEDURE — 63600175 PHARM REV CODE 636 W HCPCS: Performed by: INTERNAL MEDICINE

## 2024-07-16 PROCEDURE — 25000003 PHARM REV CODE 250

## 2024-07-16 PROCEDURE — 25000003 PHARM REV CODE 250: Performed by: INTERNAL MEDICINE

## 2024-07-16 PROCEDURE — 81001 URINALYSIS AUTO W/SCOPE: CPT | Performed by: INTERNAL MEDICINE

## 2024-07-16 PROCEDURE — 36430 TRANSFUSION BLD/BLD COMPNT: CPT

## 2024-07-16 PROCEDURE — 94761 N-INVAS EAR/PLS OXIMETRY MLT: CPT

## 2024-07-16 PROCEDURE — 31500 INSERT EMERGENCY AIRWAY: CPT

## 2024-07-16 PROCEDURE — P9016 RBC LEUKOCYTES REDUCED: HCPCS | Performed by: ORTHOPAEDIC SURGERY

## 2024-07-16 PROCEDURE — 36415 COLL VENOUS BLD VENIPUNCTURE: CPT | Performed by: ORTHOPAEDIC SURGERY

## 2024-07-16 PROCEDURE — 99900031 HC PATIENT EDUCATION (STAT)

## 2024-07-16 PROCEDURE — 80048 BASIC METABOLIC PNL TOTAL CA: CPT | Performed by: INTERNAL MEDICINE

## 2024-07-16 PROCEDURE — 99900035 HC TECH TIME PER 15 MIN (STAT)

## 2024-07-16 RX ORDER — METOCLOPRAMIDE HYDROCHLORIDE 5 MG/ML
5 INJECTION INTRAMUSCULAR; INTRAVENOUS EVERY 6 HOURS
Status: COMPLETED | OUTPATIENT
Start: 2024-07-16 | End: 2024-07-16

## 2024-07-16 RX ORDER — HYDROCODONE BITARTRATE AND ACETAMINOPHEN 500; 5 MG/1; MG/1
TABLET ORAL
Status: DISCONTINUED | OUTPATIENT
Start: 2024-07-16 | End: 2024-07-21

## 2024-07-16 RX ORDER — ALBUTEROL SULFATE 0.83 MG/ML
1.25 SOLUTION RESPIRATORY (INHALATION) CONTINUOUS
Status: DISCONTINUED | OUTPATIENT
Start: 2024-07-16 | End: 2024-07-16

## 2024-07-16 RX ORDER — ALBUTEROL SULFATE 90 UG/1
2 AEROSOL, METERED RESPIRATORY (INHALATION) EVERY 8 HOURS
Status: DISCONTINUED | OUTPATIENT
Start: 2024-07-16 | End: 2024-07-22

## 2024-07-16 RX ORDER — DEXTROSE 50 % IN WATER (D50W) INTRAVENOUS SYRINGE
Status: COMPLETED
Start: 2024-07-16 | End: 2024-07-16

## 2024-07-16 RX ORDER — ALBUTEROL SULFATE 0.83 MG/ML
10 SOLUTION RESPIRATORY (INHALATION) ONCE
Status: COMPLETED | OUTPATIENT
Start: 2024-07-16 | End: 2024-07-16

## 2024-07-16 RX ADMIN — SODIUM ZIRCONIUM CYCLOSILICATE 10 G: 5 POWDER, FOR SUSPENSION ORAL at 03:07

## 2024-07-16 RX ADMIN — METOCLOPRAMIDE 10 MG: 5 INJECTION, SOLUTION INTRAMUSCULAR; INTRAVENOUS at 12:07

## 2024-07-16 RX ADMIN — KETOROLAC TROMETHAMINE 15 MG: 30 INJECTION, SOLUTION INTRAMUSCULAR at 05:07

## 2024-07-16 RX ADMIN — DEXTROSE MONOHYDRATE 50 G: 25 INJECTION, SOLUTION INTRAVENOUS at 08:07

## 2024-07-16 RX ADMIN — SODIUM BICARBONATE: 84 INJECTION, SOLUTION INTRAVENOUS at 08:07

## 2024-07-16 RX ADMIN — CARVEDILOL 12.5 MG: 6.25 TABLET, FILM COATED ORAL at 08:07

## 2024-07-16 RX ADMIN — ACETAMINOPHEN 500 MG: 500 TABLET ORAL at 08:07

## 2024-07-16 RX ADMIN — LEVOTHYROXINE SODIUM 125 MCG: 125 TABLET ORAL at 05:07

## 2024-07-16 RX ADMIN — ALBUTEROL SULFATE 2 PUFF: 90 AEROSOL, METERED RESPIRATORY (INHALATION) at 04:07

## 2024-07-16 RX ADMIN — METOCLOPRAMIDE 10 MG: 5 INJECTION, SOLUTION INTRAMUSCULAR; INTRAVENOUS at 05:07

## 2024-07-16 RX ADMIN — SODIUM ZIRCONIUM CYCLOSILICATE 10 G: 5 POWDER, FOR SUSPENSION ORAL at 09:07

## 2024-07-16 RX ADMIN — POLYETHYLENE GLYCOL 3350 17 G: 17 POWDER, FOR SOLUTION ORAL at 08:07

## 2024-07-16 RX ADMIN — TRAMADOL HYDROCHLORIDE 50 MG: 50 TABLET, COATED ORAL at 09:07

## 2024-07-16 RX ADMIN — MENTHOL AND ZINC OXIDE: .44; 20.625 OINTMENT TOPICAL at 08:07

## 2024-07-16 RX ADMIN — ATORVASTATIN CALCIUM 20 MG: 10 TABLET, FILM COATED ORAL at 09:07

## 2024-07-16 RX ADMIN — DOCUSATE SODIUM 100 MG: 100 CAPSULE, LIQUID FILLED ORAL at 08:07

## 2024-07-16 RX ADMIN — ACETAMINOPHEN 500 MG: 500 TABLET ORAL at 12:07

## 2024-07-16 RX ADMIN — TRAMADOL HYDROCHLORIDE 50 MG: 50 TABLET, COATED ORAL at 04:07

## 2024-07-16 RX ADMIN — SENNOSIDES AND DOCUSATE SODIUM 2 TABLET: 50; 8.6 TABLET ORAL at 08:07

## 2024-07-16 RX ADMIN — ACETAMINOPHEN 500 MG: 500 TABLET ORAL at 09:07

## 2024-07-16 RX ADMIN — INSULIN HUMAN 10 UNITS: 100 INJECTION, SOLUTION PARENTERAL at 07:07

## 2024-07-16 RX ADMIN — METOCLOPRAMIDE 5 MG: 5 INJECTION, SOLUTION INTRAMUSCULAR; INTRAVENOUS at 12:07

## 2024-07-16 RX ADMIN — ACETAMINOPHEN 500 MG: 500 TABLET ORAL at 04:07

## 2024-07-16 RX ADMIN — ALLOPURINOL 100 MG: 100 TABLET ORAL at 09:07

## 2024-07-16 RX ADMIN — CEFAZOLIN 2 G: 2 INJECTION, POWDER, FOR SOLUTION INTRAMUSCULAR; INTRAVENOUS at 09:07

## 2024-07-16 RX ADMIN — SODIUM ZIRCONIUM CYCLOSILICATE 10 G: 5 POWDER, FOR SUSPENSION ORAL at 08:07

## 2024-07-16 RX ADMIN — DOCUSATE SODIUM 100 MG: 100 CAPSULE, LIQUID FILLED ORAL at 09:07

## 2024-07-16 RX ADMIN — CEFAZOLIN 2 G: 2 INJECTION, POWDER, FOR SOLUTION INTRAMUSCULAR; INTRAVENOUS at 12:07

## 2024-07-16 RX ADMIN — MUPIROCIN: 20 OINTMENT TOPICAL at 09:07

## 2024-07-16 RX ADMIN — TRAMADOL HYDROCHLORIDE 50 MG: 50 TABLET, COATED ORAL at 03:07

## 2024-07-16 RX ADMIN — ALBUTEROL SULFATE 10 MG: 2.5 SOLUTION RESPIRATORY (INHALATION) at 07:07

## 2024-07-16 RX ADMIN — CALCIUM GLUCONATE 1 G: 98 INJECTION, SOLUTION INTRAVENOUS at 07:07

## 2024-07-16 RX ADMIN — POLYETHYLENE GLYCOL 3350 17 G: 17 POWDER, FOR SOLUTION ORAL at 09:07

## 2024-07-16 NOTE — PROGRESS NOTES
"No acute events overnight.  Pain controlled.  Resting in bed.     Vital Signs  Temp: 97.5 °F (36.4 °C)  Temp Source: Oral  Pulse: 81  Heart Rate Source: Monitor  Resp: 20  SpO2: 97 %  Pulse Oximetry Type: Intermittent  Device (Oxygen Therapy): room air  BP: (!) 106/57  BP Location: Left arm  BP Method: Automatic  Patient Position: Lying  Height and Weight  Height: 5' 4" (162.6 cm)  Weight: 82.9 kg (182 lb 12.2 oz)  Weight Method: Standard Scale  BSA (Calculated - sq m): 1.93 sq meters  BMI (Calculated): 31.4  Weight in (lb) to have BMI = 25: 145.3]    +FHL/EHL  BCR distally  Dressing c/d/i  SILT distally    Recent Lab Results  (Last 5 results in the past 24 hours)        07/16/24  0810   07/16/24  0507   07/15/24  2137   07/15/24  2004   07/15/24  1919        Aerobic Culture - Tissue               Anion Gap 8.0   8.0       11.0       BUN 40.2  Comment: Continue until potassium is less than 5.0 mEq/L   38.9       34.7       BUN/CREAT RATIO 21   23       24       Calcium 7.6  Comment: Continue until potassium is less than 5.0 mEq/L   8.3       8.1       Chloride 107  Comment: Continue until potassium is less than 5.0 mEq/L   108       108       CO2 21  Comment: Continue until potassium is less than 5.0 mEq/L   22       19       Creatinine 1.88  Comment: Continue until potassium is less than 5.0 mEq/L   1.72       1.43       eGFR 26   29       36       Glucose 355  Comment: Continue until potassium is less than 5.0 mEq/L   164       188       GRAM STAIN               Hematocrit   27.9   32.6           Hemoglobin   8.9   10.2           INR   2.5             MCH   32.6             MCHC   31.9             MCV   102.2             MPV   9.9             nRBC   0.0             Platelet Count   289             POCT Glucose       190         Potassium 5.7  Comment: Continue until potassium is less than 5.0 mEq/L   6.9  Comment: Critical Result called and verified by verbal readback to: Spenser Navarro on 07/16/2024 at 06:41. " Reported by [id number].       6.3  Comment: Critical Result called and verified by verbal readback to: Spenser Navarro RN on 07/15/2024 at 20:00. Reported by [id number].       PT   27.9             RBC   2.73             RDW   16.8             Sodium 136  Comment: Continue until potassium is less than 5.0 mEq/L   138       138       WBC   18.28                                    A/P:  Status post conversion SILVIA  Pain controlled  Overall patient doing well.  Therapy for mobility and ambulation.  DVT PPx  Hyperkalemia - defer to hospitalist

## 2024-07-16 NOTE — PT/OT/SLP PROGRESS
Occupational Therapy      Patient Name:  Homa Jaquez   MRN:  [id number]    Patient not seen today secondary to pt not being appropriate for therapy at this time. Will follow-up tomorrow to address appropriateness for treatment.    7/16/2024

## 2024-07-16 NOTE — PT/OT/SLP PROGRESS
Physical Therapy      Patient Name:  Homa Jaquez   MRN:  [id number]    Patient not seen today secondary to pt receiving blood this PM and inappropriate to be seen at this time per NOEMY Santana and LORIN Davies. Will follow-up tomorrow AM.

## 2024-07-16 NOTE — PLAN OF CARE
07/16/24 1116   Discharge Assessment   Assessment Type Discharge Planning Assessment   Source of Information patient   If unable to respond/provide information was family/caregiver contacted? Yes   Contact Name/Number jamel TINOCO [phone]   Communicated AURELIO with patient/caregiver Yes   Reason For Admission conversion hip   People in Home child(claribel), adult   Facility Arrived From: [facility] rehab   Do you expect to return to your current living situation? Yes   Do you have help at home or someone to help you manage your care at home? Yes   Who are your caregiver(s) and their phone number(s)? jamel Tinoco [phone]   Prior to hospitilization cognitive status: Alert/Oriented   Current cognitive status: Alert/Oriented   Walking or Climbing Stairs Difficulty yes   Walking or Climbing Stairs ambulation difficulty, requires equipment   Dressing/Bathing Difficulty yes   Dressing/Bathing bathing difficulty, requires equipment;dressing difficulty, requires equipment   Home Accessibility wheelchair accessible   Equipment Currently Used at Home raised toilet;grab bar;walker, rolling;rollator;shower chair;wheelchair;walker, standard   Readmission within 30 days? No   Patient currently being followed by outpatient case management? No   Do you currently have service(s) that help you manage your care at home? No   Do you take prescription medications? Yes   Do you have prescription coverage? Yes   Do you have any problems affording any of your prescribed medications? No   Is the patient taking medications as prescribed? yes   Who is going to help you get home at discharge? SON   How do you get to doctors appointments? family or friend will provide   Are you on dialysis? No   Do you take coumadin? No   Discharge Plan A Rehab   Discharge Plan B Skilled Nursing Facility   DME Needed Upon Discharge  walker, rolling   Discharge Plan discussed with: Patient;Adult children   Transition of Care Barriers Mobility     Transferred from [facility]  rehab- hx s/p failed IM MELANIE hip. S/P Conversion Hip. Spk w pt briefly -- dcp deferred to son ( Av). Spk w Av via phone [phone] pt lives with  in mother in law suite attached to son's house ( [place]). House is handicap accessible incl wide doors, rails & grab bars. PLOF: ambulate with rolator; but independent with ADLS. Pt also have WC, RW, & standard walker. Used [facility] in past.   Discuss dcp options. Discuss rehab & snf options. Son requesting [facility] rehab reeval. Foc obtained.   If rehab denied, will f/u with son regarding snf preference.     Faxed referral to [facility] rehab. Await decision.     Contact # Av [phone].     Pcp: Dr Garfield Brito

## 2024-07-16 NOTE — PLAN OF CARE
Problem: Adult Inpatient Plan of Care  Goal: Plan of Care Review  Outcome: Progressing  Goal: Patient-Specific Goal (Individualized)  Outcome: Progressing  Goal: Absence of Hospital-Acquired Illness or Injury  Outcome: Progressing  Goal: Optimal Comfort and Wellbeing  Outcome: Progressing  Goal: Readiness for Transition of Care  Outcome: Progressing     Problem: Diabetes Comorbidity  Goal: Blood Glucose Level Within Targeted Range  Outcome: Progressing     Problem: Acute Kidney Injury/Impairment  Goal: Fluid and Electrolyte Balance  Outcome: Progressing  Goal: Improved Oral Intake  Outcome: Progressing  Goal: Effective Renal Function  Outcome: Progressing     Problem: Fall Injury Risk  Goal: Absence of Fall and Fall-Related Injury  Outcome: Progressing     Problem: Wound  Goal: Optimal Coping  Outcome: Progressing  Goal: Optimal Functional Ability  Outcome: Progressing  Goal: Absence of Infection Signs and Symptoms  Outcome: Progressing  Goal: Improved Oral Intake  Outcome: Progressing  Goal: Optimal Pain Control and Function  Outcome: Progressing  Goal: Skin Health and Integrity  Outcome: Progressing  Goal: Optimal Wound Healing  Outcome: Progressing     Problem: Comorbidity Management  Goal: Maintenance of Asthma Control  Outcome: Progressing  Goal: Maintenance of Behavioral Health Symptom Control  Outcome: Progressing  Goal: Maintenance of COPD Symptom Control  Outcome: Progressing  Goal: Maintenance of Heart Failure Symptom Control  Outcome: Progressing  Goal: Blood Pressure in Desired Range  Outcome: Progressing  Goal: Maintenance of Osteoarthritis Symptom Control  Outcome: Progressing  Goal: Bariatric Home Regimen Maintained  Outcome: Progressing  Goal: Maintenance of Seizure Control  Outcome: Progressing     Problem: Hip Arthroplasty  Goal: Optimal Coping  Outcome: Progressing  Goal: Absence of Bleeding  Outcome: Progressing  Goal: Effective Bowel Elimination  Outcome: Progressing  Goal: Fluid and  Electrolyte Balance  Outcome: Progressing  Goal: Optimal Functional Ability  Outcome: Progressing  Goal: Absence of Infection Signs and Symptoms  Outcome: Progressing  Goal: Intact Neurovascular Status  Outcome: Progressing  Goal: Anesthesia/Sedation Recovery  Outcome: Progressing  Goal: Acceptable Pain Control  Outcome: Progressing  Goal: Nausea and Vomiting Relief  Outcome: Progressing  Goal: Effective Urinary Elimination  Outcome: Progressing  Goal: Effective Oxygenation and Ventilation  Outcome: Progressing     Problem: Infection  Goal: Absence of Infection Signs and Symptoms  Outcome: Progressing

## 2024-07-16 NOTE — PROGRESS NOTES
[facility] - Orthopaedics  Wound Care    Patient Name:  Homa Jaquez   MRN:  [id number]  Date: 7/16/2024  Diagnosis: Intertrochanteric fracture of left femur    History:     Past Medical History:   Diagnosis Date    A-fib     Chronic cystitis     GERD (gastroesophageal reflux disease)     Graves disease     Hypertension     Hypothyroidism, unspecified     Spinal stenosis        Social History     Socioeconomic History    Marital status:    Tobacco Use    Smoking status: Former     Types: Cigarettes    Smokeless tobacco: Never   Substance and Sexual Activity    Alcohol use: Yes    Drug use: Never    Sexual activity: Not Currently     Social Determinants of Health     Financial Resource Strain: Low Risk  (7/4/2024)    Overall Financial Resource Strain (CARDIA)     Difficulty of Paying Living Expenses: Not hard at all   Food Insecurity: No Food Insecurity (7/4/2024)    Hunger Vital Sign     Worried About Running Out of Food in the Last Year: Never true     Ran Out of Food in the Last Year: Never true   Transportation Needs: No Transportation Needs (7/9/2024)    PRAPARE - Transportation     Lack of Transportation (Medical): No     Lack of Transportation (Non-Medical): No   Stress: No Stress Concern Present (7/4/2024)    [employer] - Occupational Stress Questionnaire     Feeling of Stress : Not at all   Housing Stability: Low Risk  (7/4/2024)    Housing Stability Vital Sign     Unable to Pay for Housing in the Last Year: No     Homeless in the Last Year: No       Precautions:     Allergies as of 07/13/2024 - Reviewed 07/13/2024   Allergen Reaction Noted    Cefadroxil  09/08/2022    Cefuroxime axetil  09/08/2022    Cephalexin  09/08/2022    Ciprofloxacin  09/08/2022    Enoxaparin  09/08/2022    Methenamine hippurate  09/08/2022    Promethazine  09/08/2022       WOC Assessment Details/Treatment      07/16/24 1200   Pain/Comfort/Sleep   Preferred Pain Scale number  (Numeric Rating Pain Scale)   Comfort/Acceptable Pain Level 2   Pain Body Location - Side Left   Pain Body Location hip   Pain Rating (0-10): Rest 0   Pain Rating (0-10): Activity 0   Pain Rating: Rest 2 - mild pain   FACES Pain Rating: Rest 2-->hurts little bit   rFLACC Pain Rating: - Face 0-->no particular expression or smile   rFLACC Pain Rating: - Legs 0-->normal position or relaxed   rFLACC Pain Rating: - Activity 0-->lying quietly, normal position, moves easily   POSS (Pasero Opioid-Induced Sed Scale) 1 - Awake and alert   Pain Reassessment   Pain Rating Prior to Med Admin 2   Cognitive   Level of Consciousness (AVPU) alert   Orientation oriented x 4   Speech clear/fluent;follows commands   Cognitive/Behavioral/Neuro   General Motor Response purposeful motor response   LUE Motor Response purposeful motor response   Left Motor Response purposeful motor response   Right Motor Response purposeful motor response   Pupils   Pupil PERRLA yes   [place] Coma Scale   Best Eye Response 4-->(E4) spontaneous   Best Motor Response 6-->(M6) obeys commands   Best Verbal Response 5-->(V5) oriented   Efren Coma Scale Score 15   HEENT   Face Symptoms symmetrical at rest, with movement and expression   ECG   Lead Monitored Lead II   Atrial Rhythm atrial fibrillation   Peripheral Neurovascular   Capillary Refill, LLE less than/equal to 3 secs   Capillary Refill, RLE less than/equal to 3 secs   All Extremities Neurovascular Assessment   General All Extremity Sensation no numbness;no tingling   General All Extremity Color pale   General All Extremity Temperature warm   LLE Neurovascular Assessment   LLE Sensation no numbness;no tingling   RLE Neurovascular Assessment   RLE Sensation no numbness;no tingling   Pulse Radial   Left Radial Pulse 2+ (normal)   Right Radial Pulse 2+ (normal)   Pulse Dorsalis Pedis   Left Dorsalis Pedis Pulse 2+ (normal)   Right Dorsalis Pedis Pulse 2+ (normal)   [REMOVED]      Peripheral IV - Single  Lumen 07/14/24 1603 20 G Anterior;Right Forearm   Removal Date/Time: 07/16/24 0900  Placement Date/Time: 07/14/24 1603   Present Prior to Hospital Arrival?: No  Size (G): 20 G  Orientation: Anterior;Right  Location: Forearm  Insertion attempts (enter comment if more than 2 attempts): 1  Removal Indic...   Site Assessment Clean;Dry;Intact   Extremity Assessment Distal to IV No abnormal discoloration;No redness;No swelling   Line Status Capped;Infusing;Flushed   Dressing Status Clean;Dry;Intact   Dressing Intervention Integrity maintained        Peripheral IV - Single Lumen 07/16/24 1000 20 G Anterior;Left Forearm   Placement Date/Time: 07/16/24 1000   Inserted by: RN  Size (G): 20 G  Orientation: Anterior;Left  Location: Forearm  Insertion attempts (enter comment if more than 2 attempts): 1  Patient Tolerance: Tolerated well   Site Assessment Clean;Dry;Intact   Extremity Assessment Distal to IV No abnormal discoloration;No redness;No swelling;No warmth   Line Status Blood return noted;Flushed;Infusing   Dressing Status Clean;Dry;Intact   Dressing Intervention Integrity maintained        Wound 07/09/24 1553 Pressure Injury Left Heel   Date First Assessed/Time First Assessed: 07/09/24 1553   Present on Original Admission: Yes  Primary Wound Type: Pressure Injury  Side: Left  Location: Heel  Wound Approximate Age at First Assessment (Weeks): 1 weeks   Appearance Red  (fading)   Care   (heel boots)   Dressing Foam  (or jacobo)   Dressing Change Due 07/21/24        Wound 07/02/24 2230 Skin Tear Left anterior;proximal;upper Arm   Date First Assessed/Time First Assessed: 07/02/24 2230   Present on Original Admission: Yes  Primary Wound Type: Skin Tear  Side: Left  Orientation: anterior;proximal;upper  Location: Arm   Wound Image    Dressing Appearance Intact;Moist drainage   Drainage Amount Small   Drainage Characteristics/Odor Sanguineous;No odor   Appearance Pink   Tissue loss description Partial thickness   Red (%), Wound  Tissue Color 100 %   Periwound Area Intact;Ecchymotic   Wound Length (cm) 0.5 cm   Wound Width (cm) 1.5 cm   Wound Depth (cm) 0.1 cm   Wound Volume (cm^3) 0.075 cm^3   Wound Surface Area (cm^2) 0.75 cm^2   Care Applied:   Dressing Foam;Non-adherent   Dressing Change Due 07/19/24        Wound 07/05/24 0840 Pressure Injury Buttocks   Date First Assessed/Time First Assessed: 07/05/24 0840   Present on Original Admission: Yes  Primary Wound Type: Pressure Injury  Location: (c) Buttocks   Appearance   (unable to turn patient due to medical concerns)   Safety   Safety Precautions emergency equipment at bedside   Safety Promotion   Safety Promotion/Fall Prevention assistive device/personal item within reach;nonskid shoes/socks when out of bed;instructed to call staff for mobility   Safety Management   Patient Rounds bed in low position;bed wheels locked;call light in patient/parent reach;clutter free environment maintained;ID band on;placement of personal items at bedside;toileting offered;visualized patient   Safety Bands on Patient Fall Risk Band   Positioning   Body Position position maintained   Head of Bed (HOB) Positioning HOB at 30-45 degrees   Left arm skin tear: applied Vaseline gauze + foam for q 3 day change.    Buttocks: unable to see due to hemodynamic instability. Turn q 2 hours. Apply bordered foam q 3 for any open areas.  Will see buttocks later.    Left heel SDTI:  fading. Continue with heel boots. May leave jacobo or apply bordered foam q 5 days.    .07/16/2024

## 2024-07-16 NOTE — PT/OT/SLP PROGRESS
Physical Therapy      Patient Name:  Homa Jaquez   MRN:  [id number]    Patient not seen today secondary to nursing hold, receiving blood . Will follow-up when schedule allows.

## 2024-07-16 NOTE — PT/OT/SLP EVAL
Physical Therapy Evaluation    Patient Name:  Homa Jaquez   MRN:  [id number]    Recommendations:     Discharge Recommendations: Moderate Intensity Therapy (Mod-High pending progress)   Discharge Equipment Recommendations: walker, rolling   Barriers to discharge:  impaired functional mobility, low BP    Assessment:     Homa Jaquez is a 88 y.o. female admitted with a medical diagnosis of Intertrochanteric fracture of left femur.  She presents with the following impairments/functional limitations: weakness, impaired endurance, impaired self care skills, impaired functional mobility, decreased lower extremity function, decreased upper extremity function, pain, decreased ROM, edema, orthopedic precautions .    Rehab Prognosis: Fair; patient would benefit from acute skilled PT services to address these deficits and reach maximum level of function.    Recent Surgery: Procedure(s) (LRB):  CONVERSION ARTHROPLASTY, HIP, POSTERIOR APPROACH - valencia, cell saver, pathology (Left) 1 Day Post-Op    Plan:     During this hospitalization, patient to be seen BID to address the identified rehab impairments via gait training, therapeutic activities, therapeutic exercises and progress toward the following goals:    Plan of Care Expires:  07/22/24    Subjective     Chief Complaint: L hi pain  Patient/Family Comments/goals:   Pain/Comfort:  Location - Side 1: Left  Location 1: leg    Patients cultural, spiritual, Zoroastrianism conflicts given the current situation:      Living Environment:  Pt lives in two story home with son and , she is able to live on first floor with a ramp to enter.  Prior to admission, patients level of function was mod ind.  Equipment used at home: rollator.  DME owned (not currently used): none.  Upon discharge, patient will have assistance from possibly family.    Objective:     Communicated with nurse prior to session.  Patient found supine with peripheral IV, telemetry  upon PT entry to  room.    General Precautions: Standard, fall  Orthopedic Precautions:LLE weight bearing as tolerated   Braces:    Respiratory Status: Room air    Exams:  RLE ROM: WFL  RLE Strength: WFL  LLE ROM: NT dt sx side  LLE Strength: NT dt sx side    Functional Mobility:  Bed Mobility:     Supine to Sit: minimum assistance and of 2 persons; pt required assistance with moving LLE and sitting upright at edge of bed  Sit to Supine: minimum assistance and of 2 persons; pt became dizzy/weak at EOB, BP: 80/46, HR: 87 returned pt to supine; BP: 100/59, HR: 76, symptoms slightly improved but remained present; NSG notified         Treatment & Education:  Pt edu on total hip precautions, WB status, and importance of frequent mobility    Patient left supine with all lines intact, call button in reach, and nurse notified.    GOALS:   Multidisciplinary Problems       Physical Therapy Goals          Problem: Physical Therapy    Goal Priority Disciplines Outcome Goal Variances Interventions   Physical Therapy Goal     PT, PT/OT Progressing     Description: Pt will improve functional independence by performing:    Bed mobility: CGA  Sit to stand: Min A  with rolling walker  Bed to chair t/f: Min A  with Stand Step  with rolling walker  Ambulation x 150' with CGA with rolling walker  Ramp : CGA                       History:     Past Medical History:   Diagnosis Date    A-fib     Chronic cystitis     GERD (gastroesophageal reflux disease)     Graves disease     Hypertension     Hypothyroidism, unspecified     Spinal stenosis        Past Surgical History:   Procedure Laterality Date    APPENDECTOMY      BLADDER SUSPENSION      CARDIOVERSION  04/01/2015    CATARACT EXTRACTION      CONVERSION ARTHROPLASTY, HIP, POSTERIOR APPROACH Left 7/15/2024    Procedure: CONVERSION ARTHROPLASTY, HIP, POSTERIOR APPROACH - valencia, cell saver, pathology;  Surgeon: Jonny Bains MD;  Location: [facility];  Service: Orthopedics;  Laterality: Left;  valencia, cell  saver, pathology    HYSTERECTOMY      INTRAMEDULLARY RODDING OF FEMUR Left 7/3/2024    Procedure: INSERTION, INTRAMEDULLARY MELANIE, FEMUR;  Surgeon: Steve Piecre DO;  Location: [facility];  Service: Orthopedics;  Laterality: Left;  supine hana table c arm synthes    LAPAROSCOPIC CHOLECYSTECTOMY  01/12/2016    SPHINCTEROTOMY OF URETHRA  01/11/2016    TONSILLECTOMY AND ADENOIDECTOMY         Time Tracking:     PT Received On:    PT Start Time: 1106     PT Stop Time: 1136  PT Total Time (min): 30 min     Billable Minutes: Evaluation 30 07/16/2024

## 2024-07-16 NOTE — PROGRESS NOTES
[facility] ORTHOPAEDIC  [facility] Medicine Progress Note      Patient Name: Homa Jaquez  MRN: [id number]  Admission Date: 7/13/2024   Length of Stay: 3  Attending Physician: Olvin Huber MD  Primary Care Provider: Franklyn Brito MD  Face-to-Face encounter date: 07/16/2024    Code Status: Prior        Chief Complaint:   Hip pain        HPI:   Homa Jaquez is a 88 y.o. female who  has a past medical history of A-fib, Chronic cystitis, GERD (gastroesophageal reflux disease), Graves disease, Hypertension, Hypothyroidism, unspecified, and Spinal stenosis.. The patient presented to [facility] on 7/13/2024 with a primary complaint of left hip pain.  Pt with a left femur fracture after a fall 7/2, underwent IMN 7/3 and transferred to [facility] 7/9.  She reported increasing pain to hip, xrays showing failed intertrochanteric femur fracture.  Pt transferred to [facility] for revision on Monday.  She is currently comfortable no complaints     Overview/Hospital Course:  No notes on file       Interval Hx:   The pt c/o weakness. She denies cp or dyspnea.  Her bp has been low and her bp meds were held. Her potassium level was elevated and was treated per protocol and has improved. She is anemic and a blood transfusion was ordered. I updated the pt and her son on the treatment plan.     Review of Systems   All other systems reviewed and are negative.      Objective/physical exam:  General: In no acute distress, afebrile  Chest: Clear to auscultation bilaterally  Heart: irreg, irreg, no rubs or gallops, no murmur  Abdomen: Soft, nontender, BS +  MSK:s/p left hip surgery  Neurologic: no focal deficits, A&Ox3  Psych: Calm, cooperative    VITAL SIGNS: 24 HRS MIN & MAX LAST   Temp  Min: 97.3 °F (36.3 °C)  Max: 98.2 °F (36.8 °C) 97.4 °F (36.3 °C)   BP  Min: 88/42  Max: 182/125 (!) 95/54   Pulse  Min: 74  Max: 125  74   Resp  Min: 15  Max: 27 20   SpO2  Min: 82 %  Max: 100 % 98 %       Recent  Labs   Lab 07/14/24  0609 07/15/24  0434 07/15/24  1600 07/15/24  2137 07/16/24  0507 07/16/24  1204   WBC 10.60 8.48  --   --  18.28*  --    RBC 3.31* 3.38*  --   --  2.73*  --    HGB 10.9* 10.9*   < > 10.2* 8.9* 7.7*   HCT 33.4* 34.5*   < > 32.6* 27.9* 24.8*   .9* 102.1*  --   --  102.2*  --    MCH 32.9* 32.2*  --   --  32.6*  --    MCHC 32.6* 31.6*  --   --  31.9*  --    RDW 16.8 17.1*  --   --  16.8  --     304  --   --  289  --    MPV 9.8 9.6  --   --  9.9  --     < > = values in this interval not displayed.       Recent Labs   Lab 07/10/24  0523 07/14/24  0609 07/16/24  0507 07/16/24  0810 07/16/24  1202      < > 138 136 135*   K 4.2   < > 6.9* 5.7* 5.3*   *   < > 108* 107 107   CO2 21*   < > 22* 21* 17*   BUN 26.9*   < > 38.9* 40.2* 41.0*   CREATININE 1.10*   < > 1.72* 1.88* 1.96*   CALCIUM 8.2*   < > 8.3* 7.6* 8.2*   ALBUMIN 2.2*  --   --   --   --    ALKPHOS 92  --   --   --   --    ALT 11  --   --   --   --    AST 17  --   --   --   --    BILITOT 1.8*  --   --   --   --     < > = values in this interval not displayed.        Microbiology Results (last 7 days)       Procedure Component Value Units Date/Time    Tissue Culture - Aerobic [[id number]] Collected: 07/15/24 1256    Order Status: Completed Specimen: Tissue from Hip, Left Updated: 07/16/24 0647     Tissue - Aerobic Culture No Growth At 24 Hours    Tissue Culture - Aerobic [[id number]] Collected: 07/15/24 1255    Order Status: Completed Specimen: Tissue from Hip, Left Updated: 07/16/24 0647     Tissue - Aerobic Culture No Growth At 24 Hours    Gram Stain [[id number]] Collected: 07/15/24 1255    Order Status: Completed Specimen: Tissue from Hip, Left Updated: 07/15/24 2103     GRAM STAIN Rare WBC observed      No bacteria seen    Gram Stain [[id number]] Collected: 07/15/24 1256    Order Status: Completed Specimen: Tissue from Hip, Left Updated: 07/15/24 2103     GRAM STAIN No WBCs, No bacteria seen    Fungal Culture  [[id number]] Collected: 07/15/24 1255    Order Status: Sent Specimen: Tissue from Hip, Left Updated: 07/15/24 1455    Anaerobic Culture [[id number]] Collected: 07/15/24 1255    Order Status: Sent Specimen: Tissue from Hip, Left Updated: 07/15/24 1455    Fungal Culture [[id number]] Collected: 07/15/24 1256    Order Status: Sent Specimen: Tissue from Hip, Left Updated: 07/15/24 1455    Anaerobic Culture [[id number]] Collected: 07/15/24 1256    Order Status: Sent Specimen: Tissue from Hip, Left Updated: 07/15/24 1455    Fungal Culture [[id number]]     Order Status: Sent Specimen: Tissue from Hip, Left     Gram Stain [[id number]]     Order Status: Sent Specimen: Tissue from Hip, Left     Anaerobic Culture [[id number]]     Order Status: Sent Specimen: Tissue from Hip, Left     Tissue Culture - Aerobic [[id number]]     Order Status: Sent Specimen: Tissue from Hip, Left     Fungal Culture [[id number]]     Order Status: Sent Specimen: Tissue from Hip, Left     Gram Stain [[id number]]     Order Status: Sent Specimen: Tissue from Hip, Left     Anaerobic Culture [[id number]]     Order Status: Sent Specimen: Tissue from Hip, Left     Tissue Culture - Aerobic [[id number]]     Order Status: Sent Specimen: Tissue from Hip, Left              Radiology:  X-Ray Chest 1 View  Narrative: EXAMINATION:  XR CHEST 1 VIEW    CLINICAL HISTORY:  leukocytosis;    TECHNIQUE:  One view    COMPARISON:  July 2, 2024.    FINDINGS:  Cardiopericardial silhouette is within normal limits.  Cardiac device electrode terminates within the right ventricle.  No acute dense focal or segmental consolidation, congestive process, pleural effusions or pneumothorax.  Impression: No acute cardiopulmonary process identified.    Electronically signed by: Omid Rodriguez  Date:    07/16/2024  Time:    09:41      Scheduled Med:   acetaminophen  1,000 mg Intravenous Once    acetaminophen  500 mg Oral 6 times per day    albuterol  2 puff Inhalation Q8H    allopurinol  100  mg Oral Daily    atorvastatin  20 mg Oral Daily    [START ON 7/18/2024] bisacodyL  10 mg Rectal Daily    carvediloL  12.5 mg Oral BID    docusate sodium  100 mg Oral BID    levothyroxine  125 mcg Oral Before breakfast    menthol-zinc oxide   Topical (Top) BID    metoclopramide  5 mg Intravenous Q6H    mupirocin   Nasal BID    polyethylene glycol  17 g Oral BID    senna-docusate 8.6-50 mg  2 tablet Oral QHS    sodium zirconium cyclosilicate  10 g Oral TID    tramadol  50 mg Oral TID AC        Continuous Infusions:   sodium bicarbonate 150 mEq in D5W 1,000 mL infusion   Intravenous Continuous 125 mL/hr at 07/16/24 0845 New Bag at 07/16/24 0845        PRN Meds:    Current Facility-Administered Medications:     0.9%  NaCl infusion (for blood administration), , Intravenous, Q24H PRN    aluminum-magnesium hydroxide-simethicone, 30 mL, Oral, Q6H PRN    benzonatate, 100 mg, Oral, TID PRN    dextrose 10%, 12.5 g, Intravenous, PRN    dextrose 10%, 12.5 g, Intravenous, PRN    dextrose 10%, 25 g, Intravenous, PRN    dextrose 10%, 25 g, Intravenous, PRN    glucagon (human recombinant), 1 mg, Intramuscular, PRN    glucagon (human recombinant), 1 mg, Intramuscular, PRN    glucose, 16 g, Oral, PRN    glucose, 24 g, Oral, PRN    hydrALAZINE, 10 mg, Intravenous, Q4H PRN    HYDROmorphone, 0.4 mg, Intravenous, Q5 Min PRN    hydrOXYzine pamoate, 50 mg, Oral, Nightly PRN    insulin aspart U-100, 0-5 Units, Subcutaneous, QID (AC + HS) PRN    lactulose, 20 g, Oral, Q6H PRN    melatonin, 6 mg, Oral, Nightly PRN    methocarbamoL, 500 mg, Oral, Q8H PRN    metoprolol, 10 mg, Intravenous, Q2H PRN    morphine, 2 mg, Intravenous, Q3H PRN    nitroGLYCERIN, 0.4 mg, Sublingual, Q5 Min PRN    ondansetron, 4 mg, Oral, Q6H PRN    ondansetron, 4 mg, Intravenous, Q8H PRN    ondansetron, 4 mg, Intravenous, Q6H PRN    traMADoL, 50 mg, Oral, Q4H PRN     Nutrition Status:      Assessment/Plan:  Hyperkalemia  Acute anemia  BARRY  Left femur IMN 7/3, failed  IMN  S/p Conversion SILVIA 7/15  Dilated cardiomyopathy  A fib  Htn  Hld  Gout  Dm  Constipation  Hypothyroid       Plan  Continue Lokelma  Follow K  Transfuse 2 unit prbc  Hold bicarb while blood running  resume coumadin once ok with ortho  Monitor inr, remains >2  No sign of bleeding  Ortho following    Critical care time >30 mins     Dvt proph: coumadin, scd                All diagnosis and differential diagnosis have been reviewed; assessment and plan has been documented; I have personally reviewed the labs and test results that are presently available; I have reviewed the patients medication list; I have reviewed the consulting providers response and recommendations. I have reviewed or attempted to review medical records based upon their availability      _____________________________________________________________________            Olvin Huber MD   07/16/2024

## 2024-07-16 NOTE — PLAN OF CARE
Problem: Physical Therapy  Goal: Physical Therapy Goal  Description: Pt will improve functional independence by performing:    Bed mobility: CGA  Sit to stand: Min A  with rolling walker  Bed to chair t/f: Min A  with Stand Step  with rolling walker  Ambulation x 150' with CGA with rolling walker  Ramp : CGA  Outcome: Progressing

## 2024-07-17 LAB
ANION GAP SERPL CALC-SCNC: 9 MEQ/L
BUN SERPL-MCNC: 43.5 MG/DL (ref 9.8–20.1)
CALCIUM SERPL-MCNC: 7.7 MG/DL (ref 8.4–10.2)
CHLORIDE SERPL-SCNC: 102 MMOL/L (ref 98–107)
CO2 SERPL-SCNC: 23 MMOL/L (ref 23–31)
CREAT SERPL-MCNC: 2.21 MG/DL (ref 0.55–1.02)
CREAT/UREA NIT SERPL: 20
ERYTHROCYTE [DISTWIDTH] IN BLOOD BY AUTOMATED COUNT: 17.4 % (ref 11.5–17)
GFR SERPLBLD CREATININE-BSD FMLA CKD-EPI: 21 ML/MIN/1.73/M2
GLUCOSE SERPL-MCNC: 165 MG/DL (ref 82–115)
HCT VFR BLD AUTO: 31.3 % (ref 37–47)
HGB BLD-MCNC: 10.3 G/DL (ref 12–16)
INR PPP: 3.2 (ref 2–3)
INR PPP: 3.5 (ref 2–3)
MCH RBC QN AUTO: 31.5 PG (ref 27–31)
MCHC RBC AUTO-ENTMCNC: 32.9 G/DL (ref 33–36)
MCV RBC AUTO: 95.7 FL (ref 80–94)
NRBC BLD AUTO-RTO: 0.2 %
PLATELET # BLD AUTO: 192 X10(3)/MCL (ref 130–400)
PMV BLD AUTO: 9.8 FL (ref 7.4–10.4)
POCT GLUCOSE: 182 MG/DL (ref 70–110)
POCT GLUCOSE: 186 MG/DL (ref 70–110)
POCT GLUCOSE: 191 MG/DL (ref 70–110)
POCT GLUCOSE: 206 MG/DL (ref 70–110)
POCT GLUCOSE: 212 MG/DL (ref 70–110)
POTASSIUM SERPL-SCNC: 4.9 MMOL/L (ref 3.5–5.1)
PROTHROMBIN TIME: 33.6 SECONDS (ref 11.7–14.5)
PROTHROMBIN TIME: 36.1 SECONDS (ref 11.7–14.5)
RBC # BLD AUTO: 3.27 X10(6)/MCL (ref 4.2–5.4)
SODIUM SERPL-SCNC: 134 MMOL/L (ref 136–145)
WBC # BLD AUTO: 10.05 X10(3)/MCL (ref 4.5–11.5)

## 2024-07-17 PROCEDURE — 25000003 PHARM REV CODE 250: Performed by: ORTHOPAEDIC SURGERY

## 2024-07-17 PROCEDURE — 36415 COLL VENOUS BLD VENIPUNCTURE: CPT | Performed by: NURSE PRACTITIONER

## 2024-07-17 PROCEDURE — 80048 BASIC METABOLIC PNL TOTAL CA: CPT | Performed by: ORTHOPAEDIC SURGERY

## 2024-07-17 PROCEDURE — 94799 UNLISTED PULMONARY SVC/PX: CPT

## 2024-07-17 PROCEDURE — 25000242 PHARM REV CODE 250 ALT 637 W/ HCPCS: Performed by: INTERNAL MEDICINE

## 2024-07-17 PROCEDURE — 63600175 PHARM REV CODE 636 W HCPCS: Performed by: ORTHOPAEDIC SURGERY

## 2024-07-17 PROCEDURE — 85610 PROTHROMBIN TIME: CPT | Performed by: INTERNAL MEDICINE

## 2024-07-17 PROCEDURE — 97530 THERAPEUTIC ACTIVITIES: CPT | Mod: CQ

## 2024-07-17 PROCEDURE — 85027 COMPLETE CBC AUTOMATED: CPT | Performed by: ORTHOPAEDIC SURGERY

## 2024-07-17 PROCEDURE — 97530 THERAPEUTIC ACTIVITIES: CPT

## 2024-07-17 PROCEDURE — 99900031 HC PATIENT EDUCATION (STAT)

## 2024-07-17 PROCEDURE — 36415 COLL VENOUS BLD VENIPUNCTURE: CPT | Performed by: INTERNAL MEDICINE

## 2024-07-17 PROCEDURE — 97166 OT EVAL MOD COMPLEX 45 MIN: CPT

## 2024-07-17 PROCEDURE — 94761 N-INVAS EAR/PLS OXIMETRY MLT: CPT

## 2024-07-17 PROCEDURE — 21400001 HC TELEMETRY ROOM

## 2024-07-17 PROCEDURE — 85610 PROTHROMBIN TIME: CPT | Performed by: NURSE PRACTITIONER

## 2024-07-17 PROCEDURE — 97535 SELF CARE MNGMENT TRAINING: CPT

## 2024-07-17 PROCEDURE — 25000003 PHARM REV CODE 250: Performed by: INTERNAL MEDICINE

## 2024-07-17 RX ADMIN — POLYETHYLENE GLYCOL 3350 17 G: 17 POWDER, FOR SOLUTION ORAL at 09:07

## 2024-07-17 RX ADMIN — DOCUSATE SODIUM 100 MG: 100 CAPSULE, LIQUID FILLED ORAL at 07:07

## 2024-07-17 RX ADMIN — SODIUM BICARBONATE: 84 INJECTION, SOLUTION INTRAVENOUS at 05:07

## 2024-07-17 RX ADMIN — ACETAMINOPHEN 500 MG: 500 TABLET ORAL at 08:07

## 2024-07-17 RX ADMIN — MENTHOL AND ZINC OXIDE: .44; 20.625 OINTMENT TOPICAL at 11:07

## 2024-07-17 RX ADMIN — TRAMADOL HYDROCHLORIDE 50 MG: 50 TABLET, COATED ORAL at 07:07

## 2024-07-17 RX ADMIN — ALLOPURINOL 100 MG: 100 TABLET ORAL at 07:07

## 2024-07-17 RX ADMIN — TRAMADOL HYDROCHLORIDE 50 MG: 50 TABLET, COATED ORAL at 11:07

## 2024-07-17 RX ADMIN — ALBUTEROL SULFATE 2 PUFF: 90 AEROSOL, METERED RESPIRATORY (INHALATION) at 12:07

## 2024-07-17 RX ADMIN — ACETAMINOPHEN 500 MG: 500 TABLET ORAL at 03:07

## 2024-07-17 RX ADMIN — ATORVASTATIN CALCIUM 20 MG: 10 TABLET, FILM COATED ORAL at 07:07

## 2024-07-17 RX ADMIN — MUPIROCIN: 20 OINTMENT TOPICAL at 11:07

## 2024-07-17 RX ADMIN — MENTHOL AND ZINC OXIDE: .44; 20.625 OINTMENT TOPICAL at 08:07

## 2024-07-17 RX ADMIN — INSULIN ASPART 2 UNITS: 100 INJECTION, SOLUTION INTRAVENOUS; SUBCUTANEOUS at 11:07

## 2024-07-17 RX ADMIN — ACETAMINOPHEN 500 MG: 500 TABLET ORAL at 01:07

## 2024-07-17 RX ADMIN — POLYETHYLENE GLYCOL 3350 17 G: 17 POWDER, FOR SOLUTION ORAL at 08:07

## 2024-07-17 RX ADMIN — ACETAMINOPHEN 500 MG: 500 TABLET ORAL at 07:07

## 2024-07-17 RX ADMIN — TRAMADOL HYDROCHLORIDE 50 MG: 50 TABLET, COATED ORAL at 03:07

## 2024-07-17 RX ADMIN — LEVOTHYROXINE SODIUM 125 MCG: 125 TABLET ORAL at 05:07

## 2024-07-17 RX ADMIN — CARVEDILOL 12.5 MG: 6.25 TABLET, FILM COATED ORAL at 08:07

## 2024-07-17 RX ADMIN — ACETAMINOPHEN 500 MG: 500 TABLET ORAL at 04:07

## 2024-07-17 RX ADMIN — SENNOSIDES AND DOCUSATE SODIUM 2 TABLET: 50; 8.6 TABLET ORAL at 08:07

## 2024-07-17 RX ADMIN — CARVEDILOL 12.5 MG: 6.25 TABLET, FILM COATED ORAL at 07:07

## 2024-07-17 RX ADMIN — SODIUM BICARBONATE: 84 INJECTION, SOLUTION INTRAVENOUS at 03:07

## 2024-07-17 RX ADMIN — DOCUSATE SODIUM 100 MG: 100 CAPSULE, LIQUID FILLED ORAL at 08:07

## 2024-07-17 RX ADMIN — SODIUM ZIRCONIUM CYCLOSILICATE 10 G: 5 POWDER, FOR SUSPENSION ORAL at 08:07

## 2024-07-17 RX ADMIN — ACETAMINOPHEN 500 MG: 500 TABLET ORAL at 11:07

## 2024-07-17 NOTE — NURSING
Nurses Note -- 4 Eyes      7/16/2024   9:49 PM      Skin assessed during: Q Shift Change      [x] No Altered Skin Integrity Present    [x]Prevention Measures Documented      [] Yes- Altered Skin Integrity Present or Discovered   [] LDA Added if Not in Epic (Describe Wound)   [] New Altered Skin Integrity was Present on Admit and Documented in LDA   [] Wound Image Taken    Wound Care Consulted? No    Attending Nurse:  Spenser Quiroz RN/Staff Member:  susan

## 2024-07-17 NOTE — PT/OT/SLP PROGRESS
Physical Therapy Treatment    Patient Name:  Homa Jaquez   MRN:  [id number]    Recommendations:     Discharge Recommendations: Moderate Intensity Therapy (Mod-High pending progress)  Discharge Equipment Recommendations: walker, rolling  Barriers to discharge:  impaired mobility     Assessment:     Homa Jaquez is a 88 y.o. female admitted with a medical diagnosis of Intertrochanteric fracture of left femur.  She presents with the following impairments/functional limitations: weakness, impaired endurance, impaired self care skills, impaired functional mobility, decreased lower extremity function, decreased upper extremity function, pain, decreased ROM, edema, orthopedic precautions .    Rehab Prognosis: Good; patient would benefit from acute skilled PT services to address these deficits and reach maximum level of function.    Recent Surgery: Procedure(s) (LRB):  CONVERSION ARTHROPLASTY, HIP, POSTERIOR APPROACH - valencia, cell saver, pathology (Left) 2 Days Post-Op    Plan:     During this hospitalization, patient to be seen BID to address the identified rehab impairments via gait training, therapeutic activities, therapeutic exercises and progress toward the following goals:    Plan of Care Expires:  07/22/24    Subjective     Chief Complaint: pain  Patient/Family Comments/goals: get back in bed   Pain/Comfort:         Objective:     Patient found up in chair with   upon PT entry to room.     General Precautions: Standard, fall  Orthopedic Precautions: LLE weight bearing as tolerated  Braces:    Respiratory Status: Room air     Functional Mobility:  Bed Mobility:     Sit to Supine: total assistance and of 2 persons  Transfers:     Sit to Stand:  total assistance and of 2 persons with no AD  Bed to Chair: total assistance and of 2 persons with  no AD  using  Squat Pivot      Patient left supine with all lines intact and call button in reach..    GOALS:   Multidisciplinary Problems       Physical Therapy Goals           Problem: Physical Therapy    Goal Priority Disciplines Outcome Goal Variances Interventions   Physical Therapy Goal     PT, PT/OT Progressing     Description: Pt will improve functional independence by performing:    Bed mobility: CGA  Sit to stand: Min A  with rolling walker  Bed to chair t/f: Min A  with Stand Step  with rolling walker  Ambulation x 150' with CGA with rolling walker  Ramp : CGA                       Time Tracking:     PT Received On:    PT Start Time: 1420     PT Stop Time: 1453  PT Total Time (min): 33 min     Billable Minutes: Therapeutic Activity 33    Treatment Type: Treatment  PT/PTA: PTA     Number of PTA visits since last PT visit: 1 07/17/2024

## 2024-07-17 NOTE — PT/OT/SLP EVAL
"Occupational Therapy   Evaluation    Name: Homa Jaquez  MRN: [id number]  Admitting Diagnosis: Intertrochanteric fracture of left femur  Recent Surgery: Procedure(s) (LRB):  CONVERSION ARTHROPLASTY, HIP, POSTERIOR APPROACH - valencia, cell saver, pathology (Left) 2 Days Post-Op    Recommendations:     Discharge Recommendations: Moderate Intensity Therapy  Discharge Equipment Recommendations:  none  Barriers to discharge:   (impaired functional mobility)    Assessment:     Homa Jaquez is a 88 y.o. female with a medical diagnosis of Intertrochanteric fracture of left femur.  Performance deficits affecting function: weakness, orthopedic precautions, impaired joint extensibility, impaired muscle length, edema, impaired skin, decreased ROM, pain, decreased lower extremity function, decreased upper extremity function, decreased safety awareness, impaired endurance, impaired self care skills, impaired functional mobility, gait instability, impaired balance (hard of hearing).      Rehab Prognosis: Good; patient would benefit from acute skilled OT services to address these deficits and reach maximum level of function.       Plan:     Patient to be seen daily (BID) to address the above listed problems via self-care/home management, therapeutic activities, therapeutic exercises  Plan of Care Expires: 07/23/24  Plan of Care Reviewed with: patient    Subjective     Chief Complaint: fatigue  Patient/Family Comments/goals: "I'm just so tired."    Occupational Profile:  Living Environment: lives in her son's [facility] with . Ramp to enter. WIS with shower chair and HHSH.  Previous level of function: max A  Equipment Used at Home: rollator, shower chair, wheelchair, walker, standard, walker, rolling, grab bar, raised toilet  Assistance upon Discharge: facility staff    Pain/Comfort:  Pain Rating 1:  (unable to quantify it, but winced with movement)  Location - Side 1: Left  Location - Orientation 1: lateral  Location 1: " "leg  Pain Addressed 1: Distraction, Reposition, Cessation of Activity    Patients cultural, spiritual, Alevism conflicts given the current situation: no    Objective:     Communicated with: NSJACQUI prior to session.  Patient found HOB elevated with wound vac, peripheral IV, telemetry upon OT entry to room.    General Precautions: Standard, fall  Orthopedic Precautions: LLE weight bearing as tolerated, LLE posterior precautions  Braces: N/A  Respiratory Status: Room air    Occupational Performance:    Bed Mobility:    Patient completed Scooting/Bridging with moderate assistance  Patient completed Supine to Sit with moderate assistance    Functional Mobility/Transfers:  Patient completed Sit <> Stand Transfer with moderate assistance and of 2 persons  with  rolling walker   Patient completed Bed <> Chair Transfer using Stand Pivot technique with maximal assistance and of 2 persons with rolling walker  Functional Mobility: unable to perform at this time due to pain, weakness" pt reports feeling dizzy while standing    Activities of Daily Living:  Lower Body Dressing: maximal assistance pt able to doff socks with reacher with SBA, max A to don socks. Total A to don brief, max A for safety with standing    Cognitive/Visual Perceptual:  Cognitive/Psychosocial Skills:     -       Oriented to: Person, Place, Time, and Situation   -       Follows Commands/attention:Follows one-step commands  -       Communication: clear/fluent  -       Memory: No Deficits noted  -       Safety awareness/insight to disability: intact   -       Mood/Affect/Coping skills/emotional control: Appropriate to situation and Anxious  Visual/Perceptual:      -Intact      Physical Exam:  Motor Planning: -       intact  Upper Extremity Range of Motion:     -       Right Upper Extremity: WFL  -       Left Upper Extremity: WFL  Upper Extremity Strength:    -       Right Upper Extremity: WFL  -       Left Upper Extremity: WFL    Treatment & Education:  Pt " educated on roles and goals of occupational therapy, POC for acute stay.    Patient left up in chair with all lines intact and call button in reach. LAURA Lopez notified    GOALS:   Multidisciplinary Problems       Occupational Therapy Goals          Problem: Occupational Therapy    Goal Priority Disciplines Outcome Interventions   Occupational Therapy Goal     OT, PT/OT Progressing    Description: Pt will perform LB dressing max A with AE PRN by d/c.  Pt will perform toileting max A by d/c.  Pt will perform toilet t/f max A with LRAD by d/c.  Pt will perform shower t/f max assist by d/c.  Pt will perform car t/f max assist in adherence to pxns by d/c.                        History:     Past Medical History:   Diagnosis Date    A-fib     Chronic cystitis     GERD (gastroesophageal reflux disease)     Graves disease     Hypertension     Hypothyroidism, unspecified     Spinal stenosis          Past Surgical History:   Procedure Laterality Date    APPENDECTOMY      BLADDER SUSPENSION      CARDIOVERSION  04/01/2015    CATARACT EXTRACTION      CONVERSION ARTHROPLASTY, HIP, POSTERIOR APPROACH Left 7/15/2024    Procedure: CONVERSION ARTHROPLASTY, HIP, POSTERIOR APPROACH - valencia, cell saver, pathology;  Surgeon: Jonny Bains MD;  Location: [facility];  Service: Orthopedics;  Laterality: Left;  valencia, cell saver, pathology    HYSTERECTOMY      INTRAMEDULLARY RODDING OF FEMUR Left 7/3/2024    Procedure: INSERTION, INTRAMEDULLARY MELANIE, FEMUR;  Surgeon: Steve Pierce DO;  Location: [facility];  Service: Orthopedics;  Laterality: Left;  supine hana table c arm synthes    LAPAROSCOPIC CHOLECYSTECTOMY  01/12/2016    SPHINCTEROTOMY OF URETHRA  01/11/2016    TONSILLECTOMY AND ADENOIDECTOMY         Time Tracking:     OT Date of Treatment: 07/17/24  OT Start Time: 0907  OT Stop Time: 1008  OT Total Time (min): 61 min    Billable Minutes:Evaluation 30  Self Care/Home Management 15  Therapeutic Activity 16    7/17/2024

## 2024-07-17 NOTE — DISCHARGE INSTRUCTIONS
[facility]  [facility] [facility] [address]  [place] [address]  Phone [phone]       /      Fax [phone]  SURGEON: Dr. Bains    After discharge, all questions or concerns should be handled at your surgeon's office ([phone]). If it is a weekend or after hours, you will get the surgeon on call.     Discharge Medications:    PAIN MANAGEMENT: Next Dose Available   Tylenol/Acetaminophen 500mg- every 4 hours, around the clock (WHILE AWAKE) 12 PM on 7/26/24   Ultram/Tramadol 50mg (Pain Med) - every 4-6 hours AS NEEDED for pain Anytime as needed on 7/26/24   Robaxin/Methocarbamol 500mg (Muscle Relaxer) - Every 8-12 hours AS NEEDED for muscle spasms, thigh pain or additional pain control 5 PM if needed on 7/26   COMPLICATION PREVENTION MEDS: Next Dose DUE   Coumadin 2mg - restart home regimen for continued post-op for blood clot prevention Restart as instructed by MD Crum 17gm - once or twice a day while on narcotics and muscle relaxers for constipation prevention PM on 7/26/24   NOZIN NASAL  - twice a day for 2 weeks or until supply runs out, whichever comes first (Infection prevention) PM on 7/26/24   Doxycycline 100mg (Antibiotic) - twice a day for 14 days post-op for infection prevention   PM on 7/26/24       Discharge instructions                                                                                                                                  PAIN MEDICATIONS/PAIN MANAGEMENT: (Use the medication log in your discharge packet to keep track of your medications)  Tylenol/Acetaminophen 500mg every 4 hours, around the clock (WHILE AWAKE).   Take Ultram (Tramadol) 50mg (pain pill) every 4-6 hours as needed for pain.    **NO MORE THAN 3000mg OF TYLENOL IN 24 HOURS**.     Robaxin/Methocarbamol 500mg (muscle relaxer)- you can take every 12-24 hours as needed for muscle spasms, thigh pain and stiffness, additional pain control or breakthrough pain medications.  This medication is helpful for pain control while lessening your need for narcotics. Please reduce the use gradually as the pain and spasms lessen. DO NOT TAKE AT THE SAME TIME AS A PAIN PILL. YOU WILL BE BETTER SERVED WITH 2 HOURS BETWEEN PAIN PILL AND MUSCLE RELAXER.     **Other things that help with pain control is WALKING, COMPRESSION WRAP, ICE and ELEVATION!!**    BLOOD CLOT PREVENTION:   Restart Coumadin,  as you were taking Prior to surgery. Start on as instructed by MD.     You need to continuing wearing your compression stocking (DANTE Hose - ThromboEmbolic Disease Prevention Device) for the next 2-6 weeks post-op. It is ok to remove them for hygiene and at bedtime.   Hand wash and Dry. **If the swelling persists in the legs after you stop wearing the Dante hose, continue to wear them until the swelling decreases.**  REMOVE STOCKINGS AT LEAST DAILY FOR SKIN ASSESSMENT.   Do NOT let the stockings roll down, creating a tourniquet around the back of your knee. If you need to, leave the excess at the bottom of the stocking.   The best thing you can do to prevent blood clots is to walk around as much as possible, AT LEAST EVERY 1-2 HOURS.       CONSTIPATION PREVENTION:   Miralax or Senokot S/Farrah-Colace and Stool softeners EVERY DAY while on pain meds.  Use other more aggressive over the counter LAXATIVES as needed for constipation (Examples: Milk of Magnesia, Dulcolax tabs or suppository, Magnesium Citrate, Fleet's Enema...etc.)   Drink lots of water.  Increase Fiber in diet.  Increase walking distance each day  DO NOT GO MORE THAN 2 DAYS WITHOUT HAVING A BOWEL MOVEMENT!    ACTIVITY:   Weight bearing precautions as follows:  FULL weight bearing to operative leg with walker.     DO NOT TAKE YOURSELF OFF OF THE WALKER TOO SOON. ALLOW YOUR OUTPATIENT THERAPIST or SURGEON TO GUIDE YOU.   Change positions often throughout the day.   Walk around at least every 1-2 hours while awake.   No heavy lifting, pulling, pushing  or straining.  Ice the Hip and thigh AS MUCH AS POSSIBLE  After discharged from the facility, start Home health physical therapy.       WOUND CARE:     Left LOWER leg small incision - Dry dressing changes every other day and as needed for soiling for 14 days. Start Sunday. You may need to change the dressing daily if the wound is draining. Switch to every other day as soon as possible. NOTIFY MD OF EXCESSIVE WOUND DRAINAGE.   NO ointments, creams, lotions or antiseptics on the incision. NOTIFY MD OF EXCESSIVE WOUND DRAINAGE.     Wound Vac for at least 2 weeks. Change at least every 72 hours or per facility protocol. For any issues related to the wound vac, call the surgeon's office.   DO NOT REMOVE UNLESS INSTRUCTED BY THE SURGEON. IN 2 WEEKS, IF PATIENT IS STILL HOSPITALIZED, CALL DR. COATES'S OFFICE FOR FURTHER INSTRUCTIONS ON THE WOUND VAC.    DO NOT WET WOUND or apply any ointments, creams, lotions or antiseptics.  Ace wrap - apply your compression stocking and apply the ace wrap where the stocking stops for extra added compression to the knee and thigh.   Ok to shower before then if able to keep wound from getting wet (plastic barrier, saran wrap or cling wrap and tape).   DO NOT TOUCH INCISION      URINARY RETENTION:  If you start having difficulty urinating, decrease the use of Pain pills and muscle relaxers and notify your primary care doctor.     PNEUMONIA PREVENTION:  Stay out of bed as much as possible and walk around every 1-2 hours.  Continue breathing exercises (Incentive Spirometry) every 1-2 hours while mobility is limited and while you are on pain pills.    FALL PREVENTION:  Wear sturdy shoes that fit well - Wearing shoes with high heels or slippery soles, or shoes that are too loose, can lead to falls. Walking around in bare feet, or only socks, can also increase your risk of falling.  Use walker as long as your surgeon and therapist recommend it  Use good lighting and  throw rugs,  electrical cords, furniture and clutter (anything than can cause you to trip at home.   Non-slip rug in bathroom or shower    INFECTION PREVENTION:  NOZIN ANTISEPTIC NASAL  - twice a day for 2 weeks or until supply runs out, whichever comes first. Shake bottle well, saturate cotton swab with 4 drops of antiseptic solution. Swab right nostril rim 6-8 times clockwise and counterclockwise. Take swab out, apply 2 more drops then swab left nostril rim 6-8 times clockwise and counterclockwise.   Doxycycline 100mg (Antibiotic) -  take twice a day for 14 days to prevent infection   Proper handwashing before and after dressing changes. Do not wet the wound. Wound care instructions as written above. NOTIFY MD OF EXCESSIVE WOUND DRAINAGE.  No alcohol, smoking or tobacco products  Pets should not be allowed around the wound or the dressing.   Treat UTI and skin infections as soon as possible.  Pre-medicate with antibiotics prior to dental or surgical procedures.   If you are diabetic, MAINTAIN GOOD BLOOD SUGAR CONTROL (Below 150) DURING YOUR RECOVERY. If you see high numbers, notify your primary care doctor.     Call your SURGEON'S OFFICE ([phone]) if you experience the following signs and symptoms of infection:   Unusual redness, swelling, excessive, cloudy or foul smelling drainage at the incision site.   Persistent low grade temp OR a temp greater than 102 F, unrelieved by Tylenol  Pain at surgical site, unrelieved by pain meds    Warning signs of a blood clot in your leg: (CALL YOUR SURGEON)  New onset or increasing pain in calf, new onset tenderness or redness above or below the knee or increasing swelling of your calf, ankle, or foot.  Warning signs that a blood clot has traveled to your lungs: (REPORT TO THE ER/CALL [phone])  Sudden or increase in Shortness of breath, sudden onset of chest pains, or  Localized chest pain with coughing.       IF ANY ISSUES ARISE AND YOU FEEL THE NEED TO CALL YOUR PRIMARY CARE  DOCTOR, PLEASE LET YOUR SURGEON KNOW AS WELL.     For emergencies, please report to OUR ([facility] main [place]) Emergency department and tell them to call YOUR SURGEON at [phone].     BEFORE MAKING ANY CHANGES TO THE MEDICAL CARE PLAN OR GOING TO THE EMERGENCY ROOM, PLEASE CONTACT THE SURGEON.    After discharge, all questions or concerns should be handled at your surgeon's office ([phone]). If it is a weekend or after hours, you will get the surgeon on call.

## 2024-07-17 NOTE — PT/OT/SLP PROGRESS
Physical Therapy Treatment    Patient Name:  Homa Jaquez   MRN:  [id number]    Recommendations:     Discharge Recommendations: Moderate Intensity Therapy (Mod-High pending progress)  Discharge Equipment Recommendations: walker, rolling  Barriers to discharge:  impaired mobility     Assessment:     Homa Jaquez is a 88 y.o. female admitted with a medical diagnosis of Intertrochanteric fracture of left femur.  She presents with the following impairments/functional limitations: weakness, impaired endurance, impaired self care skills, impaired functional mobility, decreased lower extremity function, decreased upper extremity function, pain, decreased ROM, edema, orthopedic precautions .    Rehab Prognosis: Fair; patient would benefit from acute skilled PT services to address these deficits and reach maximum level of function.    Recent Surgery: Procedure(s) (LRB):  CONVERSION ARTHROPLASTY, HIP, POSTERIOR APPROACH - valencia, cell saver, pathology (Left) 2 Days Post-Op    Plan:     During this hospitalization, patient to be seen BID to address the identified rehab impairments via gait training, therapeutic activities, therapeutic exercises and progress toward the following goals:    Plan of Care Expires:  07/22/24    Subjective     Chief Complaint: pain  Patient/Family Comments/goals: n/a  Pain/Comfort:         Objective:     Communicated with rn prior to session.  Patient found up in chair with   upon PT entry to room.     General Precautions: Standard, fall  Orthopedic Precautions: LLE weight bearing as tolerated  Braces:    Respiratory Status: Room air     Functional Mobility:  Transfers:     Sit to Stand:  moderate assistance and of 2 persons with rolling walker  Gait: pt amb 4 steps w/rw and min assist of one and cga for safety with another following close with chair.     Treatment & Education:  Pt had increase difficulty advancing Rle due to increase pain of LLE. Pt requested to sit due to increase pain.      Patient left up in chair with all lines intact and call button in reach..    GOALS:   Multidisciplinary Problems       Physical Therapy Goals          Problem: Physical Therapy    Goal Priority Disciplines Outcome Goal Variances Interventions   Physical Therapy Goal     PT, PT/OT Progressing     Description: Pt will improve functional independence by performing:    Bed mobility: CGA  Sit to stand: Min A  with rolling walker  Bed to chair t/f: Min A  with Stand Step  with rolling walker  Ambulation x 150' with CGA with rolling walker  Ramp : CGA                       Time Tracking:     PT Received On:    PT Start Time: 1024     PT Stop Time: 1040  PT Total Time (min): 16 min     Billable Minutes: Therapeutic Activity 16    Treatment Type: Treatment  PT/PTA: PTA     Number of PTA visits since last PT visit: 1 07/17/2024

## 2024-07-17 NOTE — PROGRESS NOTES
"No acute events overnight.  Pain controlled.  Resting in bed.     Vital Signs  Temp: 97.7 °F (36.5 °C)  Temp Source: Oral  Pulse: 82  Heart Rate Source: Monitor  Resp: 18  SpO2: 98 %  Pulse Oximetry Type: Intermittent  Device (Oxygen Therapy): room air  BP: 110/66  BP Location: Left arm  BP Method: Automatic  Patient Position: Lying  Arousal Level: opens eyes spontaneously  Height and Weight  Height: 5' 4" (162.6 cm)  Weight: 82.9 kg (182 lb 12.2 oz)  Weight Method: Standard Scale  BSA (Calculated - sq m): 1.93 sq meters  BMI (Calculated): 31.4  Weight in (lb) to have BMI = 25: 145.3]    +FHL/EHL  BCR distally  Dressing c/d/i  SILT distally    Recent Lab Results  (Last 5 results in the past 24 hours)        07/17/24  0421   07/17/24  0420   07/16/24  2035   07/16/24  1835   07/16/24  1715        Anion Gap   9.0     8.0         Appearance, UA         SL CLOUDY       Bacteria, UA         Rare       Bilirubin (UA)         Negative       BUN   43.5     41.6  Comment: Continue until potassium is less than 5.0 mEq/L         BUN/CREAT RATIO   20     20         Calcium   7.7     7.8  Comment: Continue until potassium is less than 5.0 mEq/L         Chloride   102     106  Comment: Continue until potassium is less than 5.0 mEq/L         CO2   23     19  Comment: Continue until potassium is less than 5.0 mEq/L         Color, UA         Straw       Creatinine   2.21     2.09  Comment: Continue until potassium is less than 5.0 mEq/L         eGFR   21     23         Glucose   165     161  Comment: Continue until potassium is less than 5.0 mEq/L         Glucose, UA         Negative       Hematocrit 31.3               Hemoglobin 10.3               INR   3.2             Ketones, UA         15       Leukocyte Esterase, UA         Small       MCH 31.5               MCHC 32.9               MCV 95.7               MPV 9.8               NITRITE UA         Negative       nRBC 0.2               Blood, UA         Negative       pH, UA        "  6.0       Platelet Count 192               POCT Glucose     182           Potassium   4.9     5.1  Comment: Continue until potassium is less than 5.0 mEq/L         Protein, UA         Negative       PT   33.6             RBC 3.27               RBC, UA         None Seen       RDW 17.4               Sodium   134     133  Comment: Continue until potassium is less than 5.0 mEq/L         Specific Gravity,UA         1.015       Squam Epithel, UA         Rare       Urobilinogen, UA         0.2       WBC, UA         3-5       WBC 10.05                                      A/P:  Status post conversion SILVIA  Pain controlled  Overall patient doing well.  Therapy for mobility and ambulation.  Coumadin for DVT PPx  ABLA - resolved

## 2024-07-17 NOTE — PT/OT/SLP PROGRESS
Occupational Therapy      Patient Name:  Homa Jaquez   MRN:  [id number]    Patient not seen this afternoon secondary to pt refusal due to fatigue, nursing care. Will follow-up tomorrow AM.    7/17/2024

## 2024-07-17 NOTE — PLAN OF CARE
Problem: Adult Inpatient Plan of Care  Goal: Plan of Care Review  Outcome: Progressing  Goal: Patient-Specific Goal (Individualized)  Outcome: Progressing  Goal: Absence of Hospital-Acquired Illness or Injury  Outcome: Progressing  Goal: Optimal Comfort and Wellbeing  Outcome: Progressing  Goal: Readiness for Transition of Care  Outcome: Progressing     Problem: Diabetes Comorbidity  Goal: Blood Glucose Level Within Targeted Range  Outcome: Progressing     Problem: Acute Kidney Injury/Impairment  Goal: Fluid and Electrolyte Balance  Outcome: Progressing  Goal: Improved Oral Intake  Outcome: Progressing  Goal: Effective Renal Function  Outcome: Progressing     Problem: Fall Injury Risk  Goal: Absence of Fall and Fall-Related Injury  Outcome: Progressing     Problem: Wound  Goal: Optimal Coping  Outcome: Progressing  Goal: Optimal Functional Ability  Outcome: Progressing  Goal: Absence of Infection Signs and Symptoms  Outcome: Progressing  Goal: Improved Oral Intake  Outcome: Progressing  Goal: Optimal Pain Control and Function  Outcome: Progressing  Goal: Skin Health and Integrity  Outcome: Progressing  Goal: Optimal Wound Healing  Outcome: Progressing     Problem: Comorbidity Management  Goal: Maintenance of Asthma Control  Outcome: Progressing  Goal: Maintenance of Behavioral Health Symptom Control  Outcome: Progressing  Goal: Maintenance of COPD Symptom Control  Outcome: Progressing  Goal: Maintenance of Heart Failure Symptom Control  Outcome: Progressing  Goal: Blood Pressure in Desired Range  Outcome: Progressing  Goal: Maintenance of Osteoarthritis Symptom Control  Outcome: Progressing  Goal: Bariatric Home Regimen Maintained  Outcome: Progressing  Goal: Maintenance of Seizure Control  Outcome: Progressing     Problem: Hip Arthroplasty  Goal: Optimal Coping  Outcome: Progressing  Goal: Absence of Bleeding  Outcome: Progressing  Goal: Effective Bowel Elimination  Outcome: Progressing  Goal: Fluid and  Electrolyte Balance  Outcome: Progressing  Goal: Optimal Functional Ability  Outcome: Progressing  Goal: Absence of Infection Signs and Symptoms  Outcome: Progressing  Goal: Intact Neurovascular Status  Outcome: Progressing  Goal: Anesthesia/Sedation Recovery  Outcome: Progressing  Goal: Acceptable Pain Control  Outcome: Progressing  Goal: Nausea and Vomiting Relief  Outcome: Progressing  Goal: Effective Urinary Elimination  Outcome: Progressing  Goal: Effective Oxygenation and Ventilation  Outcome: Progressing     Problem: Infection  Goal: Absence of Infection Signs and Symptoms  Outcome: Progressing     Problem: Skin Injury Risk Increased  Goal: Skin Health and Integrity  Outcome: Progressing

## 2024-07-17 NOTE — PLAN OF CARE
Problem: Occupational Therapy  Goal: Occupational Therapy Goal  Description: Pt will perform LB dressing max A with AE PRN by d/c.  Pt will perform toileting max A by d/c.  Pt will perform toilet t/f max A with LRAD by d/c.  Pt will perform shower t/f max assist by d/c.  Pt will perform car t/f max assist in adherence to pxns by d/c.   Outcome: Progressing

## 2024-07-17 NOTE — PROGRESS NOTES
[facility]  [facility] Medicine Progress Note      Patient Name: Homa Jaquez  MRN: [id number]  Admission Date: 7/13/2024   Length of Stay: 4  Attending Physician: Olvin Huber MD  Primary Care Provider: Franklyn Brito MD  Face-to-Face encounter date: 07/17/2024    Code Status: Prior        Chief Complaint:   Hip pain        HPI:   Homa Jaquez is a 88 y.o. female who  has a past medical history of A-fib, Chronic cystitis, GERD (gastroesophageal reflux disease), Graves disease, Hypertension, Hypothyroidism, unspecified, and Spinal stenosis.. The patient presented to [facility] on 7/13/2024 with a primary complaint of left hip pain.  Pt with a left femur fracture after a fall 7/2, underwent IMN 7/3 and [facility]  She reported increasing pain to hip, xrays showing failed intertrochanteric femur fracture.  Pt transferred to [facility] for revision on Monday.  She is currently comfortable no complaints     Overview/Hospital Course:  No notes on file       Interval Hx:   The pt feels better. She tolerated therapy today, she did c/o postop hip pain. She denies cp or dyspnea.  Her  is at the bedside.    Review of Systems   All other systems reviewed and are negative.      Objective/physical exam:  General: In no acute distress, afebrile  Chest: Clear to auscultation bilaterally, no wheezing or ronchi  Heart: irreg, irreg, no rubs or gallops, no murmur  Abdomen: Soft, nontender, BS +  MSK:s/p left hip surgery  Neurologic: no focal deficits, A&Ox3  Psych: Calm, cooperative    VITAL SIGNS: 24 HRS MIN & MAX LAST   Temp  Min: 97.3 °F (36.3 °C)  Max: 97.8 °F (36.6 °C) 97.8 °F (36.6 °C)   BP  Min: 89/50  Max: 123/66 123/66   Pulse  Min: 74  Max: 90  90   Resp  Min: 18  Max: 20 18   SpO2  Min: 94 %  Max: 98 % (!) 94 %       Recent Labs   Lab 07/15/24  0434 07/15/24  1600 07/16/24  0507 07/16/24  1204 07/17/24  0421   WBC 8.48  --  18.28*  --  10.05    RBC 3.38*  --  2.73*  --  3.27*   HGB 10.9*   < > 8.9* 7.7* 10.3*   HCT 34.5*   < > 27.9* 24.8* 31.3*   .1*  --  102.2*  --  95.7*   MCH 32.2*  --  32.6*  --  31.5*   MCHC 31.6*  --  31.9*  --  32.9*   RDW 17.1*  --  16.8  --  17.4*     --  289  --  192   MPV 9.6  --  9.9  --  9.8    < > = values in this interval not displayed.       Recent Labs   Lab 07/16/24  1202 07/16/24  1835 07/17/24  0420   * 133* 134*   K 5.3* 5.1 4.9    106 102   CO2 17* 19* 23   BUN 41.0* 41.6* 43.5*   CREATININE 1.96* 2.09* 2.21*   CALCIUM 8.2* 7.8* 7.7*        Microbiology Results (last 7 days)       Procedure Component Value Units Date/Time    Tissue Culture - Aerobic [[id number]] Collected: 07/15/24 1256    Order Status: Completed Specimen: Tissue from Hip, Left Updated: 07/17/24 0829     Tissue - Aerobic Culture No Growth At 48 Hours    Tissue Culture - Aerobic [[id number]] Collected: 07/15/24 1255    Order Status: Completed Specimen: Tissue from Hip, Left Updated: 07/17/24 0825     Tissue - Aerobic Culture No Growth At 48 Hours    Anaerobic Culture [[id number]] Collected: 07/15/24 1256    Order Status: Completed Specimen: Tissue from Hip, Left Updated: 07/17/24 0753     Anaerobe Culture No Anaerobes Isolated    Anaerobic Culture [[id number]] Collected: 07/15/24 1255    Order Status: Completed Specimen: Tissue from Hip, Left Updated: 07/17/24 0752     Anaerobe Culture No Anaerobes Isolated    Gram Stain [[id number]] Collected: 07/15/24 1255    Order Status: Completed Specimen: Tissue from Hip, Left Updated: 07/15/24 2103     GRAM STAIN Rare WBC observed      No bacteria seen    Gram Stain [[id number]] Collected: 07/15/24 1256    Order Status: Completed Specimen: Tissue from Hip, Left Updated: 07/15/24 2103     GRAM STAIN No WBCs, No bacteria seen    Fungal Culture [[id number]] Collected: 07/15/24 1258    Order Status: Sent Specimen: Tissue from Hip, Left Updated: 07/15/24 1452    Fungal Culture [[id number]]  Collected: 07/15/24 1256    Order Status: Sent Specimen: Tissue from Hip, Left Updated: 07/15/24 1455    Fungal Culture [[id number]]     Order Status: Sent Specimen: Tissue from Hip, Left     Gram Stain [[id number]]     Order Status: Sent Specimen: Tissue from Hip, Left     Anaerobic Culture [[id number]]     Order Status: Sent Specimen: Tissue from Hip, Left     Tissue Culture - Aerobic [[id number]]     Order Status: Sent Specimen: Tissue from Hip, Left     Fungal Culture [[id number]]     Order Status: Sent Specimen: Tissue from Hip, Left     Gram Stain [[id number]]     Order Status: Sent Specimen: Tissue from Hip, Left     Anaerobic Culture [[id number]]     Order Status: Sent Specimen: Tissue from Hip, Left     Tissue Culture - Aerobic [[id number]]     Order Status: Sent Specimen: Tissue from Hip, Left              Radiology:  X-Ray Chest 1 View  Narrative: EXAMINATION:  XR CHEST 1 VIEW    CLINICAL HISTORY:  leukocytosis;    TECHNIQUE:  One view    COMPARISON:  July 2, 2024.    FINDINGS:  Cardiopericardial silhouette is within normal limits.  Cardiac device electrode terminates within the right ventricle.  No acute dense focal or segmental consolidation, congestive process, pleural effusions or pneumothorax.  Impression: No acute cardiopulmonary process identified.    Electronically signed by: Omid Rodriguez  Date:    07/16/2024  Time:    09:41      Scheduled Med:   acetaminophen  500 mg Oral 6 times per day    albuterol  2 puff Inhalation Q8H    allopurinol  100 mg Oral Daily    atorvastatin  20 mg Oral Daily    [START ON 7/18/2024] bisacodyL  10 mg Rectal Daily    carvediloL  12.5 mg Oral BID    docusate sodium  100 mg Oral BID    levothyroxine  125 mcg Oral Before breakfast    menthol-zinc oxide   Topical (Top) BID    mupirocin   Nasal BID    polyethylene glycol  17 g Oral BID    senna-docusate 8.6-50 mg  2 tablet Oral QHS    sodium zirconium cyclosilicate  10 g Oral TID    tramadol  50 mg Oral TID AC         Continuous Infusions:   sodium bicarbonate 150 mEq in D5W 1,000 mL infusion   Intravenous Continuous 125 mL/hr at 07/17/24 1535 New Bag at 07/17/24 1535        PRN Meds:    Current Facility-Administered Medications:     0.9%  NaCl infusion (for blood administration), , Intravenous, Q24H PRN    aluminum-magnesium hydroxide-simethicone, 30 mL, Oral, Q6H PRN    benzonatate, 100 mg, Oral, TID PRN    dextrose 10%, 12.5 g, Intravenous, PRN    dextrose 10%, 12.5 g, Intravenous, PRN    dextrose 10%, 25 g, Intravenous, PRN    dextrose 10%, 25 g, Intravenous, PRN    glucagon (human recombinant), 1 mg, Intramuscular, PRN    glucagon (human recombinant), 1 mg, Intramuscular, PRN    glucose, 16 g, Oral, PRN    glucose, 24 g, Oral, PRN    hydrALAZINE, 10 mg, Intravenous, Q4H PRN    hydrOXYzine pamoate, 50 mg, Oral, Nightly PRN    insulin aspart U-100, 0-5 Units, Subcutaneous, QID (AC + HS) PRN    lactulose, 20 g, Oral, Q6H PRN    melatonin, 6 mg, Oral, Nightly PRN    methocarbamoL, 500 mg, Oral, Q8H PRN    metoprolol, 10 mg, Intravenous, Q2H PRN    nitroGLYCERIN, 0.4 mg, Sublingual, Q5 Min PRN    ondansetron, 4 mg, Oral, Q6H PRN    ondansetron, 4 mg, Intravenous, Q8H PRN    ondansetron, 4 mg, Intravenous, Q6H PRN    traMADoL, 50 mg, Oral, Q4H PRN     Nutrition Status:      Assessment/Plan:  Hyperkalemia -resolved  Acute anemia -improved   BARRY  Left femur IMN 7/3, failed IMN  S/p Conversion SILVIA 7/15  Dilated cardiomyopathy  A fib  Htn  Hld  Gout  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 I have reviewed or attempted to review medical records based upon their availability      _____________________________________________________________________            Olvin Huber MD   07/17/2024

## 2024-07-18 LAB
ANION GAP SERPL CALC-SCNC: 10 MEQ/L
BACTERIA SPEC ANAEROBE CULT: NORMAL
BACTERIA SPEC ANAEROBE CULT: NORMAL
BUN SERPL-MCNC: 41.5 MG/DL (ref 9.8–20.1)
CALCIUM SERPL-MCNC: 7.7 MG/DL (ref 8.4–10.2)
CHLORIDE SERPL-SCNC: 96 MMOL/L (ref 98–107)
CO2 SERPL-SCNC: 28 MMOL/L (ref 23–31)
CREAT SERPL-MCNC: 1.92 MG/DL (ref 0.55–1.02)
CREAT/UREA NIT SERPL: 22
ERYTHROCYTE [DISTWIDTH] IN BLOOD BY AUTOMATED COUNT: 16.7 % (ref 11.5–17)
GFR SERPLBLD CREATININE-BSD FMLA CKD-EPI: 25 ML/MIN/1.73/M2
GLUCOSE SERPL-MCNC: 103 MG/DL (ref 82–115)
HCT VFR BLD AUTO: 29.7 % (ref 37–47)
HGB BLD-MCNC: 9.8 G/DL (ref 12–16)
INR PPP: 3.4 (ref 2–3)
MAGNESIUM SERPL-MCNC: 1.5 MG/DL (ref 1.6–2.6)
MCH RBC QN AUTO: 31 PG (ref 27–31)
MCHC RBC AUTO-ENTMCNC: 33 G/DL (ref 33–36)
MCV RBC AUTO: 94 FL (ref 80–94)
NRBC BLD AUTO-RTO: 0.2 %
PLATELET # BLD AUTO: 184 X10(3)/MCL (ref 130–400)
PMV BLD AUTO: 9.7 FL (ref 7.4–10.4)
POCT GLUCOSE: 101 MG/DL (ref 70–110)
POTASSIUM SERPL-SCNC: 4 MMOL/L (ref 3.5–5.1)
PROTHROMBIN TIME: 34.8 SECONDS (ref 11.7–14.5)
RBC # BLD AUTO: 3.16 X10(6)/MCL (ref 4.2–5.4)
SODIUM SERPL-SCNC: 134 MMOL/L (ref 136–145)
WBC # BLD AUTO: 9.08 X10(3)/MCL (ref 4.5–11.5)

## 2024-07-18 PROCEDURE — 80048 BASIC METABOLIC PNL TOTAL CA: CPT | Performed by: INTERNAL MEDICINE

## 2024-07-18 PROCEDURE — 36415 COLL VENOUS BLD VENIPUNCTURE: CPT | Performed by: INTERNAL MEDICINE

## 2024-07-18 PROCEDURE — 99900031 HC PATIENT EDUCATION (STAT)

## 2024-07-18 PROCEDURE — 85610 PROTHROMBIN TIME: CPT | Performed by: INTERNAL MEDICINE

## 2024-07-18 PROCEDURE — 94761 N-INVAS EAR/PLS OXIMETRY MLT: CPT

## 2024-07-18 PROCEDURE — 97535 SELF CARE MNGMENT TRAINING: CPT

## 2024-07-18 PROCEDURE — 21400001 HC TELEMETRY ROOM

## 2024-07-18 PROCEDURE — 97530 THERAPEUTIC ACTIVITIES: CPT | Mod: CQ

## 2024-07-18 PROCEDURE — 83735 ASSAY OF MAGNESIUM: CPT | Performed by: INTERNAL MEDICINE

## 2024-07-18 PROCEDURE — 94799 UNLISTED PULMONARY SVC/PX: CPT

## 2024-07-18 PROCEDURE — 99900035 HC TECH TIME PER 15 MIN (STAT)

## 2024-07-18 PROCEDURE — 25000003 PHARM REV CODE 250: Performed by: ORTHOPAEDIC SURGERY

## 2024-07-18 PROCEDURE — 25000003 PHARM REV CODE 250: Performed by: INTERNAL MEDICINE

## 2024-07-18 PROCEDURE — 85027 COMPLETE CBC AUTOMATED: CPT | Performed by: INTERNAL MEDICINE

## 2024-07-18 PROCEDURE — 63600175 PHARM REV CODE 636 W HCPCS: Performed by: INTERNAL MEDICINE

## 2024-07-18 RX ORDER — CARVEDILOL 3.12 MG/1
3.12 TABLET ORAL 2 TIMES DAILY
Status: DISCONTINUED | OUTPATIENT
Start: 2024-07-18 | End: 2024-07-26 | Stop reason: HOSPADM

## 2024-07-18 RX ORDER — SODIUM CHLORIDE 9 MG/ML
INJECTION, SOLUTION INTRAVENOUS CONTINUOUS
Status: DISCONTINUED | OUTPATIENT
Start: 2024-07-18 | End: 2024-07-20

## 2024-07-18 RX ORDER — HYDROCODONE BITARTRATE AND ACETAMINOPHEN 5; 325 MG/1; MG/1
1 TABLET ORAL EVERY 8 HOURS PRN
Status: DISCONTINUED | OUTPATIENT
Start: 2024-07-18 | End: 2024-07-26 | Stop reason: HOSPADM

## 2024-07-18 RX ORDER — MAGNESIUM SULFATE HEPTAHYDRATE 40 MG/ML
2 INJECTION, SOLUTION INTRAVENOUS ONCE
Status: COMPLETED | OUTPATIENT
Start: 2024-07-18 | End: 2024-07-18

## 2024-07-18 RX ADMIN — ALBUTEROL SULFATE 2 PUFF: 90 AEROSOL, METERED RESPIRATORY (INHALATION) at 04:07

## 2024-07-18 RX ADMIN — ALBUTEROL SULFATE 2 PUFF: 90 AEROSOL, METERED RESPIRATORY (INHALATION) at 08:07

## 2024-07-18 RX ADMIN — ACETAMINOPHEN 500 MG: 500 TABLET ORAL at 08:07

## 2024-07-18 RX ADMIN — DOCUSATE SODIUM 100 MG: 100 CAPSULE, LIQUID FILLED ORAL at 08:07

## 2024-07-18 RX ADMIN — ACETAMINOPHEN 500 MG: 500 TABLET ORAL at 12:07

## 2024-07-18 RX ADMIN — TRAMADOL HYDROCHLORIDE 50 MG: 50 TABLET, COATED ORAL at 01:07

## 2024-07-18 RX ADMIN — MAGNESIUM SULFATE HEPTAHYDRATE 2 G: 40 INJECTION, SOLUTION INTRAVENOUS at 12:07

## 2024-07-18 RX ADMIN — HYDROCODONE BITARTRATE AND ACETAMINOPHEN 1 TABLET: 5; 325 TABLET ORAL at 04:07

## 2024-07-18 RX ADMIN — SENNOSIDES AND DOCUSATE SODIUM 2 TABLET: 50; 8.6 TABLET ORAL at 08:07

## 2024-07-18 RX ADMIN — Medication 200 MG: at 08:07

## 2024-07-18 RX ADMIN — POLYETHYLENE GLYCOL 3350 17 G: 17 POWDER, FOR SOLUTION ORAL at 08:07

## 2024-07-18 RX ADMIN — CARVEDILOL 3.12 MG: 3.12 TABLET, FILM COATED ORAL at 08:07

## 2024-07-18 RX ADMIN — TRAMADOL HYDROCHLORIDE 50 MG: 50 TABLET, COATED ORAL at 09:07

## 2024-07-18 RX ADMIN — ALBUTEROL SULFATE 2 PUFF: 90 AEROSOL, METERED RESPIRATORY (INHALATION) at 12:07

## 2024-07-18 RX ADMIN — ATORVASTATIN CALCIUM 20 MG: 10 TABLET, FILM COATED ORAL at 08:07

## 2024-07-18 RX ADMIN — SODIUM CHLORIDE: 9 INJECTION, SOLUTION INTRAVENOUS at 12:07

## 2024-07-18 RX ADMIN — Medication 200 MG: at 12:07

## 2024-07-18 RX ADMIN — MENTHOL AND ZINC OXIDE: .44; 20.625 OINTMENT TOPICAL at 08:07

## 2024-07-18 RX ADMIN — HYDROCODONE BITARTRATE AND ACETAMINOPHEN 1 TABLET: 5; 325 TABLET ORAL at 08:07

## 2024-07-18 RX ADMIN — ALLOPURINOL 100 MG: 100 TABLET ORAL at 08:07

## 2024-07-18 RX ADMIN — LEVOTHYROXINE SODIUM 125 MCG: 125 TABLET ORAL at 05:07

## 2024-07-18 RX ADMIN — METHOCARBAMOL 500 MG: 500 TABLET ORAL at 05:07

## 2024-07-18 RX ADMIN — ACETAMINOPHEN 500 MG: 500 TABLET ORAL at 04:07

## 2024-07-18 NOTE — PT/OT/SLP PROGRESS
"Physical Therapy      Patient Name:  Homa Jaquez   MRN:  [id number]    Patient not seen today secondary to increase pain  . Pt found supine in bed, pt stated "please do not touch me" due to increase pain. Will follow-up when schedule allows.    "

## 2024-07-18 NOTE — PROGRESS NOTES
[facility]  [facility] Medicine Progress Note      Patient Name: Homa Jaquez  MRN: [id number]  Admission Date: 7/13/2024   Length of Stay: 5  Attending Physician: Olvin Huber MD  Primary Care Provider: Franklyn Brito MD  Face-to-Face encounter date: 07/18/2024    Code Status: Prior        Chief Complaint:   Hip pain        HPI:   Homa Jaquez is a 88 y.o. female who  has a past medical history of A-fib, Chronic cystitis, GERD (gastroesophageal reflux disease), Graves disease, Hypertension, Hypothyroidism, unspecified, and Spinal stenosis.. The patient presented to [facility] on 7/13/2024 with a primary complaint of left hip pain.  Pt with a left femur fracture after a fall 7/2, underwent IMN 7/3 and [facility]  She reported increasing pain to hip, xrays showing failed intertrochanteric femur fracture.  Pt transferred to [facility] for revision on Monday.  She is currently comfortable no complaints     Overview/Hospital Course:  No notes on file       Interval Hx:   The pt is asleep. She c/o pain and fatigue earlier. Her bp med was held due to a low normal bp.     Review of Systems   All other systems reviewed and are negative.      Objective/physical exam:  General: In no acute distress, afebrile  Chest: Clear to auscultation bilaterally, no wheezing or ronchi  Heart: irreg, irreg, no rubs or gallops, no murmur  Abdomen: Soft, nontender, BS +  MSK:s/p left hip surgery  Neurologic: no focal deficits, A&Ox3  Psych: Calm, cooperative    VITAL SIGNS: 24 HRS MIN & MAX LAST   Temp  Min: 97.6 °F (36.4 °C)  Max: 98.3 °F (36.8 °C) 98.1 °F (36.7 °C)   BP  Min: 88/54  Max: 108/64 108/64   Pulse  Min: 78  Max: 93  90   Resp  Min: 18  Max: 19 18   SpO2  Min: 95 %  Max: 100 % 95 %       Recent Labs   Lab 07/16/24  0507 07/16/24  1204 07/17/24  0421 07/18/24  0451   WBC 18.28*  --  10.05 9.08   RBC 2.73*  --  3.27* 3.16*   HGB 8.9* 7.7* 10.3* 9.8*   HCT  27.9* 24.8* 31.3* 29.7*   .2*  --  95.7* 94.0   MCH 32.6*  --  31.5* 31.0   MCHC 31.9*  --  32.9* 33.0   RDW 16.8  --  17.4* 16.7     --  192 184   MPV 9.9  --  9.8 9.7       Recent Labs   Lab 07/16/24  1835 07/17/24  0420 07/18/24  0451   * 134* 134*   K 5.1 4.9 4.0    102 96*   CO2 19* 23 28   BUN 41.6* 43.5* 41.5*   CREATININE 2.09* 2.21* 1.92*   CALCIUM 7.8* 7.7* 7.7*   MG  --   --  1.50*        Microbiology Results (last 7 days)       Procedure Component Value Units Date/Time    Tissue Culture - Aerobic [[id number]] Collected: 07/15/24 1256    Order Status: Completed Specimen: Tissue from Hip, Left Updated: 07/18/24 0909     Tissue - Aerobic Culture No Growth At 72 Hours    Tissue Culture - Aerobic [[id number]] Collected: 07/15/24 1255    Order Status: Completed Specimen: Tissue from Hip, Left Updated: 07/18/24 0900     Tissue - Aerobic Culture No Growth At 72 Hours    Anaerobic Culture [[id number]] Collected: 07/15/24 1256    Order Status: Completed Specimen: Tissue from Hip, Left Updated: 07/18/24 0840     Anaerobe Culture No Anaerobes Isolated    Anaerobic Culture [[id number]] Collected: 07/15/24 1255    Order Status: Completed Specimen: Tissue from Hip, Left Updated: 07/18/24 0839     Anaerobe Culture No Anaerobes Isolated    Gram Stain [[id number]] Collected: 07/15/24 1255    Order Status: Completed Specimen: Tissue from Hip, Left Updated: 07/15/24 2103     GRAM STAIN Rare WBC observed      No bacteria seen    Gram Stain [[id number]] Collected: 07/15/24 1256    Order Status: Completed Specimen: Tissue from Hip, Left Updated: 07/15/24 2103     GRAM STAIN No WBCs, No bacteria seen    Fungal Culture [[id number]] Collected: 07/15/24 1255    Order Status: Sent Specimen: Tissue from Hip, Left Updated: 07/15/24 1455    Fungal Culture [[id number]] Collected: 07/15/24 1256    Order Status: Sent Specimen: Tissue from Hip, Left Updated: 07/15/24 1455    Fungal Culture [[id number]]     Order  Status: Sent Specimen: Tissue from Hip, Left     Gram Stain [[id number]]     Order Status: Sent Specimen: Tissue from Hip, Left     Anaerobic Culture [[id number]]     Order Status: Sent Specimen: Tissue from Hip, Left     Tissue Culture - Aerobic [[id number]]     Order Status: Sent Specimen: Tissue from Hip, Left     Fungal Culture [[id number]]     Order Status: Sent Specimen: Tissue from Hip, Left     Gram Stain [[id number]]     Order Status: Sent Specimen: Tissue from Hip, Left     Anaerobic Culture [[id number]]     Order Status: Sent Specimen: Tissue from Hip, Left     Tissue Culture - Aerobic [[id number]]     Order Status: Sent Specimen: Tissue from Hip, Left              Radiology:  X-Ray Femur 2 View Left  Narrative: EXAMINATION:  XR FEMUR 2 VIEW LEFT    CLINICAL HISTORY:  post-op;Displaced intertrochanteric fracture of left femur, subsequent encounter for closed fracture with routine healing    COMPARISON:  None.    FINDINGS:  Hardware of the left hip has been removed with interval insertion of a hip prostheses and anchoring device position and alignment is close to anatomical    No new fractures or dislocations    Joint spaces preserved.    No blastic or lytic lesions.    Soft tissues within normal limits.  Impression: Interval removal of hardware from the left hip.    Interval insertion of total hip prostheses    Electronically signed by: García Marquez  Date:    07/18/2024  Time:    14:16      Scheduled Med:   acetaminophen  500 mg Oral 6 times per day    albuterol  2 puff Inhalation Q8H    allopurinol  100 mg Oral Daily    atorvastatin  20 mg Oral Daily    bisacodyL  10 mg Rectal Daily    carvediloL  3.125 mg Oral BID    docusate sodium  100 mg Oral BID    levothyroxine  125 mcg Oral Before breakfast    magnesium oxide  200 mg Oral BID    menthol-zinc oxide   Topical (Top) BID    polyethylene glycol  17 g Oral BID    senna-docusate 8.6-50 mg  2 tablet Oral QHS    tramadol  50 mg Oral TID AC         Continuous Infusions:   0.9% NaCl   Intravenous Continuous 75 mL/hr at 07/18/24 1220 New Bag at 07/18/24 1220        PRN Meds:    Current Facility-Administered Medications:     0.9%  NaCl infusion (for blood administration), , Intravenous, Q24H PRN    aluminum-magnesium hydroxide-simethicone, 30 mL, Oral, Q6H PRN    benzonatate, 100 mg, Oral, TID PRN    dextrose 10%, 12.5 g, Intravenous, PRN    dextrose 10%, 12.5 g, Intravenous, PRN    dextrose 10%, 25 g, Intravenous, PRN    dextrose 10%, 25 g, Intravenous, PRN    glucagon (human recombinant), 1 mg, Intramuscular, PRN    glucagon (human recombinant), 1 mg, Intramuscular, PRN    glucose, 16 g, Oral, PRN    glucose, 24 g, Oral, PRN    hydrALAZINE, 10 mg, Intravenous, Q4H PRN    hydrOXYzine pamoate, 50 mg, Oral, Nightly PRN    insulin aspart U-100, 0-5 Units, Subcutaneous, QID (AC + HS) PRN    lactulose, 20 g, Oral, Q6H PRN    melatonin, 6 mg, Oral, Nightly PRN    methocarbamoL, 500 mg, Oral, Q8H PRN    metoprolol, 10 mg, Intravenous, Q2H PRN    nitroGLYCERIN, 0.4 mg, Sublingual, Q5 Min PRN    ondansetron, 4 mg, Oral, Q6H PRN    ondansetron, 4 mg, Intravenous, Q8H PRN    ondansetron, 4 mg, Intravenous, Q6H PRN    traMADoL, 50 mg, Oral, Q4H PRN     Nutrition Status:      Assessment/Plan:  Hyperkalemia -resolved  Acute anemia -improved   BARRY  Left femur IMN 7/3, failed IMN  S/p Conversion SILVIA 7/15  Dilated cardiomyopathy  A fib  Htn  Hld  Gout  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 I have reviewed or  attempted to review medical records based upon their availability      _____________________________________________________________________            Olvin Huber MD   07/18/2024

## 2024-07-18 NOTE — NURSING
Nurses Note -- 4 Eyes      7/18/2024   12:17 AM      Skin assessed during: Q Shift Change      [x] No Altered Skin Integrity Present    []Prevention Measures Documented      [] Yes- Altered Skin Integrity Present or Discovered   [] LDA Added if Not in Epic (Describe Wound)   [] New Altered Skin Integrity was Present on Admit and Documented in LDA   [] Wound Image Taken    Wound Care Consulted? No    Attending Nurse:  Spenser Quiroz RN/Staff Member:   neil

## 2024-07-18 NOTE — PROGRESS NOTES
[facility] - Orthopaedics  Wound Care    Patient Name:  Homa Jaquez   MRN:  [id number]  Date: 7/18/2024  Diagnosis: Intertrochanteric fracture of left femur    History:     Past Medical History:   Diagnosis Date    A-fib     Chronic cystitis     GERD (gastroesophageal reflux disease)     Graves disease     Hypertension     Hypothyroidism, unspecified     Spinal stenosis        Social History     Socioeconomic History    Marital status:    Tobacco Use    Smoking status: Former     Types: Cigarettes    Smokeless tobacco: Never   Substance and Sexual Activity    Alcohol use: Yes    Drug use: Never    Sexual activity: Not Currently     Social Determinants of Health     Financial Resource Strain: Low Risk  (7/4/2024)    Overall Financial Resource Strain (CARDIA)     Difficulty of Paying Living Expenses: Not hard at all   Food Insecurity: No Food Insecurity (7/4/2024)    Hunger Vital Sign     Worried About Running Out of Food in the Last Year: Never true     Ran Out of Food in the Last Year: Never true   Transportation Needs: No Transportation Needs (7/9/2024)    PRAPARE - Transportation     Lack of Transportation (Medical): No     Lack of Transportation (Non-Medical): No   Stress: No Stress Concern Present (7/4/2024)    [employer] [place] [employer] - Occupational Stress Questionnaire     Feeling of Stress : Not at all   Housing Stability: Low Risk  (7/4/2024)    Housing Stability Vital Sign     Unable to Pay for Housing in the Last Year: No     Homeless in the Last Year: No       Precautions:     Allergies as of 07/13/2024 - Reviewed 07/13/2024   Allergen Reaction Noted    Cefadroxil  09/08/2022    Cefuroxime axetil  09/08/2022    Cephalexin  09/08/2022    Ciprofloxacin  09/08/2022    Enoxaparin  09/08/2022    Methenamine hippurate  09/08/2022    Promethazine  09/08/2022       WOC Assessment Details/Treatment      07/18/24 1531        Wound 07/09/24 1546 Intertrigo Right Groin    Date First Assessed/Time First Assessed: 07/09/24 1546   Present on Original Admission: Yes  Primary Wound Type: Intertrigo  Side: Right  Location: Groin   Dressing Appearance Open to air   Appearance Epithelialization  (healed today)        Wound 07/09/24 1553 Pressure Injury Left Heel   Date First Assessed/Time First Assessed: 07/09/24 1553   Present on Original Admission: Yes  Primary Wound Type: Pressure Injury  Side: Left  Location: Heel  Wound Approximate Age at First Assessment (Weeks): 1 weeks   Pressure Injury Stage DTPI   Appearance Red  (fading)   Care   (heel boot)        Wound 07/02/24 2230 Skin Tear Left anterior;proximal;upper Arm   Date First Assessed/Time First Assessed: 07/02/24 2230   Present on Original Admission: Yes  Primary Wound Type: Skin Tear  Side: Left  Orientation: anterior;proximal;upper  Location: Arm   Dressing Appearance Intact;Dry   Dressing Non-adherent;Foam        Wound 07/05/24 0840 Pressure Injury Buttocks   Date First Assessed/Time First Assessed: 07/05/24 0840   Present on Original Admission: Yes  Primary Wound Type: Pressure Injury  Location: (c) Buttocks   Dressing Appearance Clean;Dry;Intact   Appearance   (dark red area is faded)   Coping/Psychosocial   Plan of Care Reviewed With patient     Buttocks and right heel (deep red) pressure injuries are fading.   states that large purple area to buttocks is a birthmark. Purple area does not appear to be pressure related.  Right groin intertrigo: healed.  Left upper arm skin tear: dressing dry and intact.  Continue with strict offloading of buttocks and heels when able.  Will monitor for need of a low air loss bed.  Will follow up closely.         07/18/2024

## 2024-07-18 NOTE — PT/OT/SLP PROGRESS
Occupational Therapy   Treatment    Name: Homa Jaquez  MRN: [id number]  Admitting Diagnosis:  Intertrochanteric fracture of left femur  3 Days Post-Op    Recommendations:     Discharge Recommendations: Moderate Intensity Therapy  Discharge Equipment Recommendations:  none  Barriers to discharge:   (impaired functional mobility)    Assessment:     Homa Jaquez is a 88 y.o. female with a medical diagnosis of Intertrochanteric fracture of left femur.  Performance deficits affecting function are weakness, impaired endurance, impaired self care skills, impaired functional mobility, gait instability, impaired balance, pain, decreased safety awareness, decreased lower extremity function, decreased upper extremity function, orthopedic precautions, impaired joint extensibility, edema, impaired skin, decreased ROM.     Rehab Prognosis:  Poor; patient would benefit from acute skilled OT services to address these deficits and reach maximum level of function.       Plan:     Patient to be seen daily (BID) to address the above listed problems via self-care/home management, therapeutic activities, therapeutic exercises  Plan of Care Expires: 07/23/24  Plan of Care Reviewed with: patient    Subjective     Chief Complaint: pain  Patient/Family Comments/goals: pain management  Pain/Comfort:  Pain Rating 1:  (unable to give a numerical value)  Location - Side 1: Left  Location - Orientation 1: lateral  Location 1: thigh  Pain Addressed 1: Distraction, Reposition, Cessation of Activity    Objective:     Communicated with: PTA prior to session.  Patient found  standing with PTA and tech  with peripheral IV upon OT entry to room.    General Precautions: Standard, fall    Orthopedic Precautions:LLE weight bearing as tolerated, LLE posterior precautions  Braces: N/A  Respiratory Status: Room air     Occupational Performance:     Bed Mobility:    Patient completed Scooting/Bridging with total assistance  Patient completed Sit to  Supine with total assistance     Functional Mobility/Transfers:  Patient completed Sit <> Stand Transfer with maximal assistance and of 2 persons  with  rolling walker     Activities of Daily Living:  Lower Body Dressing: maximal assistance pt used reacher to thread RLE into brief, despite OT instructing her to don brief on surgical leg first. Pt then realized she could not dress LLE after RLE, required max assist to thread leg into brief, unable to even pick leg up off ground while seated EOB. Total A to pull brief up while standing with RW, max A x 2 to stand with RW for OT to pull up brief    Patient left right sidelying with all lines intact and call button in reach    GOALS:   Multidisciplinary Problems       Occupational Therapy Goals          Problem: Occupational Therapy    Goal Priority Disciplines Outcome Interventions   Occupational Therapy Goal     OT, PT/OT Progressing    Description: Pt will perform LB dressing max A with AE PRN by d/c.  Pt will perform toileting max A by d/c.  Pt will perform toilet t/f max A with LRAD by d/c.  Pt will perform shower t/f max assist by d/c.  Pt will perform car t/f max assist in adherence to pxns by d/c.                        Time Tracking:     OT Date of Treatment: 07/18/24  OT Start Time: 1506  OT Stop Time: 1530  OT Total Time (min): 24 min    Billable Minutes:Self Care/Home Management 24    OT/EVY: OT          7/18/2024

## 2024-07-18 NOTE — PT/OT/SLP PROGRESS
Occupational Therapy      Patient Name:  Homa Jaquez   MRN:  [id number]    Patient not seen this AM secondary to pain and fatigue. OT convinced patient to eat some of her breakfast, as it will help her healing. Agreed, pt eating as OT was walking out, will follow-up this afternoon.    7/18/2024

## 2024-07-18 NOTE — NURSING
Nurses Note -- 4 Eyes      7/17/2024   6:47 AM      Skin assessed during: Q Shift Change      [] No Altered Skin Integrity Present    []Prevention Measures Documented      [x] Yes- Altered Skin Integrity Present or Discovered   [x] LDA Added if Not in Epic (Describe Wound)   [] New Altered Skin Integrity was Present on Admit and Documented in LDA   [] Wound Image Taken    Wound Care Consulted? No    Attending Nurse:  Jessica Quiroz RN/Staff Member:  Spenser

## 2024-07-18 NOTE — PT/OT/SLP PROGRESS
Physical Therapy Treatment    Patient Name:  Homa Jaquez   MRN:  [id number]    Recommendations:     Discharge Recommendations: Moderate Intensity Therapy (Mod-High pending progress)  Discharge Equipment Recommendations: walker, rolling  Barriers to discharge:  impaired mobility     Assessment:     Homa Jaquez is a 88 y.o. female admitted with a medical diagnosis of Intertrochanteric fracture of left femur.  She presents with the following impairments/functional limitations: weakness, impaired endurance, impaired self care skills, impaired functional mobility, decreased lower extremity function, decreased upper extremity function, pain, decreased ROM, edema, orthopedic precautions .    Rehab Prognosis: Poor; patient would benefit from acute skilled PT services to address these deficits and reach maximum level of function.    Recent Surgery: Procedure(s) (LRB):  CONVERSION ARTHROPLASTY, HIP, POSTERIOR APPROACH - valencia, cell saver, pathology (Left) 3 Days Post-Op    Plan:     During this hospitalization, patient to be seen BID to address the identified rehab impairments via gait training, therapeutic activities, therapeutic exercises and progress toward the following goals:    Plan of Care Expires:  07/22/24    Subjective     Chief Complaint: pain  Patient/Family Comments/goals: we can try to stand   Pain/Comfort:         Objective:     Patient found supine with   upon PT entry to room.     General Precautions: Standard, fall  Orthopedic Precautions: LLE weight bearing as tolerated  Braces:    Respiratory Status: Room air     Functional Mobility:  Bed Mobility:     Supine to Sit: total assistance and of 2 persons  Sit to Supine: total assistance and of 2 persons  Transfers:     Sit to Stand:  total assistance and of 2 persons with rolling walker, preformed multiple times while sitting eob.     Treatment & Education:  Pt required total assist for all activities due to increase pain.  Pt required max encouragement  to participate.     Patient left supine with all lines intact and call button in reach..    GOALS:   Multidisciplinary Problems       Physical Therapy Goals          Problem: Physical Therapy    Goal Priority Disciplines Outcome Goal Variances Interventions   Physical Therapy Goal     PT, PT/OT Progressing     Description: Pt will improve functional independence by performing:    Bed mobility: CGA  Sit to stand: Min A  with rolling walker  Bed to chair t/f: Min A  with Stand Step  with rolling walker  Ambulation x 150' with CGA with rolling walker  Ramp : CGA                       Time Tracking:     PT Received On:    PT Start Time: 1450     PT Stop Time: 1528  PT Total Time (min): 38 min     Billable Minutes: Therapeutic Activity 38  Co treat with OT  Treatment Type: Treatment  PT/PTA: PTA     Number of PTA visits since last PT visit: 2     07/18/2024

## 2024-07-18 NOTE — PROGRESS NOTES
"No acute events overnight.  Pain controlled.  Resting in bed. C/o pain to L thigh    Vital Signs  Temp: 97.9 °F (36.6 °C)  Temp Source: Oral  Pulse: 79  Heart Rate Source: Monitor  Resp: 18  SpO2: 97 %  Pulse Oximetry Type: Intermittent  Device (Oxygen Therapy): room air  BP: 96/60  BP Location: Left arm  BP Method: Automatic  Patient Position: Lying  Arousal Level: opens eyes spontaneously  Height and Weight  Height: 5' 4" (162.6 cm)  Weight: 82.9 kg (182 lb 12.2 oz)  Weight Method: Standard Scale  BSA (Calculated - sq m): 1.93 sq meters  BMI (Calculated): 31.4  Weight in (lb) to have BMI = 25: 145.3]    +FHL/EHL  BCR distally  Dressing c/d/i  SILT distally    Recent Lab Results  (Last 5 results in the past 24 hours)        07/18/24  0451   07/17/24  2023   07/17/24  1747   07/17/24  1532   07/17/24  1124        Anion Gap 10.0               BUN 41.5               BUN/CREAT RATIO 22               Calcium 7.7               Chloride 96               CO2 28               Creatinine 1.92               eGFR 25               Glucose 103               Hematocrit 29.7               Hemoglobin 9.8               INR 3.4       3.5         Magnesium  1.50               MCH 31.0               MCHC 33.0               MCV 94.0               MPV 9.7               nRBC 0.2               Platelet Count 184               POCT Glucose   206   186     212       Potassium 4.0               PT 34.8       36.1         RBC 3.16               RDW 16.7               Sodium 134               WBC 9.08                                      A/P:  Status post conversion SILVIA  Pain controlled  Overall patient doing well.  Therapy for mobility and ambulation.  DVT PPx  Repeat x-ray left femur to eval   "

## 2024-07-19 LAB
ANION GAP SERPL CALC-SCNC: 10 MEQ/L
BNP BLD-MCNC: 537 PG/ML
BUN SERPL-MCNC: 43.6 MG/DL (ref 9.8–20.1)
CALCIUM SERPL-MCNC: 7.6 MG/DL (ref 8.4–10.2)
CHLORIDE SERPL-SCNC: 96 MMOL/L (ref 98–107)
CO2 SERPL-SCNC: 26 MMOL/L (ref 23–31)
CREAT SERPL-MCNC: 2.04 MG/DL (ref 0.55–1.02)
CREAT/UREA NIT SERPL: 21
ERYTHROCYTE [DISTWIDTH] IN BLOOD BY AUTOMATED COUNT: 16.9 % (ref 11.5–17)
GFR SERPLBLD CREATININE-BSD FMLA CKD-EPI: 23 ML/MIN/1.73/M2
GLUCOSE SERPL-MCNC: 76 MG/DL (ref 82–115)
HCT VFR BLD AUTO: 29.1 % (ref 37–47)
HGB BLD-MCNC: 9.7 G/DL (ref 12–16)
INR PPP: 2.7 (ref 2–3)
MAGNESIUM SERPL-MCNC: 1.9 MG/DL (ref 1.6–2.6)
MCH RBC QN AUTO: 31.7 PG (ref 27–31)
MCHC RBC AUTO-ENTMCNC: 33.3 G/DL (ref 33–36)
MCV RBC AUTO: 95.1 FL (ref 80–94)
NRBC BLD AUTO-RTO: 0 %
PLATELET # BLD AUTO: 192 X10(3)/MCL (ref 130–400)
PMV BLD AUTO: 9.9 FL (ref 7.4–10.4)
POCT GLUCOSE: 103 MG/DL (ref 70–110)
POCT GLUCOSE: 149 MG/DL (ref 70–110)
POTASSIUM SERPL-SCNC: 4.2 MMOL/L (ref 3.5–5.1)
PROTHROMBIN TIME: 28.9 SECONDS (ref 11.7–14.5)
RBC # BLD AUTO: 3.06 X10(6)/MCL (ref 4.2–5.4)
SODIUM SERPL-SCNC: 132 MMOL/L (ref 136–145)
WBC # BLD AUTO: 9.34 X10(3)/MCL (ref 4.5–11.5)

## 2024-07-19 PROCEDURE — 99900031 HC PATIENT EDUCATION (STAT)

## 2024-07-19 PROCEDURE — 21400001 HC TELEMETRY ROOM

## 2024-07-19 PROCEDURE — 36415 COLL VENOUS BLD VENIPUNCTURE: CPT | Performed by: INTERNAL MEDICINE

## 2024-07-19 PROCEDURE — 83735 ASSAY OF MAGNESIUM: CPT | Performed by: INTERNAL MEDICINE

## 2024-07-19 PROCEDURE — 25000003 PHARM REV CODE 250: Performed by: ORTHOPAEDIC SURGERY

## 2024-07-19 PROCEDURE — 97530 THERAPEUTIC ACTIVITIES: CPT

## 2024-07-19 PROCEDURE — 94799 UNLISTED PULMONARY SVC/PX: CPT

## 2024-07-19 PROCEDURE — 97535 SELF CARE MNGMENT TRAINING: CPT

## 2024-07-19 PROCEDURE — 99900035 HC TECH TIME PER 15 MIN (STAT)

## 2024-07-19 PROCEDURE — 80048 BASIC METABOLIC PNL TOTAL CA: CPT | Performed by: INTERNAL MEDICINE

## 2024-07-19 PROCEDURE — 83880 ASSAY OF NATRIURETIC PEPTIDE: CPT | Performed by: INTERNAL MEDICINE

## 2024-07-19 PROCEDURE — 25000003 PHARM REV CODE 250: Performed by: INTERNAL MEDICINE

## 2024-07-19 PROCEDURE — 85027 COMPLETE CBC AUTOMATED: CPT | Performed by: INTERNAL MEDICINE

## 2024-07-19 PROCEDURE — 85610 PROTHROMBIN TIME: CPT | Performed by: INTERNAL MEDICINE

## 2024-07-19 PROCEDURE — 94761 N-INVAS EAR/PLS OXIMETRY MLT: CPT

## 2024-07-19 RX ADMIN — LEVOTHYROXINE SODIUM 125 MCG: 125 TABLET ORAL at 05:07

## 2024-07-19 RX ADMIN — ALBUTEROL SULFATE 2 PUFF: 90 AEROSOL, METERED RESPIRATORY (INHALATION) at 09:07

## 2024-07-19 RX ADMIN — ALBUTEROL SULFATE 2 PUFF: 90 AEROSOL, METERED RESPIRATORY (INHALATION) at 12:07

## 2024-07-19 RX ADMIN — Medication 200 MG: at 08:07

## 2024-07-19 RX ADMIN — MENTHOL AND ZINC OXIDE: .44; 20.625 OINTMENT TOPICAL at 09:07

## 2024-07-19 RX ADMIN — HYDROCODONE BITARTRATE AND ACETAMINOPHEN 1 TABLET: 5; 325 TABLET ORAL at 08:07

## 2024-07-19 RX ADMIN — ACETAMINOPHEN 500 MG: 500 TABLET ORAL at 09:07

## 2024-07-19 RX ADMIN — ATORVASTATIN CALCIUM 20 MG: 10 TABLET, FILM COATED ORAL at 09:07

## 2024-07-19 RX ADMIN — CARVEDILOL 3.12 MG: 3.12 TABLET, FILM COATED ORAL at 09:07

## 2024-07-19 RX ADMIN — HYDROCODONE BITARTRATE AND ACETAMINOPHEN 1 TABLET: 5; 325 TABLET ORAL at 05:07

## 2024-07-19 RX ADMIN — ALLOPURINOL 100 MG: 100 TABLET ORAL at 09:07

## 2024-07-19 RX ADMIN — Medication 200 MG: at 09:07

## 2024-07-19 RX ADMIN — ACETAMINOPHEN 500 MG: 500 TABLET ORAL at 04:07

## 2024-07-19 RX ADMIN — SENNOSIDES AND DOCUSATE SODIUM 2 TABLET: 50; 8.6 TABLET ORAL at 08:07

## 2024-07-19 RX ADMIN — ACETAMINOPHEN 500 MG: 500 TABLET ORAL at 01:07

## 2024-07-19 RX ADMIN — CARVEDILOL 3.12 MG: 3.12 TABLET, FILM COATED ORAL at 08:07

## 2024-07-19 NOTE — PROGRESS NOTES
"No acute events overnight.  Pain controlled.  Resting in bed.     Vital Signs  Temp: 98.5 °F (36.9 °C)  Temp Source: Oral  Pulse: 95  Heart Rate Source: Monitor  Resp: 18  SpO2: 96 %  Pulse Oximetry Type: Intermittent  Device (Oxygen Therapy): room air  BP: 94/61  BP Location: Left arm  BP Method: Automatic  Patient Position: Lying  Arousal Level: opens eyes spontaneously  Height and Weight  Height: 5' 4" (162.6 cm)  Weight: 82.9 kg (182 lb 12.2 oz)  Weight Method: Standard Scale  BSA (Calculated - sq m): 1.93 sq meters  BMI (Calculated): 31.4  Weight in (lb) to have BMI = 25: 145.3]    +FHL/EHL  BCR distally  Dressing c/d/i  SILT distally    Recent Lab Results         07/19/24  0501   07/18/24  1115        Anion Gap 10.0         .0         BUN 43.6         BUN/CREAT RATIO 21         Calcium 7.6         Chloride 96         CO2 26         Creatinine 2.04         eGFR 23         Glucose 76         Hematocrit 29.1         Hemoglobin 9.7         INR 2.7         Magnesium  1.90         MCH 31.7         MCHC 33.3         MCV 95.1         MPV 9.9         nRBC 0.0         Platelet Count 192         POCT Glucose   101       Potassium 4.2         PT 28.9         RBC 3.06         RDW 16.9         Sodium 132         WBC 9.34                 A/P:  Status post conversion SILVIA  Plan to place [place] to L hip today  Pain controlled  Overall patient doing well.  Therapy for mobility and ambulation.  DVT PPx  Swelling to LLE  "

## 2024-07-19 NOTE — PT/OT/SLP PROGRESS
"Physical Therapy Treatment    Patient Name:  Homa Jaquez   MRN:  [id number]    Recommendations:     Discharge Recommendations: Moderate Intensity Therapy  Discharge Equipment Recommendations: walker, rolling  Barriers to discharge:  impaired functional mobility, pain, weakness, medical management    Assessment:     Homa Jaquez is a 88 y.o. female admitted with a medical diagnosis of Intertrochanteric fracture of left femur.  She presents with the following impairments/functional limitations: weakness, impaired endurance, impaired self care skills, impaired functional mobility, decreased lower extremity function, decreased upper extremity function, pain, decreased ROM, edema, orthopedic precautions.    Rehab Prognosis: Fair; patient would benefit from acute skilled PT services to address these deficits and reach maximum level of function.    Recent Surgery: Procedure(s) (LRB):  CONVERSION ARTHROPLASTY, HIP, POSTERIOR APPROACH - valencia, cell saver, pathology (Left) 4 Days Post-Op    Plan:     During this hospitalization, patient to be seen BID to address the identified rehab impairments via gait training, therapeutic activities, therapeutic exercises and progress toward the following goals:    Plan of Care Expires:  07/22/24    Subjective     Chief Complaint: L hip pain; weakness  Patient/Family Comments/goals: :"I'm willing to try"  Pain/Comfort:  Pain Rating 1: 8/10  Location - Side 1: Left  Location 1: hip  Pain Addressed 1: Reposition, Cessation of Activity  Pain Rating Post-Intervention 1: 4/10      Objective:     Communicated with NSG prior to session.  Patient found supine in bed upon PT entry to room.    General Precautions: Standard, fall  Orthopedic Precautions: LLE weight bearing as tolerated  Braces:    Respiratory Status: Room air     Functional Mobility:  Bed Mobility:     Rolling Left:  maximal assistance  Rolling Right: maximal assistance  Supine to Sit: maximal assistance and of 2 persons  Sit " to Supine: maximal assistance and of 2 persons  Attempted sit<>stand, pt able to get ~75% standing with total A x2. Not safe for pivot transfer to recliner at this time.    Treatment & Education:  Multiple completions of all bed mobility performed to assist CNA staff with hygiene d/t incontinent BM. PT assisted with hygiene as appropriate, total A + increased time required to complete.    Patient left with bed in chair position with all lines intact, call button in reach, RN notified, and CNA present..    GOALS:   Multidisciplinary Problems       Physical Therapy Goals          Problem: Physical Therapy    Goal Priority Disciplines Outcome Goal Variances Interventions   Physical Therapy Goal     PT, PT/OT Progressing     Description: Pt will improve functional independence by performing:    Bed mobility: CGA  Sit to stand: Min A  with rolling walker  Bed to chair t/f: Min A  with Stand Step  with rolling walker  Ambulation x 150' with CGA with rolling walker  Ramp : CGA                       Time Tracking:     PT Received On:    PT Start Time: 0830     PT Stop Time: 0915  PT Total Time (min): 45 min     Billable Minutes: Therapeutic Activity 45 min    Treatment Type: Treatment  PT/PTA: PT     Number of PTA visits since last PT visit: 3     07/19/2024

## 2024-07-19 NOTE — PT/OT/SLP PROGRESS
"Occupational Therapy   Treatment    Name: Homa Jaquez  MRN: [id number]  Admitting Diagnosis:  Intertrochanteric fracture of left femur  4 Days Post-Op    Recommendations:     Discharge Recommendations: Moderate Intensity Therapy  Discharge Equipment Recommendations:  none  Barriers to discharge:   (impaired functional mobility)    Assessment:     Homa Jaquez is a 88 y.o. female with a medical diagnosis of Intertrochanteric fracture of left femur.  Performance deficits affecting function are weakness, pain, gait instability, impaired balance, impaired self care skills, impaired functional mobility, impaired endurance, decreased upper extremity function, decreased lower extremity function, edema, impaired skin, decreased ROM, impaired joint extensibility, impaired muscle length, orthopedic precautions.     Rehab Prognosis:  Poor; patient would benefit from acute skilled OT services to address these deficits and reach maximum level of function.       Plan:     Patient to be seen daily (BID) to address the above listed problems via self-care/home management, therapeutic activities, therapeutic exercises  Plan of Care Expires: 07/23/24  Plan of Care Reviewed with: patient    Subjective     Chief Complaint: pain in L hip  Patient/Family Comments/goals: "I can try."  Pain/Comfort:  Pain Rating 1:  (unable to give numerical value, wincing with all movement of L hip)  Location - Side 1: Left  Location - Orientation 1: lateral  Location 1: hip  Pain Addressed 1: Reposition, Distraction, Cessation of Activity    Objective:     Communicated with: NSG and PT prior to session.  Patient found supine with wound vac, peripheral IV upon OT entry to room.    General Precautions: Standard, fall    Orthopedic Precautions:LLE weight bearing as tolerated, LLE posterior precautions  Braces: N/A  Respiratory Status: Room air     Occupational Performance:     Bed Mobility:    Patient completed Scooting/Bridging with moderate " assistance and 2 persons  Patient completed Supine to Sit with maximal assistance and 2 persons  Patient completed Sit to Supine with maximal assistance and 2 persons     Functional Mobility/Transfers:  To increase independence with functional bathroom transfers to improve quality of self care, Patient completed Sit <> Stand Transfer with moderate assistance, of 2 persons, and min A x1  with  rolling walker . Pt took 2 steps back toward bed while standing, mod verbal cues for sequencing.     Patient left sitting edge of bed with all lines intact and Hugo PT and Hugo SPT present    GOALS:   Multidisciplinary Problems       Occupational Therapy Goals          Problem: Occupational Therapy    Goal Priority Disciplines Outcome Interventions   Occupational Therapy Goal     OT, PT/OT Not Progressing    Description: Pt will perform LB dressing max A with AE PRN by d/c.  Pt will perform toileting max A by d/c.  Pt will perform toilet t/f max A with LRAD by d/c.  Pt will perform shower t/f max assist by d/c.  Pt will perform car t/f max assist in adherence to pxns by d/c.                        Time Tracking:     OT Date of Treatment: 07/19/24  OT Start Time: 1345  OT Stop Time: 1401  OT Total Time (min): 16 min    Billable Minutes:Self Care/Home Management 16    OT/EYV: OT          7/19/2024

## 2024-07-19 NOTE — PT/OT/SLP PROGRESS
"Occupational Therapy   Treatment    Name: Homa Jaquez  MRN: [id number]  Admitting Diagnosis:  Intertrochanteric fracture of left femur  4 Days Post-Op    Recommendations:     Discharge Recommendations: Moderate Intensity Therapy  Discharge Equipment Recommendations:  none  Barriers to discharge:   (impaired functional mobility)    Assessment:     Homa Jaquez is a 88 y.o. female with a medical diagnosis of Intertrochanteric fracture of left femur.  Performance deficits affecting function are weakness, pain, decreased safety awareness, decreased lower extremity function, gait instability, impaired balance, impaired functional mobility, impaired self care skills, impaired endurance, orthopedic precautions, impaired joint extensibility, impaired coordination, decreased ROM, edema, impaired muscle length.     Rehab Prognosis:  Poor; patient would benefit from acute skilled OT services to address these deficits and reach maximum level of function.       Plan:     Patient to be seen daily (BID) to address the above listed problems via self-care/home management, therapeutic activities, therapeutic exercises  Plan of Care Expires: 07/23/24  Plan of Care Reviewed with: patient    Subjective     Chief Complaint: pain  Patient/Family Comments/goals: "I think that's enough."  Pain/Comfort:  Pain Rating 1:  (unable to give numerical value, wincing with all movement of L hip)  Location - Side 1: Left  Location - Orientation 1: lateral  Location 1: hip  Pain Addressed 1: Reposition, Distraction, Cessation of Activity    Objective:     Communicated with: NSJACQUI prior to session.  Patient found HOB elevated with peripheral IV upon OT entry to room.    General Precautions: Standard, fall    Orthopedic Precautions:LLE weight bearing as tolerated, LLE posterior precautions  Braces: N/A  Respiratory Status: Room air     Occupational Performance:     Bed Mobility:    Patient completed Rolling/Turning to Left with  total " assistance  Patient completed Rolling/Turning to Right with total assistance  Patient completed Scooting/Bridging with total assistance and 2 persons  Patient completed Supine to Sit with moderate assistance  Patient completed Sit to Supine with maximal assistance     Treatment & Education:  OT attempted to instruct patient in technique for sock aid, pt unable to tolerate sitting EOB to learn AE. Requested to be returned to supine    Patient left HOB elevated with all lines intact and call button in reach    GOALS:   Multidisciplinary Problems       Occupational Therapy Goals          Problem: Occupational Therapy    Goal Priority Disciplines Outcome Interventions   Occupational Therapy Goal     OT, PT/OT Progressing    Description: Pt will perform LB dressing max A with AE PRN by d/c.  Pt will perform toileting max A by d/c.  Pt will perform toilet t/f max A with LRAD by d/c.  Pt will perform shower t/f max assist by d/c.  Pt will perform car t/f max assist in adherence to pxns by d/c.                        Time Tracking:     OT Date of Treatment: 07/19/24  OT Start Time: 0938  OT Stop Time: 1016  OT Total Time (min): 38 min    Billable Minutes:Therapeutic Activity 38    OT/EVY: OT          7/19/2024

## 2024-07-19 NOTE — NURSING
Nurses Note -- 4 Eyes      7/18/2024   7:40 PM      Skin assessed during: Q Shift Change      [] No Altered Skin Integrity Present    []Prevention Measures Documented      [x] Yes- Altered Skin Integrity Present or Discovered   [] LDA Added if Not in Epic (Describe Wound)   [] New Altered Skin Integrity was Present on Admit and Documented in LDA   [] Wound Image Taken    Wound Care Consulted? No    Attending Nurse:  Spenser Quiroz RN/Staff Member:   km

## 2024-07-19 NOTE — PT/OT/SLP PROGRESS
Physical Therapy Treatment    Patient Name:  Homa Jaquez   MRN:  [id number]    Recommendations:     Discharge Recommendations: Moderate Intensity Therapy  Discharge Equipment Recommendations: walker, rolling  Barriers to discharge:  impaired functional mobility    Assessment:     Homa Jaquez is a 88 y.o. female admitted with a medical diagnosis of Intertrochanteric fracture of left femur.  She presents with the following impairments/functional limitations: weakness, impaired endurance, impaired self care skills, impaired functional mobility, decreased lower extremity function, decreased upper extremity function, pain, decreased ROM, edema, orthopedic precautions.    Rehab Prognosis: Fair; patient would benefit from acute skilled PT services to address these deficits and reach maximum level of function.    Recent Surgery: Procedure(s) (LRB):  CONVERSION ARTHROPLASTY, HIP, POSTERIOR APPROACH - valencia, cell saver, pathology (Left) 4 Days Post-Op    Plan:     During this hospitalization, patient to be seen BID to address the identified rehab impairments via gait training, therapeutic activities, therapeutic exercises and progress toward the following goals:    Plan of Care Expires:  07/22/24    Subjective     Chief Complaint: L hip pain, weakness, fatigue  Pain/Comfort:  Pain Rating 1: 6/10  Location - Side 1: Left  Location 1: hip  Pain Addressed 1: Reposition, Cessation of Activity  Pain Rating Post-Intervention 1: 0/10      Objective:     Communicated with NSG prior to session.  Patient found supine upon PT entry to room.     General Precautions: Standard, fall  Orthopedic Precautions: LLE weight bearing as tolerated  Braces:    Respiratory Status: Room air     Functional Mobility:  Bed Mobility:     Supine to Sit: maximal assistance and of 2 persons  Sit to Supine: maximal assistance and of 2 persons  Transfers:     Sit to Stand:   Performed x3 completions:  1st completed with x3 person assist: mod A x2 + min  A x1  2nd and 3rd completed with x2 person assist: mod A x2  Pt stood to tolerance on each completion (~10 seconds), requiring VC and education on technique and hand placement with each sit<>stand, pt exhibited poor adherence to education and cueing. Extended seated breaks needed between reps.    Patient left right sidelying with call button in reach and NSG staff present..    GOALS:   Multidisciplinary Problems       Physical Therapy Goals          Problem: Physical Therapy    Goal Priority Disciplines Outcome Goal Variances Interventions   Physical Therapy Goal     PT, PT/OT Progressing     Description: Pt will improve functional independence by performing:    Bed mobility: CGA  Sit to stand: Min A  with rolling walker  Bed to chair t/f: Min A  with Stand Step  with rolling walker  Ambulation x 150' with CGA with rolling walker  Ramp : CGA                       Time Tracking:     PT Received On:    PT Start Time: 1345     PT Stop Time: 1415  PT Total Time (min): 30 min     Billable Minutes: Therapeutic Activity 30 min    Treatment Type: Treatment  PT/PTA: PT     Number of PTA visits since last PT visit: 3     07/19/2024

## 2024-07-19 NOTE — PROGRESS NOTES
Inpatient Nutrition Assessment    Admit Date: 7/13/2024   Total duration of encounter: 6 days   Patient Age: 88 y.o.    Nutrition Recommendation/Prescription     Consider changing diet to 1800 diabetic diet. Add coumadin modifier when coumadin restarted  Will provide Boost Glucose Control TID due to elevated glucose levels (250 kcal and 14 gm protein per serving)  Weekly wts  Consider adding mvi daily to aid in wound healing  Medical management of glucose    Communication of Recommendations:  EMR    Nutrition Assessment     Malnutrition Assessment/Nutrition-Focused Physical Exam    Malnutrition Context: other (see comments) (Does not meet criteria) (07/19/24 1022)                                                           A minimum of two characteristics is recommended for diagnosis of either severe or non-severe malnutrition.    Chart Review    Reason Seen: follow-up    Malnutrition Screening Tool Results   Have you recently lost weight without trying?: No  Have you been eating poorly because of a decreased appetite?: No   MST Score: 0   Diagnosis:  Status post intramedullary nail left hip with a failed intertrochanteric femur fracture     Relevant Medical History:   Past Medical History:   Diagnosis Date    A-fib     Chronic cystitis     GERD (gastroesophageal reflux disease)     Graves disease     Hypertension     Hypothyroidism, unspecified     Spinal stenosis          Scheduled Medications:  acetaminophen, 500 mg, 6 times per day  albuterol, 2 puff, Q8H  allopurinol, 100 mg, Daily  atorvastatin, 20 mg, Daily  carvediloL, 3.125 mg, BID  docusate sodium, 100 mg, BID  levothyroxine, 125 mcg, Before breakfast  magnesium oxide, 200 mg, BID  menthol-zinc oxide, , BID  polyethylene glycol, 17 g, BID  senna-docusate 8.6-50 mg, 2 tablet, QHS    Continuous Infusions:  0.9% NaCl, Last Rate: 75 mL/hr at 07/18/24 1220    PRN Medications:  0.9%  NaCl infusion (for blood administration), , Q24H PRN  aluminum-magnesium  hydroxide-simethicone, 30 mL, Q6H PRN  benzonatate, 100 mg, TID PRN  dextrose 10%, 12.5 g, PRN  dextrose 10%, 12.5 g, PRN  dextrose 10%, 25 g, PRN  dextrose 10%, 25 g, PRN  glucagon (human recombinant), 1 mg, PRN  glucagon (human recombinant), 1 mg, PRN  glucose, 16 g, PRN  glucose, 24 g, PRN  HYDROcodone-acetaminophen, 1 tablet, Q8H PRN  hydrOXYzine pamoate, 50 mg, Nightly PRN  insulin aspart U-100, 0-5 Units, QID (AC + HS) PRN  lactulose, 20 g, Q6H PRN  melatonin, 6 mg, Nightly PRN  methocarbamoL, 500 mg, Q8H PRN  metoprolol, 10 mg, Q2H PRN  nitroGLYCERIN, 0.4 mg, Q5 Min PRN  ondansetron, 4 mg, Q6H PRN  ondansetron, 4 mg, Q8H PRN  traMADoL, 50 mg, Q4H PRN    Calorie Containing IV Medications: no significant kcals from medications at this time    Recent Labs   Lab 07/14/24  0609 07/15/24  0434 07/15/24  1600 07/15/24  1919 07/15/24  2137 07/16/24  0507 07/16/24  0810 07/16/24  1202 07/16/24  1204 07/16/24  1835 07/17/24  0420 07/17/24  0421 07/18/24  0451 07/19/24  0501    140  --    < >  --  138 136 135*  --  133* 134*  --  134* 132*   K 5.0 6.0*  --    < >  --  6.9* 5.7* 5.3*  --  5.1 4.9  --  4.0 4.2   CALCIUM 8.8 8.7  --    < >  --  8.3* 7.6* 8.2*  --  7.8* 7.7*  --  7.7* 7.6*   MG  --   --   --   --   --   --   --   --   --   --   --   --  1.50* 1.90   CO2 23 26  --    < >  --  22* 21* 17*  --  19* 23  --  28 26   BUN 33.6* 33.9*  --    < >  --  38.9* 40.2* 41.0*  --  41.6* 43.5*  --  41.5* 43.6*   CREATININE 1.21* 1.29*  --    < >  --  1.72* 1.88* 1.96*  --  2.09* 2.21*  --  1.92* 2.04*   EGFRNORACEVR 43 40  --    < >  --  29 26 24  --  23 21  --  25 23   GLUCOSE 111 107  --    < >  --  164* 355* 211*  --  161* 165*  --  103 76*   WBC 10.60 8.48  --   --   --  18.28*  --   --   --   --   --  10.05 9.08 9.34   HGB 10.9* 10.9* 10.1*  --  10.2* 8.9*  --   --  7.7*  --   --  10.3* 9.8* 9.7*   HCT 33.4* 34.5* 31.5*  --  32.6* 27.9*  --   --  24.8*  --   --  31.3* 29.7* 29.1*    < > = values in this interval  "not displayed.     Nutrition Orders:  Diet diabetic 2000 Calorie      Appetite/Oral Intake: poor/0-25% of meals  Factors Affecting Nutritional Intake: decreased appetite  Social Needs Impacting Access to Food: none identified  Food/Yazdanism/Cultural Preferences: none reported  Food Allergies: no known food allergies  Last Bowel Movement: 24  Wound(s):     Wound 24 Skin Tear Left anterior;proximal;upper Arm-Tissue loss description: Partial thickness Wound 240 Skin Tear Left anterior;proximal;upper Arm-Tissue loss description: Full thickness     Comments    7/15:  Pt in surgery during rounds.  Pt transferred from rehab unit / L hip fx status.  Prior RD recorded pt with good appetite and intake during rehab stay.  New labs / meds reviewed - monitor renal indices    () Pt with poor appetite and intake. Consuming ~25% of meals. No reports of N/V/C/D. No difficulties chewing or swallowing noted. Set-up assist for meals. Pressure injury to left heel. Wound vac to be placed today. Med/labs reviewed.  Pt on diabetic diet due to CBG's elevated. Current A1C 5.2% Not diagnosed diabetic    Anthropometrics    Height: 5' 4" (162.6 cm),    Last Weight: 82.9 kg (182 lb 12.2 oz) (24 1453), Weight Method: Standard Scale  BMI (Calculated): 31.4  BMI Classification: obese grade I (BMI 30-34.9)     Ideal Body Weight (IBW), Female: 120 lb     % Ideal Body Weight, Female (lb): 152.3 %                    Usual Body Weight (UBW), k.5 kg  % Usual Body Weight: 117.83     Usual Weight Provided By: EMR weight history    Wt Readings from Last 5 Encounters:   24 82.9 kg (182 lb 12.2 oz)   24 82.9 kg (182 lb 12.2 oz)   24 70.3 kg (155 lb)   23 67 kg (147 lb 12.8 oz)   23 71.1 kg (156 lb 12.8 oz)     Weight Change(s) Since Admission:   (7/15) new admit -- wt up significantly from rehab weights - monitor  () no new wt recorded  Wt Readings from Last 1 Encounters: "   07/13/24 1453 82.9 kg (182 lb 12.2 oz)   07/13/24 1452 82.9 kg (182 lb 12.2 oz)   Admit Weight: 82.9 kg (182 lb 12.2 oz) (07/13/24 1452), Weight Method: Standard Scale    Estimated Needs    Weight Used For Calorie Calculations: 82.9 kg (182 lb 12.2 oz)  Energy Calorie Requirements (kcal): 3050-3349 kcal/d (MSJ x 1.3 sf)  Energy Need Method: [place]-St Jeor  Weight Used For Protein Calculations: 54.4 kg (120 lb) (IBW used for calculation)  Protein Requirements: 82 g Pro / d (1.5 g/kg IBW)  Fluid Requirements (mL): 2000 ml/d (25 ml/kg)        Enteral Nutrition     Patient not receiving enteral nutrition at this time.    Parenteral Nutrition     Patient not receiving parenteral nutrition support at this time.    Evaluation of Received Nutrient Intake    Calories: not meeting estimated needs  Protein: not meeting estimated needs    Patient Education     Not applicable.    Nutrition Diagnosis     PES: Inadequate oral intake related to acute illness as evidenced by poor intake (<25%) of meals since diet advanced.  (active)       Nutrition Interventions     Intervention(s): general/healthful diet and collaboration with other providers    Goal: Meet greater than 80% of nutritional needs by follow-up. (goal progressing)      Nutrition Goals & Monitoring     Dietitian will monitor: food and beverage intake, weight change, and electrolyte/renal panel  Discharge planning: too early to determine; pending clinical course  Nutrition Risk/Follow-Up: low (follow-up in 5-7 days)   Please consult if re-assessment needed sooner.

## 2024-07-19 NOTE — PLAN OF CARE
Problem: Occupational Therapy  Goal: Occupational Therapy Goal  Description: Pt will perform LB dressing max A with AE PRN by d/c.  Pt will perform toileting max A by d/c.  Pt will perform toilet t/f max A with LRAD by d/c.  Pt will perform shower t/f max assist by d/c.  Pt will perform car t/f max assist in adherence to pxns by d/c.   Outcome: Not Progressing

## 2024-07-19 NOTE — PLAN OF CARE
[place] [facility] [place] [place] Dr Holley plan to readmit to rehab when medically stable.     Per Dr Bains, pt not medically stable for transfer. Plan transfer to rehab early next.     Updated pt & son re: placement status.

## 2024-07-19 NOTE — PROGRESS NOTES
[facility]  [facility] Medicine Progress Note      Patient Name: Homa Jaquez  MRN: [id number]  Admission Date: 7/13/2024   Length of Stay: 6  Attending Physician: Olvin Huber MD  Primary Care Provider: Franklyn Brito MD  Face-to-Face encounter date: 07/19/2024    Code Status: Prior        Chief Complaint:   Hip pain        HPI:   Homa Jaquez is a 88 y.o. female who  has a past medical history of A-fib, Chronic cystitis, GERD (gastroesophageal reflux disease), Graves disease, Hypertension, Hypothyroidism, unspecified, and Spinal stenosis.. The patient presented to [facility] on 7/13/2024 with a primary complaint of left hip pain.  Pt with a left femur fracture after a fall 7/2, underwent IMN 7/3 and [facility].  She reported increasing pain to hip, xrays showing failed intertrochanteric femur fracture.  Pt transferred to [facility] for revision on Monday.  She is currently comfortable no complaints     Overview/Hospital Course:  No notes on file       Interval Hx:   The pt is asleep. She worked with therapy today. 2 Large bms. Her  is at the bedside. No acute issues reported. I reviewed the tx plan with him. All questions answered.    Review of Systems   All other systems reviewed and are negative.      Objective/physical exam:  General: In no acute distress, afebrile  Chest: Clear to auscultation bilaterally, no wheezing or ronchi  Heart: irreg, irreg, no rubs or gallops, no murmur, no edema  Abdomen: Soft, nontender, BS +  MSK:s/p left hip surgery, wound vac in place  Neurologic: no focal deficits, A&Ox3  Psych: Calm, cooperative    VITAL SIGNS: 24 HRS MIN & MAX LAST   Temp  Min: 98.1 °F (36.7 °C)  Max: 99.2 °F (37.3 °C) 98.2 °F (36.8 °C)   BP  Min: 87/55  Max: 120/70 108/64   Pulse  Min: 72  Max: 101  95   Resp  Min: 18  Max: 18 18   SpO2  Min: 92 %  Max: 96 % (!) 92 %       Recent Labs   Lab 07/17/24  0421 07/18/24  4530  07/19/24  0501   WBC 10.05 9.08 9.34   RBC 3.27* 3.16* 3.06*   HGB 10.3* 9.8* 9.7*   HCT 31.3* 29.7* 29.1*   MCV 95.7* 94.0 95.1*   MCH 31.5* 31.0 31.7*   MCHC 32.9* 33.0 33.3   RDW 17.4* 16.7 16.9    184 192   MPV 9.8 9.7 9.9       Recent Labs   Lab 07/17/24  0420 07/18/24  0451 07/19/24  0501   * 134* 132*   K 4.9 4.0 4.2    96* 96*   CO2 23 28 26   BUN 43.5* 41.5* 43.6*   CREATININE 2.21* 1.92* 2.04*   CALCIUM 7.7* 7.7* 7.6*   MG  --  1.50* 1.90        Microbiology Results (last 7 days)       Procedure Component Value Units Date/Time    Tissue Culture - Aerobic [[id number]] Collected: 07/15/24 1255    Order Status: Completed Specimen: Tissue from Hip, Left Updated: 07/19/24 0940     Tissue - Aerobic Culture No growth at 4 days    Tissue Culture - Aerobic [[id number]] Collected: 07/15/24 1256    Order Status: Completed Specimen: Tissue from Hip, Left Updated: 07/19/24 0939     Tissue - Aerobic Culture No growth at 4 days    Anaerobic Culture [[id number]] Collected: 07/15/24 1256    Order Status: Completed Specimen: Tissue from Hip, Left Updated: 07/18/24 0840     Anaerobe Culture No Anaerobes Isolated    Anaerobic Culture [[id number]] Collected: 07/15/24 1255    Order Status: Completed Specimen: Tissue from Hip, Left Updated: 07/18/24 0839     Anaerobe Culture No Anaerobes Isolated    Gram Stain [[id number]] Collected: 07/15/24 1255    Order Status: Completed Specimen: Tissue from Hip, Left Updated: 07/15/24 2103     GRAM STAIN Rare WBC observed      No bacteria seen    Gram Stain [[id number]] Collected: 07/15/24 1256    Order Status: Completed Specimen: Tissue from Hip, Left Updated: 07/15/24 2103     GRAM STAIN No WBCs, No bacteria seen    Fungal Culture [[id number]] Collected: 07/15/24 1255    Order Status: Sent Specimen: Tissue from Hip, Left Updated: 07/15/24 1451    Fungal Culture [[id number]] Collected: 07/15/24 1256    Order Status: Sent Specimen: Tissue from Hip, Left Updated: 07/15/24  1455    Fungal Culture [[id number]]     Order Status: Sent Specimen: Tissue from Hip, Left     Gram Stain [[id number]]     Order Status: Sent Specimen: Tissue from Hip, Left     Anaerobic Culture [[id number]]     Order Status: Sent Specimen: Tissue from Hip, Left     Tissue Culture - Aerobic [[id number]]     Order Status: Sent Specimen: Tissue from Hip, Left     Fungal Culture [[id number]]     Order Status: Sent Specimen: Tissue from Hip, Left     Gram Stain [[id number]]     Order Status: Sent Specimen: Tissue from Hip, Left     Anaerobic Culture [[id number]]     Order Status: Sent Specimen: Tissue from Hip, Left     Tissue Culture - Aerobic [[id number]]     Order Status: Sent Specimen: Tissue from Hip, Left              Radiology:  X-Ray Femur 2 View Left  Narrative: EXAMINATION:  XR FEMUR 2 VIEW LEFT    CLINICAL HISTORY:  post-op;Displaced intertrochanteric fracture of left femur, subsequent encounter for closed fracture with routine healing    COMPARISON:  None.    FINDINGS:  Hardware of the left hip has been removed with interval insertion of a hip prostheses and anchoring device position and alignment is close to anatomical    No new fractures or dislocations    Joint spaces preserved.    No blastic or lytic lesions.    Soft tissues within normal limits.  Impression: Interval removal of hardware from the left hip.    Interval insertion of total hip prostheses    Electronically signed by: García Marquez  Date:    07/18/2024  Time:    14:16      Scheduled Med:   acetaminophen  500 mg Oral 6 times per day    albuterol  2 puff Inhalation Q8H    allopurinol  100 mg Oral Daily    atorvastatin  20 mg Oral Daily    carvediloL  3.125 mg Oral BID    docusate sodium  100 mg Oral BID    levothyroxine  125 mcg Oral Before breakfast    magnesium oxide  200 mg Oral BID    menthol-zinc oxide   Topical (Top) BID    polyethylene glycol  17 g Oral BID    senna-docusate 8.6-50 mg  2 tablet Oral QHS        Continuous  Infusions:   0.9% NaCl   Intravenous Continuous 75 mL/hr at 07/18/24 1220 New Bag at 07/18/24 1220        PRN Meds:    Current Facility-Administered Medications:     0.9%  NaCl infusion (for blood administration), , Intravenous, Q24H PRN    aluminum-magnesium hydroxide-simethicone, 30 mL, Oral, Q6H PRN    benzonatate, 100 mg, Oral, TID PRN    dextrose 10%, 12.5 g, Intravenous, PRN    dextrose 10%, 12.5 g, Intravenous, PRN    dextrose 10%, 25 g, Intravenous, PRN    dextrose 10%, 25 g, Intravenous, PRN    glucagon (human recombinant), 1 mg, Intramuscular, PRN    glucagon (human recombinant), 1 mg, Intramuscular, PRN    glucose, 16 g, Oral, PRN    glucose, 24 g, Oral, PRN    HYDROcodone-acetaminophen, 1 tablet, Oral, Q8H PRN    hydrOXYzine pamoate, 50 mg, Oral, Nightly PRN    insulin aspart U-100, 0-5 Units, Subcutaneous, QID (AC + HS) PRN    lactulose, 20 g, Oral, Q6H PRN    melatonin, 6 mg, Oral, Nightly PRN    methocarbamoL, 500 mg, Oral, Q8H PRN    metoprolol, 10 mg, Intravenous, Q2H PRN    nitroGLYCERIN, 0.4 mg, Sublingual, Q5 Min PRN    ondansetron, 4 mg, Oral, Q6H PRN    ondansetron, 4 mg, Intravenous, Q8H PRN    traMADoL, 50 mg, Oral, Q4H PRN     Nutrition Status:      Assessment/Plan:    Acute anemia -improved   BARRY  Left femur IMN 7/3, failed IMN  S/p Conversion SILVIA 7/15  Dilated cardiomyopathy  A fib  Htn  Hld  Gout  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 I have reviewed or attempted to review medical records based upon  their availability      _____________________________________________________________________            Olvin Huber MD   07/19/2024

## 2024-07-20 LAB
ALBUMIN SERPL-MCNC: 1.9 G/DL (ref 3.4–4.8)
BACTERIA TISS AEROBE CULT: NORMAL
BACTERIA TISS AEROBE CULT: NORMAL
BUN SERPL-MCNC: 49.7 MG/DL (ref 9.8–20.1)
CALCIUM SERPL-MCNC: 8.3 MG/DL (ref 8.4–10.2)
CHLORIDE SERPL-SCNC: 96 MMOL/L (ref 98–107)
CO2 SERPL-SCNC: 25 MMOL/L (ref 23–31)
CREAT SERPL-MCNC: 2.39 MG/DL (ref 0.55–1.02)
GFR SERPLBLD CREATININE-BSD FMLA CKD-EPI: 19 ML/MIN/1.73/M2
GLUCOSE SERPL-MCNC: 105 MG/DL (ref 82–115)
INR PPP: 2 (ref 2–3)
MAGNESIUM SERPL-MCNC: 2 MG/DL (ref 1.6–2.6)
PHOSPHATE SERPL-MCNC: 3.7 MG/DL (ref 2.3–4.7)
POCT GLUCOSE: 148 MG/DL (ref 70–110)
POCT GLUCOSE: 151 MG/DL (ref 70–110)
POCT GLUCOSE: 155 MG/DL (ref 70–110)
POCT GLUCOSE: 180 MG/DL (ref 70–110)
POTASSIUM SERPL-SCNC: 4.3 MMOL/L (ref 3.5–5.1)
PROTHROMBIN TIME: 23.3 SECONDS (ref 11.7–14.5)
SODIUM SERPL-SCNC: 132 MMOL/L (ref 136–145)
VIEW PATHOLOGY REPORT (RELIAPATH): NORMAL

## 2024-07-20 PROCEDURE — 97530 THERAPEUTIC ACTIVITIES: CPT

## 2024-07-20 PROCEDURE — 94761 N-INVAS EAR/PLS OXIMETRY MLT: CPT

## 2024-07-20 PROCEDURE — 99900035 HC TECH TIME PER 15 MIN (STAT)

## 2024-07-20 PROCEDURE — 83735 ASSAY OF MAGNESIUM: CPT | Performed by: INTERNAL MEDICINE

## 2024-07-20 PROCEDURE — 80069 RENAL FUNCTION PANEL: CPT | Performed by: INTERNAL MEDICINE

## 2024-07-20 PROCEDURE — 97535 SELF CARE MNGMENT TRAINING: CPT

## 2024-07-20 PROCEDURE — 25000003 PHARM REV CODE 250: Performed by: ORTHOPAEDIC SURGERY

## 2024-07-20 PROCEDURE — 25000003 PHARM REV CODE 250: Performed by: INTERNAL MEDICINE

## 2024-07-20 PROCEDURE — 99900031 HC PATIENT EDUCATION (STAT)

## 2024-07-20 PROCEDURE — 21400001 HC TELEMETRY ROOM

## 2024-07-20 PROCEDURE — 85610 PROTHROMBIN TIME: CPT | Performed by: INTERNAL MEDICINE

## 2024-07-20 PROCEDURE — 94799 UNLISTED PULMONARY SVC/PX: CPT

## 2024-07-20 PROCEDURE — 36415 COLL VENOUS BLD VENIPUNCTURE: CPT | Performed by: INTERNAL MEDICINE

## 2024-07-20 RX ORDER — SODIUM CHLORIDE 9 MG/ML
INJECTION, SOLUTION INTRAVENOUS CONTINUOUS
Status: DISCONTINUED | OUTPATIENT
Start: 2024-07-20 | End: 2024-07-21

## 2024-07-20 RX ADMIN — ALBUTEROL SULFATE 2 PUFF: 90 AEROSOL, METERED RESPIRATORY (INHALATION) at 12:07

## 2024-07-20 RX ADMIN — ACETAMINOPHEN 500 MG: 500 TABLET ORAL at 11:07

## 2024-07-20 RX ADMIN — SODIUM CHLORIDE: 9 INJECTION, SOLUTION INTRAVENOUS at 06:07

## 2024-07-20 RX ADMIN — ALLOPURINOL 100 MG: 100 TABLET ORAL at 08:07

## 2024-07-20 RX ADMIN — ACETAMINOPHEN 500 MG: 500 TABLET ORAL at 04:07

## 2024-07-20 RX ADMIN — Medication 200 MG: at 08:07

## 2024-07-20 RX ADMIN — CARVEDILOL 3.12 MG: 3.12 TABLET, FILM COATED ORAL at 08:07

## 2024-07-20 RX ADMIN — TRAMADOL HYDROCHLORIDE 50 MG: 50 TABLET, COATED ORAL at 04:07

## 2024-07-20 RX ADMIN — SODIUM CHLORIDE 500 ML: 9 INJECTION, SOLUTION INTRAVENOUS at 01:07

## 2024-07-20 RX ADMIN — ACETAMINOPHEN 500 MG: 500 TABLET ORAL at 08:07

## 2024-07-20 RX ADMIN — MENTHOL AND ZINC OXIDE: .44; 20.625 OINTMENT TOPICAL at 08:07

## 2024-07-20 RX ADMIN — LEVOTHYROXINE SODIUM 125 MCG: 125 TABLET ORAL at 05:07

## 2024-07-20 RX ADMIN — ATORVASTATIN CALCIUM 20 MG: 10 TABLET, FILM COATED ORAL at 08:07

## 2024-07-20 NOTE — PROGRESS NOTES
[facility]  [facility] Medicine Progress Note      Patient Name: Homa Jaquez  MRN: [id number]  Admission Date: 7/13/2024   Length of Stay: 7  Attending Physician: Olvin Huber MD  Primary Care Provider: Franklyn Brito MD  Face-to-Face encounter date: 07/20/2024    Code Status: Prior        Chief Complaint:   Hip pain        HPI:   Homa Jaquez is a 88 y.o. female who  has a past medical history of A-fib, Chronic cystitis, GERD (gastroesophageal reflux disease), Graves disease, Hypertension, Hypothyroidism, unspecified, and Spinal stenosis.. The patient presented to [facility] on 7/13/2024 with a primary complaint of left hip pain.  Pt with a left femur fracture after a fall 7/2, underwent IMN 7/3 and transferred to [facility] 7/9.  She reported increasing pain to hip, xrays showing failed intertrochanteric femur fracture.  Pt transferred to [facility] for revision on Monday.  She is currently comfortable no complaints     Overview/Hospital Course:  No notes on file       Interval Hx:   The pt was asleep but easily arousable. She appears drowsy. She has no c/o. Denies cp, dyspnea, dizziness or weakness. Her bp was better overnight but borderline low at 11 am. Case d/w RN at bedside.    Review of Systems   All other systems reviewed and are negative.      Objective/physical exam:  General: In no acute distress, afebrile  Chest: Clear to auscultation bilaterally, no wheezing or ronchi  Heart: irreg, irreg, no rubs or gallops, no murmur, no edema  Abdomen: Soft, nontender, BS +  MSK:s/p left hip surgery, wound vac in place  Neurologic: no focal deficits, A&Ox3  Psych: Calm, cooperative    VITAL SIGNS: 24 HRS MIN & MAX LAST   Temp  Min: 96.7 °F (35.9 °C)  Max: 98.3 °F (36.8 °C) 98.3 °F (36.8 °C)   BP  Min: 90/54  Max: 133/78 (!) 92/57   Pulse  Min: 74  Max: 97  90   Resp  Min: 18  Max: 18 18   SpO2  Min: 92 %  Max: 98 % 97 %       Recent Labs   Lab 07/17/24  0426  07/18/24  0451 07/19/24  0501   WBC 10.05 9.08 9.34   RBC 3.27* 3.16* 3.06*   HGB 10.3* 9.8* 9.7*   HCT 31.3* 29.7* 29.1*   MCV 95.7* 94.0 95.1*   MCH 31.5* 31.0 31.7*   MCHC 32.9* 33.0 33.3   RDW 17.4* 16.7 16.9    184 192   MPV 9.8 9.7 9.9       Recent Labs   Lab 07/18/24  0451 07/19/24  0501 07/20/24  0603   * 132* 132*   K 4.0 4.2 4.3   CL 96* 96* 96*   CO2 28 26 25   BUN 41.5* 43.6* 49.7*   CREATININE 1.92* 2.04* 2.39*   CALCIUM 7.7* 7.6* 8.3*   MG 1.50* 1.90 2.00   ALBUMIN  --   --  1.9*        Microbiology Results (last 7 days)       Procedure Component Value Units Date/Time    Tissue Culture - Aerobic [[id number]] Collected: 07/15/24 1256    Order Status: Completed Specimen: Tissue from Hip, Left Updated: 07/20/24 0906     Tissue - Aerobic Culture No Growth at 5 days    Tissue Culture - Aerobic [[id number]] Collected: 07/15/24 1255    Order Status: Completed Specimen: Tissue from Hip, Left Updated: 07/20/24 0905     Tissue - Aerobic Culture No Growth at 5 days    Anaerobic Culture [[id number]] Collected: 07/15/24 1256    Order Status: Completed Specimen: Tissue from Hip, Left Updated: 07/18/24 0840     Anaerobe Culture No Anaerobes Isolated    Anaerobic Culture [[id number]] Collected: 07/15/24 1255    Order Status: Completed Specimen: Tissue from Hip, Left Updated: 07/18/24 0839     Anaerobe Culture No Anaerobes Isolated    Gram Stain [[id number]] Collected: 07/15/24 1255    Order Status: Completed Specimen: Tissue from Hip, Left Updated: 07/15/24 2103     GRAM STAIN Rare WBC observed      No bacteria seen    Gram Stain [[id number]] Collected: 07/15/24 1256    Order Status: Completed Specimen: Tissue from Hip, Left Updated: 07/15/24 2103     GRAM STAIN No WBCs, No bacteria seen    Fungal Culture [[id number]] Collected: 07/15/24 1255    Order Status: Sent Specimen: Tissue from Hip, Left Updated: 07/15/24 5295    Fungal Culture [[id number]] Collected: 07/15/24 1256    Order Status: Sent Specimen:  Tissue from Hip, Left Updated: 07/15/24 1455    Fungal Culture [[id number]]     Order Status: Sent Specimen: Tissue from Hip, Left     Gram Stain [[id number]]     Order Status: Sent Specimen: Tissue from Hip, Left     Anaerobic Culture [[id number]]     Order Status: Sent Specimen: Tissue from Hip, Left     Tissue Culture - Aerobic [[id number]]     Order Status: Sent Specimen: Tissue from Hip, Left     Fungal Culture [[id number]]     Order Status: Sent Specimen: Tissue from Hip, Left     Gram Stain [[id number]]     Order Status: Sent Specimen: Tissue from Hip, Left     Anaerobic Culture [[id number]]     Order Status: Sent Specimen: Tissue from Hip, Left     Tissue Culture - Aerobic [[id number]]     Order Status: Sent Specimen: Tissue from Hip, Left              Radiology:  X-Ray Femur 2 View Left  Narrative: EXAMINATION:  XR FEMUR 2 VIEW LEFT    CLINICAL HISTORY:  post-op;Displaced intertrochanteric fracture of left femur, subsequent encounter for closed fracture with routine healing    COMPARISON:  None.    FINDINGS:  Hardware of the left hip has been removed with interval insertion of a hip prostheses and anchoring device position and alignment is close to anatomical    No new fractures or dislocations    Joint spaces preserved.    No blastic or lytic lesions.    Soft tissues within normal limits.  Impression: Interval removal of hardware from the left hip.    Interval insertion of total hip prostheses    Electronically signed by: García Marquez  Date:    07/18/2024  Time:    14:16      Scheduled Med:   acetaminophen  500 mg Oral 6 times per day    albuterol  2 puff Inhalation Q8H    allopurinol  100 mg Oral Daily    atorvastatin  20 mg Oral Daily    carvediloL  3.125 mg Oral BID    docusate sodium  100 mg Oral BID    levothyroxine  125 mcg Oral Before breakfast    magnesium oxide  200 mg Oral BID    menthol-zinc oxide   Topical (Top) BID    polyethylene glycol  17 g Oral BID    senna-docusate 8.6-50 mg  2  tablet Oral QHS    sodium chloride 0.9%  500 mL Intravenous Once        Continuous Infusions:         PRN Meds:    Current Facility-Administered Medications:     0.9%  NaCl infusion (for blood administration), , Intravenous, Q24H PRN    aluminum-magnesium hydroxide-simethicone, 30 mL, Oral, Q6H PRN    benzonatate, 100 mg, Oral, TID PRN    dextrose 10%, 12.5 g, Intravenous, PRN    dextrose 10%, 12.5 g, Intravenous, PRN    dextrose 10%, 25 g, Intravenous, PRN    dextrose 10%, 25 g, Intravenous, PRN    glucagon (human recombinant), 1 mg, Intramuscular, PRN    glucagon (human recombinant), 1 mg, Intramuscular, PRN    glucose, 16 g, Oral, PRN    glucose, 24 g, Oral, PRN    HYDROcodone-acetaminophen, 1 tablet, Oral, Q8H PRN    hydrOXYzine pamoate, 50 mg, Oral, Nightly PRN    insulin aspart U-100, 0-5 Units, Subcutaneous, QID (AC + HS) PRN    lactulose, 20 g, Oral, Q6H PRN    melatonin, 6 mg, Oral, Nightly PRN    methocarbamoL, 500 mg, Oral, Q8H PRN    metoprolol, 10 mg, Intravenous, Q2H PRN    nitroGLYCERIN, 0.4 mg, Sublingual, Q5 Min PRN    ondansetron, 4 mg, Oral, Q6H PRN    ondansetron, 4 mg, Intravenous, Q8H PRN    traMADoL, 50 mg, Oral, Q4H PRN     Nutrition Status:      Assessment/Plan:    Acute anemia -stable   BARRY  Left femur IMN 7/3, failed IMN  S/p Conversion SILVIA 7/15  Dilated cardiomyopathy  A fib  Htn  Hld  Gout  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 All questions answered.    Dvt proph: coumadin, scd                All diagnosis and differential diagnosis have been reviewed; assessment and plan has been documented; I have personally reviewed the labs and test results that are presently available; I have  reviewed the patients medication list; I have reviewed the consulting providers response and recommendations. I have reviewed or attempted to review medical records based upon their availability      _____________________________________________________________________            Olvin Huber MD   07/20/2024

## 2024-07-20 NOTE — PT/OT/SLP PROGRESS
Physical Therapy Treatment    Patient Name:  Homa Jaquez   MRN:  [id number]    Recommendations:     Discharge Recommendations: Moderate Intensity Therapy  Discharge Equipment Recommendations: walker, rolling  Barriers to discharge: None    Assessment:     Homa Jaquez is a 88 y.o. female admitted with a medical diagnosis of Intertrochanteric fracture of left femur.  She presents with the following impairments/functional limitations: weakness, impaired functional mobility, impaired balance, decreased lower extremity function, pain, orthopedic precautions .    Rehab Prognosis: Fair; patient would benefit from acute skilled PT services to address these deficits and reach maximum level of function.    Recent Surgery: Procedure(s) (LRB):  CONVERSION ARTHROPLASTY, HIP, POSTERIOR APPROACH - valencia, cell saver, pathology (Left) 5 Days Post-Op    Plan:     During this hospitalization, patient to be seen BID to address the identified rehab impairments via gait training, therapeutic activities, therapeutic exercises and progress toward the following goals:    Plan of Care Expires:  07/22/24    Subjective     Chief Complaint: Pain on (L) LE  Patient/Family Comments/goals: Return home  Pain/Comfort:  Pain Rating 1: 6/10  Location - Side 1: Left  Location 1: leg  Pain Addressed 1: Cessation of Activity, Reposition, Distraction  Pain Rating Post-Intervention 1: 7/10      Objective:     Communicated with nursing prior to session.  Patient found up in chair with wound vac, peripheral IV upon PT entry to room.     General Precautions: Standard, fall  Orthopedic Precautions: LLE weight bearing as tolerated, LLE posterior precautions  Braces: N/A  Respiratory Status: Room air     Functional Mobility:  Bed Mobility:     Supine to Sit: total assistance  Sit to Supine: total assistance  Transfers:     Sit to Stand:  maximal assistance with no AD  Bed to Chair: total assistance with  no AD  using  Squat Pivot      AM-PAC 6 CLICK  MOBILITY          Treatment & Education:  Patient was able to complete transfer and bed mobility training. Required total assistance this afternoon for transfers. Patient was able to sit comfortably in her chair since this morning PT session. Encouraged patient to be out of bed during the day. Reviewed room exercises.     Patient left supine with all lines intact and nurses notified..    GOALS:   Multidisciplinary Problems       Physical Therapy Goals          Problem: Physical Therapy    Goal Priority Disciplines Outcome Goal Variances Interventions   Physical Therapy Goal     PT, PT/OT Progressing     Description: Pt will improve functional independence by performing:    Bed mobility: CGA  Sit to stand: Min A  with rolling walker  Bed to chair t/f: Min A  with Stand Step  with rolling walker  Ambulation x 150' with CGA with rolling walker  Ramp : CGA                       Time Tracking:     PT Received On: 07/20/24  PT Start Time: 1430     PT Stop Time: 1450  PT Total Time (min): 20 min     Billable Minutes: Therapeutic Activity 20             Number of PTA visits since last PT visit: 3     07/20/2024

## 2024-07-20 NOTE — NURSING
Nurses Note -- 4 Eyes      7/20/2024   10:49 AM      Skin assessed during: Daily Assessment      [] No Altered Skin Integrity Present    []Prevention Measures Documented      [x] Yes- Altered Skin Integrity Present or Discovered   [] LDA Added if Not in Epic (Describe Wound)   [] New Altered Skin Integrity was Present on Admit and Documented in LDA   [] Wound Image Taken    Wound Care Consulted? Yes    Attending Nurse:  Marleni Quiroz RN/Staff Member:   Arlette

## 2024-07-20 NOTE — PT/OT/SLP PROGRESS
"Occupational Therapy   Treatment    Name: Homa Jaquez  MRN: [id number]  Admitting Diagnosis:  Intertrochanteric fracture of left femur  5 Days Post-Op    Recommendations:     Discharge Recommendations: Moderate Intensity Therapy  Discharge Equipment Recommendations:  none  Barriers to discharge:   (pain management)    Assessment:     Hoam Jaquez is a 88 y.o. female with a medical diagnosis of Intertrochanteric fracture of left femur.  Performance deficits affecting function are weakness, pain, decreased ROM, decreased lower extremity function, decreased upper extremity function, decreased coordination, impaired balance, gait instability, impaired functional mobility, impaired self care skills, impaired endurance, orthopedic precautions, impaired muscle length, impaired joint extensibility, impaired cardiopulmonary response to activity, edema, impaired skin.     Rehab Prognosis:  Poor; patient would benefit from acute skilled OT services to address these deficits and reach maximum level of function.       Plan:     Patient to be seen daily (BID) to address the above listed problems via self-care/home management, therapeutic activities, therapeutic exercises  Plan of Care Expires: 07/23/24  Plan of Care Reviewed with: patient    Subjective     Chief Complaint: pain  Patient/Family Comments/goals: "I can try."  Pain/Comfort:  Pain Rating 1: 7/10  Location - Side 1: Left  Location - Orientation 1:  (anterolateral)  Location 1: hip  Pain Addressed 1: Distraction, Reposition, Cessation of Activity    Objective:     Communicated with: LORIN prior to session.  Patient found up in chair with wound vac, peripheral IV upon OT entry to room.    General Precautions: Standard, fall    Orthopedic Precautions:LLE weight bearing as tolerated, LLE posterior precautions  Braces: N/A  Respiratory Status: Room air     Occupational Performance:     Activities of Daily Living:  Lower Body Dressing: moderate assistance pt with " reacher to doff R sock. Attempted L sock, unable to perform even with OT holding L leg up for her to see, as she cannot tolerate sitting up in chair due to pain and weakness in BUEs. Increased time needed to thread sock onto sock aid, mod verbal cues for proper technique. Required OT to place sock aid onto foot for her to pull on. Unable to tolerate further activity.     Pt is not appropriate for inpatient rehab, as she will be unable to safely tolerate 3 hours of intense therapy per day. OT HIGHLY recommending moderate intensity; Dr. Mayo MD notified.    Patient left up in chair with all lines intact, call button in reach, and LAURA Yepez and Dr. Mayo MD notified    GOALS:   Multidisciplinary Problems       Occupational Therapy Goals          Problem: Occupational Therapy    Goal Priority Disciplines Outcome Interventions   Occupational Therapy Goal     OT, PT/OT Not Progressing    Description: Pt will perform LB dressing max A with AE PRN by d/c.  Pt will perform toileting max A by d/c.  Pt will perform toilet t/f max A with LRAD by d/c.  Pt will perform shower t/f max assist by d/c.  Pt will perform car t/f max assist in adherence to pxns by d/c.                        Time Tracking:     OT Date of Treatment: 07/20/24  OT Start Time: 1309  OT Stop Time: 1334  OT Total Time (min): 25 min    Billable Minutes:Self Care/Home Management 25    OT/EVY: OT          7/20/2024

## 2024-07-20 NOTE — NURSING
Nurses Note -- 4 Eyes      7/19/2024   10:05 PM      Skin assessed during: Q Shift Change      [] No Altered Skin Integrity Present    []Prevention Measures Documented      [x] Yes- Altered Skin Integrity Present or Discovered   [] LDA Added if Not in Epic (Describe Wound)   [] New Altered Skin Integrity was Present on Admit and Documented in LDA   [] Wound Image Taken    Wound Care Consulted? No    Attending Nurse:  Spenser Quiroz RN/Staff Member:  malika

## 2024-07-20 NOTE — PT/OT/SLP PROGRESS
Physical Therapy Treatment    Patient Name:  Homa Jaquez   MRN:  [id number]    Recommendations:     Discharge Recommendations: Moderate Intensity Therapy  Discharge Equipment Recommendations: walker, rolling  Barriers to discharge: Decreased caregiver support    Assessment:     Homa Jaquez is a 88 y.o. female admitted with a medical diagnosis of Intertrochanteric fracture of left femur.  She presents with the following impairments/functional limitations: weakness, impaired functional mobility, impaired balance, decreased lower extremity function, pain, orthopedic precautions .    Rehab Prognosis: Fair; patient would benefit from acute skilled PT services to address these deficits and reach maximum level of function.    Recent Surgery: Procedure(s) (LRB):  CONVERSION ARTHROPLASTY, HIP, POSTERIOR APPROACH - valencia, cell saver, pathology (Left) 5 Days Post-Op    Plan:     During this hospitalization, patient to be seen BID to address the identified rehab impairments via gait training, therapeutic activities, therapeutic exercises and progress toward the following goals:    Plan of Care Expires:  07/22/24    Subjective     Chief Complaint: Pain on (L) groin  Patient/Family Comments/goals: To return back home, pain relief  Pain/Comfort:  Pain Rating 1: 6/10  Location - Side 1: Left  Location 1: groin  Pain Addressed 1: Pre-medicate for activity, Cessation of Activity  Pain Rating Post-Intervention 1: 6/10      Objective:     Communicated with nursing prior to session.  Patient found supine with wound vac, peripheral IV upon PT entry to room.     General Precautions: Standard, fall  Orthopedic Precautions: LLE weight bearing as tolerated, LLE posterior precautions  Braces: N/A  Respiratory Status: Room air     Functional Mobility:  Bed Mobility:     Supine to Sit: maximal assistance  Sit to Supine: maximal assistance  Transfers:     Sit to Stand:  maximal assistance with rolling walker  Bed to Chair: maximal  assistance with  no AD  using  Stand Pivot      AM-PAC 6 CLICK MOBILITY          Treatment & Education:  Patient was seen for transfer and bed mobility training. Patent needed encouragement to perform activities. Educated regarding deleterious effect of remaining in bed while recovering from her surgery. Patient was able to transfer to her chair with max A.     Patient left up in chair with all lines intact, call button in reach, and nurses present..    GOALS:   Multidisciplinary Problems       Physical Therapy Goals          Problem: Physical Therapy    Goal Priority Disciplines Outcome Goal Variances Interventions   Physical Therapy Goal     PT, PT/OT Progressing     Description: Pt will improve functional independence by performing:    Bed mobility: CGA  Sit to stand: Min A  with rolling walker  Bed to chair t/f: Min A  with Stand Step  with rolling walker  Ambulation x 150' with CGA with rolling walker  Ramp : CGA                       Time Tracking:     PT Received On: 07/20/24  PT Start Time: 1000     PT Stop Time: 1030  PT Total Time (min): 30 min     Billable Minutes: Therapeutic Activity 30             Number of PTA visits since last PT visit: 3     07/20/2024

## 2024-07-20 NOTE — PT/OT/SLP PROGRESS
Occupational Therapy      Patient Name:  Homa Jaquez   MRN:  [id number]    Patient not seen this morning secondary to blood pressure being outside parameters: 92/65. LAURA Yepez notified. Will follow-up this afternoon.    7/20/2024

## 2024-07-21 LAB
ALBUMIN SERPL-MCNC: 1.9 G/DL (ref 3.4–4.8)
BNP BLD-MCNC: 715.9 PG/ML
BUN SERPL-MCNC: 57.7 MG/DL (ref 9.8–20.1)
CALCIUM SERPL-MCNC: 8.4 MG/DL (ref 8.4–10.2)
CHLORIDE SERPL-SCNC: 97 MMOL/L (ref 98–107)
CO2 SERPL-SCNC: 25 MMOL/L (ref 23–31)
CREAT SERPL-MCNC: 2.08 MG/DL (ref 0.55–1.02)
ERYTHROCYTE [DISTWIDTH] IN BLOOD BY AUTOMATED COUNT: 16.7 % (ref 11.5–17)
GFR SERPLBLD CREATININE-BSD FMLA CKD-EPI: 23 ML/MIN/1.73/M2
GLUCOSE SERPL-MCNC: 96 MG/DL (ref 82–115)
HCT VFR BLD AUTO: 32.6 % (ref 37–47)
HGB BLD-MCNC: 10.9 G/DL (ref 12–16)
INR PPP: 1.4 (ref 2–3)
MAGNESIUM SERPL-MCNC: 2 MG/DL (ref 1.6–2.6)
MCH RBC QN AUTO: 32 PG (ref 27–31)
MCHC RBC AUTO-ENTMCNC: 33.4 G/DL (ref 33–36)
MCV RBC AUTO: 95.6 FL (ref 80–94)
NRBC BLD AUTO-RTO: 0 %
PHOSPHATE SERPL-MCNC: 3 MG/DL (ref 2.3–4.7)
PLATELET # BLD AUTO: 168 X10(3)/MCL (ref 130–400)
PMV BLD AUTO: 9.9 FL (ref 7.4–10.4)
POCT GLUCOSE: 107 MG/DL (ref 70–110)
POCT GLUCOSE: 155 MG/DL (ref 70–110)
POTASSIUM SERPL-SCNC: 4.1 MMOL/L (ref 3.5–5.1)
PREALB SERPL-MCNC: 5.1 MG/DL (ref 14–37)
PROTHROMBIN TIME: 17.5 SECONDS (ref 11.7–14.5)
RBC # BLD AUTO: 3.41 X10(6)/MCL (ref 4.2–5.4)
SODIUM SERPL-SCNC: 132 MMOL/L (ref 136–145)
WBC # BLD AUTO: 5.78 X10(3)/MCL (ref 4.5–11.5)

## 2024-07-21 PROCEDURE — 25000003 PHARM REV CODE 250: Performed by: ORTHOPAEDIC SURGERY

## 2024-07-21 PROCEDURE — 97530 THERAPEUTIC ACTIVITIES: CPT

## 2024-07-21 PROCEDURE — 94799 UNLISTED PULMONARY SVC/PX: CPT

## 2024-07-21 PROCEDURE — 83880 ASSAY OF NATRIURETIC PEPTIDE: CPT | Performed by: INTERNAL MEDICINE

## 2024-07-21 PROCEDURE — 80069 RENAL FUNCTION PANEL: CPT | Performed by: INTERNAL MEDICINE

## 2024-07-21 PROCEDURE — 21400001 HC TELEMETRY ROOM

## 2024-07-21 PROCEDURE — 25000003 PHARM REV CODE 250: Performed by: INTERNAL MEDICINE

## 2024-07-21 PROCEDURE — 85027 COMPLETE CBC AUTOMATED: CPT | Performed by: INTERNAL MEDICINE

## 2024-07-21 PROCEDURE — 85610 PROTHROMBIN TIME: CPT | Performed by: INTERNAL MEDICINE

## 2024-07-21 PROCEDURE — 84134 ASSAY OF PREALBUMIN: CPT | Performed by: INTERNAL MEDICINE

## 2024-07-21 PROCEDURE — 94761 N-INVAS EAR/PLS OXIMETRY MLT: CPT

## 2024-07-21 PROCEDURE — 36415 COLL VENOUS BLD VENIPUNCTURE: CPT | Performed by: INTERNAL MEDICINE

## 2024-07-21 PROCEDURE — 99900031 HC PATIENT EDUCATION (STAT)

## 2024-07-21 PROCEDURE — 83735 ASSAY OF MAGNESIUM: CPT | Performed by: INTERNAL MEDICINE

## 2024-07-21 PROCEDURE — 99900035 HC TECH TIME PER 15 MIN (STAT)

## 2024-07-21 RX ORDER — NAPROXEN SODIUM 220 MG/1
81 TABLET, FILM COATED ORAL 2 TIMES DAILY
Status: DISCONTINUED | OUTPATIENT
Start: 2024-07-21 | End: 2024-07-25

## 2024-07-21 RX ADMIN — Medication 6 MG: at 08:07

## 2024-07-21 RX ADMIN — Medication 200 MG: at 08:07

## 2024-07-21 RX ADMIN — SENNOSIDES AND DOCUSATE SODIUM 2 TABLET: 50; 8.6 TABLET ORAL at 08:07

## 2024-07-21 RX ADMIN — ACETAMINOPHEN 500 MG: 500 TABLET ORAL at 08:07

## 2024-07-21 RX ADMIN — DOCUSATE SODIUM 100 MG: 100 CAPSULE, LIQUID FILLED ORAL at 08:07

## 2024-07-21 RX ADMIN — ASPIRIN 81 MG 81 MG: 81 TABLET ORAL at 08:07

## 2024-07-21 RX ADMIN — ATORVASTATIN CALCIUM 20 MG: 10 TABLET, FILM COATED ORAL at 08:07

## 2024-07-21 RX ADMIN — CARVEDILOL 3.12 MG: 3.12 TABLET, FILM COATED ORAL at 08:07

## 2024-07-21 RX ADMIN — ASPIRIN 81 MG 81 MG: 81 TABLET ORAL at 02:07

## 2024-07-21 RX ADMIN — MENTHOL AND ZINC OXIDE: .44; 20.625 OINTMENT TOPICAL at 08:07

## 2024-07-21 RX ADMIN — POLYETHYLENE GLYCOL 3350 17 G: 17 POWDER, FOR SOLUTION ORAL at 08:07

## 2024-07-21 RX ADMIN — LEVOTHYROXINE SODIUM 125 MCG: 125 TABLET ORAL at 06:07

## 2024-07-21 RX ADMIN — TRAMADOL HYDROCHLORIDE 50 MG: 50 TABLET, COATED ORAL at 08:07

## 2024-07-21 RX ADMIN — TRAMADOL HYDROCHLORIDE 50 MG: 50 TABLET, COATED ORAL at 06:07

## 2024-07-21 RX ADMIN — ACETAMINOPHEN 500 MG: 500 TABLET ORAL at 12:07

## 2024-07-21 NOTE — PROGRESS NOTES
[facility] - Orthopaedics  Orthopedics  Progress Note    Patient Name: Homa Jaquez  MRN: [id number]  Admission Date: 7/13/2024  Hospital Length of Stay: 8 days  Attending Provider: Mathieu Patel MD  Primary Care Provider: Franklyn Brito MD  Follow-up For: Procedure(s) (LRB):  CONVERSION ARTHROPLASTY, HIP, POSTERIOR APPROACH - valencia, cell saver, pathology (Left)    Post-Operative Day: 6 Days Post-Op  Subjective:     Principal Problem:Intertrochanteric fracture of left femur    Principal Orthopedic Problem: 6 Days Post-Op   Conversion left hip SILVIA with Dr Bains following hardware failure left IT femur fracture       Interval History:  Incisional wound VAC was placed Friday over the incision.  Wound VAC is not holding suction at 125 mmHg.  It is at approximately 25-50 mmHg today.  As 2nd at [place] pad tubing was placed to the back yesterday with some improvement.  She seems to be doing well overall.  Her son remains bedside as an advocate for her.  Physical therapy is in the room this morning eager to get her up and mobilizing.    Review of patient's allergies indicates:   Allergen Reactions    Cefadroxil      Other reaction(s): Nausea or vomiting    Cefuroxime axetil     Cephalexin      Other reaction(s): NAUSEA AND VOMITING    Ciprofloxacin     Enoxaparin     Methenamine hippurate      Other reaction(s): Unknown    Promethazine        Current Facility-Administered Medications   Medication    0.9%  NaCl infusion (for blood administration)    0.9%  NaCl infusion    acetaminophen tablet 500 mg    albuterol inhaler 2 puff    allopurinoL tablet 100 mg    aluminum-magnesium hydroxide-simethicone 200-200-20 mg/5 mL suspension 30 mL    atorvastatin tablet 20 mg    benzonatate capsule 100 mg    carvediloL tablet 3.125 mg    dextrose 10% bolus 125 mL 125 mL    dextrose 10% bolus 125 mL 125 mL    dextrose 10% bolus 250 mL 250 mL    dextrose 10% bolus 250 mL 250 mL    docusate sodium capsule  "100 mg    glucagon (human recombinant) injection 1 mg    glucagon (human recombinant) injection 1 mg    glucose chewable tablet 16 g    glucose chewable tablet 24 g    HYDROcodone-acetaminophen 5-325 mg per tablet 1 tablet    hydrOXYzine pamoate capsule 50 mg    insulin aspart U-100 injection 0-5 Units    lactulose 20 gram/30 mL solution Soln 20 g    levothyroxine tablet 125 mcg    magnesium oxide split tablet 200 mg    melatonin tablet 6 mg    menthol-zinc oxide 0.44-20.6 % Oint    methocarbamoL tablet 500 mg    metoprolol injection 10 mg    nitroGLYCERIN SL tablet 0.4 mg    ondansetron disintegrating tablet 4 mg    ondansetron injection 4 mg    polyethylene glycol packet 17 g    senna-docusate 8.6-50 mg per tablet 2 tablet    traMADoL tablet 50 mg     Objective:     Vital Signs (Most Recent):  Temp: 97.4 °F (36.3 °C) (07/21/24 0831)  Pulse: 82 (07/21/24 0831)  Resp: 18 (07/21/24 0618)  BP: 124/67 (07/21/24 0838)  SpO2: 97 % (07/21/24 0831) Vital Signs (24h Range):  Temp:  [97.4 °F (36.3 °C)-98.8 °F (37.1 °C)] 97.4 °F (36.3 °C)  Pulse:  [] 82  Resp:  [16-18] 18  SpO2:  [93 %-98 %] 97 %  BP: ()/(57-76) 124/67     Weight: 82.9 kg (182 lb 12.2 oz)  Height: 5' 4" (162.6 cm)  Body mass index is 31.37 kg/m².      Intake/Output Summary (Last 24 hours) at 7/21/2024 0917  Last data filed at 7/21/2024 0618  Gross per 24 hour   Intake 400 ml   Output 450 ml   Net -50 ml       Physical Exam:   General the patient is alert and oriented x3 no acute distress nontoxic-appearing appropriate affect.    Constitutional: Vital signs are examined and stable.  Resp: No signs of labored breathing               LLE: -Skin: Dressing CDI, notice auditory small leak around back distal [place] pad removed and replaced with a new tubing.  This allowed return if suctioned to the VAC at 125 mmHg, No signs of new abrasions or lacerations, no scars; distal incision clean and dry without erythema or drainage.           -MSK: Hip and Knee " F/E, EHL/FHL           -Neuro:  Sensation intact to light touch L3-S1 dermatomes             Diagnostic Findings:   Significant Labs:   Recent Lab Results  (Last 5 results in the past 72 hours)        07/21/24  0630   07/21/24  0620   07/20/24 2052   07/20/24  1636   07/20/24  1114        Albumin 1.9               .9               BUN 57.7               Calcium 8.4               Chloride 97               CO2 25               Creatinine 2.08               eGFR 23               Glucose 96               Hematocrit 32.6               Hemoglobin 10.9               INR 1.4               Magnesium  2.00               MCH 32.0               MCHC 33.4               MCV 95.6               MPV 9.9               nRBC 0.0               Phosphorus Level 3.0               Platelet Count 168               POCT Glucose   107   151   155   148       Potassium 4.1               Prealbumin 5.1               PT 17.5               RBC 3.41               RDW 16.7               Sodium 132               WBC 5.78                                       Significant Imaging: I have reviewed and interpreted all pertinent imaging results/findings.     Assessment/Plan:     Active Diagnoses:    Diagnosis Date Noted POA    PRINCIPAL PROBLEM:  Intertrochanteric fracture of left femur [S72.142A] 07/03/2024 Yes      Problems Resolved During this Admission:     Continue weight-bearing as tolerated range of motion as tolerated on her total hip arthroplasty.  Continue the incisional VAC per Dr. Bains.  Return if suctioned to the back today with change of clotted tubing.  Continue to work with therapies much as possible.  -son having questions regarding weight-bearing status following intramedullary nail of the IT fracture.  I answered all questions for him to the best of my ability today.  Discussed that this is standard of care and unfortunately she has a very comminuted fracture which led to need for SILVIA.  She is otherwise doing well.  Nursing  staff remains present at her bedside.  Physical therapy present to get her up and mobilizing today.  Continue DVT prophylaxis during stay.  Continue multimodal pain control.  We will continue to follow through her stay.      The above findings, diagnostics, and treatment plan were discussed with Dr. Bains who is in agreement with the plan of care except as stated in additional documentation.      Alice Davis PA-C    Orthopedic Trauma Surgery  [facility]

## 2024-07-21 NOTE — PT/OT/SLP PROGRESS
Occupational Therapy      Patient Name:  Homa Jaquez   MRN:  [id number]    Patient not seen today secondary to fatigue and unable to arouse from sleep. Will follow-up in AM.    7/21/2024

## 2024-07-21 NOTE — PT/OT/SLP PROGRESS
Physical Therapy Treatment    Patient Name:  Homa Jaquez   MRN:  [id number]    Recommendations:     Discharge Recommendations: Moderate Intensity Therapy  Discharge Equipment Recommendations: walker, rolling  Barriers to discharge: Decreased caregiver support    Assessment:     Homa Jaquez is a 88 y.o. female admitted with a medical diagnosis of Intertrochanteric fracture of left femur.  She presents with the following impairments/functional limitations: weakness, impaired endurance, impaired functional mobility, gait instability, impaired balance .    Rehab Prognosis: Fair; patient would benefit from acute skilled PT services to address these deficits and reach maximum level of function.    Recent Surgery: Procedure(s) (LRB):  CONVERSION ARTHROPLASTY, HIP, POSTERIOR APPROACH - valencia, cell saver, pathology (Left) 6 Days Post-Op    Plan:     During this hospitalization, patient to be seen BID to address the identified rehab impairments via gait training, therapeutic activities, therapeutic exercises and progress toward the following goals:    Plan of Care Expires:  07/22/24    Subjective     Chief Complaint: pain in LLE  Patient/Family Comments/goals: to be able to walk  Pain/Comfort:  Pain Rating 1: 6/10  Location - Side 1: Left  Location 1: leg  Pain Addressed 1: Reposition, Distraction  Pain Rating Post-Intervention 1: 6/10      Objective:     Communicated with NSG prior to session.  Patient found up in chair with PureWick, peripheral IV, wound vac upon PT entry to room.     General Precautions: Standard, fall  Orthopedic Precautions: LLE weight bearing as tolerated  Braces: N/A  Respiratory Status: Room air     Functional Mobility:  Bed Mobility:     Scooting: maximal assistance  Sit to Supine: maximal assistance and of 2 persons  Transfers:     Sit to Stand:  maximal assistance and of 2 persons with no AD  Bed to Chair: maximal assistance and of 2 persons with  no AD  using  Stand Pivot      Patient left  supine with all lines intact, call button in reach, and NSG present..    GOALS:   Multidisciplinary Problems       Physical Therapy Goals          Problem: Physical Therapy    Goal Priority Disciplines Outcome Goal Variances Interventions   Physical Therapy Goal     PT, PT/OT Progressing     Description: Pt will improve functional independence by performing:    Bed mobility: CGA  Sit to stand: Min A  with rolling walker  Bed to chair t/f: Min A  with Stand Step  with rolling walker  Ambulation x 150' with CGA with rolling walker  Ramp : CGA                       Time Tracking:     PT Received On: 07/21/24  PT Start Time: 1120     PT Stop Time: 1200  PT Total Time (min): 40 min     Billable Minutes: Therapeutic Activity 40    Treatment Type: Treatment  PT/PTA: PT     Number of PTA visits since last PT visit: 3     07/21/2024

## 2024-07-21 NOTE — PROGRESS NOTES
[facility] ORTHOPAEDIC  [facility] Medicine Progress Note      Patient Name: Homa Jaquez  MRN: [id number]  Admission Date: 7/13/2024   Length of Stay: 8  Attending Physician: Olvin Huber MD  Primary Care Provider: Franklyn Brito MD  Face-to-Face encounter date: 07/21/2024    Code Status: Prior        Chief Complaint:   Hip pain        HPI:   Homa Jaquez is a 88 y.o. female who  has a past medical history of A-fib, Chronic cystitis, GERD (gastroesophageal reflux disease), Graves disease, Hypertension, Hypothyroidism, unspecified, and Spinal stenosis.. The patient presented to [facility] on 7/13/2024 with a primary complaint of left hip pain.  Pt with a left femur fracture after a fall 7/2, underwent IMN 7/3 and [facility].  She reported increasing pain to hip, xrays showing failed intertrochanteric femur fracture.  Pt transferred to [facility] for revision on Monday.  She is currently comfortable no complaints     Overview/Hospital Course:  No notes on file       Interval Hx:   The pt has no c/o. Denies dyspnea or lightheadedness. She sat up for about 2 hrs earlier. Her wound vac was changed today.     Review of Systems   All other systems reviewed and are negative.      Objective/physical exam:  General: In no acute distress, afebrile  Chest: Clear to auscultation bilaterally, no wheezing or ronchi  Heart: irreg, irreg, no rubs or gallops, no murmur, no edema  Abdomen: Soft, nontender, BS +  MSK:s/p left hip surgery, wound vac in place  Neurologic: no focal deficits, A&Ox3  Psych: Calm, cooperative    VITAL SIGNS: 24 HRS MIN & MAX LAST   Temp  Min: 97.4 °F (36.3 °C)  Max: 98.8 °F (37.1 °C) 97.9 °F (36.6 °C)   BP  Min: 93/61  Max: 151/87 (!) 151/87   Pulse  Min: 78  Max: 108  80   Resp  Min: 16  Max: 18 18   SpO2  Min: 93 %  Max: 98 % 97 %       Recent Labs   Lab 07/18/24  0451 07/19/24  0501 07/21/24  0630   WBC 9.08 9.34 5.78   RBC 3.16* 3.06* 3.41*    HGB 9.8* 9.7* 10.9*   HCT 29.7* 29.1* 32.6*   MCV 94.0 95.1* 95.6*   MCH 31.0 31.7* 32.0*   MCHC 33.0 33.3 33.4   RDW 16.7 16.9 16.7    192 168   MPV 9.7 9.9 9.9       Recent Labs   Lab 07/19/24  0501 07/20/24  0603 07/21/24  0630   * 132* 132*   K 4.2 4.3 4.1   CL 96* 96* 97*   CO2 26 25 25   BUN 43.6* 49.7* 57.7*   CREATININE 2.04* 2.39* 2.08*   CALCIUM 7.6* 8.3* 8.4   MG 1.90 2.00 2.00   ALBUMIN  --  1.9* 1.9*        Microbiology Results (last 7 days)       Procedure Component Value Units Date/Time    Tissue Culture - Aerobic [[id number]] Collected: 07/15/24 1256    Order Status: Completed Specimen: Tissue from Hip, Left Updated: 07/20/24 0906     Tissue - Aerobic Culture No Growth at 5 days    Tissue Culture - Aerobic [[id number]] Collected: 07/15/24 1255    Order Status: Completed Specimen: Tissue from Hip, Left Updated: 07/20/24 0905     Tissue - Aerobic Culture No Growth at 5 days    Anaerobic Culture [[id number]] Collected: 07/15/24 1256    Order Status: Completed Specimen: Tissue from Hip, Left Updated: 07/18/24 0840     Anaerobe Culture No Anaerobes Isolated    Anaerobic Culture [[id number]] Collected: 07/15/24 1255    Order Status: Completed Specimen: Tissue from Hip, Left Updated: 07/18/24 0839     Anaerobe Culture No Anaerobes Isolated    Gram Stain [[id number]] Collected: 07/15/24 1255    Order Status: Completed Specimen: Tissue from Hip, Left Updated: 07/15/24 2103     GRAM STAIN Rare WBC observed      No bacteria seen    Gram Stain [[id number]] Collected: 07/15/24 1256    Order Status: Completed Specimen: Tissue from Hip, Left Updated: 07/15/24 2103     GRAM STAIN No WBCs, No bacteria seen    Fungal Culture [[id number]] Collected: 07/15/24 1255    Order Status: Sent Specimen: Tissue from Hip, Left Updated: 07/15/24 1455    Fungal Culture [[id number]] Collected: 07/15/24 1256    Order Status: Sent Specimen: Tissue from Hip, Left Updated: 07/15/24 1455    Fungal Culture [[id number]]      Order Status: Canceled Specimen: Tissue from Hip, Left     Gram Stain [[id number]]     Order Status: Canceled Specimen: Tissue from Hip, Left     Anaerobic Culture [[id number]]     Order Status: Canceled Specimen: Tissue from Hip, Left     Tissue Culture - Aerobic [[id number]]     Order Status: Canceled Specimen: Tissue from Hip, Left     Fungal Culture [[id number]]     Order Status: Canceled Specimen: Tissue from Hip, Left     Gram Stain [[id number]]     Order Status: Canceled Specimen: Tissue from Hip, Left     Anaerobic Culture [[id number]]     Order Status: Canceled Specimen: Tissue from Hip, Left     Tissue Culture - Aerobic [[id number]]     Order Status: Canceled Specimen: Tissue from Hip, Left              Radiology:  X-Ray Femur 2 View Left  Narrative: EXAMINATION:  XR FEMUR 2 VIEW LEFT    CLINICAL HISTORY:  post-op;Displaced intertrochanteric fracture of left femur, subsequent encounter for closed fracture with routine healing    COMPARISON:  None.    FINDINGS:  Hardware of the left hip has been removed with interval insertion of a hip prostheses and anchoring device position and alignment is close to anatomical    No new fractures or dislocations    Joint spaces preserved.    No blastic or lytic lesions.    Soft tissues within normal limits.  Impression: Interval removal of hardware from the left hip.    Interval insertion of total hip prostheses    Electronically signed by: García Marquez  Date:    07/18/2024  Time:    14:16      Scheduled Med:   acetaminophen  500 mg Oral 6 times per day    albuterol  2 puff Inhalation Q8H    allopurinol  100 mg Oral Daily    aspirin  81 mg Oral BID    atorvastatin  20 mg Oral Daily    carvediloL  3.125 mg Oral BID    docusate sodium  100 mg Oral BID    levothyroxine  125 mcg Oral Before breakfast    magnesium oxide  200 mg Oral BID    menthol-zinc oxide   Topical (Top) BID    polyethylene glycol  17 g Oral BID    senna-docusate 8.6-50 mg  2 tablet Oral QHS         Continuous Infusions:           PRN Meds:    Current Facility-Administered Medications:     aluminum-magnesium hydroxide-simethicone, 30 mL, Oral, Q6H PRN    benzonatate, 100 mg, Oral, TID PRN    HYDROcodone-acetaminophen, 1 tablet, Oral, Q8H PRN    hydrOXYzine pamoate, 50 mg, Oral, Nightly PRN    lactulose, 20 g, Oral, Q6H PRN    melatonin, 6 mg, Oral, Nightly PRN    methocarbamoL, 500 mg, Oral, Q8H PRN    metoprolol, 10 mg, Intravenous, Q2H PRN    nitroGLYCERIN, 0.4 mg, Sublingual, Q5 Min PRN    ondansetron, 4 mg, Oral, Q6H PRN    ondansetron, 4 mg, Intravenous, Q8H PRN    traMADoL, 50 mg, Oral, Q4H PRN     Nutrition Status:      Assessment/Plan:    Acute anemia -stable   BARRY  Left femur IMN 7/3, failed IMN  S/p Conversion SILVIA 7/15  Dilated cardiomyopathy  A fib  Htn  Hld  Gout  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  Hypothyroid     Plan  Orthostatic bp negative this am  Dc ivf, cbgs  Cont coreg  Cont holding entresto due to low bp, barry  Start asa bid for dvt prophylaxis while inr <2  Pt having bleeding in wound vac so keep Coumadin on hold   Consider dc to SNF vs rehab d/w CM tomorrow      Dvt proph: ASA, scd                All diagnosis and differential diagnosis have been reviewed; assessment and plan has been documented; I have personally reviewed the labs and test results that are presently available; I have reviewed the patients medication list; I have reviewed the consulting providers response and recommendations. I have reviewed or attempted to review medical records based upon their availability      _____________________________________________________________________            Olvin Huber MD   07/21/2024

## 2024-07-21 NOTE — NURSING
Nurses Note -- 4 Eyes      7/21/2024   10:17 AM      Skin assessed during: Daily Assessment      [] No Altered Skin Integrity Present    []Prevention Measures Documented      [x] Yes- Altered Skin Integrity Present or Discovered   [] LDA Added if Not in Epic (Describe Wound)   [] New Altered Skin Integrity was Present on Admit and Documented in LDA   [] Wound Image Taken    Wound Care Consulted? No    Attending Nurse:  Marleni Quiroz RN/Staff Member:   Arlette

## 2024-07-21 NOTE — PT/OT/SLP PROGRESS
"Occupational Therapy   Treatment    Name: Homa Jaquez  MRN: [id number]  Admitting Diagnosis:  Intertrochanteric fracture of left femur  6 Days Post-Op    Recommendations:     Discharge Recommendations: Moderate Intensity Therapy  Discharge Equipment Recommendations:  none  Barriers to discharge:       Assessment:     Homa Jaquez is a 88 y.o. female with a medical diagnosis of Intertrochanteric fracture of left femur.  She presents with the following performance deficits affecting function - weakness, impaired endurance, impaired self care skills, impaired functional mobility, gait instability, impaired balance, decreased coordination, decreased lower extremity function, decreased safety awareness, pain, orthopedic precautions, impaired skin, edema.     Rehab Prognosis:  Fair; patient would benefit from acute skilled OT services to address these deficits and reach maximum level of function.       Plan:     Patient to be seen daily (QD-BID) to address the above listed problems via self-care/home management, therapeutic activities, therapeutic exercises  Plan of Care Expires: 07/23/24  Plan of Care Reviewed with: patient, son    Subjective     Chief Complaint: pain  Patient/Family Comments/goals: "I'm willing to try and do what I can."  Pain/Comfort:  Pain Rating 1: 8/10  Location - Side 1: Left  Location 1: leg  Pain Addressed 1: Reposition, Distraction, Cessation of Activity, Nurse notified  Pain Rating Post-Intervention 1: 8/10    Objective:     Communicated with: Nsg prior to session.  Patient found supine with peripheral IV, PureWick, wound vac upon OT entry to room. Son present during session    General Precautions: Standard, fall    Orthopedic Precautions:LLE weight bearing as tolerated  Braces: N/A  Respiratory Status: Room air     Occupational Performance:     Bed Mobility:    Patient completed Scooting/Bridging with maximal assistance  Patient completed Supine to Sit with maximal assistance "     Functional Mobility/Transfers:  Patient completed Sit <> Stand Transfer X2 trials with maximal assistance and of 2 persons with  rolling walker   Patient completed Bed <> Chair Transfer using Stand Pivot technique with maximal assistance and of 2 persons with rolling walker and max verbal cues for PLB and encouragement.  Functional Mobility: Pt only tolerated standing for 10-20 secs with max assist for standing balance.     Activities of Daily Living:  Toileting: total assistance of 2 for donning and doffing adult brief while standing.    Treatment & Education:  Pt educated on pursed lip breathing during transitional movements as pt tends to hold breath.    **Pt is not appropriate for inpatient rehab, as she will be unable to safely tolerate 3 hours of intense therapy per day     Patient left up in chair with all lines intact, call button in reach, and nursing and son present    GOALS:   Multidisciplinary Problems       Occupational Therapy Goals          Problem: Occupational Therapy    Goal Priority Disciplines Outcome Interventions   Occupational Therapy Goal     OT, PT/OT Progressing    Description: Pt will perform LB dressing max A with AE PRN by d/c.  Pt will perform toileting max A by d/c.  Pt will perform toilet t/f max A with LRAD by d/c.  Pt will perform shower t/f max assist by d/c.  Pt will perform car t/f max assist in adherence to pxns by d/c.                        Time Tracking:     OT Date of Treatment: 07/21/24  OT Start Time: 0900  OT Stop Time: 0930  OT Total Time (min): 30 min    Billable Minutes:Therapeutic Activity 2               7/21/2024

## 2024-07-21 NOTE — PT/OT/SLP PROGRESS
Physical Therapy Treatment    Patient Name:  Homa Jaquez   MRN:  [id number]    Recommendations:     Discharge Recommendations: Moderate Intensity Therapy  Discharge Equipment Recommendations: walker, rolling  Barriers to discharge: None    Assessment:     Homa Jaquez is a 88 y.o. female admitted with a medical diagnosis of Intertrochanteric fracture of left femur.  She presents with the following impairments/functional limitations: weakness, impaired endurance, impaired sensation, impaired functional mobility, gait instability, impaired balance .    Rehab Prognosis: Fair; patient would benefit from acute skilled PT services to address these deficits and reach maximum level of function.    Recent Surgery: Procedure(s) (LRB):  CONVERSION ARTHROPLASTY, HIP, POSTERIOR APPROACH - valencia, cell saver, pathology (Left) 6 Days Post-Op    Plan:     During this hospitalization, patient to be seen BID to address the identified rehab impairments via gait training, therapeutic activities, therapeutic exercises and progress toward the following goals:    Plan of Care Expires:  07/22/24    Subjective     Chief Complaint: pain in LLE  Patient/Family Comments/goals: to be able to walk  Pain/Comfort:  Pain Rating 1: 8/10  Location - Side 1: Left  Location 1: leg  Pain Addressed 1: Reposition, Distraction, Cessation of Activity, Nurse notified  Pain Rating Post-Intervention 1: 8/10      Objective:     Communicated with NSG prior to session.  Patient found up in chair with wound vac, peripheral IV upon PT entry to room.     General Precautions: Standard, fall  Orthopedic Precautions: LLE weight bearing as tolerated  Braces: N/A  Respiratory Status: Room air     Functional Mobility:  Bed Mobility:     Scooting: maximal assistance  Supine to Sit: maximal assistance  Transfers:     Sit to Stand:  maximal assistance and of 2 persons with rolling walker  Bed to Chair: maximal assistance and of 2 persons with  rolling walker  using   Stand Pivot      Patient left up in chair with all lines intact and call button in reach..    GOALS:   Multidisciplinary Problems       Physical Therapy Goals          Problem: Physical Therapy    Goal Priority Disciplines Outcome Goal Variances Interventions   Physical Therapy Goal     PT, PT/OT Progressing     Description: Pt will improve functional independence by performing:    Bed mobility: CGA  Sit to stand: Min A  with rolling walker  Bed to chair t/f: Min A  with Stand Step  with rolling walker  Ambulation x 150' with CGA with rolling walker  Ramp : CGA                       Time Tracking:     PT Received On: 07/21/24  PT Start Time: 0730     PT Stop Time: 0800  PT Total Time (min): 30 min     Billable Minutes: Therapeutic Activity 30    Treatment Type: Treatment  PT/PTA: PT     Number of PTA visits since last PT visit: 3     07/21/2024

## 2024-07-22 PROCEDURE — 97530 THERAPEUTIC ACTIVITIES: CPT

## 2024-07-22 PROCEDURE — 97530 THERAPEUTIC ACTIVITIES: CPT | Mod: CQ

## 2024-07-22 PROCEDURE — 21400001 HC TELEMETRY ROOM

## 2024-07-22 PROCEDURE — 97535 SELF CARE MNGMENT TRAINING: CPT

## 2024-07-22 PROCEDURE — 25000003 PHARM REV CODE 250: Performed by: INTERNAL MEDICINE

## 2024-07-22 PROCEDURE — 99900031 HC PATIENT EDUCATION (STAT)

## 2024-07-22 PROCEDURE — 25000003 PHARM REV CODE 250: Performed by: ORTHOPAEDIC SURGERY

## 2024-07-22 PROCEDURE — 94761 N-INVAS EAR/PLS OXIMETRY MLT: CPT

## 2024-07-22 PROCEDURE — 97110 THERAPEUTIC EXERCISES: CPT

## 2024-07-22 PROCEDURE — 94799 UNLISTED PULMONARY SVC/PX: CPT

## 2024-07-22 RX ORDER — METHOCARBAMOL 500 MG/1
500 TABLET, FILM COATED ORAL 3 TIMES DAILY
Status: DISCONTINUED | OUTPATIENT
Start: 2024-07-22 | End: 2024-07-26 | Stop reason: HOSPADM

## 2024-07-22 RX ORDER — MICONAZOLE NITRATE 2 %
POWDER (GRAM) TOPICAL 2 TIMES DAILY
Status: DISCONTINUED | OUTPATIENT
Start: 2024-07-22 | End: 2024-07-23

## 2024-07-22 RX ORDER — WARFARIN 2 MG/1
2 TABLET ORAL DAILY
Status: DISCONTINUED | OUTPATIENT
Start: 2024-07-22 | End: 2024-07-25

## 2024-07-22 RX ADMIN — TRAMADOL HYDROCHLORIDE 50 MG: 50 TABLET, COATED ORAL at 02:07

## 2024-07-22 RX ADMIN — ACETAMINOPHEN 500 MG: 500 TABLET ORAL at 07:07

## 2024-07-22 RX ADMIN — TRAMADOL HYDROCHLORIDE 50 MG: 50 TABLET, COATED ORAL at 07:07

## 2024-07-22 RX ADMIN — SACUBITRIL AND VALSARTAN 1 TABLET: 24; 26 TABLET, FILM COATED ORAL at 02:07

## 2024-07-22 RX ADMIN — TRAMADOL HYDROCHLORIDE 50 MG: 50 TABLET, COATED ORAL at 06:07

## 2024-07-22 RX ADMIN — Medication 200 MG: at 08:07

## 2024-07-22 RX ADMIN — ASPIRIN 81 MG 81 MG: 81 TABLET ORAL at 09:07

## 2024-07-22 RX ADMIN — ACETAMINOPHEN 500 MG: 500 TABLET ORAL at 09:07

## 2024-07-22 RX ADMIN — CARVEDILOL 3.12 MG: 3.12 TABLET, FILM COATED ORAL at 08:07

## 2024-07-22 RX ADMIN — ACETAMINOPHEN 500 MG: 500 TABLET ORAL at 11:07

## 2024-07-22 RX ADMIN — ACETAMINOPHEN 500 MG: 500 TABLET ORAL at 04:07

## 2024-07-22 RX ADMIN — ASPIRIN 81 MG 81 MG: 81 TABLET ORAL at 08:07

## 2024-07-22 RX ADMIN — CARVEDILOL 3.12 MG: 3.12 TABLET, FILM COATED ORAL at 09:07

## 2024-07-22 RX ADMIN — WARFARIN SODIUM 2 MG: 2 TABLET ORAL at 04:07

## 2024-07-22 RX ADMIN — MENTHOL AND ZINC OXIDE: .44; 20.625 OINTMENT TOPICAL at 09:07

## 2024-07-22 RX ADMIN — METHOCARBAMOL 500 MG: 500 TABLET ORAL at 09:07

## 2024-07-22 RX ADMIN — DOCUSATE SODIUM 100 MG: 100 CAPSULE, LIQUID FILLED ORAL at 09:07

## 2024-07-22 RX ADMIN — LEVOTHYROXINE SODIUM 125 MCG: 125 TABLET ORAL at 05:07

## 2024-07-22 RX ADMIN — DOCUSATE SODIUM 100 MG: 100 CAPSULE, LIQUID FILLED ORAL at 08:07

## 2024-07-22 RX ADMIN — SACUBITRIL AND VALSARTAN 1 TABLET: 24; 26 TABLET, FILM COATED ORAL at 09:07

## 2024-07-22 RX ADMIN — ATORVASTATIN CALCIUM 20 MG: 10 TABLET, FILM COATED ORAL at 08:07

## 2024-07-22 RX ADMIN — POLYETHYLENE GLYCOL 3350 17 G: 17 POWDER, FOR SOLUTION ORAL at 08:07

## 2024-07-22 RX ADMIN — POLYETHYLENE GLYCOL 3350 17 G: 17 POWDER, FOR SOLUTION ORAL at 09:07

## 2024-07-22 NOTE — PLAN OF CARE
Problem: Adult Inpatient Plan of Care  Goal: Plan of Care Review  Outcome: Progressing  Flowsheets (Taken 7/22/2024 8284)  Plan of Care Reviewed With: patient  Goal: Optimal Comfort and Wellbeing  Outcome: Progressing     Problem: Fall Injury Risk  Goal: Absence of Fall and Fall-Related Injury  Outcome: Progressing     Problem: Skin Injury Risk Increased  Goal: Skin Health and Integrity  Outcome: Progressing

## 2024-07-22 NOTE — PLAN OF CARE
Recvd notification from Amber ( [facility] re[facility])-- per Dr Holley-- pt more appropriate for SNF.     F/u with pt/ son ( Av) @ bedside -- notified that [facility] rehab denied; too low level. Offered to send referral to other rehab facilities-- son decline.   lengthy discussion re: snf options. Pt/son requesting [facility] ( [facility] & [facility] ( [facility]).   Pt's daughter also in room during discussion.     Faxed referral to [facility]. Await decision.

## 2024-07-22 NOTE — PLAN OF CARE
Problem: Adult Inpatient Plan of Care  Goal: Plan of Care Review  Outcome: Progressing  Goal: Patient-Specific Goal (Individualized)  Outcome: Progressing  Goal: Absence of Hospital-Acquired Illness or Injury  Outcome: Progressing  Goal: Optimal Comfort and Wellbeing  Outcome: Progressing  Goal: Readiness for Transition of Care  Outcome: Progressing     Problem: Acute Kidney Injury/Impairment  Goal: Fluid and Electrolyte Balance  Outcome: Progressing  Goal: Improved Oral Intake  Outcome: Progressing  Goal: Effective Renal Function  Outcome: Progressing     Problem: Fall Injury Risk  Goal: Absence of Fall and Fall-Related Injury  Outcome: Progressing     Problem: Wound  Goal: Optimal Coping  Outcome: Progressing  Goal: Optimal Functional Ability  Outcome: Progressing  Goal: Absence of Infection Signs and Symptoms  Outcome: Progressing  Goal: Improved Oral Intake  Outcome: Progressing  Goal: Optimal Pain Control and Function  Outcome: Progressing  Goal: Skin Health and Integrity  Outcome: Progressing  Goal: Optimal Wound Healing  Outcome: Progressing     Problem: Comorbidity Management  Goal: Maintenance of Heart Failure Symptom Control  Outcome: Progressing  Goal: Blood Pressure in Desired Range  Outcome: Progressing  Goal: Maintenance of Osteoarthritis Symptom Control  Outcome: Progressing  Goal: Bariatric Home Regimen Maintained  Outcome: Progressing     Problem: Hip Arthroplasty  Goal: Optimal Coping  Outcome: Progressing  Goal: Absence of Bleeding  Outcome: Progressing  Goal: Effective Bowel Elimination  Outcome: Progressing  Goal: Fluid and Electrolyte Balance  Outcome: Progressing  Goal: Optimal Functional Ability  Outcome: Progressing  Goal: Absence of Infection Signs and Symptoms  Outcome: Progressing  Goal: Intact Neurovascular Status  Outcome: Progressing  Goal: Anesthesia/Sedation Recovery  Outcome: Progressing  Goal: Acceptable Pain Control  Outcome: Progressing  Goal: Nausea and Vomiting  Relief  Outcome: Progressing  Goal: Effective Urinary Elimination  Outcome: Progressing  Goal: Effective Oxygenation and Ventilation  Outcome: Progressing     Problem: Infection  Goal: Absence of Infection Signs and Symptoms  Outcome: Progressing     Problem: Skin Injury Risk Increased  Goal: Skin Health and Integrity  Outcome: Progressing

## 2024-07-22 NOTE — PROGRESS NOTES
[facility]  [facility] Medicine Progress Note      Patient Name: Homa Jaquez  MRN: [id number]  Admission Date: 7/13/2024   Length of Stay: 9  Attending Physician: Olvin Huber MD  Primary Care Provider: Franklyn Brito MD  Face-to-Face encounter date: 07/22/2024    Code Status: Prior        Chief Complaint:   Hip pain        HPI:   Homa Jaquez is a 88 y.o. female who  has a past medical history of A-fib, Chronic cystitis, GERD (gastroesophageal reflux disease), Graves disease, Hypertension, Hypothyroidism, unspecified, and Spinal stenosis.. The patient presented to [facility] on 7/13/2024 with a primary complaint of left hip pain.  Pt with a left femur fracture after a fall 7/2, underwent IMN 7/3 and transferred to [facility] 7/9.  She reported increasing pain to hip, xrays showing failed intertrochanteric femur fracture.  Pt transferred to [facility] for revision on Monday.  She is currently comfortable no complaints     Overview/Hospital Course:  No notes on file       Interval Hx:   The pt has no c/o. Pain improved. Denies dyspnea. She is in her chair. Rn report serous drainage from surgical site. Orthostatics negative.    Review of Systems   All other systems reviewed and are negative.      Objective/physical exam:  General: In no acute distress, afebrile  Chest: Clear to auscultation bilaterally, no wheezing or ronchi  Heart: irreg, irreg, no rubs or gallops, no murmur, no edema  Abdomen: Soft, nontender, BS +  MSK:s/p left hip surgery, wound vac in place  Neurologic: no focal deficits, A&Ox3  Psych: Calm, cooperative    VITAL SIGNS: 24 HRS MIN & MAX LAST   Temp  Min: 97.4 °F (36.3 °C)  Max: 97.9 °F (36.6 °C) 97.8 °F (36.6 °C)   BP  Min: 100/56  Max: 151/87 106/71   Pulse  Min: 79  Max: 92  85   Resp  Min: 16  Max: 20 20   SpO2  Min: 96 %  Max: 100 % 97 %       Recent Labs   Lab 07/18/24  0451 07/19/24  0501 07/21/24  0630   WBC 9.08 9.34 5.78   RBC 3.16*  3.06* 3.41*   HGB 9.8* 9.7* 10.9*   HCT 29.7* 29.1* 32.6*   MCV 94.0 95.1* 95.6*   MCH 31.0 31.7* 32.0*   MCHC 33.0 33.3 33.4   RDW 16.7 16.9 16.7    192 168   MPV 9.7 9.9 9.9       Recent Labs   Lab 07/19/24  0501 07/20/24  0603 07/21/24  0630   * 132* 132*   K 4.2 4.3 4.1   CL 96* 96* 97*   CO2 26 25 25   BUN 43.6* 49.7* 57.7*   CREATININE 2.04* 2.39* 2.08*   CALCIUM 7.6* 8.3* 8.4   MG 1.90 2.00 2.00   ALBUMIN  --  1.9* 1.9*        Microbiology Results (last 7 days)       Procedure Component Value Units Date/Time    Tissue Culture - Aerobic [[id number]] Collected: 07/15/24 1256    Order Status: Completed Specimen: Tissue from Hip, Left Updated: 07/20/24 0906     Tissue - Aerobic Culture No Growth at 5 days    Tissue Culture - Aerobic [[id number]] Collected: 07/15/24 1255    Order Status: Completed Specimen: Tissue from Hip, Left Updated: 07/20/24 0905     Tissue - Aerobic Culture No Growth at 5 days    Anaerobic Culture [[id number]] Collected: 07/15/24 1256    Order Status: Completed Specimen: Tissue from Hip, Left Updated: 07/18/24 0840     Anaerobe Culture No Anaerobes Isolated    Anaerobic Culture [[id number]] Collected: 07/15/24 1255    Order Status: Completed Specimen: Tissue from Hip, Left Updated: 07/18/24 0839     Anaerobe Culture No Anaerobes Isolated    Gram Stain [[id number]] Collected: 07/15/24 1255    Order Status: Completed Specimen: Tissue from Hip, Left Updated: 07/15/24 2103     GRAM STAIN Rare WBC observed      No bacteria seen    Gram Stain [[id number]] Collected: 07/15/24 1256    Order Status: Completed Specimen: Tissue from Hip, Left Updated: 07/15/24 2103     GRAM STAIN No WBCs, No bacteria seen    Fungal Culture [[id number]] Collected: 07/15/24 1255    Order Status: Sent Specimen: Tissue from Hip, Left Updated: 07/15/24 1455    Fungal Culture [[id number]] Collected: 07/15/24 1256    Order Status: Sent Specimen: Tissue from Hip, Left Updated: 07/15/24 1455    Fungal Culture  [[id number]]     Order Status: Canceled Specimen: Tissue from Hip, Left     Gram Stain [[id number]]     Order Status: Canceled Specimen: Tissue from Hip, Left     Anaerobic Culture [[id number]]     Order Status: Canceled Specimen: Tissue from Hip, Left     Tissue Culture - Aerobic [[id number]]     Order Status: Canceled Specimen: Tissue from Hip, Left     Fungal Culture [[id number]]     Order Status: Canceled Specimen: Tissue from Hip, Left     Gram Stain [[id number]]     Order Status: Canceled Specimen: Tissue from Hip, Left     Anaerobic Culture [[id number]]     Order Status: Canceled Specimen: Tissue from Hip, Left     Tissue Culture - Aerobic [[id number]]     Order Status: Canceled Specimen: Tissue from Hip, Left              Radiology:  X-Ray Femur 2 View Left  Narrative: EXAMINATION:  XR FEMUR 2 VIEW LEFT    CLINICAL HISTORY:  post-op;Displaced intertrochanteric fracture of left femur, subsequent encounter for closed fracture with routine healing    COMPARISON:  None.    FINDINGS:  Hardware of the left hip has been removed with interval insertion of a hip prostheses and anchoring device position and alignment is close to anatomical    No new fractures or dislocations    Joint spaces preserved.    No blastic or lytic lesions.    Soft tissues within normal limits.  Impression: Interval removal of hardware from the left hip.    Interval insertion of total hip prostheses    Electronically signed by: García Marquez  Date:    07/18/2024  Time:    14:16      Scheduled Med:   acetaminophen  500 mg Oral 6 times per day    albuterol  2 puff Inhalation Q8H    allopurinol  100 mg Oral Daily    aspirin  81 mg Oral BID    atorvastatin  20 mg Oral Daily    carvediloL  3.125 mg Oral BID    docusate sodium  100 mg Oral BID    levothyroxine  125 mcg Oral Before breakfast    magnesium oxide  200 mg Oral BID    menthol-zinc oxide   Topical (Top) BID    polyethylene glycol  17 g Oral BID    senna-docusate 8.6-50 mg  2 tablet  Oral QHS        Continuous Infusions:           PRN Meds:    Current Facility-Administered Medications:     aluminum-magnesium hydroxide-simethicone, 30 mL, Oral, Q6H PRN    benzonatate, 100 mg, Oral, TID PRN    HYDROcodone-acetaminophen, 1 tablet, Oral, Q8H PRN    lactulose, 20 g, Oral, Q6H PRN    melatonin, 6 mg, Oral, Nightly PRN    methocarbamoL, 500 mg, Oral, Q8H PRN    metoprolol, 10 mg, Intravenous, Q2H PRN    nitroGLYCERIN, 0.4 mg, Sublingual, Q5 Min PRN    ondansetron, 4 mg, Oral, Q6H PRN    ondansetron, 4 mg, Intravenous, Q8H PRN    traMADoL, 50 mg, Oral, Q4H PRN     Nutrition Status:      Assessment/Plan:    Acute anemia -stable   BARRY  Left femur IMN 7/3, failed IMN  S/p Conversion SILVIA 7/15  Dilated cardiomyopathy  A fib  Htn  Hld  Gout  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  Hypothyroid     Plan  Check am labs - inr, cbc, renal panel  Cont coreg  Resume entresto   Resume coumadin 2 mg daily  Start asa bid for dvt prophylaxis while inr <2  DC dispo d/w CM, pt medically stable for transfer      Dvt proph: ASA, scd                All diagnosis and differential diagnosis have been reviewed; assessment and plan has been documented; I have personally reviewed the labs and test results that are presently available; I have reviewed the patients medication list; I have reviewed the consulting providers response and recommendations. I have reviewed or attempted to review medical records based upon their availability      _____________________________________________________________________            Olvin Huber MD   07/22/2024

## 2024-07-22 NOTE — PT/OT/SLP PROGRESS
"Physical Therapy Treatment    Patient Name:  Homa Jaquez   MRN:  [id number]    Recommendations:     Discharge Recommendations: Moderate Intensity Therapy  Discharge Equipment Recommendations: walker, rolling  Barriers to discharge:  impaired functional mobility     Assessment:     Homa Jaquez is a 88 y.o. female admitted with a medical diagnosis of Intertrochanteric fracture of left femur.  She presents with the following impairments/functional limitations: weakness, impaired endurance, impaired functional mobility, gait instability, impaired balance .    Rehab Prognosis: Fair and Poor; patient would benefit from acute skilled PT services to address these deficits and reach maximum level of function.    Recent Surgery: Procedure(s) (LRB):  CONVERSION ARTHROPLASTY, HIP, POSTERIOR APPROACH - valencia, cell saver, pathology (Left) 7 Days Post-Op    Plan:     During this hospitalization, patient to be seen BID to address the identified rehab impairments via gait training, therapeutic activities, therapeutic exercises and progress toward the following goals:    Plan of Care Expires:  07/22/24    Subjective     Chief Complaint: "my leg is just so sore"  Patient/Family Comments/goals: "I need to try"  Pain/Comfort:         Objective:     Communicated with rn prior to session.  Patient found supine with   upon PT entry to room.     General Precautions: Standard, fall  Orthopedic Precautions: LLE weight bearing as tolerated  Braces: N/A  Respiratory Status: Room air     Functional Mobility:  Bed Mobility:     Supine to Sit: moderate assistance and of 2 persons, attempted with leg , pt still unable to successfully move Lle towards eob. Pt required mod assist with LLE and mod assist to sit up at eob. Pt extremely limited by pain.    Transfers:     Sit to Stand:  maximal assistance and of 2 persons with rolling walker, attempted 3 trials, unsuccessful due to weakness and pain.    Bed to Chair: total assistance and of " 2 persons with  no AD  using  Squat Pivot.     Treatment & Education:  Pt had increase difficulty tolerating therapy due to increase pain.     Patient left up in chair with all lines intact and call button in reach..    GOALS:   Multidisciplinary Problems       Physical Therapy Goals          Problem: Physical Therapy    Goal Priority Disciplines Outcome Goal Variances Interventions   Physical Therapy Goal     PT, PT/OT Progressing     Description: Pt will improve functional independence by performing:    Bed mobility: CGA  Sit to stand: Min A  with rolling walker  Bed to chair t/f: Min A  with Stand Step  with rolling walker  Ambulation x 150' with CGA with rolling walker  Ramp : CGA                       Time Tracking:     PT Received On:    PT Start Time: 0920     PT Stop Time: 0958  PT Total Time (min): 38 min   Co treat with JI Rodriguez    Billable Minutes: Therapeutic Activity 30  Conference 8    Treatment Type: Treatment  PT/PTA: PTA     Number of PTA visits since last PT visit: 4     07/22/2024   P had palpitations prior to arrival, led to lightheadedness, syncope, & fall. Converted to NSR via Adenosine by EMT  prior to ED arrival.

## 2024-07-22 NOTE — PT/OT/SLP PROGRESS
Physical Therapy Treatment    Patient Name:  Homa Jaquez   MRN:  [id number]    Recommendations:     Discharge Recommendations: Moderate Intensity Therapy  Discharge Equipment Recommendations: walker, rolling  Barriers to discharge:  pain management, increased edema    Assessment:     Homa Jaquez is a 88 y.o. female admitted with a medical diagnosis of Intertrochanteric fracture of left femur.  She presents with the following impairments/functional limitations: weakness, impaired endurance, impaired functional mobility, gait instability, impaired balance .    Rehab Prognosis: Fair; patient would benefit from acute skilled PT services to address these deficits and reach maximum level of function.    Recent Surgery: Procedure(s) (LRB):  CONVERSION ARTHROPLASTY, HIP, POSTERIOR APPROACH - valencia, cell saver, pathology (Left) 7 Days Post-Op    Plan:     During this hospitalization, patient to be seen BID to address the identified rehab impairments via gait training, therapeutic activities, therapeutic exercises and progress toward the following goals:    Plan of Care Expires:  07/22/24    Subjective     Chief Complaint: L hip pain  Patient/Family Comments/goals: Pt refused standing due to fatigue and pain but agreed to exercises in bed  Pain/Comfort:  Location - Side 1: Left  Location 1: hip  Pain Addressed 1: Pre-medicate for activity, Reposition      Objective:     Communicated with nurse prior to session.  Patient found supine with peripheral IV, telemetry upon PT entry to room.     General Precautions: Standard, fall  Orthopedic Precautions: LLE weight bearing as tolerated  Braces: N/A  Respiratory Status: Room air         Treatment & Education:  Pt completed SILVIA therex x10 AAROM bilaterally    Patient left supine with all lines intact, call button in reach, nurse notified, and daughter present..    GOALS:   Multidisciplinary Problems       Physical Therapy Goals          Problem: Physical Therapy    Goal  Priority Disciplines Outcome Goal Variances Interventions   Physical Therapy Goal     PT, PT/OT Progressing     Description: Pt will improve functional independence by performing:    Bed mobility: CGA  Sit to stand: Min A  with rolling walker  Bed to chair t/f: Min A  with Stand Step  with rolling walker  Ambulation x 150' with CGA with rolling walker  Ramp : CGA                       Time Tracking:     PT Received On:    PT Start Time: 1647     PT Stop Time: 1700  PT Total Time (min): 13 min     Billable Minutes: Therapeutic Exercise 13    Treatment Type: Treatment  PT/PTA: PT     Number of PTA visits since last PT visit: 0     07/22/2024

## 2024-07-22 NOTE — PT/OT/SLP PROGRESS
"Occupational Therapy   Treatment    Name: Homa Jaquez  MRN: [id number]  Admitting Diagnosis:  Intertrochanteric fracture of left femur  7 Days Post-Op    Recommendations:     Discharge Recommendations: Moderate Intensity Therapy  Discharge Equipment Recommendations:  none  Barriers to discharge:   (pain management)    Assessment:     Homa Jaquez is a 88 y.o. female with a medical diagnosis of Intertrochanteric fracture of left femur.  Performance deficits affecting function are weakness, impaired endurance, orthopedic precautions, impaired muscle length, impaired joint extensibility, impaired cardiopulmonary response to activity, edema, impaired coordination, decreased ROM, abnormal tone, pain, decreased safety awareness, impaired self care skills, impaired functional mobility, gait instability, impaired balance, decreased upper extremity function, decreased lower extremity function.     Rehab Prognosis:  Poor; patient would benefit from acute skilled OT services to address these deficits and reach maximum level of function.       Plan:     Patient to be seen daily (QD-BID) to address the above listed problems via self-care/home management, therapeutic activities, therapeutic exercises  Plan of Care Expires: 07/23/24  Plan of Care Reviewed with: patient, son, daughter    Subjective     Chief Complaint: pain  Patient/Family Comments/goals: "I'll try."  Pain/Comfort:  Pain Rating 1: 7/10  Location - Side 1: Left  Location - Orientation 1: generalized  Location 1: hip  Pain Addressed 1: Distraction, Reposition, Cessation of Activity, Nurse notified  Pain Rating Post-Intervention 1: 8/10    Objective:     Communicated with: NSG prior to session.  Patient found up in chair with wound vac, peripheral IV upon OT entry to room.    General Precautions: Standard, fall    Orthopedic Precautions:LLE weight bearing as tolerated, LLE posterior precautions  Braces: N/A  Respiratory Status: Room air     Occupational " Performance:     Bed Mobility:    Patient completed Scooting/Bridging with total assistance  Patient completed Sit to Supine with moderate assistance and 2 persons     Functional Mobility/Transfers:  Patient completed Sit <> Stand Transfer with maximal assistance and of 2 persons with rolling walker; 2 attempts with standing tolerance of ~10 seconds. Pain and fatigue limiting patient's program.   Patient completed Bed <> Chair Transfer using Stand Pivot technique with total assistance with no assistive device    Treatment & Education:  Noticed skin tear under bilateral breasts, LAURA Craig notified. Also noticed wounds were leaking on LLE, LAURA Craig notified.     Patient left HOB elevated with all lines intact, call button in reach, LAURA Craig notified, and daughter present    GOALS:   Multidisciplinary Problems       Occupational Therapy Goals          Problem: Occupational Therapy    Goal Priority Disciplines Outcome Interventions   Occupational Therapy Goal     OT, PT/OT Not Progressing    Description: Pt will perform LB dressing max A with AE PRN by d/c.  Pt will perform toileting max A by d/c.  Pt will perform toilet t/f max A with LRAD by d/c.  Pt will perform shower t/f max assist by d/c.  Pt will perform car t/f max assist in adherence to pxns by d/c.                        Time Tracking:     OT Date of Treatment: 07/22/24  OT Start Time: 1337  OT Stop Time: 1421  OT Total Time (min): 44 min    Billable Minutes:Therapeutic Activity 44    OT/EVY: OT          7/22/2024

## 2024-07-22 NOTE — NURSING
Nurses Note -- 4 Eyes      7/22/2024   0730 AM      Skin assessed during: daily assessment       [] No Altered Skin Integrity Present    [x]Prevention Measures Documented      [x] Yes- Altered Skin Integrity Present or Discovered   [] LDA Added if Not in Epic (Describe Wound)   [] New Altered Skin Integrity was Present on Admit and Documented in LDA   [] Wound Image Taken    Wound Care Consulted? Yes    Attending Nurse:  Ella Quiroz RN/Staff Member:   Shannan

## 2024-07-22 NOTE — PLAN OF CARE
Noted therapy notes. Spk w pt--more alert today; tolerating therapy- max asst with bed mobility & transfer attempts. Spk w  son ( in [place])---discuss dcp options. Son prefer pt return to [facility] inpt rehab. Son decline snf placement at present.     Amber ( [facility] rehab) notified. Await decision per Dr Soto.

## 2024-07-22 NOTE — CONSULTS
Inpatient Nutrition Assessment    Admit Date: 7/13/2024   Total duration of encounter: 9 days   Patient Age: 88 y.o.    Nutrition Recommendation/Prescription     Continue 2000 Diabetic diet as tolerated. Will add Coumadin modifier (Coumadin restarted)  Continue Boost Glucose Control TID (190 kcal and 16 gm protein per serving)  Weigh weekly  Consider providing MVI daily, Vitamin C 500 mg BID, Zinc Sulfate 220 mg daily x 14 days to aid in wound healing  Consider appetite stimulate  If intake does not improve, consider PPN. Consult for rec's.       Communication of Recommendations: reviewed with patient    Nutrition Assessment     Malnutrition Assessment/Nutrition-Focused Physical Exam    Malnutrition Context: other (see comments) (Does not meet criteria) (07/19/24 1022)                                                           A minimum of two characteristics is recommended for diagnosis of either severe or non-severe malnutrition.    Chart Review    Reason Seen: physician consult for decreased appetite    Malnutrition Screening Tool Results   Have you recently lost weight without trying?: No  Have you been eating poorly because of a decreased appetite?: No   MST Score: 0   Diagnosis:  Status post intramedullary nail left hip with a failed intertrochanteric femur fracture     Relevant Medical History:    A-fib      Chronic cystitis      GERD (gastroesophageal reflux disease)      Graves disease      Hypertension      Hypothyroidism, unspecified      Spinal stenosis        Scheduled Medications:  acetaminophen, 500 mg, 6 times per day  allopurinol, 100 mg, Daily  aspirin, 81 mg, BID  atorvastatin, 20 mg, Daily  carvediloL, 3.125 mg, BID  docusate sodium, 100 mg, BID  levothyroxine, 125 mcg, Before breakfast  menthol-zinc oxide, , BID  polyethylene glycol, 17 g, BID  sacubitriL-valsartan, 1 tablet, BID  warfarin, 2 mg, Daily    Continuous Infusions:   PRN Medications:  aluminum-magnesium hydroxide-simethicone, 30 mL,  Q6H PRN  benzonatate, 100 mg, TID PRN  HYDROcodone-acetaminophen, 1 tablet, Q8H PRN  lactulose, 20 g, Q6H PRN  melatonin, 6 mg, Nightly PRN  methocarbamoL, 500 mg, Q8H PRN  metoprolol, 10 mg, Q2H PRN  nitroGLYCERIN, 0.4 mg, Q5 Min PRN  ondansetron, 4 mg, Q6H PRN  ondansetron, 4 mg, Q8H PRN  traMADoL, 50 mg, Q4H PRN    Calorie Containing IV Medications: no significant kcals from medications at this time    Recent Labs   Lab 07/15/24  2137 07/16/24  0507 07/16/24  0810 07/16/24  1202 07/16/24  1204 07/16/24  1835 07/17/24  0420 07/17/24  0421 07/18/24  0451 07/19/24  0501 07/20/24  0603 07/21/24  0630   NA  --  138   < > 135*  --  133* 134*  --  134* 132* 132* 132*   K  --  6.9*   < > 5.3*  --  5.1 4.9  --  4.0 4.2 4.3 4.1   CALCIUM  --  8.3*   < > 8.2*  --  7.8* 7.7*  --  7.7* 7.6* 8.3* 8.4   PHOS  --   --   --   --   --   --   --   --   --   --  3.7 3.0   MG  --   --   --   --   --   --   --   --  1.50* 1.90 2.00 2.00   CO2  --  22*   < > 17*  --  19* 23  --  28 26 25 25   BUN  --  38.9*   < > 41.0*  --  41.6* 43.5*  --  41.5* 43.6* 49.7* 57.7*   CREATININE  --  1.72*   < > 1.96*  --  2.09* 2.21*  --  1.92* 2.04* 2.39* 2.08*   EGFRNORACEVR  --  29   < > 24  --  23 21  --  25 23 19 23   GLUCOSE  --  164*   < > 211*  --  161* 165*  --  103 76* 105 96   ALBUMIN  --   --   --   --   --   --   --   --   --   --  1.9* 1.9*   PREALB  --   --   --   --   --   --   --   --   --   --   --  5.1*   WBC  --  18.28*  --   --   --   --   --  10.05 9.08 9.34  --  5.78   HGB 10.2* 8.9*  --   --  7.7*  --   --  10.3* 9.8* 9.7*  --  10.9*   HCT 32.6* 27.9*  --   --  24.8*  --   --  31.3* 29.7* 29.1*  --  32.6*    < > = values in this interval not displayed.     Nutrition Orders:  Diet diabetic 2000 Calorie  Dietary nutrition supplements Boost Glucose Control - Any flavor; TID    Appetite/Oral Intake: poor/0-25% of meals  Factors Affecting Nutritional Intake: decreased appetite and does not like the food  Social Needs Impacting Access  "to Food: none identified  Food/Nondenominational/Cultural Preferences: none reported  Food Allergies: none reported  Last Bowel Movement: 24  Wound(s):     Wound 24 Skin Tear Left anterior;proximal;upper Arm-Tissue loss description: Partial thickness     Comments    () Consulted for poor appetite. Pt consumes <25% of meals. Denied N/V/C/D. Reported she does not like the food. Was eating well PTA. Pt stated she does drink the oral supplements provided. No chewing or swallowing difficulties. Pt not able to recall UBW. Stated she has lost some weight but no idea of how much. Med/labs reviewed. Pre-albumin 5.1(L)      Anthropometrics    Height: 5' 4" (162.6 cm),    Last Weight: 82.9 kg (182 lb 12.2 oz) (24 1453), Weight Method: Standard Scale  BMI (Calculated): 31.4  BMI Classification: obese grade I (BMI 30-34.9)     Ideal Body Weight (IBW), Female: 120 lb     % Ideal Body Weight, Female (lb): 152.3 %                    Usual Body Weight (UBW), k.5 kg  % Usual Body Weight: 117.83     Usual Weight Provided By: unable to obtain usual weight    Wt Readings from Last 5 Encounters:   24 82.9 kg (182 lb 12.2 oz)   24 82.9 kg (182 lb 12.2 oz)   24 70.3 kg (155 lb)   23 67 kg (147 lb 12.8 oz)   23 71.1 kg (156 lb 12.8 oz)     Weight Change(s) Since Admission:   Wt Readings from Last 1 Encounters:   24 1453 82.9 kg (182 lb 12.2 oz)   24 1452 82.9 kg (182 lb 12.2 oz)   Admit Weight: 82.9 kg (182 lb 12.2 oz) (24 1452), Weight Method: Standard Scale    Estimated Needs    Weight Used For Calorie Calculations: 82.9 kg (182 lb 12.2 oz)  Energy Calorie Requirements (kcal): 4567-9860 kcal/d (MSJ x 1.3 sf)  Energy Need Method: [place]- Jeor  Weight Used For Protein Calculations: 54.4 kg (120 lb) (IBW used for calculation)  Protein Requirements: 82 g Pro / d (1.5 g/kg IBW)  Fluid Requirements (mL): 2000 ml/d (25 ml/kg)        Enteral Nutrition     Patient not " receiving enteral nutrition at this time.    Parenteral Nutrition     Patient not receiving parenteral nutrition support at this time.    Evaluation of Received Nutrient Intake    Calories: not meeting estimated needs  Protein: not meeting estimated needs    Patient Education     Not applicable.    Nutrition Diagnosis     PES: Inadequate oral intake related to acute illness as evidenced by poor intake (<25%) of meals. (new)       Nutrition Interventions     Intervention(s): general/healthful diet, commercial beverage, and collaboration with other providers    Goal: Meet greater than 80% of nutritional needs by follow-up. (new)  Goal: Maintain weight throughout hospitalization. (new)    Nutrition Goals & Monitoring     Dietitian will monitor: food and beverage intake, weight, electrolyte/renal panel, glucose/endocrine profile, and gastrointestinal profile  Discharge planning: resume home regimen  Nutrition Risk/Follow-Up: high (follow-up in 1-4 days)   Please consult if re-assessment needed sooner.

## 2024-07-22 NOTE — PROGRESS NOTES
"No acute events overnight.  Pain controlled.  Resting in bed. Poor mobility with therapy    Vital Signs  Temp: 97.5 °F (36.4 °C)  Temp Source: Oral  Pulse: 79  Heart Rate Source: Monitor  Resp: 20  SpO2: 97 %  Pulse Oximetry Type: Intermittent  Device (Oxygen Therapy): room air  BP: 123/77  BP Location: Right arm  BP Method: Automatic  Patient Position: Lying  Arousal Level: opens eyes spontaneously  Height and Weight  Height: 5' 4" (162.6 cm)  Weight: 82.9 kg (182 lb 12.2 oz)  Weight Method: Standard Scale  BSA (Calculated - sq m): 1.93 sq meters  BMI (Calculated): 31.4  Weight in (lb) to have BMI = 25: 145.3]    +FHL/EHL  BCR distally  Dressing c/d/i  SILT distally    Recent Lab Results         07/21/24  1141        POCT Glucose 155               A/P:  Status post conversion SIVLIA  Pain controlled  Therapy for mobility and ambulation.  DVT PPx  Patient mobility poor.    WV output decreasing  Continue to monitor  "

## 2024-07-22 NOTE — PT/OT/SLP PROGRESS
"Occupational Therapy   Treatment    Name: Homa Jaquez  MRN: [id number]  Admitting Diagnosis:  Intertrochanteric fracture of left femur  7 Days Post-Op    Recommendations:     Discharge Recommendations: Moderate Intensity Therapy  Discharge Equipment Recommendations:  none  Barriers to discharge:   (pain management)    Assessment:     Homa Jaquez is a 88 y.o. female with a medical diagnosis of Intertrochanteric fracture of left femur.  Performance deficits affecting function are weakness, impaired endurance, impaired self care skills, impaired functional mobility, gait instability, impaired balance, decreased upper extremity function, decreased lower extremity function, decreased safety awareness, pain, decreased ROM, impaired coordination, impaired fine motor, orthopedic precautions, impaired muscle length, impaired joint extensibility, impaired cardiopulmonary response to activity, edema, impaired skin.     Rehab Prognosis:  Poor; patient would benefit from acute skilled OT services to address these deficits and reach maximum level of function.       Patient is not appropriate for inpatient rehab setting, as she is unable to safely tolerate 3 hours of intense therapy a day. OT highly recommending SNF setting.    Plan:     Patient to be seen daily (QD-BID) to address the above listed problems via self-care/home management, therapeutic activities, therapeutic exercises  Plan of Care Expires: 07/23/24  Plan of Care Reviewed with: patient    Subjective     Chief Complaint: pain in L hip  Patient/Family Comments/goals: "I'll try."  Pain/Comfort:  Pain Rating 1: 8/10  Location - Side 1: Left  Location - Orientation 1: anterior  Location 1: thigh  Pain Addressed 1: Reposition, Distraction, Cessation of Activity  Pain Rating Post-Intervention 1: 8/10    Objective:     Communicated with: CM prior to session.  Patient found HOB elevated with wound vac, peripheral IV upon OT entry to room.    General Precautions: " Standard, fall    Orthopedic Precautions:LLE weight bearing as tolerated, LLE posterior precautions  Braces: N/A  Respiratory Status: Room air     Occupational Performance:     Bed Mobility:    Patient completed Scooting/Bridging with moderate assistance and 2 persons  Patient completed Supine to Sit with moderate assistance and 2 persons  *Patient required mod A for static sitting balance while EOB  While HOB elevated, OT asked that patient demonstrate the PROM activities she was performing with a towel (as instructed by her son). Pt only able to tolerate ~5 degrees, limited by pain and BUE weakness.   OT and PTA provided patient with leg  to increase independence with bed mobility. Pt limited by BUE weakness and pain, unable to use it effectively; still required mod A from OT to lift leg.    Functional Mobility/Transfers:  Patient completed Sit <> Stand Transfer with maximal assistance and of 2 persons  with  rolling walker. 3 unsuccessful attempts with R foot blocked. Pt is significantly weaker.   Patient completed Bed <> Chair Transfer using Stand Pivot technique with total assistance with no assistive device    Activities of Daily Living:  OT encouraged patient to participate in oral hygiene activity, pt declined.     Patient left up in chair with all lines intact, call button in reach, and cryotherapy to L lateral hip    GOALS:   Multidisciplinary Problems       Occupational Therapy Goals          Problem: Occupational Therapy    Goal Priority Disciplines Outcome Interventions   Occupational Therapy Goal     OT, PT/OT Not Progressing    Description: Pt will perform LB dressing max A with AE PRN by d/c.  Pt will perform toileting max A by d/c.  Pt will perform toilet t/f max A with LRAD by d/c.  Pt will perform shower t/f max assist by d/c.  Pt will perform car t/f max assist in adherence to pxns by d/c.                        Time Tracking:     OT Date of Treatment: 07/22/24  OT Start Time: 0920  OT Stop  Time: 0958  OT Total Time (min): 38 min    Billable Minutes:Self Care/Home Management 15  Therapeutic Activity 15  Conference Charge of 8 min  Co-treat with FELIPA Arevalo  OT/EVY: OT          7/22/2024

## 2024-07-22 NOTE — PROGRESS NOTES
[facility] - Orthopaedics  Wound Care    Patient Name:  Homa Jaquez   MRN:  [id number]  Date: 7/22/2024  Diagnosis: Intertrochanteric fracture of left femur    History:     Past Medical History:   Diagnosis Date    A-fib     Chronic cystitis     GERD (gastroesophageal reflux disease)     Graves disease     Hypertension     Hypothyroidism, unspecified     Spinal stenosis        Social History     Socioeconomic History    Marital status:    Tobacco Use    Smoking status: Former     Types: Cigarettes    Smokeless tobacco: Never   Substance and Sexual Activity    Alcohol use: Yes    Drug use: Never    Sexual activity: Not Currently     Social Determinants of Health     Financial Resource Strain: Low Risk  (7/4/2024)    Overall Financial Resource Strain (CARDIA)     Difficulty of Paying Living Expenses: Not hard at all   Food Insecurity: No Food Insecurity (7/4/2024)    Hunger Vital Sign     Worried About Running Out of Food in the Last Year: Never true     Ran Out of Food in the Last Year: Never true   Transportation Needs: No Transportation Needs (7/9/2024)    PRAPARE - Transportation     Lack of Transportation (Medical): No     Lack of Transportation (Non-Medical): No   Stress: No Stress Concern Present (7/4/2024)    [employer] - Occupational Stress Questionnaire     Feeling of Stress : Not at all   Housing Stability: Low Risk  (7/4/2024)    Housing Stability Vital Sign     Unable to Pay for Housing in the Last Year: No     Homeless in the Last Year: No       Precautions:     Allergies as of 07/13/2024 - Reviewed 07/13/2024   Allergen Reaction Noted    Cefadroxil  09/08/2022    Cefuroxime axetil  09/08/2022    Cephalexin  09/08/2022    Ciprofloxacin  09/08/2022    Enoxaparin  09/08/2022    Methenamine hippurate  09/08/2022    Promethazine  09/08/2022       WOC Assessment Details/Treatment      07/22/24 1537        Negative Pressure Wound Therapy  07/19/24  1200 Left anterior   Placement Date/Time: 07/19/24 1200   Side: Left  Orientation: anterior  Location: Thigh   NPWT Type Incision Management   Therapy Setting NPWT Continuous therapy   Pressure Setting NPWT 125 mmHg   Therapy Interventions NPWT Seal intact        Wound 07/09/24 1553 Pressure Injury Left Heel   Date First Assessed/Time First Assessed: 07/09/24 1553   Present on Original Admission: Yes  Primary Wound Type: Pressure Injury  Side: Left  Location: Heel  Wound Approximate Age at First Assessment (Weeks): 1 weeks   Appearance Red  (fading)        Wound 07/02/24 2230 Skin Tear Left anterior;proximal;upper Arm   Date First Assessed/Time First Assessed: 07/02/24 2230   Present on Original Admission: Yes  Primary Wound Type: Skin Tear  Side: Left  Orientation: anterior;proximal;upper  Location: Arm   Appearance   (almost healed)   Wound Length (cm) 0.3 cm   Wound Width (cm) 1 cm   Wound Surface Area (cm^2) 0.3 cm^2   Dressing Non-adherent;Foam        Wound 07/03/24 1056 Incision Left lateral Thigh   Date First Assessed/Time First Assessed: 07/03/24 1056   Primary Wound Type: Incision  Side: Left  Orientation: lateral  Location: Thigh   Dressing   (wound vac)        Wound 07/15/24 Incision Left Hip vertical   Date First Assessed: 07/15/24   Present on Original Admission: No  Primary Wound Type: Incision  Side: Left  Location: Hip  Incision Type: vertical  Closure Method: Staples  Additional Comments: prevena wound drain   Drainage Amount Large   Drainage Characteristics/Odor Serosanguineous   Dressing   (black foam for incisional vac)        Wound 07/20/24 1050 Blister(s) Left anterior Thigh   Date First Assessed/Time First Assessed: 07/20/24 1050   Primary Wound Type: Blister(s)  Side: Left  Orientation: anterior  Location: Thigh   Drainage Amount Moderate   Drainage Characteristics/Odor Serous   Dressing Absorptive Pad  (paper tape)   Dressing Change Due 07/23/24        Wound 07/22/24 1407 Intertrigo Left  Breast   Date First Assessed/Time First Assessed: 07/22/24 1407   Present on Original Admission: (c) Yes  Primary Wound Type: Intertrigo  Side: Left  Location: (c) Breast   Appearance Red;Moist   Care   (ordered antifungal powder)        Wound 07/22/24 1408 Intertrigo Right Breast   Date First Assessed/Time First Assessed: 07/22/24 1408   Present on Original Admission: (c) Yes  Primary Wound Type: Intertrigo  Side: Right  Location: (c) Breast   Appearance Red;Moist   Care   (ordered antifungal powder)     Bilateral breast folds: intertrigo. Will try powder due to patient sensitivity to pain. If powder fails - will use Calmoseptine.  Left hip incision: Wound vac in place.  Buttocks SDTI: faded and healed.  Left heel: red, fading in color.  Multiple blisters ceasar incisions: gauze/ ABD + prep pad + paper tape.  Left heel: SDTI- fading.  Continue with strict offloading.

## 2024-07-23 LAB
ALBUMIN SERPL-MCNC: 1.6 G/DL (ref 3.4–4.8)
BUN SERPL-MCNC: 53.4 MG/DL (ref 9.8–20.1)
CALCIUM SERPL-MCNC: 8 MG/DL (ref 8.4–10.2)
CHLORIDE SERPL-SCNC: 98 MMOL/L (ref 98–107)
CO2 SERPL-SCNC: 26 MMOL/L (ref 23–31)
CREAT SERPL-MCNC: 1.62 MG/DL (ref 0.55–1.02)
ERYTHROCYTE [DISTWIDTH] IN BLOOD BY AUTOMATED COUNT: 16.6 % (ref 11.5–17)
GFR SERPLBLD CREATININE-BSD FMLA CKD-EPI: 31 ML/MIN/1.73/M2
GLUCOSE SERPL-MCNC: 115 MG/DL (ref 82–115)
HCT VFR BLD AUTO: 31.5 % (ref 37–47)
HGB BLD-MCNC: 10.2 G/DL (ref 12–16)
INR PPP: 1.4 (ref 2–3)
MAGNESIUM SERPL-MCNC: 1.9 MG/DL (ref 1.6–2.6)
MCH RBC QN AUTO: 31.3 PG (ref 27–31)
MCHC RBC AUTO-ENTMCNC: 32.4 G/DL (ref 33–36)
MCV RBC AUTO: 96.6 FL (ref 80–94)
NRBC BLD AUTO-RTO: 0.2 %
PHOSPHATE SERPL-MCNC: 2.2 MG/DL (ref 2.3–4.7)
PLATELET # BLD AUTO: 159 X10(3)/MCL (ref 130–400)
PMV BLD AUTO: 10.7 FL (ref 7.4–10.4)
POTASSIUM SERPL-SCNC: 5 MMOL/L (ref 3.5–5.1)
PROTHROMBIN TIME: 17.6 SECONDS (ref 11.7–14.5)
RBC # BLD AUTO: 3.26 X10(6)/MCL (ref 4.2–5.4)
SODIUM SERPL-SCNC: 130 MMOL/L (ref 136–145)
WBC # BLD AUTO: 8.22 X10(3)/MCL (ref 4.5–11.5)

## 2024-07-23 PROCEDURE — 25000003 PHARM REV CODE 250: Performed by: INTERNAL MEDICINE

## 2024-07-23 PROCEDURE — 63600175 PHARM REV CODE 636 W HCPCS: Performed by: INTERNAL MEDICINE

## 2024-07-23 PROCEDURE — 97535 SELF CARE MNGMENT TRAINING: CPT

## 2024-07-23 PROCEDURE — 99900035 HC TECH TIME PER 15 MIN (STAT)

## 2024-07-23 PROCEDURE — 85610 PROTHROMBIN TIME: CPT | Performed by: INTERNAL MEDICINE

## 2024-07-23 PROCEDURE — P9047 ALBUMIN (HUMAN), 25%, 50ML: HCPCS | Mod: JZ,JG | Performed by: INTERNAL MEDICINE

## 2024-07-23 PROCEDURE — 25000003 PHARM REV CODE 250: Performed by: ORTHOPAEDIC SURGERY

## 2024-07-23 PROCEDURE — 36415 COLL VENOUS BLD VENIPUNCTURE: CPT | Performed by: INTERNAL MEDICINE

## 2024-07-23 PROCEDURE — 99900031 HC PATIENT EDUCATION (STAT)

## 2024-07-23 PROCEDURE — 21400001 HC TELEMETRY ROOM

## 2024-07-23 PROCEDURE — 94799 UNLISTED PULMONARY SVC/PX: CPT

## 2024-07-23 PROCEDURE — 80069 RENAL FUNCTION PANEL: CPT | Performed by: INTERNAL MEDICINE

## 2024-07-23 PROCEDURE — 85027 COMPLETE CBC AUTOMATED: CPT | Performed by: INTERNAL MEDICINE

## 2024-07-23 PROCEDURE — 94761 N-INVAS EAR/PLS OXIMETRY MLT: CPT

## 2024-07-23 PROCEDURE — 63600175 PHARM REV CODE 636 W HCPCS: Mod: JZ,JG | Performed by: INTERNAL MEDICINE

## 2024-07-23 PROCEDURE — 97168 OT RE-EVAL EST PLAN CARE: CPT

## 2024-07-23 PROCEDURE — 97164 PT RE-EVAL EST PLAN CARE: CPT

## 2024-07-23 PROCEDURE — 83735 ASSAY OF MAGNESIUM: CPT | Performed by: INTERNAL MEDICINE

## 2024-07-23 RX ORDER — ASCORBIC ACID 500 MG
500 TABLET ORAL 2 TIMES DAILY
Status: DISCONTINUED | OUTPATIENT
Start: 2024-07-23 | End: 2024-07-26 | Stop reason: HOSPADM

## 2024-07-23 RX ORDER — FUROSEMIDE 10 MG/ML
20 INJECTION INTRAMUSCULAR; INTRAVENOUS ONCE
Status: COMPLETED | OUTPATIENT
Start: 2024-07-23 | End: 2024-07-23

## 2024-07-23 RX ORDER — SODIUM,POTASSIUM PHOSPHATES 280-250MG
1 POWDER IN PACKET (EA) ORAL ONCE
Status: COMPLETED | OUTPATIENT
Start: 2024-07-23 | End: 2024-07-23

## 2024-07-23 RX ORDER — ALBUMIN HUMAN 250 G/1000ML
50 SOLUTION INTRAVENOUS ONCE
Status: COMPLETED | OUTPATIENT
Start: 2024-07-23 | End: 2024-07-23

## 2024-07-23 RX ORDER — ZINC SULFATE 50(220)MG
220 CAPSULE ORAL DAILY
Status: DISCONTINUED | OUTPATIENT
Start: 2024-07-23 | End: 2024-07-26 | Stop reason: HOSPADM

## 2024-07-23 RX ADMIN — WARFARIN SODIUM 2 MG: 2 TABLET ORAL at 06:07

## 2024-07-23 RX ADMIN — ACETAMINOPHEN 500 MG: 500 TABLET ORAL at 12:07

## 2024-07-23 RX ADMIN — ZINC SULFATE 220 MG (50 MG) CAPSULE 220 MG: CAPSULE at 02:07

## 2024-07-23 RX ADMIN — LEVOTHYROXINE SODIUM 125 MCG: 125 TABLET ORAL at 05:07

## 2024-07-23 RX ADMIN — POTASSIUM & SODIUM PHOSPHATES POWDER PACK 280-160-250 MG 1 PACKET: 280-160-250 PACK at 02:07

## 2024-07-23 RX ADMIN — METHOCARBAMOL 500 MG: 500 TABLET ORAL at 03:07

## 2024-07-23 RX ADMIN — MENTHOL AND ZINC OXIDE: .44; 20.625 OINTMENT TOPICAL at 09:07

## 2024-07-23 RX ADMIN — DOCUSATE SODIUM 100 MG: 100 CAPSULE, LIQUID FILLED ORAL at 09:07

## 2024-07-23 RX ADMIN — CARVEDILOL 3.12 MG: 3.12 TABLET, FILM COATED ORAL at 08:07

## 2024-07-23 RX ADMIN — SACUBITRIL AND VALSARTAN 1 TABLET: 24; 26 TABLET, FILM COATED ORAL at 08:07

## 2024-07-23 RX ADMIN — METHOCARBAMOL 500 MG: 500 TABLET ORAL at 08:07

## 2024-07-23 RX ADMIN — ACETAMINOPHEN 500 MG: 500 TABLET ORAL at 05:07

## 2024-07-23 RX ADMIN — POLYETHYLENE GLYCOL 3350 17 G: 17 POWDER, FOR SOLUTION ORAL at 09:07

## 2024-07-23 RX ADMIN — TRAMADOL HYDROCHLORIDE 50 MG: 50 TABLET, COATED ORAL at 06:07

## 2024-07-23 RX ADMIN — ASPIRIN 81 MG 81 MG: 81 TABLET ORAL at 08:07

## 2024-07-23 RX ADMIN — CARVEDILOL 3.12 MG: 3.12 TABLET, FILM COATED ORAL at 09:07

## 2024-07-23 RX ADMIN — ATORVASTATIN CALCIUM 20 MG: 10 TABLET, FILM COATED ORAL at 08:07

## 2024-07-23 RX ADMIN — METHOCARBAMOL 500 MG: 500 TABLET ORAL at 09:07

## 2024-07-23 RX ADMIN — ALBUMIN (HUMAN) 50 G: 5 SOLUTION INTRAVENOUS at 02:07

## 2024-07-23 RX ADMIN — ACETAMINOPHEN 500 MG: 500 TABLET ORAL at 04:07

## 2024-07-23 RX ADMIN — ACETAMINOPHEN 500 MG: 500 TABLET ORAL at 09:07

## 2024-07-23 RX ADMIN — MICONAZOLE NITRATE ANTIFUNGAL POWDER: 2 POWDER TOPICAL at 09:07

## 2024-07-23 RX ADMIN — ACETAMINOPHEN 500 MG: 500 TABLET ORAL at 01:07

## 2024-07-23 RX ADMIN — DOCUSATE SODIUM 100 MG: 100 CAPSULE, LIQUID FILLED ORAL at 08:07

## 2024-07-23 RX ADMIN — POLYETHYLENE GLYCOL 3350 17 G: 17 POWDER, FOR SOLUTION ORAL at 08:07

## 2024-07-23 RX ADMIN — ASPIRIN 81 MG 81 MG: 81 TABLET ORAL at 09:07

## 2024-07-23 RX ADMIN — ACETAMINOPHEN 500 MG: 500 TABLET ORAL at 08:07

## 2024-07-23 RX ADMIN — OXYCODONE HYDROCHLORIDE AND ACETAMINOPHEN 500 MG: 500 TABLET ORAL at 09:07

## 2024-07-23 RX ADMIN — FUROSEMIDE 20 MG: 10 INJECTION, SOLUTION INTRAMUSCULAR; INTRAVENOUS at 01:07

## 2024-07-23 RX ADMIN — TRAMADOL HYDROCHLORIDE 50 MG: 50 TABLET, COATED ORAL at 10:07

## 2024-07-23 RX ADMIN — THERA TABS 1 TABLET: TAB at 02:07

## 2024-07-23 NOTE — PT/OT/SLP PROGRESS
Physical Therapy      Patient Name:  Homa Jaquez   MRN:  [id number]    Patient not seen today secondary to increase pain . Pt stated she is unable to get up at this time due to increase pain. Will follow-up when schedule allows.

## 2024-07-23 NOTE — PROGRESS NOTES
[facility]  [facility] Medicine Progress Note      Patient Name: Homa Jaquez  MRN: [id number]  Admission Date: 7/13/2024   Length of Stay: 10  Attending Physician: Olvin Huber MD  Primary Care Provider: Franklyn Brito MD  Face-to-Face encounter date: 07/23/2024    Code Status: Prior        Chief Complaint:   Hip pain        HPI:   Homa Jaquez is a 88 y.o. female who  has a past medical history of A-fib, Chronic cystitis, GERD (gastroesophageal reflux disease), Graves disease, Hypertension, Hypothyroidism, unspecified, and Spinal stenosis.. The patient presented to [facility] on 7/13/2024 with a primary complaint of left hip pain.  Pt with a left femur fracture after a fall 7/2, underwent IMN 7/3 and [facility]  She reported increasing pain to hip, xrays showing failed intertrochanteric femur fracture.  Pt transferred to [facility] for revision on Monday.  She is currently comfortable no complaints     Overview/Hospital Course:  No notes on file       Interval Hx:   The pt reports feeling tired. Otherwise she has no new c/o. Her  is at the bedside. Case d/w wound RN. Wound pics reviewed.    Review of Systems   All other systems reviewed and are negative.      Objective/physical exam:  General: In no acute distress, afebrile  Chest: Clear to auscultation bilaterally, no wheezing or ronchi  Heart: irreg, irreg, no rubs or gallops, no murmur, no edema  Abdomen: Soft, nontender, BS +  MSK:s/p left hip surgery, wound vac in place, + swelling present in thigh   Neurologic: no focal deficits, A&Ox3  Psych: Calm, cooperative    VITAL SIGNS: 24 HRS MIN & MAX LAST   Temp  Min: 97.5 °F (36.4 °C)  Max: 97.9 °F (36.6 °C) 97.9 °F (36.6 °C)   BP  Min: 107/65  Max: 136/82 120/86   Pulse  Min: 74  Max: 84  78   Resp  Min: 16  Max: 20 16   SpO2  Min: 93 %  Max: 98 % 98 %       Recent Labs   Lab 07/19/24  0501 07/21/24  0630 07/23/24  0424   WBC 9.34  5.78 8.22   RBC 3.06* 3.41* 3.26*   HGB 9.7* 10.9* 10.2*   HCT 29.1* 32.6* 31.5*   MCV 95.1* 95.6* 96.6*   MCH 31.7* 32.0* 31.3*   MCHC 33.3 33.4 32.4*   RDW 16.9 16.7 16.6    168 159   MPV 9.9 9.9 10.7*       Recent Labs   Lab 07/20/24  0603 07/21/24  0630 07/23/24  0425   * 132* 130*   K 4.3 4.1 5.0   CL 96* 97* 98   CO2 25 25 26   BUN 49.7* 57.7* 53.4*   CREATININE 2.39* 2.08* 1.62*   CALCIUM 8.3* 8.4 8.0*   MG 2.00 2.00 1.90   ALBUMIN 1.9* 1.9* 1.6*        Microbiology Results (last 7 days)       Procedure Component Value Units Date/Time    Tissue Culture - Aerobic [[id number]] Collected: 07/15/24 1256    Order Status: Completed Specimen: Tissue from Hip, Left Updated: 07/20/24 0906     Tissue - Aerobic Culture No Growth at 5 days    Tissue Culture - Aerobic [[id number]] Collected: 07/15/24 1255    Order Status: Completed Specimen: Tissue from Hip, Left Updated: 07/20/24 0905     Tissue - Aerobic Culture No Growth at 5 days    Anaerobic Culture [[id number]] Collected: 07/15/24 1256    Order Status: Completed Specimen: Tissue from Hip, Left Updated: 07/18/24 0840     Anaerobe Culture No Anaerobes Isolated    Anaerobic Culture [[id number]] Collected: 07/15/24 1255    Order Status: Completed Specimen: Tissue from Hip, Left Updated: 07/18/24 0839     Anaerobe Culture No Anaerobes Isolated             Radiology:  X-Ray Femur 2 View Left  Narrative: EXAMINATION:  XR FEMUR 2 VIEW LEFT    CLINICAL HISTORY:  post-op;Displaced intertrochanteric fracture of left femur, subsequent encounter for closed fracture with routine healing    COMPARISON:  None.    FINDINGS:  Hardware of the left hip has been removed with interval insertion of a hip prostheses and anchoring device position and alignment is close to anatomical    No new fractures or dislocations    Joint spaces preserved.    No blastic or lytic lesions.    Soft tissues within normal limits.  Impression: Interval removal of hardware from the left  hip.    Interval insertion of total hip prostheses    Electronically signed by: García Marquez  Date:    07/18/2024  Time:    14:16      Scheduled Med:   acetaminophen  500 mg Oral 6 times per day    albumin human 25%  50 g Intravenous Once    allopurinol  100 mg Oral Daily    aspirin  81 mg Oral BID    atorvastatin  20 mg Oral Daily    carvediloL  3.125 mg Oral BID    docusate sodium  100 mg Oral BID    furosemide (LASIX) injection  20 mg Intravenous Once    levothyroxine  125 mcg Oral Before breakfast    menthol-zinc oxide   Topical (Top) BID    methocarbamoL  500 mg Oral TID    polyethylene glycol  17 g Oral BID    potassium, sodium phosphates  1 packet Oral Once    sacubitriL-valsartan  1 tablet Oral BID    warfarin  2 mg Oral Daily        Continuous Infusions:           PRN Meds:    Current Facility-Administered Medications:     aluminum-magnesium hydroxide-simethicone, 30 mL, Oral, Q6H PRN    benzonatate, 100 mg, Oral, TID PRN    HYDROcodone-acetaminophen, 1 tablet, Oral, Q8H PRN    lactulose, 20 g, Oral, Q6H PRN    melatonin, 6 mg, Oral, Nightly PRN    methocarbamoL, 500 mg, Oral, Q8H PRN    metoprolol, 10 mg, Intravenous, Q2H PRN    nitroGLYCERIN, 0.4 mg, Sublingual, Q5 Min PRN    ondansetron, 4 mg, Oral, Q6H PRN    ondansetron, 4 mg, Intravenous, Q8H PRN    traMADoL, 50 mg, Oral, Q4H PRN     Nutrition Status:      Assessment/Plan:    Acute anemia -stable   BARRY -improved  Left femur IMN 7/3, failed IMN  S/p Conversion SILVIA 7/15  Dilated cardiomyopathy  A fib  Htn  Hld  Gout  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  Hypothyroid     Plan  Pt's bp stable  CR improved down to 1.6  Give albumin infusion and lasix 20 mg iv x 1 to help with swelling  Per nutrition rec - add MVI daily, Vitamin C 500 mg BID, Zinc Sulfate 220 mg daily x 14 days to aid in wound healing   repeat am labs - inr, cbc, renal panel  Monitor inr daily until at goal 2-3  Cont coreg, entresto, coumadin  cont asa bid for dvt prophylaxis while inr <2  DC dispo in  progress, d/w CM      Dvt proph: ASA, scd                All diagnosis and differential diagnosis have been reviewed; assessment and plan has been documented; I have personally reviewed the labs and test results that are presently available; I have reviewed the patients medication list; I have reviewed the consulting providers response and recommendations. I have reviewed or attempted to review medical records based upon their availability      _____________________________________________________________________            Olvin Huber MD   07/23/2024

## 2024-07-23 NOTE — PT/OT/SLP RE-EVAL
"Occupational Therapy   Re-evaluation    Name: Homa Jaquez  MRN: [id number]  Admitting Diagnosis:  Intertrochanteric fracture of left femur  Recent Surgery: Procedure(s) (LRB):  CONVERSION ARTHROPLASTY, HIP, POSTERIOR APPROACH - valencia, cell saver, pathology (Left) 8 Days Post-Op    Recommendations:     Discharge Recommendations: Moderate Intensity Therapy  Discharge Equipment Recommendations: hospital bed, bedside commode, wheelchair, walker, rolling, lift device  Barriers to discharge:  None    Assessment:     Homa Jaqeuz is a 88 y.o. female with a medical diagnosis of Intertrochanteric fracture of left femur.  Performance deficits affecting function are weakness, impaired endurance, impaired self care skills, impaired functional mobility, impaired balance, gait instability, decreased upper extremity function, decreased lower extremity function, decreased safety awareness, pain, decreased ROM, impaired fine motor, impaired coordination, edema, impaired skin, impaired muscle length, impaired joint extensibility, orthopedic precautions.      Rehab Prognosis:  poor; patient would benefit from acute skilled OT services to address these deficits and reach maximum level of function.       Plan:     Patient to be seen daily (BID) to address the above listed problems via self-care/home management, therapeutic activities, therapeutic exercises  Plan of Care Expires: 07/29/24  Plan of Care Reviewed with: patient    Subjective     Chief Complaint: pain  Patient/Family stated goals: "Don't touch me."  Communicated with: LORIN prior to session.  Pain/Comfort:  Pain Rating 1:  (unable to give numerical value)  Location - Side 1: Left  Location - Orientation 1: generalized  Location 1: leg  Pain Addressed 1: Distraction, Reposition, Cessation of Activity    Objective:     Patient found HOB elevated with: wound vac, peripheral IV upon OT entry to room.    General Precautions: Standard, fall  Orthopedic Precautions: LLE weight " bearing as tolerated, LLE posterior precautions  Braces: N/A  Respiratory Status: Room air    Occupational Performance:    Bed Mobility:    Patient completed Rolling/Turning to Left with  total assistance  Patient completed Rolling/Turning to Right with total assistance    Activities of Daily Living:  Toileting: total assistance incontinence episode    Cognitive/Visual Perceptual:  Cognitive/Psychosocial Skills:     -       Oriented to: Person, Place, Time, and Situation   -       Follows Commands/attention:Follows one-step commands  -       Communication: clear/fluent  -       Memory: No Deficits noted  -       Safety awareness/insight to disability: intact   -       Mood/Affect/Coping skills/emotional control: Appropriate to situation and Cooperative  Visual/Perceptual:      -Intact      Physical Exam:  Tremor on RUE, generalized weakness in BUE.    Treatment & Education:  Reviewed roles and goals of OT, POC for acute stay    Patient left HOB elevated with all lines intact and call button in reach    GOALS:   Multidisciplinary Problems       Occupational Therapy Goals          Problem: Occupational Therapy    Goal Priority Disciplines Outcome Interventions   Occupational Therapy Goal     OT, PT/OT Not Progressing    Description: Pt will perform LB dressing max A with AE PRN by d/c.  Pt will perform toileting max A by d/c.  Pt will perform toilet t/f max A with LRAD by d/c. Discontinued goal  Pt will perform shower t/f max assist by d/c.  Pt will perform car t/f max assist in adherence to pxns by d/c. Discontinued goal                       History:     Past Medical History:   Diagnosis Date    A-fib     Chronic cystitis     GERD (gastroesophageal reflux disease)     Graves disease     Hypertension     Hypothyroidism, unspecified     Spinal stenosis          Past Surgical History:   Procedure Laterality Date    APPENDECTOMY      BLADDER SUSPENSION      CARDIOVERSION  04/01/2015    CATARACT EXTRACTION       CONVERSION ARTHROPLASTY, HIP, POSTERIOR APPROACH Left 7/15/2024    Procedure: CONVERSION ARTHROPLASTY, HIP, POSTERIOR APPROACH - valencia, cell saver, pathology;  Surgeon: Jonny Bains MD;  Location: [facility] OR;  Service: Orthopedics;  Laterality: Left;  valencia, cell saver, pathology    HYSTERECTOMY      INTRAMEDULLARY RODDING OF FEMUR Left 7/3/2024    Procedure: INSERTION, INTRAMEDULLARY MELANIE, FEMUR;  Surgeon: Steve Pierce DO;  Location: [facility] OR;  Service: Orthopedics;  Laterality: Left;  supine hana table c arm synthes    LAPAROSCOPIC CHOLECYSTECTOMY  01/12/2016    SPHINCTEROTOMY OF URETHRA  01/11/2016    TONSILLECTOMY AND ADENOIDECTOMY         Time Tracking:     OT Date of Treatment: 07/23/24  OT Start Time: 1130  OT Stop Time: 1200  OT Total Time (min): 30 min    Billable Minutes:Re-eval 15  Self Care/Home Management 15    7/23/2024

## 2024-07-23 NOTE — PLAN OF CARE
Shazia from [facility] called (ph [phone]) to let me know they do not receive referrals from [facility]. Referral faxed to [phone].   Dr Bah is ordering albumin and lasix today. Dr Bah states pt is medically ready to dc to [facility] once they are able to accept. I let Ferny know this. She stated she will review and let me know by tomorrow if able to accept.  1600: Cna called to state she received 32 of 64 pages in her fax. She provided her work email [email or web address] which I emailed clinicals.

## 2024-07-23 NOTE — PROGRESS NOTES
"No acute events overnight.  Pain controlled.  Resting in bed. OOB to chair with PT.  C/o global pain to legs    Vital Signs  Temp: 97.7 °F (36.5 °C)  Temp Source: Oral  Pulse: 74  Heart Rate Source: Monitor  Resp: 17  SpO2: 97 %  Pulse Oximetry Type: Intermittent  Device (Oxygen Therapy): room air  BP: 121/69  BP Location: Right arm  BP Method: Automatic  Patient Position: Lying  Arousal Level: opens eyes spontaneously  Height and Weight  Height: 5' 4" (162.6 cm)  Weight: 82.9 kg (182 lb 12.2 oz)  Weight Method: Standard Scale  BSA (Calculated - sq m): 1.93 sq meters  BMI (Calculated): 31.4  Weight in (lb) to have BMI = 25: 145.3]    +FHL/EHL  BCR distally  Dressing c/d/i  SILT distally    Recent Lab Results         07/23/24  0425   07/23/24  0424        Albumin 1.6         BUN 53.4         Calcium 8.0         Chloride 98         CO2 26         Creatinine 1.62         eGFR 31         Glucose 115         Hematocrit   31.5       Hemoglobin   10.2       INR   1.4       Magnesium  1.90         MCH   31.3       MCHC   32.4       MCV   96.6       MPV   10.7       nRBC   0.2       Phosphorus Level 2.2         Platelet Count   159       Potassium 5.0         PT   17.6       RBC   3.26       RDW   16.6       Sodium 130         WBC   8.22               A/P:  Status post conversion SILVIA  Pain controlled  Overall patient doing well.  Therapy for mobility and ambulation.  DVT PPx  Transfer to swing when able  Change WV today due to leak  "

## 2024-07-23 NOTE — PT/OT/SLP PROGRESS
Occupational Therapy      Patient Name:  Homa Jaquez   MRN:  [id number]    Patient not seen today secondary to pt refusal due to fatigue and pain; family and friend at bedside. Will follow-up tomorrow AM.    7/23/2024

## 2024-07-23 NOTE — PROGRESS NOTES
[facility] - Orthopaedics  Wound Care    Patient Name:  Homa Jaquez   MRN:  [id number]  Date: 7/23/2024  Diagnosis: Intertrochanteric fracture of left femur    History:     Past Medical History:   Diagnosis Date    A-fib     Chronic cystitis     GERD (gastroesophageal reflux disease)     Graves disease     Hypertension     Hypothyroidism, unspecified     Spinal stenosis        Social History     Socioeconomic History    Marital status:    Tobacco Use    Smoking status: Former     Types: Cigarettes    Smokeless tobacco: Never   Substance and Sexual Activity    Alcohol use: Yes    Drug use: Never    Sexual activity: Not Currently     Social Determinants of Health     Financial Resource Strain: Low Risk  (7/4/2024)    Overall Financial Resource Strain (CARDIA)     Difficulty of Paying Living Expenses: Not hard at all   Food Insecurity: No Food Insecurity (7/4/2024)    Hunger Vital Sign     Worried About Running Out of Food in the Last Year: Never true     Ran Out of Food in the Last Year: Never true   Transportation Needs: No Transportation Needs (7/9/2024)    PRAPARE - Transportation     Lack of Transportation (Medical): No     Lack of Transportation (Non-Medical): No   Stress: No Stress Concern Present (7/4/2024)    [employer] [place] [employer] - Occupational Stress Questionnaire     Feeling of Stress : Not at all   Housing Stability: Low Risk  (7/4/2024)    Housing Stability Vital Sign     Unable to Pay for Housing in the Last Year: No     Homeless in the Last Year: No       Precautions:     Allergies as of 07/13/2024 - Reviewed 07/13/2024   Allergen Reaction Noted    Cefadroxil  09/08/2022    Cefuroxime axetil  09/08/2022    Cephalexin  09/08/2022    Ciprofloxacin  09/08/2022    Enoxaparin  09/08/2022    Methenamine hippurate  09/08/2022    Promethazine  09/08/2022       WOC Assessment Details/Treatment      07/23/24 1051   Pain/Comfort/Sleep   POSS (Pasero Opioid-Induced  Sed Scale) 1 - Awake and alert   Pain Reassessment   Pain Rating Prior to Med Admin 5   RASS (Hansen Agitation-Sedation Scale)   RASS (Hansen Agitation-Sedation Scale) 0   Brief Confusion Assessment Method (bCAM)   Feature 3: Altered Level of Consciousness Negative        Negative Pressure Wound Therapy  07/19/24 1200 Left anterior   Placement Date/Time: 07/19/24 1200   Side: Left  Orientation: anterior  Location: Thigh   NPWT Type Incision Management   Therapy Setting NPWT Continuous therapy   Pressure Setting NPWT 125 mmHg   Therapy Interventions NPWT Seal intact;Dressing changed;Trac pad replaced   General Output (mL)   (large)        Wound 07/09/24 1553 Pressure Injury Left Heel   Date First Assessed/Time First Assessed: 07/09/24 1553   Present on Original Admission: Yes  Primary Wound Type: Pressure Injury  Side: Left  Location: Heel  Wound Approximate Age at First Assessment (Weeks): 1 weeks   Appearance Red   Off Loading   (heel boots)        Wound 07/02/24 2230 Skin Tear Left anterior;proximal;upper Arm   Date First Assessed/Time First Assessed: 07/02/24 2230   Present on Original Admission: Yes  Primary Wound Type: Skin Tear  Side: Left  Orientation: anterior;proximal;upper  Location: Arm   Appearance Intact        Wound 07/15/24 Incision Left Hip vertical   Date First Assessed: 07/15/24   Present on Original Admission: No  Primary Wound Type: Incision  Side: Left  Location: Hip  Incision Type: vertical  Closure Method: Staples  Additional Comments: prevena wound drain   Dressing   (changed black foam)        Wound 07/20/24 1050 Blister(s) Left anterior Thigh   Date First Assessed/Time First Assessed: 07/20/24 1050   Primary Wound Type: Blister(s)  Side: Left  Orientation: anterior  Location: Thigh   Appearance Pink   Care Applied:   Dressing Hydrocolloid        Wound 07/22/24 1407 Intertrigo Left Breast   Date First Assessed/Time First Assessed: 07/22/24 1407   Present on Original Admission: (c) Yes   Primary Wound Type: Intertrigo  Side: Left  Location: (c) Breast   Care Applied:;Skin Barrier        Wound 07/22/24 1408 Intertrigo Right Breast   Date First Assessed/Time First Assessed: 07/22/24 1408   Present on Original Admission: (c) Yes  Primary Wound Type: Intertrigo  Side: Right  Location: (c) Breast   Care Applied:;Skin Barrier     Left thigh: black foam wound vac dressing changed. Seal intact and tubing is draining to Vac Ulta.    Left thigh: multiple draining blisters: may cover with duoderm prn or ABD pads + prep pad and paper tape prn. Keep all draining areas covered to assist with  physical therapy.    Bilateral breast fold intertrigo: d/c  Miconazole powder due to patient intolerance. Begin Calmoseptine  (menthol oxide) ointment instead bid.    Continue with offloading with heel boots while in bed. Please do not put boots in closet. Please strap to foot of bed while patient in therapy.       07/23/2024

## 2024-07-23 NOTE — PLAN OF CARE
Spoke to pt and her son Av Jaquez this morning and Av states thatthey would like a referral to be sent to [facility] in [place]. Pt Choice obtained and placed in pt chart. Referral sent via [facility].

## 2024-07-23 NOTE — PLAN OF CARE
Problem: Occupational Therapy  Goal: Occupational Therapy Goal  Description: Pt will perform LB dressing max A with AE PRN by d/c.  Pt will perform toileting max A by d/c.  Pt will perform toilet t/f max A with LRAD by d/c. Discontinued goal  Pt will perform shower t/f max assist by d/c.  Pt will perform car t/f max assist in adherence to pxns by d/c. Discontinued goal  Outcome: Not Progressing

## 2024-07-23 NOTE — PT/OT/SLP RE-EVAL
Physical Therapy Evaluation    Patient Name:  Homa Jaquez   MRN:  [id number]    Recommendations:     Discharge Recommendations: Moderate Intensity Therapy   Discharge Equipment Recommendations: walker, rolling   Barriers to discharge:  impaired functional mobility, pain management, increased edema    Assessment:     Homa Jaquez is a 88 y.o. female admitted with a medical diagnosis of Intertrochanteric fracture of left femur.  She presents with the following impairments/functional limitations: weakness, impaired endurance, impaired functional mobility, gait instability, impaired balance . Wound Care nurseJyoti present to complete dressing change.    Rehab Prognosis: Poor; patient would benefit from acute skilled PT services to address these deficits and reach maximum level of function.    Recent Surgery: Procedure(s) (LRB):  CONVERSION ARTHROPLASTY, HIP, POSTERIOR APPROACH - valencia, cell saver, pathology (Left) 8 Days Post-Op    Plan:     During this hospitalization, patient to be seen BID to address the identified rehab impairments via gait training, therapeutic activities, therapeutic exercises and progress toward the following goals:    Plan of Care Expires:  07/29/24    Subjective     Chief Complaint: L hip pain  Patient/Family Comments/goals:   Pain/Comfort:  Location - Side 1: Left  Location 1: hip  Pain Addressed 1: Pre-medicate for activity, Reposition, Distraction, Cessation of Activity    Patients cultural, spiritual, Cheondoism conflicts given the current situation: no      Objective:     Communicated with nurse prior to session.  Patient found supine with peripheral IV, telemetry  upon PT entry to room.    General Precautions: Standard, fall  Orthopedic Precautions:LLE weight bearing as tolerated   Braces: N/A  Respiratory Status: Room air      Functional Mobility:  Bed Mobility:     Rolling Left:  moderate assistance x2; pt using HR to pull herself towards side of bed for wound care with  additional assistance from therapist and CNA, placed pillow between pt's Les to avoid crossing LEs  Rolling Right: moderate assistance x2          Treatment & Education:  Pt edu on total hip precautions and importance of frequent mobility    Patient left supine with all lines intact, call button in reach, nurse notified, and CNA, wound care nurse, Jyoti present.    GOALS:   Multidisciplinary Problems       Physical Therapy Goals          Problem: Physical Therapy    Goal Priority Disciplines Outcome Goal Variances Interventions   Physical Therapy Goal     PT, PT/OT Progressing     Description: Pt will improve functional independence by performing:    Bed mobility: CGA  Sit to stand: Min A  with rolling walker  Bed to chair t/f: Min A  with Stand Step  with rolling walker  Ambulation x 150' with CGA with rolling walker  Ramp : CGA                       History:     Past Medical History:   Diagnosis Date    A-fib     Chronic cystitis     GERD (gastroesophageal reflux disease)     Graves disease     Hypertension     Hypothyroidism, unspecified     Spinal stenosis        Past Surgical History:   Procedure Laterality Date    APPENDECTOMY      BLADDER SUSPENSION      CARDIOVERSION  04/01/2015    CATARACT EXTRACTION      CONVERSION ARTHROPLASTY, HIP, POSTERIOR APPROACH Left 7/15/2024    Procedure: CONVERSION ARTHROPLASTY, HIP, POSTERIOR APPROACH - valencia, cell saver, pathology;  Surgeon: Jonny Bains MD;  Location: [facility];  Service: Orthopedics;  Laterality: Left;  valencia, cell saver, pathology    HYSTERECTOMY      INTRAMEDULLARY RODDING OF FEMUR Left 7/3/2024    Procedure: INSERTION, INTRAMEDULLARY MELANIE, FEMUR;  Surgeon: Steve Pierce DO;  Location: [facility];  Service: Orthopedics;  Laterality: Left;  supine hana table c arm synthes    LAPAROSCOPIC CHOLECYSTECTOMY  01/12/2016    SPHINCTEROTOMY OF URETHRA  01/11/2016    TONSILLECTOMY AND ADENOIDECTOMY         Time Tracking:     PT Received On:    PT Start Time: 1100      PT Stop Time: 1126  PT Total Time (min): 26 min     Billable Minutes: Re-eval 26 07/23/2024

## 2024-07-23 NOTE — NURSING
Nurses Note -- 4 Eyes      7/22/2024   11:56 PM      Skin assessed during: Q Shift Change      [x] No Altered Skin Integrity Present    []Prevention Measures Documented      [] Yes- Altered Skin Integrity Present or Discovered   [] LDA Added if Not in Epic (Describe Wound)   [] New Altered Skin Integrity was Present on Admit and Documented in LDA   [] Wound Image Taken    Wound Care Consulted? No    Attending Nurse:  Spenser Quiroz RN/Staff Member:   devaughn

## 2024-07-24 LAB
ALBUMIN SERPL-MCNC: 2.3 G/DL (ref 3.4–4.8)
BUN SERPL-MCNC: 49.2 MG/DL (ref 9.8–20.1)
CALCIUM SERPL-MCNC: 8.2 MG/DL (ref 8.4–10.2)
CHLORIDE SERPL-SCNC: 98 MMOL/L (ref 98–107)
CO2 SERPL-SCNC: 24 MMOL/L (ref 23–31)
CREAT SERPL-MCNC: 1.53 MG/DL (ref 0.55–1.02)
GFR SERPLBLD CREATININE-BSD FMLA CKD-EPI: 33 ML/MIN/1.73/M2
GLUCOSE SERPL-MCNC: 113 MG/DL (ref 82–115)
INR PPP: 1.8 (ref 2–3)
MAGNESIUM SERPL-MCNC: 1.7 MG/DL (ref 1.6–2.6)
PHOSPHATE SERPL-MCNC: 2.3 MG/DL (ref 2.3–4.7)
POTASSIUM SERPL-SCNC: 4.4 MMOL/L (ref 3.5–5.1)
PROTHROMBIN TIME: 21 SECONDS (ref 11.7–14.5)
SODIUM SERPL-SCNC: 130 MMOL/L (ref 136–145)

## 2024-07-24 PROCEDURE — 97530 THERAPEUTIC ACTIVITIES: CPT | Mod: CQ

## 2024-07-24 PROCEDURE — 99900031 HC PATIENT EDUCATION (STAT)

## 2024-07-24 PROCEDURE — 36415 COLL VENOUS BLD VENIPUNCTURE: CPT | Performed by: INTERNAL MEDICINE

## 2024-07-24 PROCEDURE — 94799 UNLISTED PULMONARY SVC/PX: CPT

## 2024-07-24 PROCEDURE — 25000003 PHARM REV CODE 250: Performed by: ORTHOPAEDIC SURGERY

## 2024-07-24 PROCEDURE — 63600175 PHARM REV CODE 636 W HCPCS: Performed by: INTERNAL MEDICINE

## 2024-07-24 PROCEDURE — 94761 N-INVAS EAR/PLS OXIMETRY MLT: CPT

## 2024-07-24 PROCEDURE — 85610 PROTHROMBIN TIME: CPT | Performed by: INTERNAL MEDICINE

## 2024-07-24 PROCEDURE — 25000003 PHARM REV CODE 250: Performed by: INTERNAL MEDICINE

## 2024-07-24 PROCEDURE — 80069 RENAL FUNCTION PANEL: CPT | Performed by: INTERNAL MEDICINE

## 2024-07-24 PROCEDURE — 21400001 HC TELEMETRY ROOM

## 2024-07-24 PROCEDURE — 83735 ASSAY OF MAGNESIUM: CPT | Performed by: INTERNAL MEDICINE

## 2024-07-24 RX ORDER — DULOXETIN HYDROCHLORIDE 20 MG/1
20 CAPSULE, DELAYED RELEASE ORAL DAILY
Status: DISCONTINUED | OUTPATIENT
Start: 2024-07-24 | End: 2024-07-26 | Stop reason: HOSPADM

## 2024-07-24 RX ORDER — FUROSEMIDE 10 MG/ML
20 INJECTION INTRAMUSCULAR; INTRAVENOUS ONCE
Status: COMPLETED | OUTPATIENT
Start: 2024-07-24 | End: 2024-07-24

## 2024-07-24 RX ADMIN — CARVEDILOL 3.12 MG: 3.12 TABLET, FILM COATED ORAL at 09:07

## 2024-07-24 RX ADMIN — ACETAMINOPHEN 500 MG: 500 TABLET ORAL at 04:07

## 2024-07-24 RX ADMIN — ASPIRIN 81 MG 81 MG: 81 TABLET ORAL at 09:07

## 2024-07-24 RX ADMIN — ZINC SULFATE 220 MG (50 MG) CAPSULE 220 MG: CAPSULE at 09:07

## 2024-07-24 RX ADMIN — LEVOTHYROXINE SODIUM 125 MCG: 125 TABLET ORAL at 05:07

## 2024-07-24 RX ADMIN — METHOCARBAMOL 500 MG: 500 TABLET ORAL at 09:07

## 2024-07-24 RX ADMIN — SACUBITRIL AND VALSARTAN 1 TABLET: 24; 26 TABLET, FILM COATED ORAL at 09:07

## 2024-07-24 RX ADMIN — DULOXETINE HYDROCHLORIDE 20 MG: 20 CAPSULE, DELAYED RELEASE ORAL at 01:07

## 2024-07-24 RX ADMIN — DOCUSATE SODIUM 100 MG: 100 CAPSULE, LIQUID FILLED ORAL at 09:07

## 2024-07-24 RX ADMIN — OXYCODONE HYDROCHLORIDE AND ACETAMINOPHEN 500 MG: 500 TABLET ORAL at 09:07

## 2024-07-24 RX ADMIN — FUROSEMIDE 20 MG: 10 INJECTION, SOLUTION INTRAMUSCULAR; INTRAVENOUS at 01:07

## 2024-07-24 RX ADMIN — MENTHOL AND ZINC OXIDE: .44; 20.625 OINTMENT TOPICAL at 09:07

## 2024-07-24 RX ADMIN — TRAMADOL HYDROCHLORIDE 50 MG: 50 TABLET, COATED ORAL at 07:07

## 2024-07-24 RX ADMIN — ATORVASTATIN CALCIUM 20 MG: 10 TABLET, FILM COATED ORAL at 09:07

## 2024-07-24 RX ADMIN — THERA TABS 1 TABLET: TAB at 09:07

## 2024-07-24 RX ADMIN — ACETAMINOPHEN 500 MG: 500 TABLET ORAL at 12:07

## 2024-07-24 RX ADMIN — ACETAMINOPHEN 500 MG: 500 TABLET ORAL at 09:07

## 2024-07-24 RX ADMIN — ALLOPURINOL 100 MG: 100 TABLET ORAL at 09:07

## 2024-07-24 RX ADMIN — WARFARIN SODIUM 2 MG: 2 TABLET ORAL at 05:07

## 2024-07-24 RX ADMIN — METHOCARBAMOL 500 MG: 500 TABLET ORAL at 07:07

## 2024-07-24 RX ADMIN — TRAMADOL HYDROCHLORIDE 50 MG: 50 TABLET, COATED ORAL at 11:07

## 2024-07-24 RX ADMIN — ACETAMINOPHEN 500 MG: 500 TABLET ORAL at 01:07

## 2024-07-24 NOTE — PT/OT/SLP PROGRESS
Occupational Therapy      Patient Name:  Homa Jaquez   MRN:  [id number]    Patient not seen today secondary to pt pain and fatigue, refusal. Will follow-up tomorrow AM.    7/24/2024

## 2024-07-24 NOTE — NURSING
Nurses Note -- 4 Eyes      7/23/2024   7:47 PM      Skin assessed during: Q Shift Change      [] No Altered Skin Integrity Present    []Prevention Measures Documented      [x] Yes- Altered Skin Integrity Present or Discovered   [] LDA Added if Not in Epic (Describe Wound)   [] New Altered Skin Integrity was Present on Admit and Documented in LDA   [] Wound Image Taken    Wound Care Consulted? No    Attending Nurse:  Spenser Quiroz RN/Staff Member:   Shannan

## 2024-07-24 NOTE — NURSING
Nurses Note -- 4 Eyes      7/24/2024   06:58 AM      Skin assessed during: Q Shift Change      [] No Altered Skin Integrity Present    []Prevention Measures Documented      [x] Yes- Altered Skin Integrity Present or Discovered   [] LDA Added if Not in Epic (Describe Wound)   [x] New Altered Skin Integrity was Present on Admit and Documented in LDA   [] Wound Image Taken    Wound Care Consulted? No    Attending Nurse:  Jessica Quiroz RN/Staff Member:   Spenser

## 2024-07-24 NOTE — PROGRESS NOTES
[facility]  Hospital Medicine Progress Note      Patient Name: Homa Jaquez  MRN: [id number]  Admission Date: 7/13/2024   Length of Stay: 11  Attending Physician: Mathieu Patel MD  Primary Care Provider: Franklyn Brito MD  Face-to-Face encounter date: 07/24/2024    Code Status: Prior        Chief Complaint:   Hip pain        HPI:   Homa Jaquez is a 88 y.o. female who  has a past medical history of A-fib, Chronic cystitis, GERD (gastroesophageal reflux disease), Graves disease, Hypertension, Hypothyroidism, unspecified, and Spinal stenosis.. The patient presented to [facility] on 7/13/2024 with a primary complaint of left hip pain.  Pt with a left femur fracture after a fall 7/2, underwent IMN 7/3 and transferred to [facility] rebab 7/9.  She reported increasing pain to hip, xrays showing failed intertrochanteric femur fracture.  Pt transferred to [facility] for revision on Monday.  She is currently comfortable no complaints     Overview/Hospital Course:  No notes on file       Interval Hx:   Son at bedside, answered all questions, feels may be depressed which pt admitted to me.  Not eating hospital food but son says she eats all the food they bring her.  Concerned about drainage/pain to leg which since pt swollen could be contributing to drainage.. given lasix/albumin yesterday and looks less swollen today and cr better.  Discussed this with rn.    Review of Systems   All other systems reviewed and are negative.      Objective/physical exam:  General: In no acute distress, afebrile  Chest: Clear to auscultation bilaterally, no wheezing or ronchi  Heart: irreg, irreg, no rubs or gallops, no murmur,    Abdomen: Soft, nontender, BS +  MSK:s/p left hip surgery, wound vac in place, + swelling/tenderness present in thigh   Neurologic: no focal deficits, A&Ox3  Psych: Calm, cooperative    VITAL SIGNS: 24 HRS MIN & MAX LAST   Temp  Min: 97.3 °F (36.3 °C)  Max: 98.2 °F  (36.8 °C) 98.2 °F (36.8 °C)   BP  Min: 93/61  Max: 148/83 135/74   Pulse  Min: 64  Max: 91  87   Resp  Min: 16  Max: 18 18   SpO2  Min: 94 %  Max: 97 % 96 %       Recent Labs   Lab 07/19/24  0501 07/21/24  0630 07/23/24  0424   WBC 9.34 5.78 8.22   RBC 3.06* 3.41* 3.26*   HGB 9.7* 10.9* 10.2*   HCT 29.1* 32.6* 31.5*   MCV 95.1* 95.6* 96.6*   MCH 31.7* 32.0* 31.3*   MCHC 33.3 33.4 32.4*   RDW 16.9 16.7 16.6    168 159   MPV 9.9 9.9 10.7*       Recent Labs   Lab 07/21/24  0630 07/23/24  0425 07/24/24  0417   * 130* 130*   K 4.1 5.0 4.4   CL 97* 98 98   CO2 25 26 24   BUN 57.7* 53.4* 49.2*   CREATININE 2.08* 1.62* 1.53*   CALCIUM 8.4 8.0* 8.2*   MG 2.00 1.90 1.70   ALBUMIN 1.9* 1.6* 2.3*        Microbiology Results (last 7 days)       Procedure Component Value Units Date/Time    Tissue Culture - Aerobic [[id number]] Collected: 07/15/24 1256    Order Status: Completed Specimen: Tissue from Hip, Left Updated: 07/20/24 0906     Tissue - Aerobic Culture No Growth at 5 days    Tissue Culture - Aerobic [[id number]] Collected: 07/15/24 1255    Order Status: Completed Specimen: Tissue from Hip, Left Updated: 07/20/24 0905     Tissue - Aerobic Culture No Growth at 5 days    Anaerobic Culture [[id number]] Collected: 07/15/24 1256    Order Status: Completed Specimen: Tissue from Hip, Left Updated: 07/18/24 0840     Anaerobe Culture No Anaerobes Isolated    Anaerobic Culture [[id number]] Collected: 07/15/24 1255    Order Status: Completed Specimen: Tissue from Hip, Left Updated: 07/18/24 0839     Anaerobe Culture No Anaerobes Isolated             Radiology:  X-Ray Femur 2 View Left  Narrative: EXAMINATION:  XR FEMUR 2 VIEW LEFT    CLINICAL HISTORY:  post-op;Displaced intertrochanteric fracture of left femur, subsequent encounter for closed fracture with routine healing    COMPARISON:  None.    FINDINGS:  Hardware of the left hip has been removed with interval insertion of a hip prostheses and anchoring device position  and alignment is close to anatomical    No new fractures or dislocations    Joint spaces preserved.    No blastic or lytic lesions.    Soft tissues within normal limits.  Impression: Interval removal of hardware from the left hip.    Interval insertion of total hip prostheses    Electronically signed by: García Marquez  Date:    07/18/2024  Time:    14:16      Scheduled Med:   acetaminophen  500 mg Oral 6 times per day    allopurinol  100 mg Oral Daily    ascorbic acid (vitamin C)  500 mg Oral BID    aspirin  81 mg Oral BID    atorvastatin  20 mg Oral Daily    carvediloL  3.125 mg Oral BID    docusate sodium  100 mg Oral BID    levothyroxine  125 mcg Oral Before breakfast    menthol-zinc oxide   Topical (Top) BID    methocarbamoL  500 mg Oral TID    multivitamin  1 tablet Oral Daily    polyethylene glycol  17 g Oral BID    sacubitriL-valsartan  1 tablet Oral BID    warfarin  2 mg Oral Daily    zinc sulfate  220 mg Oral Daily        Continuous Infusions:           PRN Meds:    Current Facility-Administered Medications:     aluminum-magnesium hydroxide-simethicone, 30 mL, Oral, Q6H PRN    benzonatate, 100 mg, Oral, TID PRN    HYDROcodone-acetaminophen, 1 tablet, Oral, Q8H PRN    lactulose, 20 g, Oral, Q6H PRN    melatonin, 6 mg, Oral, Nightly PRN    methocarbamoL, 500 mg, Oral, Q8H PRN    metoprolol, 10 mg, Intravenous, Q2H PRN    nitroGLYCERIN, 0.4 mg, Sublingual, Q5 Min PRN    ondansetron, 4 mg, Oral, Q6H PRN    ondansetron, 4 mg, Intravenous, Q8H PRN    traMADoL, 50 mg, Oral, Q4H PRN     Nutrition Status:      Assessment/Plan:    Acute anemia -stable   BARRY -improved  Left femur IMN 7/3, failed IMN  S/p Conversion SILVIA 7/15  Dilated cardiomyopathy  A fib  Htn  Hld  Gout  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  Hypothyroid     Plan    Check u/s left leg  Start cymbalta  Pt's bp stable  CR improved down to 1.5  repeat lasix 20 mg iv x 1 to help with swelling  Per nutrition rec - added MVI daily, Vitamin C 500 mg BID, Zinc Sulfate 220 mg  daily x 14 days to aid in wound healing   repeat am labs - inr, cbc, renal panel  Monitor inr daily until at goal 2-3  Cont coreg, entresto, coumadin  cont asa bid for dvt prophylaxis while inr <2  DC dispo in progress, d/w CM snf have facility in [place]   PTOT    Dvt proph: ASA, scd                All diagnosis and differential diagnosis have been reviewed; assessment and plan has been documented; I have personally reviewed the labs and test results that are presently available; I have reviewed the patients medication list; I have reviewed the consulting providers response and recommendations. I have reviewed or attempted to review medical records based upon their availability      _____________________________________________________________________            Mathieu Patel MD   07/24/2024

## 2024-07-24 NOTE — PT/OT/SLP PROGRESS
Physical Therapy Treatment    Patient Name:  Homa Jaquez   MRN:  [id number]    Recommendations:     Discharge Recommendations: Moderate Intensity Therapy  Discharge Equipment Recommendations: walker, rolling  Barriers to discharge:  impaired mobility    Assessment:     Homa Jaquez is a 88 y.o. female admitted with a medical diagnosis of Intertrochanteric fracture of left femur.  She presents with the following impairments/functional limitations: weakness, impaired endurance, impaired functional mobility, gait instability, impaired balance .    Rehab Prognosis: Fair and Poor; patient would benefit from acute skilled PT services to address these deficits and reach maximum level of function.    Recent Surgery: Procedure(s) (LRB):  CONVERSION ARTHROPLASTY, HIP, POSTERIOR APPROACH - valencia, cell saver, pathology (Left) 9 Days Post-Op    Plan:     During this hospitalization, patient to be seen BID to address the identified rehab impairments via gait training, therapeutic activities, therapeutic exercises and progress toward the following goals:    Plan of Care Expires:  07/29/24    Subjective     Chief Complaint: pain  Patient/Family Comments/goals: get to eob  Pain/Comfort:         Objective:       Patient found supine with   upon PT entry to room.     General Precautions: Standard, fall  Orthopedic Precautions: LLE weight bearing as tolerated  Braces: N/A  Respiratory Status: Room air     Functional Mobility:  Bed Mobility:     Supine to Sit: moderate assistance and of 2 persons  Sit to Supine: moderate assistance and of 2 persons      Treatment & Education:  Pt sat eob fully supported due to increase pain and decrease activity tolerance. Pt attempted to preformed knee ext and reaching, pt had to lay back down due to fatigue.     Patient left supine with all lines intact and call button in reach..    GOALS:   Multidisciplinary Problems       Physical Therapy Goals          Problem: Physical Therapy    Goal  Priority Disciplines Outcome Goal Variances Interventions   Physical Therapy Goal     PT, PT/OT Progressing     Description: Pt will improve functional independence by performing:    Bed mobility: CGA  Sit to stand: Min A  with rolling walker  Bed to chair t/f: Min A  with Stand Step  with rolling walker  Ambulation x 150' with CGA with rolling walker  Ramp : CGA                       Time Tracking:     PT Received On:    PT Start Time: 1435     PT Stop Time: 1512  PT Total Time (min): 37 min     Billable Minutes: Therapeutic Activity 37    Treatment Type: Treatment  PT/PTA: PTA     Number of PTA visits since last PT visit: 1 07/24/2024

## 2024-07-24 NOTE — PT/OT/SLP PROGRESS
"Occupational Therapy      Patient Name:  Homa Jaquez   MRN:  [id number]    Patient not seen today secondary to pain and fatigue. Patient reports, "I'm not making any progress," as she starts to fall asleep. Will follow-up this afternoon.    7/24/2024  "

## 2024-07-24 NOTE — PT/OT/SLP PROGRESS
Physical Therapy      Patient Name:  Homa Jaquez   MRN:  [id number]    Patient not seen today secondary to unwilling to participate, pt fatigue and pain  . Pt found supine in bed, assist RT with repositioning pt in bed. Pt still having increase pain and states she is unable to tolerate moving. Will follow-up when schedule allows.

## 2024-07-24 NOTE — PLAN OF CARE
Emailed clinical updates to Live at [facility]. She said her MD will accept. Dr Patel updated and said possibly ready for dc tomorrow.

## 2024-07-24 NOTE — PLAN OF CARE
Problem: Adult Inpatient Plan of Care  Goal: Plan of Care Review  Outcome: Not Progressing  Goal: Patient-Specific Goal (Individualized)  Outcome: Not Progressing  Goal: Absence of Hospital-Acquired Illness or Injury  Outcome: Not Progressing  Goal: Optimal Comfort and Wellbeing  Outcome: Not Progressing  Goal: Readiness for Transition of Care  Outcome: Not Progressing     Problem: Acute Kidney Injury/Impairment  Goal: Fluid and Electrolyte Balance  Outcome: Not Progressing  Goal: Improved Oral Intake  Outcome: Not Progressing  Goal: Effective Renal Function  Outcome: Not Progressing     Problem: Fall Injury Risk  Goal: Absence of Fall and Fall-Related Injury  Outcome: Not Progressing     Problem: Wound  Goal: Optimal Coping  Outcome: Not Progressing  Goal: Optimal Functional Ability  Outcome: Not Progressing  Goal: Absence of Infection Signs and Symptoms  Outcome: Not Progressing  Goal: Improved Oral Intake  Outcome: Not Progressing  Goal: Optimal Pain Control and Function  Outcome: Not Progressing  Goal: Skin Health and Integrity  Outcome: Not Progressing  Goal: Optimal Wound Healing  Outcome: Not Progressing     Problem: Comorbidity Management  Goal: Maintenance of Heart Failure Symptom Control  Outcome: Not Progressing  Goal: Blood Pressure in Desired Range  Outcome: Not Progressing  Goal: Maintenance of Osteoarthritis Symptom Control  Outcome: Not Progressing  Goal: Bariatric Home Regimen Maintained  Outcome: Not Progressing     Problem: Hip Arthroplasty  Goal: Optimal Coping  Outcome: Not Progressing  Goal: Absence of Bleeding  Outcome: Not Progressing  Goal: Effective Bowel Elimination  Outcome: Not Progressing  Goal: Fluid and Electrolyte Balance  Outcome: Not Progressing  Goal: Optimal Functional Ability  Outcome: Not Progressing  Goal: Absence of Infection Signs and Symptoms  Outcome: Not Progressing  Goal: Intact Neurovascular Status  Outcome: Not Progressing  Goal: Anesthesia/Sedation  Recovery  Outcome: Not Progressing  Goal: Acceptable Pain Control  Outcome: Not Progressing  Goal: Nausea and Vomiting Relief  Outcome: Not Progressing  Goal: Effective Urinary Elimination  Outcome: Not Progressing  Goal: Effective Oxygenation and Ventilation  Outcome: Not Progressing     Problem: Infection  Goal: Absence of Infection Signs and Symptoms  Outcome: Not Progressing     Problem: Skin Injury Risk Increased  Goal: Skin Health and Integrity  Outcome: Not Progressing

## 2024-07-25 LAB
ANION GAP SERPL CALC-SCNC: 8 MEQ/L
BASOPHILS # BLD AUTO: 0.06 X10(3)/MCL
BASOPHILS NFR BLD AUTO: 0.7 %
BUN SERPL-MCNC: 40.6 MG/DL (ref 9.8–20.1)
CALCIUM SERPL-MCNC: 8.3 MG/DL (ref 8.4–10.2)
CHLORIDE SERPL-SCNC: 98 MMOL/L (ref 98–107)
CO2 SERPL-SCNC: 24 MMOL/L (ref 23–31)
CREAT SERPL-MCNC: 1.36 MG/DL (ref 0.55–1.02)
CREAT/UREA NIT SERPL: 30
EOSINOPHIL # BLD AUTO: 0.32 X10(3)/MCL (ref 0–0.9)
EOSINOPHIL NFR BLD AUTO: 3.7 %
ERYTHROCYTE [DISTWIDTH] IN BLOOD BY AUTOMATED COUNT: 16.9 % (ref 11.5–17)
GFR SERPLBLD CREATININE-BSD FMLA CKD-EPI: 38 ML/MIN/1.73/M2
GLUCOSE SERPL-MCNC: 114 MG/DL (ref 82–115)
HCT VFR BLD AUTO: 31.4 % (ref 37–47)
HGB BLD-MCNC: 10.5 G/DL (ref 12–16)
IMM GRANULOCYTES # BLD AUTO: 0.11 X10(3)/MCL (ref 0–0.04)
IMM GRANULOCYTES NFR BLD AUTO: 1.3 %
INR PPP: 1.7 (ref 2–3)
LYMPHOCYTES # BLD AUTO: 0.97 X10(3)/MCL (ref 0.6–4.6)
LYMPHOCYTES NFR BLD AUTO: 11.2 %
MCH RBC QN AUTO: 31.6 PG (ref 27–31)
MCHC RBC AUTO-ENTMCNC: 33.4 G/DL (ref 33–36)
MCV RBC AUTO: 94.6 FL (ref 80–94)
MONOCYTES # BLD AUTO: 0.78 X10(3)/MCL (ref 0.1–1.3)
MONOCYTES NFR BLD AUTO: 9 %
NEUTROPHILS # BLD AUTO: 6.43 X10(3)/MCL (ref 2.1–9.2)
NEUTROPHILS NFR BLD AUTO: 74.1 %
NRBC BLD AUTO-RTO: 0.3 %
PLATELET # BLD AUTO: 248 X10(3)/MCL (ref 130–400)
PMV BLD AUTO: 10.1 FL (ref 7.4–10.4)
POTASSIUM SERPL-SCNC: 4.6 MMOL/L (ref 3.5–5.1)
PROTHROMBIN TIME: 20.3 SECONDS (ref 11.7–14.5)
RBC # BLD AUTO: 3.32 X10(6)/MCL (ref 4.2–5.4)
SODIUM SERPL-SCNC: 130 MMOL/L (ref 136–145)
WBC # BLD AUTO: 8.67 X10(3)/MCL (ref 4.5–11.5)

## 2024-07-25 PROCEDURE — 94799 UNLISTED PULMONARY SVC/PX: CPT

## 2024-07-25 PROCEDURE — 25000003 PHARM REV CODE 250: Performed by: INTERNAL MEDICINE

## 2024-07-25 PROCEDURE — 36415 COLL VENOUS BLD VENIPUNCTURE: CPT | Performed by: INTERNAL MEDICINE

## 2024-07-25 PROCEDURE — 80048 BASIC METABOLIC PNL TOTAL CA: CPT | Performed by: INTERNAL MEDICINE

## 2024-07-25 PROCEDURE — 25000003 PHARM REV CODE 250: Performed by: ORTHOPAEDIC SURGERY

## 2024-07-25 PROCEDURE — 97110 THERAPEUTIC EXERCISES: CPT

## 2024-07-25 PROCEDURE — 63600175 PHARM REV CODE 636 W HCPCS: Mod: JZ,JG | Performed by: NURSE PRACTITIONER

## 2024-07-25 PROCEDURE — 97530 THERAPEUTIC ACTIVITIES: CPT | Mod: CQ

## 2024-07-25 PROCEDURE — 63600175 PHARM REV CODE 636 W HCPCS: Performed by: INTERNAL MEDICINE

## 2024-07-25 PROCEDURE — 94761 N-INVAS EAR/PLS OXIMETRY MLT: CPT

## 2024-07-25 PROCEDURE — 25000003 PHARM REV CODE 250: Performed by: NURSE PRACTITIONER

## 2024-07-25 PROCEDURE — 85610 PROTHROMBIN TIME: CPT | Performed by: INTERNAL MEDICINE

## 2024-07-25 PROCEDURE — 21400001 HC TELEMETRY ROOM

## 2024-07-25 PROCEDURE — 99900031 HC PATIENT EDUCATION (STAT)

## 2024-07-25 PROCEDURE — 85025 COMPLETE CBC W/AUTO DIFF WBC: CPT | Performed by: INTERNAL MEDICINE

## 2024-07-25 RX ORDER — FUROSEMIDE 10 MG/ML
20 INJECTION INTRAMUSCULAR; INTRAVENOUS ONCE
Status: DISCONTINUED | OUTPATIENT
Start: 2024-07-25 | End: 2024-07-26 | Stop reason: HOSPADM

## 2024-07-25 RX ORDER — ACETAMINOPHEN 10 MG/ML
1000 INJECTION, SOLUTION INTRAVENOUS ONCE
Status: COMPLETED | OUTPATIENT
Start: 2024-07-25 | End: 2024-07-25

## 2024-07-25 RX ORDER — DOXYCYCLINE HYCLATE 100 MG
100 TABLET ORAL EVERY 12 HOURS
Status: DISCONTINUED | OUTPATIENT
Start: 2024-07-25 | End: 2024-07-26 | Stop reason: HOSPADM

## 2024-07-25 RX ORDER — FUROSEMIDE 10 MG/ML
20 INJECTION INTRAMUSCULAR; INTRAVENOUS ONCE
Status: COMPLETED | OUTPATIENT
Start: 2024-07-25 | End: 2024-07-25

## 2024-07-25 RX ADMIN — ZINC SULFATE 220 MG (50 MG) CAPSULE 220 MG: CAPSULE at 09:07

## 2024-07-25 RX ADMIN — DOXYCYCLINE HYCLATE 100 MG: 100 TABLET, COATED ORAL at 09:07

## 2024-07-25 RX ADMIN — CARVEDILOL 3.12 MG: 3.12 TABLET, FILM COATED ORAL at 09:07

## 2024-07-25 RX ADMIN — ATORVASTATIN CALCIUM 20 MG: 10 TABLET, FILM COATED ORAL at 09:07

## 2024-07-25 RX ADMIN — THERA TABS 1 TABLET: TAB at 09:07

## 2024-07-25 RX ADMIN — SACUBITRIL AND VALSARTAN 1 TABLET: 24; 26 TABLET, FILM COATED ORAL at 09:07

## 2024-07-25 RX ADMIN — OXYCODONE HYDROCHLORIDE AND ACETAMINOPHEN 500 MG: 500 TABLET ORAL at 09:07

## 2024-07-25 RX ADMIN — ACETAMINOPHEN 1000 MG: 10 INJECTION INTRAVENOUS at 09:07

## 2024-07-25 RX ADMIN — ALLOPURINOL 100 MG: 100 TABLET ORAL at 09:07

## 2024-07-25 RX ADMIN — TRAMADOL HYDROCHLORIDE 50 MG: 50 TABLET, COATED ORAL at 09:07

## 2024-07-25 RX ADMIN — TRAMADOL HYDROCHLORIDE 50 MG: 50 TABLET, COATED ORAL at 03:07

## 2024-07-25 RX ADMIN — LEVOTHYROXINE SODIUM 125 MCG: 125 TABLET ORAL at 06:07

## 2024-07-25 RX ADMIN — FUROSEMIDE 20 MG: 10 INJECTION, SOLUTION INTRAMUSCULAR; INTRAVENOUS at 01:07

## 2024-07-25 RX ADMIN — METHOCARBAMOL 500 MG: 500 TABLET ORAL at 05:07

## 2024-07-25 RX ADMIN — POLYETHYLENE GLYCOL 3350 17 G: 17 POWDER, FOR SOLUTION ORAL at 09:07

## 2024-07-25 RX ADMIN — DOCUSATE SODIUM 100 MG: 100 CAPSULE, LIQUID FILLED ORAL at 09:07

## 2024-07-25 RX ADMIN — METHOCARBAMOL 500 MG: 500 TABLET ORAL at 09:07

## 2024-07-25 RX ADMIN — ACETAMINOPHEN 500 MG: 500 TABLET ORAL at 01:07

## 2024-07-25 RX ADMIN — ASPIRIN 81 MG 81 MG: 81 TABLET ORAL at 09:07

## 2024-07-25 RX ADMIN — ACETAMINOPHEN 500 MG: 500 TABLET ORAL at 09:07

## 2024-07-25 RX ADMIN — DULOXETINE HYDROCHLORIDE 20 MG: 20 CAPSULE, DELAYED RELEASE ORAL at 09:07

## 2024-07-25 NOTE — PT/OT/SLP PROGRESS
Physical Therapy Treatment    Patient Name:  Homa Jaquez   MRN:  [id number]    Recommendations:     Discharge Recommendations: Moderate Intensity Therapy  Discharge Equipment Recommendations: walker, rolling  Barriers to discharge:  impaired mobility     Assessment:     Homa Jaquez is a 88 y.o. female admitted with a medical diagnosis of Intertrochanteric fracture of left femur.  She presents with the following impairments/functional limitations: weakness, impaired endurance, impaired functional mobility, gait instability, impaired balance .    Rehab Prognosis: Good; patient would benefit from acute skilled PT services to address these deficits and reach maximum level of function.    Recent Surgery: Procedure(s) (LRB):  CONVERSION ARTHROPLASTY, HIP, POSTERIOR APPROACH - valencia, cell saver, pathology (Left) 10 Days Post-Op    Plan:     During this hospitalization, patient to be seen BID to address the identified rehab impairments via gait training, therapeutic activities, therapeutic exercises and progress toward the following goals:    Plan of Care Expires:  07/29/24    Subjective     Chief Complaint: pain  Pain/Comfort:         Objective:     Patient found supine with   upon PT entry to room.     General Precautions: Standard, fall  Orthopedic Precautions: LLE weight bearing as tolerated  Braces: N/A  Respiratory Status: Room air     Functional Mobility:  Bed Mobility:     Supine to Sit: total assistance and of 2 persons  Transfers:     Sit to Stand:  total assistance and of 2 persons with no AD  Bed to Chair: total assistance and of 2 persons with  no AD  using  Stand Pivot    Treatment & Education:  Pt required max encouragement to participate due to pain, pt required max/ total assist for all activities due to increase pain.     Patient left up in chair with all lines intact and call button in reach..    GOALS:   Multidisciplinary Problems       Physical Therapy Goals          Problem: Physical Therapy     Goal Priority Disciplines Outcome Goal Variances Interventions   Physical Therapy Goal     PT, PT/OT Progressing     Description: Pt will improve functional independence by performing:    Bed mobility: CGA  Sit to stand: Min A  with rolling walker  Bed to chair t/f: Min A  with Stand Step  with rolling walker  Ambulation x 150' with CGA with rolling walker  Ramp : CGA                       Time Tracking:     PT Received On:    PT Start Time: 1005     PT Stop Time: 1028  PT Total Time (min): 23 min     Billable Minutes: Therapeutic Activity 23    Treatment Type: Treatment  PT/PTA: PTA     Number of PTA visits since last PT visit: 2     07/25/2024

## 2024-07-25 NOTE — NURSING
Nurses Note -- 4 Eyes      7/25/2024   2:30 AM      Skin assessed during: Q Shift Change      [] No Altered Skin Integrity Present    []Prevention Measures Documented      [x] Yes- Altered Skin Integrity Present or Discovered   [] LDA Added if Not in Epic (Describe Wound)   [] New Altered Skin Integrity was Present on Admit and Documented in LDA   [] Wound Image Taken    Wound Care Consulted? No    Attending Nurse:  Bj Palma RN    Second RN/Staff Member:  Jessica Soni RN

## 2024-07-25 NOTE — PROGRESS NOTES
Inpatient Nutrition Assessment    Admit Date: 7/13/2024   Total duration of encounter: 12 days   Patient Age: 88 y.o.    Nutrition Recommendation/Prescription     Continue Regular/Coumadin Restricted as tolerated  Continue Boost Glucose Control TID (190 kcal and 16 gm protein per serving)  Weigh weekly  Continue MVI daily, Vitamin C 500 mg BID, Zinc Sulfate 220 mg daily x 14 days to aid in wound healing    Communication of Recommendations: reviewed with patient    Nutrition Assessment     Malnutrition Assessment/Nutrition-Focused Physical Exam    Malnutrition Context: other (see comments) (Does not meet criteria) (07/19/24 1022)                                                           A minimum of two characteristics is recommended for diagnosis of either severe or non-severe malnutrition.    Chart Review    Reason Seen: follow-up    Malnutrition Screening Tool Results   Have you recently lost weight without trying?: No  Have you been eating poorly because of a decreased appetite?: No   MST Score: 0   Diagnosis:  Status post intramedullary nail left hip with a failed intertrochanteric femur fracture     Relevant Medical History:    A-fib      Chronic cystitis      GERD (gastroesophageal reflux disease)      Graves disease      Hypertension      Hypothyroidism, unspecified      Spinal stenosis        Scheduled Medications:  acetaminophen, 500 mg, 6 times per day  allopurinol, 100 mg, Daily  ascorbic acid (vitamin C), 500 mg, BID  aspirin, 81 mg, BID  atorvastatin, 20 mg, Daily  carvediloL, 3.125 mg, BID  docusate sodium, 100 mg, BID  DULoxetine, 20 mg, Daily  levothyroxine, 125 mcg, Before breakfast  menthol-zinc oxide, , BID  methocarbamoL, 500 mg, TID  multivitamin, 1 tablet, Daily  polyethylene glycol, 17 g, BID  sacubitriL-valsartan, 1 tablet, BID  warfarin, 2 mg, Daily  zinc sulfate, 220 mg, Daily    Continuous Infusions:   PRN Medications:  aluminum-magnesium hydroxide-simethicone, 30 mL, Q6H PRN  benzonatate,  100 mg, TID PRN  HYDROcodone-acetaminophen, 1 tablet, Q8H PRN  lactulose, 20 g, Q6H PRN  melatonin, 6 mg, Nightly PRN  methocarbamoL, 500 mg, Q8H PRN  metoprolol, 10 mg, Q2H PRN  nitroGLYCERIN, 0.4 mg, Q5 Min PRN  ondansetron, 4 mg, Q6H PRN  ondansetron, 4 mg, Q8H PRN  traMADoL, 50 mg, Q4H PRN    Calorie Containing IV Medications: no significant kcals from medications at this time    Recent Labs   Lab 07/19/24  0501 07/20/24  0603 07/21/24  0630 07/23/24  0424 07/23/24  0425 07/24/24  0417 07/25/24  0441   * 132* 132*  --  130* 130* 130*   K 4.2 4.3 4.1  --  5.0 4.4 4.6   CALCIUM 7.6* 8.3* 8.4  --  8.0* 8.2* 8.3*   PHOS  --  3.7 3.0  --  2.2* 2.3  --    MG 1.90 2.00 2.00  --  1.90 1.70  --    CO2 26 25 25  --  26 24 24   BUN 43.6* 49.7* 57.7*  --  53.4* 49.2* 40.6*   CREATININE 2.04* 2.39* 2.08*  --  1.62* 1.53* 1.36*   EGFRNORACEVR 23 19 23  --  31 33 38   GLUCOSE 76* 105 96  --  115 113 114   ALBUMIN  --  1.9* 1.9*  --  1.6* 2.3*  --    PREALB  --   --  5.1*  --   --   --   --    WBC 9.34  --  5.78 8.22  --   --  8.67   HGB 9.7*  --  10.9* 10.2*  --   --  10.5*   HCT 29.1*  --  32.6* 31.5*  --   --  31.4*     Nutrition Orders:  Diet Adult Regular Coumadin Restriction  Dietary nutrition supplements Boost Glucose Control - Any flavor; TID    Appetite/Oral Intake: fair/50-75% of meals  Factors Affecting Nutritional Intake:  does not like the food  Social Needs Impacting Access to Food: none identified  Food/Denominational/Cultural Preferences: none reported  Food Allergies: none reported  Last Bowel Movement: 07/23/24  Wound(s):     Wound 07/02/24 2230 Skin Tear Left anterior;proximal;upper Arm-Tissue loss description: Partial thickness     Comments    (7/22) Consulted for poor appetite. Pt consumes <25% of meals. Denied N/V/C/D. Reported she does not like the food. Was eating well PTA. Pt stated she does drink the oral supplements provided. No chewing or swallowing difficulties. Pt not able to recall UBW. Stated  "she has lost some weight but no idea of how much. Med/labs reviewed. Pre-albumin 5.1(L)    () Pt with fair appetite and intake. She does not like the hospital food. She does eat food brought in by son. No GI issues reported. Med/labs reviewed. No new wt recorded.      Anthropometrics    Height: 5' 4" (162.6 cm),    Last Weight: 82.9 kg (182 lb 12.2 oz) (24 1453), Weight Method: Standard Scale  BMI (Calculated): 31.4  BMI Classification: obese grade I (BMI 30-34.9)     Ideal Body Weight (IBW), Female: 120 lb     % Ideal Body Weight, Female (lb): 152.3 %                    Usual Body Weight (UBW), k.5 kg  % Usual Body Weight: 117.83     Usual Weight Provided By: unable to obtain usual weight    Wt Readings from Last 5 Encounters:   24 82.9 kg (182 lb 12.2 oz)   24 82.9 kg (182 lb 12.2 oz)   24 70.3 kg (155 lb)   23 67 kg (147 lb 12.8 oz)   23 71.1 kg (156 lb 12.8 oz)     Weight Change(s) Since Admission: no new wt recorded  Wt Readings from Last 1 Encounters:   24 1453 82.9 kg (182 lb 12.2 oz)   24 1452 82.9 kg (182 lb 12.2 oz)   Admit Weight: 82.9 kg (182 lb 12.2 oz) (24 1452), Weight Method: Standard Scale    Estimated Needs    Weight Used For Calorie Calculations: 82.9 kg (182 lb 12.2 oz)  Energy Calorie Requirements (kcal): 3307-9911 kcal/d (MSJ x 1.3 sf)  [facility]  Weight Used For Protein Calculations: 54.4 kg (120 lb) (IBW used for calculation)  Protein Requirements: 82 g Pro / d (1.5 g/kg IBW)  Fluid Requirements (mL): 2000 ml/d (25 ml/kg)        Enteral Nutrition     Patient not receiving enteral nutrition at this time.    Parenteral Nutrition     Patient not receiving parenteral nutrition support at this time.    Evaluation of Received Nutrient Intake    Calories: not meeting estimated needs  Protein: not meeting estimated needs    Patient Education     Not applicable.    Nutrition Diagnosis     PES: Inadequate oral " intake related to acute illness as evidenced by poor intake (<50%) of meals. (active)       Nutrition Interventions     Intervention(s): general/healthful diet, commercial beverage, and collaboration with other providers    Goal: Meet greater than 80% of nutritional needs by follow-up. (goal progressing)  Goal: Maintain weight throughout hospitalization. (goal progressing)    Nutrition Goals & Monitoring     Dietitian will monitor: food and beverage intake, weight, electrolyte/renal panel, glucose/endocrine profile, and gastrointestinal profile  Discharge planning: resume home regimen  Nutrition Risk/Follow-Up: low (follow-up in 5-7 days)   Please consult if re-assessment needed sooner.

## 2024-07-25 NOTE — PLAN OF CARE
Clinical updates emailed to Live' at [facility].  Kalyn with [employer] called and stated they need 24 hr notice to set up transportation to [facility] since this is out of their area and it is a special request set up, son works for [employer].  Pt is set up with [facility] to be picked up for 10am 7/26/26.   I spoke to Live' and she said Dr Pita Rodriguez is the accepting doctor at their facility.  Dr Patel made aware of above.

## 2024-07-25 NOTE — PT/OT/SLP PROGRESS
"Occupational Therapy   Treatment    Name: Homa Jaquez  MRN: [id number]  Admitting Diagnosis:  Intertrochanteric fracture of left femur  10 Days Post-Op    Recommendations:     Discharge Recommendations: Moderate Intensity Therapy  Discharge Equipment Recommendations:  hospital bed, bedside commode, wheelchair, walker, rolling, lift device  Barriers to discharge:  None    Assessment:     Homa Jaquez is a 88 y.o. female with a medical diagnosis of Intertrochanteric fracture of left femur.  Performance deficits affecting function are weakness, impaired endurance, impaired self care skills, pain, decreased ROM, orthopedic precautions, impaired joint extensibility, edema, decreased lower extremity function, impaired balance, impaired functional mobility, gait instability.     Rehab Prognosis:  Poor; patient would benefit from acute skilled OT services to address these deficits and reach maximum level of function.       Plan:     Patient to be seen daily (BID) to address the above listed problems via self-care/home management, therapeutic activities, therapeutic exercises  Plan of Care Expires: 07/29/24  Plan of Care Reviewed with: patient    Subjective     Chief Complaint: fatigue  Patient/Family Comments/goals: "I can try."  Pain/Comfort:  Pain Rating 1: 0/10    Objective:     Communicated with: NSG prior to session.  Patient found up in chair with wound vac, peripheral IV upon OT entry to room.    General Precautions: Standard, fall    Orthopedic Precautions:LLE weight bearing as tolerated, LLE posterior precautions  Braces: N/A  Respiratory Status: Room air     Occupational Performance:     Treatment & Education:  With 1# weighted dowel, patient performed 20 reps of the following exercises: bicep curls and chest presses. Slow pace and several rest breaks. Pt unable to continue due to BUE muscular fatigue.    Patient left up in chair with all lines intact, call button in reach, and NOEMY Santana notified    GOALS: "   Multidisciplinary Problems       Occupational Therapy Goals          Problem: Occupational Therapy    Goal Priority Disciplines Outcome Interventions   Occupational Therapy Goal     OT, PT/OT Not Progressing    Description: Pt will perform LB dressing max A with AE PRN by d/c.  Pt will perform toileting max A by d/c.  Pt will perform toilet t/f max A with LRAD by d/c. Discontinued goal  Pt will perform shower t/f max assist by d/c.  Pt will perform car t/f max assist in adherence to pxns by d/c. Discontinued goal                       Time Tracking:     OT Date of Treatment: 07/25/24  OT Start Time: 1356  OT Stop Time: 1420  OT Total Time (min): 24 min    Billable Minutes:Therapeutic Exercise 24    OT/EVY: OT          7/25/2024

## 2024-07-25 NOTE — PROGRESS NOTES
[facility]  [facility] Medicine Progress Note      Patient Name: Homa Jaquez  MRN: [id number]  Admission Date: 7/13/2024   Length of Stay: 12  Attending Physician: Mathieu Patel MD  Primary Care Provider: Franklyn Brito MD  Face-to-Face encounter date: 07/25/2024    Code Status: Prior        Chief Complaint:   Hip pain        HPI:   Homa Jaquez is a 88 y.o. female who  has a past medical history of A-fib, Chronic cystitis, GERD (gastroesophageal reflux disease), Graves disease, Hypertension, Hypothyroidism, unspecified, and Spinal stenosis.. The patient presented to [facility] on 7/13/2024 with a primary complaint of left hip pain.  Pt with a left femur fracture after a fall 7/2, underwent IMN 7/3 and transferred to [facility] rebab 7/9.  She reported increasing pain to hip, xrays showing failed intertrochanteric femur fracture.  Pt transferred to [facility] for revision on Monday.  She is currently comfortable no complaints     Overview/Hospital Course:  No notes on file       Interval Hx:   Pt awake alert, no new complaints, no f/c.  Rn says still having quite a bit of bloody drainage from wound vac.    Review of Systems   All other systems reviewed and are negative.      Objective/physical exam:  General: In no acute distress, afebrile  Chest: Clear to auscultation bilaterally, no wheezing or ronchi  Heart: irreg, irreg, no rubs or gallops, no murmur,    Abdomen: Soft, nontender, BS +  MSK:s/p left hip surgery, wound vac in place, + swelling/tenderness present in thigh   Neurologic: no focal deficits, A&Ox3  Psych: Calm, cooperative    VITAL SIGNS: 24 HRS MIN & MAX LAST   Temp  Min: 97.4 °F (36.3 °C)  Max: 98.2 °F (36.8 °C) 97.7 °F (36.5 °C)   BP  Min: 120/62  Max: 139/85 127/74   Pulse  Min: 80  Max: 90  80   Resp  Min: 16  Max: 19 18   SpO2  Min: 93 %  Max: 97 % (!) 93 %       Recent Labs   Lab 07/21/24  0630 07/23/24  0424 07/25/24  0441   WBC 5.78 8.22 8.67    RBC 3.41* 3.26* 3.32*   HGB 10.9* 10.2* 10.5*   HCT 32.6* 31.5* 31.4*   MCV 95.6* 96.6* 94.6*   MCH 32.0* 31.3* 31.6*   MCHC 33.4 32.4* 33.4   RDW 16.7 16.6 16.9    159 248   MPV 9.9 10.7* 10.1       Recent Labs   Lab 07/21/24  0630 07/23/24  0425 07/24/24  0417 07/25/24  0441   * 130* 130* 130*   K 4.1 5.0 4.4 4.6   CL 97* 98 98 98   CO2 25 26 24 24   BUN 57.7* 53.4* 49.2* 40.6*   CREATININE 2.08* 1.62* 1.53* 1.36*   CALCIUM 8.4 8.0* 8.2* 8.3*   MG 2.00 1.90 1.70  --    ALBUMIN 1.9* 1.6* 2.3*  --         Microbiology Results (last 7 days)       Procedure Component Value Units Date/Time    Tissue Culture - Aerobic [[id number]] Collected: 07/15/24 1256    Order Status: Completed Specimen: Tissue from Hip, Left Updated: 07/20/24 0906     Tissue - Aerobic Culture No Growth at 5 days    Tissue Culture - Aerobic [[id number]] Collected: 07/15/24 1255    Order Status: Completed Specimen: Tissue from Hip, Left Updated: 07/20/24 0905     Tissue - Aerobic Culture No Growth at 5 days             Radiology:  US Lower Extremity Veins Left  Narrative: EXAMINATION:  US LOWER EXTREMITY VEINS LEFT    CLINICAL HISTORY:  swelling, r/o dvt;    COMPARISON:  None    FINDINGS:  Sonographic images with color and spectral analysis were obtained of the left lower extremity venous system.    The imaged left lower extremity veins demonstrate normal flow, compressibility, and augmentation without evidence of thrombus.  There is some subcutaneous edema around the knee.  Impression: Negative exam for left lower extremity thrombus.    Electronically signed by: Roger Montague  Date:    07/24/2024  Time:    17:00      Scheduled Med:   acetaminophen  500 mg Oral 6 times per day    allopurinol  100 mg Oral Daily    ascorbic acid (vitamin C)  500 mg Oral BID    atorvastatin  20 mg Oral Daily    carvediloL  3.125 mg Oral BID    docusate sodium  100 mg Oral BID    DULoxetine  20 mg Oral Daily    levothyroxine  125 mcg Oral Before breakfast     menthol-zinc oxide   Topical (Top) BID    methocarbamoL  500 mg Oral TID    multivitamin  1 tablet Oral Daily    polyethylene glycol  17 g Oral BID    sacubitriL-valsartan  1 tablet Oral BID    warfarin  2 mg Oral Daily    zinc sulfate  220 mg Oral Daily        Continuous Infusions:           PRN Meds:    Current Facility-Administered Medications:     aluminum-magnesium hydroxide-simethicone, 30 mL, Oral, Q6H PRN    benzonatate, 100 mg, Oral, TID PRN    HYDROcodone-acetaminophen, 1 tablet, Oral, Q8H PRN    lactulose, 20 g, Oral, Q6H PRN    melatonin, 6 mg, Oral, Nightly PRN    methocarbamoL, 500 mg, Oral, Q8H PRN    metoprolol, 10 mg, Intravenous, Q2H PRN    nitroGLYCERIN, 0.4 mg, Sublingual, Q5 Min PRN    ondansetron, 4 mg, Oral, Q6H PRN    ondansetron, 4 mg, Intravenous, Q8H PRN    traMADoL, 50 mg, Oral, Q4H PRN     Nutrition Status:      Assessment/Plan:    Acute anemia -stable   BARRY -improved  Left femur IMN 7/3, failed IMN  S/p Conversion SILVIA 7/15  Dilated cardiomyopathy  A fib  Htn  Hld  Gout  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  Hypothyroid     Plan    Dc asa/hold coumadin-resume once bleeding stopped  u/s left leg neg dvt  Cont cymbalta  Pt's bp stable  CR improved down to 1.3  repeat lasix 20 mg iv x 1 to help with swelling  Cont coreg, entresto, hold coumadin resume when appropriate  DC dispo in progress, d/w  snf have facility in [place]   PTOT    Dvt proph:  scd    Addendum:  Lasix 20mg iv x1            All diagnosis and differential diagnosis have been reviewed; assessment and plan has been documented; I have personally reviewed the labs and test results that are presently available; I have reviewed the patients medication list; I have reviewed the consulting providers response and recommendations. I have reviewed or attempted to review medical records based upon their availability      _____________________________________________________________________            Mathieu Patel MD   07/25/2024

## 2024-07-25 NOTE — PT/OT/SLP PROGRESS
"Physical Therapy Treatment    Patient Name:  Homa Jaquez   MRN:  [id number]    Recommendations:     Discharge Recommendations: Moderate Intensity Therapy  Discharge Equipment Recommendations: walker, rolling  Barriers to discharge:  impaired mobility     Assessment:     Homa Jaquez is a 88 y.o. female admitted with a medical diagnosis of Intertrochanteric fracture of left femur.  She presents with the following impairments/functional limitations: weakness, impaired endurance, impaired functional mobility, gait instability, impaired balance .    Rehab Prognosis: Poor; patient would benefit from acute skilled PT services to address these deficits and reach maximum level of function.    Recent Surgery: Procedure(s) (LRB):  CONVERSION ARTHROPLASTY, HIP, POSTERIOR APPROACH - valencia, cell saver, pathology (Left) 10 Days Post-Op    Plan:     During this hospitalization, patient to be seen BID to address the identified rehab impairments via gait training, therapeutic activities, therapeutic exercises and progress toward the following goals:    Plan of Care Expires:  07/29/24    Subjective     Chief Complaint: pain  Patient/Family Comments/goals: get in bed   Pain/Comfort:         Objective:     Communicated with rn prior to session.  Patient found up in chair with   upon PT entry to room.     General Precautions: Standard, fall  Orthopedic Precautions: LLE weight bearing as tolerated  Braces: N/A  Respiratory Status: Room air     Functional Mobility:  Bed Mobility:     Sit to Supine: maximal assistance and of 2 persons, required max assist with clayton to lift on bed and assistance with trunk.  Transfers:     Sit to Stand:  total assistance and of 2 persons with rolling walker, pt only able to tolerate ~5 sec of standing, preformed 2x to change vicki pad due to being soiled. Pt was asked how long she had been sitting in her urine she stated for awhile, pt educated on calling when needed to be changed. Pt stated "I do not " "care if I'm sitting in it or not". Pt then educated on increase risk of wounds and infection. Pt did not express any concern.   Bed to Chair: total assistance and of 2 persons with  no AD  using  Stand Pivot    Patient left supine with all lines intact and call button in reach..    GOALS:   Multidisciplinary Problems       Physical Therapy Goals          Problem: Physical Therapy    Goal Priority Disciplines Outcome Goal Variances Interventions   Physical Therapy Goal     PT, PT/OT Progressing     Description: Pt will improve functional independence by performing:    Bed mobility: CGA  Sit to stand: Min A  with rolling walker  Bed to chair t/f: Min A  with Stand Step  with rolling walker  Ambulation x 150' with CGA with rolling walker  Ramp : CGA                       Time Tracking:     PT Received On:    PT Start Time: 1435     PT Stop Time: 1453  PT Total Time (min): 18 min     Billable Minutes: Therapeutic Activity 18    Treatment Type: Treatment  PT/PTA: PTA     Number of PTA visits since last PT visit: 2     07/25/2024  "

## 2024-07-25 NOTE — PROGRESS NOTES
[facility] - Orthopaedics  Wound Care    Patient Name:  Homa Jaquez   MRN:  [id number]  Date: 7/25/2024  Diagnosis: Intertrochanteric fracture of left femur    History:     Past Medical History:   Diagnosis Date    A-fib     Chronic cystitis     GERD (gastroesophageal reflux disease)     Graves disease     Hypertension     Hypothyroidism, unspecified     Spinal stenosis        Social History     Socioeconomic History    Marital status:    Tobacco Use    Smoking status: Former     Types: Cigarettes    Smokeless tobacco: Never   Substance and Sexual Activity    Alcohol use: Yes    Drug use: Never    Sexual activity: Not Currently     Social Determinants of Health     Financial Resource Strain: Low Risk  (7/4/2024)    Overall Financial Resource Strain (CARDIA)     Difficulty of Paying Living Expenses: Not hard at all   Food Insecurity: No Food Insecurity (7/4/2024)    Hunger Vital Sign     Worried About Running Out of Food in the Last Year: Never true     Ran Out of Food in the Last Year: Never true   Transportation Needs: No Transportation Needs (7/9/2024)    PRAPARE - Transportation     Lack of Transportation (Medical): No     Lack of Transportation (Non-Medical): No   Stress: No Stress Concern Present (7/4/2024)    [employer] - Occupational Stress Questionnaire     Feeling of Stress : Not at all   Housing Stability: Low Risk  (7/4/2024)    Housing Stability Vital Sign     Unable to Pay for Housing in the Last Year: No     Homeless in the Last Year: No       Precautions:     Allergies as of 07/13/2024 - Reviewed 07/13/2024   Allergen Reaction Noted    Cefadroxil  09/08/2022    Cefuroxime axetil  09/08/2022    Cephalexin  09/08/2022    Ciprofloxacin  09/08/2022    Enoxaparin  09/08/2022    Methenamine hippurate  09/08/2022    Promethazine  09/08/2022       WOC Assessment Details/Treatment        07/25/24 0928   Pain Reassessment   Pain Rating Prior to Med  Admin 5        Negative Pressure Wound Therapy  07/19/24 1200 Left anterior   Placement Date/Time: 07/19/24 1200   Side: Left  Orientation: anterior  Location: Thigh   NPWT Type Incision Management   Therapy Setting NPWT Continuous therapy   Pressure Setting NPWT 125 mmHg   Therapy Interventions NPWT Seal intact   General Output (mL)   (cannister full)        Wound 07/09/24 1553 Pressure Injury Left Heel   Date First Assessed/Time First Assessed: 07/09/24 1553   Present on Original Admission: Yes  Primary Wound Type: Pressure Injury  Side: Left  Location: Heel  Wound Approximate Age at First Assessment (Weeks): 1 weeks   Pressure Injury Stage   (healed/ blanchable res)   Dressing Foam   Off Loading   (heel boots)        Wound 07/02/24 2230 Skin Tear Left anterior;proximal;upper Arm   Date First Assessed/Time First Assessed: 07/02/24 2230   Present on Original Admission: Yes  Primary Wound Type: Skin Tear  Side: Left  Orientation: anterior;proximal;upper  Location: Arm   Dressing Appearance Intact   Dressing Foam        Wound 07/15/24 Incision Left Hip vertical   Date First Assessed: 07/15/24   Present on Original Admission: No  Primary Wound Type: Incision  Side: Left  Location: Hip  Incision Type: vertical  Closure Method: Staples  Additional Comments: prevena wound drain   Dressing Appearance Intact   Drainage Amount Copious   Drainage Characteristics/Odor Serosanguineous   Wound Length (cm) 24 cm   Dressing Change Due 07/26/24        Wound 07/20/24 1050 Blister(s) Left anterior Thigh   Date First Assessed/Time First Assessed: 07/20/24 1050   Primary Wound Type: Blister(s)  Side: Left  Orientation: anterior  Location: Thigh   Dressing Appearance Intact        Wound 07/22/24 1407 Intertrigo Left Breast   Date First Assessed/Time First Assessed: 07/22/24 1407   Present on Original Admission: (c) Yes  Primary Wound Type: Intertrigo  Side: Left  Location: (c) Breast   Care Skin Barrier        Wound 07/22/24 1408  Intertrigo Right Breast   Date First Assessed/Time First Assessed: 07/22/24 1408   Present on Original Admission: (c) Yes  Primary Wound Type: Intertrigo  Side: Right  Location: (c) Breast   Care Skin Barrier        Wound 07/22/24 0800 Moisture associated dermatitis Right medial Thigh   Date First Assessed/Time First Assessed: 07/22/24 0800   Primary Wound Type: Moisture associated dermatitis  Side: Right  Orientation: medial  Location: Thigh   Dressing Appearance Intact     For discharge tomorrow.   Vac Ulta machine is to be returned to central supply. Patient may leave with black foam dressing in place.May disconnect the tubing that is attached to cannister. Clamp off remaining tubing that is attached to black foam dressing ensuring that the open end of tubing is covered.     Left heel pressure wound has healed.  Continue all current orders.       07/25/2024

## 2024-07-25 NOTE — PROGRESS NOTES
"No acute events overnight.  Pain controlled.  Resting in bed. Still not moving well.  C/o pain diffusely to L leg    Vital Signs  Temp: 97.8 °F (36.6 °C)  Temp Source: Oral  Pulse: 86  Heart Rate Source: Monitor  Resp: 18  SpO2: 97 %  Pulse Oximetry Type: Intermittent  Device (Oxygen Therapy): room air  BP: 133/82  BP Location: Right arm  BP Method: Automatic  Patient Position: Lying  Arousal Level: opens eyes spontaneously  Height and Weight  Height: 5' 4" (162.6 cm)  Weight: 82.9 kg (182 lb 12.2 oz)  Weight Method: Standard Scale  BSA (Calculated - sq m): 1.93 sq meters  BMI (Calculated): 31.4  Weight in (lb) to have BMI = 25: 145.3]    +FHL/EHL  BCR distally  Dressing c/d/i  SILT distally    Recent Lab Results         07/25/24  0441        Anion Gap 8.0       Baso # 0.06       Basophil % 0.7       BUN 40.6       BUN/CREAT RATIO 30       Calcium 8.3       Chloride 98       CO2 24       Creatinine 1.36       eGFR 38       Eos # 0.32       Eos % 3.7       Glucose 114       Hematocrit 31.4       Hemoglobin 10.5       Immature Grans (Abs) 0.11       Immature Granulocytes 1.3       INR 1.7       Lymph # 0.97       LYMPH % 11.2       MCH 31.6       MCHC 33.4       MCV 94.6       Mono # 0.78       Mono % 9.0       MPV 10.1       Neut # 6.43       Neut % 74.1       nRBC 0.3       Platelet Count 248       Potassium 4.6       PT 20.3       RBC 3.32       RDW 16.9       Sodium 130       WBC 8.67               A/P:  Status post conversion SILVIA  Pain controlled in bed  coumadin for DVT PPx  WV with ongoing drainage - continue  "

## 2024-07-26 VITALS
SYSTOLIC BLOOD PRESSURE: 135 MMHG | DIASTOLIC BLOOD PRESSURE: 78 MMHG | HEART RATE: 91 BPM | OXYGEN SATURATION: 94 % | HEIGHT: 64 IN | TEMPERATURE: 98 F | WEIGHT: 182.75 LBS | RESPIRATION RATE: 18 BRPM | BODY MASS INDEX: 31.2 KG/M2

## 2024-07-26 PROBLEM — M25.559 HIP PAIN: Status: ACTIVE | Noted: 2024-07-26

## 2024-07-26 LAB
ANION GAP SERPL CALC-SCNC: 8 MEQ/L
BASOPHILS # BLD AUTO: 0.05 X10(3)/MCL
BASOPHILS NFR BLD AUTO: 0.6 %
BUN SERPL-MCNC: 34.9 MG/DL (ref 9.8–20.1)
CALCIUM SERPL-MCNC: 8.2 MG/DL (ref 8.4–10.2)
CHLORIDE SERPL-SCNC: 97 MMOL/L (ref 98–107)
CO2 SERPL-SCNC: 24 MMOL/L (ref 23–31)
CREAT SERPL-MCNC: 1.21 MG/DL (ref 0.55–1.02)
CREAT/UREA NIT SERPL: 29
EOSINOPHIL # BLD AUTO: 0.37 X10(3)/MCL (ref 0–0.9)
EOSINOPHIL NFR BLD AUTO: 4.2 %
ERYTHROCYTE [DISTWIDTH] IN BLOOD BY AUTOMATED COUNT: 17.5 % (ref 11.5–17)
GFR SERPLBLD CREATININE-BSD FMLA CKD-EPI: 43 ML/MIN/1.73/M2
GLUCOSE SERPL-MCNC: 101 MG/DL (ref 82–115)
HCT VFR BLD AUTO: 32.8 % (ref 37–47)
HGB BLD-MCNC: 10.9 G/DL (ref 12–16)
IMM GRANULOCYTES # BLD AUTO: 0.09 X10(3)/MCL (ref 0–0.04)
IMM GRANULOCYTES NFR BLD AUTO: 1 %
INR PPP: 1.7 (ref 2–3)
LYMPHOCYTES # BLD AUTO: 1.01 X10(3)/MCL (ref 0.6–4.6)
LYMPHOCYTES NFR BLD AUTO: 11.5 %
MCH RBC QN AUTO: 31.9 PG (ref 27–31)
MCHC RBC AUTO-ENTMCNC: 33.2 G/DL (ref 33–36)
MCV RBC AUTO: 95.9 FL (ref 80–94)
MONOCYTES # BLD AUTO: 0.84 X10(3)/MCL (ref 0.1–1.3)
MONOCYTES NFR BLD AUTO: 9.6 %
NEUTROPHILS # BLD AUTO: 6.41 X10(3)/MCL (ref 2.1–9.2)
NEUTROPHILS NFR BLD AUTO: 73.1 %
NRBC BLD AUTO-RTO: 0.2 %
PLATELET # BLD AUTO: 245 X10(3)/MCL (ref 130–400)
PMV BLD AUTO: 9.9 FL (ref 7.4–10.4)
POTASSIUM SERPL-SCNC: 4.2 MMOL/L (ref 3.5–5.1)
PROTHROMBIN TIME: 19.9 SECONDS (ref 11.7–14.5)
RBC # BLD AUTO: 3.42 X10(6)/MCL (ref 4.2–5.4)
SODIUM SERPL-SCNC: 129 MMOL/L (ref 136–145)
WBC # BLD AUTO: 8.77 X10(3)/MCL (ref 4.5–11.5)

## 2024-07-26 PROCEDURE — 80048 BASIC METABOLIC PNL TOTAL CA: CPT | Performed by: INTERNAL MEDICINE

## 2024-07-26 PROCEDURE — 25000003 PHARM REV CODE 250: Performed by: INTERNAL MEDICINE

## 2024-07-26 PROCEDURE — 85610 PROTHROMBIN TIME: CPT | Performed by: INTERNAL MEDICINE

## 2024-07-26 PROCEDURE — 25000003 PHARM REV CODE 250: Performed by: ORTHOPAEDIC SURGERY

## 2024-07-26 PROCEDURE — 25000003 PHARM REV CODE 250: Performed by: NURSE PRACTITIONER

## 2024-07-26 PROCEDURE — 94799 UNLISTED PULMONARY SVC/PX: CPT

## 2024-07-26 PROCEDURE — 99900031 HC PATIENT EDUCATION (STAT)

## 2024-07-26 PROCEDURE — 36415 COLL VENOUS BLD VENIPUNCTURE: CPT | Performed by: INTERNAL MEDICINE

## 2024-07-26 PROCEDURE — 85025 COMPLETE CBC W/AUTO DIFF WBC: CPT | Performed by: INTERNAL MEDICINE

## 2024-07-26 RX ORDER — ASCORBIC ACID 500 MG
500 TABLET ORAL 2 TIMES DAILY
Start: 2024-07-26

## 2024-07-26 RX ORDER — ACETAMINOPHEN 500 MG
500 TABLET ORAL EVERY 4 HOURS
Start: 2024-07-26

## 2024-07-26 RX ORDER — METHOCARBAMOL 500 MG/1
500 TABLET, FILM COATED ORAL EVERY 8 HOURS PRN
Start: 2024-07-26 | End: 2024-08-05

## 2024-07-26 RX ORDER — NITROGLYCERIN 0.4 MG/1
0.4 TABLET SUBLINGUAL EVERY 5 MIN PRN
Start: 2024-07-26 | End: 2025-07-26

## 2024-07-26 RX ORDER — CARVEDILOL 3.12 MG/1
3.12 TABLET ORAL 2 TIMES DAILY
Start: 2024-07-26 | End: 2025-07-26

## 2024-07-26 RX ORDER — ZINC SULFATE 50(220)MG
220 CAPSULE ORAL DAILY
Start: 2024-07-27

## 2024-07-26 RX ORDER — TRAMADOL HYDROCHLORIDE 50 MG/1
50 TABLET ORAL EVERY 4 HOURS PRN
Start: 2024-07-26

## 2024-07-26 RX ORDER — MENTHOL AND ZINC OXIDE .44; 20.625 G/100G; G/100G
OINTMENT TOPICAL 2 TIMES DAILY
Start: 2024-07-26

## 2024-07-26 RX ORDER — FUROSEMIDE 20 MG/1
20 TABLET ORAL 2 TIMES DAILY
Start: 2024-07-26 | End: 2024-07-29

## 2024-07-26 RX ORDER — DOCUSATE SODIUM 100 MG/1
100 CAPSULE, LIQUID FILLED ORAL 2 TIMES DAILY PRN
Start: 2024-07-26

## 2024-07-26 RX ORDER — DOXYCYCLINE HYCLATE 100 MG
100 TABLET ORAL EVERY 12 HOURS
Start: 2024-07-26 | End: 2024-08-09

## 2024-07-26 RX ORDER — DULOXETIN HYDROCHLORIDE 20 MG/1
20 CAPSULE, DELAYED RELEASE ORAL DAILY
Qty: 30 CAPSULE | Refills: 11 | Status: SHIPPED | OUTPATIENT
Start: 2024-07-27 | End: 2025-07-27

## 2024-07-26 RX ADMIN — ATORVASTATIN CALCIUM 20 MG: 10 TABLET, FILM COATED ORAL at 08:07

## 2024-07-26 RX ADMIN — ZINC SULFATE 220 MG (50 MG) CAPSULE 220 MG: CAPSULE at 09:07

## 2024-07-26 RX ADMIN — ACETAMINOPHEN 500 MG: 500 TABLET ORAL at 04:07

## 2024-07-26 RX ADMIN — CARVEDILOL 3.12 MG: 3.12 TABLET, FILM COATED ORAL at 08:07

## 2024-07-26 RX ADMIN — ACETAMINOPHEN 500 MG: 500 TABLET ORAL at 08:07

## 2024-07-26 RX ADMIN — SACUBITRIL AND VALSARTAN 1 TABLET: 24; 26 TABLET, FILM COATED ORAL at 09:07

## 2024-07-26 RX ADMIN — METHOCARBAMOL 500 MG: 500 TABLET ORAL at 09:07

## 2024-07-26 RX ADMIN — OXYCODONE HYDROCHLORIDE AND ACETAMINOPHEN 500 MG: 500 TABLET ORAL at 08:07

## 2024-07-26 RX ADMIN — METHOCARBAMOL 500 MG: 500 TABLET ORAL at 08:07

## 2024-07-26 RX ADMIN — DOXYCYCLINE HYCLATE 100 MG: 100 TABLET, COATED ORAL at 08:07

## 2024-07-26 RX ADMIN — DULOXETINE HYDROCHLORIDE 20 MG: 20 CAPSULE, DELAYED RELEASE ORAL at 09:07

## 2024-07-26 RX ADMIN — LEVOTHYROXINE SODIUM 125 MCG: 125 TABLET ORAL at 05:07

## 2024-07-26 RX ADMIN — ACETAMINOPHEN 500 MG: 500 TABLET ORAL at 09:07

## 2024-07-26 RX ADMIN — SACUBITRIL AND VALSARTAN 1 TABLET: 24; 26 TABLET, FILM COATED ORAL at 08:07

## 2024-07-26 RX ADMIN — DULOXETINE HYDROCHLORIDE 20 MG: 20 CAPSULE, DELAYED RELEASE ORAL at 08:07

## 2024-07-26 RX ADMIN — ATORVASTATIN CALCIUM 20 MG: 10 TABLET, FILM COATED ORAL at 09:07

## 2024-07-26 RX ADMIN — ALLOPURINOL 100 MG: 100 TABLET ORAL at 08:07

## 2024-07-26 RX ADMIN — ZINC SULFATE 220 MG (50 MG) CAPSULE 220 MG: CAPSULE at 08:07

## 2024-07-26 RX ADMIN — DOXYCYCLINE HYCLATE 100 MG: 100 TABLET, COATED ORAL at 09:07

## 2024-07-26 RX ADMIN — THERA TABS 1 TABLET: TAB at 08:07

## 2024-07-26 RX ADMIN — MENTHOL AND ZINC OXIDE: .44; 20.625 OINTMENT TOPICAL at 08:07

## 2024-07-26 RX ADMIN — CARVEDILOL 3.12 MG: 3.12 TABLET, FILM COATED ORAL at 09:07

## 2024-07-26 RX ADMIN — ALLOPURINOL 100 MG: 100 TABLET ORAL at 09:07

## 2024-07-26 NOTE — PLAN OF CARE
07/26/24 1010   Medicare Message   Important Message from Medicare regarding Discharge Appeal Rights Given to patient/caregiver;Explained to patient/caregiver     IMM given to jamel Ley.

## 2024-07-26 NOTE — PLAN OF CARE
Orders, AVS, Current med list printed and placed in dc packet and given to Mary with info to call report.  Av and  at bedside. [facility] just arrived for transport to [facility].  The following were emailed to Shazia. [email or web address]  DC orders, AVS, Current med list, U/S of lower legs, last 72 hr labs, recent PT, OT and Progress note  I spoke to Live' and she verified that she received all the above emails.

## 2024-07-26 NOTE — DISCHARGE SUMMARY
Hospital Medicine Discharge Summary    Admit Date: 7/13/2024  Discharge Date and Time: 7/26/20249:38 AM  Admitting Physician: Mathieu Patel MD   Discharge Attending Physician: Mathieu Patel.  Primary Care Physician: Franklyn Brito MD  Consults: Dr. Bains Orthopedic Surgery    Discharge Diagnoses:  Acute anemia -stable   BARRY -improved  Left femur IMN 7/3, failed IMN  S/p Conversion SILVIA 7/15  Dilated cardiomyopathy  A fib  Htn  Hld  Gout  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  Hypothyroid       Hospital Course:   Homa Jaquez is a 88 y.o. female who has a past medical history of A-fib, Chronic cystitis, GERD (gastroesophageal reflux disease), Graves disease, Hypertension, Hypothyroidism, unspecified, and Spinal stenosis.. The patient presented to [facility] on 7/13/2024 with a primary complaint of left hip pain. Pt with a left femur fracture after a fall 7/2, underwent IMN 7/3 and [facility] She reported increasing pain to hip, xrays showing failed intertrochanteric femur fracture. Pt transferred to [facility] for revision on Monday. She is currently comfortable no complaints   Pt underwent conversion left SILVIA 7/15, she developed hypotension postop and bp medications were held briefly and now resumed.  Bp has remained stable and will need to be adjusted to original dose as appropriate.  She has been swollen in b/l upper thighs and wound vac with persistent bloody drainage.  H&H has remained stable, coumadin was resumed but I have held it last night and can be resumed once drainage lessens.   I also started her on lasix, cr which was elevated has been trending down.  She has had a difficult time with therapy, currently sitting on side of bed and up to chair but having a lot of pain to hip.  She seems depressed and admitted being depressed, I started low dose cymbalta.  Her appetite has been decreased but she has been eating food brought to her by her family.  She is on doxycycline x 14 days for prophylaxis.   "She will need to follow up with dr Bains.    /78   Pulse 91   Temp 97.7 °F (36.5 °C) (Oral)   Resp 18   Ht 5' 4" (1.626 m)   Wt 82.9 kg (182 lb 12.2 oz)   SpO2 (!) 94%   BMI 31.37 kg/m²    Physical Exam   General: In no acute distress, afebrile  Chest: Clear to auscultation bilaterally, no wheezing or ronchi  Heart: irreg, irreg, no rubs or gallops, no murmur,    Abdomen: Soft, nontender, BS +  MSK:s/p left hip surgery, wound vac in place, + swelling/tenderness present in thigh   Neurologic: no focal deficits, A&Ox3  Psych: Calm, cooperative  Procedures Performed:  conversion left SILVIA 7/15     Significant Diagnostic Studies: See Full reports for all details  No results displayed because visit has over 200 results.           US Lower Extremity Veins Left    Result Date: 7/24/2024  EXAMINATION: US LOWER EXTREMITY VEINS LEFT CLINICAL HISTORY: swelling, r/o dvt; COMPARISON: None FINDINGS: Sonographic images with color and spectral analysis were obtained of the left lower extremity venous system. The imaged left lower extremity veins demonstrate normal flow, compressibility, and augmentation without evidence of thrombus.  There is some subcutaneous edema around the knee.     Negative exam for left lower extremity thrombus. Electronically signed by: Roger Montague Date:    07/24/2024 Time:    17:00    X-Ray Femur 2 View Left    Result Date: 7/18/2024  EXAMINATION: XR FEMUR 2 VIEW LEFT CLINICAL HISTORY: post-op;Displaced intertrochanteric fracture of left femur, subsequent encounter for closed fracture with routine healing COMPARISON: None. FINDINGS: Hardware of the left hip has been removed with interval insertion of a hip prostheses and anchoring device position and alignment is close to anatomical No new fractures or dislocations Joint spaces preserved. No blastic or lytic lesions. Soft tissues within normal limits.     Interval removal of hardware from the left hip. Interval insertion of total hip " prostheses Electronically signed by: García Marquez Date:    07/18/2024 Time:    14:16    X-Ray Chest 1 View    Result Date: 7/16/2024  EXAMINATION: XR CHEST 1 VIEW CLINICAL HISTORY: leukocytosis; TECHNIQUE: One view COMPARISON: July 2, 2024. FINDINGS: Cardiopericardial silhouette is within normal limits.  Cardiac device electrode terminates within the right ventricle.  No acute dense focal or segmental consolidation, congestive process, pleural effusions or pneumothorax.     No acute cardiopulmonary process identified. Electronically signed by: Omid Rodriguez Date:    07/16/2024 Time:    09:41    X-Ray Hip 2 or 3 views Left with Pelvis when performed    Result Date: 7/15/2024  EXAMINATION: XR HIP WITH PELVIS WHEN PERFORMED 2 OR 3 VIEWS LEFT CLINICAL HISTORY: Postop. TECHNIQUE: One view COMPARISON: July 2, 2024. FINDINGS: Interval placement of left arthroplasty which is in satisfactory position and alignment.  Postsurgical soft tissue inflammation.  Skin staples.     As above. Electronically signed by: Omid Rodriguez Date:    07/15/2024 Time:    18:53    CT Hip w/o Contrast Left w/Binh Protocol    Result Date: 7/13/2024  EXAMINATION CT HIP WITHOUT CONTRAST LEFT W/BINH PROTOCOL CLINICAL HISTORY left femur fracture; Displaced intertrochanteric fracture of left femur, initial encounter for closed fracture TECHNIQUE Helical-acquisition CT images of the lower extremities were obtained without intravascular iodinated contrast media, utilizing preoperative robotic protocol. COMPARISON 2 July 2024 radiographs FINDINGS There are notable degenerative changes of the included lower lumbar spine and throughout the bony pelvis.  Mild-to-moderate degenerative alterations of the knees are also appreciated bilaterally (right > left).  There is appearance of widespread bone demineralization.  Interval internal fixation of the previously visualized comminuted, displaced left proximal femur fracture is evident.  No other obvious  fracture is identified. No intrinsic osseous lesion is appreciated. The regional musculature and subcutaneous tissues are without evidence of suspicious focal lesion.  There is regional subcutaneous edema overlying the left hip with associated disorganized fluid and gaseous foci likely representing postoperative residual.. Limited intrapelvic evaluation reveals no acute process. IMPRESSION 1. Limited study obtained for presurgical planning purposes. 2. Interval internal fixation of the left hip with residual overlying subcutaneous fluid and air. 3. Additional nonacute secondary details discussed above. RADIATION DOSE Automated tube current modulation and/or weight-based exposure dosing is utilized, when appropriate, to reach lowest reasonably achievable exposure rate. DLP: 599 mGy*cm Electronically signed by: Akil Grider Date:    07/13/2024 Time:    11:25    X-Ray Femur 2 View Left    Result Date: 7/12/2024  EXAMINATION: XR FEMUR 2 VIEW LEFT CLINICAL HISTORY: increased pain/swelling; TECHNIQUE: AP and lateral views of the left femur COMPARISON: Radiography 07/03/2024 FINDINGS: Fixation hardware in the femoral head/neck has migrated superiorly since prior exam with tip of the screw now in the most superior portion of the femoral head.  Femoral head remains aligned with the acetabulum.     Mild superior migration of the femoral head/neck screw since 07/03/2024. Electronically signed by: Terrell Crump Date:    07/12/2024 Time:    13:00    CT Head Without Contrast    Result Date: 7/6/2024  EXAMINATION: CT HEAD WITHOUT CONTRAST CLINICAL HISTORY: Neuro deficit, acute, stroke suspected; TECHNIQUE: Multiple axial images were obtained from the base of the brain to the vertex without contrast administration.  Sagittal and coronal reconstructions were performed. .Automatic exposure control  (AEC) is utilized to reduce patient radiation exposure. COMPARISON: 07/10/2013 FINDINGS: There is no intracranial mass or lesion seen.   No hemorrhage is seen.  No infarct is seen.  The ventricles and basilar cisterns appear normal.  There is some cerebral atrophy seen consistent patient's age. Posterior fossa appears normal.  The calvarium is intact.  There is evidence of left maxillary sinusitis and some mucosal thickening in the ethmoid sinus     Sinusitis Chronic age related changes Otherwise unremarkable Electronically signed by: Bhanu Archer Date:    07/06/2024 Time:    16:27    X-Ray Knee 1 or 2 View Left    Result Date: 7/4/2024  EXAMINATION: XR KNEE 1 OR 2 VIEW LEFT CLINICAL HISTORY: pain following fall. Bruising over patella, no effusion; TECHNIQUE: One or two views of the left knee were performed. COMPARISON: None FINDINGS: The bones and joints are in good anatomic alignment with no evidence of acute fracture or dislocation seen.  There is intramedullary azael seen in the femur.  There is some surgical staples seen in the lateral aspect of the distal thigh.     Postsurgical changes as outlined above no fracture seen Electronically signed by: Bhanu Archer Date:    07/04/2024 Time:    12:16    X-Ray Femur 2 View Left    Result Date: 7/3/2024  EXAMINATION: XR FEMUR 2 VIEW LEFT CPT: 68789 CLINICAL HISTORY: post-op; FINDINGS: Screw and intramedullary azael identified fixating a fracture of the proximal femur some displaced fragments are identified position and alignment is otherwise close to anatomical hardware appears to be intact     Internal fixation Electronically signed by: García Marquez Date:    07/03/2024 Time:    11:45    SURG FL Surgery Fluoro Usage    Result Date: 7/3/2024  See OP Notes for results. IMPRESSION: See OP Notes for results. This procedure was auto-finalized by: [facility] [employer]    X-Ray Chest 1 View    Result Date: 7/2/2024  EXAMINATION: XR CHEST 1 VIEW CLINICAL HISTORY: Unspecified fall, initial encounter TECHNIQUE: Single frontal view of the chest was performed. COMPARISON: 10/22/2018 FINDINGS: LINES AND TUBES:  Left subclavian defibrillator lead projects over the right ventricle. MEDIASTINUM AND GINO: Cardiac silhouette is enlarged. Aortic atherosclerosis. LUNGS: Mild prominence of the interstitium. PLEURA:No pleural effusion. No pneumothorax. OTHER: Probable mild asymmetry of the right 1st rib costochondral junction.  Suggest follow-up PA and lateral radiographs after resolution of patient's acute illness to reassess and exclude underlying nodule.     Prominence of the interstitium may be related to interstitial edema in the setting of enlarged cardiac silhouette. Electronically signed by: Karla Sims Date:    07/02/2024 Time:    19:06    X-Ray Hip 2 or 3 views Left with Pelvis when performed    Result Date: 7/2/2024  EXAMINATION: XR HIP WITH PELVIS WHEN PERFORMED 2 OR 3 VIEWS LEFT CLINICAL HISTORY: Unspecified fall, initial encounter TECHNIQUE: AP view of the pelvis and AP and frog leg lateral view of the left hip were performed. COMPARISON: 03/13/2023 FINDINGS: BONE: Mildly comminuted intertrochanteric fracture of the left femur with mild displacement. SOFT TISSUES: Unremarkable.     Left femur intertrochanteric fracture Electronically signed by: Karla Sims Date:    07/02/2024 Time:    19:04      Microbiology Results (last 7 days)       Procedure Component Value Units Date/Time    Tissue Culture - Aerobic [[id number]] Collected: 07/15/24 1256    Order Status: Completed Specimen: Tissue from Hip, Left Updated: 07/20/24 0906     Tissue - Aerobic Culture No Growth at 5 days    Tissue Culture - Aerobic [[id number]] Collected: 07/15/24 1255    Order Status: Completed Specimen: Tissue from Hip, Left Updated: 07/20/24 0905     Tissue - Aerobic Culture No Growth at 5 days                Medication List        START taking these medications      ascorbic acid (vitamin C) 500 MG tablet  Commonly known as: VITAMIN C  Take 1 tablet (500 mg total) by mouth 2 (two) times daily.     docusate sodium 100 MG capsule  Commonly  known as: COLACE  Take 1 capsule (100 mg total) by mouth 2 (two) times daily as needed for Constipation.     doxycycline 100 MG tablet  Commonly known as: VIBRA-TABS  Take 1 tablet (100 mg total) by mouth every 12 (twelve) hours. for 14 days     DULoxetine 20 MG capsule  Commonly known as: CYMBALTA  Take 1 capsule (20 mg total) by mouth once daily.  Start taking on: July 27, 2024     furosemide 20 MG tablet  Commonly known as: LASIX  Take 1 tablet (20 mg total) by mouth 2 (two) times daily. for 3 days     menthol-zinc oxide 0.44-20.6 % Oint  Commonly known as: CALMOSEPTINE  Apply topically 2 (two) times a day. To bilateral breast folds and labia.     multivitamin Tab  Take 1 tablet by mouth once daily.  Start taking on: July 27, 2024     nitroGLYCERIN 0.4 MG SL tablet  Commonly known as: NITROSTAT  Place 1 tablet (0.4 mg total) under the tongue every 5 (five) minutes as needed for Chest pain.     zinc sulfate 50 mg zinc (220 mg) capsule  Commonly known as: ZINCATE  Take 1 capsule (220 mg total) by mouth once daily.  Start taking on: July 27, 2024            CHANGE how you take these medications      acetaminophen 500 MG tablet  Commonly known as: TYLENOL  Take 1 tablet (500 mg total) by mouth every 4 (four) hours.  What changed:   how much to take  when to take this     carvediloL 3.125 MG tablet  Commonly known as: COREG  Take 1 tablet (3.125 mg total) by mouth 2 (two) times daily.  What changed:   medication strength  how much to take     methocarbamoL 500 MG Tab  Commonly known as: ROBAXIN  Take 1 tablet (500 mg total) by mouth every 8 (eight) hours as needed.  What changed:   when to take this  reasons to take this     traMADoL 50 mg tablet  Commonly known as: ULTRAM  Take 1 tablet (50 mg total) by mouth every 4 (four) hours as needed (as needed for pain score 1-10).  What changed:   when to take this  reasons to take this            CONTINUE taking these medications      allopurinoL 100 MG tablet  Commonly  known as: ZYLOPRIM  Take 1 tablet (100 mg total) by mouth once daily.     COUMADIN ORAL     cyanocobalamin 1,000 mcg/mL injection  INJECT 1000MCG (1ML) INTRAMUSCULARY ONCE MONTHLY     levothyroxine 125 MCG tablet  Commonly known as: SYNTHROID  TAKE 1 TABLET BY MOUTH ONCE DAILY     lovastatin 20 MG tablet  Commonly known as: MEVACOR     pantoprazole 40 MG tablet  Commonly known as: PROTONIX  Take 1 tablet (40 mg total) by mouth once daily.     polyethylene glycol 17 gram Pwpk  Commonly known as: GLYCOLAX  Take 17 g by mouth 2 (two) times daily as needed for Constipation.     sacubitriL-valsartan 24-26 mg per tablet  Commonly known as: ENTRESTO  Take 1 tablet by mouth 2 (two) times daily.     senna 8.6 mg tablet  Commonly known as: SENOKOT  Take 1 tablet by mouth daily as needed for Constipation.            STOP taking these medications      HYDROcodone-acetaminophen 5-325 mg per tablet  Commonly known as: NORCO     ondansetron 4 mg/2 mL Soln     ondansetron 8 MG Tbdl  Commonly known as: ZOFRAN-ODT               Where to Get Your Medications        These medications [facility] [address] [place] [address]      Phone: [phone]   DULoxetine 20 MG capsule       Information about where to get these medications is not yet available    Ask your nurse or doctor about these medications  acetaminophen 500 MG tablet  ascorbic acid (vitamin C) 500 MG tablet  carvediloL 3.125 MG tablet  docusate sodium 100 MG capsule  doxycycline 100 MG tablet  furosemide 20 MG tablet  menthol-zinc oxide 0.44-20.6 % Oint  methocarbamoL 500 MG Tab  multivitamin Tab  nitroGLYCERIN 0.4 MG SL tablet  traMADoL 50 mg tablet  zinc sulfate 50 mg zinc (220 mg) capsule         Explained in detail to the patient about the discharge plan, medications, and follow-up visits. Pt understands and agrees with the treatment plan  Discharged Condition: stable  Medications Per ID med  rec  Activities as tolerated  Follow-up dr Bains 8/15/2024  For further questions contact hospitalist office    Discharge time 45 minutes    For worsening symptoms, chest pain, shortness of breath, increased abdominal pain, high grade fever, stroke or stroke like symptoms, immediately go to the nearest Emergency Room or call 911 as soon as possible.      Mathieu Chang M.D, on 7/26/2024. at 9:38 AM.

## 2024-07-26 NOTE — NURSING
Nurses Note -- 4 Eyes      7/25/2024   9:51 PM      Skin assessed during: Q Shift Change      [] No Altered Skin Integrity Present    []Prevention Measures Documented      [] Yes- Altered Skin Integrity Present or Discovered   [] LDA Added if Not in Epic (Describe Wound)   [] New Altered Skin Integrity was Present on Admit and Documented in LDA   [] Wound Image Taken    Wound Care Consulted? No    Attending Nurse:  Spenser Quiroz RN/Staff Member:   Mary

## 2024-07-26 NOTE — NURSING
Nurses Note -- 4 Eyes      7/26/2024   11:09 AM      Skin assessed during: Daily Assessment      [] No Altered Skin Integrity Present    []Prevention Measures Documented      [x] Yes- Altered Skin Integrity Present or Discovered   [] LDA Added if Not in Epic (Describe Wound)   [] New Altered Skin Integrity was Present on Admit and Documented in LDA   [] Wound Image Taken    Wound Care Consulted? No    Attending Nurse:  Marleni Quiroz RN/Staff Member:   Arlette

## 2024-07-26 NOTE — PLAN OF CARE
Problem: Adult Inpatient Plan of Care  Goal: Plan of Care Review  Outcome: Met  Goal: Patient-Specific Goal (Individualized)  Outcome: Met  Goal: Absence of Hospital-Acquired Illness or Injury  Outcome: Met  Goal: Optimal Comfort and Wellbeing  Outcome: Met  Goal: Readiness for Transition of Care  Outcome: Met     Problem: Acute Kidney Injury/Impairment  Goal: Fluid and Electrolyte Balance  Outcome: Met  Goal: Improved Oral Intake  Outcome: Met  Goal: Effective Renal Function  Outcome: Met     Problem: Fall Injury Risk  Goal: Absence of Fall and Fall-Related Injury  Outcome: Met     Problem: Wound  Goal: Optimal Coping  Outcome: Met  Goal: Optimal Functional Ability  Outcome: Met  Goal: Absence of Infection Signs and Symptoms  Outcome: Met  Goal: Improved Oral Intake  Outcome: Met  Goal: Optimal Pain Control and Function  Outcome: Met  Goal: Skin Health and Integrity  Outcome: Met  Goal: Optimal Wound Healing  Outcome: Met     Problem: Comorbidity Management  Goal: Maintenance of Heart Failure Symptom Control  Outcome: Met  Goal: Blood Pressure in Desired Range  Outcome: Met  Goal: Maintenance of Osteoarthritis Symptom Control  Outcome: Met  Goal: Bariatric Home Regimen Maintained  Outcome: Met     Problem: Hip Arthroplasty  Goal: Optimal Coping  Outcome: Met  Goal: Absence of Bleeding  Outcome: Met  Goal: Effective Bowel Elimination  Outcome: Met  Goal: Fluid and Electrolyte Balance  Outcome: Met  Goal: Optimal Functional Ability  Outcome: Met  Goal: Absence of Infection Signs and Symptoms  Outcome: Met  Goal: Intact Neurovascular Status  Outcome: Met  Goal: Anesthesia/Sedation Recovery  Outcome: Met  Goal: Acceptable Pain Control  Outcome: Met  Goal: Nausea and Vomiting Relief  Outcome: Met  Goal: Effective Urinary Elimination  Outcome: Met  Goal: Effective Oxygenation and Ventilation  Outcome: Met     Problem: Infection  Goal: Absence of Infection Signs and Symptoms  Outcome: Met     Problem: Skin Injury Risk  Increased  Goal: Skin Health and Integrity  Outcome: Met

## 2024-07-29 LAB
FUNGUS SPEC CULT: NORMAL
FUNGUS SPEC CULT: NORMAL

## 2024-07-30 ENCOUNTER — TELEPHONE (OUTPATIENT)
Dept: INTERNAL MEDICINE | Facility: CLINIC | Age: 89
End: 2024-07-30
Payer: MEDICARE

## 2024-07-30 NOTE — TELEPHONE ENCOUNTER
----- Message from Kenisha Pearson LPN sent at 7/29/2024  2:51 PM CDT -----  Regarding: chata Liu 08/06 @2:40  No labs needed.

## 2024-08-06 ENCOUNTER — TELEPHONE (OUTPATIENT)
Dept: ORTHOPEDICS | Facility: CLINIC | Age: 89
End: 2024-08-06
Payer: MEDICARE

## 2024-08-06 NOTE — TELEPHONE ENCOUNTER
I called patient son to try and get her in sooner than 8/15/2024 and he let me know he would call me back by tomorrow to let me know what the rehab facility his mom is in decided on transportation to get her here to be be seen.

## 2024-08-08 NOTE — DISCHARGE SUMMARY
[facility] ([facility])  Rehab Discharge Summary    Patient Name: Homa Jaquez  MRN: [id number]  Age: 88 y.o. Sex: female  : 1935  Hospital Length of Stay: 4 days   Date of Service: 2024     Discharge Information   Date of Admission: 2024  Date of Discharge: 2024  Admit Diagnosis:  Left intertrochanteric femur fracture with severe comminution 2/2 fall         Dilated cardiomyopathy         Atrial fibrillation         Anemia         Hypertension         Hyperlipidemia         Gout         Diabetes type 2         Constipation         Hypothyroidism  Discharge Diagnosis: Left intertrochanteric femur fracture with severe comminution 2/2 fall (current)           Dilated cardiomyopathy (stable)           Atrial fibrillation (stable)           Anemia (stable)           Hypertension (stable)           Hyperlipidemia (stable)           Gout (stable)           Diabetes type 2 (stable)           Constipation (stable)           Hypothyroidism (stable)    Internal Medicine (attending): Tato Holley MD  Physiatry (consulting):  Alfred Soto MD     OUTPATIENT PROVIDERS  PCP: Franklyn Brito MD  Cardiology: Yefri Mukherjee MD   Orthopedic surgery: Steve Pierce D.O.    Hospital Course   88-year-old female presented to [facility] on  with complaints of left hip pain after falling at home.  PMH significant for atrial fibrillation, chronic cystitis, GERD, Graves disease, hypertension, hypothyroidism, spinal stenosis.  Workup significant for left intertrochanteric femur fracture.  On  tolerated IM nailing without perioperative complications.  Restarted Coumadin on  without complications.  Initiated IV fluid resuscitation 2/2 BARRY on .  Initiated 1 dose of vitamin K 2/2 INR 12.1.  Supratherapeutic INR resolved. Tolerated transfer to [facility] inpatient rehab unit on  without incident.     Throughout inpatient rehab course remained continent of bowel and  "bladder.  Orthostatic hypotension on 07/11 resolved with 250 mL normal saline bolus and decrease Coreg to 6.25 mg b.i.d..  On 07/12  Reported increased pain to left femur.  Left femur x-ray significant for mild superior migration of the femoral head/neck screw since 07/03/2024.  Orthopedic surgery recommended nonweightbearing and slide board transfers 2/2 continued pain.  On 07/13 Orthopedic surgery  Jonny Bains MD recommended total hip replacement 2/2 failed IM nailing.  Continue nonweightbearing and hold therapy.  Hold Coumadin and initiate full-dose Lovenox.  To obtain CT left hip.  Plans for conversion to total hip arthroplasty on Monday in the afternoon on 07/15.  Initiate transfer to 3rd floor for planned surgery on Monday.  Discussed with hospitalist, accepted patient. Discharge orders initiated.  Stable for transfer.    Chief Complaint: Left intertrochanteric femur fracture with severe comminution 2/2 fall s/p IM nailing on 07/03/2024     /71   Pulse 70   Temp 97.2 °F (36.2 °C) (Oral)   Resp 17   Ht 5' 4" (1.626 m)   Wt 82.9 kg (182 lb 12.2 oz)   SpO2 99%   BMI 31.37 kg/m²      Physical Exam  Vitals reviewed.   Eyes:      Pupils: Pupils are equal, round, and reactive to light.   Cardiovascular:      Rate and Rhythm: Normal rate and regular rhythm.      Heart sounds: Normal heart sounds.   Pulmonary:      Effort: Pulmonary effort is normal.      Breath sounds: Normal breath sounds.   Abdominal:      General: Bowel sounds are normal.   Musculoskeletal:         General: Normal range of motion.   Skin:     General: Skin is warm.      Comments: Left femur incision dry and intact    Neurological:      General: No focal deficit present.      Mental Status: She is alert and oriented to person, place, and time.      Motor: Weakness present.   Psychiatric:         Mood and Affect: Mood normal.      Labs  No results displayed because visit has over 200 results.      Admission on 07/09/2024, Discharged " on 07/13/2024   Component Date Value Ref Range Status    TSH 07/10/2024 1.155  0.350 - 4.940 uIU/mL Final    PT 07/10/2024 23.3 (H)  11.7 - 14.5 seconds Final    INR 07/10/2024 2.0  2.0 - 3.0 Final    Sodium 07/10/2024 138  136 - 145 mmol/L Final    Potassium 07/10/2024 4.2  3.5 - 5.1 mmol/L Final    Chloride 07/10/2024 110 (H)  98 - 107 mmol/L Final    CO2 07/10/2024 21 (L)  23 - 31 mmol/L Final    Glucose 07/10/2024 119 (H)  82 - 115 mg/dL Final    Blood Urea Nitrogen 07/10/2024 26.9 (H)  9.8 - 20.1 mg/dL Final    Creatinine 07/10/2024 1.10 (H)  0.55 - 1.02 mg/dL Final    Calcium 07/10/2024 8.2 (L)  8.4 - 10.2 mg/dL Final    Protein Total 07/10/2024 5.3 (L)  5.8 - 7.6 gm/dL Final    Albumin 07/10/2024 2.2 (L)  3.4 - 4.8 g/dL Final    Globulin 07/10/2024 3.1  2.4 - 3.5 gm/dL Final    Albumin/Globulin Ratio 07/10/2024 0.7 (L)  1.1 - 2.0 ratio Final    Bilirubin Total 07/10/2024 1.8 (H)  <=1.5 mg/dL Final    ALP 07/10/2024 92  40 - 150 unit/L Final    ALT 07/10/2024 11  0 - 55 unit/L Final    AST 07/10/2024 17  5 - 34 unit/L Final    eGFR 07/10/2024 49  mL/min/1.73/m2 Final    Anion Gap 07/10/2024 7.0  mEq/L Final    BUN/Creatinine Ratio 07/10/2024 24   Final    Ferritin Level 07/10/2024 188.88  4.63 - 204.00 ng/mL Final    Iron Binding Capacity Unsaturated 07/10/2024 128  70 - 310 ug/dL Final    Iron Level 07/10/2024 25 (L)  50 - 170 ug/dL Final    Transferrin 07/10/2024 134  mg/dL Final    Iron Binding Capacity Total 07/10/2024 153 (L)  250 - 450 ug/dL Final    Iron Saturation 07/10/2024 16 (L)  20 - 50 % Final    Prealbumin 07/10/2024 7.2 (L)  14.0 - 37.0 mg/dL Final    Hemoglobin A1c 07/10/2024 5.2  <=7.0 % Final    Estimated Average Glucose 07/10/2024 102.5  mg/dL Final    WBC 07/10/2024 8.58  4.50 - 11.50 x10(3)/mcL Final    RBC 07/10/2024 3.34 (L)  4.20 - 5.40 x10(6)/mcL Final    Hgb 07/10/2024 10.9 (L)  12.0 - 16.0 g/dL Final    Hct 07/10/2024 32.4 (L)  37.0 - 47.0 % Final    MCV 07/10/2024 97.0 (H)  80.0 -  94.0 fL Final    MCH 07/10/2024 32.6 (H)  27.0 - 31.0 pg Final    MCHC 07/10/2024 33.6  33.0 - 36.0 g/dL Final    RDW 07/10/2024 16.8  11.5 - 17.0 % Final    Platelet 07/10/2024 195  130 - 400 x10(3)/mcL Final    MPV 07/10/2024 9.5  7.4 - 10.4 fL Final    Neut % 07/10/2024 57.7  % Final    Lymph % 07/10/2024 22.0  % Final    Mono % 07/10/2024 14.1  % Final    Eos % 07/10/2024 5.1  % Final    Basophil % 07/10/2024 0.3  % Final    Lymph # 07/10/2024 1.89  0.6 - 4.6 x10(3)/mcL Final    Neut # 07/10/2024 4.94  2.1 - 9.2 x10(3)/mcL Final    Mono # 07/10/2024 1.21  0.1 - 1.3 x10(3)/mcL Final    Eos # 07/10/2024 0.44  0 - 0.9 x10(3)/mcL Final    Baso # 07/10/2024 0.03  <=0.2 x10(3)/mcL Final    IG# 07/10/2024 0.07 (H)  0 - 0.04 x10(3)/mcL Final    IG% 07/10/2024 0.8  % Final    NRBC% 07/10/2024 0.0  % Final    PT 07/11/2024 27.0 (H)  11.7 - 14.5 seconds Final    INR 07/11/2024 2.4  2.0 - 3.0 Final    PT 07/13/2024 29.5 (H)  11.7 - 14.5 seconds Final    INR 07/13/2024 2.7  2.0 - 3.0 Final    POCT Glucose 07/13/2024 156 (H)  70 - 110 mg/dL Final    POCT Glucose 07/13/2024 151 (H)  70 - 110 mg/dL Final    POCT Glucose 07/14/2024 115 (H)  70 - 110 mg/dL Final    POCT Glucose 07/14/2024 105  70 - 110 mg/dL Final    POCT Glucose 07/14/2024 146 (H)  70 - 110 mg/dL Final     Radiology  Left femur x-ray 7/3/24 Impression: Screw and intramedullary azael identified fixating a fracture of the proximal femur some displaced fragments are identified position and alignment is otherwise close to anatomical hardware appears to be intact     Discharge Summary Plan   Discharge Status:  Stable    Location: Discharge 4th floor [facility]    Medications: See discharge medicine reconciliation    Activity:  Nonweightbearing to left lower extremity    Diet:  Regular diet    Instructions:  Take all medications as prescribed.      Attend appointments as scheduled.      Return to ED if symptoms worsen, or if t > 100.4.    Education:   Left intertrochanteric femur fracture    Follow-up:  To follow up with NP/MD upon arrival to 4th floor    Discussed plan of care, and patient communicated understanding. Agreed to comply with recommendations.    Calli Salazar NP conducted independent examination and assisted with medical documentation.     Discharge Time: 43 minutes

## 2024-08-09 ENCOUNTER — HOSPITAL ENCOUNTER (INPATIENT)
Facility: HOSPITAL | Age: 89
LOS: 12 days | Discharge: NURSING, REHAB OR LONG-TERM CARE | DRG: 498 | End: 2024-08-21
Attending: INTERNAL MEDICINE | Admitting: INTERNAL MEDICINE
Payer: MEDICARE

## 2024-08-09 DIAGNOSIS — R94.31 QT PROLONGATION: ICD-10-CM

## 2024-08-09 DIAGNOSIS — T81.49XA POSTOPERATIVE WOUND INFECTION OF LEFT HIP: Primary | ICD-10-CM

## 2024-08-09 DIAGNOSIS — R07.9 CHEST PAIN: ICD-10-CM

## 2024-08-09 DIAGNOSIS — T81.9XXA POST-OPERATIVE COMPLICATION: ICD-10-CM

## 2024-08-09 DIAGNOSIS — T84.011A FAILURE OF LEFT TOTAL HIP ARTHROPLASTY, INITIAL ENCOUNTER: ICD-10-CM

## 2024-08-09 LAB
ANION GAP SERPL CALC-SCNC: 9 MEQ/L
APTT PPP: 44.7 SECONDS (ref 23.4–33.9)
BASOPHILS # BLD AUTO: 0.12 X10(3)/MCL
BASOPHILS NFR BLD AUTO: 1.2 %
BUN SERPL-MCNC: 19.6 MG/DL (ref 9.8–20.1)
CALCIUM SERPL-MCNC: 8.2 MG/DL (ref 8.4–10.2)
CHLORIDE SERPL-SCNC: 99 MMOL/L (ref 98–107)
CO2 SERPL-SCNC: 25 MMOL/L (ref 23–31)
CREAT SERPL-MCNC: 1.19 MG/DL (ref 0.55–1.02)
CREAT/UREA NIT SERPL: 16
CRP SERPL HS-MCNC: 139.44 MG/L
EOSINOPHIL # BLD AUTO: 0.05 X10(3)/MCL (ref 0–0.9)
EOSINOPHIL NFR BLD AUTO: 0.5 %
ERYTHROCYTE [DISTWIDTH] IN BLOOD BY AUTOMATED COUNT: 19.2 % (ref 11.5–17)
ERYTHROCYTE [SEDIMENTATION RATE] IN BLOOD: 106 MM/HR (ref 0–20)
GFR SERPLBLD CREATININE-BSD FMLA CKD-EPI: 44 ML/MIN/1.73/M2
GLUCOSE SERPL-MCNC: 133 MG/DL (ref 82–115)
HCT VFR BLD AUTO: 32.8 % (ref 37–47)
HGB BLD-MCNC: 10.8 G/DL (ref 12–16)
IMM GRANULOCYTES # BLD AUTO: 0.05 X10(3)/MCL (ref 0–0.04)
IMM GRANULOCYTES NFR BLD AUTO: 0.5 %
INR PPP: 1.8 (ref 2–3)
LYMPHOCYTES # BLD AUTO: 1.05 X10(3)/MCL (ref 0.6–4.6)
LYMPHOCYTES NFR BLD AUTO: 10.6 %
MCH RBC QN AUTO: 32.4 PG (ref 27–31)
MCHC RBC AUTO-ENTMCNC: 32.9 G/DL (ref 33–36)
MCV RBC AUTO: 98.5 FL (ref 80–94)
MONOCYTES # BLD AUTO: 1.08 X10(3)/MCL (ref 0.1–1.3)
MONOCYTES NFR BLD AUTO: 11 %
NEUTROPHILS # BLD AUTO: 7.51 X10(3)/MCL (ref 2.1–9.2)
NEUTROPHILS NFR BLD AUTO: 76.2 %
NRBC BLD AUTO-RTO: 0 %
PLATELET # BLD AUTO: 327 X10(3)/MCL (ref 130–400)
PMV BLD AUTO: 9.1 FL (ref 7.4–10.4)
POTASSIUM SERPL-SCNC: 4.2 MMOL/L (ref 3.5–5.1)
PREALB SERPL-MCNC: 5.6 MG/DL (ref 14–37)
PROTHROMBIN TIME: 20.8 SECONDS (ref 11.7–14.5)
RBC # BLD AUTO: 3.33 X10(6)/MCL (ref 4.2–5.4)
SODIUM SERPL-SCNC: 133 MMOL/L (ref 136–145)
WBC # BLD AUTO: 9.86 X10(3)/MCL (ref 4.5–11.5)

## 2024-08-09 PROCEDURE — 80048 BASIC METABOLIC PNL TOTAL CA: CPT | Performed by: INTERNAL MEDICINE

## 2024-08-09 PROCEDURE — 99900031 HC PATIENT EDUCATION (STAT)

## 2024-08-09 PROCEDURE — 36415 COLL VENOUS BLD VENIPUNCTURE: CPT | Performed by: INTERNAL MEDICINE

## 2024-08-09 PROCEDURE — 97162 PT EVAL MOD COMPLEX 30 MIN: CPT

## 2024-08-09 PROCEDURE — 25000003 PHARM REV CODE 250: Performed by: INTERNAL MEDICINE

## 2024-08-09 PROCEDURE — 85652 RBC SED RATE AUTOMATED: CPT | Performed by: INTERNAL MEDICINE

## 2024-08-09 PROCEDURE — S0179 MEGESTROL 20 MG: HCPCS | Performed by: INTERNAL MEDICINE

## 2024-08-09 PROCEDURE — 11000001 HC ACUTE MED/SURG PRIVATE ROOM

## 2024-08-09 PROCEDURE — 85025 COMPLETE CBC W/AUTO DIFF WBC: CPT | Performed by: INTERNAL MEDICINE

## 2024-08-09 PROCEDURE — 21400001 HC TELEMETRY ROOM

## 2024-08-09 PROCEDURE — 85610 PROTHROMBIN TIME: CPT | Performed by: INTERNAL MEDICINE

## 2024-08-09 PROCEDURE — 85730 THROMBOPLASTIN TIME PARTIAL: CPT | Performed by: INTERNAL MEDICINE

## 2024-08-09 PROCEDURE — 94761 N-INVAS EAR/PLS OXIMETRY MLT: CPT

## 2024-08-09 PROCEDURE — 87040 BLOOD CULTURE FOR BACTERIA: CPT | Performed by: INTERNAL MEDICINE

## 2024-08-09 PROCEDURE — 84134 ASSAY OF PREALBUMIN: CPT | Performed by: INTERNAL MEDICINE

## 2024-08-09 PROCEDURE — 86141 C-REACTIVE PROTEIN HS: CPT | Performed by: INTERNAL MEDICINE

## 2024-08-09 RX ORDER — MEGESTROL ACETATE 40 MG/ML
200 SUSPENSION ORAL
Status: DISCONTINUED | OUTPATIENT
Start: 2024-08-09 | End: 2024-08-20

## 2024-08-09 RX ORDER — PRAVASTATIN SODIUM 10 MG/1
20 TABLET ORAL DAILY
Status: DISCONTINUED | OUTPATIENT
Start: 2024-08-09 | End: 2024-08-21 | Stop reason: HOSPADM

## 2024-08-09 RX ORDER — ZINC SULFATE 50(220)MG
220 CAPSULE ORAL DAILY
Status: DISCONTINUED | OUTPATIENT
Start: 2024-08-09 | End: 2024-08-21 | Stop reason: HOSPADM

## 2024-08-09 RX ORDER — ALLOPURINOL 100 MG/1
100 TABLET ORAL DAILY
Status: DISCONTINUED | OUTPATIENT
Start: 2024-08-09 | End: 2024-08-13

## 2024-08-09 RX ORDER — NITROGLYCERIN 0.4 MG/1
0.4 TABLET SUBLINGUAL EVERY 5 MIN PRN
Status: DISCONTINUED | OUTPATIENT
Start: 2024-08-09 | End: 2024-08-21 | Stop reason: HOSPADM

## 2024-08-09 RX ORDER — ASCORBIC ACID 500 MG
500 TABLET ORAL 2 TIMES DAILY
Status: DISCONTINUED | OUTPATIENT
Start: 2024-08-09 | End: 2024-08-21 | Stop reason: HOSPADM

## 2024-08-09 RX ORDER — PANTOPRAZOLE SODIUM 40 MG/1
40 TABLET, DELAYED RELEASE ORAL DAILY
Status: DISCONTINUED | OUTPATIENT
Start: 2024-08-09 | End: 2024-08-21 | Stop reason: HOSPADM

## 2024-08-09 RX ORDER — CARVEDILOL 3.12 MG/1
3.12 TABLET ORAL 2 TIMES DAILY
Status: DISCONTINUED | OUTPATIENT
Start: 2024-08-09 | End: 2024-08-21 | Stop reason: HOSPADM

## 2024-08-09 RX ORDER — DOCUSATE SODIUM 100 MG/1
100 CAPSULE, LIQUID FILLED ORAL 2 TIMES DAILY PRN
Status: DISCONTINUED | OUTPATIENT
Start: 2024-08-09 | End: 2024-08-13

## 2024-08-09 RX ORDER — ACETAMINOPHEN 325 MG/1
650 TABLET ORAL EVERY 6 HOURS PRN
Status: DISCONTINUED | OUTPATIENT
Start: 2024-08-09 | End: 2024-08-13

## 2024-08-09 RX ORDER — ONDANSETRON HYDROCHLORIDE 2 MG/ML
4 INJECTION, SOLUTION INTRAVENOUS EVERY 8 HOURS PRN
Status: DISCONTINUED | OUTPATIENT
Start: 2024-08-09 | End: 2024-08-21 | Stop reason: HOSPADM

## 2024-08-09 RX ORDER — MUPIROCIN 20 MG/G
OINTMENT TOPICAL 2 TIMES DAILY
Status: COMPLETED | OUTPATIENT
Start: 2024-08-09 | End: 2024-08-14

## 2024-08-09 RX ORDER — ACETAMINOPHEN 500 MG
500 TABLET ORAL EVERY 4 HOURS PRN
Status: DISCONTINUED | OUTPATIENT
Start: 2024-08-09 | End: 2024-08-13

## 2024-08-09 RX ORDER — IBUPROFEN 200 MG
24 TABLET ORAL
Status: DISCONTINUED | OUTPATIENT
Start: 2024-08-09 | End: 2024-08-19

## 2024-08-09 RX ORDER — TRAMADOL HYDROCHLORIDE 50 MG/1
50 TABLET ORAL EVERY 4 HOURS PRN
Status: DISCONTINUED | OUTPATIENT
Start: 2024-08-09 | End: 2024-08-21 | Stop reason: HOSPADM

## 2024-08-09 RX ORDER — FUROSEMIDE 20 MG/1
20 TABLET ORAL 2 TIMES DAILY
Status: DISCONTINUED | OUTPATIENT
Start: 2024-08-09 | End: 2024-08-14

## 2024-08-09 RX ORDER — INSULIN ASPART 100 [IU]/ML
0-5 INJECTION, SOLUTION INTRAVENOUS; SUBCUTANEOUS
Status: DISCONTINUED | OUTPATIENT
Start: 2024-08-09 | End: 2024-08-19

## 2024-08-09 RX ORDER — GLUCAGON 1 MG
1 KIT INJECTION
Status: DISCONTINUED | OUTPATIENT
Start: 2024-08-09 | End: 2024-08-19

## 2024-08-09 RX ORDER — NALOXONE HCL 0.4 MG/ML
0.02 VIAL (ML) INJECTION
Status: DISCONTINUED | OUTPATIENT
Start: 2024-08-09 | End: 2024-08-21 | Stop reason: HOSPADM

## 2024-08-09 RX ORDER — POLYETHYLENE GLYCOL 3350 17 G/17G
17 POWDER, FOR SOLUTION ORAL 2 TIMES DAILY PRN
Status: DISCONTINUED | OUTPATIENT
Start: 2024-08-09 | End: 2024-08-13

## 2024-08-09 RX ORDER — SENNOSIDES 8.6 MG/1
1 TABLET ORAL DAILY PRN
Status: DISCONTINUED | OUTPATIENT
Start: 2024-08-09 | End: 2024-08-13

## 2024-08-09 RX ORDER — SODIUM CHLORIDE 0.9 % (FLUSH) 0.9 %
10 SYRINGE (ML) INJECTION EVERY 12 HOURS PRN
Status: DISCONTINUED | OUTPATIENT
Start: 2024-08-09 | End: 2024-08-13

## 2024-08-09 RX ORDER — LEVOTHYROXINE SODIUM 125 UG/1
125 TABLET ORAL
Status: DISCONTINUED | OUTPATIENT
Start: 2024-08-10 | End: 2024-08-21 | Stop reason: HOSPADM

## 2024-08-09 RX ORDER — IBUPROFEN 200 MG
16 TABLET ORAL
Status: DISCONTINUED | OUTPATIENT
Start: 2024-08-09 | End: 2024-08-19

## 2024-08-09 RX ADMIN — MUPIROCIN: 20 OINTMENT TOPICAL at 08:08

## 2024-08-09 RX ADMIN — OXYCODONE HYDROCHLORIDE AND ACETAMINOPHEN 500 MG: 500 TABLET ORAL at 08:08

## 2024-08-09 RX ADMIN — SACUBITRIL AND VALSARTAN 1 TABLET: 24; 26 TABLET, FILM COATED ORAL at 08:08

## 2024-08-09 RX ADMIN — CARVEDILOL 3.12 MG: 3.12 TABLET, FILM COATED ORAL at 08:08

## 2024-08-09 RX ADMIN — FUROSEMIDE 20 MG: 20 TABLET ORAL at 08:08

## 2024-08-09 RX ADMIN — MEGESTROL ACETATE 200 MG: 40 SUSPENSION ORAL at 05:08

## 2024-08-09 RX ADMIN — TRAMADOL HYDROCHLORIDE 50 MG: 50 TABLET, COATED ORAL at 08:08

## 2024-08-09 NOTE — PLAN OF CARE
08/09/24 1344   Discharge Assessment   Assessment Type Discharge Planning Assessment   Source of Information patient;family;health record   Communicated AURELIO with patient/caregiver Yes   Reason For Admission ? postop wound infection   People in Home child(claribel), adult   Facility Arrived From: [facility]   Do you expect to return to your current living situation? No   Do you have help at home or someone to help you manage your care at home? Yes   Who are your caregiver(s) and their phone number(s)? son- bahman or daughter- Kaylynn   Prior to hospitilization cognitive status: Alert/Oriented   Current cognitive status: Alert/Oriented   Walking or Climbing Stairs Difficulty yes   Walking or Climbing Stairs ambulation difficulty, requires equipment   Dressing/Bathing Difficulty yes   Dressing/Bathing bathing difficulty, requires equipment;bathing difficulty, dependent;dressing difficulty, requires equipment;dressing difficulty, dependent   Equipment Currently Used at Home bedside commode;bath bench;grab bar;rollator;wheelchair;walker, rolling;walker, standard;raised toilet   Readmission within 30 days? Yes   Patient currently being followed by outpatient case management? No   Do you currently have service(s) that help you manage your care at home? No   Do you take prescription medications? Yes   Do you have prescription coverage? Yes   Do you have any problems affording any of your prescribed medications? No   Is the patient taking medications as prescribed? yes   Who is going to help you get home at discharge? tbd   How do you get to doctors appointments? family or friend will provide   Are you on dialysis? No   Do you take coumadin? No   Discharge Plan A Skilled Nursing Facility   Discharge Plan B Skilled Nursing Facility   DME Needed Upon Discharge  wheelchair;walker, rolling;raised toilet;bedside commode;bath bench   Discharge Plan discussed with: Patient;Adult children   Transition of Care Barriers Mobility      Pt known to me from previous admission. [facility] rehab- then [facility] dx failed IM MELANIE hip -s/p conversion hip;  then [facility]; now return to [facility] for evaluation of postop wound. Awaiting Dr Valdes-- may need possible wound wash out.   Pt lives with  in mother in law suite attached to son's house ( Av) in [place]. House is handicap accessible. Pt has grab bars, wc, rw, rolator, etc. Used [facility] in past.   Spk w pt, son ( Av ) & daughter ( Kaylynn) @ bedside-- discuss poc & dcp.   Plan to transfer to SNF when medically stable. Pt/son requesting [facility].     Will f/u next week.     Contact # Av [phone]; Kaylynn [phone].

## 2024-08-09 NOTE — CONSULTS
[facility]  Orthopedics  Consult Note    Patient Name: Homa Jaquez  MRN: [id number]  Admission Date: 8/9/2024  Hospital Length of Stay: 0 days  Attending Provider: Mathieu Patel MD  Primary Care Provider: Franklyn Brito MD        Consults  Subjective:     Principal Problem:s/p conversion of L SILVIA    Chief Complaint: No chief complaint on file.       HPI:  88-year-old female presents to the hospital after being transferred from skilled nursing facility.  She is 2 weeks out from conversion to L total hip arthroplasty.  Was transferred back for wound evaluation.  At the facility they state that patient has been having increased amount of drainage.  With erythema noted around incision site.  Wound care was involved and using a special type of soap.  Also packing with iodoform.  After she arrived here in the hospital she does have copious amount of serosanguineous drainage is actively draining as well as drainage on her bandage.  States that this was changed this morning.  Denies any active pain.  Has been working with therapy in the facility.  She was ambulating with maximum assistance.  She is having some swelling noted to the left lower extremity.  This has worse in the foot.    Past Medical History:   Diagnosis Date    A-fib     Chronic cystitis     GERD (gastroesophageal reflux disease)     Graves disease     Hypertension     Hypothyroidism, unspecified     Spinal stenosis        Past Surgical History:   Procedure Laterality Date    APPENDECTOMY      BLADDER SUSPENSION      CARDIOVERSION  04/01/2015    CATARACT EXTRACTION      CONVERSION ARTHROPLASTY, HIP, POSTERIOR APPROACH Left 7/15/2024    Procedure: CONVERSION ARTHROPLASTY, HIP, POSTERIOR APPROACH - valencia, cell saver, pathology;  Surgeon: Jonny Bains MD;  Location: [facility];  Service: Orthopedics;  Laterality: Left;  valencia, cell saver, pathology    HYSTERECTOMY      INTRAMEDULLARY RODDING OF FEMUR Left  7/3/2024    Procedure: INSERTION, INTRAMEDULLARY MELANIE, FEMUR;  Surgeon: Steve Pierce DO;  Location: [facility] OR;  Service: Orthopedics;  Laterality: Left;  supine hana table c arm synthes    LAPAROSCOPIC CHOLECYSTECTOMY  01/12/2016    SPHINCTEROTOMY OF URETHRA  01/11/2016    TONSILLECTOMY AND ADENOIDECTOMY         Review of patient's allergies indicates:   Allergen Reactions    Cefadroxil      Other reaction(s): Nausea or vomiting    Cefuroxime axetil     Cephalexin      Other reaction(s): NAUSEA AND VOMITING    Ciprofloxacin     Enoxaparin     Methenamine hippurate      Other reaction(s): Unknown    Promethazine        No current facility-administered medications for this encounter.     Family History       Problem Relation (Age of Onset)    Cancer Father    Heart attack Father    Stroke Father          Tobacco Use    Smoking status: Former     Types: Cigarettes    Smokeless tobacco: Never   Substance and Sexual Activity    Alcohol use: Yes    Drug use: Never    Sexual activity: Not Currently     ROS  Objective:     Vital Signs (Most Recent):    Vital Signs (24h Range):              There is no height or weight on file to calculate BMI.    No intake or output data in the 24 hours ending 08/09/24 1123    Ortho/SPM Exam  Musculoskeletal:     Left lower extremity: incision site with erythema and copious amounts of serious drainage. TTP along the area. Notable swelling to the lateral aspect of the hip. +EHL/FHL. +plantar and dorsi flexion. Sensation intact distally. Brisk cap refill distally. +2 edema to L foot.    Significant Labs:   Recent Lab Results       None          All pertinent labs within the past 24 hours have been reviewed.  Recent Lab Results       None             Significant Imaging: None  US Lower Extremity Veins Left    Result Date: 7/24/2024  EXAMINATION: US LOWER EXTREMITY VEINS LEFT CLINICAL HISTORY: swelling, r/o dvt; COMPARISON: None FINDINGS: Sonographic images with color and spectral analysis were  obtained of the left lower extremity venous system. The imaged left lower extremity veins demonstrate normal flow, compressibility, and augmentation without evidence of thrombus.  There is some subcutaneous edema around the knee.     Negative exam for left lower extremity thrombus. Electronically signed by: Roger Montague Date:    07/24/2024 Time:    17:00    X-Ray Femur 2 View Left    Result Date: 7/18/2024  EXAMINATION: XR FEMUR 2 VIEW LEFT CLINICAL HISTORY: post-op;Displaced intertrochanteric fracture of left femur, subsequent encounter for closed fracture with routine healing COMPARISON: None. FINDINGS: Hardware of the left hip has been removed with interval insertion of a hip prostheses and anchoring device position and alignment is close to anatomical No new fractures or dislocations Joint spaces preserved. No blastic or lytic lesions. Soft tissues within normal limits.     Interval removal of hardware from the left hip. Interval insertion of total hip prostheses Electronically signed by: García Marquez Date:    07/18/2024 Time:    14:16    X-Ray Chest 1 View    Result Date: 7/16/2024  EXAMINATION: XR CHEST 1 VIEW CLINICAL HISTORY: leukocytosis; TECHNIQUE: One view COMPARISON: July 2, 2024. FINDINGS: Cardiopericardial silhouette is within normal limits.  Cardiac device electrode terminates within the right ventricle.  No acute dense focal or segmental consolidation, congestive process, pleural effusions or pneumothorax.     No acute cardiopulmonary process identified. Electronically signed by: Omid Rodriguez Date:    07/16/2024 Time:    09:41    X-Ray Hip 2 or 3 views Left with Pelvis when performed    Result Date: 7/15/2024  EXAMINATION: XR HIP WITH PELVIS WHEN PERFORMED 2 OR 3 VIEWS LEFT CLINICAL HISTORY: Postop. TECHNIQUE: One view COMPARISON: July 2, 2024. FINDINGS: Interval placement of left arthroplasty which is in satisfactory position and alignment.  Postsurgical soft tissue inflammation.  Skin staples.      As above. Electronically signed by: Omid Rodriguez Date:    07/15/2024 Time:    18:53    CT Hip w/o Contrast Left w/Binh Protocol    Result Date: 7/13/2024  EXAMINATION CT HIP WITHOUT CONTRAST LEFT W/BINH PROTOCOL CLINICAL HISTORY left femur fracture; Displaced intertrochanteric fracture of left femur, initial encounter for closed fracture TECHNIQUE Helical-acquisition CT images of the lower extremities were obtained without intravascular iodinated contrast media, utilizing preoperative robotic protocol. COMPARISON 2 July 2024 radiographs FINDINGS There are notable degenerative changes of the included lower lumbar spine and throughout the bony pelvis.  Mild-to-moderate degenerative alterations of the knees are also appreciated bilaterally (right > left).  There is appearance of widespread bone demineralization.  Interval internal fixation of the previously visualized comminuted, displaced left proximal femur fracture is evident.  No other obvious fracture is identified. No intrinsic osseous lesion is appreciated. The regional musculature and subcutaneous tissues are without evidence of suspicious focal lesion.  There is regional subcutaneous edema overlying the left hip with associated disorganized fluid and gaseous foci likely representing postoperative residual.. Limited intrapelvic evaluation reveals no acute process. IMPRESSION 1. Limited study obtained for presurgical planning purposes. 2. Interval internal fixation of the left hip with residual overlying subcutaneous fluid and air. 3. Additional nonacute secondary details discussed above. RADIATION DOSE Automated tube current modulation and/or weight-based exposure dosing is utilized, when appropriate, to reach lowest reasonably achievable exposure rate. DLP: 599 mGy*cm Electronically signed by: Akil Grider Date:    07/13/2024 Time:    11:25    X-Ray Femur 2 View Left    Result Date: 7/12/2024  EXAMINATION: XR FEMUR 2 VIEW LEFT CLINICAL HISTORY: increased  pain/swelling; TECHNIQUE: AP and lateral views of the left femur COMPARISON: Radiography 07/03/2024 FINDINGS: Fixation hardware in the femoral head/neck has migrated superiorly since prior exam with tip of the screw now in the most superior portion of the femoral head.  Femoral head remains aligned with the acetabulum.     Mild superior migration of the femoral head/neck screw since 07/03/2024. Electronically signed by: Terrell Crump Date:    07/12/2024 Time:    13:00       Assessment/Plan:     There are no hospital problems to display for this patient.    Two weeks status post revision left hip:  Patient arrived for wound evaluation.  She does have active copious amounts of serosanguineous drainage at incision site.  We will change her dressing to dry dressing changes.  No application of any further soaps.  We will keep this dry.  We will have Dr. Cedric dudley assess over the weekend for possible I and D of left hip.  We discussed the risks, benefits and alternatives to this.  She would be a good candidate for a washout.  This has most likely be done on Monday.  We will have her to continue working with therapy at this time.  We will continue her current pain regimen.  She is also to be evaluated her hospitalist.        DEIDRA Sharma  Orthopedic Trauma Surgery  [facility]

## 2024-08-09 NOTE — PT/OT/SLP EVAL
Physical Therapy Evaluation     Patient Name: Homa Jaquez   MRN: [id number]  Recent Surgery: * No surgery found *      Recommendations:     Discharge Recommendations: Moderate Intensity Therapy   Discharge Equipment Recommendations: none   Barriers to discharge: Increased level of assist and Ongoing medical treatment    Assessment:     Homa Jaquez is a 88 y.o. female admitted with a medical diagnosis of Post-operative complication. She presents with the following impairments/functional limitations: weakness, impaired functional mobility, decreased safety awareness, impaired coordination, decreased coordination, impaired endurance, gait instability, impaired self care skills, orthopedic precautions, pain.     PT NOTE: pt with recent L SILVIA conversion. Pt was at SNF and was t/f back to Ortho. Per family, Pt tolerated ~ 3 days out of 2 weeks while at SNF. Pt was able to sit EOB but was limited with ambulation. Pt requires motivation to continue with treatment. Pt overall Max A x 2 for all mobility 2/2 increased pain and fatigue. Daughter present throughout to assist and encourage pt participation.     Rehab Prognosis: Fair; patient would benefit from acute PT services to address these deficits and reach maximum level of function.    Plan:     During this hospitalization, patient to be seen daily (QD-BID pending pt tolerance) to address the above listed problems via gait training, therapeutic activities, therapeutic exercises    Plan of Care Expires: 08/15/24    Subjective     Chief Complaint: N/A  Patient Comments/Goals: N/A  Pain/Comfort:  Pain Rating 1: 6/10  Location - Side 1: Left  Location - Orientation 1: upper  Location 1: hip  Pain Addressed 1: Reposition    Social History:  Living Environment: Patient  son and   in a single story home with  no steps. Pt son states it is a mother in law suite behind house.  Prior Level of Function: Prior to admission, patient  required assist with mobility and was  using rollator for short distances.   Equipment Used at Home: bedside commode, bath bench, walker, rolling, rollator, wheelchair    Assistance Upon Discharge: family    Objective:     Communicated with RN prior to session. Patient found HOB elevated with peripheral IV, talley catheter upon PT entry to room.    General Precautions: Standard, fall   Orthopedic Precautions: LLE weight bearing as tolerated, LLE posterior precautions   Braces: N/A    Respiratory Status: Room air    Exams:  RLE ROM: WFL  RLE Strength: WFL  LLE ROM:  limited 2/2 pain and recent surgery   LLE Strength:  limited 2/2 pain and recent surgery   Cognitive: Patient is oriented to Person, Place, Time, Situation      Functional Mobility:  Gait belt applied - Yes  Bed Mobility  Rolling Left: maximal assistance, of 2 persons, and with use of bed rail   Rolling Right: moderate assistance, of 2 persons, and with use of bed rail   Donned new vicki pad and assisted pt to HOB   Supine to Sit: maximal assistance and of 2 persons for LE management, trunk management, and maintenance of precautions  Sit to Supine: maximal assistance and of 2 persons for LE management, trunk management, and maintenance of precautions  Transfers  Sit to Stand: maximal assistance and of 2 persons with rolling walker   Unable to complete t/f at this time.     Therapeutic Activities and Exercises:  Patient educated on role of acute care PT and PT POC, safety while in hospital including calling nurse for mobility, and call light usage.  Educated about weightbearing precautions and provided cuing for adherence as appropriate during session.  Educated about importance of OOB mobility and remaining up in chair most of the day.      AM-PAC 6 CLICK MOBILITY  Total Score:     Patient left HOB elevated with all lines intact, call button in reach, and RN notified.    GOALS:   Multidisciplinary Problems       Physical Therapy Goals          Problem: Physical Therapy    Goal Priority  Disciplines Outcome Goal Variances Interventions   Physical Therapy Goal     PT, PT/OT Progressing     Description: Pt will improve functional independence by performing:    Bed mobility:   Rolling:max A  Supine to sit: Max A   Sit to supine: Max A   Sit to stand: max A with rolling walker  Bed to chair: max A with Stand Step  with rolling walker   Bed to chair t/f: Max A slide board                            History:     Past Medical History:   Diagnosis Date    A-fib     Chronic cystitis     GERD (gastroesophageal reflux disease)     Graves disease     Hypertension     Hypothyroidism, unspecified     Spinal stenosis        Past Surgical History:   Procedure Laterality Date    APPENDECTOMY      BLADDER SUSPENSION      CARDIOVERSION  04/01/2015    CATARACT EXTRACTION      CONVERSION ARTHROPLASTY, HIP, POSTERIOR APPROACH Left 7/15/2024    Procedure: CONVERSION ARTHROPLASTY, HIP, POSTERIOR APPROACH - valencia, cell saver, pathology;  Surgeon: Jonny Bains MD;  Location: [facility];  Service: Orthopedics;  Laterality: Left;  valencia, cell saver, pathology    HYSTERECTOMY      INTRAMEDULLARY RODDING OF FEMUR Left 7/3/2024    Procedure: INSERTION, INTRAMEDULLARY MELANIE, FEMUR;  Surgeon: Steve Pierce DO;  Location: [facility];  Service: Orthopedics;  Laterality: Left;  supine hana table c arm synthes    LAPAROSCOPIC CHOLECYSTECTOMY  01/12/2016    SPHINCTEROTOMY OF URETHRA  01/11/2016    TONSILLECTOMY AND ADENOIDECTOMY         Time Tracking:     PT Received On:    PT Start Time: 1349  PT Stop Time: 1420  PT Total Time (min): 31 min     Billable Minutes: Evaluation 31 8/9/2024

## 2024-08-09 NOTE — NURSING
Nurses Note -- 4 Eyes      8/9/2024   3:58 PM      Skin assessed during: Admit      [] No Altered Skin Integrity Present    []Prevention Measures Documented      [x] Yes- Altered Skin Integrity Present or Discovered   [] LDA Added if Not in Epic (Describe Wound)   [x] New Altered Skin Integrity was Present on Admit and Documented in LDA   [x] Wound Image Taken    Wound Care Consulted? Yes    Attending Nurse:  Arlette Quiroz RN/Staff Member:   lucas contreras cna

## 2024-08-09 NOTE — H&P
[facility]    [facility] Medicine History & Physical Examination       Patient Name: Homa Jaquez  MRN: [id number]  Patient Class: IP- Inpatient   Admission Date: 8/9/2024   Admitting Physician: CT Service   Length of Stay: 0  Attending Physician: Mathieu Patel MD  Primary Care Provider: Franklyn Brito MD  Face-to-Face encounter date: 08/09/2024    Code Status: Full Code    Chief Complaint: post op complications          HISTORY OF PRESENT ILLNESS:   Homa Jaquez is a 88 y.o. female who  has a past medical history of A-fib, Chronic cystitis, GERD (gastroesophageal reflux disease), Graves disease, Hypertension, Hypothyroidism, unspecified, and Spinal stenosis.. The patient presented to [facility] 8/9/2024 with a primary complaint of post op complications.  Pt with a left femur fracture after a fall 7/2, underwent IMN 7/3 and [facility] She reported increasing pain to hip, xrays showing failed intertrochanteric femur fracture.  Pt underwent conversion left SILVIA 7/15, she was discharged 7/26 to snf in [place].  She developed pneumonia which was treated with merrem and vancomycin and completed about 3 days ago.  She was started on megace for poor appetite about a week ago and has been eating good since, coumadin held 3 days ago for elevated inr.doxycline and cymbalta was stopped and she has had lasix on and off with resolution of edema.  She has been awake and alert and improving.  Her wound vac was removed 1 week ago, drainage improved.  She is transferred back for evaluation of incision.  Family says there has been some serosanginous drainage on bandage but mild and decreased from admission where she was draining 1000cc per day.  No f/c.  Inr has been up and down, currently on hold x 3 days.    PAST MEDICAL HISTORY:     Past Medical History:   Diagnosis Date    A-fib     Chronic cystitis     GERD (gastroesophageal reflux disease)      Graves disease     Hypertension     Hypothyroidism, unspecified     Spinal stenosis        PAST SURGICAL HISTORY:     Past Surgical History:   Procedure Laterality Date    APPENDECTOMY      BLADDER SUSPENSION      CARDIOVERSION  04/01/2015    CATARACT EXTRACTION      CONVERSION ARTHROPLASTY, HIP, POSTERIOR APPROACH Left 7/15/2024    Procedure: CONVERSION ARTHROPLASTY, HIP, POSTERIOR APPROACH - valencia, cell saver, pathology;  Surgeon: Jonny Bains MD;  Location[facility] OR;  Service: Orthopedics;  Laterality: Left;  valencia, cell saver, pathology    HYSTERECTOMY      INTRAMEDULLARY RODDING OF FEMUR Left 7/3/2024    Procedure: INSERTION, INTRAMEDULLARY MELANIE, FEMUR;  Surgeon: Steve Pierce DO;  Location: [facility] OR;  Service: Orthopedics;  Laterality: Left;  supine hana table c arm synthes    LAPAROSCOPIC CHOLECYSTECTOMY  01/12/2016    SPHINCTEROTOMY OF URETHRA  01/11/2016    TONSILLECTOMY AND ADENOIDECTOMY         ALLERGIES:   Cefadroxil, Cefuroxime axetil, Cephalexin, Ciprofloxacin, Enoxaparin, Methenamine hippurate, and Promethazine    FAMILY HISTORY:   family history includes Cancer in her father; Heart attack in her father; Stroke in her father.    SOCIAL HISTORY:     Social History     Tobacco Use    Smoking status: Former     Types: Cigarettes    Smokeless tobacco: Never   Substance Use Topics    Alcohol use: Yes        HOME MEDICATIONS:     Prior to Admission medications    Medication Sig Start Date End Date Taking? Authorizing Provider   acetaminophen (TYLENOL) 500 MG tablet Take 1 tablet (500 mg total) by mouth every 4 (four) hours. 7/26/24  Yes Mathieu Patel MD   allopurinoL (ZYLOPRIM) 100 MG tablet Take 1 tablet (100 mg total) by mouth once daily. 9/13/22  Yes Franklyn Brito MD   ascorbic acid, vitamin C, (VITAMIN C) 500 MG tablet Take 1 tablet (500 mg total) by mouth 2 (two) times daily. 7/26/24  Yes Mathieu Patel MD   carvediloL (COREG) 3.125 MG tablet Take 1 tablet (3.125 mg total) by mouth 2  (two) times daily. 7/26/24 7/26/25 Yes Mathieu Patel MD   cyanocobalamin 1,000 mcg/mL injection INJECT 1000MCG (1ML) INTRAMUSCULARY ONCE MONTHLY 9/7/23  Yes Franklyn Brito MD   docusate sodium (COLACE) 100 MG capsule Take 1 capsule (100 mg total) by mouth 2 (two) times daily as needed for Constipation. 7/26/24  Yes Mathieu Patel MD   levothyroxine (SYNTHROID) 125 MCG tablet TAKE 1 TABLET BY MOUTH ONCE DAILY 6/10/24  Yes Franklyn Brito MD   lovastatin (MEVACOR) 20 MG tablet Take 20 mg by mouth once daily. 3/21/22  Yes Jessee Petersen   menthol-zinc oxide (CALMOSEPTINE) 0.44-20.6 % Oint Apply topically 2 (two) times a day. To bilateral breast folds and labia. 7/26/24  Yes Mathieu Patel MD   multivitamin Tab Take 1 tablet by mouth once daily. 7/27/24  Yes Mathieu Patel MD   polyethylene glycol (GLYCOLAX) 17 gram PwPk Take 17 g by mouth 2 (two) times daily as needed for Constipation. 7/9/24  Yes Franklyn Brito MD   sacubitriL-valsartan (ENTRESTO) 24-26 mg per tablet Take 1 tablet by mouth 2 (two) times daily. 7/9/24  Yes Franklyn Brito MD   traMADoL (ULTRAM) 50 mg tablet Take 1 tablet (50 mg total) by mouth every 4 (four) hours as needed (as needed for pain score 1-10). 7/26/24  Yes Mathieu Patel MD   doxycycline (VIBRA-TABS) 100 MG tablet Take 1 tablet (100 mg total) by mouth every 12 (twelve) hours. for 14 days  Patient not taking: Reported on 8/9/2024 7/26/24 8/9/24  Mathieu Patel MD   furosemide (LASIX) 20 MG tablet Take 1 tablet (20 mg total) by mouth 2 (two) times daily. for 3 days 7/26/24 7/29/24  Mathieu Patel MD   nitroGLYCERIN (NITROSTAT) 0.4 MG SL tablet Place 1 tablet (0.4 mg total) under the tongue every 5 (five) minutes as needed for Chest pain. 7/26/24 7/26/25  Mathieu Patel MD   pantoprazole (PROTONIX) 40 MG tablet Take 1 tablet (40 mg total) by mouth once daily. 3/16/23 7/13/24  Franklyn Brito MD   senna (SENOKOT) 8.6 mg  "tablet Take 1 tablet by mouth daily as needed for Constipation. 3/16/23   Franklyn Brito MD   warfarin sodium (COUMADIN ORAL) Take 2 mg by mouth. 9/21/21   Provider, Jessee   zinc sulfate (ZINCATE) 50 mg zinc (220 mg) capsule Take 1 capsule (220 mg total) by mouth once daily. 7/27/24   Mathieu Patel MD   DULoxetine (CYMBALTA) 20 MG capsule Take 1 capsule (20 mg total) by mouth once daily. 7/27/24 8/9/24  Mathieu Patel MD       REVIEW OF SYSTEMS:   Except as documented, all other systems reviewed and negative   ROS      PHYSICAL EXAM:     VITAL SIGNS: 24 HRS MIN & MAX LAST   Temp  Min: 98.1 °F (36.7 °C)  Max: 98.1 °F (36.7 °C) 98.1 °F (36.7 °C)   BP  Min: 118/56  Max: 118/56 (!) 118/56   Pulse  Min: 97  Max: 97  97   No data recorded     SpO2  Min: 94 %  Max: 94 % (!) 94 %       General appearance: Well-developed, well-nourished obese female in no apparent distress.  HENT: Atraumatic head. Moist mucous membranes of oral cavity.  Eyes: Normal extraocular movements.   Neck: Supple.   Lungs: Clear to auscultation bilaterally. No wheezing present.   Heart: Regular rate and rhythm. S1 and S2 present with no murmurs/gallop/rub. trace pedal edema. No JVD present.  picc  Abdomen: Soft, non-distended, non-tender. No rebound tenderness/guarding. Bowel sounds are normal.   Extremities: No cyanosis, clubbing   Skin: No Rash. Left hip incision, staples in place, not intact slough in incision  Neuro: no focal deficits  Psych/mental status: Appropriate mood and affect. Responds appropriately to questions.     LABS AND IMAGING:   No results for input(s): "WBC", "RBC", "HGB", "HCT", "MCV", "MCH", "MCHC", "RDW", "PLT", "MPV", "GRAN", "LYMPH", "MONO", "BASO", "NRBC" in the last 168 hours.    No results for input(s): "NA", "K", "CL", "CO2", "ANIONGAP", "BUN", "CREATININE", "GLU", "CALCIUM", "PH", "MG", "ALBUMIN", "PROT", "ALKPHOS", "ALT", "AST", "BILITOT" in the last 168 hours.    Microbiology Results (last 7 days)  "      Procedure Component Value Units Date/Time    Blood Culture (site 1) [[id number]]     Order Status: Sent Specimen: Blood from PICC Line     Blood Culture (site 2) [[id number]]     Order Status: Sent Specimen: Blood     Blood Culture [[id number]]     Order Status: Sent Specimen: Blood     Blood Culture [[id number]]     Order Status: Sent Specimen: Blood              US Lower Extremity Veins Left  Narrative: EXAMINATION:  US LOWER EXTREMITY VEINS LEFT    CLINICAL HISTORY:  swelling, r/o dvt;    COMPARISON:  None    FINDINGS:  Sonographic images with color and spectral analysis were obtained of the left lower extremity venous system.    The imaged left lower extremity veins demonstrate normal flow, compressibility, and augmentation without evidence of thrombus.  There is some subcutaneous edema around the knee.  Impression: Negative exam for left lower extremity thrombus.    Electronically signed by: Roger Montague  Date:    07/24/2024  Time:    17:00        __________________________________________________________________________  INPATIENT LIST OF MEDICATIONS     Scheduled Meds:   allopurinoL  100 mg Oral Daily    ascorbic acid (vitamin C)  500 mg Oral BID    carvediloL  3.125 mg Oral BID    furosemide  20 mg Oral BID    [START ON 8/10/2024] levothyroxine  125 mcg Oral Before breakfast    [START ON 8/10/2024] multivitamin  1 tablet Oral Daily    mupirocin   Nasal BID    pantoprazole  40 mg Oral Daily    pravastatin  20 mg Oral Daily    sacubitriL-valsartan  1 tablet Oral BID    zinc sulfate  220 mg Oral Daily     Continuous Infusions:  PRN Meds:  Current Facility-Administered Medications:     acetaminophen, 500 mg, Oral, Q4H PRN    acetaminophen, 650 mg, Oral, Q6H PRN    dextrose 10%, 12.5 g, Intravenous, PRN    dextrose 10%, 25 g, Intravenous, PRN    docusate sodium, 100 mg, Oral, BID PRN    glucagon (human recombinant), 1 mg, Intramuscular, PRN    glucose, 16 g, Oral, PRN    glucose, 24 g, Oral, PRN    insulin  aspart U-100, 0-5 Units, Subcutaneous, QID (AC + HS) PRN    naloxone, 0.02 mg, Intravenous, PRN    nitroGLYCERIN, 0.4 mg, Sublingual, Q5 Min PRN    ondansetron, 4 mg, Intravenous, Q8H PRN    polyethylene glycol, 17 g, Oral, BID PRN    senna, 1 tablet, Oral, Daily PRN    sodium chloride 0.9%, 10 mL, Intravenous, Q12H PRN    traMADoL, 50 mg, Oral, Q4H PRN          ASSESSMENT & PLAN:   Left femur IMN 7/3, failed IMN  S/p Conversion SILVIA 7/15  Dilated cardiomyopathy  A fib  Htn  Hld  Gout  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  Hypothyroid    Plan    Resume meds/iss  Hold coumadin, check inr  Check labs  Consult wound care  Consult ortho  Consult PTOT    Dvt proph: coumadin          Mathieu Patel MD   08/09/2024

## 2024-08-09 NOTE — PLAN OF CARE
Problem: Physical Therapy  Goal: Physical Therapy Goal  Description: Pt will improve functional independence by performing:    Bed mobility:   Rolling:max A  Supine to sit: Max A   Sit to supine: Max A   Sit to stand: max A with rolling walker  Bed to chair: max A with Stand Step  with rolling walker   Bed to chair t/f: Max A slide board       Outcome: Progressing

## 2024-08-09 NOTE — PLAN OF CARE
Problem: Adult Inpatient Plan of Care  Goal: Plan of Care Review  Outcome: Progressing  Goal: Patient-Specific Goal (Individualized)  Outcome: Progressing  Goal: Absence of Hospital-Acquired Illness or Injury  Outcome: Progressing  Goal: Optimal Comfort and Wellbeing  Outcome: Progressing  Goal: Readiness for Transition of Care  Outcome: Progressing     Problem: Diabetes Comorbidity  Goal: Blood Glucose Level Within Targeted Range  Outcome: Progressing     Problem: Acute Kidney Injury/Impairment  Goal: Fluid and Electrolyte Balance  Outcome: Progressing  Goal: Improved Oral Intake  Outcome: Progressing  Goal: Effective Renal Function  Outcome: Progressing     Problem: Wound  Goal: Optimal Coping  Outcome: Progressing  Goal: Optimal Functional Ability  Outcome: Progressing  Goal: Absence of Infection Signs and Symptoms  Outcome: Progressing  Goal: Improved Oral Intake  Outcome: Progressing  Goal: Optimal Pain Control and Function  Outcome: Progressing  Goal: Skin Health and Integrity  Outcome: Progressing  Goal: Optimal Wound Healing  Outcome: Progressing     Problem: Infection  Goal: Absence of Infection Signs and Symptoms  Outcome: Progressing     Problem: Fall Injury Risk  Goal: Absence of Fall and Fall-Related Injury  Outcome: Progressing     Problem: Skin Injury Risk Increased  Goal: Skin Health and Integrity  Outcome: Progressing     Problem: Hip Arthroplasty  Goal: Optimal Coping  Outcome: Progressing  Goal: Absence of Bleeding  Outcome: Progressing  Goal: Effective Bowel Elimination  Outcome: Progressing  Goal: Fluid and Electrolyte Balance  Outcome: Progressing  Goal: Optimal Functional Ability  Outcome: Progressing  Goal: Absence of Infection Signs and Symptoms  Outcome: Progressing  Goal: Intact Neurovascular Status  Outcome: Progressing  Goal: Anesthesia/Sedation Recovery  Outcome: Progressing  Goal: Acceptable Pain Control  Outcome: Progressing  Goal: Nausea and Vomiting Relief  Outcome:  Progressing  Goal: Effective Urinary Elimination  Outcome: Progressing  Goal: Effective Oxygenation and Ventilation  Outcome: Progressing     Problem: Dysrhythmia  Goal: Normalized Cardiac Rhythm  Outcome: Progressing

## 2024-08-10 PROCEDURE — 99900031 HC PATIENT EDUCATION (STAT)

## 2024-08-10 PROCEDURE — 25000003 PHARM REV CODE 250: Performed by: INTERNAL MEDICINE

## 2024-08-10 PROCEDURE — 21400001 HC TELEMETRY ROOM

## 2024-08-10 PROCEDURE — S0179 MEGESTROL 20 MG: HCPCS | Performed by: INTERNAL MEDICINE

## 2024-08-10 PROCEDURE — 97166 OT EVAL MOD COMPLEX 45 MIN: CPT

## 2024-08-10 PROCEDURE — 97530 THERAPEUTIC ACTIVITIES: CPT

## 2024-08-10 PROCEDURE — 94761 N-INVAS EAR/PLS OXIMETRY MLT: CPT

## 2024-08-10 RX ADMIN — OXYCODONE HYDROCHLORIDE AND ACETAMINOPHEN 500 MG: 500 TABLET ORAL at 08:08

## 2024-08-10 RX ADMIN — MUPIROCIN: 20 OINTMENT TOPICAL at 08:08

## 2024-08-10 RX ADMIN — FUROSEMIDE 20 MG: 20 TABLET ORAL at 08:08

## 2024-08-10 RX ADMIN — PANTOPRAZOLE SODIUM 40 MG: 40 TABLET, DELAYED RELEASE ORAL at 08:08

## 2024-08-10 RX ADMIN — THERA TABS 1 TABLET: TAB at 08:08

## 2024-08-10 RX ADMIN — CARVEDILOL 3.12 MG: 3.12 TABLET, FILM COATED ORAL at 08:08

## 2024-08-10 RX ADMIN — LEVOTHYROXINE SODIUM 125 MCG: 125 TABLET ORAL at 06:08

## 2024-08-10 RX ADMIN — SACUBITRIL AND VALSARTAN 1 TABLET: 24; 26 TABLET, FILM COATED ORAL at 08:08

## 2024-08-10 RX ADMIN — ALLOPURINOL 100 MG: 100 TABLET ORAL at 08:08

## 2024-08-10 RX ADMIN — MEGESTROL ACETATE 200 MG: 40 SUSPENSION ORAL at 08:08

## 2024-08-10 RX ADMIN — MEGESTROL ACETATE 200 MG: 40 SUSPENSION ORAL at 04:08

## 2024-08-10 RX ADMIN — PRAVASTATIN SODIUM 20 MG: 10 TABLET ORAL at 08:08

## 2024-08-10 RX ADMIN — ZINC SULFATE 220 MG (50 MG) CAPSULE 220 MG: CAPSULE at 08:08

## 2024-08-10 RX ADMIN — TRAMADOL HYDROCHLORIDE 50 MG: 50 TABLET, COATED ORAL at 08:08

## 2024-08-10 RX ADMIN — MEGESTROL ACETATE 200 MG: 40 SUSPENSION ORAL at 12:08

## 2024-08-10 NOTE — NURSING
Nurses Note -- 4 Eyes      8/10/2024   6:50 AM      Skin assessed during: Q Shift Change      [] No Altered Skin Integrity Present    []Prevention Measures Documented      [x] Yes- Altered Skin Integrity Present or Discovered   [] LDA Added if Not in Epic (Describe Wound)   [x] New Altered Skin Integrity was Present on Admit and Documented in LDA   [] Wound Image Taken    Wound Care Consulted? No    Attending Nurse:  Jessica Quiroz RN/Staff Member:   Marilu

## 2024-08-10 NOTE — PT/OT/SLP PROGRESS
Physical Therapy Treatment    Patient Name:  Homa Jaquez   MRN:  [id number]    Recommendations:     Discharge Recommendations: Moderate Intensity Therapy  Discharge Equipment Recommendations: none  Barriers to discharge:  Impaired functional mobility    Assessment:     Homa Jaquez is a 88 y.o. female admitted with a medical diagnosis of Post-operative complication.  She presents with the following impairments/functional limitations: weakness, impaired functional mobility, decreased safety awareness, impaired coordination, decreased coordination, impaired endurance, gait instability, impaired self care skills, orthopedic precautions, pain .    Rehab Prognosis: Fair; patient would benefit from acute skilled PT services to address these deficits and reach maximum level of function.    Recent Surgery: * No surgery found *      Plan:     During this hospitalization, patient to be seen daily (QD-BID pending pt tolerance) to address the identified rehab impairments via gait training, therapeutic activities, therapeutic exercises and progress toward the following goals:    Plan of Care Expires:  08/15/24    Subjective     Chief Complaint: Fatigue   Patient/Family Comments/goals: To improve functional mobility   Pain/Comfort:         Objective:     Communicated with NSG prior to session.  Patient found HOB elevated with talley catheter, peripheral IV, with son present upon PT entry to room.     General Precautions: Standard, fall  Orthopedic Precautions: LLE weight bearing as tolerated, LLE posterior precautions  Braces: N/A  Respiratory Status: Room air     Functional Mobility:  Bed Mobility:     Rolling Left:  maximal assistance and of 2 persons  Rolling Right: maximal assistance and of 2 persons  Supine to Sit: maximal assistance and of 2 persons  Sit to Supine: maximal assistance and of 2 persons  Transfers:     Sit to Stand:  maximal assistance and of 2 persons with rolling walker          Treatment &  Education  The plan of care was reviewed with the pt and the pt's son and they verbalized understanding of this.     Patient left HOB elevated with bed alarm on, with all lines intact, NSG notified, and son present..    GOALS:   Multidisciplinary Problems       Physical Therapy Goals          Problem: Physical Therapy    Goal Priority Disciplines Outcome Goal Variances Interventions   Physical Therapy Goal     PT, PT/OT Progressing     Description: Pt will improve functional independence by performing:    Bed mobility:   Rolling:max A  Supine to sit: Max A   Sit to supine: Max A   Sit to stand: max A with rolling walker  Bed to chair: max A with Stand Step  with rolling walker   Bed to chair t/f: Max A slide board                            Time Tracking:     PT Received On: 08/10/24  PT Start Time: 0900     PT Stop Time: 0930  PT Total Time (min): 30 min     Billable Minutes: Therapeutic Activity 30 minutes    Treatment Type: Treatment  PT/PTA: PT           08/10/2024

## 2024-08-10 NOTE — PLAN OF CARE
Problem: Physical Therapy  Goal: Physical Therapy Goal  Description: Pt will improve functional independence by performing:    Bed mobility:   Rolling:max A  Supine to sit: Max A   Sit to supine: Max A   Sit to stand: max A with rolling walker  Bed to chair: max A with Stand Step  with rolling walker   Bed to chair t/f: Max A slide board       Outcome: Progressing      Report from [facility]. Assumed care of pt at this time. Sharri Lay RN  06/21/22 [id number]

## 2024-08-10 NOTE — PLAN OF CARE
Problem: Occupational Therapy  Goal: Occupational Therapy Goal  Description: Pt will perform LB dressing c AE and Max A  by d/c.  Pt will perform toileting Max A  by d/c.  Pt will perform toilet t/f Mod A by d/c.  Pt will perform tub t/f MOD A  assist c TTB by d/c.    Pt will perform car t/f Mod A assist in adherence to pxns by d/c.   Outcome: Not Progressing

## 2024-08-10 NOTE — PROGRESS NOTES
"No acute events overnight.  Pain controlled.  Resting in bed. C/o weakness generalized    Vital Signs  Temp: 98 °F (36.7 °C)  Temp Source: Oral  Pulse: 92  Heart Rate Source: Monitor  Resp: 17  SpO2: 98 %  Pulse Oximetry Type: Intermittent  Device (Oxygen Therapy): room air  BP: 130/79  BP Method: Automatic  Height and Weight  Height: 5' 4" (162.6 cm)  Height Method: Stated  Weight: 86 kg (189 lb 9.5 oz)  Weight Method: Bed Scale  Weight in (lb) to have BMI = 25: 145.3]    +FHL/EHL  BCR distally  L hip with significant serous drainage  SILT distally    Recent Lab Results         08/09/24  1433        Anion Gap 9.0       PTT 44.7  Comment: For Minimal Heparin Infusion, the goal aPTT 64-85 seconds corresponds to an anti-Xa of 0.3-0.5.    For Low Intensity and High Intensity Heparin, the goal aPTT  seconds corresponds to an anti-Xa of 0.3-0.7       Baso # 0.12       Basophil % 1.2       BUN 19.6       BUN/CREAT RATIO 16       Calcium 8.2       Chloride 99       CO2 25       Creatinine 1.19       CRP, High Sensitivity 139.44       eGFR 44       Eos # 0.05       Eos % 0.5       Glucose 133       Hematocrit 32.8       Hemoglobin 10.8       Immature Grans (Abs) 0.05       Immature Granulocytes 0.5       INR 1.8       Lymph # 1.05       LYMPH % 10.6       MCH 32.4       MCHC 32.9       MCV 98.5       Mono # 1.08       Mono % 11.0       MPV 9.1       Neut # 7.51       Neut % 76.2       nRBC 0.0       Platelet Count 327       Potassium 4.2       Prealbumin 5.6       PT 20.8       RBC 3.33       RDW 19.2       Sed Rate 106       Sodium 133       WBC 9.86               A/P:  Status post conversion L SILVIA  Discussed all treatment options with patient and family  Given ongoing drainage, plan for I&D L SILVIA on Monday  R/B/A discussed in detail  Hold coumadin  Surgery Monday afternoon    "

## 2024-08-10 NOTE — NURSING
Nurses Note -- 4 Eyes      8/9/2024   7:38 PM      Skin assessed during: Q Shift Change      [] No Altered Skin Integrity Present    [x]Prevention Measures Documented      [x] Yes- Altered Skin Integrity Present or Discovered - PRESENT, IN EPIC   [] LDA Added if Not in Epic (Describe Wound)   [] New Altered Skin Integrity was Present on Admit and Documented in LDA   [] Wound Image Taken    Wound Care Consulted? No    Attending Nurse:  Marilu Quiroz RN/Staff Member:  Arlette Bailey RN

## 2024-08-10 NOTE — PROGRESS NOTES
[facility]  [facility] Medicine Progress Note      Patient Name: Homa Jaquez  MRN: [id number]  Admission Date: 8/9/2024   Length of Stay: 1  Attending Physician: Mathieu Patel MD  Primary Care Provider: Franklyn Brito MD  Face-to-Face encounter date: 08/10/2024    Code Status: DNR        Chief Complaint:   Wound drainage        HPI:   Homa Jaquez is a 88 y.o. female who  has a past medical history of A-fib, Chronic cystitis, GERD (gastroesophageal reflux disease), Graves disease, Hypertension, Hypothyroidism, unspecified, and Spinal stenosis.. The patient presented to [facility] 8/9/2024 with a primary complaint of post op complications.  Pt with a left femur fracture after a fall 7/2, underwent IMN 7/3 and [facility] She reported increasing pain to hip, xrays showing failed intertrochanteric femur fracture.  Pt underwent conversion left SILVIA 7/15, she was discharged 7/26 to snf [place].  She developed pneumonia which was treated with merrem and vancomycin and completed about 3 days ago.  She was started on megace for poor appetite about a week ago and has been eating good since, coumadin held 3 days ago for elevated inr.doxycline and cymbalta was stopped and she has had lasix on and off with resolution of edema.  She has been awake and alert and improving.  Her wound vac was removed 1 week ago, drainage improved.  She is transferred back for evaluation of incision.  Family says there has been some serosanginous drainage on bandage but mild and decreased from admission where she was draining 1000cc per day.  No f/c.  Inr has been up and down, currently on hold x 3 days.     Overview/Hospital Course:  No notes on file       Interval Hx:   No complaints eating good, no f/c    Review of Systems   All other systems reviewed and are negative.      Objective/physical exam:  General: In no acute distress, afebrile  Chest: Clear to  auscultation bilaterally  Heart: RRR, +S1, S2, no appreciable murmur, picc  Abdomen: Soft, nontender, BS +  MSK: Warm, trace lower extremity edema, no clubbing or cyanosis  Skin: left hip surgery/incision slough drainage staples,  Neurologic: Alert and oriented x4, Cranial nerve II-XII intact,      VITAL SIGNS: 24 HRS MIN & MAX LAST   Temp  Min: 97.8 °F (36.6 °C)  Max: 98.2 °F (36.8 °C) 97.9 °F (36.6 °C)   BP  Min: 107/66  Max: 142/82 107/66   Pulse  Min: 85  Max: 96  93   Resp  Min: 17  Max: 17 17   SpO2  Min: 92 %  Max: 98 % (!) 92 %       Recent Labs   Lab 08/09/24  1433   WBC 9.86   RBC 3.33*   HGB 10.8*   HCT 32.8*   MCV 98.5*   MCH 32.4*   MCHC 32.9*   RDW 19.2*      MPV 9.1       Recent Labs   Lab 08/09/24  1433   *   K 4.2   CL 99   CO2 25   BUN 19.6   CREATININE 1.19*   CALCIUM 8.2*        Microbiology Results (last 7 days)       Procedure Component Value Units Date/Time    Blood Culture [[id number]] Collected: 08/09/24 1436    Order Status: Resulted Specimen: Blood from Arm, Left Updated: 08/09/24 1448    Blood Culture [[id number]] Collected: 08/09/24 1433    Order Status: Resulted Specimen: Blood from Antecubital, Left Updated: 08/09/24 1448    Blood Culture (site 1) [[id number]]     Order Status: Canceled Specimen: Blood from PICC Line     Blood Culture (site 2) [[id number]]     Order Status: Canceled Specimen: Blood              Radiology:  US Lower Extremity Veins Left  Narrative: EXAMINATION:  US LOWER EXTREMITY VEINS LEFT    CLINICAL HISTORY:  swelling, r/o dvt;    COMPARISON:  None    FINDINGS:  Sonographic images with color and spectral analysis were obtained of the left lower extremity venous system.    The imaged left lower extremity veins demonstrate normal flow, compressibility, and augmentation without evidence of thrombus.  There is some subcutaneous edema around the knee.  Impression: Negative exam for left lower extremity thrombus.    Electronically signed by: Roger  Eddi  Date:    07/24/2024  Time:    17:00      Scheduled Med:   allopurinoL  100 mg Oral Daily    ascorbic acid (vitamin C)  500 mg Oral BID    carvediloL  3.125 mg Oral BID    furosemide  20 mg Oral BID    levothyroxine  125 mcg Oral Before breakfast    megestroL  200 mg Oral TID WM    multivitamin  1 tablet Oral Daily    mupirocin   Nasal BID    pantoprazole  40 mg Oral Daily    pravastatin  20 mg Oral Daily    sacubitriL-valsartan  1 tablet Oral BID    zinc sulfate  220 mg Oral Daily        Continuous Infusions:       PRN Meds:    Current Facility-Administered Medications:     acetaminophen, 500 mg, Oral, Q4H PRN    acetaminophen, 650 mg, Oral, Q6H PRN    dextrose 10%, 12.5 g, Intravenous, PRN    dextrose 10%, 25 g, Intravenous, PRN    docusate sodium, 100 mg, Oral, BID PRN    glucagon (human recombinant), 1 mg, Intramuscular, PRN    glucose, 16 g, Oral, PRN    glucose, 24 g, Oral, PRN    insulin aspart U-100, 0-5 Units, Subcutaneous, QID (AC + HS) PRN    naloxone, 0.02 mg, Intravenous, PRN    nitroGLYCERIN, 0.4 mg, Sublingual, Q5 Min PRN    ondansetron, 4 mg, Intravenous, Q8H PRN    polyethylene glycol, 17 g, Oral, BID PRN    senna, 1 tablet, Oral, Daily PRN    sodium chloride 0.9%, 10 mL, Intravenous, Q12H PRN    traMADoL, 50 mg, Oral, Q4H PRN     Nutrition Status:      Assessment/Plan:  Left femur IMN 7/3, failed IMN  S/p Conversion SILVIA 7/15  Dilated cardiomyopathy  A fib  Htn  Hld  Gout  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  Hypothyroid     Plan     Hold coumadin x4d, monitor inr 1.8   wound care  Refer to ortho washout tomorrow  enocourage PTOT  Labs in am     Dvt proph: coumadin on hold,              All diagnosis and differential diagnosis have been reviewed; assessment and plan has been documented; I have personally reviewed the labs and test results that are presently available; I have reviewed the patients medication list; I have reviewed the consulting providers response and recommendations. I have reviewed or attempted to  review medical records based upon their availability      _____________________________________________________________________            Mathieu Patel MD   08/10/2024

## 2024-08-10 NOTE — PT/OT/SLP EVAL
"Occupational Therapy   Evaluation    Name: Homa Jaquez  MRN: [id number]  Admitting Diagnosis: Post-operative complication  Recent Surgery: * No surgery found *      Recommendations:     Discharge Recommendations: Moderate Intensity Therapy  Discharge Equipment Recommendations:     Barriers to discharge:       Assessment:     Homa Jaquez is a 88 y.o. female with a medical diagnosis of Post-operative complication.  She presents with [place] and dressing to L hip. Performance deficits affecting function: weakness, impaired endurance, impaired self care skills, impaired functional mobility, decreased upper extremity function, decreased lower extremity function, decreased ROM, orthopedic precautions.      Rehab Prognosis: Fair; patient would benefit from acute skilled OT services to address these deficits and reach maximum level of function.       Plan:     Patient to be seen   BID to address the above listed problems via    Plan of Care Expires:  Fri. 8/16/2024  Plan of Care Reviewed with: patient, son    Subjective     Chief Complaint: "Too early; I don't want to move"   Patient/Family Comments/goals: son present, encouraging Pt. To allow Tx    Occupational Profile:  Living Environment: Home c  prior to a month ago; recently in SNF   Previous level of function: Pt. Required Min-Mod A of  prior to 1 month ago  Roles and Routines: Pt.'s  "did everything" prior to initial L hip Sx  Equipment Used at Home:  rollator  Assistance upon Discharge: Pt. Will benefit from SNF to increase FM and ADL     Pain/Comfort:  Location 1: breast  Pain Addressed 1: Other (see comments) (Gait belt removed)    Patients cultural, spiritual, Buddhist conflicts given the current situation: yes    Objective:     Communicated with: LAURA Lopez  prior to session.  Patient found HOB elevated with talley catheter, peripheral IV upon OT entry to room.    General Precautions: Standard, fall  Orthopedic Precautions: LLE weight " bearing as tolerated, LLE posterior precautions  Braces:    Respiratory Status: Room air    Occupational Performance:    Bed Mobility:    Patient completed Rolling/Turning to Right with total assistance and 2 persons  Patient completed Scooting/Bridging with total assistance and 2 persons  Patient completed Supine to Sit with total assistance and 3 persons  Patient completed Sit to Supine with total assistance and 3 persons    Functional Mobility/Transfers:  OT, OT tech attempted 3 x's to sit <> stand c Total A (Max A x's 2) Pt. Able to half stand before sitting EOB, requiring OT and tech and Pt.'s son to use vicki to scoot back to avoid slipping from EOB. Pt. Unable to follow v/c's to push from surface of bed to assist. Pt. Attempts to pull up on RW. Pt. Required Max A x's 3 to return to semi-supine via vicki.   Functional Mobility: Pt. Unable to assist c sit <> stand        AMPAC 6 Click ADL:  AMPAC Total Score:      Treatment & Education:  Attempts at setting goals unanswered.     Patient left HOB elevated with call button in reach, bed alarm on, and son present c food from home and Pt.'s breakfast tray    GOALS:   Multidisciplinary Problems       Occupational Therapy Goals          Problem: Occupational Therapy    Goal Priority Disciplines Outcome Interventions   Occupational Therapy Goal     OT, PT/OT Not Progressing    Description: Pt will perform LB dressing c AE and Max A  by d/c.  Pt will perform toileting Max A  by d/c.  Pt will perform toilet t/f Mod A by d/c.  Pt will perform tub t/f MOD A  assist c TTB by d/c.    Pt will perform car t/f Mod A assist in adherence to pxns by d/c.                        History:     Past Medical History:   Diagnosis Date    A-fib     Chronic cystitis     GERD (gastroesophageal reflux disease)     Graves disease     Hypertension     Hypothyroidism, unspecified     Spinal stenosis          Past Surgical History:   Procedure Laterality Date    APPENDECTOMY      BLADDER  SUSPENSION      CARDIOVERSION  04/01/2015    CATARACT EXTRACTION      CONVERSION ARTHROPLASTY, HIP, POSTERIOR APPROACH Left 7/15/2024    Procedure: CONVERSION ARTHROPLASTY, HIP, POSTERIOR APPROACH - valencia, cell saver, pathology;  Surgeon: Jonny Bains MD;  Location: [facility] OR;  Service: Orthopedics;  Laterality: Left;  valencia, cell saver, pathology    HYSTERECTOMY      INTRAMEDULLARY RODDING OF FEMUR Left 7/3/2024    Procedure: INSERTION, INTRAMEDULLARY MELANIE, FEMUR;  Surgeon: Steve Pierce DO;  Location: [facility];  Service: Orthopedics;  Laterality: Left;  supine hana table c arm synthes    LAPAROSCOPIC CHOLECYSTECTOMY  01/12/2016    SPHINCTEROTOMY OF URETHRA  01/11/2016    TONSILLECTOMY AND ADENOIDECTOMY         Time Tracking:     OT Date of Treatment: 08/10/24  OT Start Time: 0735  OT Stop Time: 0805  OT Total Time (min): 30 min    Billable Minutes:Evaluation 30    8/10/2024

## 2024-08-11 LAB
ANION GAP SERPL CALC-SCNC: 8 MEQ/L
BUN SERPL-MCNC: 19.8 MG/DL (ref 9.8–20.1)
CALCIUM SERPL-MCNC: 8.1 MG/DL (ref 8.4–10.2)
CHLORIDE SERPL-SCNC: 100 MMOL/L (ref 98–107)
CO2 SERPL-SCNC: 25 MMOL/L (ref 23–31)
CREAT SERPL-MCNC: 1.07 MG/DL (ref 0.55–1.02)
CREAT/UREA NIT SERPL: 19
GFR SERPLBLD CREATININE-BSD FMLA CKD-EPI: 50 ML/MIN/1.73/M2
GLUCOSE SERPL-MCNC: 109 MG/DL (ref 82–115)
GROUP & RH: NORMAL
INDIRECT COOMBS: NORMAL
INR PPP: 1.5 (ref 2–3)
POTASSIUM SERPL-SCNC: 4.9 MMOL/L (ref 3.5–5.1)
PROTHROMBIN TIME: 18.8 SECONDS (ref 11.7–14.5)
SODIUM SERPL-SCNC: 133 MMOL/L (ref 136–145)
SPECIMEN OUTDATE: NORMAL

## 2024-08-11 PROCEDURE — 86923 COMPATIBILITY TEST ELECTRIC: CPT | Mod: 91 | Performed by: ORTHOPAEDIC SURGERY

## 2024-08-11 PROCEDURE — 85610 PROTHROMBIN TIME: CPT | Performed by: INTERNAL MEDICINE

## 2024-08-11 PROCEDURE — 25000003 PHARM REV CODE 250: Performed by: INTERNAL MEDICINE

## 2024-08-11 PROCEDURE — S0179 MEGESTROL 20 MG: HCPCS | Performed by: INTERNAL MEDICINE

## 2024-08-11 PROCEDURE — 36415 COLL VENOUS BLD VENIPUNCTURE: CPT | Performed by: INTERNAL MEDICINE

## 2024-08-11 PROCEDURE — 86850 RBC ANTIBODY SCREEN: CPT | Performed by: ORTHOPAEDIC SURGERY

## 2024-08-11 PROCEDURE — 86900 BLOOD TYPING SEROLOGIC ABO: CPT | Performed by: ORTHOPAEDIC SURGERY

## 2024-08-11 PROCEDURE — 97530 THERAPEUTIC ACTIVITIES: CPT

## 2024-08-11 PROCEDURE — 21400001 HC TELEMETRY ROOM

## 2024-08-11 PROCEDURE — 80048 BASIC METABOLIC PNL TOTAL CA: CPT | Performed by: INTERNAL MEDICINE

## 2024-08-11 PROCEDURE — 86901 BLOOD TYPING SEROLOGIC RH(D): CPT | Performed by: ORTHOPAEDIC SURGERY

## 2024-08-11 RX ADMIN — FUROSEMIDE 20 MG: 20 TABLET ORAL at 08:08

## 2024-08-11 RX ADMIN — MUPIROCIN: 20 OINTMENT TOPICAL at 08:08

## 2024-08-11 RX ADMIN — SACUBITRIL AND VALSARTAN 1 TABLET: 24; 26 TABLET, FILM COATED ORAL at 08:08

## 2024-08-11 RX ADMIN — PRAVASTATIN SODIUM 20 MG: 10 TABLET ORAL at 07:08

## 2024-08-11 RX ADMIN — PANTOPRAZOLE SODIUM 40 MG: 40 TABLET, DELAYED RELEASE ORAL at 07:08

## 2024-08-11 RX ADMIN — LEVOTHYROXINE SODIUM 125 MCG: 125 TABLET ORAL at 06:08

## 2024-08-11 RX ADMIN — CARVEDILOL 3.12 MG: 3.12 TABLET, FILM COATED ORAL at 08:08

## 2024-08-11 RX ADMIN — CARVEDILOL 3.12 MG: 3.12 TABLET, FILM COATED ORAL at 07:08

## 2024-08-11 RX ADMIN — TRAMADOL HYDROCHLORIDE 50 MG: 50 TABLET, COATED ORAL at 08:08

## 2024-08-11 RX ADMIN — POLYETHYLENE GLYCOL 3350 17 G: 17 POWDER, FOR SOLUTION ORAL at 08:08

## 2024-08-11 RX ADMIN — ALLOPURINOL 100 MG: 100 TABLET ORAL at 07:08

## 2024-08-11 RX ADMIN — OXYCODONE HYDROCHLORIDE AND ACETAMINOPHEN 500 MG: 500 TABLET ORAL at 08:08

## 2024-08-11 RX ADMIN — MEGESTROL ACETATE 200 MG: 40 SUSPENSION ORAL at 07:08

## 2024-08-11 RX ADMIN — MEGESTROL ACETATE 200 MG: 40 SUSPENSION ORAL at 04:08

## 2024-08-11 RX ADMIN — DOCUSATE SODIUM 100 MG: 100 CAPSULE, LIQUID FILLED ORAL at 08:08

## 2024-08-11 RX ADMIN — THERA TABS 1 TABLET: TAB at 07:08

## 2024-08-11 RX ADMIN — FUROSEMIDE 20 MG: 20 TABLET ORAL at 07:08

## 2024-08-11 RX ADMIN — ZINC SULFATE 220 MG (50 MG) CAPSULE 220 MG: CAPSULE at 07:08

## 2024-08-11 RX ADMIN — MEGESTROL ACETATE 200 MG: 40 SUSPENSION ORAL at 11:08

## 2024-08-11 RX ADMIN — SACUBITRIL AND VALSARTAN 1 TABLET: 24; 26 TABLET, FILM COATED ORAL at 07:08

## 2024-08-11 NOTE — CONSULTS
Inpatient Nutrition Assessment    Admit Date: 8/9/2024   Total duration of encounter: 1 day   Patient Age: 88 y.o.    Nutrition Recommendation/Prescription     Continue Regular diet with coumadin restriction as tolerated.   Boost Plus, TID, to assist with intake. Boost Plus provides 360 kcal, 14 g protein per serving.  Consider adding 500 mg Vitamin C, BID and 220 mg of Zinc Sulfate daily to aid in healing.   NFPE at f/u.   Monitor intake, weight, and labs.     RD following and available as needed. Thank you.     Communication of Recommendations:  EMR.    Nutrition Assessment     Malnutrition Assessment/Nutrition-Focused Physical Exam    Unable to complete. RD will attempt on 8/12 at f/u.                                                             A minimum of two characteristics is recommended for diagnosis of either severe or non-severe malnutrition.    Chart Review    Reason Seen: physician consult for poor appetite.     Malnutrition Screening Tool Results   Have you recently lost weight without trying?: Unsure  Have you been eating poorly because of a decreased appetite?: Yes   MST Score: 3   Diagnosis:  Wound drainage.     Relevant Medical History:   A-fib      Chronic cystitis      GERD (gastroesophageal reflux disease)      Graves disease      Hypertension      Hypothyroidism, unspecified      Spinal stenosis          Scheduled Medications:  allopurinoL, 100 mg, Daily  ascorbic acid (vitamin C), 500 mg, BID  carvediloL, 3.125 mg, BID  furosemide, 20 mg, BID  levothyroxine, 125 mcg, Before breakfast  megestroL, 200 mg, TID WM  multivitamin, 1 tablet, Daily  mupirocin, , BID  pantoprazole, 40 mg, Daily  pravastatin, 20 mg, Daily  sacubitriL-valsartan, 1 tablet, BID  zinc sulfate, 220 mg, Daily    Continuous Infusions:   PRN Medications:  acetaminophen, 500 mg, Q4H PRN  acetaminophen, 650 mg, Q6H PRN  dextrose 10%, 12.5 g, PRN  dextrose 10%, 25 g, PRN  docusate sodium, 100 mg, BID PRN  glucagon (human  "recombinant), 1 mg, PRN  glucose, 16 g, PRN  glucose, 24 g, PRN  insulin aspart U-100, 0-5 Units, QID (AC + HS) PRN  naloxone, 0.02 mg, PRN  nitroGLYCERIN, 0.4 mg, Q5 Min PRN  ondansetron, 4 mg, Q8H PRN  polyethylene glycol, 17 g, BID PRN  senna, 1 tablet, Daily PRN  sodium chloride 0.9%, 10 mL, Q12H PRN  traMADoL, 50 mg, Q4H PRN    Calorie Containing IV Medications: no significant kcals from medications at this time    Recent Labs   Lab 08/09/24  1433   *   K 4.2   CALCIUM 8.2*   CO2 25   BUN 19.6   CREATININE 1.19*   EGFRNORACEVR 44   GLUCOSE 133*   PREALB 5.6*   HSCRP 139.44*   WBC 9.86   HGB 10.8*   HCT 32.8*     Nutrition Orders:  Diet Adult Regular Coumadin Restriction      Appetite/Oral Intake: poor/25-50% of meals  Factors Affecting Nutritional Intake: decreased appetite  Social Needs Impacting Access to Food: unable to assess at this time; will attempt on follow-up  Food/Voodoo/Cultural Preferences: none reported  Food Allergies: none reported  Last Bowel Movement: 08/07/24  Wound(s):     Wound 08/09/24 1400 Other (comment) midline Sacral spine-Tissue loss description: Not applicable       Wound 08/09/24 1704 Other (comment) Left Heel-Tissue loss description: Not applicable       Wound 08/09/24 1704 Other (comment) Right Heel-Tissue loss description: Not applicable Noted.     Comments    8/10:  Noted pt is status post conversion L SILVIA L hip with significant serous drainage and plans are for surgery Monday afternoon per EMR notes. Consult received secondary to pt with poor appetite/intake. Unable to complete NFPE at this time. RD to complete at f/u. No recent weight loss or chewing/swallowing issues noted in EMR. Will add Boost Plus, TID, and continue to monitor during stay.     Anthropometrics    Height: 5' 4.02" (162.6 cm), Height Method: Stated  Last Weight: 86 kg (189 lb 9.5 oz) (08/1936), Weight Method: Bed Scale     BMI Classification: obese grade I (BMI 30-34.9)     Ideal Body Weight " (IBW), Female: 120.1 lb                                   Usual Weight Provided By: unable to obtain usual weight    Wt Readings from Last 5 Encounters:   08/09/24 86 kg (189 lb 9.5 oz)   07/13/24 82.9 kg (182 lb 12.2 oz)   07/13/24 82.9 kg (182 lb 12.2 oz)   07/03/24 70.3 kg (155 lb)   06/29/23 67 kg (147 lb 12.8 oz)     Weight Change(s) Since Admission:   Wt Readings from Last 1 Encounters:   08/1936 86 kg (189 lb 9.5 oz)   Admit Weight: 86 kg (189 lb 9.5 oz) (08/1935), Weight Method: Bed Scale    Estimated Needs    Weight Used For Calorie Calculations: 86 kg (189 lb 9.5 oz)  Energy Calorie Requirements (kcal): 1550 to 1700 kcals/day (SF 1.2-1.3)  Energy Need Method: [place]-St Jeor  Weight Used For Protein Calculations: 86 kg (189 lb 9.5 oz)  Protein Requirements: 70 to 90 g/day (0.8 to 1.0 g/kg/CBW)  Fluid Requirements (mL): 1550 to 1700 mL/day (1mL/kcal) or per MD Guidance.        Enteral Nutrition     Patient not receiving enteral nutrition at this time.    Parenteral Nutrition     Patient not receiving parenteral nutrition support at this time.    Evaluation of Received Nutrient Intake    Calories: not meeting estimated needs  Protein: not meeting estimated needs    Patient Education     Not applicable.    Nutrition Diagnosis     PES: Increased nutrient needs (Vitamin C, protein, and Zinc ) related to increased protein energy demand for wound healing as evidenced by Recent surgery/wounds. (new)       Nutrition Interventions     Intervention(s): commercial beverage, multivitamin/mineral supplement therapy, and collaboration with other providers    Goal: Meet greater than 80% of nutritional needs by follow-up. (new)    Nutrition Goals & Monitoring     Dietitian will monitor: food and beverage intake, weight, weight change, electrolyte/renal panel, glucose/endocrine profile, and gastrointestinal profile  Discharge planning: too early to determine; pending clinical course  Nutrition Risk/Follow-Up:  moderate (follow-up in 3-5 days)   Please consult if re-assessment needed sooner.

## 2024-08-11 NOTE — PT/OT/SLP PROGRESS
Occupational Therapy      Patient Name:  Homa Jaquez   MRN:  [id number]    Attempted to see patient 4 times today, twice in morning, twice in afternoon. Patient was eating during all 4 attempts. On 4th attempt, patient declined when asked if she wanted to participate in therapy. Will follow-up in morning as schedule permits.     8/11/2024

## 2024-08-11 NOTE — PLAN OF CARE
Problem: Adult Inpatient Plan of Care  Goal: Plan of Care Review  Outcome: Progressing  Goal: Patient-Specific Goal (Individualized)  Outcome: Progressing  Goal: Absence of Hospital-Acquired Illness or Injury  Outcome: Progressing  Goal: Optimal Comfort and Wellbeing  Outcome: Progressing  Goal: Readiness for Transition of Care  Outcome: Progressing     Problem: Diabetes Comorbidity  Goal: Blood Glucose Level Within Targeted Range  Outcome: Progressing     Problem: Acute Kidney Injury/Impairment  Goal: Fluid and Electrolyte Balance  Outcome: Progressing  Goal: Improved Oral Intake  Outcome: Progressing  Goal: Effective Renal Function  Outcome: Progressing     Problem: Wound  Goal: Optimal Coping  Outcome: Progressing  Goal: Optimal Functional Ability  Outcome: Progressing  Goal: Absence of Infection Signs and Symptoms  Outcome: Progressing  Goal: Improved Oral Intake  Outcome: Progressing  Goal: Optimal Pain Control and Function  Outcome: Progressing  Goal: Skin Health and Integrity  Outcome: Progressing  Goal: Optimal Wound Healing  Outcome: Progressing     Problem: Infection  Goal: Absence of Infection Signs and Symptoms  Outcome: Progressing     Problem: Fall Injury Risk  Goal: Absence of Fall and Fall-Related Injury  Outcome: Progressing     Problem: Skin Injury Risk Increased  Goal: Skin Health and Integrity  Outcome: Progressing     Problem: Hip Arthroplasty  Goal: Optimal Coping  Outcome: Progressing  Goal: Absence of Bleeding  Outcome: Progressing  Goal: Effective Bowel Elimination  Outcome: Progressing  Goal: Fluid and Electrolyte Balance  Outcome: Progressing  Goal: Optimal Functional Ability  Outcome: Progressing  Goal: Absence of Infection Signs and Symptoms  Outcome: Progressing  Goal: Intact Neurovascular Status  Outcome: Progressing  Goal: Anesthesia/Sedation Recovery  Outcome: Progressing  Goal: Acceptable Pain Control  Outcome: Progressing  Goal: Nausea and Vomiting Relief  Outcome:  Progressing  Goal: Effective Urinary Elimination  Outcome: Progressing  Goal: Effective Oxygenation and Ventilation  Outcome: Progressing     Problem: Dysrhythmia  Goal: Normalized Cardiac Rhythm  Outcome: Progressing     Problem: Pain Acute  Goal: Optimal Pain Control and Function  Outcome: Progressing

## 2024-08-11 NOTE — NURSING
Nurses Note -- 4 Eyes      8/11/2024   6:55 AM      Skin assessed during: Q Shift Change      [] No Altered Skin Integrity Present    []Prevention Measures Documented      [x] Yes- Altered Skin Integrity Present or Discovered   [] LDA Added if Not in Epic (Describe Wound)   [x] New Altered Skin Integrity was Present on Admit and Documented in LDA   [] Wound Image Taken    Wound Care Consulted? No    Attending Nurse:  Jessica Quiroz RN/Staff Member:   Marilu

## 2024-08-11 NOTE — PLAN OF CARE
Problem: Physical Therapy  Goal: Physical Therapy Goal  Description: Pt will improve functional independence by performing:    Bed mobility:   Rolling:max A  Supine to sit: Max A   Sit to supine: Max A   Sit to stand: max A with rolling walker  Bed to chair: max A with Stand Step  with rolling walker   Bed to chair t/f: Max A slide board       Outcome: Progressing      CT called and notified that pt was ready for CT and IV in place

## 2024-08-11 NOTE — PROGRESS NOTES
[facility] ORTHOPAEDIC  [facility] Medicine Progress Note      Patient Name: Homa Jaquez  MRN: [id number]  Admission Date: 8/9/2024   Length of Stay: 2  Attending Physician: Mathieu Patel MD  Primary Care Provider: Franklyn Brito MD  Face-to-Face encounter date: 08/11/2024    Code Status: DNR        Chief Complaint:   Wound drainage        HPI:   Homa Jaquez is a 88 y.o. female who  has a past medical history of A-fib, Chronic cystitis, GERD (gastroesophageal reflux disease), Graves disease, Hypertension, Hypothyroidism, unspecified, and Spinal stenosis.. The patient presented to [facility] 8/9/2024 with a primary complaint of post op complications.  Pt with a left femur fracture after a fall 7/2, underwent IMN 7/3 and [facility]. She reported increasing pain to hip, xrays showing failed intertrochanteric femur fracture.  Pt underwent conversion left SILVIA 7/15, she was discharged 7/26 to snf in [place].  She developed pneumonia which was treated with merrem and vancomycin and completed about 3 days ago.  She was started on megace for poor appetite about a week ago and has been eating good since, coumadin held 3 days ago for elevated inr.doxycline and cymbalta was stopped and she has had lasix on and off with resolution of edema.  She has been awake and alert and improving.  Her wound vac was removed 1 week ago, drainage improved.  She is transferred back for evaluation of incision.  Family says there has been some serosanginous drainage on bandage but mild and decreased from admission where she was draining 1000cc per day.  No f/c.  Inr has been up and down, currently on hold x 3 days.     Overview/Hospital Course:  No notes on file       Interval Hx:   No complaints eating good, no f/c.  Encourage therapy    Review of Systems   All other systems reviewed and are negative.      Objective/physical exam:  General: In no acute distress,  afebrile  Chest: Clear to auscultation bilaterally  Heart: RRR, +S1, S2, no appreciable murmur, picc  Abdomen: Soft, nontender, BS +  MSK: Warm, trace lower extremity edema, no clubbing or cyanosis  Skin: left hip surgery/incision slough drainage staples,  Neurologic: Alert and oriented x4, Cranial nerve II-XII intact,      VITAL SIGNS: 24 HRS MIN & MAX LAST   Temp  Min: 97.9 °F (36.6 °C)  Max: 98.5 °F (36.9 °C) 98.1 °F (36.7 °C)   BP  Min: 116/66  Max: 138/78 125/72   Pulse  Min: 83  Max: 101  83   Resp  Min: 18  Max: 18 18   SpO2  Min: 93 %  Max: 98 % 95 %       Recent Labs   Lab 08/09/24  1433   WBC 9.86   RBC 3.33*   HGB 10.8*   HCT 32.8*   MCV 98.5*   MCH 32.4*   MCHC 32.9*   RDW 19.2*      MPV 9.1       Recent Labs   Lab 08/09/24  1433 08/11/24  0611   * 133*   K 4.2 4.9   CL 99 100   CO2 25 25   BUN 19.6 19.8   CREATININE 1.19* 1.07*   CALCIUM 8.2* 8.1*        Microbiology Results (last 7 days)       Procedure Component Value Units Date/Time    Blood Culture [[id number]]  (Normal) Collected: 08/09/24 1433    Order Status: Completed Specimen: Blood from Antecubital, Left Updated: 08/10/24 1901     Blood Culture No Growth At 24 Hours    Blood Culture [[id number]]  (Normal) Collected: 08/09/24 1436    Order Status: Completed Specimen: Blood from Arm, Left Updated: 08/10/24 1901     Blood Culture No Growth At 24 Hours    Blood Culture (site 1) [[id number]]     Order Status: Canceled Specimen: Blood from PICC Line     Blood Culture (site 2) [[id number]]     Order Status: Canceled Specimen: Blood              Radiology:  US Lower Extremity Veins Left  Narrative: EXAMINATION:  US LOWER EXTREMITY VEINS LEFT    CLINICAL HISTORY:  swelling, r/o dvt;    COMPARISON:  None    FINDINGS:  Sonographic images with color and spectral analysis were obtained of the left lower extremity venous system.    The imaged left lower extremity veins demonstrate normal flow, compressibility, and augmentation without evidence  of thrombus.  There is some subcutaneous edema around the knee.  Impression: Negative exam for left lower extremity thrombus.    Electronically signed by: Roger Montague  Date:    07/24/2024  Time:    17:00      Scheduled Med:   allopurinoL  100 mg Oral Daily    ascorbic acid (vitamin C)  500 mg Oral BID    carvediloL  3.125 mg Oral BID    furosemide  20 mg Oral BID    levothyroxine  125 mcg Oral Before breakfast    megestroL  200 mg Oral TID WM    multivitamin  1 tablet Oral Daily    mupirocin   Nasal BID    pantoprazole  40 mg Oral Daily    pravastatin  20 mg Oral Daily    sacubitriL-valsartan  1 tablet Oral BID    zinc sulfate  220 mg Oral Daily        Continuous Infusions:       PRN Meds:    Current Facility-Administered Medications:     acetaminophen, 500 mg, Oral, Q4H PRN    acetaminophen, 650 mg, Oral, Q6H PRN    dextrose 10%, 12.5 g, Intravenous, PRN    dextrose 10%, 25 g, Intravenous, PRN    docusate sodium, 100 mg, Oral, BID PRN    glucagon (human recombinant), 1 mg, Intramuscular, PRN    glucose, 16 g, Oral, PRN    glucose, 24 g, Oral, PRN    insulin aspart U-100, 0-5 Units, Subcutaneous, QID (AC + HS) PRN    naloxone, 0.02 mg, Intravenous, PRN    nitroGLYCERIN, 0.4 mg, Sublingual, Q5 Min PRN    ondansetron, 4 mg, Intravenous, Q8H PRN    polyethylene glycol, 17 g, Oral, BID PRN    senna, 1 tablet, Oral, Daily PRN    sodium chloride 0.9%, 10 mL, Intravenous, Q12H PRN    traMADoL, 50 mg, Oral, Q4H PRN     Nutrition Status:      Assessment/Plan:  Left femur IMN 7/3, failed IMN  S/p Conversion SILVIA 7/15  Dilated cardiomyopathy  A fib  Htn  Hld  Gout  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  Hypothyroid     Plan     Hold coumadin x4d, monitor inr 1.5   wound care  Refer to ortho washout tomorrow  enocourage PTOT  Labs reviewed     Dvt proph: coumadin on hold,              All diagnosis and differential diagnosis have been reviewed; assessment and plan has been documented; I have personally reviewed the labs and test results that are  presently available; I have reviewed the patients medication list; I have reviewed the consulting providers response and recommendations. I have reviewed or attempted to review medical records based upon their availability      _____________________________________________________________________            Mathieu Patel MD   08/11/2024

## 2024-08-11 NOTE — PLAN OF CARE
Nurses Note -- 4 Eyes      8/10/2024   9:20 PM      Skin assessed during: Q Shift Change      [] No Altered Skin Integrity Present    [x]Prevention Measures Documented      [x] Yes- Altered Skin Integrity Present or Discovered - PRESENT, IN EPIC, WC ALREADY CONSULTED   [] LDA Added if Not in Epic (Describe Wound)   [] New Altered Skin Integrity was Present on Admit and Documented in LDA   [] Wound Image Taken    Wound Care Consulted? No    Attending Nurse:  Marilu Quiroz RN/Staff Member:  Jessica Soni RN             Problem: Adult Inpatient Plan of Care  Goal: Plan of Care Review  Outcome: Progressing  Goal: Patient-Specific Goal (Individualized)  Outcome: Progressing  Goal: Absence of Hospital-Acquired Illness or Injury  Outcome: Progressing  Goal: Optimal Comfort and Wellbeing  Outcome: Progressing  Goal: Readiness for Transition of Care  Outcome: Progressing     Problem: Diabetes Comorbidity  Goal: Blood Glucose Level Within Targeted Range  Outcome: Progressing     Problem: Acute Kidney Injury/Impairment  Goal: Fluid and Electrolyte Balance  Outcome: Progressing  Goal: Improved Oral Intake  Outcome: Progressing  Goal: Effective Renal Function  Outcome: Progressing     Problem: Wound  Goal: Optimal Coping  Outcome: Progressing  Goal: Optimal Functional Ability  Outcome: Progressing  Goal: Absence of Infection Signs and Symptoms  Outcome: Progressing  Goal: Improved Oral Intake  Outcome: Progressing  Goal: Optimal Pain Control and Function  Outcome: Progressing  Goal: Skin Health and Integrity  Outcome: Progressing  Goal: Optimal Wound Healing  Outcome: Progressing     Problem: Infection  Goal: Absence of Infection Signs and Symptoms  Outcome: Progressing     Problem: Fall Injury Risk  Goal: Absence of Fall and Fall-Related Injury  Outcome: Progressing     Problem: Skin Injury Risk Increased  Goal: Skin Health and Integrity  Outcome: Progressing     Problem: Hip Arthroplasty  Goal: Optimal Coping  Outcome:  Progressing  Goal: Absence of Bleeding  Outcome: Progressing  Goal: Effective Bowel Elimination  Outcome: Progressing  Goal: Fluid and Electrolyte Balance  Outcome: Progressing  Goal: Optimal Functional Ability  Outcome: Progressing  Goal: Absence of Infection Signs and Symptoms  Outcome: Progressing  Goal: Intact Neurovascular Status  Outcome: Progressing  Goal: Anesthesia/Sedation Recovery  Outcome: Progressing  Goal: Acceptable Pain Control  Outcome: Progressing  Goal: Nausea and Vomiting Relief  Outcome: Progressing  Goal: Effective Urinary Elimination  Outcome: Progressing  Goal: Effective Oxygenation and Ventilation  Outcome: Progressing     Problem: Dysrhythmia  Goal: Normalized Cardiac Rhythm  Outcome: Progressing

## 2024-08-11 NOTE — PT/OT/SLP PROGRESS
Physical Therapy Treatment    Patient Name:  Homa Jaquez   MRN:  [id number]    Recommendations:     Discharge Recommendations: Moderate Intensity Therapy  Discharge Equipment Recommendations: none  Barriers to discharge:  Impaired functional mobility     Assessment:     Homa Jaquez is a 88 y.o. female admitted with a medical diagnosis of Post-operative complication.  She presents with the following impairments/functional limitations: weakness, impaired functional mobility, decreased safety awareness, impaired coordination, decreased coordination, impaired endurance, gait instability, impaired self care skills, orthopedic precautions, pain.    Rehab Prognosis: Fair; patient would benefit from acute skilled PT services to address these deficits and reach maximum level of function.    Recent Surgery: Procedure(s) (LRB):  REVISION,ARTHROPLASTY,HIP,POSTERIOR APPROACH (Left)      Plan:     During this hospitalization, patient to be seen daily (QD-BID pending pt tolerance) to address the identified rehab impairments via gait training, therapeutic activities, therapeutic exercises and progress toward the following goals:    Plan of Care Expires:  08/15/24    Subjective     Chief Complaint: None  Patient/Family Comments/goals: To return home       Objective:     Communicated with NSG prior to session.  Patient found HOB elevated with talley catheter, peripheral IV upon PT entry to room.     General Precautions: Standard, fall  Orthopedic Precautions: LLE weight bearing as tolerated, LLE posterior precautions  Braces: N/A  Respiratory Status: Room air     Functional Mobility:  Bed Mobility:     Rolling Right: maximal assistance  Supine to Sit: maximal assistance  Sit to Supine: maximal assistance and of 2 persons  Transfers:     Sit to Stand:  maximal assistance and of 2 persons with rolling walker      AM-PAC 6 CLICK MOBILITY          Treatment & Education:  The plan of care was reviewed with the pt and the pt  verbalized understanding of this.     Patient left HOB elevated with all lines intact, call button in reach, bed alarm on, and NSG notified. Tech present    GOALS:   Multidisciplinary Problems       Physical Therapy Goals          Problem: Physical Therapy    Goal Priority Disciplines Outcome Goal Variances Interventions   Physical Therapy Goal     PT, PT/OT Progressing     Description: Pt will improve functional independence by performing:    Bed mobility:   Rolling:max A  Supine to sit: Max A   Sit to supine: Max A   Sit to stand: max A with rolling walker  Bed to chair: max A with Stand Step  with rolling walker   Bed to chair t/f: Max A slide board                            Time Tracking:     PT Received On: 08/11/24  PT Start Time: 0905     PT Stop Time: 0930  PT Total Time (min): 25 min     Billable Minutes: Therapeutic Activity 25 minutes    Treatment Type: Treatment  PT/PTA: PT     Number of PTA visits since last PT visit: 0     08/11/2024

## 2024-08-11 NOTE — PROGRESS NOTES
"No acute events overnight.  Pain controlled.  Resting in bed. Eating breakfast    Vital Signs  Temp: 98.3 °F (36.8 °C)  Temp Source: Oral  Pulse: 90  Heart Rate Source: Monitor  Resp: 18  SpO2: 96 %  Pulse Oximetry Type: Intermittent  Device (Oxygen Therapy): room air  BP: 138/78  BP Method: Automatic  Height and Weight  Height: 5' 4.02" (162.6 cm)  Height Method: Stated  Weight: 86 kg (189 lb 9.5 oz)  Weight Method: Bed Scale  Weight in (lb) to have BMI = 25: 145.4]    +FHL/EHL  BCR distally  Serous drainage from L hip  SILT distally    Recent Lab Results         08/11/24  0611        Anion Gap 8.0       BUN 19.8       BUN/CREAT RATIO 19       Calcium 8.1       Chloride 100       CO2 25       Creatinine 1.07       eGFR 50       Glucose 109       INR 1.5       Potassium 4.9       PT 18.8       Sodium 133               A/P:  Status post conversion L SILVIA  Pain controlled  Therapy for mobility and ambulation.  DVT PPx  To OR tomorrow for I&D L hip  R/B/A discussed in detail  All questions answered, no guarantees made  "

## 2024-08-12 ENCOUNTER — ANESTHESIA EVENT (OUTPATIENT)
Dept: SURGERY | Facility: HOSPITAL | Age: 89
End: 2024-08-12
Payer: MEDICARE

## 2024-08-12 ENCOUNTER — ANESTHESIA (OUTPATIENT)
Dept: SURGERY | Facility: HOSPITAL | Age: 89
End: 2024-08-12
Payer: MEDICARE

## 2024-08-12 LAB
HCT VFR BLD AUTO: 27.5 % (ref 37–47)
HGB BLD-MCNC: 8.9 G/DL (ref 12–16)
INR PPP: 1.5 (ref 2–3)
POCT GLUCOSE: 100 MG/DL (ref 70–110)
PROTHROMBIN TIME: 18.7 SECONDS (ref 11.7–14.5)

## 2024-08-12 PROCEDURE — 63600175 PHARM REV CODE 636 W HCPCS: Performed by: ANESTHESIOLOGY

## 2024-08-12 PROCEDURE — 36415 COLL VENOUS BLD VENIPUNCTURE: CPT | Performed by: ORTHOPAEDIC SURGERY

## 2024-08-12 PROCEDURE — 87205 SMEAR GRAM STAIN: CPT | Performed by: ORTHOPAEDIC SURGERY

## 2024-08-12 PROCEDURE — 87075 CULTR BACTERIA EXCEPT BLOOD: CPT | Performed by: ORTHOPAEDIC SURGERY

## 2024-08-12 PROCEDURE — 87186 SC STD MICRODIL/AGAR DIL: CPT | Performed by: ORTHOPAEDIC SURGERY

## 2024-08-12 PROCEDURE — 36000711: Performed by: ORTHOPAEDIC SURGERY

## 2024-08-12 PROCEDURE — 71000039 HC RECOVERY, EACH ADD'L HOUR: Performed by: ORTHOPAEDIC SURGERY

## 2024-08-12 PROCEDURE — 11043 DBRDMT MUSC&/FSCA 1ST 20/<: CPT | Mod: 78,,, | Performed by: ORTHOPAEDIC SURGERY

## 2024-08-12 PROCEDURE — 85610 PROTHROMBIN TIME: CPT | Performed by: INTERNAL MEDICINE

## 2024-08-12 PROCEDURE — 25000003 PHARM REV CODE 250: Performed by: INTERNAL MEDICINE

## 2024-08-12 PROCEDURE — 97607 NEG PRS WND THR NDME<=50SQCM: CPT | Mod: ,,, | Performed by: ORTHOPAEDIC SURGERY

## 2024-08-12 PROCEDURE — 71000033 HC RECOVERY, INTIAL HOUR: Performed by: ORTHOPAEDIC SURGERY

## 2024-08-12 PROCEDURE — 27201423 OPTIME MED/SURG SUP & DEVICES STERILE SUPPLY: Performed by: ORTHOPAEDIC SURGERY

## 2024-08-12 PROCEDURE — 87070 CULTURE OTHR SPECIMN AEROBIC: CPT | Performed by: ORTHOPAEDIC SURGERY

## 2024-08-12 PROCEDURE — 36000710: Performed by: ORTHOPAEDIC SURGERY

## 2024-08-12 PROCEDURE — 21400001 HC TELEMETRY ROOM

## 2024-08-12 PROCEDURE — D9220A PRA ANESTHESIA: Mod: CRNA,,, | Performed by: NURSE ANESTHETIST, CERTIFIED REGISTERED

## 2024-08-12 PROCEDURE — 25000003 PHARM REV CODE 250: Performed by: NURSE ANESTHETIST, CERTIFIED REGISTERED

## 2024-08-12 PROCEDURE — 37000009 HC ANESTHESIA EA ADD 15 MINS: Performed by: ORTHOPAEDIC SURGERY

## 2024-08-12 PROCEDURE — P9047 ALBUMIN (HUMAN), 25%, 50ML: HCPCS | Mod: JZ,JG | Performed by: NURSE ANESTHETIST, CERTIFIED REGISTERED

## 2024-08-12 PROCEDURE — 85018 HEMOGLOBIN: CPT | Performed by: ORTHOPAEDIC SURGERY

## 2024-08-12 PROCEDURE — 36415 COLL VENOUS BLD VENIPUNCTURE: CPT | Performed by: INTERNAL MEDICINE

## 2024-08-12 PROCEDURE — 85014 HEMATOCRIT: CPT | Performed by: ORTHOPAEDIC SURGERY

## 2024-08-12 PROCEDURE — 25000003 PHARM REV CODE 250: Performed by: ORTHOPAEDIC SURGERY

## 2024-08-12 PROCEDURE — 63600175 PHARM REV CODE 636 W HCPCS: Performed by: NURSE ANESTHETIST, CERTIFIED REGISTERED

## 2024-08-12 PROCEDURE — C1729 CATH, DRAINAGE: HCPCS | Performed by: ORTHOPAEDIC SURGERY

## 2024-08-12 PROCEDURE — 87077 CULTURE AEROBIC IDENTIFY: CPT | Performed by: ORTHOPAEDIC SURGERY

## 2024-08-12 PROCEDURE — 37000008 HC ANESTHESIA 1ST 15 MINUTES: Performed by: ORTHOPAEDIC SURGERY

## 2024-08-12 PROCEDURE — 0QB70ZZ EXCISION OF LEFT UPPER FEMUR, OPEN APPROACH: ICD-10-PCS | Performed by: ORTHOPAEDIC SURGERY

## 2024-08-12 PROCEDURE — 25000003 PHARM REV CODE 250: Performed by: ANESTHESIOLOGY

## 2024-08-12 PROCEDURE — D9220A PRA ANESTHESIA: Mod: ANES,,, | Performed by: ANESTHESIOLOGY

## 2024-08-12 RX ORDER — PROPOFOL 10 MG/ML
VIAL (ML) INTRAVENOUS
Status: DISCONTINUED | OUTPATIENT
Start: 2024-08-12 | End: 2024-08-12

## 2024-08-12 RX ORDER — ACETAMINOPHEN 10 MG/ML
INJECTION, SOLUTION INTRAVENOUS
Status: DISPENSED
Start: 2024-08-12 | End: 2024-08-13

## 2024-08-12 RX ORDER — LIDOCAINE HYDROCHLORIDE 20 MG/ML
INJECTION, SOLUTION EPIDURAL; INFILTRATION; INTRACAUDAL; PERINEURAL
Status: DISCONTINUED | OUTPATIENT
Start: 2024-08-12 | End: 2024-08-12

## 2024-08-12 RX ORDER — ALBUMIN HUMAN 250 G/1000ML
SOLUTION INTRAVENOUS
Status: DISPENSED
Start: 2024-08-12 | End: 2024-08-13

## 2024-08-12 RX ORDER — SODIUM CHLORIDE, SODIUM GLUCONATE, SODIUM ACETATE, POTASSIUM CHLORIDE AND MAGNESIUM CHLORIDE 30; 37; 368; 526; 502 MG/100ML; MG/100ML; MG/100ML; MG/100ML; MG/100ML
INJECTION, SOLUTION INTRAVENOUS CONTINUOUS
Status: CANCELLED | OUTPATIENT
Start: 2024-08-12 | End: 2024-09-11

## 2024-08-12 RX ORDER — ONDANSETRON HYDROCHLORIDE 2 MG/ML
4 INJECTION, SOLUTION INTRAVENOUS DAILY PRN
Status: DISCONTINUED | OUTPATIENT
Start: 2024-08-12 | End: 2024-08-12

## 2024-08-12 RX ORDER — ETOMIDATE 2 MG/ML
INJECTION INTRAVENOUS
Status: DISCONTINUED | OUTPATIENT
Start: 2024-08-12 | End: 2024-08-12

## 2024-08-12 RX ORDER — CEFAZOLIN SODIUM 1 G/3ML
INJECTION, POWDER, FOR SOLUTION INTRAMUSCULAR; INTRAVENOUS
Status: DISCONTINUED | OUTPATIENT
Start: 2024-08-12 | End: 2024-08-12

## 2024-08-12 RX ORDER — SODIUM CITRATE AND CITRIC ACID MONOHYDRATE 334; 500 MG/5ML; MG/5ML
30 SOLUTION ORAL ONCE
Status: CANCELLED | OUTPATIENT
Start: 2024-08-12 | End: 2024-08-12

## 2024-08-12 RX ORDER — LIDOCAINE HYDROCHLORIDE 10 MG/ML
1 INJECTION, SOLUTION EPIDURAL; INFILTRATION; INTRACAUDAL; PERINEURAL ONCE
Status: CANCELLED | OUTPATIENT
Start: 2024-08-12 | End: 2024-08-12

## 2024-08-12 RX ORDER — CALCIUM CHLORIDE INJECTION 100 MG/ML
INJECTION, SOLUTION INTRAVENOUS
Status: DISCONTINUED | OUTPATIENT
Start: 2024-08-12 | End: 2024-08-12

## 2024-08-12 RX ORDER — PHENYLEPHRINE HYDROCHLORIDE 10 MG/ML
INJECTION INTRAVENOUS
Status: DISCONTINUED | OUTPATIENT
Start: 2024-08-12 | End: 2024-08-12

## 2024-08-12 RX ORDER — IPRATROPIUM BROMIDE AND ALBUTEROL SULFATE 2.5; .5 MG/3ML; MG/3ML
3 SOLUTION RESPIRATORY (INHALATION)
Status: DISCONTINUED | OUTPATIENT
Start: 2024-08-12 | End: 2024-08-12

## 2024-08-12 RX ORDER — ROCURONIUM BROMIDE 10 MG/ML
INJECTION, SOLUTION INTRAVENOUS
Status: DISCONTINUED | OUTPATIENT
Start: 2024-08-12 | End: 2024-08-12

## 2024-08-12 RX ORDER — ACETAMINOPHEN 10 MG/ML
1000 INJECTION, SOLUTION INTRAVENOUS EVERY 8 HOURS
Status: DISCONTINUED | OUTPATIENT
Start: 2024-08-12 | End: 2024-08-12

## 2024-08-12 RX ORDER — HYDROMORPHONE HYDROCHLORIDE 2 MG/ML
0.2 INJECTION, SOLUTION INTRAMUSCULAR; INTRAVENOUS; SUBCUTANEOUS EVERY 5 MIN PRN
Status: DISCONTINUED | OUTPATIENT
Start: 2024-08-12 | End: 2024-08-12

## 2024-08-12 RX ORDER — FENTANYL CITRATE 50 UG/ML
INJECTION, SOLUTION INTRAMUSCULAR; INTRAVENOUS
Status: DISCONTINUED | OUTPATIENT
Start: 2024-08-12 | End: 2024-08-12

## 2024-08-12 RX ORDER — METOPROLOL TARTRATE 1 MG/ML
5 INJECTION, SOLUTION INTRAVENOUS EVERY 5 MIN PRN
Status: ACTIVE | OUTPATIENT
Start: 2024-08-12 | End: 2024-08-12

## 2024-08-12 RX ORDER — ONDANSETRON HYDROCHLORIDE 2 MG/ML
INJECTION, SOLUTION INTRAVENOUS
Status: DISCONTINUED | OUTPATIENT
Start: 2024-08-12 | End: 2024-08-12

## 2024-08-12 RX ORDER — ALBUMIN HUMAN 250 G/1000ML
SOLUTION INTRAVENOUS
Status: DISCONTINUED | OUTPATIENT
Start: 2024-08-12 | End: 2024-08-12

## 2024-08-12 RX ORDER — GLUCAGON 1 MG
1 KIT INJECTION
Status: DISCONTINUED | OUTPATIENT
Start: 2024-08-12 | End: 2024-08-12

## 2024-08-12 RX ADMIN — MUPIROCIN: 20 OINTMENT TOPICAL at 08:08

## 2024-08-12 RX ADMIN — FENTANYL CITRATE 50 MCG: 50 INJECTION, SOLUTION INTRAMUSCULAR; INTRAVENOUS at 05:08

## 2024-08-12 RX ADMIN — SODIUM CHLORIDE, SODIUM GLUCONATE, SODIUM ACETATE, POTASSIUM CHLORIDE AND MAGNESIUM CHLORIDE: 526; 502; 368; 37; 30 INJECTION, SOLUTION INTRAVENOUS at 03:08

## 2024-08-12 RX ADMIN — ONDANSETRON HYDROCHLORIDE 4 MG: 2 SOLUTION INTRAMUSCULAR; INTRAVENOUS at 05:08

## 2024-08-12 RX ADMIN — PHENYLEPHRINE HYDROCHLORIDE 200 MCG: 10 INJECTION INTRAVENOUS at 04:08

## 2024-08-12 RX ADMIN — PHENYLEPHRINE HYDROCHLORIDE 50 MCG/MIN: 10 INJECTION INTRAVENOUS at 04:08

## 2024-08-12 RX ADMIN — ALBUMIN (HUMAN) 200 ML: 5 SOLUTION INTRAVENOUS at 04:08

## 2024-08-12 RX ADMIN — PHENYLEPHRINE HYDROCHLORIDE 100 MCG: 10 INJECTION INTRAVENOUS at 04:08

## 2024-08-12 RX ADMIN — SUGAMMADEX 200 MG: 100 INJECTION, SOLUTION INTRAVENOUS at 05:08

## 2024-08-12 RX ADMIN — OXYCODONE HYDROCHLORIDE AND ACETAMINOPHEN 500 MG: 500 TABLET ORAL at 08:08

## 2024-08-12 RX ADMIN — CARVEDILOL 3.12 MG: 3.12 TABLET, FILM COATED ORAL at 08:08

## 2024-08-12 RX ADMIN — ETOMIDATE 12 MG: 2 INJECTION INTRAVENOUS at 03:08

## 2024-08-12 RX ADMIN — LIDOCAINE HYDROCHLORIDE 50 MG: 20 INJECTION, SOLUTION EPIDURAL; INFILTRATION; INTRACAUDAL; PERINEURAL at 03:08

## 2024-08-12 RX ADMIN — HYDROMORPHONE HYDROCHLORIDE 0.2 MG: 2 INJECTION INTRAMUSCULAR; INTRAVENOUS; SUBCUTANEOUS at 06:08

## 2024-08-12 RX ADMIN — FENTANYL CITRATE 50 MCG: 50 INJECTION, SOLUTION INTRAMUSCULAR; INTRAVENOUS at 03:08

## 2024-08-12 RX ADMIN — CALCIUM CHLORIDE INJECTION 0.5 G: 100 INJECTION, SOLUTION INTRAVENOUS at 04:08

## 2024-08-12 RX ADMIN — ROCURONIUM BROMIDE 60 MG: 10 SOLUTION INTRAVENOUS at 03:08

## 2024-08-12 RX ADMIN — CEFAZOLIN 2 G: 330 INJECTION, POWDER, FOR SOLUTION INTRAMUSCULAR; INTRAVENOUS at 04:08

## 2024-08-12 RX ADMIN — PROPOFOL 40 MG: 10 INJECTION, EMULSION INTRAVENOUS at 03:08

## 2024-08-12 RX ADMIN — METOPROLOL TARTRATE 5 MG: 1 INJECTION, SOLUTION INTRAVENOUS at 06:08

## 2024-08-12 RX ADMIN — TRAMADOL HYDROCHLORIDE 50 MG: 50 TABLET, COATED ORAL at 08:08

## 2024-08-12 NOTE — OP NOTE
OPERATIVE REPORT      Patient: Homa Jaquez   : 1935    MRN: [id number]  Date: 2024      Surgeon: Jonny Bains MD  Assistant: Katheryn Bourgeois NP, [facility].  Certified first assist was necessary as a skilled set of hands and was necessary for proper patient positioning as well as assistance with closure in place with multiple implants.  Preoperative Diagnosis:  Left postoperative wound.  Postoperative Diagnosis: Same  Procedure:  Irrigation debridement of left wound including excision subcutaneous tissues, fascia, skin, bone.  Wound VAC left hip  Anesthesiologist: Bk Brandt MD  OR Staff: Circulator: Cherry Gonsalez RN  Nurse Practitioner: Katheryn Bourgeois FNP  Scrub Person: Sadiq Perez; Nereyda Veliz ST  Implants:   Implant Name Type Inv. Item Serial No.  Lot No. LRB No. Used Action   L hip explants      Left 1 Explanted     EBL: See anesthesia note  Complications: None  Disposition: To PACU, stable    Indications: Homa Jaquez is a 88 y.o. female presenting with the aforementioned injuries/findings. The procedure is indicated to stabilize left hip.  Patient was history of a conversion to total hip arthroplasty.  Patient was presented as a transfer back from her facility.  Had some onset it continuing drainage of the left hip.  Discussed all treatment options.  Recommended irrigation debridement given the onset drainage delayed nature of the presentation..  The patient is awake and alert. After thorough discussion of the risks, benefits, expected outcomes, and alternatives to surgical intervention, the patient agreed to proceed with surgical treatment.  Specific risks discussed included, but were not limited to: superficial or deep infection, wound healing complications, DVT/PE, significant bleeding requiring transfusion, damage to named anatomic structures in the immediate area including named neurovascular structures, infection, nonunion, malunion and general risks  of anesthesia.  The patient voiced understanding and written as well as verbal consent was obtained by myself prior to the procedure.    Procedure Note:  The patient was brought back to the OR and placed supine on the OR table. After successful induction of anesthesia by anesthesia staff, the patient was positioned in the lateral decubitus position and all bony prominences were padded appropriately.  The surgical field was then provisionally cleansed and then prepped and draped in the usual sterile fashion.    At this time a time-out was performed, with the correct patient, site, and procedure identified.  The universal time out as well as sign your site protocols were followed.  Preoperative antibiotics were verified as administered.     Patient has a abundant serous drainage from her proximal wound.  The wound was opened in its entirety.  It was really no significant healing of the skin for repeat complete dehiscence of the fascial repair.  Some significant fatty necrosis.  Divided down to the prosthesis.  Took cultures from around the prosthesis.  It was no mi pus although significant amount of necrotic appearing soft tissues were noted.  Initially performed debridement of both the skin.  We ellipsed out any non adhered her nonhealing areas.  We ellipsed out and debrided significant amounts of ill-appearing fatty tissue including fascia all the way down to some bony tissue from around her greater trochanter and around the prosthesis.  We did not change of the prosthesis.  It was was reduced well.  After complete debridement and thorough debridement of any ill-appearing soft tissues, we copiously irrigated the wound with normal saline totaling 3 L.  We then used a Betadine lavage.  We then used an IrriSept lavage.  We then used an experienced lavage. .  We then changed gloves.  We changed drapes.  We changed instruments.  We placed a drain deep.  We then closed the fascia in interrupted fashion with a PDS  suture.  We used a PDS suture in the fatty tissue to reapproximate this.  Monocryl in the subcutaneous tissue.  Nylon on the skin. Due to revision nature and poor wound healing capability, wound vac applied to the incision to assist with wound closure and decrease infection risk.      Cultures x3 previously been obtained from around the hip.  These were done prior to debridement.          The patient was then subsequently transferred to to PACU in a stable condition.    All sponge and needle counts were correct at the end of the case.  I was present and participated in all aspects of the procedure.    Prognosis:  The patient will be kept WBAT on the ipsilateral extremity  .  Patient will receive DVT prophylaxis .       This note/OR report was created with the assistance of  voice recognition software or phone  dictation.  There may be transcription errors as a result of using this technology however minimal. Effort has been made to assure accuracy of transcription but any obvious errors or omissions should be clarified with the author of the document.

## 2024-08-12 NOTE — NURSING
Nurses Note -- 4 Eyes      8/12/2024   4:39 PM      Skin assessed during: Q Shift Change      [x] No Altered Skin Integrity Present    [x]Prevention Measures Documented      [] Yes- Altered Skin Integrity Present or Discovered   [] LDA Added if Not in Epic (Describe Wound)   [] New Altered Skin Integrity was Present on Admit and Documented in LDA   [] Wound Image Taken    Wound Care Consulted? No    Attending Nurse:  Lois Quiroz RN/Staff Member:   Arlette

## 2024-08-12 NOTE — PROGRESS NOTES
Inpatient Nutrition Assessment    Admit Date: 8/9/2024   Total duration of encounter: 3 days   Patient Age: 88 y.o.    Nutrition Recommendation/Prescription     Resume Regular diet with coumadin restriction as tolerated when medically able to advance   Continue Boost Plus TID Boost Plus (360 kcal, 14 g protein per serving).  Continue 500 mg Vitamin C, BID and 220 mg of Zinc Sulfate daily to aid in healing.   Continue Megace as medically appropriate.         Communication of Recommendations:  patient and daughter    Nutrition Assessment     Malnutrition Assessment/Nutrition-Focused Physical Exam        Malnutrition Context: acute illness or injury (08/12/24 1039)  Malnutrition Level: moderate (08/12/24 1039)  Energy Intake (Malnutrition): less than or equal to 50% for greater than or equal to 1 month (08/12/24 1039)     Orbital Region (Subcutaneous Fat Loss): mild depletion        Muscle Mass (Malnutrition): mild depletion (08/12/24 1039)  [place] (Muscle Loss): mild depletion                                A minimum of two characteristics is recommended for diagnosis of either severe or non-severe malnutrition.    Chart Review    Reason Seen: follow-up    Malnutrition Screening Tool Results   Have you recently lost weight without trying?: Unsure  Have you been eating poorly because of a decreased appetite?: Yes   MST Score: 3   Diagnosis:  Wound drainage.     Relevant Medical History:   A-fib      Chronic cystitis      GERD (gastroesophageal reflux disease)      Graves disease      Hypertension      Hypothyroidism, unspecified      Spinal stenosis          Scheduled Medications:  allopurinoL, 100 mg, Daily  ascorbic acid (vitamin C), 500 mg, BID  carvediloL, 3.125 mg, BID  furosemide, 20 mg, BID  levothyroxine, 125 mcg, Before breakfast  megestroL, 200 mg, TID WM  multivitamin, 1 tablet, Daily  mupirocin, , BID  pantoprazole, 40 mg, Daily  pravastatin, 20 mg, Daily  sacubitriL-valsartan, 1 tablet, BID  zinc  sulfate, 220 mg, Daily    Continuous Infusions:   PRN Medications:  acetaminophen, 500 mg, Q4H PRN  acetaminophen, 650 mg, Q6H PRN  dextrose 10%, 12.5 g, PRN  dextrose 10%, 25 g, PRN  docusate sodium, 100 mg, BID PRN  glucagon (human recombinant), 1 mg, PRN  glucose, 16 g, PRN  glucose, 24 g, PRN  insulin aspart U-100, 0-5 Units, QID (AC + HS) PRN  naloxone, 0.02 mg, PRN  nitroGLYCERIN, 0.4 mg, Q5 Min PRN  ondansetron, 4 mg, Q8H PRN  polyethylene glycol, 17 g, BID PRN  senna, 1 tablet, Daily PRN  sodium chloride 0.9%, 10 mL, Q12H PRN  traMADoL, 50 mg, Q4H PRN    Calorie Containing IV Medications: no significant kcals from medications at this time    Recent Labs   Lab 08/09/24  1433 08/11/24  0611   * 133*   K 4.2 4.9   CALCIUM 8.2* 8.1*   CO2 25 25   BUN 19.6 19.8   CREATININE 1.19* 1.07*   EGFRNORACEVR 44 50   GLUCOSE 133* 109   PREALB 5.6*  --    HSCRP 139.44*  --    WBC 9.86  --    HGB 10.8*  --    HCT 32.8*  --      Nutrition Orders:  Diet NPO Except for: Medication  Dietary nutrition supplements TID; Boost Plus Nutritional Drink - Any flavor    Appetite/Oral Intake: NPO/NPO for surgery today  Factors Affecting Nutritional Intake: decreased appetite  Social Needs Impacting Access to Food: none identified  Food/Congregation/Cultural Preferences: none reported  Food Allergies: none reported  Last Bowel Movement: 08/07/24  Wound(s):     Wound 08/09/24 1400 Other (comment) midline Sacral spine-Tissue loss description: Not applicable       Wound 08/09/24 1704 Other (comment) Left Heel-Tissue loss description: Not applicable       Wound 08/09/24 1704 Other (comment) Right Heel-Tissue loss description: Not applicable Noted.     Comments    8/10:  Noted pt is status post conversion L SILVIA L hip with significant serous drainage and plans are for surgery Monday afternoon per EMR notes. Consult received secondary to pt with poor appetite/intake. Unable to complete NFPE at this time. RD to complete at f/u. No recent  "weight loss or chewing/swallowing issues noted in EMR. Will add Boost Plus, TID, and continue to monitor during stay.     () Pt currently NPO for surgery later today. Met with pt and daughter. Pt with poor appetite and intake since July. She was dx with a UTI at that time. Megace was started a week ago and intake has improved some since receiving. Pt drinks about one ONS per day. Denied N/V/C/D. No difficulties chewing or swallowing. Med/labs reviewed. # per daughter    Anthropometrics    Height: 5' 4.02" (162.6 cm), Height Method: Stated  Last Weight: 86 kg (189 lb 9.5 oz) (24), Weight Method: Bed Scale     BMI Classification: obese grade I (BMI 30-34.9)     Ideal Body Weight (IBW), Female: 120.1 lb                          Usual Body Weight (UBW), k.9 kg  % Usual Body Weight: 103.96     Usual Weight Provided By: family/caregiver    Wt Readings from Last 5 Encounters:   24 86 kg (189 lb 9.5 oz)   24 82.9 kg (182 lb 12.2 oz)   24 82.9 kg (182 lb 12.2 oz)   24 70.3 kg (155 lb)   23 67 kg (147 lb 12.8 oz)     Weight Change(s) Since Admission:   Wt Readings from Last 1 Encounters:   24 86 kg (189 lb 9.5 oz)   Admit Weight: 86 kg (189 lb 9.5 oz) (24), Weight Method: Bed Scale    Estimated Needs    Weight Used For Calorie Calculations: 86 kg (189 lb 9.5 oz)  Energy Calorie Requirements (kcal): 1550 to 1700 kcals/day (SF 1.2-1.3)  Energy Need Method: Sebastian- Jeor  Weight Used For Protein Calculations: 86 kg (189 lb 9.5 oz)  Protein Requirements: 70 to 90 g/day (0.8 to 1.0 g/kg/CBW)  Fluid Requirements (mL): 1550 to 1700 mL/day (1mL/kcal) or per MD Guidance.        Enteral Nutrition     Patient not receiving enteral nutrition at this time.    Parenteral Nutrition     Patient not receiving parenteral nutrition support at this time.    Evaluation of Received Nutrient Intake    Calories: not meeting estimated needs  Protein: not meeting " estimated needs    Patient Education     Not applicable.    Nutrition Diagnosis     PES: Increased nutrient needs (Vitamin C, protein, and Zinc ) related to increased protein energy demand for wound healing as evidenced by Recent surgery/wounds. (active)       PES: Moderate acute disease or injury related malnutrition related to acute illness as evidenced by less than or equal to 50% needs met for greater than or equal to 1 month, mild fat depletion, and mild muscle depletion. (new)     Nutrition Interventions     Intervention(s): commercial beverage, multivitamin/mineral supplement therapy, and collaboration with other providers    Goal: Meet greater than 80% of nutritional needs by follow-up. (goal progressing)    Nutrition Goals & Monitoring     Dietitian will monitor: food and beverage intake, weight, weight change, electrolyte/renal panel, glucose/endocrine profile, and gastrointestinal profile  Discharge planning: continue Regular/Coumadin diet  Nutrition Risk/Follow-Up: moderate (follow-up in 3-5 days)   Please consult if re-assessment needed sooner.

## 2024-08-12 NOTE — PROGRESS NOTES
"No acute events overnight.  Pain controlled.  Resting in bed.     Vital Signs  Temp: 97.4 °F (36.3 °C)  Temp Source: Oral  Pulse: 74  Heart Rate Source: Monitor  Resp: 18  SpO2: 95 %  Pulse Oximetry Type: Intermittent  Device (Oxygen Therapy): room air  BP: 119/68  BP Method: Automatic  Height and Weight  Height: 5' 4.02" (162.6 cm)  Height Method: Stated  Weight: 86 kg (189 lb 9.5 oz)  Weight Method: Bed Scale  Weight in (lb) to have BMI = 25: 145.4]    +FHL/EHL  BCR distally  Dressing with ongoing serous drainage  SILT distally    Recent Lab Results         08/12/24  1209   08/12/24  0444        INR   1.5       POCT Glucose 100         PT   18.7               A/P:  Status post conversion L SILVIA  To OR today for I&D L hip  R/B/A discussed in detail  All questions answered  No guarantees made    "

## 2024-08-12 NOTE — TRANSFER OF CARE
"Anesthesia Transfer of Care Note    Patient: Homa Jaquez    Procedure(s) Performed: Procedure(s) (LRB):  REVISION,ARTHROPLASTY,HIP,POSTERIOR APPROACH (Left)    Patient location: PACU    Anesthesia Type: general    Transport from OR: Transported from OR on room air with adequate spontaneous ventilation    Post pain: adequate analgesia    Post assessment: no apparent anesthetic complications    Post vital signs: stable    Level of consciousness: sedated    Nausea/Vomiting: no nausea/vomiting    Complications: none    Transfer of care protocol was followed      Last vitals: Visit Vitals  BP (!) 151/85   Pulse (!) 111   Temp 36.6 °C (97.9 °F)   Resp (!) 24   Ht 5' 4.02" (1.626 m)   Wt 86 kg (189 lb 9.5 oz)   SpO2 100%   BMI 32.53 kg/m²     "

## 2024-08-12 NOTE — PLAN OF CARE
Nurses Note -- 4 Eyes      8/12/2024   4:36 AM      Skin assessed during: Q Shift Change      [] No Altered Skin Integrity Present    [x]Prevention Measures Documented      [x] Yes- Altered Skin Integrity Present or Discovered   [] LDA Added if Not in Epic (Describe Wound)   [] New Altered Skin Integrity was Present on Admit and Documented in LDA   [] Wound Image Taken    Wound Care Consulted? No    Attending Nurse:  Marilu Quiroz RN/Staff Member:  Jessica @ shift change              Problem: Adult Inpatient Plan of Care  Goal: Plan of Care Review  Outcome: Progressing  Goal: Patient-Specific Goal (Individualized)  Outcome: Progressing  Goal: Absence of Hospital-Acquired Illness or Injury  Outcome: Progressing  Goal: Optimal Comfort and Wellbeing  Outcome: Progressing  Goal: Readiness for Transition of Care  Outcome: Progressing     Problem: Diabetes Comorbidity  Goal: Blood Glucose Level Within Targeted Range  Outcome: Progressing     Problem: Acute Kidney Injury/Impairment  Goal: Fluid and Electrolyte Balance  Outcome: Progressing  Goal: Improved Oral Intake  Outcome: Progressing  Goal: Effective Renal Function  Outcome: Progressing     Problem: Wound  Goal: Optimal Coping  Outcome: Progressing  Goal: Optimal Functional Ability  Outcome: Progressing  Goal: Absence of Infection Signs and Symptoms  Outcome: Progressing  Goal: Improved Oral Intake  Outcome: Progressing  Goal: Optimal Pain Control and Function  Outcome: Progressing  Goal: Skin Health and Integrity  Outcome: Progressing  Goal: Optimal Wound Healing  Outcome: Progressing     Problem: Infection  Goal: Absence of Infection Signs and Symptoms  Outcome: Progressing     Problem: Fall Injury Risk  Goal: Absence of Fall and Fall-Related Injury  Outcome: Progressing     Problem: Skin Injury Risk Increased  Goal: Skin Health and Integrity  Outcome: Progressing     Problem: Hip Arthroplasty  Goal: Optimal Coping  Outcome: Progressing  Goal: Absence of  Bleeding  Outcome: Progressing  Goal: Effective Bowel Elimination  Outcome: Progressing  Goal: Fluid and Electrolyte Balance  Outcome: Progressing  Goal: Optimal Functional Ability  Outcome: Progressing  Goal: Absence of Infection Signs and Symptoms  Outcome: Progressing  Goal: Intact Neurovascular Status  Outcome: Progressing  Goal: Anesthesia/Sedation Recovery  Outcome: Progressing  Goal: Acceptable Pain Control  Outcome: Progressing  Goal: Nausea and Vomiting Relief  Outcome: Progressing  Goal: Effective Urinary Elimination  Outcome: Progressing  Goal: Effective Oxygenation and Ventilation  Outcome: Progressing     Problem: Dysrhythmia  Goal: Normalized Cardiac Rhythm  Outcome: Progressing     Problem: Pain Acute  Goal: Optimal Pain Control and Function  Outcome: Progressing

## 2024-08-12 NOTE — ANESTHESIA PREPROCEDURE EVALUATION
08/12/2024  Homa Jaquez is a 88 y.o., female.    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]    Procedure(s): REVISION,ARTHROPLASTY,HIP,POSTERIOR APPROACH       Review of patient's allergies indicates:   Allergen Reactions    Cefadroxil      Other reaction(s): Nausea or vomiting    Cefuroxime axetil     Cephalexin      Other reaction(s): NAUSEA AND VOMITING    Ciprofloxacin     Enoxaparin     Methenamine hippurate      Other reaction(s): Unknown    Promethazine        Current Outpatient Medications   Medication Instructions    acetaminophen (TYLENOL) 500 mg, Oral, Every 4 hours    allopurinoL (ZYLOPRIM) 100 mg, Oral, Daily    ascorbic acid (vitamin C) (VITAMIN C) 500 mg, Oral, 2 times daily    carvediloL (COREG) 3.125 mg, Oral, 2 times daily    cyanocobalamin 1,000 mcg/mL injection INJECT 1000MCG (1ML) INTRAMUSCULARY ONCE MONTHLY    docusate sodium (COLACE) 100 mg, Oral, 2 times daily PRN    furosemide (LASIX) 20 mg, Oral, 2 times daily    levothyroxine (SYNTHROID) 125 mcg, Oral    lovastatin (MEVACOR) 20 mg, Oral, Daily    menthol-zinc oxide (CALMOSEPTINE) 0.44-20.6 % Oint Topical (Top), 2 times daily, To bilateral breast folds and labia.    multivitamin Tab 1 tablet, Oral, Daily    nitroGLYCERIN (NITROSTAT) 0.4 mg, Sublingual, Every 5 min PRN    pantoprazole (PROTONIX) 40 mg, Oral, Daily    polyethylene glycol (GLYCOLAX) 17 g, Oral, 2 times daily PRN    sacubitriL-valsartan (ENTRESTO) 24-26 mg per tablet 1 tablet, Oral, 2 times daily    senna (SENOKOT) 8.6 mg tablet 1 tablet, Oral, Daily PRN    traMADoL (ULTRAM) 50 mg, Oral, Every 4 hours PRN    warfarin sodium (COUMADIN ORAL) 2 mg, Oral    zinc sulfate (ZINCATE) 220 mg, Oral, Daily           Past Medical History:   Diagnosis Date    A-fib     Chronic cystitis     GERD (gastroesophageal reflux disease)      Graves disease     Hypertension     Hypothyroidism, unspecified     Spinal stenosis    PMH includes Cardiomyopathy, CHF, & TIA (9 yrs ago)    Past Surgical History:   Procedure Laterality Date    APPENDECTOMY      BLADDER SUSPENSION      CARDIOVERSION  04/01/2015    CATARACT EXTRACTION      CONVERSION ARTHROPLASTY, HIP, POSTERIOR APPROACH Left 7/15/2024    Procedure: CONVERSION ARTHROPLASTY, HIP, POSTERIOR APPROACH - valencia, cell saver, pathology;  Surgeon: Jonny Bains MD;  Location: [facility] OR;  Service: Orthopedics;  Laterality: Left;  valencia, cell saver, pathology    HYSTERECTOMY      INTRAMEDULLARY RODDING OF FEMUR Left 7/3/2024    Procedure: INSERTION, INTRAMEDULLARY MELANIE, FEMUR;  Surgeon: Steve Pierce DO;  Location: [facility] OR;  Service: Orthopedics;  Laterality: Left;  supine hana table c arm synthes    LAPAROSCOPIC CHOLECYSTECTOMY  01/12/2016    SPHINCTEROTOMY OF URETHRA  01/11/2016    TONSILLECTOMY AND ADENOIDECTOMY       Recent Labs   Lab 08/09/24  1433   WBC 9.86   RBC 3.33*   HGB 10.8*   HCT 32.8*   MCV 98.5*   MCH 32.4*   MCHC 32.9*   RDW 19.2*      MPV 9.1       Recent Labs   Lab 08/09/24  1433 08/11/24  0611   * 133*   K 4.2 4.9   CL 99 100   CO2 25 25   BUN 19.6 19.8   CREATININE 1.19* 1.07*   CALCIUM 8.2* 8.1*     TTE 7/29/2024 [facility]  LVEF 35-40%, mild LAE, mild KRANTHI, RVSP 53 mm Hg, mod TR, mod MR, RV pacemaker wire    LAST ANESTHETIC      Recent Labs   Lab 08/09/24  1433 08/11/24  0611 08/12/24  0444   INR 1.8* 1.5* 1.5*      Pre-op Assessment    I have reviewed the Patient Summary Reports.    I have reviewed the NPO Status.   I have reviewed the Medications.     Review of Systems  Anesthesia Hx:  No problems with previous Anesthesia             Denies Family Hx of Anesthesia complications.    Denies Personal Hx of Anesthesia complications.                    Social:  Non-Smoker       Cardiovascular:  Exercise tolerance: poor   Hypertension    Dysrhythmias  atrial fibrillation  Denies Angina. CHF  Denies Orthopnea.  Denies PND.    Denies ZAMAN.   Dilated Cardiomyopathy (most recent EF 35-40%),  Systolic CHF (improved on Entresto) Functional Capacity low / < 4 METS                         Renal/:  Chronic Renal Disease, CKD   Chronic cystitis,  Mild CRI             Hepatic/GI:     GERD             Musculoskeletal:  Arthritis               Neurological:  TIA,  Denies CVA.        Spinal Stenosis  TIA 9 yrs ago                            Endocrine:  Diabetes Hypothyroidism          Psych:  Psychiatric Normal                    Physical Exam  General: Well nourished, Alert and Oriented    Airway:  Mallampati: III   Mouth Opening: Normal  TM Distance: Normal  Tongue: Normal  Neck ROM: Normal ROM    Dental:  Intact    Chest/Lungs:  Clear to auscultation    Heart:  Rate: Normal  Rhythm: Regular Rhythm  No pretibial edema  No carotid bruits      Anesthesia Plan  Type of Anesthesia, risks & benefits discussed:    Anesthesia Type: Gen ETT  Intra-op Monitoring Plan: Standard ASA Monitors  Post Op Pain Control Plan: multimodal analgesia  Induction:  IV  Airway Plan: Direct, Post-Induction  Informed Consent: Informed consent signed with the Patient and all parties understand the risks and agree with anesthesia plan.  All questions answered. Patient consented to blood products? Yes  ASA Score: 3  Day of Surgery Review of History & Physical: H&P Update referred to the surgeon/provider.  Anesthesia Plan Notes: Patient & Son prefers GENERAL ANESTHESIA    Ready For Surgery From Anesthesia Perspective.     .

## 2024-08-13 LAB
ANION GAP SERPL CALC-SCNC: 6 MEQ/L
BASOPHILS # BLD AUTO: 0.09 X10(3)/MCL
BASOPHILS NFR BLD AUTO: 1.1 %
BUN SERPL-MCNC: 22.7 MG/DL (ref 9.8–20.1)
CALCIUM SERPL-MCNC: 8.3 MG/DL (ref 8.4–10.2)
CHLORIDE SERPL-SCNC: 106 MMOL/L (ref 98–107)
CO2 SERPL-SCNC: 24 MMOL/L (ref 23–31)
CREAT SERPL-MCNC: 1.2 MG/DL (ref 0.55–1.02)
CREAT/UREA NIT SERPL: 19
EOSINOPHIL # BLD AUTO: 0.36 X10(3)/MCL (ref 0–0.9)
EOSINOPHIL NFR BLD AUTO: 4.3 %
ERYTHROCYTE [DISTWIDTH] IN BLOOD BY AUTOMATED COUNT: 18.1 % (ref 11.5–17)
GFR SERPLBLD CREATININE-BSD FMLA CKD-EPI: 44 ML/MIN/1.73/M2
GLUCOSE SERPL-MCNC: 87 MG/DL (ref 82–115)
GRAM STN SPEC: NORMAL
HCT VFR BLD AUTO: 26.7 % (ref 37–47)
HGB BLD-MCNC: 8.4 G/DL (ref 12–16)
IMM GRANULOCYTES # BLD AUTO: 0.05 X10(3)/MCL (ref 0–0.04)
IMM GRANULOCYTES NFR BLD AUTO: 0.6 %
LYMPHOCYTES # BLD AUTO: 1.13 X10(3)/MCL (ref 0.6–4.6)
LYMPHOCYTES NFR BLD AUTO: 13.6 %
MCH RBC QN AUTO: 32.3 PG (ref 27–31)
MCHC RBC AUTO-ENTMCNC: 31.5 G/DL (ref 33–36)
MCV RBC AUTO: 102.7 FL (ref 80–94)
MONOCYTES # BLD AUTO: 0.57 X10(3)/MCL (ref 0.1–1.3)
MONOCYTES NFR BLD AUTO: 6.9 %
NEUTROPHILS # BLD AUTO: 6.1 X10(3)/MCL (ref 2.1–9.2)
NEUTROPHILS NFR BLD AUTO: 73.5 %
NRBC BLD AUTO-RTO: 0 %
PLATELET # BLD AUTO: 236 X10(3)/MCL (ref 130–400)
PMV BLD AUTO: 9.3 FL (ref 7.4–10.4)
POTASSIUM SERPL-SCNC: 4.7 MMOL/L (ref 3.5–5.1)
RBC # BLD AUTO: 2.6 X10(6)/MCL (ref 4.2–5.4)
SODIUM SERPL-SCNC: 136 MMOL/L (ref 136–145)
WBC # BLD AUTO: 8.3 X10(3)/MCL (ref 4.5–11.5)

## 2024-08-13 PROCEDURE — A4216 STERILE WATER/SALINE, 10 ML: HCPCS | Performed by: INTERNAL MEDICINE

## 2024-08-13 PROCEDURE — 97164 PT RE-EVAL EST PLAN CARE: CPT

## 2024-08-13 PROCEDURE — 80048 BASIC METABOLIC PNL TOTAL CA: CPT | Performed by: ORTHOPAEDIC SURGERY

## 2024-08-13 PROCEDURE — 21400001 HC TELEMETRY ROOM

## 2024-08-13 PROCEDURE — 85025 COMPLETE CBC W/AUTO DIFF WBC: CPT | Performed by: ORTHOPAEDIC SURGERY

## 2024-08-13 PROCEDURE — 63600175 PHARM REV CODE 636 W HCPCS: Performed by: ORTHOPAEDIC SURGERY

## 2024-08-13 PROCEDURE — 25000003 PHARM REV CODE 250: Performed by: INTERNAL MEDICINE

## 2024-08-13 PROCEDURE — 97166 OT EVAL MOD COMPLEX 45 MIN: CPT

## 2024-08-13 PROCEDURE — 25000003 PHARM REV CODE 250: Performed by: ORTHOPAEDIC SURGERY

## 2024-08-13 PROCEDURE — 25000003 PHARM REV CODE 250: Performed by: NURSE PRACTITIONER

## 2024-08-13 PROCEDURE — S0179 MEGESTROL 20 MG: HCPCS | Performed by: ORTHOPAEDIC SURGERY

## 2024-08-13 PROCEDURE — 36415 COLL VENOUS BLD VENIPUNCTURE: CPT | Performed by: ORTHOPAEDIC SURGERY

## 2024-08-13 RX ORDER — SODIUM CHLORIDE 0.9 % (FLUSH) 0.9 %
10 SYRINGE (ML) INJECTION
Status: DISCONTINUED | OUTPATIENT
Start: 2024-08-13 | End: 2024-08-21 | Stop reason: HOSPADM

## 2024-08-13 RX ORDER — POLYETHYLENE GLYCOL 3350 17 G/17G
17 POWDER, FOR SOLUTION ORAL NIGHTLY
Status: DISCONTINUED | OUTPATIENT
Start: 2024-08-13 | End: 2024-08-14

## 2024-08-13 RX ORDER — ACETAMINOPHEN 650 MG/20.3ML
650 LIQUID ORAL EVERY 6 HOURS
Status: DISCONTINUED | OUTPATIENT
Start: 2024-08-13 | End: 2024-08-21 | Stop reason: HOSPADM

## 2024-08-13 RX ORDER — SODIUM CHLORIDE 9 MG/ML
INJECTION, SOLUTION INTRAVENOUS ONCE
Status: COMPLETED | OUTPATIENT
Start: 2024-08-13 | End: 2024-08-13

## 2024-08-13 RX ORDER — DOCUSATE SODIUM 100 MG/1
100 CAPSULE, LIQUID FILLED ORAL
Status: DISCONTINUED | OUTPATIENT
Start: 2024-08-14 | End: 2024-08-14

## 2024-08-13 RX ORDER — AMOXICILLIN 250 MG
2 CAPSULE ORAL 2 TIMES DAILY
Status: DISCONTINUED | OUTPATIENT
Start: 2024-08-13 | End: 2024-08-19

## 2024-08-13 RX ORDER — ACETAMINOPHEN 650 MG/20.3ML
650 LIQUID ORAL EVERY 6 HOURS PRN
Status: DISCONTINUED | OUTPATIENT
Start: 2024-08-13 | End: 2024-08-13

## 2024-08-13 RX ORDER — BISACODYL 10 MG/1
10 SUPPOSITORY RECTAL ONCE
Status: COMPLETED | OUTPATIENT
Start: 2024-08-13 | End: 2024-08-13

## 2024-08-13 RX ORDER — SODIUM CHLORIDE 0.9 % (FLUSH) 0.9 %
10 SYRINGE (ML) INJECTION EVERY 6 HOURS
Status: DISCONTINUED | OUTPATIENT
Start: 2024-08-13 | End: 2024-08-21 | Stop reason: HOSPADM

## 2024-08-13 RX ORDER — WARFARIN 2 MG/1
2 TABLET ORAL DAILY
Status: DISCONTINUED | OUTPATIENT
Start: 2024-08-13 | End: 2024-08-14

## 2024-08-13 RX ORDER — LACTULOSE 10 G/15ML
20 SOLUTION ORAL EVERY 6 HOURS PRN
Status: DISCONTINUED | OUTPATIENT
Start: 2024-08-13 | End: 2024-08-21 | Stop reason: HOSPADM

## 2024-08-13 RX ADMIN — OXYCODONE HYDROCHLORIDE AND ACETAMINOPHEN 500 MG: 500 TABLET ORAL at 08:08

## 2024-08-13 RX ADMIN — PRAVASTATIN SODIUM 20 MG: 10 TABLET ORAL at 08:08

## 2024-08-13 RX ADMIN — MEGESTROL ACETATE 200 MG: 40 SUSPENSION ORAL at 07:08

## 2024-08-13 RX ADMIN — CEFAZOLIN 2 G: 2 INJECTION, POWDER, FOR SOLUTION INTRAMUSCULAR; INTRAVENOUS at 11:08

## 2024-08-13 RX ADMIN — PANTOPRAZOLE SODIUM 40 MG: 40 TABLET, DELAYED RELEASE ORAL at 08:08

## 2024-08-13 RX ADMIN — WARFARIN SODIUM 2 MG: 2 TABLET ORAL at 05:08

## 2024-08-13 RX ADMIN — ACETAMINOPHEN 650 MG: 650 SOLUTION ORAL at 04:08

## 2024-08-13 RX ADMIN — ACETAMINOPHEN 650 MG: 650 SOLUTION ORAL at 10:08

## 2024-08-13 RX ADMIN — ACETAMINOPHEN 650 MG: 650 SOLUTION ORAL at 11:08

## 2024-08-13 RX ADMIN — CARVEDILOL 3.12 MG: 3.12 TABLET, FILM COATED ORAL at 12:08

## 2024-08-13 RX ADMIN — MEGESTROL ACETATE 200 MG: 40 SUSPENSION ORAL at 12:08

## 2024-08-13 RX ADMIN — LACTULOSE 20 G: 20 SOLUTION ORAL at 03:08

## 2024-08-13 RX ADMIN — POLYETHYLENE GLYCOL 3350 17 G: 17 POWDER, FOR SOLUTION ORAL at 08:08

## 2024-08-13 RX ADMIN — CEFAZOLIN 2 G: 2 INJECTION, POWDER, FOR SOLUTION INTRAMUSCULAR; INTRAVENOUS at 12:08

## 2024-08-13 RX ADMIN — SODIUM CHLORIDE: 9 INJECTION, SOLUTION INTRAVENOUS at 03:08

## 2024-08-13 RX ADMIN — CEFAZOLIN 2 G: 2 INJECTION, POWDER, FOR SOLUTION INTRAMUSCULAR; INTRAVENOUS at 07:08

## 2024-08-13 RX ADMIN — BISACODYL 10 MG: 10 SUPPOSITORY RECTAL at 03:08

## 2024-08-13 RX ADMIN — MUPIROCIN: 20 OINTMENT TOPICAL at 08:08

## 2024-08-13 RX ADMIN — TRAMADOL HYDROCHLORIDE 50 MG: 50 TABLET, COATED ORAL at 11:08

## 2024-08-13 RX ADMIN — ALLOPURINOL 100 MG: 100 TABLET ORAL at 08:08

## 2024-08-13 RX ADMIN — THERA TABS 1 TABLET: TAB at 08:08

## 2024-08-13 RX ADMIN — LEVOTHYROXINE SODIUM 125 MCG: 125 TABLET ORAL at 05:08

## 2024-08-13 RX ADMIN — SODIUM CHLORIDE, PRESERVATIVE FREE 10 ML: 5 INJECTION INTRAVENOUS at 03:08

## 2024-08-13 RX ADMIN — MEGESTROL ACETATE 200 MG: 40 SUSPENSION ORAL at 05:08

## 2024-08-13 RX ADMIN — CEFAZOLIN 2 G: 2 INJECTION, POWDER, FOR SOLUTION INTRAMUSCULAR; INTRAVENOUS at 05:08

## 2024-08-13 RX ADMIN — ZINC SULFATE 220 MG (50 MG) CAPSULE 220 MG: CAPSULE at 08:08

## 2024-08-13 RX ADMIN — MUPIROCIN: 20 OINTMENT TOPICAL at 09:08

## 2024-08-13 RX ADMIN — SENNOSIDES AND DOCUSATE SODIUM 2 TABLET: 50; 8.6 TABLET ORAL at 08:08

## 2024-08-13 NOTE — PROGRESS NOTES
"No acute events overnight.  Pain controlled.  Resting in bed. Nurse at bedside stated the patient was very hypotensive last night and this morning. Holding BP meds.    Vital Signs  Temp: 97.4 °F (36.3 °C)  Temp Source: Oral  Pulse: 90  Heart Rate Source: Monitor  Resp: 18  SpO2: 100 %  Pulse Oximetry Type: Continuous  Flow (L/min) (Oxygen Therapy): 10  Oxygen Concentration (%): 80  Device (Oxygen Therapy): Face tent  BP: 104/64  BP Location: Left arm  BP Method: Automatic  Patient Position: Lying  Arousal Level: arouses to voice  Height and Weight  Height: 5' 4.02" (162.6 cm)  Height Method: Stated  Weight: 86 kg (189 lb 9.5 oz)  Weight Method: Bed Scale  Weight in (lb) to have BMI = 25: 145.4]    LLE:  Abduction pillow in place  HV intact with good seal, 100ml of blood drainage in canister  WV with good seal, scant amount canister  +FHL/EHL  BCR distally  Dressing c/d/i  SILT distally    Recent Lab Results         08/12/24  1756   08/12/24  1209        Hematocrit 27.5         Hemoglobin 8.9         POCT Glucose   100               A/P:  Status post I&D L hip s/p conversion to L SILVIA  Pain controlled  Overall patient doing well.  Therapy for mobility and ambulation.  DVT PPx  Hypotension- hospitalist eval  Low H&H- hospitalist eval  Awaiting cx results- pending; will consult ID for IV abx   "

## 2024-08-13 NOTE — PT/OT/SLP EVAL
"Occupational Therapy   Evaluation    Name: Homa Jaquez  MRN: [id number]  Admitting Diagnosis: Post-operative complication  Recent Surgery: Procedure(s) (LRB):  REVISION,ARTHROPLASTY,HIP,POSTERIOR APPROACH (Left) 1 Day Post-Op    Recommendations:     Discharge Recommendations: Moderate Intensity Therapy  Discharge Equipment Recommendations:  bedside commode, hospital bed, walker, rolling  Barriers to discharge:   (level of assist for FM and tranfers, pain)    Assessment:     Homa Jaquez is a 88 y.o. female with a medical diagnosis of Post-operative complication. Performance deficits affecting function: weakness, impaired endurance, impaired self care skills, impaired functional mobility, gait instability, impaired balance, decreased upper extremity function, decreased lower extremity function, pain, decreased ROM, orthopedic precautions, impaired muscle length, impaired joint extensibility, edema, impaired skin, impaired coordination, impaired fine motor.      Rehab Prognosis: Poor; patient would benefit from acute skilled OT services to address these deficits and reach maximum level of function.       Plan:     Patient to be seen daily (QD-BID) to address the above listed problems via self-care/home management, therapeutic activities, therapeutic exercises  Plan of Care Expires: 08/19/24  Plan of Care Reviewed with: patient, daughter    Subjective     Chief Complaint: pain  Patient/Family Comments/goals: "I don't think I can do it."    Occupational Profile:  Living Environment: was previously at a SNF  Previous level of function: max A, reports mostly doing "bed stuff"  Equipment Used at Home: wheelchair, hospital bed  Assistance upon Discharge: facility staff    Pain/Comfort:  Unable to give numerical value, but severe pain present in   L hip with all movement     Patients cultural, spiritual, Shinto conflicts given the current situation: no    Objective:     Communicated with: LORIN prior to session.  " Patient found HOB elevated with wound vac, peripheral IV, AYSHA drain, talley catheter upon OT entry to room.    General Precautions: Standard, fall  Orthopedic Precautions: LLE weight bearing as tolerated, LLE partial weight bearing  Braces: N/A  Respiratory Status: Room air    Occupational Performance:    Bed Mobility:    Patient completed Scooting/Bridging with minimum assistance  Patient completed Supine to Sit with moderate assistance and 2 persons  Patient completed Sit to Supine with moderate assistance and 2 persons  *Third person was present for safety, CGA.    Cognitive/Visual Perceptual:  Cognitive/Psychosocial Skills:     -       Oriented to: Person, Place, Time, and Situation   -       Follows Commands/attention:Follows one-step commands  -       Communication: clear/fluent  -       Memory: No Deficits noted  -       Safety awareness/insight to disability: intact   -       Mood/Affect/Coping skills/emotional control: Appropriate to situation, Anxious, and Guarded  Visual/Perceptual:      -Intact      Physical Exam:  Resting tremor in RUE, generalized weakness noted throughout    Treatment & Education:  Pt educated on roles and goals of occupational therapy. Refused any more activity once sitting EOB. Overall, needed max verbal encouragement to participate with therapy, as she has been declining all day    Patient left HOB elevated with all lines intact, call button in reach, and LAURA Chong and daughter present    GOALS:   Multidisciplinary Problems       Occupational Therapy Goals          Problem: Occupational Therapy    Goal Priority Disciplines Outcome Interventions   Occupational Therapy Goal     OT, PT/OT Progressing    Description: Pt will perform LB dressing c AE PRN and Max A by d/c.  Pt will perform toileting Max A by d/c.  Pt will perform toilet t/f Max A to BSC by d/c.                       History:     Past Medical History:   Diagnosis Date    A-fib     Chronic cystitis     GERD  (gastroesophageal reflux disease)     Graves disease     Hypertension     Hypothyroidism, unspecified     Spinal stenosis          Past Surgical History:   Procedure Laterality Date    APPENDECTOMY      BLADDER SUSPENSION      CARDIOVERSION  04/01/2015    CATARACT EXTRACTION      CONVERSION ARTHROPLASTY, HIP, POSTERIOR APPROACH Left 7/15/2024    Procedure: CONVERSION ARTHROPLASTY, HIP, POSTERIOR APPROACH - valencia, cell saver, pathology;  Surgeon: Jonny Bains MD;  Location: [facility] OR;  Service: Orthopedics;  Laterality: Left;  valencia, cell saver, pathology    HYSTERECTOMY      INTRAMEDULLARY RODDING OF FEMUR Left 7/3/2024    Procedure: INSERTION, INTRAMEDULLARY MELANIE, FEMUR;  Surgeon: Steve Pierce DO;  Location: [facility];  Service: Orthopedics;  Laterality: Left;  supine hana table c arm synthes    LAPAROSCOPIC CHOLECYSTECTOMY  01/12/2016    REVISION, ARTHROPLASTY, HIP, POSTERIOR APPROACH Left 8/12/2024    Procedure: REVISION,ARTHROPLASTY,HIP,POSTERIOR APPROACH;  Surgeon: Jonny Bains MD;  Location: [facility] OR;  Service: Orthopedics;  Laterality: Left;  I&D L hip - head/liner    SPHINCTEROTOMY OF URETHRA  01/11/2016    TONSILLECTOMY AND ADENOIDECTOMY         Time Tracking:     OT Date of Treatment: 08/13/24  OT Start Time: 1615  OT Stop Time: 1640  OT Total Time (min): 25 min    Billable Minutes:Evaluation 25  Co-eval with PT  8/13/2024

## 2024-08-13 NOTE — ANESTHESIA POSTPROCEDURE EVALUATION
Anesthesia Post Evaluation    Patient: Homa Jaquez    Procedure(s) Performed: Procedure(s) (LRB):  REVISION,ARTHROPLASTY,HIP,POSTERIOR APPROACH (Left)    Final Anesthesia Type: general      Patient location during evaluation: PACU  Patient participation: Yes- Able to Participate  Level of consciousness: awake and alert and oriented  Post-procedure vital signs: reviewed and stable  Pain management: adequate  Airway patency: patent    PONV status at discharge: No PONV  Anesthetic complications: no      Cardiovascular status: hemodynamically stable  Respiratory status: unassisted  Hydration status: euvolemic  Follow-up not needed.              Vitals Value Taken Time   /64 08/13/24 0709   Temp 36.3 °C (97.4 °F) 08/13/24 0709   Pulse 90 08/13/24 0709   Resp 18 08/13/24 0709   SpO2 100 % 08/13/24 0709         Event Time   Out of Recovery 08/12/2024 18:36:14         Pain/Mo Score: Pain Rating Prior to Med Admin: 6 (8/12/2024  8:46 PM)  Pain Rating Post Med Admin: 0 (8/12/2024  9:46 PM)  Mo Score: 9 (8/12/2024  6:35 PM)

## 2024-08-13 NOTE — NURSING
Nurses Note -- 4 Eyes      8/13/2024   6:16 PM      Skin assessed during: Q Shift Change      [] No Altered Skin Integrity Present    []Prevention Measures Documented      [x] Yes- Altered Skin Integrity Present or Discovered   [x] LDA Added if Not in Epic (Describe Wound)   [] New Altered Skin Integrity was Present on Admit and Documented in LDA   [x] Wound Image Taken    Wound Care Consulted? No    Attending Nurse:  Lois Quiroz RN/Staff Member: Arlette

## 2024-08-13 NOTE — NURSING
Nurses Note -- 4 Eyes      8/12/2024   7:27 PM      Skin assessed during: Q Shift Change      [] No Altered Skin Integrity Present    []Prevention Measures Documented      [] Yes- Altered Skin Integrity Present or Discovered   [] LDA Added if Not in Epic (Describe Wound)   [] New Altered Skin Integrity was Present on Admit and Documented in LDA   [] Wound Image Taken    Wound Care Consulted? No    Attending Nurse:  Deanna Quiroz RN/Staff Member:   LAURA Chong

## 2024-08-13 NOTE — PLAN OF CARE
Faxed snf referral to [facility], & [facility].     Called in locet. Faxed passr to [facility]. Await 142.     Intra-op cultures pending. Await I.D. recs.

## 2024-08-13 NOTE — PT/OT/SLP PROGRESS
"Occupational Therapy      Patient Name:  Homa Jaquez   MRN:  [id number]    OT attempted to see patient this morning for evaluation, pt refusing. Shaking head, saying "I don't like you today." Will follow-up as schedule allows.    8/13/2024  "

## 2024-08-13 NOTE — PLAN OF CARE
Problem: Occupational Therapy  Goal: Occupational Therapy Goal  Description: Pt will perform LB dressing c AE PRN and Max A by d/c.  Pt will perform toileting Max A by d/c.  Pt will perform toilet t/f Max A to BSC by d/c.  Outcome: Progressing

## 2024-08-13 NOTE — PT/OT/SLP RE-EVAL
Physical Therapy Evaluation    Patient Name:  Homa Jaquez   MRN:  [id number]    Recommendations:     Discharge Recommendations: Moderate Intensity Therapy   Discharge Equipment Recommendations: none   Barriers to discharge:  impaired functional mobility, pain management    Assessment:     Homa Jaquez is a 88 y.o. female admitted with a medical diagnosis of Post-operative complication.  She presents with the following impairments/functional limitations: weakness, impaired functional mobility, decreased safety awareness, impaired coordination, decreased coordination, impaired endurance, gait instability, impaired self care skills, orthopedic precautions, pain .    Rehab Prognosis: Fair; patient would benefit from acute skilled PT services to address these deficits and reach maximum level of function.    Recent Surgery: Procedure(s) (LRB):  REVISION,ARTHROPLASTY,HIP,POSTERIOR APPROACH (Left) 1 Day Post-Op    Plan:     During this hospitalization, patient to be seen daily (QD-BID pending pt tolerance) to address the identified rehab impairments via gait training, therapeutic activities, therapeutic exercises and progress toward the following goals:    Plan of Care Expires:  08/19/24    Subjective     Chief Complaint: L hip pain, fear of moving  Patient/Family Comments/goals:   Pain/Comfort:  Location - Side 1: Left  Location 1: hip    Patients cultural, spiritual, Jew conflicts given the current situation: no    Objective:     Communicated with nurse prior to session.  Patient found supine with    upon PT entry to room.    General Precautions: Standard, fall  Orthopedic Precautions:LLE weight bearing as tolerated, LLE posterior precautions   Braces: N/A  Respiratory Status: Room air    Exams:  RLE ROM: WFL  RLE Strength: Overall Weakness  LLE ROM: NT dt sx side  LLE Strength: NT dt sx side    Functional Mobility:  Bed Mobility:     Supine to Sit: moderate assistance and of 2 persons for assist with trunk  and Les; pt refused continuing mobility due to pain; max verbal cues required to encouragement participation at beginning of treatment           Treatment & Education:  Pt edu on total hip precautions, Wb status and importance of frequent mobility    Patient left supine with all lines intact, call button in reach, nurse notified, and family present.    GOALS:   Multidisciplinary Problems       Physical Therapy Goals          Problem: Physical Therapy    Goal Priority Disciplines Outcome Goal Variances Interventions   Physical Therapy Goal     PT, PT/OT Progressing     Description: Pt will improve functional independence by performing:    Bed mobility:   Rolling:max A  Supine to sit: Max A   Sit to supine: Max A   Sit to stand: max A with rolling walker  Bed to chair: max A with Stand Step  with rolling walker   Bed to chair t/f: Max A slide board                            History:     Past Medical History:   Diagnosis Date    A-fib     Chronic cystitis     GERD (gastroesophageal reflux disease)     Graves disease     Hypertension     Hypothyroidism, unspecified     Spinal stenosis        Past Surgical History:   Procedure Laterality Date    APPENDECTOMY      BLADDER SUSPENSION      CARDIOVERSION  04/01/2015    CATARACT EXTRACTION      CONVERSION ARTHROPLASTY, HIP, POSTERIOR APPROACH Left 7/15/2024    Procedure: CONVERSION ARTHROPLASTY, HIP, POSTERIOR APPROACH - valencia, cell saver, pathology;  Surgeon: Jonny Bains MD;  Location: [facility];  Service: Orthopedics;  Laterality: Left;  valencia, cell saver, pathology    HYSTERECTOMY      INTRAMEDULLARY RODDING OF FEMUR Left 7/3/2024    Procedure: INSERTION, INTRAMEDULLARY MELANIE, FEMUR;  Surgeon: Steve Pierce DO;  Location: [facility];  Service: Orthopedics;  Laterality: Left;  supine hana table c arm synthes    LAPAROSCOPIC CHOLECYSTECTOMY  01/12/2016    REVISION, ARTHROPLASTY, HIP, POSTERIOR APPROACH Left 8/12/2024    Procedure: REVISION,ARTHROPLASTY,HIP,POSTERIOR APPROACH;   Surgeon: Jonny Bains MD;  Location: [facility];  Service: Orthopedics;  Laterality: Left;  I&D L hip - head/liner    SPHINCTEROTOMY OF URETHRA  01/11/2016    TONSILLECTOMY AND ADENOIDECTOMY         Time Tracking:     PT Received On:    PT Start Time: 1615     PT Stop Time: 1640  PT Total Time (min): 25 min     Billable Minutes: Re-eval 25      08/13/2024

## 2024-08-13 NOTE — NURSING
[facility] - Orthopaedics  Wound Care    Patient Name:  Homa Jaquez   MRN:  [id number]  Date: 8/13/2024  Diagnosis: Post-operative complication    History:     Past Medical History:   Diagnosis Date    A-fib     Chronic cystitis     GERD (gastroesophageal reflux disease)     Graves disease     Hypertension     Hypothyroidism, unspecified     Spinal stenosis        Social History     Socioeconomic History    Marital status:    Tobacco Use    Smoking status: Former     Types: Cigarettes    Smokeless tobacco: Never   Substance and Sexual Activity    Alcohol use: Yes    Drug use: Never    Sexual activity: Not Currently     Social Determinants of Health     Financial Resource Strain: Low Risk  (7/4/2024)    Overall Financial Resource Strain (CARDIA)     Difficulty of Paying Living Expenses: Not hard at all   Food Insecurity: No Food Insecurity (7/4/2024)    Hunger Vital Sign     Worried About Running Out of Food in the Last Year: Never true     Ran Out of Food in the Last Year: Never true   Transportation Needs: No Transportation Needs (7/9/2024)    PRAPARE - Transportation     Lack of Transportation (Medical): No     Lack of Transportation (Non-Medical): No   Stress: No Stress Concern Present (7/4/2024)    [employer] - Occupational Stress Questionnaire     Feeling of Stress : Not at all   Housing Stability: Low Risk  (7/4/2024)    Housing Stability Vital Sign     Unable to Pay for Housing in the Last Year: No     Homeless in the Last Year: No       Precautions:     Allergies as of 08/08/2024 - Reviewed 07/15/2024   Allergen Reaction Noted    Cefadroxil  09/08/2022    Cefuroxime axetil  09/08/2022    Cephalexin  09/08/2022    Ciprofloxacin  09/08/2022    Enoxaparin  09/08/2022    Methenamine hippurate  09/08/2022    Promethazine  09/08/2022       WOC Assessment Details/Treatment     89 yo F who was consulted for her L hip incsision. Upon assessment we noticed a  scant amount of drainage under the pt's occlusive dressing. Everything seemed to look normal with no signs or symptoms of infection present. Family member was present at bedside.       08/13/24 0901   WOCN Assessment   WOCN Total Time (mins) 30   Visit Date 08/13/24   Visit Time 0901   Consult Type Follow Up   WOCN Speciality Wound   WOCN List wound vac   Wound I&D   Number of Wounds 1   Intervention assessed;chart review;orders        Wound 08/12/24 1328 Incision Left anterior Hip   Date First Assessed/Time First Assessed: 08/12/24 1328   Primary Wound Type: Incision  Side: Left  Orientation: anterior  Location: Hip  Additional Comments: hemavac, black foam wound vac   Wound Image    Dressing Appearance Intact;Moist drainage   Drainage Amount Scant   Appearance Dressing in place, unable to visualize   Dressing Transparent film         Recommendations: Continue monitoring incisional wound and wound vac dressing for any signs and symptoms of infection. Wound care treatment team will follow up on Thursday.    08/13/2024

## 2024-08-13 NOTE — PROGRESS NOTES
[facility]  [facility] Medicine Progress Note      Patient Name: Homa Jaquez  MRN: [id number]  Admission Date: 8/9/2024   Length of Stay: 4  Attending Physician: Mathieu Patel MD  Primary Care Provider: Franklyn Brito MD  Face-to-Face encounter date: 08/13/2024    Code Status: DNR        Chief Complaint:   Wound drainage        HPI:   Homa Jaquez is a 88 y.o. female who  has a past medical history of A-fib, Chronic cystitis, GERD (gastroesophageal reflux disease), Graves disease, Hypertension, Hypothyroidism, unspecified, and Spinal stenosis.. The patient presented to [facility] 8/9/2024 with a primary complaint of post op complications.  Pt with a left femur fracture after a fall 7/2, underwent IMN 7/3 and [facility] She reported increasing pain to hip, xrays showing failed intertrochanteric femur fracture.  Pt underwent conversion left SILVIA 7/15, she was discharged 7/26 to snf in [place].  She developed pneumonia which was treated with merrem and vancomycin and completed about 3 days ago.  She was started on megace for poor appetite about a week ago and has been eating good since, coumadin held 3 days ago for elevated inr.doxycline and cymbalta was stopped and she has had lasix on and off with resolution of edema.  She has been awake and alert and improving.  Her wound vac was removed 1 week ago, drainage improved.  She is transferred back for evaluation of incision.  Family says there has been some serosanginous drainage on bandage but mild and decreased from admission where she was draining 1000cc per day.  No f/c.  Inr has been up and down, currently on hold x 3 days.     Overview/Hospital Course:  No notes on file       Interval Hx:   S/p washout, sleepy, poor sleep last night, not very interactive with therapy.  Intake poor, urine dark.  Pain controlled at rest.    Review of Systems   All other systems reviewed and are  negative.      Objective/physical exam:  General: In no acute distress, afebrile  Chest: Clear to auscultation bilaterally  Heart: RRR, +S1, S2, no appreciable murmur, picc  Abdomen: Soft, nontender, BS +  MSK: Warm, trace lower extremity edema, no clubbing or cyanosis  Skin: left hip surgery/wound vac/hemovac in place  Neurologic: Alert and oriented x4, Cranial nerve II-XII intact,      VITAL SIGNS: 24 HRS MIN & MAX LAST   Temp  Min: 97.2 °F (36.2 °C)  Max: 98.1 °F (36.7 °C) 98.1 °F (36.7 °C)   BP  Min: 63/38  Max: 161/92 113/64   Pulse  Min: 71  Max: 131  103   Resp  Min: 13  Max: 21 18   SpO2  Min: 56 %  Max: 100 % 97 %       Recent Labs   Lab 08/09/24  1433 08/12/24  1756 08/13/24  0726   WBC 9.86  --  8.30   RBC 3.33*  --  2.60*   HGB 10.8* 8.9* 8.4*   HCT 32.8* 27.5* 26.7*   MCV 98.5*  --  102.7*   MCH 32.4*  --  32.3*   MCHC 32.9*  --  31.5*   RDW 19.2*  --  18.1*     --  236   MPV 9.1  --  9.3       Recent Labs   Lab 08/09/24  1433 08/11/24  0611 08/13/24  0726   * 133* 136   K 4.2 4.9 4.7   CL 99 100 106   CO2 25 25 24   BUN 19.6 19.8 22.7*   CREATININE 1.19* 1.07* 1.20*   CALCIUM 8.2* 8.1* 8.3*        Microbiology Results (last 7 days)       Procedure Component Value Units Date/Time    Tissue Culture - Aerobic [[id number]] Collected: 08/12/24 1616    Order Status: Sent Specimen: Tissue from Hip, Left Updated: 08/13/24 0942    Gram Stain [[id number]] Collected: 08/12/24 1616    Order Status: Sent Specimen: Tissue from Hip, Left Updated: 08/13/24 0941    Anaerobic Culture [[id number]] Collected: 08/12/24 1616    Order Status: Sent Specimen: Tissue from Hip, Left Updated: 08/13/24 0941    Tissue Culture - Aerobic [[id number]] Collected: 08/12/24 1616    Order Status: Sent Specimen: Tissue from Hip, Left Updated: 08/13/24 0941    Gram Stain [[id number]] Collected: 08/12/24 1616    Order Status: Sent Specimen: Tissue from Hip, Left Updated: 08/13/24 0940    Anaerobic Culture [[id number]] Collected:  08/12/24 1616    Order Status: Sent Specimen: Tissue from Hip, Left Updated: 08/13/24 0940    Anaerobic Culture [[id number]] Collected: 08/12/24 1616    Order Status: Sent Specimen: Tissue from Hip, Left Updated: 08/13/24 0939    Gram Stain [[id number]] Collected: 08/12/24 1616    Order Status: Sent Specimen: Tissue from Hip, Left Updated: 08/13/24 0939    Tissue Culture - Aerobic [[id number]] Collected: 08/12/24 1616    Order Status: Sent Specimen: Tissue from Hip, Left Updated: 08/13/24 0938    Blood Culture [[id number]]  (Normal) Collected: 08/09/24 1433    Order Status: Completed Specimen: Blood from Antecubital, Left Updated: 08/12/24 1900     Blood Culture No Growth At 72 Hours    Blood Culture [[id number]]  (Normal) Collected: 08/09/24 1436    Order Status: Completed Specimen: Blood from Arm, Left Updated: 08/12/24 1900     Blood Culture No Growth At 72 Hours    Anaerobic Culture [[id number]] Collected: 08/12/24 1516    Order Status: Canceled Specimen: Tissue from Hip, Left     Gram Stain [[id number]] Collected: 08/12/24 1516    Order Status: Canceled Specimen: Tissue from Hip, Left     Tissue Culture - Aerobic [[id number]] Collected: 08/12/24 1516    Order Status: Canceled Specimen: Tissue from Hip, Left     Anaerobic Culture [[id number]] Collected: 08/12/24 1324    Order Status: Canceled Specimen: Tissue from Hip, Left     Anaerobic Culture [[id number]] Collected: 08/12/24 1324    Order Status: Canceled Specimen: Tissue from Hip, Left     Gram Stain [[id number]] Collected: 08/12/24 1324    Order Status: Canceled Specimen: Tissue from Hip, Left     Gram Stain [[id number]] Collected: 08/12/24 1324    Order Status: Canceled Specimen: Tissue from Hip, Left     Tissue Culture - Aerobic [[id number]] Collected: 08/12/24 1324    Order Status: Canceled Specimen: Tissue from Hip, Left     Tissue Culture - Aerobic [[id number]] Collected: 08/12/24 1324    Order Status: Canceled Specimen: Tissue from Hip, Left     Blood  Culture (site 1) [[id number]]     Order Status: Canceled Specimen: Blood from PICC Line     Blood Culture (site 2) [[id number]]     Order Status: Canceled Specimen: Blood              Radiology:  X-Ray Hip 2 or 3 views Left with Pelvis when performed  Narrative: EXAMINATION:  XR HIP WITH PELVIS WHEN PERFORMED 2 OR 3 VIEWS LEFT    CLINICAL HISTORY:  Broken internal left hip prosthesis, initial encounter post op;    COMPARISON:  X-rays dated 07/15/2024    FINDINGS:  There is stable alignment following left hip arthroplasty.  There is no new acute fracture identified.  Soft tissue drain is in place.  Impression: Satisfactory alignment following left hip arthroplasty.    Electronically signed by: Alice Shaffer  Date:    08/12/2024  Time:    18:21      Scheduled Med:   acetaminophen  650 mg Oral Q6H    ascorbic acid (vitamin C)  500 mg Oral BID    carvediloL  3.125 mg Oral BID    ceFAZolin (Ancef) IV (PEDS and ADULTS)  2 g Intravenous Q8H    furosemide  20 mg Oral BID    levothyroxine  125 mcg Oral Before breakfast    megestroL  200 mg Oral TID WM    multivitamin  1 tablet Oral Daily    mupirocin   Nasal BID    pantoprazole  40 mg Oral Daily    pravastatin  20 mg Oral Daily    sacubitriL-valsartan  1 tablet Oral BID    zinc sulfate  220 mg Oral Daily        Continuous Infusions:       PRN Meds:    Current Facility-Administered Medications:     dextrose 10%, 12.5 g, Intravenous, PRN    dextrose 10%, 25 g, Intravenous, PRN    docusate sodium, 100 mg, Oral, BID PRN    glucagon (human recombinant), 1 mg, Intramuscular, PRN    glucose, 16 g, Oral, PRN    glucose, 24 g, Oral, PRN    insulin aspart U-100, 0-5 Units, Subcutaneous, QID (AC + HS) PRN    naloxone, 0.02 mg, Intravenous, PRN    nitroGLYCERIN, 0.4 mg, Sublingual, Q5 Min PRN    ondansetron, 4 mg, Intravenous, Q8H PRN    polyethylene glycol, 17 g, Oral, BID PRN    senna, 1 tablet, Oral, Daily PRN    sodium chloride 0.9%, 10 mL, Intravenous, Q12H PRN    traMADoL, 50  mg, Oral, Q4H PRN     Nutrition Status:      Assessment/Plan:  Left femur IMN 7/3, failed IMN  S/p Conversion SILVIA 7/15/washout 8/13  Dilated cardiomyopathy  A fib  Htn  Hld  Gout  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 I have reviewed or attempted to review medical records based upon their availability      _____________________________________________________________________            Mathieu Patel MD   08/13/2024

## 2024-08-14 LAB
ABO + RH BLD: NORMAL
ABO + RH BLD: NORMAL
ANION GAP SERPL CALC-SCNC: 9 MEQ/L
BACTERIA BLD CULT: NORMAL
BACTERIA BLD CULT: NORMAL
BASOPHILS # BLD AUTO: 0.07 X10(3)/MCL
BASOPHILS NFR BLD AUTO: 0.9 %
BLD PROD TYP BPU: NORMAL
BLD PROD TYP BPU: NORMAL
BLOOD UNIT EXPIRATION DATE: NORMAL
BLOOD UNIT EXPIRATION DATE: NORMAL
BLOOD UNIT TYPE CODE: 6200
BLOOD UNIT TYPE CODE: 6200
BUN SERPL-MCNC: 23.6 MG/DL (ref 9.8–20.1)
CALCIUM SERPL-MCNC: 7.8 MG/DL (ref 8.4–10.2)
CHLORIDE SERPL-SCNC: 107 MMOL/L (ref 98–107)
CO2 SERPL-SCNC: 22 MMOL/L (ref 23–31)
CREAT SERPL-MCNC: 1.35 MG/DL (ref 0.55–1.02)
CREAT/UREA NIT SERPL: 17
CROSSMATCH INTERPRETATION: NORMAL
CROSSMATCH INTERPRETATION: NORMAL
DISPENSE STATUS: NORMAL
DISPENSE STATUS: NORMAL
EOSINOPHIL # BLD AUTO: 0.36 X10(3)/MCL (ref 0–0.9)
EOSINOPHIL NFR BLD AUTO: 4.5 %
ERYTHROCYTE [DISTWIDTH] IN BLOOD BY AUTOMATED COUNT: 18 % (ref 11.5–17)
GFR SERPLBLD CREATININE-BSD FMLA CKD-EPI: 38 ML/MIN/1.73/M2
GLUCOSE SERPL-MCNC: 115 MG/DL (ref 82–115)
GRAM STN SPEC: NORMAL
GRAM STN SPEC: NORMAL
HCT VFR BLD AUTO: 21.9 % (ref 37–47)
HGB BLD-MCNC: 6.9 G/DL (ref 12–16)
IMM GRANULOCYTES # BLD AUTO: 0.04 X10(3)/MCL (ref 0–0.04)
IMM GRANULOCYTES NFR BLD AUTO: 0.5 %
INR PPP: 2.2 (ref 2–3)
LYMPHOCYTES # BLD AUTO: 1.13 X10(3)/MCL (ref 0.6–4.6)
LYMPHOCYTES NFR BLD AUTO: 14.2 %
MCH RBC QN AUTO: 31.9 PG (ref 27–31)
MCHC RBC AUTO-ENTMCNC: 31.5 G/DL (ref 33–36)
MCV RBC AUTO: 101.4 FL (ref 80–94)
MONOCYTES # BLD AUTO: 0.61 X10(3)/MCL (ref 0.1–1.3)
MONOCYTES NFR BLD AUTO: 7.7 %
NEUTROPHILS # BLD AUTO: 5.73 X10(3)/MCL (ref 2.1–9.2)
NEUTROPHILS NFR BLD AUTO: 72.2 %
NRBC BLD AUTO-RTO: 0 %
PLATELET # BLD AUTO: 227 X10(3)/MCL (ref 130–400)
PMV BLD AUTO: 9.5 FL (ref 7.4–10.4)
POTASSIUM SERPL-SCNC: 4.9 MMOL/L (ref 3.5–5.1)
PROTHROMBIN TIME: 25.2 SECONDS (ref 11.7–14.5)
RBC # BLD AUTO: 2.16 X10(6)/MCL (ref 4.2–5.4)
SODIUM SERPL-SCNC: 138 MMOL/L (ref 136–145)
UNIT NUMBER: NORMAL
UNIT NUMBER: NORMAL
WBC # BLD AUTO: 7.94 X10(3)/MCL (ref 4.5–11.5)

## 2024-08-14 PROCEDURE — 99900031 HC PATIENT EDUCATION (STAT)

## 2024-08-14 PROCEDURE — 25000003 PHARM REV CODE 250: Performed by: ORTHOPAEDIC SURGERY

## 2024-08-14 PROCEDURE — 25000003 PHARM REV CODE 250: Performed by: INTERNAL MEDICINE

## 2024-08-14 PROCEDURE — A4216 STERILE WATER/SALINE, 10 ML: HCPCS | Performed by: INTERNAL MEDICINE

## 2024-08-14 PROCEDURE — S0179 MEGESTROL 20 MG: HCPCS | Performed by: ORTHOPAEDIC SURGERY

## 2024-08-14 PROCEDURE — 97530 THERAPEUTIC ACTIVITIES: CPT | Mod: CQ

## 2024-08-14 PROCEDURE — 21400001 HC TELEMETRY ROOM

## 2024-08-14 PROCEDURE — 25000003 PHARM REV CODE 250: Performed by: NURSE PRACTITIONER

## 2024-08-14 PROCEDURE — P9016 RBC LEUKOCYTES REDUCED: HCPCS | Performed by: ORTHOPAEDIC SURGERY

## 2024-08-14 PROCEDURE — 80048 BASIC METABOLIC PNL TOTAL CA: CPT | Performed by: INTERNAL MEDICINE

## 2024-08-14 PROCEDURE — 85025 COMPLETE CBC W/AUTO DIFF WBC: CPT | Performed by: INTERNAL MEDICINE

## 2024-08-14 PROCEDURE — 85610 PROTHROMBIN TIME: CPT | Performed by: INTERNAL MEDICINE

## 2024-08-14 PROCEDURE — 94761 N-INVAS EAR/PLS OXIMETRY MLT: CPT

## 2024-08-14 PROCEDURE — 63600175 PHARM REV CODE 636 W HCPCS: Performed by: INTERNAL MEDICINE

## 2024-08-14 PROCEDURE — 97535 SELF CARE MNGMENT TRAINING: CPT

## 2024-08-14 PROCEDURE — 63600175 PHARM REV CODE 636 W HCPCS: Performed by: ORTHOPAEDIC SURGERY

## 2024-08-14 PROCEDURE — 36415 COLL VENOUS BLD VENIPUNCTURE: CPT | Performed by: INTERNAL MEDICINE

## 2024-08-14 PROCEDURE — 30233N1 TRANSFUSION OF NONAUTOLOGOUS RED BLOOD CELLS INTO PERIPHERAL VEIN, PERCUTANEOUS APPROACH: ICD-10-PCS | Performed by: INTERNAL MEDICINE

## 2024-08-14 RX ORDER — POLYETHYLENE GLYCOL 3350 17 G/17G
17 POWDER, FOR SOLUTION ORAL 2 TIMES DAILY
Status: DISCONTINUED | OUTPATIENT
Start: 2024-08-14 | End: 2024-08-19

## 2024-08-14 RX ORDER — HYDROCODONE BITARTRATE AND ACETAMINOPHEN 500; 5 MG/1; MG/1
TABLET ORAL
Status: DISCONTINUED | OUTPATIENT
Start: 2024-08-14 | End: 2024-08-19

## 2024-08-14 RX ORDER — FUROSEMIDE 10 MG/ML
20 INJECTION INTRAMUSCULAR; INTRAVENOUS DAILY
Status: DISCONTINUED | OUTPATIENT
Start: 2024-08-14 | End: 2024-08-17

## 2024-08-14 RX ADMIN — SODIUM CHLORIDE, PRESERVATIVE FREE 10 ML: 5 INJECTION INTRAVENOUS at 06:08

## 2024-08-14 RX ADMIN — ACETAMINOPHEN 650 MG: 650 SOLUTION ORAL at 06:08

## 2024-08-14 RX ADMIN — POLYETHYLENE GLYCOL 3350 17 G: 17 POWDER, FOR SOLUTION ORAL at 08:08

## 2024-08-14 RX ADMIN — CEFAZOLIN 2 G: 2 INJECTION, POWDER, FOR SOLUTION INTRAMUSCULAR; INTRAVENOUS at 05:08

## 2024-08-14 RX ADMIN — CARVEDILOL 3.12 MG: 3.12 TABLET, FILM COATED ORAL at 08:08

## 2024-08-14 RX ADMIN — SODIUM CHLORIDE, PRESERVATIVE FREE 10 ML: 5 INJECTION INTRAVENOUS at 12:08

## 2024-08-14 RX ADMIN — MEGESTROL ACETATE 200 MG: 40 SUSPENSION ORAL at 07:08

## 2024-08-14 RX ADMIN — CEFAZOLIN 2 G: 2 INJECTION, POWDER, FOR SOLUTION INTRAMUSCULAR; INTRAVENOUS at 09:08

## 2024-08-14 RX ADMIN — ACETAMINOPHEN 650 MG: 650 SOLUTION ORAL at 12:08

## 2024-08-14 RX ADMIN — FUROSEMIDE 20 MG: 10 INJECTION, SOLUTION INTRAMUSCULAR; INTRAVENOUS at 02:08

## 2024-08-14 RX ADMIN — SACUBITRIL AND VALSARTAN 1 TABLET: 24; 26 TABLET, FILM COATED ORAL at 08:08

## 2024-08-14 RX ADMIN — LEVOTHYROXINE SODIUM 125 MCG: 125 TABLET ORAL at 04:08

## 2024-08-14 RX ADMIN — MUPIROCIN: 20 OINTMENT TOPICAL at 06:08

## 2024-08-14 RX ADMIN — DOCUSATE SODIUM 100 MG: 100 CAPSULE, LIQUID FILLED ORAL at 05:08

## 2024-08-14 NOTE — PROGRESS NOTES
"No acute events overnight.  Pain controlled.  Resting in bed. Sat up yesterday.    Vital Signs  Temp: 98.2 °F (36.8 °C)  Temp Source: Oral  Pulse: 98  Heart Rate Source: Monitor  Resp: 18  SpO2: 98 %  Pulse Oximetry Type: Continuous  Flow (L/min) (Oxygen Therapy): 1  Oxygen Concentration (%): 80  Device (Oxygen Therapy): room air  BP: (!) 102/54  BP Location: Left arm  BP Method: Automatic  Patient Position: Lying  Arousal Level: arouses to voice  Height and Weight  Height: 5' 4.02" (162.6 cm)  Height Method: Stated  Weight: 86 kg (189 lb 9.5 oz)  Weight Method: Bed Scale  Weight in (lb) to have BMI = 25: 145.4]    +FHL/EHL  BCR distally  Dressing c/d/i  SILT distally    Recent Lab Results         08/14/24  0435        Anion Gap 9.0       Baso # 0.07       Basophil % 0.9       BUN 23.6       BUN/CREAT RATIO 17       Calcium 7.8       Chloride 107       CO2 22       Creatinine 1.35       eGFR 38       Eos # 0.36       Eos % 4.5       Glucose 115       Hematocrit 21.9       Hemoglobin 6.9       Immature Grans (Abs) 0.04       Immature Granulocytes 0.5       INR 2.2       Lymph # 1.13       LYMPH % 14.2       MCH 31.9       MCHC 31.5       .4       Mono # 0.61       Mono % 7.7       MPV 9.5       Neut # 5.73       Neut % 72.2       nRBC 0.0       Platelet Count 227       Potassium 4.9       PT 25.2       RBC 2.16       RDW 18.0       Sodium 138       WBC 7.94               A/P:  Status post conversion with I&D L hip on Monday  Pain controlled  Therapy for mobility and ambulation.  DVT PPx  ABLA - expected  ID consult  "

## 2024-08-14 NOTE — NURSING
Nurses Note -- 4 Eyes      8/13/2024   10:15 PM      Skin assessed during: Q Shift Change      [] No Altered Skin Integrity Present    []Prevention Measures Documented      [x] Yes- Altered Skin Integrity Present or Discovered   [] LDA Added if Not in Epic (Describe Wound)   [] New Altered Skin Integrity was Present on Admit and Documented in LDA   [] Wound Image Taken    Wound Care Consulted? No    Attending Nurse:  Deanna Quiroz RN/Staff Member:   Lois

## 2024-08-14 NOTE — PT/OT/SLP PROGRESS
"Occupational Therapy   Treatment    Name: Homa Jaquez  MRN: [id number]  Admitting Diagnosis:  Post-operative complication  2 Days Post-Op    Recommendations:     Discharge Recommendations: Moderate Intensity Therapy  Discharge Equipment Recommendations:  bedside commode, hospital bed, walker, rolling  Barriers to discharge:   (level of assist for bed mobility and transfers, pain)    Assessment:     Homa Jaquez is a 88 y.o. female with a medical diagnosis of Post-operative complication. Performance deficits affecting function are weakness, pain, decreased ROM, edema, decreased lower extremity function, decreased upper extremity function, impaired balance, impaired endurance, impaired functional mobility, impaired self care skills, gait instability, orthopedic precautions, impaired joint extensibility, impaired muscle length, impaired skin, impaired coordination.     Rehab Prognosis:  Fair; patient would benefit from acute skilled OT services to address these deficits and reach maximum level of function.       Plan:     Patient to be seen daily (QD-BID) to address the above listed problems via self-care/home management, therapeutic activities, therapeutic exercises  Plan of Care Expires: 08/19/24  Plan of Care Reviewed with: patient, daughter    Subjective     Chief Complaint: pain  Patient/Family Comments/goals: "Okay, I'm ready to lay back down when y'all are."  Pain/Comfort:  Pain Rating 1:  (unable to give numerical value)  Location - Side 1: Left  Location - Orientation 1: generalized  Location 1: hip  Pain Addressed 1: Distraction, Reposition    Objective:     Communicated with: LORIN prior to session.  Patient found HOB elevated with wound vac, peripheral IV, AYSHA drain, talley catheter upon OT entry to room.    General Precautions: Standard, fall    Orthopedic Precautions:LLE weight bearing as tolerated, LLE posterior precautions  Braces: N/A  Respiratory Status: Room air     Occupational Performance: "     Bed Mobility:    Patient completed Rolling/Turning to Left with  moderate assistance, 2 persons  Patient completed Rolling/Turning to Right with moderate assistance, 2 persons, and third person present for holding her LLE while turning  Patient completed Scooting/Bridging with moderate assistance and 2 persons  Patient completed Supine to Sit with moderate assistance and 2 persons  Patient completed Sit to Supine with moderate assistance and 2 persons     Activities of Daily Living:  Grooming: minimum assistance with oral hygiene and brushing her hair, PTA present during co-treat, providing CGA for static sitting balance while patient EOB. Daughter present, encouraging patient to participate  Toileting: total A, +BM    Patient left HOB elevated with all lines intact, call button in reach, LAURA Yepez notified, and daughter present    GOALS:   Multidisciplinary Problems       Occupational Therapy Goals          Problem: Occupational Therapy    Goal Priority Disciplines Outcome Interventions   Occupational Therapy Goal     OT, PT/OT Progressing    Description: Pt will perform LB dressing c AE PRN and Max A by d/c.  Pt will perform toileting Max A by d/c.  Pt will perform toilet t/f Max A to BSC by d/c.                       Time Tracking:     OT Date of Treatment: 08/14/24  OT Start Time: 1345  OT Stop Time: 1418  OT Total Time (min): 33 min    Billable Minutes:Self Care/Home Management 33    OT/EVY: OT          8/14/2024

## 2024-08-14 NOTE — PROGRESS NOTES
[facility]  [facility] Medicine Progress Note      Patient Name: Homa Jaquez  MRN: [id number]  Admission Date: 8/9/2024   Length of Stay: 5  Attending Physician: Olvin Huber MD  Primary Care Provider: Franklyn Brito MD  Face-to-Face encounter date: 08/14/2024    Code Status: DNR        Chief Complaint:   Wound drainage        HPI:   Homa Jaquez is a 88 y.o. female who  has a past medical history of A-fib, Chronic cystitis, GERD (gastroesophageal reflux disease), Graves disease, Hypertension, Hypothyroidism, unspecified, and Spinal stenosis.. The patient presented to [facility] 8/9/2024 with a primary complaint of post op complications.  Pt with a left femur fracture after a fall 7/2, underwent IMN 7/3 and [facility] She reported increasing pain to hip, xrays showing failed intertrochanteric femur fracture.  Pt underwent conversion left SILVIA 7/15, she was discharged 7/26 to snf in [place].  She developed pneumonia which was treated with merrem and vancomycin and completed about 3 days ago.  She was started on megace for poor appetite about a week ago and has been eating good since, coumadin held 3 days ago for elevated inr. Doxycline and cymbalta was stopped and she has had lasix on and off with resolution of edema.  She has been awake and alert and improving.  Her wound vac was removed 1 week ago, drainage improved.  She is transferred back for evaluation of incision.  Family says there has been some serosanginous drainage on bandage but mild and decreased from admission where she was draining 1000cc per day.  No f/c.  Inr has been up and down, currently on hold x 3 days.     Overview/Hospital Course:  No notes on file       Interval Hx:   The pt says she feels better, she has a good appetite. She hadn't had a bm in 7 days but had a bm last night after manual disimpaction. Her daughter is at bedside.    Review of Systems   All  other systems reviewed and are negative.      Objective/physical exam:  General: In no acute distress, afebrile  Chest: Clear to auscultation bilaterally  Heart: RRR, +S1, S2, no appreciable murmur, picc,   Abdomen: Soft, nontender, BS +  MSK: Warm, +1 b/l lower extremity edema, no clubbing or cyanosis  Skin: left hip surgery/wound vac/hemovac in place  Neurologic: Alert and oriented x4    VITAL SIGNS: 24 HRS MIN & MAX LAST   Temp  Min: 97.9 °F (36.6 °C)  Max: 98.6 °F (37 °C) 97.9 °F (36.6 °C)   BP  Min: 90/53  Max: 113/64 100/60   Pulse  Min: 84  Max: 116  102   Resp  Min: 18  Max: 18 18   SpO2  Min: 95 %  Max: 100 % 98 %       Recent Labs   Lab 08/09/24  1433 08/12/24  1756 08/13/24  0726 08/14/24  0435   WBC 9.86  --  8.30 7.94   RBC 3.33*  --  2.60* 2.16*   HGB 10.8* 8.9* 8.4* 6.9*   HCT 32.8* 27.5* 26.7* 21.9*   MCV 98.5*  --  102.7* 101.4*   MCH 32.4*  --  32.3* 31.9*   MCHC 32.9*  --  31.5* 31.5*   RDW 19.2*  --  18.1* 18.0*     --  236 227   MPV 9.1  --  9.3 9.5       Recent Labs   Lab 08/11/24  0611 08/13/24  0726 08/14/24  0435   * 136 138   K 4.9 4.7 4.9    106 107   CO2 25 24 22*   BUN 19.8 22.7* 23.6*   CREATININE 1.07* 1.20* 1.35*   CALCIUM 8.1* 8.3* 7.8*        Microbiology Results (last 7 days)       Procedure Component Value Units Date/Time    Gram Stain [[id number]] Collected: 08/12/24 1616    Order Status: Completed Specimen: Tissue from Hip, Left Updated: 08/14/24 0843     GRAM STAIN Few WBC observed      Few Gram Negative Rods    Tissue Culture - Aerobic [[id number]]  (Abnormal) Collected: 08/12/24 1616    Order Status: Completed Specimen: Tissue from Hip, Left Updated: 08/14/24 0730     Tissue - Aerobic Culture Moderate Gram-negative Rods    Tissue Culture - Aerobic [[id number]]  (Abnormal) Collected: 08/12/24 1616    Order Status: Completed Specimen: Tissue from Hip, Left Updated: 08/14/24 0726     Tissue - Aerobic Culture Few Gram-negative Rods    Tissue Culture - Aerobic  [[id number]]  (Abnormal) Collected: 08/12/24 1616    Order Status: Completed Specimen: Tissue from Hip, Left Updated: 08/14/24 0722     Tissue - Aerobic Culture Moderate Gram-negative Rods    Blood Culture [[id number]]  (Normal) Collected: 08/09/24 1433    Order Status: Completed Specimen: Blood from Antecubital, Left Updated: 08/13/24 1901     Blood Culture No Growth At 96 Hours    Blood Culture [[id number]]  (Normal) Collected: 08/09/24 1436    Order Status: Completed Specimen: Blood from Arm, Left Updated: 08/13/24 1901     Blood Culture No Growth At 96 Hours    Gram Stain [[id number]] Collected: 08/12/24 1616    Order Status: Completed Specimen: Tissue from Hip, Left Updated: 08/13/24 1404     GRAM STAIN Few WBC observed      No bacteria seen    Gram Stain [[id number]] Collected: 08/12/24 1616    Order Status: Completed Specimen: Tissue from Hip, Left Updated: 08/13/24 1404     GRAM STAIN Few WBC observed      No bacteria seen    Anaerobic Culture [[id number]] Collected: 08/12/24 1616    Order Status: Sent Specimen: Tissue from Hip, Left Updated: 08/13/24 0941    Anaerobic Culture [[id number]] Collected: 08/12/24 1616    Order Status: Sent Specimen: Tissue from Hip, Left Updated: 08/13/24 0940    Anaerobic Culture [[id number]] Collected: 08/12/24 1616    Order Status: Sent Specimen: Tissue from Hip, Left Updated: 08/13/24 0939    Anaerobic Culture [[id number]] Collected: 08/12/24 1516    Order Status: Canceled Specimen: Tissue from Hip, Left     Gram Stain [[id number]] Collected: 08/12/24 1516    Order Status: Canceled Specimen: Tissue from Hip, Left     Tissue Culture - Aerobic [[id number]] Collected: 08/12/24 1516    Order Status: Canceled Specimen: Tissue from Hip, Left     Anaerobic Culture [[id number]] Collected: 08/12/24 1324    Order Status: Canceled Specimen: Tissue from Hip, Left     Anaerobic Culture [[id number]] Collected: 08/12/24 1324    Order Status: Canceled Specimen: Tissue from Hip, Left      Gram Stain [[id number]] Collected: 08/12/24 1324    Order Status: Canceled Specimen: Tissue from Hip, Left     Gram Stain [[id number]] Collected: 08/12/24 1324    Order Status: Canceled Specimen: Tissue from Hip, Left     Tissue Culture - Aerobic [[id number]] Collected: 08/12/24 1324    Order Status: Canceled Specimen: Tissue from Hip, Left     Tissue Culture - Aerobic [[id number]] Collected: 08/12/24 1324    Order Status: Canceled Specimen: Tissue from Hip, Left     Blood Culture (site 1) [[id number]]     Order Status: Canceled Specimen: Blood from PICC Line     Blood Culture (site 2) [[id number]]     Order Status: Canceled Specimen: Blood              Radiology:  X-Ray Hip 2 or 3 views Left with Pelvis when performed  Narrative: EXAMINATION:  XR HIP WITH PELVIS WHEN PERFORMED 2 OR 3 VIEWS LEFT    CLINICAL HISTORY:  Broken internal left hip prosthesis, initial encounter post op;    COMPARISON:  X-rays dated 07/15/2024    FINDINGS:  There is stable alignment following left hip arthroplasty.  There is no new acute fracture identified.  Soft tissue drain is in place.  Impression: Satisfactory alignment following left hip arthroplasty.    Electronically signed by: Alice Shaffer  Date:    08/12/2024  Time:    18:21      Scheduled Med:   acetaminophen  650 mg Oral Q6H    ascorbic acid (vitamin C)  500 mg Oral BID    carvediloL  3.125 mg Oral BID    ceFAZolin (Ancef) IV (PEDS and ADULTS)  2 g Intravenous Q8H    docusate sodium  100 mg Oral Before breakfast    furosemide  20 mg Oral BID    levothyroxine  125 mcg Oral Before breakfast    megestroL  200 mg Oral TID WM    multivitamin  1 tablet Oral Daily    mupirocin   Nasal BID    pantoprazole  40 mg Oral Daily    polyethylene glycol  17 g Oral QHS    pravastatin  20 mg Oral Daily    sacubitriL-valsartan  1 tablet Oral BID    senna-docusate 8.6-50 mg  2 tablet Oral BID    sodium chloride 0.9%  10 mL Intravenous Q6H    warfarin  2 mg Oral Daily    zinc sulfate  220 mg  Oral Daily        Continuous Infusions:       PRN Meds:    Current Facility-Administered Medications:     0.9%  NaCl infusion (for blood administration), , Intravenous, Q24H PRN    dextrose 10%, 12.5 g, Intravenous, PRN    dextrose 10%, 25 g, Intravenous, PRN    glucagon (human recombinant), 1 mg, Intramuscular, PRN    glucose, 16 g, Oral, PRN    glucose, 24 g, Oral, PRN    insulin aspart U-100, 0-5 Units, Subcutaneous, QID (AC + HS) PRN    lactulose, 20 g, Oral, Q6H PRN    naloxone, 0.02 mg, Intravenous, PRN    nitroGLYCERIN, 0.4 mg, Sublingual, Q5 Min PRN    ondansetron, 4 mg, Intravenous, Q8H PRN    Flushing PICC/Midline Protocol, , , Until Discontinued **AND** sodium chloride 0.9%, 10 mL, Intravenous, Q6H **AND** sodium chloride 0.9%, 10 mL, Intravenous, PRN    traMADoL, 50 mg, Oral, Q4H PRN     Nutrition Status:      Assessment/Plan:  Left femur IMN 7/3, failed IMN  S/p Conversion SILVIA 7/15/washout 8/13  Postop wound infection -GNR on 8/13  Acute blood loss anemia  Dilated cardiomyopathy  A fib  Htn  Hld  Gout  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  Hypothyroid       Plan  Transfuse 2 units prbc  F/u cxs   hold coumadin  monitor INR  Switch lasix to IV  Add miralax  Cont wound care  encouraged PTOT     Dvt proph: coumadin             All diagnosis and differential diagnosis have been reviewed; assessment and plan has been documented; I have personally reviewed the labs and test results that are presently available; I have reviewed the patients medication list; I have reviewed the consulting providers response and recommendations. I have reviewed or attempted to review medical records based upon their availability      _____________________________________________________________________            Olvin Huber MD   08/14/2024

## 2024-08-14 NOTE — PT/OT/SLP PROGRESS
Physical Therapy Treatment    Patient Name:  Homa Jaquez   MRN:  [id number]    Recommendations:     Discharge Recommendations: Moderate Intensity Therapy  Discharge Equipment Recommendations: none  Barriers to discharge:  impaired mobility     Assessment:     Homa Jaquez is a 88 y.o. female admitted with a medical diagnosis of Post-operative complication.  She presents with the following impairments/functional limitations: weakness, impaired functional mobility, decreased safety awareness, impaired coordination, decreased coordination, impaired endurance, gait instability, impaired self care skills, orthopedic precautions, pain .    Rehab Prognosis: Good; patient would benefit from acute skilled PT services to address these deficits and reach maximum level of function.    Recent Surgery: Procedure(s) (LRB):  REVISION,ARTHROPLASTY,HIP,POSTERIOR APPROACH (Left) 2 Days Post-Op    Plan:     During this hospitalization, patient to be seen daily (QD-BID pending pt tolerance) to address the identified rehab impairments via gait training, therapeutic activities, therapeutic exercises and progress toward the following goals:    Plan of Care Expires:  08/19/24    Subjective     Chief Complaint: pain  Patient/Family Comments/goals: sit eob  Pain/Comfort:         Objective:     Communicated with rn prior to session.  Patient found supine with   upon PT entry to room.     General Precautions: Standard, fall  Orthopedic Precautions: LLE weight bearing as tolerated, LLE posterior precautions  Braces: N/A  Respiratory Status: Room air     Functional Mobility:  Bed Mobility:     Rolling Left:  moderate assistance  Rolling Right: moderate assistance  Supine to Sit: moderate assistance and of 2 persons  Sit to Supine: modified independence and of 2 persons      Treatment & Education:  Pt sat eob 5min with cga for support.    Patient left supine with all lines intact and call button in reach..    GOALS:   Multidisciplinary  Problems       Physical Therapy Goals          Problem: Physical Therapy    Goal Priority Disciplines Outcome Goal Variances Interventions   Physical Therapy Goal     PT, PT/OT Progressing     Description: Pt will improve functional independence by performing:    Bed mobility:   Rolling:max A  Supine to sit: Max A   Sit to supine: Max A  Sit to stand: max A with rolling walker  Bed to chair: max A with Stand Step  with rolling walker   Bed to chair t/f: Max A slide board                            Time Tracking:     PT Received On:    PT Start Time: 1345     PT Stop Time: 1419  PT Total Time (min): 34 min     Billable Minutes: Therapeutic Activity 34  Co-treatconcepción OT  Treatment Type: Treatment  PT/PTA: PTA     Number of PTA visits since last PT visit: 1 08/14/2024

## 2024-08-14 NOTE — PLAN OF CARE
142 recvd.     Final cultures pending. Awaiting I.D. recs.     [facility] accepted.   [facility] - no beds avail.   [facility] -- accepted.     Pt/son aware of placement status.

## 2024-08-14 NOTE — PT/OT/SLP PROGRESS
Physical Therapy      Patient Name:  Homa Jaquez   MRN:  [id number]    Patient not seen today secondary to PT notified by nursing that patient has low H&H and is inappropriate for therapy at this time. Will follow-up when patient cleared for mobility.

## 2024-08-15 LAB
ALBUMIN SERPL-MCNC: 1.8 G/DL (ref 3.4–4.8)
BASOPHILS # BLD AUTO: 0.11 X10(3)/MCL
BASOPHILS NFR BLD AUTO: 1.3 %
BNP BLD-MCNC: 377.3 PG/ML
BUN SERPL-MCNC: 24.4 MG/DL (ref 9.8–20.1)
CALCIUM SERPL-MCNC: 7.7 MG/DL (ref 8.4–10.2)
CHLORIDE SERPL-SCNC: 110 MMOL/L (ref 98–107)
CO2 SERPL-SCNC: 21 MMOL/L (ref 23–31)
CREAT SERPL-MCNC: 1.35 MG/DL (ref 0.55–1.02)
EOSINOPHIL # BLD AUTO: 0.38 X10(3)/MCL (ref 0–0.9)
EOSINOPHIL NFR BLD AUTO: 4.6 %
ERYTHROCYTE [DISTWIDTH] IN BLOOD BY AUTOMATED COUNT: 18.3 % (ref 11.5–17)
GFR SERPLBLD CREATININE-BSD FMLA CKD-EPI: 38 ML/MIN/1.73/M2
GLUCOSE SERPL-MCNC: 102 MG/DL (ref 82–115)
HCT VFR BLD AUTO: 29.8 % (ref 37–47)
HGB BLD-MCNC: 9.9 G/DL (ref 12–16)
IMM GRANULOCYTES # BLD AUTO: 0.04 X10(3)/MCL (ref 0–0.04)
IMM GRANULOCYTES NFR BLD AUTO: 0.5 %
LYMPHOCYTES # BLD AUTO: 1.25 X10(3)/MCL (ref 0.6–4.6)
LYMPHOCYTES NFR BLD AUTO: 15.1 %
MAGNESIUM SERPL-MCNC: 1.5 MG/DL (ref 1.6–2.6)
MCH RBC QN AUTO: 32.8 PG (ref 27–31)
MCHC RBC AUTO-ENTMCNC: 33.2 G/DL (ref 33–36)
MCV RBC AUTO: 98.7 FL (ref 80–94)
MONOCYTES # BLD AUTO: 0.73 X10(3)/MCL (ref 0.1–1.3)
MONOCYTES NFR BLD AUTO: 8.8 %
NEUTROPHILS # BLD AUTO: 5.78 X10(3)/MCL (ref 2.1–9.2)
NEUTROPHILS NFR BLD AUTO: 69.7 %
NRBC BLD AUTO-RTO: 0 %
PHOSPHATE SERPL-MCNC: 2.6 MG/DL (ref 2.3–4.7)
PLATELET # BLD AUTO: 209 X10(3)/MCL (ref 130–400)
PMV BLD AUTO: 9.7 FL (ref 7.4–10.4)
POTASSIUM SERPL-SCNC: 4.5 MMOL/L (ref 3.5–5.1)
RBC # BLD AUTO: 3.02 X10(6)/MCL (ref 4.2–5.4)
RBCS: NORMAL
SODIUM SERPL-SCNC: 137 MMOL/L (ref 136–145)
WBC # BLD AUTO: 8.29 X10(3)/MCL (ref 4.5–11.5)

## 2024-08-15 PROCEDURE — 99900031 HC PATIENT EDUCATION (STAT)

## 2024-08-15 PROCEDURE — 83735 ASSAY OF MAGNESIUM: CPT | Performed by: INTERNAL MEDICINE

## 2024-08-15 PROCEDURE — 85025 COMPLETE CBC W/AUTO DIFF WBC: CPT | Performed by: INTERNAL MEDICINE

## 2024-08-15 PROCEDURE — 25000003 PHARM REV CODE 250: Performed by: NURSE PRACTITIONER

## 2024-08-15 PROCEDURE — 63600175 PHARM REV CODE 636 W HCPCS: Performed by: INTERNAL MEDICINE

## 2024-08-15 PROCEDURE — S0179 MEGESTROL 20 MG: HCPCS | Performed by: ORTHOPAEDIC SURGERY

## 2024-08-15 PROCEDURE — 36415 COLL VENOUS BLD VENIPUNCTURE: CPT | Performed by: INTERNAL MEDICINE

## 2024-08-15 PROCEDURE — 25000003 PHARM REV CODE 250: Performed by: INTERNAL MEDICINE

## 2024-08-15 PROCEDURE — 25000003 PHARM REV CODE 250: Performed by: ORTHOPAEDIC SURGERY

## 2024-08-15 PROCEDURE — 21400001 HC TELEMETRY ROOM

## 2024-08-15 PROCEDURE — 83880 ASSAY OF NATRIURETIC PEPTIDE: CPT | Performed by: INTERNAL MEDICINE

## 2024-08-15 PROCEDURE — A4216 STERILE WATER/SALINE, 10 ML: HCPCS | Performed by: INTERNAL MEDICINE

## 2024-08-15 PROCEDURE — 63600175 PHARM REV CODE 636 W HCPCS: Performed by: ORTHOPAEDIC SURGERY

## 2024-08-15 PROCEDURE — 94761 N-INVAS EAR/PLS OXIMETRY MLT: CPT

## 2024-08-15 PROCEDURE — 80069 RENAL FUNCTION PANEL: CPT | Performed by: INTERNAL MEDICINE

## 2024-08-15 PROCEDURE — 97605 NEG PRS WND THER DME<=50SQCM: CPT

## 2024-08-15 PROCEDURE — 97530 THERAPEUTIC ACTIVITIES: CPT

## 2024-08-15 PROCEDURE — 97530 THERAPEUTIC ACTIVITIES: CPT | Mod: CQ

## 2024-08-15 RX ORDER — MAGNESIUM SULFATE HEPTAHYDRATE 40 MG/ML
2 INJECTION, SOLUTION INTRAVENOUS ONCE
Status: COMPLETED | OUTPATIENT
Start: 2024-08-15 | End: 2024-08-15

## 2024-08-15 RX ADMIN — TRAMADOL HYDROCHLORIDE 50 MG: 50 TABLET, COATED ORAL at 08:08

## 2024-08-15 RX ADMIN — MAGNESIUM SULFATE HEPTAHYDRATE 2 G: 40 INJECTION, SOLUTION INTRAVENOUS at 04:08

## 2024-08-15 RX ADMIN — ACETAMINOPHEN 650 MG: 650 SOLUTION ORAL at 11:08

## 2024-08-15 RX ADMIN — SENNOSIDES AND DOCUSATE SODIUM 2 TABLET: 50; 8.6 TABLET ORAL at 09:08

## 2024-08-15 RX ADMIN — CARVEDILOL 3.12 MG: 3.12 TABLET, FILM COATED ORAL at 10:08

## 2024-08-15 RX ADMIN — MEGESTROL ACETATE 200 MG: 40 SUSPENSION ORAL at 11:08

## 2024-08-15 RX ADMIN — TRAMADOL HYDROCHLORIDE 50 MG: 50 TABLET, COATED ORAL at 12:08

## 2024-08-15 RX ADMIN — ACETAMINOPHEN 650 MG: 650 SOLUTION ORAL at 06:08

## 2024-08-15 RX ADMIN — LEVOTHYROXINE SODIUM 125 MCG: 125 TABLET ORAL at 05:08

## 2024-08-15 RX ADMIN — PANTOPRAZOLE SODIUM 40 MG: 40 TABLET, DELAYED RELEASE ORAL at 10:08

## 2024-08-15 RX ADMIN — POLYETHYLENE GLYCOL 3350 17 G: 17 POWDER, FOR SOLUTION ORAL at 10:08

## 2024-08-15 RX ADMIN — SODIUM CHLORIDE, PRESERVATIVE FREE 10 ML: 5 INJECTION INTRAVENOUS at 12:08

## 2024-08-15 RX ADMIN — ACETAMINOPHEN 650 MG: 650 SOLUTION ORAL at 05:08

## 2024-08-15 RX ADMIN — SODIUM CHLORIDE, PRESERVATIVE FREE 10 ML: 5 INJECTION INTRAVENOUS at 05:08

## 2024-08-15 RX ADMIN — ACETAMINOPHEN 650 MG: 650 SOLUTION ORAL at 12:08

## 2024-08-15 RX ADMIN — SACUBITRIL AND VALSARTAN 1 TABLET: 24; 26 TABLET, FILM COATED ORAL at 10:08

## 2024-08-15 RX ADMIN — CARVEDILOL 3.12 MG: 3.12 TABLET, FILM COATED ORAL at 09:08

## 2024-08-15 RX ADMIN — FUROSEMIDE 20 MG: 10 INJECTION, SOLUTION INTRAMUSCULAR; INTRAVENOUS at 08:08

## 2024-08-15 RX ADMIN — MEGESTROL ACETATE 200 MG: 40 SUSPENSION ORAL at 04:08

## 2024-08-15 RX ADMIN — CEFAZOLIN 2 G: 2 INJECTION, POWDER, FOR SOLUTION INTRAMUSCULAR; INTRAVENOUS at 12:08

## 2024-08-15 RX ADMIN — PIPERACILLIN SODIUM AND TAZOBACTAM SODIUM 4.5 G: 4; .5 INJECTION, POWDER, LYOPHILIZED, FOR SOLUTION INTRAVENOUS at 04:08

## 2024-08-15 RX ADMIN — CEFAZOLIN 2 G: 2 INJECTION, POWDER, FOR SOLUTION INTRAMUSCULAR; INTRAVENOUS at 08:08

## 2024-08-15 RX ADMIN — SACUBITRIL AND VALSARTAN 1 TABLET: 24; 26 TABLET, FILM COATED ORAL at 09:08

## 2024-08-15 RX ADMIN — POLYETHYLENE GLYCOL 3350 17 G: 17 POWDER, FOR SOLUTION ORAL at 09:08

## 2024-08-15 RX ADMIN — TRAMADOL HYDROCHLORIDE 50 MG: 50 TABLET, COATED ORAL at 09:08

## 2024-08-15 RX ADMIN — SODIUM CHLORIDE, PRESERVATIVE FREE 10 ML: 5 INJECTION INTRAVENOUS at 11:08

## 2024-08-15 NOTE — NURSING
Nurses Note -- 4 Eyes      8/15/2024   5:58 PM      Skin assessed during: Daily Assessment      [] No Altered Skin Integrity Present    []Prevention Measures Documented      [x] Yes- Altered Skin Integrity Present or Discovered   [] LDA Added if Not in Epic (Describe Wound)   [] New Altered Skin Integrity was Present on Admit and Documented in LDA   [] Wound Image Taken    Wound Care Consulted? Yes    Attending Nurse:  Marleni Quiroz RN/Staff Member:   Kiara          - Hepatology consulted, appreciate recommendations. Transaminitis likely due to DILI (Depakote and hydralazine), both discontinued.  - US of Abdomen 9/1 showed contracted gallbladder with cholelithiasis.

## 2024-08-15 NOTE — PROGRESS NOTES
[facility] - Orthopaedics  Wound Care    Patient Name:  Homa Jaquez   MRN:  [id number]  Date: 8/15/2024  Diagnosis: Post-operative complication    History:     Past Medical History:   Diagnosis Date    A-fib     Chronic cystitis     GERD (gastroesophageal reflux disease)     Graves disease     Hypertension     Hypothyroidism, unspecified     Spinal stenosis        Social History     Socioeconomic History    Marital status:    Tobacco Use    Smoking status: Former     Types: Cigarettes    Smokeless tobacco: Never   Substance and Sexual Activity    Alcohol use: Yes    Drug use: Never    Sexual activity: Not Currently     Social Determinants of Health     Financial Resource Strain: Low Risk  (7/4/2024)    Overall Financial Resource Strain (CARDIA)     Difficulty of Paying Living Expenses: Not hard at all   Food Insecurity: No Food Insecurity (7/4/2024)    Hunger Vital Sign     Worried About Running Out of Food in the Last Year: Never true     Ran Out of Food in the Last Year: Never true   Transportation Needs: No Transportation Needs (7/9/2024)    PRAPARE - Transportation     Lack of Transportation (Medical): No     Lack of Transportation (Non-Medical): No   Stress: No Stress Concern Present (7/4/2024)    [employer] [place] [employer] - Occupational Stress Questionnaire     Feeling of Stress : Not at all   Housing Stability: Low Risk  (7/4/2024)    Housing Stability Vital Sign     Unable to Pay for Housing in the Last Year: No     Homeless in the Last Year: No       Precautions:     Allergies as of 08/08/2024 - Reviewed 07/15/2024   Allergen Reaction Noted    Cefadroxil  09/08/2022    Cefuroxime axetil  09/08/2022    Cephalexin  09/08/2022    Ciprofloxacin  09/08/2022    Enoxaparin  09/08/2022    Methenamine hippurate  09/08/2022    Promethazine  09/08/2022       WOC Assessment Details/Treatment        08/15/24 1445   WOCN Assessment   WOCN Total Time (mins) 60   Visit Date  08/15/24   Visit Time 1445   Consult Type Follow Up   WOCN Speciality Wound   WOCN List wound vac   Wound surgical   Procedure wound vac   Intervention changed   Teaching on-going   Skin Interventions   Device Skin Pressure Protection absorbent pad utilized/changed        Wound 08/12/24 1328 Incision Left lateral Hip   Date First Assessed/Time First Assessed: 08/12/24 1328   Primary Wound Type: Incision  Side: Left  Orientation: lateral  Location: Hip  Additional Comments: hemavac, black foam wound vac   Wound Image     Dressing Appearance Moist drainage   Drainage Amount Moderate   Drainage Characteristics/Odor Serosanguineous   Appearance Moist;Sutures intact;Red   Periwound Area Redness;Moist   Wound Edges Approximated   Care Cleansed with:;Sterile normal saline;Applied:;Skin Barrier   Dressing Applied  (NPWT)   Dressing Change Due 08/19/24        Negative Pressure Wound Therapy  08/12/24 Left lateral   Placement Date: 08/12/24   Side: Left  Orientation: lateral  Location: Hip   NPWT Type Incision Management   Therapy Setting NPWT Continuous therapy   Pressure Setting NPWT 125 mmHg   Therapy Interventions NPWT Dressing changed   Sponges Inserted NPWT Black  (versatel)   Sponges Removed NPWT Black     Wound care follow up. Patient is S/P Irrigation debridement of left wound on 8/12/2024 by Dr Bains. Discussed with Nurse Marleni SANCHEZ, wound vac dressing to be changed today. Daughter at bedside. Wound vac dressing removed. Approximately 25cm in length. Incision cleansed with NS. Skin prep applied to periwound. Versatel applied to incision on top of sutures, Black wound vac sponge applied. Excellent seal noted at 125mm/ hg continuous. Patient tolerated well, no distress noted. Will Follow up. Nursing to continue with preventative measures. Will follow up.     08/15/2024

## 2024-08-15 NOTE — PHYSICIAN QUERY
Please clarify the nutritional diagnosis associated with the clinical findings (include all that apply):  Moderate Protein Calorie Malnutrition

## 2024-08-15 NOTE — NURSING
Nurses Note -- 4 Eyes      8/14/2024   7:15 PM      Skin assessed during: Q Shift Change      [] No Altered Skin Integrity Present    []Prevention Measures Documented      [x] Yes- Altered Skin Integrity Present or Discovered**no NEW skin issues present**   [] LDA Added if Not in Epic (Describe Wound)   [] New Altered Skin Integrity was Present on Admit and Documented in LDA   [] Wound Image Taken    Wound Care Consulted? No-wound care already seeing patient    Attending Nurse:  DELMIS Le RN/Staff Member:   LAURA Yepez

## 2024-08-15 NOTE — PT/OT/SLP PROGRESS
Physical Therapy Treatment    Patient Name:  Homa Jaquez   MRN:  [id number]    Recommendations:     Discharge Recommendations: Moderate Intensity Therapy  Discharge Equipment Recommendations: none  Barriers to discharge:  impaired mobility     Assessment:     Homa Jaquez is a 88 y.o. female admitted with a medical diagnosis of Post-operative complication.  She presents with the following impairments/functional limitations: weakness, impaired functional mobility, decreased safety awareness, impaired coordination, decreased coordination, impaired endurance, gait instability, impaired self care skills, orthopedic precautions, pain .    Rehab Prognosis: Fair and Poor; patient would benefit from acute skilled PT services to address these deficits and reach maximum level of function.    Recent Surgery: Procedure(s) (LRB):  REVISION,ARTHROPLASTY,HIP,POSTERIOR APPROACH (Left) 3 Days Post-Op    Plan:     During this hospitalization, patient to be seen daily (QD-BID pending pt tolerance) to address the identified rehab impairments via gait training, therapeutic activities, therapeutic exercises and progress toward the following goals:    Plan of Care Expires:  08/19/24    Subjective     Chief Complaint: pain in L hip   Patient/Family Comments/goals: n/a  Pain/Comfort:  Pain Rating 1: 7/10  Location - Side 1: Left      Objective:     Communicated with rn prior to session.  Patient found supine with   upon PT entry to room.     General Precautions: Standard, fall  Orthopedic Precautions: LLE weight bearing as tolerated, LLE posterior precautions  Braces: N/A  Respiratory Status: Room air     Functional Mobility:  Bed Mobility:     Supine to Sit: total assistance and of 2 persons  Transfers:     Bed to Chair: total assistance and of 2 persons with  no AD  using  Stand Pivot    Treatment & Education:  Pt required max encouragement to participate in therapy, pt had increase pain in L hip. Pt minimally able to assist with  transfer due to weakness and pain.     Patient left up in chair with all lines intact and call button in reach..    GOALS:   Multidisciplinary Problems       Physical Therapy Goals          Problem: Physical Therapy    Goal Priority Disciplines Outcome Goal Variances Interventions   Physical Therapy Goal     PT, PT/OT Progressing     Description: Pt will improve functional independence by performing:    Bed mobility:   Rolling:max A  Supine to sit: Max A   Sit to supine: Max A  Sit to stand: max A with rolling walker  Bed to chair: max A with Stand Step  with rolling walker   Bed to chair t/f: Max A slide board                            Time Tracking:     PT Received On:    PT Start Time: 0905     PT Stop Time: 0930  PT Total Time (min): 25 min     Billable Minutes: Therapeutic Activity 25    Treatment Type: Treatment  PT/PTA: PTA     Number of PTA visits since last PT visit: 2     08/15/2024

## 2024-08-15 NOTE — PROGRESS NOTES
[facility]  [facility] Progress Note      Patient Name: Homa Jaquez  MRN: [id number]  Admission Date: 8/9/2024   Length of Stay: 6  Attending Physician: Olvin Huber MD  Primary Care Provider: Franklyn Brito MD  Face-to-Face encounter date: 08/15/2024    Code Status: DNR        Chief Complaint:   Wound drainage        HPI:   Homa Jaquez is a 88 y.o. female who  has a past medical history of A-fib, Chronic cystitis, GERD (gastroesophageal reflux disease), Graves disease, Hypertension, Hypothyroidism, unspecified, and Spinal stenosis.. The patient presented to [facility] 8/9/2024 with a primary complaint of post op complications.  Pt with a left femur fracture after a fall 7/2, underwent IMN 7/3 and [facility] 7[facility]. She reported increasing pain to hip, xrays showing failed intertrochanteric femur fracture.  Pt underwent conversion left SILVIA 7/15, she was discharged 7/26 to snf in [place].  She developed pneumonia which was treated with merrem and vancomycin and completed about 3 days ago.  She was started on megace for poor appetite about a week ago and has been eating good since, coumadin held 3 days ago for elevated inr. Doxycline and cymbalta was stopped and she has had lasix on and off with resolution of edema.  She has been awake and alert and improving.  Her wound vac was removed 1 week ago, drainage improved.  She is transferred back for evaluation of incision.  Family says there has been some serosanginous drainage on bandage but mild and decreased from admission where she was draining 1000cc per day.  No f/c.  Inr has been up and down, currently on hold x 3 days.     Overview/Hospital Course:  No notes on file       Interval Hx:   The pt has no c/o at present. She says it takes a lot of effort to work with therapy due general weakness. She does have a good apptite. She denies pain, fever, chills or sweats. She had a bm  today. Her daughter is at bedside. S/p blood transfusion yesterday x 2 units.    Review of Systems   All other systems reviewed and are negative.      Objective/physical exam:  General: In no acute distress, afebrile  Chest: Clear to auscultation bilaterally  Heart: RRR, +S1, S2, no appreciable murmur, picc,   Abdomen: Soft, nontender, BS +  MSK: Warm, +1 b/l lower extremity edema, no clubbing or cyanosis  Skin: left hip surgery/wound vac/hemovac in place  Neurologic: Alert and oriented x4    VITAL SIGNS: 24 HRS MIN & MAX LAST   Temp  Min: 97.7 °F (36.5 °C)  Max: 98.8 °F (37.1 °C) 97.7 °F (36.5 °C)   BP  Min: 96/54  Max: 134/69 117/67   Pulse  Min: 84  Max: 103  98   Resp  Min: 16  Max: 18 18   SpO2  Min: 97 %  Max: 100 % 98 %       Recent Labs   Lab 08/13/24  0726 08/14/24  0435 08/15/24  0429   WBC 8.30 7.94 8.29   RBC 2.60* 2.16* 3.02*   HGB 8.4* 6.9* 9.9*   HCT 26.7* 21.9* 29.8*   .7* 101.4* 98.7*   MCH 32.3* 31.9* 32.8*   MCHC 31.5* 31.5* 33.2   RDW 18.1* 18.0* 18.3*    227 209   MPV 9.3 9.5 9.7       Recent Labs   Lab 08/13/24  0726 08/14/24  0435 08/15/24  0429    138 137   K 4.7 4.9 4.5    107 110*   CO2 24 22* 21*   BUN 22.7* 23.6* 24.4*   CREATININE 1.20* 1.35* 1.35*   CALCIUM 8.3* 7.8* 7.7*   MG  --   --  1.50*   ALBUMIN  --   --  1.8*        Microbiology Results (last 7 days)       Procedure Component Value Units Date/Time    Tissue Culture - Aerobic [[id number]]  (Abnormal) Collected: 08/12/24 1616    Order Status: Completed Specimen: Tissue from Hip, Left Updated: 08/15/24 1011     Tissue - Aerobic Culture Few Gram-negative Rods    Tissue Culture - Aerobic [[id number]]  (Abnormal) Collected: 08/12/24 1616    Order Status: Completed Specimen: Tissue from Hip, Left Updated: 08/15/24 1010     Tissue - Aerobic Culture Moderate Gram-negative Rods    Tissue Culture - Aerobic [[id number]]  (Abnormal) Collected: 08/12/24 1616    Order Status: Completed Specimen: Tissue from Hip, Left  Updated: 08/15/24 0806     Tissue - Aerobic Culture Moderate Gram-negative Rods    Anaerobic Culture [[id number]] Collected: 08/12/24 1616    Order Status: Completed Specimen: Tissue from Hip, Left Updated: 08/15/24 0738     Anaerobe Culture No Anaerobes Isolated    Anaerobic Culture [[id number]] Collected: 08/12/24 1616    Order Status: Completed Specimen: Tissue from Hip, Left Updated: 08/15/24 0735     Anaerobe Culture No Anaerobes Isolated    Anaerobic Culture [[id number]] Collected: 08/12/24 1616    Order Status: Completed Specimen: Tissue from Hip, Left Updated: 08/15/24 0734     Anaerobe Culture No Anaerobes Isolated    Blood Culture [[id number]]  (Normal) Collected: 08/09/24 1433    Order Status: Completed Specimen: Blood from Antecubital, Left Updated: 08/14/24 1902     Blood Culture No Growth at 5 days    Blood Culture [[id number]]  (Normal) Collected: 08/09/24 1436    Order Status: Completed Specimen: Blood from Arm, Left Updated: 08/14/24 1902     Blood Culture No Growth at 5 days    Gram Stain [[id number]] Collected: 08/12/24 1616    Order Status: Completed Specimen: Tissue from Hip, Left Updated: 08/14/24 0843     GRAM STAIN Few WBC observed      Few Gram Negative Rods    Gram Stain [[id number]] Collected: 08/12/24 1616    Order Status: Completed Specimen: Tissue from Hip, Left Updated: 08/13/24 1404     GRAM STAIN Few WBC observed      No bacteria seen    Gram Stain [[id number]] Collected: 08/12/24 1616    Order Status: Completed Specimen: Tissue from Hip, Left Updated: 08/13/24 1404     GRAM STAIN Few WBC observed      No bacteria seen    Anaerobic Culture [[id number]] Collected: 08/12/24 1516    Order Status: Canceled Specimen: Tissue from Hip, Left     Gram Stain [[id number]] Collected: 08/12/24 1516    Order Status: Canceled Specimen: Tissue from Hip, Left     Tissue Culture - Aerobic [[id number]] Collected: 08/12/24 1516    Order Status: Canceled Specimen: Tissue from Hip, Left     Anaerobic  Culture [[id number]] Collected: 08/12/24 1324    Order Status: Canceled Specimen: Tissue from Hip, Left     Anaerobic Culture [[id number]] Collected: 08/12/24 1324    Order Status: Canceled Specimen: Tissue from Hip, Left     Gram Stain [[id number]] Collected: 08/12/24 1324    Order Status: Canceled Specimen: Tissue from Hip, Left     Gram Stain [[id number]] Collected: 08/12/24 1324    Order Status: Canceled Specimen: Tissue from Hip, Left     Tissue Culture - Aerobic [[id number]] Collected: 08/12/24 1324    Order Status: Canceled Specimen: Tissue from Hip, Left     Tissue Culture - Aerobic [[id number]] Collected: 08/12/24 1324    Order Status: Canceled Specimen: Tissue from Hip, Left     Blood Culture (site 1) [[id number]]     Order Status: Canceled Specimen: Blood from PICC Line     Blood Culture (site 2) [[id number]]     Order Status: Canceled Specimen: Blood              Radiology:  X-Ray Hip 2 or 3 views Left with Pelvis when performed  Narrative: EXAMINATION:  XR HIP WITH PELVIS WHEN PERFORMED 2 OR 3 VIEWS LEFT    CLINICAL HISTORY:  Broken internal left hip prosthesis, initial encounter post op;    COMPARISON:  X-rays dated 07/15/2024    FINDINGS:  There is stable alignment following left hip arthroplasty.  There is no new acute fracture identified.  Soft tissue drain is in place.  Impression: Satisfactory alignment following left hip arthroplasty.    Electronically signed by: Alice Shaffer  Date:    08/12/2024  Time:    18:21      Scheduled Med:   acetaminophen  650 mg Oral Q6H    ascorbic acid (vitamin C)  500 mg Oral BID    carvediloL  3.125 mg Oral BID    ceFAZolin (Ancef) IV (PEDS and ADULTS)  2 g Intravenous Q8H    furosemide (LASIX) injection  20 mg Intravenous Daily    levothyroxine  125 mcg Oral Before breakfast    megestroL  200 mg Oral TID WM    pantoprazole  40 mg Oral Daily    polyethylene glycol  17 g Oral BID    pravastatin  20 mg Oral Daily    sacubitriL-valsartan  1 tablet Oral BID     senna-docusate 8.6-50 mg  2 tablet Oral BID    sodium chloride 0.9%  10 mL Intravenous Q6H    zinc sulfate  220 mg Oral Daily        Continuous Infusions:       PRN Meds:    Current Facility-Administered Medications:     0.9%  NaCl infusion (for blood administration), , Intravenous, Q24H PRN    dextrose 10%, 12.5 g, Intravenous, PRN    dextrose 10%, 25 g, Intravenous, PRN    glucagon (human recombinant), 1 mg, Intramuscular, PRN    glucose, 16 g, Oral, PRN    glucose, 24 g, Oral, PRN    insulin aspart U-100, 0-5 Units, Subcutaneous, QID (AC + HS) PRN    lactulose, 20 g, Oral, Q6H PRN    naloxone, 0.02 mg, Intravenous, PRN    nitroGLYCERIN, 0.4 mg, Sublingual, Q5 Min PRN    ondansetron, 4 mg, Intravenous, Q8H PRN    Flushing PICC/Midline Protocol, , , Until Discontinued **AND** sodium chloride 0.9%, 10 mL, Intravenous, Q6H **AND** sodium chloride 0.9%, 10 mL, Intravenous, PRN    traMADoL, 50 mg, Oral, Q4H PRN     Nutrition Status:      Assessment/Plan:  Left femur IMN 7/3, failed IMN  S/p Conversion SILVIA 7/15/washout 8/13  Postop wound infection -GNR on 8/13  Acute blood loss anemia s/p 2unit prbc 8/15  Dilated cardiomyopathy  A fib  Htn  Hld  Gout  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 I have reviewed or attempted to review medical records based upon their availability      _____________________________________________________________________            Olvin Huber MD   08/15/2024

## 2024-08-15 NOTE — PT/OT/SLP PROGRESS
"Occupational Therapy   Treatment    Name: Homa Jaquez  MRN: [id number]  Admitting Diagnosis:  Post-operative complication  3 Days Post-Op    Recommendations:     Discharge Recommendations: Moderate Intensity Therapy  Discharge Equipment Recommendations:  bedside commode, hospital bed, walker, rolling  Barriers to discharge:   (level of assist for FM and transfers, pain)    Assessment:     Homa Jaquez is a 88 y.o. female with a medical diagnosis of Post-operative complication.  Performance deficits affecting function are weakness, pain, impaired endurance, impaired self care skills, impaired functional mobility, gait instability, impaired balance, decreased lower extremity function, decreased upper extremity function, decreased ROM, orthopedic precautions, impaired joint extensibility, impaired muscle length, impaired skin, edema, impaired coordination, impaired fine motor.     Rehab Prognosis:  Poor; patient would benefit from acute skilled OT services to address these deficits and reach maximum level of function.       Plan:     Patient to be seen daily (QD-BID) to address the above listed problems via self-care/home management, therapeutic activities, therapeutic exercises  Plan of Care Expires: 08/19/24  Plan of Care Reviewed with: patient, daughter    Subjective     Chief Complaint: pain  Patient/Family Comments/goals: "I really want to get in bed."  Pain/Comfort:  Pain Rating 1:  (unrated)  Location - Side 1: Left  Location - Orientation 1: generalized  Location 1: hip  Pain Addressed 1: Distraction, Reposition, Cessation of Activity  Pain Rating 2:  (unrated)  Location - Side 2: Left  Location - Orientation 2: anterior  Location 2: thigh  Pain Addressed 2: Distraction, Reposition, Cessation of Activity    Objective:     Communicated with: NSG prior to session.  Patient found up in chair with peripheral IV, talley catheter, AYSHA drain, wound vac upon OT entry to room.    General Precautions: Standard, fall "    Orthopedic Precautions:LLE weight bearing as tolerated, LLE posterior precautions  Braces: N/A  Respiratory Status: Room air     Occupational Performance:     Bed Mobility:    Patient completed Scooting/Bridging with dependent     Functional Mobility/Transfers:  Patient completed Chair <> Bed Transfer using Stand Pivot technique with dependence with no assistive device. Patient fatigued and in pain following transfer, unable to tolerate any other activity. AYSHA drain also leaking, Marleni RN notified.     Treatment & Education:  While supine, heel protective booties donned and rolled towels placed at lateral ankles to prevent continued external rotation of BLEs. Patient and daughter instructed to continuously check position of feet to correct towel placement if needed.     Patient left HOB elevated with all lines intact, call button in reach, and daughter present    GOALS:   Multidisciplinary Problems       Occupational Therapy Goals          Problem: Occupational Therapy    Goal Priority Disciplines Outcome Interventions   Occupational Therapy Goal     OT, PT/OT Progressing    Description: Pt will perform LB dressing c AE PRN and Max A by d/c.  Pt will perform toileting Max A by d/c.  Pt will perform toilet t/f Max A to BSC by d/c.                       Time Tracking:     OT Date of Treatment: 08/15/24  OT Start Time: 1317  OT Stop Time: 1337  OT Total Time (min): 20 min    Billable Minutes:Therapeutic Activity 20    OT/EVY: OT          8/15/2024

## 2024-08-15 NOTE — PROGRESS NOTES
Inpatient Nutrition Assessment    Admit Date: 8/9/2024   Total duration of encounter: 6 days   Patient Age: 88 y.o.    Nutrition Recommendation/Prescription     Continue Regular diet with coumadin restriction as tolerated   Continue Boost Plus TID Boost Plus (360 kcal, 14 g protein per serving).  Continue 500 mg Vitamin C BID, MVI daily, and 220 mg of Zinc Sulfate daily to aid in healing.   Continue Megace as medically appropriate.         Communication of Recommendations:  EMR    Nutrition Assessment     Malnutrition Assessment/Nutrition-Focused Physical Exam        Malnutrition Context: acute illness or injury (08/12/24 1039)  Malnutrition Level: moderate (08/12/24 1039)  Energy Intake (Malnutrition): less than or equal to 50% for greater than or equal to 1 month (08/12/24 1039)     Or[place] Region (Subcutaneous Fat Loss): mild depletion        Muscle Mass (Malnutrition): mild depletion (08/12/24 1039)  [place] (Muscle Loss): mild depletion                                A minimum of two characteristics is recommended for diagnosis of either severe or non-severe malnutrition.    Chart Review    Reason Seen: follow-up    Malnutrition Screening Tool Results   Have you recently lost weight without trying?: Unsure  Have you been eating poorly because of a decreased appetite?: Yes   MST Score: 3   Diagnosis:  Wound drainage.     Relevant Medical History:   A-fib      Chronic cystitis      GERD (gastroesophageal reflux disease)      Graves disease      Hypertension      Hypothyroidism, unspecified      Spinal stenosis          Scheduled Medications:  acetaminophen, 650 mg, Q6H  ascorbic acid (vitamin C), 500 mg, BID  carvediloL, 3.125 mg, BID  ceFAZolin (Ancef) IV (PEDS and ADULTS), 2 g, Q8H  furosemide (LASIX) injection, 20 mg, Daily  levothyroxine, 125 mcg, Before breakfast  megestroL, 200 mg, TID WM  pantoprazole, 40 mg, Daily  polyethylene glycol, 17 g, BID  pravastatin, 20 mg, Daily  sacubitriL-valsartan, 1  tablet, BID  senna-docusate 8.6-50 mg, 2 tablet, BID  sodium chloride 0.9%, 10 mL, Q6H  zinc sulfate, 220 mg, Daily    Continuous Infusions:   PRN Medications:  0.9%  NaCl infusion (for blood administration), , Q24H PRN  dextrose 10%, 12.5 g, PRN  dextrose 10%, 25 g, PRN  glucagon (human recombinant), 1 mg, PRN  glucose, 16 g, PRN  glucose, 24 g, PRN  insulin aspart U-100, 0-5 Units, QID (AC + HS) PRN  lactulose, 20 g, Q6H PRN  naloxone, 0.02 mg, PRN  nitroGLYCERIN, 0.4 mg, Q5 Min PRN  ondansetron, 4 mg, Q8H PRN  sodium chloride 0.9%, 10 mL, PRN  traMADoL, 50 mg, Q4H PRN    Calorie Containing IV Medications: no significant kcals from medications at this time    Recent Labs   Lab 08/09/24  1433 08/11/24  0611 08/12/24  1756 08/13/24  0726 08/14/24  0435 08/15/24  0429   * 133*  --  136 138 137   K 4.2 4.9  --  4.7 4.9 4.5   CALCIUM 8.2* 8.1*  --  8.3* 7.8* 7.7*   PHOS  --   --   --   --   --  2.6   MG  --   --   --   --   --  1.50*   CO2 25 25  --  24 22* 21*   BUN 19.6 19.8  --  22.7* 23.6* 24.4*   CREATININE 1.19* 1.07*  --  1.20* 1.35* 1.35*   EGFRNORACEVR 44 50  --  44 38 38   GLUCOSE 133* 109  --  87 115 102   ALBUMIN  --   --   --   --   --  1.8*   PREALB 5.6*  --   --   --   --   --    HSCRP 139.44*  --   --   --   --   --    WBC 9.86  --   --  8.30 7.94 8.29   HGB 10.8*  --  8.9* 8.4* 6.9* 9.9*   HCT 32.8*  --  27.5* 26.7* 21.9* 29.8*     Nutrition Orders:  Diet Adult Regular Coumadin Restriction  Dietary nutrition supplements TID; Boost Plus Nutritional Drink - Any flavor    Appetite/Oral Intake: fair/25-50% of meals   Factors Affecting Nutritional Intake: decreased appetite  Social Needs Impacting Access to Food: none identified  Food/Advent/Cultural Preferences: none reported  Food Allergies: none reported  Last Bowel Movement: 08/14/24  Wound(s):     Wound 08/09/24 1400 Other (comment) midline Sacral spine-Tissue loss description: Not applicable       Wound 08/09/24 1704 Other (comment) Left  "Heel-Tissue loss description: Not applicable       Wound 24 1704 Other (comment) Right Heel-Tissue loss description: Not applicable Noted.     Comments    8/10:  Noted pt is status post conversion L SILVIA L hip with significant serous drainage and plans are for surgery Monday afternoon per EMR notes. Consult received secondary to pt with poor appetite/intake. Unable to complete NFPE at this time. RD to complete at f/u. No recent weight loss or chewing/swallowing issues noted in EMR. Will add Boost Plus, TID, and continue to monitor during stay.     () Pt currently NPO for surgery later today. Met with pt and daughter. Pt with poor appetite and intake since July. She was dx with a UTI at that time. Megace was started a week ago and intake has improved some since receiving. Pt drinks about one ONS per day. Denied N/V/C/D. No difficulties chewing or swallowing. Med/labs reviewed. # per daughter    (8/15) Pt with poor to fair appetite and intake. Consumes 25-50% of meals. Drinks one ONS/day. No reports of N/V/C/D. No difficulties chewing or swallowing. Med/labs reviewed. Continues to receive Megace. No new wt recorded.     Anthropometrics    Height: 5' 4.02" (162.6 cm), Height Method: Stated  Last Weight: 86 kg (189 lb 9.5 oz) (24), Weight Method: Bed Scale     BMI Classification: obese grade I (BMI 30-34.9)     Ideal Body Weight (IBW), Female: 120.1 lb                          Usual Body Weight (UBW), k.9 kg  % Usual Body Weight: 103.96     Usual Weight Provided By: family/caregiver    Wt Readings from Last 5 Encounters:   24 86 kg (189 lb 9.5 oz)   24 82.9 kg (182 lb 12.2 oz)   24 82.9 kg (182 lb 12.2 oz)   24 70.3 kg (155 lb)   23 67 kg (147 lb 12.8 oz)     Weight Change(s) Since Admission: no new wt recorded  Wt Readings from Last 1 Encounters:   24 86 kg (189 lb 9.5 oz)   Admit Weight: 86 kg (189 lb 9.5 oz) (24), Weight Method: Bed " Scale    Estimated Needs    Weight Used For Calorie Calculations: 86 kg (189 lb 9.5 oz)  Energy Calorie Requirements (kcal): 1550 to 1700 kcals/day (SF 1.2-1.3)  Energy Need Method: [place]-St Jeor  Weight Used For Protein Calculations: 86 kg (189 lb 9.5 oz)  Protein Requirements: 70 to 90 g/day (0.8 to 1.0 g/kg/CBW)  Fluid Requirements (mL): 1550 to 1700 mL/day (1mL/kcal) or per MD Guidance.        Enteral Nutrition     Patient not receiving enteral nutrition at this time.    Parenteral Nutrition     Patient not receiving parenteral nutrition support at this time.    Evaluation of Received Nutrient Intake    Calories: not meeting estimated needs  Protein: not meeting estimated needs    Patient Education     Not applicable.    Nutrition Diagnosis     PES: Increased nutrient needs (Vitamin C, protein, and Zinc ) related to increased protein energy demand for wound healing as evidenced by Recent surgery/wounds. (active)       PES: Moderate acute disease or injury related malnutrition related to acute illness as evidenced by less than or equal to 50% needs met for greater than or equal to 1 month, mild fat depletion, and mild muscle depletion. (active)     Nutrition Interventions     Intervention(s): commercial beverage, multivitamin/mineral supplement therapy, and collaboration with other providers    Goal: Meet greater than 80% of nutritional needs by follow-up. (goal progressing)    Nutrition Goals & Monitoring     Dietitian will monitor: food and beverage intake, weight, weight change, electrolyte/renal panel, glucose/endocrine profile, and gastrointestinal profile  Discharge planning: continue Regular/Coumadin diet  Nutrition Risk/Follow-Up: moderate (follow-up in 3-5 days)   Please consult if re-assessment needed sooner.

## 2024-08-16 LAB
BACTERIA SPEC ANAEROBE CULT: NORMAL
BACTERIA TISS AEROBE CULT: ABNORMAL
BNP BLD-MCNC: 454.8 PG/ML
ERYTHROCYTE [DISTWIDTH] IN BLOOD BY AUTOMATED COUNT: 18.2 % (ref 11.5–17)
HCT VFR BLD AUTO: 31.3 % (ref 37–47)
HGB BLD-MCNC: 10.3 G/DL (ref 12–16)
INR PPP: 3.6 (ref 2–3)
MAGNESIUM SERPL-MCNC: 2 MG/DL (ref 1.6–2.6)
MCH RBC QN AUTO: 32.5 PG (ref 27–31)
MCHC RBC AUTO-ENTMCNC: 32.9 G/DL (ref 33–36)
MCV RBC AUTO: 98.7 FL (ref 80–94)
NRBC BLD AUTO-RTO: 0 %
PLATELET # BLD AUTO: 276 X10(3)/MCL (ref 130–400)
PMV BLD AUTO: 9.4 FL (ref 7.4–10.4)
PROTHROMBIN TIME: 36.5 SECONDS (ref 11.7–14.5)
RBC # BLD AUTO: 3.17 X10(6)/MCL (ref 4.2–5.4)
WBC # BLD AUTO: 8.06 X10(3)/MCL (ref 4.5–11.5)

## 2024-08-16 PROCEDURE — 63600175 PHARM REV CODE 636 W HCPCS: Performed by: GENERAL PRACTICE

## 2024-08-16 PROCEDURE — 25000003 PHARM REV CODE 250: Performed by: ORTHOPAEDIC SURGERY

## 2024-08-16 PROCEDURE — 97530 THERAPEUTIC ACTIVITIES: CPT | Mod: CQ

## 2024-08-16 PROCEDURE — 97530 THERAPEUTIC ACTIVITIES: CPT

## 2024-08-16 PROCEDURE — 36415 COLL VENOUS BLD VENIPUNCTURE: CPT | Performed by: INTERNAL MEDICINE

## 2024-08-16 PROCEDURE — S0179 MEGESTROL 20 MG: HCPCS | Performed by: ORTHOPAEDIC SURGERY

## 2024-08-16 PROCEDURE — 25000003 PHARM REV CODE 250: Performed by: NURSE PRACTITIONER

## 2024-08-16 PROCEDURE — A4216 STERILE WATER/SALINE, 10 ML: HCPCS | Performed by: INTERNAL MEDICINE

## 2024-08-16 PROCEDURE — 83735 ASSAY OF MAGNESIUM: CPT | Performed by: INTERNAL MEDICINE

## 2024-08-16 PROCEDURE — 83880 ASSAY OF NATRIURETIC PEPTIDE: CPT | Performed by: INTERNAL MEDICINE

## 2024-08-16 PROCEDURE — 85610 PROTHROMBIN TIME: CPT | Performed by: INTERNAL MEDICINE

## 2024-08-16 PROCEDURE — 21400001 HC TELEMETRY ROOM

## 2024-08-16 PROCEDURE — 85027 COMPLETE CBC AUTOMATED: CPT | Performed by: INTERNAL MEDICINE

## 2024-08-16 PROCEDURE — 25000003 PHARM REV CODE 250: Performed by: INTERNAL MEDICINE

## 2024-08-16 PROCEDURE — 97110 THERAPEUTIC EXERCISES: CPT

## 2024-08-16 PROCEDURE — 63600175 PHARM REV CODE 636 W HCPCS: Performed by: INTERNAL MEDICINE

## 2024-08-16 PROCEDURE — 25000003 PHARM REV CODE 250: Performed by: GENERAL PRACTICE

## 2024-08-16 RX ADMIN — MEGESTROL ACETATE 200 MG: 40 SUSPENSION ORAL at 09:08

## 2024-08-16 RX ADMIN — PANTOPRAZOLE SODIUM 40 MG: 40 TABLET, DELAYED RELEASE ORAL at 09:08

## 2024-08-16 RX ADMIN — MEGESTROL ACETATE 200 MG: 40 SUSPENSION ORAL at 05:08

## 2024-08-16 RX ADMIN — SENNOSIDES AND DOCUSATE SODIUM 2 TABLET: 50; 8.6 TABLET ORAL at 09:08

## 2024-08-16 RX ADMIN — TRAMADOL HYDROCHLORIDE 50 MG: 50 TABLET, COATED ORAL at 02:08

## 2024-08-16 RX ADMIN — ACETAMINOPHEN 650 MG: 650 SOLUTION ORAL at 06:08

## 2024-08-16 RX ADMIN — SODIUM CHLORIDE, PRESERVATIVE FREE 10 ML: 5 INJECTION INTRAVENOUS at 06:08

## 2024-08-16 RX ADMIN — POLYETHYLENE GLYCOL 3350 17 G: 17 POWDER, FOR SOLUTION ORAL at 09:08

## 2024-08-16 RX ADMIN — MEGESTROL ACETATE 200 MG: 40 SUSPENSION ORAL at 12:08

## 2024-08-16 RX ADMIN — PIPERACILLIN SODIUM AND TAZOBACTAM SODIUM 4.5 G: 4; .5 INJECTION, POWDER, LYOPHILIZED, FOR SOLUTION INTRAVENOUS at 09:08

## 2024-08-16 RX ADMIN — ACETAMINOPHEN 650 MG: 650 SOLUTION ORAL at 12:08

## 2024-08-16 RX ADMIN — PRAVASTATIN SODIUM 20 MG: 10 TABLET ORAL at 09:08

## 2024-08-16 RX ADMIN — POLYETHYLENE GLYCOL 3350 17 G: 17 POWDER, FOR SOLUTION ORAL at 08:08

## 2024-08-16 RX ADMIN — PIPERACILLIN SODIUM AND TAZOBACTAM SODIUM 4.5 G: 4; .5 INJECTION, POWDER, LYOPHILIZED, FOR SOLUTION INTRAVENOUS at 12:08

## 2024-08-16 RX ADMIN — ACETAMINOPHEN 650 MG: 650 SOLUTION ORAL at 05:08

## 2024-08-16 RX ADMIN — LEVOTHYROXINE SODIUM 125 MCG: 125 TABLET ORAL at 06:08

## 2024-08-16 RX ADMIN — CARVEDILOL 3.12 MG: 3.12 TABLET, FILM COATED ORAL at 09:08

## 2024-08-16 RX ADMIN — SACUBITRIL AND VALSARTAN 1 TABLET: 24; 26 TABLET, FILM COATED ORAL at 09:08

## 2024-08-16 RX ADMIN — TRAMADOL HYDROCHLORIDE 50 MG: 50 TABLET, COATED ORAL at 07:08

## 2024-08-16 RX ADMIN — SODIUM CHLORIDE, PRESERVATIVE FREE 10 ML: 5 INJECTION INTRAVENOUS at 12:08

## 2024-08-16 RX ADMIN — MEROPENEM 1 G: 1 INJECTION, POWDER, FOR SOLUTION INTRAVENOUS at 03:08

## 2024-08-16 RX ADMIN — FUROSEMIDE 20 MG: 10 INJECTION, SOLUTION INTRAMUSCULAR; INTRAVENOUS at 09:08

## 2024-08-16 NOTE — PT/OT/SLP PROGRESS
Physical Therapy Treatment    Patient Name:  Homa Jaquez   MRN:  [id number]    Recommendations:     Discharge Recommendations: Moderate Intensity Therapy  Discharge Equipment Recommendations: none  Barriers to discharge: Decreased caregiver support    Assessment:     Homa Jaquez is a 88 y.o. female admitted with a medical diagnosis of Post-operative complication.  She presents with the following impairments/functional limitations: weakness, impaired endurance, impaired sensation, impaired self care skills, impaired functional mobility, gait instability, impaired balance, decreased lower extremity function, decreased coordination, impaired cognition, decreased safety awareness, pain, decreased ROM, impaired coordination, impaired fine motor, impaired skin, edema, orthopedic precautions .    Rehab Prognosis: Fair; patient would benefit from acute skilled PT services to address these deficits and reach maximum level of function.    Recent Surgery: Procedure(s) (LRB):  REVISION,ARTHROPLASTY,HIP,POSTERIOR APPROACH (Left) 4 Days Post-Op    Plan:     During this hospitalization, patient to be seen daily (QD-BID pending pt tolerance) to address the identified rehab impairments via gait training, therapeutic activities, therapeutic exercises and progress toward the following goals:    Plan of Care Expires:  08/19/24    Subjective     Chief Complaint: pain   Patient/Family Comments/goals: per daughter for pt to return pt previous function   Pain/Comfort:  Location - Side 1: Left  Location 1: hip  Pain Addressed 1: Pre-medicate for activity, Reposition      Objective:     Communicated with nursing  prior to session.  Patient found HOB elevated with talley catheter, hemovac, peripheral IV, wound vac upon PT entry to room.     General Precautions: Standard, fall  Orthopedic Precautions: LLE weight bearing as tolerated, LLE posterior precautions  Braces: N/A  Respiratory Status: Room air     Functional Mobility:  Bed  Mobility:     Supine to Sit: of 2 persons and moda of  one with min a of another slow transition much increased time use of bed rails with vc for self effort   Transfers:     Sit to Stand:  maximal assistance, total assistance, and of 2 persons with no AD  Bed to Chair: maximal assistance, total assistance, and of 2 persons with  no AD  using  Stand Pivot and increased time with vc for safety     Treatment & Education:  Education on possible use of sliding board   Sitting EOB touching with trials of min a with vc for sitting posture     Patient left up in chair with call button in reach, daughter  present, and pt carina tx BLE elevated pt repositioned in recliner for comfort . Pt poor tolerance fatigues quickly  ..    GOALS:   Multidisciplinary Problems       Physical Therapy Goals          Problem: Physical Therapy    Goal Priority Disciplines Outcome Goal Variances Interventions   Physical Therapy Goal     PT, PT/OT Progressing     Description: Pt will improve functional independence by performing:    Bed mobility:   Rolling:max A  Supine to sit: Max A   Sit to supine: Max A  Sit to stand: max A with rolling walker  Bed to chair: max A with Stand Step  with rolling walker   Bed to chair t/f: Max A slide board                            Time Tracking:     PT Received On: 08/16/24  PT Start Time: 1015     PT Stop Time: 1040  PT Total Time (min): 25 min     Billable Minutes: Therapeutic Activity 25min    Treatment Type: Treatment  PT/PTA: PTA     Number of PTA visits since last PT visit: 3     08/16/2024

## 2024-08-16 NOTE — PROGRESS NOTES
Pharmacist Renal Dose Adjustment Note    Homa Jaquez is a 88 y.o. female being treated with the medication meropenem    Patient Data:    Vital Signs (Most Recent):  Temp: 97.5 °F (36.4 °C) (08/16/24 1109)  Pulse: 90 (08/16/24 1109)  Resp: 18 (08/16/24 0719)  BP: 103/71 (08/16/24 1109)  SpO2: 97 % (08/16/24 1109) Vital Signs (72h Range):  Temp:  [97.4 °F (36.3 °C)-98.8 °F (37.1 °C)]   Pulse:  []   Resp:  [16-18]   BP: ()/(47-77)   SpO2:  [95 %-100 %]      Recent Labs   Lab 08/13/24  0726 08/14/24  0435 08/15/24  0429   CREATININE 1.20* 1.35* 1.35*     Serum creatinine: 1.35 mg/dL (H) 08/15/24 0429  Estimated creatinine clearance: 31.1 mL/min (A)    Medication:meropenem dose: 1g frequency q8h will be changed to medication:meropenem dose:1g frequency:q12h for CrCl 26-50    Pharmacist's Name: Zoë Kaur  Pharmacist's Extension: [phone]

## 2024-08-16 NOTE — PT/OT/SLP PROGRESS
"Occupational Therapy   Treatment    Name: Homa Jaquez  MRN: [id number]  Admitting Diagnosis:  Post-operative complication  4 Days Post-Op    Recommendations:     Discharge Recommendations: Moderate Intensity Therapy  Discharge Equipment Recommendations:  bedside commode, hospital bed, walker, rolling  Barriers to discharge:  None    Assessment:     Homa Jaquez is a 88 y.o. female with a medical diagnosis of Post-operative complication. Performance deficits affecting function are weakness, orthopedic precautions, impaired joint extensibility, gait instability, impaired balance, impaired coordination, impaired fine motor, impaired skin, edema, decreased ROM, pain, decreased lower extremity function, impaired functional mobility, impaired endurance, impaired self care skills.     Rehab Prognosis:  Fair; patient would benefit from acute skilled OT services to address these deficits and reach maximum level of function.       Plan:     Patient to be seen daily (QD-BID) to address the above listed problems via self-care/home management, therapeutic activities, therapeutic exercises  Plan of Care Expires: 08/19/24  Plan of Care Reviewed with: patient, family    Subjective     Chief Complaint: pain  Patient/Family Comments/goals: "Whatever you say."  Pain/Comfort:  Location - Side 1: Left  Location - Orientation 1: generalized  Location 1: leg  Pain Addressed 1: Reposition, Distraction    Objective:     Communicated with: NSG prior to session.  Patient found up in chair with talley catheter, AYSHA drain, peripheral IV, wound vac upon OT entry to room.    General Precautions: Standard, fall    Orthopedic Precautions:LLE weight bearing as tolerated, LLE posterior precautions  Braces: N/A  Respiratory Status: Room air     Occupational Performance:     Bed Mobility:    Patient completed Scooting/Bridging with total assistance  Patient completed Sit to Supine with moderate assistance and 2 persons     Functional " Mobility/Transfers:  Patient completed Recliner <> Bed Transfer using Slide Board technique with moderate assistance and of 2 persons with slide board    Treatment & Education:  While seated in recliner, pt utilized 2# weighted dowel to perform 10 reps of the following exercises: chest presses and bicep curls. Slow pace and mod verbal encouragement to perform, as her family leaving during treatment was distracting her.  While supine in bed, PRAFO boot donned on LLE to prevent patient from resting in external rotation.     Patient left HOB elevated with all lines intact, call button in reach, and family present    GOALS:   Multidisciplinary Problems       Occupational Therapy Goals          Problem: Occupational Therapy    Goal Priority Disciplines Outcome Interventions   Occupational Therapy Goal     OT, PT/OT Progressing    Description: Pt will perform LB dressing c AE PRN and Max A by d/c.  Pt will perform toileting Max A by d/c.  Pt will perform toilet t/f Max A to BSC by d/c.                       Time Tracking:     OT Date of Treatment: 08/16/24  OT Start Time: 1313  OT Stop Time: 1400  OT Total Time (min): 47 min    Billable Minutes:Therapeutic Activity 32  Therapeutic Exercise 15    OT/EVY: OT          8/16/2024

## 2024-08-16 NOTE — PROGRESS NOTES
[facility]  [facility] Medicine Progress Note      Patient Name: Homa Jaquez  MRN: [id number]  Admission Date: 8/9/2024   Length of Stay: 7  Attending Physician: Olvin Huber MD  Primary Care Provider: Franklyn Brito MD  Face-to-Face encounter date: 08/16/2024    Code Status: DNR        Chief Complaint:   Wound drainage        HPI:   Homa Jaquez is a 88 y.o. female who  has a past medical history of A-fib, Chronic cystitis, GERD (gastroesophageal reflux disease), Graves disease, Hypertension, Hypothyroidism, unspecified, and Spinal stenosis.. The patient presented to [facility] 8/9/2024 with a primary complaint of post op complications.  Pt with a left femur fracture after a fall 7/2, underwent IMN 7/3 and [facility]. She reported increasing pain to hip, xrays showing failed intertrochanteric femur fracture.  Pt underwent conversion left SILVIA 7/15, she was discharged 7/26 to snf in [place].  She developed pneumonia which was treated with merrem and vancomycin and completed about 3 days ago.  She was started on megace for poor appetite about a week ago and has been eating good since, coumadin held 3 days ago for elevated inr. Doxycline and cymbalta was stopped and she has had lasix on and off with resolution of edema.  She has been awake and alert and improving.  Her wound vac was removed 1 week ago, drainage improved.  She is transferred back for evaluation of incision.  Family says there has been some serosanginous drainage on bandage but mild and decreased from admission where she was draining 1000cc per day.  No f/c.  Inr has been up and down, currently on hold x 3 days.     Overview/Hospital Course:  No notes on file       Interval Hx:   The pt has no c/o. Her son is at the bedside. He was updated on the treatment plan. All questions answered.     Review of Systems   All other systems reviewed and are  negative.      Objective/physical exam:  General: In no acute distress, afebrile  Chest: Clear to auscultation bilaterally  Heart: RRR, +S1, S2, no appreciable murmur, picc,   Abdomen: Soft, nontender, BS +  MSK: Warm, +1 b/l lower extremity edema, no clubbing or cyanosis  Skin: left hip surgery/wound vac/hemovac in place  Neurologic: Alert and oriented x4    VITAL SIGNS: 24 HRS MIN & MAX LAST   Temp  Min: 97.4 °F (36.3 °C)  Max: 98.1 °F (36.7 °C) 97.5 °F (36.4 °C)   BP  Min: 103/71  Max: 135/72 103/71   Pulse  Min: 76  Max: 90  90   Resp  Min: 18  Max: 18 18   SpO2  Min: 96 %  Max: 99 % 97 %       Recent Labs   Lab 08/14/24  0435 08/15/24  0429 08/16/24  0443   WBC 7.94 8.29 8.06   RBC 2.16* 3.02* 3.17*   HGB 6.9* 9.9* 10.3*   HCT 21.9* 29.8* 31.3*   .4* 98.7* 98.7*   MCH 31.9* 32.8* 32.5*   MCHC 31.5* 33.2 32.9*   RDW 18.0* 18.3* 18.2*    209 276   MPV 9.5 9.7 9.4       Recent Labs   Lab 08/13/24  0726 08/14/24 0435 08/15/24  0429 08/16/24  0443    138 137  --    K 4.7 4.9 4.5  --     107 110*  --    CO2 24 22* 21*  --    BUN 22.7* 23.6* 24.4*  --    CREATININE 1.20* 1.35* 1.35*  --    CALCIUM 8.3* 7.8* 7.7*  --    MG  --   --  1.50* 2.00   ALBUMIN  --   --  1.8*  --         Microbiology Results (last 7 days)       Procedure Component Value Units Date/Time    Tissue Culture - Aerobic [[id number]]  (Abnormal)  (Susceptibility) Collected: 08/12/24 1616    Order Status: Completed Specimen: Tissue from Hip, Left Updated: 08/16/24 1054     Tissue - Aerobic Culture Moderate HAFNIA SPECIES      Moderate Pseudomonas aeruginosa    Tissue Culture - Aerobic [[id number]]  (Abnormal)  (Susceptibility) Collected: 08/12/24 1616    Order Status: Completed Specimen: Tissue from Hip, Left Updated: 08/16/24 1053     Tissue - Aerobic Culture Few HAFNIA SPECIES      Few Pseudomonas aeruginosa    Tissue Culture - Aerobic [[id number]]  (Abnormal)  (Susceptibility) Collected: 08/12/24 1616    Order Status:  Completed Specimen: Tissue from Hip, Left Updated: 08/16/24 1050     Tissue - Aerobic Culture Moderate HAFNIA SPECIES      Moderate Pseudomonas aeruginosa    Anaerobic Culture [[id number]] Collected: 08/12/24 1616    Order Status: Completed Specimen: Tissue from Hip, Left Updated: 08/16/24 0731     Anaerobe Culture No Anaerobes Isolated    Anaerobic Culture [[id number]] Collected: 08/12/24 1616    Order Status: Completed Specimen: Tissue from Hip, Left Updated: 08/16/24 0730     Anaerobe Culture No Anaerobes Isolated    Anaerobic Culture [[id number]] Collected: 08/12/24 1616    Order Status: Completed Specimen: Tissue from Hip, Left Updated: 08/16/24 0729     Anaerobe Culture No Anaerobes Isolated    Blood Culture [[id number]]  (Normal) Collected: 08/09/24 1433    Order Status: Completed Specimen: Blood from Antecubital, Left Updated: 08/14/24 1902     Blood Culture No Growth at 5 days    Blood Culture [[id number]]  (Normal) Collected: 08/09/24 1436    Order Status: Completed Specimen: Blood from Arm, Left Updated: 08/14/24 1902     Blood Culture No Growth at 5 days    Gram Stain [[id number]] Collected: 08/12/24 1616    Order Status: Completed Specimen: Tissue from Hip, Left Updated: 08/14/24 0843     GRAM STAIN Few WBC observed      Few Gram Negative Rods    Gram Stain [[id number]] Collected: 08/12/24 1616    Order Status: Completed Specimen: Tissue from Hip, Left Updated: 08/13/24 1404     GRAM STAIN Few WBC observed      No bacteria seen    Gram Stain [[id number]] Collected: 08/12/24 1616    Order Status: Completed Specimen: Tissue from Hip, Left Updated: 08/13/24 1404     GRAM STAIN Few WBC observed      No bacteria seen    Anaerobic Culture [[id number]] Collected: 08/12/24 1516    Order Status: Canceled Specimen: Tissue from Hip, Left     Gram Stain [[id number]] Collected: 08/12/24 1516    Order Status: Canceled Specimen: Tissue from Hip, Left     Tissue Culture - Aerobic [[id number]] Collected: 08/12/24 1516     Order Status: Canceled Specimen: Tissue from Hip, Left     Anaerobic Culture [[id number]] Collected: 08/12/24 1324    Order Status: Canceled Specimen: Tissue from Hip, Left     Anaerobic Culture [[id number]] Collected: 08/12/24 1324    Order Status: Canceled Specimen: Tissue from Hip, Left     Gram Stain [[id number]] Collected: 08/12/24 1324    Order Status: Canceled Specimen: Tissue from Hip, Left     Gram Stain [[id number]] Collected: 08/12/24 1324    Order Status: Canceled Specimen: Tissue from Hip, Left     Tissue Culture - Aerobic [[id number]] Collected: 08/12/24 1324    Order Status: Canceled Specimen: Tissue from Hip, Left     Tissue Culture - Aerobic [[id number]] Collected: 08/12/24 1324    Order Status: Canceled Specimen: Tissue from Hip, Left              Radiology:  X-Ray Hip 2 or 3 views Left with Pelvis when performed  Narrative: EXAMINATION:  XR HIP WITH PELVIS WHEN PERFORMED 2 OR 3 VIEWS LEFT    CLINICAL HISTORY:  Broken internal left hip prosthesis, initial encounter post op;    COMPARISON:  X-rays dated 07/15/2024    FINDINGS:  There is stable alignment following left hip arthroplasty.  There is no new acute fracture identified.  Soft tissue drain is in place.  Impression: Satisfactory alignment following left hip arthroplasty.    Electronically signed by: Alice Shaffer  Date:    08/12/2024  Time:    18:21      Scheduled Med:   acetaminophen  650 mg Oral Q6H    ascorbic acid (vitamin C)  500 mg Oral BID    carvediloL  3.125 mg Oral BID    furosemide (LASIX) injection  20 mg Intravenous Daily    levothyroxine  125 mcg Oral Before breakfast    megestroL  200 mg Oral TID WM    meropenem IV (PEDS and ADULTS)  1 g Intravenous Q12H    pantoprazole  40 mg Oral Daily    polyethylene glycol  17 g Oral BID    pravastatin  20 mg Oral Daily    sacubitriL-valsartan  1 tablet Oral BID    senna-docusate 8.6-50 mg  2 tablet Oral BID    sodium chloride 0.9%  10 mL Intravenous Q6H    zinc sulfate  220 mg Oral  Daily        Continuous Infusions:       PRN Meds:    Current Facility-Administered Medications:     0.9%  NaCl infusion (for blood administration), , Intravenous, Q24H PRN    dextrose 10%, 12.5 g, Intravenous, PRN    dextrose 10%, 25 g, Intravenous, PRN    glucagon (human recombinant), 1 mg, Intramuscular, PRN    glucose, 16 g, Oral, PRN    glucose, 24 g, Oral, PRN    insulin aspart U-100, 0-5 Units, Subcutaneous, QID (AC + HS) PRN    lactulose, 20 g, Oral, Q6H PRN    naloxone, 0.02 mg, Intravenous, PRN    nitroGLYCERIN, 0.4 mg, Sublingual, Q5 Min PRN    ondansetron, 4 mg, Intravenous, Q8H PRN    Flushing PICC/Midline Protocol, , , Until Discontinued **AND** sodium chloride 0.9%, 10 mL, Intravenous, Q6H **AND** sodium chloride 0.9%, 10 mL, Intravenous, PRN    traMADoL, 50 mg, Oral, Q4H PRN     Nutrition Status:      Assessment/Plan:  Left femur IMN 7/3, failed IMN  S/p Conversion SILVIA 7/15/washout 8/13  Postop wound infection -8/13 Wcx Pseudomonas and Hafnia  Acute blood loss anemia s/p 2unit prbc 8/15  Dilated cardiomyopathy  A fib  Htn  Hld  Gout  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  Hypothyroid  Moderate protein calorie malnutrition        Plan  Switch zosyn to merrem based on cx results  Pt will likely need 4-6wk course of IV abx but  Defer final abx reccs to ID  Pt has RUE picc line placed on July 22  holding coumadin, INR 3.6  check INR daily  Cont miralax  Cont wound care  encouraged PTOT     Dvt proph: coumadin             All diagnosis and differential diagnosis have been reviewed; assessment and plan has been documented; I have personally reviewed the labs and test results that are presently available; I have reviewed the patients medication list; I have reviewed the consulting providers response and recommendations. I have reviewed or attempted to review medical records based upon their availability      _____________________________________________________________________            Olvin Huber MD   08/16/2024

## 2024-08-16 NOTE — PROGRESS NOTES
"No acute events overnight.  Pain controlled.  Resting in bed. OOB to chair yesterday.    Vital Signs  Temp: 97.4 °F (36.3 °C)  Temp Source: Oral  Pulse: 82  Heart Rate Source: Monitor  Resp: 18  SpO2: 96 %  Pulse Oximetry Type: Continuous  Flow (L/min) (Oxygen Therapy): 1  Oxygen Concentration (%): 80  Device (Oxygen Therapy): room air  BP: 135/72  BP Location: Left arm  BP Method: Automatic  Patient Position: Lying  Arousal Level: opens eyes spontaneously  Height and Weight  Height: 5' 4.02" (162.6 cm)  Height Method: Stated  Weight: 86 kg (189 lb 9.5 oz)  Weight Method: Bed Scale  Weight in (lb) to have BMI = 25: 145.4]    +FHL/EHL  BCR distally  Dressing c/d/i  SILT distally    Recent Lab Results         08/16/24  0443        .8       Hematocrit 31.3       Hemoglobin 10.3       INR 3.6       Magnesium  2.00       MCH 32.5       MCHC 32.9       MCV 98.7       MPV 9.4       nRBC 0.0       Platelet Count 276       PT 36.5       RBC 3.17       RDW 18.2       WBC 8.06               A/P:  Status post I&D hip  Pain controlled  Overall patient doing well.  Therapy for mobility and ambulation.  DVT PPx  D/c HV today  Continue WV  "

## 2024-08-16 NOTE — NURSING
Nurses Note -- 4 Eyes      8/15/2024   9:54 PM      Skin assessed during: Q Shift Change      [] No Altered Skin Integrity Present    []Prevention Measures Documented      [x] Yes- Altered Skin Integrity Present or Discovered   [] LDA Added if Not in Epic (Describe Wound)   [] New Altered Skin Integrity was Present on Admit and Documented in LDA   [] Wound Image Taken    Wound Care Consulted? Yes    Attending Nurse:  Deanna Quiroz RN/Staff Member:   LAURA Hernandez

## 2024-08-17 LAB
INR PPP: 4.3 (ref 2–3)
PROTHROMBIN TIME: 41.9 SECONDS (ref 11.7–14.5)

## 2024-08-17 PROCEDURE — S0179 MEGESTROL 20 MG: HCPCS | Performed by: ORTHOPAEDIC SURGERY

## 2024-08-17 PROCEDURE — 25000003 PHARM REV CODE 250: Performed by: ORTHOPAEDIC SURGERY

## 2024-08-17 PROCEDURE — 25000003 PHARM REV CODE 250: Performed by: INTERNAL MEDICINE

## 2024-08-17 PROCEDURE — 85610 PROTHROMBIN TIME: CPT | Performed by: INTERNAL MEDICINE

## 2024-08-17 PROCEDURE — 36415 COLL VENOUS BLD VENIPUNCTURE: CPT | Performed by: INTERNAL MEDICINE

## 2024-08-17 PROCEDURE — 21400001 HC TELEMETRY ROOM

## 2024-08-17 PROCEDURE — 97530 THERAPEUTIC ACTIVITIES: CPT | Mod: CO

## 2024-08-17 PROCEDURE — 25000003 PHARM REV CODE 250: Performed by: GENERAL PRACTICE

## 2024-08-17 PROCEDURE — 63600175 PHARM REV CODE 636 W HCPCS: Performed by: GENERAL PRACTICE

## 2024-08-17 PROCEDURE — A4216 STERILE WATER/SALINE, 10 ML: HCPCS | Performed by: INTERNAL MEDICINE

## 2024-08-17 PROCEDURE — 63600175 PHARM REV CODE 636 W HCPCS: Performed by: INTERNAL MEDICINE

## 2024-08-17 RX ORDER — FUROSEMIDE 20 MG/1
20 TABLET ORAL 2 TIMES DAILY
Status: DISCONTINUED | OUTPATIENT
Start: 2024-08-17 | End: 2024-08-20

## 2024-08-17 RX ADMIN — SACUBITRIL AND VALSARTAN 1 TABLET: 24; 26 TABLET, FILM COATED ORAL at 08:08

## 2024-08-17 RX ADMIN — ACETAMINOPHEN 650 MG: 650 SOLUTION ORAL at 12:08

## 2024-08-17 RX ADMIN — PANTOPRAZOLE SODIUM 40 MG: 40 TABLET, DELAYED RELEASE ORAL at 09:08

## 2024-08-17 RX ADMIN — ACETAMINOPHEN 650 MG: 650 SOLUTION ORAL at 06:08

## 2024-08-17 RX ADMIN — ACETAMINOPHEN 650 MG: 650 SOLUTION ORAL at 11:08

## 2024-08-17 RX ADMIN — MEGESTROL ACETATE 200 MG: 40 SUSPENSION ORAL at 06:08

## 2024-08-17 RX ADMIN — SODIUM CHLORIDE, PRESERVATIVE FREE 10 ML: 5 INJECTION INTRAVENOUS at 06:08

## 2024-08-17 RX ADMIN — SODIUM CHLORIDE, PRESERVATIVE FREE 10 ML: 5 INJECTION INTRAVENOUS at 11:08

## 2024-08-17 RX ADMIN — MEGESTROL ACETATE 200 MG: 40 SUSPENSION ORAL at 11:08

## 2024-08-17 RX ADMIN — PRAVASTATIN SODIUM 20 MG: 10 TABLET ORAL at 09:08

## 2024-08-17 RX ADMIN — LEVOTHYROXINE SODIUM 125 MCG: 125 TABLET ORAL at 05:08

## 2024-08-17 RX ADMIN — FUROSEMIDE 20 MG: 20 TABLET ORAL at 06:08

## 2024-08-17 RX ADMIN — FUROSEMIDE 20 MG: 10 INJECTION, SOLUTION INTRAMUSCULAR; INTRAVENOUS at 10:08

## 2024-08-17 RX ADMIN — MEGESTROL ACETATE 200 MG: 40 SUSPENSION ORAL at 08:08

## 2024-08-17 RX ADMIN — TRAMADOL HYDROCHLORIDE 50 MG: 50 TABLET, COATED ORAL at 12:08

## 2024-08-17 RX ADMIN — MEROPENEM 1 G: 1 INJECTION, POWDER, FOR SOLUTION INTRAVENOUS at 04:08

## 2024-08-17 RX ADMIN — SODIUM CHLORIDE, PRESERVATIVE FREE 10 ML: 5 INJECTION INTRAVENOUS at 12:08

## 2024-08-17 RX ADMIN — MEROPENEM 1 G: 1 INJECTION, POWDER, FOR SOLUTION INTRAVENOUS at 03:08

## 2024-08-17 RX ADMIN — CARVEDILOL 3.12 MG: 3.12 TABLET, FILM COATED ORAL at 09:08

## 2024-08-17 RX ADMIN — SODIUM CHLORIDE, PRESERVATIVE FREE 10 ML: 5 INJECTION INTRAVENOUS at 05:08

## 2024-08-17 RX ADMIN — CARVEDILOL 3.12 MG: 3.12 TABLET, FILM COATED ORAL at 08:08

## 2024-08-17 RX ADMIN — ACETAMINOPHEN 650 MG: 650 SOLUTION ORAL at 05:08

## 2024-08-17 NOTE — NURSING
Nurses Note -- 4 Eyes      8/16/2024   7:24 PM      Skin assessed during: Q Shift Change      [x] No Altered Skin Integrity Present    []Prevention Measures Documented      [] Yes- Altered Skin Integrity Present or Discovered   [] LDA Added if Not in Epic (Describe Wound)   [] New Altered Skin Integrity was Present on Admit and Documented in LDA   [] Wound Image Taken    Wound Care Consulted? No    Attending Nurse:  Deanna Quiroz RN/Staff Member:   LAURA Lopez

## 2024-08-17 NOTE — PROGRESS NOTES
[facility]AED[facility]  [facility] Medicine Progress Note      Patient Name: Homa Jaquez  MRN: [id number]  Admission Date: 8/9/2024   Length of Stay: 8  Attending Physician: lOvin Huber MD  Primary Care Provider: Franklyn Brito MD  Face-to-Face encounter date: 08/17/2024    Code Status: DNR        Chief Complaint:   Wound drainage        HPI:   Homa Jaquez is a 88 y.o. female who  has a past medical history of A-fib, Chronic cystitis, GERD (gastroesophageal reflux disease), Graves disease, Hypertension, Hypothyroidism, unspecified, and Spinal stenosis.. The patient presented to [facility] 8/9/2024 with a primary complaint of post op complications.  Pt with a left femur fracture after a fall 7/2, underwent IMN 7/3 and [facility]. She reported increasing pain to hip, xrays showing failed intertrochanteric femur fracture.  Pt underwent conversion left SILVIA 7/15, she was discharged 7/26 to snf in [place].  She developed pneumonia which was treated with merrem and vancomycin and completed about 3 days ago.  She was started on megace for poor appetite about a week ago and has been eating good since, coumadin held 3 days ago for elevated inr. Doxycline and cymbalta was stopped and she has had lasix on and off with resolution of edema.  She has been awake and alert and improving.  Her wound vac was removed 1 week ago, drainage improved.  She is transferred back for evaluation of incision.  Family says there has been some serosanginous drainage on bandage but mild and decreased from admission where she was draining 1000cc per day.  No f/c.  Inr has been up and down, currently on hold x 3 days.     Overview/Hospital Course:  No notes on file       Interval Hx:   The pt c/o left hip pain. She was given tramadol about 20mins ago. She has no other c/o. Case d/w RN.    Review of Systems   All other systems reviewed and are negative.      Objective/physical  exam:  General: In no acute distress, afebrile  Chest: Clear to auscultation bilaterally  Heart: RRR, +S1, S2, no appreciable murmur, picc,   Abdomen: Soft, nontender, BS +  MSK: Warm, +1 b/l lower extremity edema, no clubbing or cyanosis  Skin: s/p left hip surgery with wound vac in place  Neurologic: Alert and oriented x4    VITAL SIGNS: 24 HRS MIN & MAX LAST   Temp  Min: 97.4 °F (36.3 °C)  Max: 98.1 °F (36.7 °C) 98.1 °F (36.7 °C)   BP  Min: 91/49  Max: 138/78 110/75   Pulse  Min: 85  Max: 105  105   Resp  Min: 16  Max: 20 16   SpO2  Min: 94 %  Max: 99 % 99 %       Recent Labs   Lab 08/14/24  0435 08/15/24  0429 08/16/24  0443   WBC 7.94 8.29 8.06   RBC 2.16* 3.02* 3.17*   HGB 6.9* 9.9* 10.3*   HCT 21.9* 29.8* 31.3*   .4* 98.7* 98.7*   MCH 31.9* 32.8* 32.5*   MCHC 31.5* 33.2 32.9*   RDW 18.0* 18.3* 18.2*    209 276   MPV 9.5 9.7 9.4       Recent Labs   Lab 08/13/24  0726 08/14/24 0435 08/15/24  0429 08/16/24  0443    138 137  --    K 4.7 4.9 4.5  --     107 110*  --    CO2 24 22* 21*  --    BUN 22.7* 23.6* 24.4*  --    CREATININE 1.20* 1.35* 1.35*  --    CALCIUM 8.3* 7.8* 7.7*  --    MG  --   --  1.50* 2.00   ALBUMIN  --   --  1.8*  --         Microbiology Results (last 7 days)       Procedure Component Value Units Date/Time    Tissue Culture - Aerobic [[id number]]  (Abnormal)  (Susceptibility) Collected: 08/12/24 1616    Order Status: Completed Specimen: Tissue from Hip, Left Updated: 08/16/24 1054     Tissue - Aerobic Culture Moderate HAFNIA SPECIES      Moderate Pseudomonas aeruginosa    Tissue Culture - Aerobic [[id number]]  (Abnormal)  (Susceptibility) Collected: 08/12/24 1616    Order Status: Completed Specimen: Tissue from Hip, Left Updated: 08/16/24 1053     Tissue - Aerobic Culture Few HAFNIA SPECIES      Few Pseudomonas aeruginosa    Tissue Culture - Aerobic [[id number]]  (Abnormal)  (Susceptibility) Collected: 08/12/24 1616    Order Status: Completed Specimen: Tissue from Hip,  Left Updated: 08/16/24 1050     Tissue - Aerobic Culture Moderate HAFNIA SPECIES      Moderate Pseudomonas aeruginosa    Anaerobic Culture [[id number]] Collected: 08/12/24 1616    Order Status: Completed Specimen: Tissue from Hip, Left Updated: 08/16/24 0731     Anaerobe Culture No Anaerobes Isolated    Anaerobic Culture [[id number]] Collected: 08/12/24 1616    Order Status: Completed Specimen: Tissue from Hip, Left Updated: 08/16/24 0730     Anaerobe Culture No Anaerobes Isolated    Anaerobic Culture [[id number]] Collected: 08/12/24 1616    Order Status: Completed Specimen: Tissue from Hip, Left Updated: 08/16/24 0729     Anaerobe Culture No Anaerobes Isolated    Blood Culture [[id number]]  (Normal) Collected: 08/09/24 1433    Order Status: Completed Specimen: Blood from Antecubital, Left Updated: 08/14/24 1902     Blood Culture No Growth at 5 days    Blood Culture [[id number]]  (Normal) Collected: 08/09/24 1436    Order Status: Completed Specimen: Blood from Arm, Left Updated: 08/14/24 1902     Blood Culture No Growth at 5 days    Gram Stain [[id number]] Collected: 08/12/24 1616    Order Status: Completed Specimen: Tissue from Hip, Left Updated: 08/14/24 0843     GRAM STAIN Few WBC observed      Few Gram Negative Rods    Gram Stain [[id number]] Collected: 08/12/24 1616    Order Status: Completed Specimen: Tissue from Hip, Left Updated: 08/13/24 1404     GRAM STAIN Few WBC observed      No bacteria seen    Gram Stain [[id number]] Collected: 08/12/24 1616    Order Status: Completed Specimen: Tissue from Hip, Left Updated: 08/13/24 1404     GRAM STAIN Few WBC observed      No bacteria seen    Anaerobic Culture [[id number]] Collected: 08/12/24 1516    Order Status: Canceled Specimen: Tissue from Hip, Left     Gram Stain [[id number]] Collected: 08/12/24 1516    Order Status: Canceled Specimen: Tissue from Hip, Left     Tissue Culture - Aerobic [[id number]] Collected: 08/12/24 1516    Order Status: Canceled Specimen:  Tissue from Hip, Left     Anaerobic Culture [[id number]] Collected: 08/12/24 1324    Order Status: Canceled Specimen: Tissue from Hip, Left     Anaerobic Culture [[id number]] Collected: 08/12/24 1324    Order Status: Canceled Specimen: Tissue from Hip, Left     Gram Stain [[id number]] Collected: 08/12/24 1324    Order Status: Canceled Specimen: Tissue from Hip, Left     Gram Stain [[id number]] Collected: 08/12/24 1324    Order Status: Canceled Specimen: Tissue from Hip, Left     Tissue Culture - Aerobic [[id number]] Collected: 08/12/24 1324    Order Status: Canceled Specimen: Tissue from Hip, Left     Tissue Culture - Aerobic [[id number]] Collected: 08/12/24 1324    Order Status: Canceled Specimen: Tissue from Hip, Left              Radiology:  X-Ray Hip 2 or 3 views Left with Pelvis when performed  Narrative: EXAMINATION:  XR HIP WITH PELVIS WHEN PERFORMED 2 OR 3 VIEWS LEFT    CLINICAL HISTORY:  Broken internal left hip prosthesis, initial encounter post op;    COMPARISON:  X-rays dated 07/15/2024    FINDINGS:  There is stable alignment following left hip arthroplasty.  There is no new acute fracture identified.  Soft tissue drain is in place.  Impression: Satisfactory alignment following left hip arthroplasty.    Electronically signed by: Alice Shaffer  Date:    08/12/2024  Time:    18:21      Scheduled Med:   acetaminophen  650 mg Oral Q6H    ascorbic acid (vitamin C)  500 mg Oral BID    carvediloL  3.125 mg Oral BID    furosemide (LASIX) injection  20 mg Intravenous Daily    levothyroxine  125 mcg Oral Before breakfast    megestroL  200 mg Oral TID WM    meropenem IV (PEDS and ADULTS)  1 g Intravenous Q12H    pantoprazole  40 mg Oral Daily    polyethylene glycol  17 g Oral BID    pravastatin  20 mg Oral Daily    sacubitriL-valsartan  1 tablet Oral BID    senna-docusate 8.6-50 mg  2 tablet Oral BID    sodium chloride 0.9%  10 mL Intravenous Q6H    zinc sulfate  220 mg Oral Daily        Continuous Infusions:        PRN Meds:    Current Facility-Administered Medications:     0.9%  NaCl infusion (for blood administration), , Intravenous, Q24H PRN    dextrose 10%, 12.5 g, Intravenous, PRN    dextrose 10%, 25 g, Intravenous, PRN    glucagon (human recombinant), 1 mg, Intramuscular, PRN    glucose, 16 g, Oral, PRN    glucose, 24 g, Oral, PRN    insulin aspart U-100, 0-5 Units, Subcutaneous, QID (AC + HS) PRN    lactulose, 20 g, Oral, Q6H PRN    naloxone, 0.02 mg, Intravenous, PRN    nitroGLYCERIN, 0.4 mg, Sublingual, Q5 Min PRN    ondansetron, 4 mg, Intravenous, Q8H PRN    Flushing PICC/Midline Protocol, , , Until Discontinued **AND** sodium chloride 0.9%, 10 mL, Intravenous, Q6H **AND** sodium chloride 0.9%, 10 mL, Intravenous, PRN    traMADoL, 50 mg, Oral, Q4H PRN     Nutrition Status:      Assessment/Plan:  Left femur IMN 7/3, failed IMN  S/p Conversion SILVIA 7/15/washout 8/13  Postop wound infection -8/13 Wcx Pseudomonas and Hafnia  Acute blood loss anemia s/p 2unit prbc 8/15  Dilated cardiomyopathy  A fib  Htn  Hld  Gout  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  Hypothyroid  Moderate protein calorie malnutrition        Plan  Switch lasix to PO  cont merrem based on cx results  Pt will likely need 4-6wk course of IV abx but  Defer final abx reccs to ID  Pt has RUE picc line placed on July 22  holding coumadin, INR >3, goal 2-3  check INR daily  Cont wound care  encouraged PTOT     Dvt proph: coumadin             All diagnosis and differential diagnosis have been reviewed; assessment and plan has been documented; I have personally reviewed the labs and test results that are presently available; I have reviewed the patients medication list; I have reviewed the consulting providers response and recommendations. I have reviewed or attempted to review medical records based upon their availability      _____________________________________________________________________            Olvin Huber MD   08/17/2024

## 2024-08-17 NOTE — NURSING
Nurses Note -- 4 Eyes      8/17/2024   6:02 PM      Skin assessed during: Q Shift Change      [x] No Altered Skin Integrity Present    [x]Prevention Measures Documented      [] Yes- Altered Skin Integrity Present or Discovered   [] LDA Added if Not in Epic (Describe Wound)   [] New Altered Skin Integrity was Present on Admit and Documented in LDA   [] Wound Image Taken    Wound Care Consulted? No    Attending Nurse:  Lois Quiroz RN/Staff Member:   Arlette

## 2024-08-17 NOTE — PT/OT/SLP PROGRESS
Physical Therapy      Patient Name:  Homa Jaquez   MRN:  [id number]    Patient not seen today secondary to Patient fatigue, Pain. Attempted to see patient in the AM but requested not to be seen due to fatigue and attempted again in the afternoon but refused to participate due to pain on (L) LE. Will follow-up 8/18/23.

## 2024-08-17 NOTE — PT/OT/SLP PROGRESS
"Occupational Therapy   Treatment    Name: Homa Jaquez  MRN: [id number]  Admitting Diagnosis:  Post-operative complication  5 Days Post-Op    Recommendations:     Discharge Recommendations: Moderate Intensity Therapy  Discharge Equipment Recommendations:  bedside commode, hospital bed, walker, rolling  Barriers to discharge:       Assessment:     Homa Jaquez is a 88 y.o. female with a medical diagnosis of Post-operative complication.   Performance deficits affecting function are weakness, impaired self care skills, impaired functional mobility, gait instability, impaired balance, decreased lower extremity function, pain, decreased ROM, orthopedic precautions.     Rehab Prognosis:  Fair; patient would benefit from acute skilled OT services to address these deficits and reach maximum level of function.       Plan:     Patient to be seen daily (QD-BID) to address the above listed problems via self-care/home management, therapeutic activities, therapeutic exercises  Plan of Care Expires: 08/19/24  Plan of Care Reviewed with: patient, son    Subjective     Chief Complaint: "I'm trying to let my food settle, I just finished my last bite"  Patient/Family Comments/goals:   Pain/Comfort:  Pain Rating 1: 4/10  Location - Side 1: Left  Location 1: leg  Pain Addressed 1: Reposition, Distraction  Pain Rating Post-Intervention 1: 4/10    Objective:     Communicated with: nsg prior to session.  Patient found supine with talley catheter, wound vac, peripheral IV upon OT entry to room.    General Precautions: Standard, fall    Orthopedic Precautions:LLE weight bearing as tolerated, LLE posterior precautions  Braces: N/A  Respiratory Status: Room air     Occupational Performance:     Bed Mobility:    Patient completed Supine to Sit with moderate assistance and 2 persons  Patient completed Sit to Supine with total assistance     All activities required increased time and motivation to participate  Functional " Mobility/Transfers:  Patient completed Sit <> Stand Transfer with moderate assistance and of 2 persons  with  rolling walker  pt completed x2 from EOB level with cueing for feet placement and hand placement. Was able to fully stand up on 1st trial. 2nd trial- unable to fully ext trunk    Activities of Daily Living:  Pt had BM at end of session, nursing notified for pt to be cleaned        Patient left supine with all lines intact, call button in reach, nsg notified, and son present    GOALS:   Multidisciplinary Problems       Occupational Therapy Goals          Problem: Occupational Therapy    Goal Priority Disciplines Outcome Interventions   Occupational Therapy Goal     OT, PT/OT Progressing    Description: Pt will perform LB dressing c AE PRN and Max A by d/c.  Pt will perform toileting Max A by d/c.  Pt will perform toilet t/f Max A to BSC by d/c.                       Time Tracking:     OT Date of Treatment: 08/17/24  OT Start Time: 0830  OT Stop Time: 0855  OT Total Time (min): 25 min    Billable Minutes:Therapeutic Activity 25    OT/EVY: EVY          8/17/2024

## 2024-08-18 LAB
INR PPP: 2.8 (ref 2–3)
PROTHROMBIN TIME: 30.5 SECONDS (ref 11.7–14.5)

## 2024-08-18 PROCEDURE — A4216 STERILE WATER/SALINE, 10 ML: HCPCS | Performed by: INTERNAL MEDICINE

## 2024-08-18 PROCEDURE — S0179 MEGESTROL 20 MG: HCPCS | Performed by: ORTHOPAEDIC SURGERY

## 2024-08-18 PROCEDURE — 25000003 PHARM REV CODE 250: Performed by: INTERNAL MEDICINE

## 2024-08-18 PROCEDURE — 63600175 PHARM REV CODE 636 W HCPCS: Performed by: GENERAL PRACTICE

## 2024-08-18 PROCEDURE — 21400001 HC TELEMETRY ROOM

## 2024-08-18 PROCEDURE — 25000003 PHARM REV CODE 250: Performed by: ORTHOPAEDIC SURGERY

## 2024-08-18 PROCEDURE — 25000003 PHARM REV CODE 250: Performed by: GENERAL PRACTICE

## 2024-08-18 PROCEDURE — 97110 THERAPEUTIC EXERCISES: CPT

## 2024-08-18 PROCEDURE — 85610 PROTHROMBIN TIME: CPT | Performed by: INTERNAL MEDICINE

## 2024-08-18 PROCEDURE — 36415 COLL VENOUS BLD VENIPUNCTURE: CPT | Performed by: INTERNAL MEDICINE

## 2024-08-18 PROCEDURE — 97530 THERAPEUTIC ACTIVITIES: CPT | Mod: CO

## 2024-08-18 RX ADMIN — SODIUM CHLORIDE, PRESERVATIVE FREE 10 ML: 5 INJECTION INTRAVENOUS at 12:08

## 2024-08-18 RX ADMIN — SACUBITRIL AND VALSARTAN 1 TABLET: 24; 26 TABLET, FILM COATED ORAL at 08:08

## 2024-08-18 RX ADMIN — SODIUM CHLORIDE, PRESERVATIVE FREE 10 ML: 5 INJECTION INTRAVENOUS at 01:08

## 2024-08-18 RX ADMIN — MEROPENEM 1 G: 1 INJECTION, POWDER, FOR SOLUTION INTRAVENOUS at 04:08

## 2024-08-18 RX ADMIN — ACETAMINOPHEN 650 MG: 650 SOLUTION ORAL at 01:08

## 2024-08-18 RX ADMIN — FUROSEMIDE 20 MG: 20 TABLET ORAL at 01:08

## 2024-08-18 RX ADMIN — PANTOPRAZOLE SODIUM 40 MG: 40 TABLET, DELAYED RELEASE ORAL at 09:08

## 2024-08-18 RX ADMIN — TRAMADOL HYDROCHLORIDE 50 MG: 50 TABLET, COATED ORAL at 04:08

## 2024-08-18 RX ADMIN — SODIUM CHLORIDE, PRESERVATIVE FREE 10 ML: 5 INJECTION INTRAVENOUS at 06:08

## 2024-08-18 RX ADMIN — CARVEDILOL 3.12 MG: 3.12 TABLET, FILM COATED ORAL at 08:08

## 2024-08-18 RX ADMIN — CARVEDILOL 3.12 MG: 3.12 TABLET, FILM COATED ORAL at 01:08

## 2024-08-18 RX ADMIN — ACETAMINOPHEN 650 MG: 650 SOLUTION ORAL at 12:08

## 2024-08-18 RX ADMIN — PRAVASTATIN SODIUM 20 MG: 10 TABLET ORAL at 09:08

## 2024-08-18 RX ADMIN — SODIUM CHLORIDE, PRESERVATIVE FREE 10 ML: 5 INJECTION INTRAVENOUS at 07:08

## 2024-08-18 RX ADMIN — LEVOTHYROXINE SODIUM 125 MCG: 125 TABLET ORAL at 06:08

## 2024-08-18 RX ADMIN — MEGESTROL ACETATE 200 MG: 40 SUSPENSION ORAL at 09:08

## 2024-08-18 RX ADMIN — FUROSEMIDE 20 MG: 20 TABLET ORAL at 10:08

## 2024-08-18 RX ADMIN — MEGESTROL ACETATE 200 MG: 40 SUSPENSION ORAL at 01:08

## 2024-08-18 RX ADMIN — ACETAMINOPHEN 650 MG: 650 SOLUTION ORAL at 06:08

## 2024-08-18 RX ADMIN — MEROPENEM 1 G: 1 INJECTION, POWDER, FOR SOLUTION INTRAVENOUS at 03:08

## 2024-08-18 NOTE — PT/OT/SLP PROGRESS
Physical Therapy Treatment    Patient Name:  Homa Jaquez   MRN:  [id number]    Recommendations:     Discharge Recommendations: Moderate Intensity Therapy  Discharge Equipment Recommendations: none  Barriers to discharge: None    Assessment:     Homa Jaquez is a 88 y.o. female admitted with a medical diagnosis of Post-operative complication.  She presents with the following impairments/functional limitations: weakness, impaired endurance, impaired sensation, impaired self care skills, impaired functional mobility, gait instability, impaired balance, decreased lower extremity function, decreased coordination, impaired cognition, decreased safety awareness, pain, decreased ROM, impaired coordination, impaired fine motor, impaired skin, edema, orthopedic precautions .    Rehab Prognosis: Good; patient would benefit from acute skilled PT services to address these deficits and reach maximum level of function.    Recent Surgery: Procedure(s) (LRB):  REVISION,ARTHROPLASTY,HIP,POSTERIOR APPROACH (Left) 6 Days Post-Op    Plan:     During this hospitalization, patient to be seen daily (QD-BID pending pt tolerance) to address the identified rehab impairments via gait training, therapeutic activities, therapeutic exercises and progress toward the following goals:    Plan of Care Expires:  08/19/24    Subjective     Chief Complaint: Pain on (L) hip, fatigue.   Patient/Family Comments/goals: Patient's goal is to return home soon.   Pain/Comfort:  Pain Rating 1: 7/10  Location - Side 1: Left  Location 1: hip  Pain Addressed 1: Cessation of Activity, Distraction, Reposition  Pain Rating Post-Intervention 1: 7/10      Objective:     Communicated with OT prior to session.  Patient found supine with   upon PT entry to room.     General Precautions: Standard, fall  Orthopedic Precautions: LLE weight bearing as tolerated, LLE posterior precautions  Braces: N/A  Respiratory Status: Room air     Functional Mobility:  Bed Mobility:      Supine to Sit: maximal assistance  Sit to Supine: maximal assistance      AM-PAC 6 CLICK MOBILITY          Treatment & Education:  Patient was seen for (B) LE thera ex in supine x 10 reps x 2 rounds with moderate assist due to strength impairment and pain. Attempted to get patient out of bed and sit in her recliner chair but patient refused. Stated that she is tired after taking a bath. Encouraged and educated patient regarding the benefits of getting out of bed daily. Discussed with patient to at least eat one meal during the day while sitting in her chair and patient agrees and stated that she will try.       Patient left supine with all lines intact and call button in reach.    GOALS:   Multidisciplinary Problems       Physical Therapy Goals          Problem: Physical Therapy    Goal Priority Disciplines Outcome Goal Variances Interventions   Physical Therapy Goal     PT, PT/OT Progressing     Description: Pt will improve functional independence by performing:    Bed mobility:   Rolling:max A  Supine to sit: Max A   Sit to supine: Max A  Sit to stand: max A with rolling walker  Bed to chair: max A with Stand Step  with rolling walker   Bed to chair t/f: Max A slide board                            Time Tracking:     PT Received On:    PT Start Time: 0900     PT Stop Time: 0920  PT Total Time (min): 20 min     Billable Minutes: Therapeutic Exercise 20             Number of PTA visits since last PT visit: 3     08/18/2024

## 2024-08-18 NOTE — NURSING
Nurses Note -- 4 Eyes      8/17/2024   8:34 PM      Skin assessed during: Q Shift Change      [x] No Altered Skin Integrity Present    [x]Prevention Measures Documented      [] Yes- Altered Skin Integrity Present or Discovered   [] LDA Added if Not in Epic (Describe Wound)   [] New Altered Skin Integrity was Present on Admit and Documented in LDA   [] Wound Image Taken    Wound Care Consulted? No    Attending Nurse:  Deanna Quiroz RN/Staff Member:   Lois

## 2024-08-18 NOTE — PROGRESS NOTES
[facility]  Hospital Medicine Progress Note      Patient Name: Homa Jaquez  MRN: [id number]  Admission Date: 8/9/2024   Length of Stay: 9  Attending Physician: Olvin Huber MD  Primary Care Provider: Franklyn Brito MD  Face-to-Face encounter date: 08/18/2024    Code Status: DNR        Chief Complaint:   Wound drainage        HPI:   Homa Jaquez is a 88 y.o. female who  has a past medical history of A-fib, Chronic cystitis, GERD (gastroesophageal reflux disease), Graves disease, Hypertension, Hypothyroidism, unspecified, and Spinal stenosis. The patient presented to [facility] on 8/9/2024 with a primary complaint of post op complications.  Pt with a left femur fracture after a fall 7/2, underwent IMN 7/3 and transferred to [facility] 7/9. She reported increasing pain to hip, xrays showing failed intertrochanteric femur fracture.  Pt underwent conversion left SILVIA 7/15, she was discharged 7/26 to snf in [place].  She developed pneumonia which was treated with merrem and vancomycin and completed about 3 days ago.  She was started on megace for poor appetite about a week ago and has been eating good since, coumadin held 3 days ago for elevated inr. Doxycline and cymbalta was stopped and she has had lasix on and off with resolution of edema.  She has been awake and alert and improving.  Her wound vac was removed 1 week ago, drainage improved.  She is transferred back for evaluation of incision.  Family says there has been some serosanginous drainage on bandage but mild and decreased from admission where she was draining 1000cc per day.  No f/c.  Inr has been up and down, currently on hold x 3 days.     Overview/Hospital Course:  No notes on file       Interval Hx:   The pt is more comfortable today. She c/o general left leg weakness when working therapy. I encouraged her to continue working with therapy to improve her strength and mobility. Her son and   are at the bedside.     Review of Systems   All other systems reviewed and are negative.      Objective/physical exam:  General: In no acute distress, afebrile  Chest: Clear to auscultation bilaterally  Heart: RRR, +S1, S2, no appreciable murmur, picc,   Abdomen: Soft, nontender, BS +  MSK: Warm, +1 b/l lower extremity edema, no clubbing or cyanosis. Full ROM left foot, strength 4/5. Sensation intact.  Skin: s/p left hip surgery with wound vac in place  Neurologic: Alert and oriented x4    VITAL SIGNS: 24 HRS MIN & MAX LAST   Temp  Min: 97.5 °F (36.4 °C)  Max: 98.6 °F (37 °C) 98.2 °F (36.8 °C)   BP  Min: 95/58  Max: 135/81 120/74   Pulse  Min: 89  Max: 105  96   Resp  Min: 16  Max: 18 18   SpO2  Min: 96 %  Max: 99 % 99 %       Recent Labs   Lab 08/14/24  0435 08/15/24  0429 08/16/24  0443   WBC 7.94 8.29 8.06   RBC 2.16* 3.02* 3.17*   HGB 6.9* 9.9* 10.3*   HCT 21.9* 29.8* 31.3*   .4* 98.7* 98.7*   MCH 31.9* 32.8* 32.5*   MCHC 31.5* 33.2 32.9*   RDW 18.0* 18.3* 18.2*    209 276   MPV 9.5 9.7 9.4       Recent Labs   Lab 08/13/24  0726 08/14/24  0435 08/15/24  0429 08/16/24  0443    138 137  --    K 4.7 4.9 4.5  --     107 110*  --    CO2 24 22* 21*  --    BUN 22.7* 23.6* 24.4*  --    CREATININE 1.20* 1.35* 1.35*  --    CALCIUM 8.3* 7.8* 7.7*  --    MG  --   --  1.50* 2.00   ALBUMIN  --   --  1.8*  --         Microbiology Results (last 7 days)       Procedure Component Value Units Date/Time    Tissue Culture - Aerobic [[id number]]  (Abnormal)  (Susceptibility) Collected: 08/12/24 1616    Order Status: Completed Specimen: Tissue from Hip, Left Updated: 08/16/24 1054     Tissue - Aerobic Culture Moderate HAFNIA SPECIES      Moderate Pseudomonas aeruginosa    Tissue Culture - Aerobic [[id number]]  (Abnormal)  (Susceptibility) Collected: 08/12/24 1616    Order Status: Completed Specimen: Tissue from Hip, Left Updated: 08/16/24 1053     Tissue - Aerobic Culture Few HAFNIA SPECIES      Few  Pseudomonas aeruginosa    Tissue Culture - Aerobic [[id number]]  (Abnormal)  (Susceptibility) Collected: 08/12/24 1616    Order Status: Completed Specimen: Tissue from Hip, Left Updated: 08/16/24 1050     Tissue - Aerobic Culture Moderate HAFNIA SPECIES      Moderate Pseudomonas aeruginosa    Anaerobic Culture [[id number]] Collected: 08/12/24 1616    Order Status: Completed Specimen: Tissue from Hip, Left Updated: 08/16/24 0731     Anaerobe Culture No Anaerobes Isolated    Anaerobic Culture [[id number]] Collected: 08/12/24 1616    Order Status: Completed Specimen: Tissue from Hip, Left Updated: 08/16/24 0730     Anaerobe Culture No Anaerobes Isolated    Anaerobic Culture [[id number]] Collected: 08/12/24 1616    Order Status: Completed Specimen: Tissue from Hip, Left Updated: 08/16/24 0729     Anaerobe Culture No Anaerobes Isolated    Blood Culture [[id number]]  (Normal) Collected: 08/09/24 1433    Order Status: Completed Specimen: Blood from Antecubital, Left Updated: 08/14/24 1902     Blood Culture No Growth at 5 days    Blood Culture [[id number]]  (Normal) Collected: 08/09/24 1436    Order Status: Completed Specimen: Blood from Arm, Left Updated: 08/14/24 1902     Blood Culture No Growth at 5 days    Gram Stain [[id number]] Collected: 08/12/24 1616    Order Status: Completed Specimen: Tissue from Hip, Left Updated: 08/14/24 0843     GRAM STAIN Few WBC observed      Few Gram Negative Rods    Gram Stain [[id number]] Collected: 08/12/24 1616    Order Status: Completed Specimen: Tissue from Hip, Left Updated: 08/13/24 1404     GRAM STAIN Few WBC observed      No bacteria seen    Gram Stain [[id number]] Collected: 08/12/24 1616    Order Status: Completed Specimen: Tissue from Hip, Left Updated: 08/13/24 1404     GRAM STAIN Few WBC observed      No bacteria seen    Anaerobic Culture [[id number]] Collected: 08/12/24 1516    Order Status: Canceled Specimen: Tissue from Hip, Left     Gram Stain [[id number]] Collected:  08/12/24 1516    Order Status: Canceled Specimen: Tissue from Hip, Left     Tissue Culture - Aerobic [[id number]] Collected: 08/12/24 1516    Order Status: Canceled Specimen: Tissue from Hip, Left     Anaerobic Culture [[id number]] Collected: 08/12/24 1324    Order Status: Canceled Specimen: Tissue from Hip, Left     Anaerobic Culture [[id number]] Collected: 08/12/24 1324    Order Status: Canceled Specimen: Tissue from Hip, Left     Gram Stain [[id number]] Collected: 08/12/24 1324    Order Status: Canceled Specimen: Tissue from Hip, Left     Gram Stain [[id number]] Collected: 08/12/24 1324    Order Status: Canceled Specimen: Tissue from Hip, Left     Tissue Culture - Aerobic [[id number]] Collected: 08/12/24 1324    Order Status: Canceled Specimen: Tissue from Hip, Left     Tissue Culture - Aerobic [[id number]] Collected: 08/12/24 1324    Order Status: Canceled Specimen: Tissue from Hip, Left              Radiology:  X-Ray Hip 2 or 3 views Left with Pelvis when performed  Narrative: EXAMINATION:  XR HIP WITH PELVIS WHEN PERFORMED 2 OR 3 VIEWS LEFT    CLINICAL HISTORY:  Broken internal left hip prosthesis, initial encounter post op;    COMPARISON:  X-rays dated 07/15/2024    FINDINGS:  There is stable alignment following left hip arthroplasty.  There is no new acute fracture identified.  Soft tissue drain is in place.  Impression: Satisfactory alignment following left hip arthroplasty.    Electronically signed by: Alice Shaffer  Date:    08/12/2024  Time:    18:21      Scheduled Med:   acetaminophen  650 mg Oral Q6H    ascorbic acid (vitamin C)  500 mg Oral BID    carvediloL  3.125 mg Oral BID    furosemide  20 mg Oral BID    levothyroxine  125 mcg Oral Before breakfast    megestroL  200 mg Oral TID WM    meropenem IV (PEDS and ADULTS)  1 g Intravenous Q12H    pantoprazole  40 mg Oral Daily    polyethylene glycol  17 g Oral BID    pravastatin  20 mg Oral Daily    sacubitriL-valsartan  1 tablet Oral BID     senna-docusate 8.6-50 mg  2 tablet Oral BID    sodium chloride 0.9%  10 mL Intravenous Q6H    zinc sulfate  220 mg Oral Daily        Continuous Infusions:       PRN Meds:    Current Facility-Administered Medications:     0.9%  NaCl infusion (for blood administration), , Intravenous, Q24H PRN    dextrose 10%, 12.5 g, Intravenous, PRN    dextrose 10%, 25 g, Intravenous, PRN    glucagon (human recombinant), 1 mg, Intramuscular, PRN    glucose, 16 g, Oral, PRN    glucose, 24 g, Oral, PRN    insulin aspart U-100, 0-5 Units, Subcutaneous, QID (AC + HS) PRN    lactulose, 20 g, Oral, Q6H PRN    naloxone, 0.02 mg, Intravenous, PRN    nitroGLYCERIN, 0.4 mg, Sublingual, Q5 Min PRN    ondansetron, 4 mg, Intravenous, Q8H PRN    Flushing PICC/Midline Protocol, , , Until Discontinued **AND** sodium chloride 0.9%, 10 mL, Intravenous, Q6H **AND** sodium chloride 0.9%, 10 mL, Intravenous, PRN    traMADoL, 50 mg, Oral, Q4H PRN     Nutrition Status:      Assessment/Plan:  Left femur IMN 7/3, failed IMN  S/p Conversion SILVIA 7/15/washout 8/13  Postop wound infection -8/13 Wcx Pseudomonas and Hafnia  Acute blood loss anemia s/p 2unit prbc 8/15  Dilated cardiomyopathy  A fib  Htn  Hld  Gout  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  Hypothyroid  Moderate protein calorie malnutrition        Plan  cont lasix PO  cont merrem based on cx results  Pt will likely need 4-6wk course of IV abx but defer final abx reccs to ID  Pt has RUE picc line placed on July 22  holding coumadin, INR 2.8  check INR daily  Cont wound care  encouraged PTOT     Dvt proph: coumadin             All diagnosis and differential diagnosis have been reviewed; assessment and plan has been documented; I have personally reviewed the labs and test results that are presently available; I have reviewed the patients medication list; I have reviewed the consulting providers response and recommendations. I have reviewed or attempted to review medical records based upon their  availability      _____________________________________________________________________            [facility] NADINE Huber MD   08/18/2024

## 2024-08-18 NOTE — PT/OT/SLP PROGRESS
I would have her check insurance for wegovy coverage  Has been having a few medicare case coverage     Occupational Therapy   Treatment    Name: Homa Jaquez  MRN: [id number]  Admitting Diagnosis:  Post-operative complication  6 Days Post-Op    Recommendations:     Discharge Recommendations: Moderate Intensity Therapy  Discharge Equipment Recommendations:  bedside commode, hospital bed, walker, rolling  Barriers to discharge:       Assessment:     Homa Jaquez is a 88 y.o. female with a medical diagnosis of Post-operative complication.   Performance deficits affecting function are weakness, impaired endurance, impaired self care skills, impaired functional mobility, gait instability, impaired balance, impaired cognition, decreased coordination, decreased lower extremity function, pain, decreased ROM, orthopedic precautions.     Rehab Prognosis:  Fair; patient would benefit from acute skilled OT services to address these deficits and reach maximum level of function.       Plan:     Patient to be seen daily (QD-BID) to address the above listed problems via self-care/home management, therapeutic activities, therapeutic exercises  Plan of Care Expires: 08/19/24  Plan of Care Reviewed with: patient    Subjective     Chief Complaint: pain in L hip  Patient/Family Comments/goals:   Pain/Comfort:  Pain Rating 1: 6/10  Location - Side 1: Left  Location 1: hip  Pain Addressed 1: Reposition, Distraction, Pre-medicate for activity  Pain Rating Post-Intervention 1: 5/10  Pain Rating 2: 7/10  Location - Side 2: Right  Location - Orientation 2: lower  Location 2: breast  Pain Addressed 2: Nurse notified    Objective:     Communicated with: ebonie prior to session.  Patient found supine with talley catheter, peripheral IV, wound vac upon OT entry to room.    General Precautions: Standard, fall    Orthopedic Precautions:LLE weight bearing as tolerated, LLE posterior precautions  Braces: N/A  Respiratory Status: Room air     Occupational Performance:     Bed Mobility:    Patient completed Supine to Sit with maximal assistance and 2  persons  Patient completed Sit to Supine with total assistance and 2 persons   More assistance required to return to supine d/t pt leaning posteriorly and slipping on EOB, total assist for safety d/t pt anxious and not following safety cues    Functional Mobility/Transfers:  Patient completed Sit <> Stand Transfer with moderate assistance and of 2 persons  with  rolling walker   Able to stand x1 trial almost fully upright. Pt unable to stand on second trial. Max cues for hand and feet placement          Patient left supine with all lines intact, call button in reach, and nsg notified    GOALS:   Multidisciplinary Problems       Occupational Therapy Goals          Problem: Occupational Therapy    Goal Priority Disciplines Outcome Interventions   Occupational Therapy Goal     OT, PT/OT Progressing    Description: Pt will perform LB dressing c AE PRN and Max A by d/c.  Pt will perform toileting Max A by d/c.  Pt will perform toilet t/f Max A to BSC by d/c.                       Time Tracking:     OT Date of Treatment: 08/18/24  OT Start Time: 0830  OT Stop Time: 0855  OT Total Time (min): 25 min    Billable Minutes:Therapeutic Activity 25    OT/EVY: EVY          8/18/2024

## 2024-08-18 NOTE — NURSING
Nurses Note -- 4 Eyes      8/18/2024   4:04 PM      Skin assessed during: Q Shift Change      [] No Altered Skin Integrity Present    []Prevention Measures Documented      [x] Yes- Altered Skin Integrity Present or Discovered   [] LDA Added if Not in Epic (Describe Wound)   [x] New Altered Skin Integrity was Present on Admit and Documented in LDA   [x] Wound Image Taken    Wound Care Consulted? Yes    Attending Nurse:  Lois Quiroz RN/Staff Member: Arlette

## 2024-08-18 NOTE — PLAN OF CARE
Problem: Adult Inpatient Plan of Care  Goal: Plan of Care Review  8/17/2024 2034 by Deanna Milner LPN  Outcome: Progressing  8/17/2024 2033 by Deanna Milner LPN  Outcome: Progressing  Goal: Patient-Specific Goal (Individualized)  8/17/2024 2034 by Deanna Milner LPN  Outcome: Progressing  8/17/2024 2033 by Deanna Milner LPN  Outcome: Progressing  Goal: Absence of Hospital-Acquired Illness or Injury  8/17/2024 2034 by Deanna Milner LPN  Outcome: Progressing  8/17/2024 2033 by Deanna Milner LPN  Outcome: Progressing  Goal: Optimal Comfort and Wellbeing  8/17/2024 2034 by Deanna Milner LPN  Outcome: Progressing  8/17/2024 2033 by Deanna Milner LPN  Outcome: Progressing  Goal: Readiness for Transition of Care  8/17/2024 2034 by Deanna Milner LPN  Outcome: Progressing  8/17/2024 2033 by Deanna Milner LPN  Outcome: Progressing     Problem: Diabetes Comorbidity  Goal: Blood Glucose Level Within Targeted Range  8/17/2024 2034 by Deanna Milner LPN  Outcome: Progressing  8/17/2024 2033 by Deanna Milner LPN  Outcome: Progressing     Problem: Acute Kidney Injury/Impairment  Goal: Fluid and Electrolyte Balance  8/17/2024 2034 by Deanna Milner LPN  Outcome: Progressing  8/17/2024 2033 by Deanna Milner LPN  Outcome: Progressing  Goal: Improved Oral Intake  8/17/2024 2034 by Deanna Milner LPN  Outcome: Progressing  8/17/2024 2033 by Deanna Milner LPN  Outcome: Progressing  Goal: Effective Renal Function  8/17/2024 2034 by Deanna Milner LPN  Outcome: Progressing  8/17/2024 2033 by Deanna Milner LPN  Outcome: Progressing     Problem: Wound  Goal: Optimal Coping  8/17/2024 2034 by Baudoin, Laticea, LPN  Outcome: Progressing  8/17/2024 2033 by Deanna Milner LPN  Outcome: Progressing  Goal: Optimal Functional Ability  8/17/2024 2034 by Deanna Milner LPN  Outcome: Progressing  8/17/2024 2033 by Deanna Milner LPN  Outcome: Progressing  Goal: Absence of  Infection Signs and Symptoms  8/17/2024 2034 by Deanna Milner LPN  Outcome: Progressing  8/17/2024 2033 by Deanna Milner LPN  Outcome: Progressing  Goal: Improved Oral Intake  8/17/2024 2034 by Deanna Milner LPN  Outcome: Progressing  8/17/2024 2033 by Deanna Milner LPN  Outcome: Progressing  Goal: Optimal Pain Control and Function  8/17/2024 2034 by Deanna Milner LPN  Outcome: Progressing  8/17/2024 2033 by Deanna Milner LPN  Outcome: Progressing  Goal: Skin Health and Integrity  8/17/2024 2034 by Deanna Milner LPN  Outcome: Progressing  8/17/2024 2033 by Deanna Milner LPN  Outcome: Progressing  Goal: Optimal Wound Healing  8/17/2024 2034 by Deanna Milner LPN  Outcome: Progressing  8/17/2024 2033 by Deanna Milner LPN  Outcome: Progressing     Problem: Infection  Goal: Absence of Infection Signs and Symptoms  8/17/2024 2034 by Deanna Milner LPN  Outcome: Progressing  8/17/2024 2033 by Deanna Milner LPN  Outcome: Progressing     Problem: Fall Injury Risk  Goal: Absence of Fall and Fall-Related Injury  8/17/2024 2034 by Deanna Milner LPN  Outcome: Progressing  8/17/2024 2033 by Deanna Milner LPN  Outcome: Progressing     Problem: Skin Injury Risk Increased  Goal: Skin Health and Integrity  8/17/2024 2034 by Deanna Milner LPN  Outcome: Progressing  8/17/2024 2033 by Deanna Milner LPN  Outcome: Progressing     Problem: Hip Arthroplasty  Goal: Optimal Coping  8/17/2024 2034 by Deanna Milner LPN  Outcome: Progressing  8/17/2024 2033 by Deanna Milner LPN  Outcome: Progressing  Goal: Absence of Bleeding  8/17/2024 2034 by Deanna Milner LPN  Outcome: Progressing  8/17/2024 2033 by Deanna Milner LPN  Outcome: Progressing  Goal: Effective Bowel Elimination  8/17/2024 2034 by Deanna Milner LPN  Outcome: Progressing  8/17/2024 2033 by Deanna Milner LPN  Outcome: Progressing  Goal: Fluid and Electrolyte Balance  8/17/2024 2034 by Alf  Deanna, LPN  Outcome: Progressing  8/17/2024 2033 by Deanna Milner LPN  Outcome: Progressing  Goal: Optimal Functional Ability  8/17/2024 2034 by Deanna Milner LPN  Outcome: Progressing  8/17/2024 2033 by Deanna Milner LPN  Outcome: Progressing  Goal: Absence of Infection Signs and Symptoms  8/17/2024 2034 by Deanna Milner LPN  Outcome: Progressing  8/17/2024 2033 by Deanna Milner LPN  Outcome: Progressing  Goal: Intact Neurovascular Status  8/17/2024 2034 by Deanna Milner LPN  Outcome: Progressing  8/17/2024 2033 by Deanna Milner LPN  Outcome: Progressing  Goal: Anesthesia/Sedation Recovery  8/17/2024 2034 by Deanna Milner LPN  Outcome: Progressing  8/17/2024 2033 by Deanna Milner LPN  Outcome: Progressing  Goal: Acceptable Pain Control  8/17/2024 2034 by Deanna Milner LPN  Outcome: Progressing  8/17/2024 2033 by Deanna Milner LPN  Outcome: Progressing  Goal: Nausea and Vomiting Relief  8/17/2024 2034 by Deanna Milner LPN  Outcome: Progressing  8/17/2024 2033 by Deanna Milner LPN  Outcome: Progressing  Goal: Effective Urinary Elimination  8/17/2024 2034 by Deanna Milner LPN  Outcome: Progressing  8/17/2024 2033 by Deanna Milner LPN  Outcome: Progressing  Goal: Effective Oxygenation and Ventilation  8/17/2024 2034 by Deanna Milner LPN  Outcome: Progressing  8/17/2024 2033 by Deanna Milner LPN  Outcome: Progressing     Problem: Dysrhythmia  Goal: Normalized Cardiac Rhythm  8/17/2024 2034 by Deanna Milner LPN  Outcome: Progressing  8/17/2024 2033 by Deanna Milner LPN  Outcome: Progressing     Problem: Pain Acute  Goal: Optimal Pain Control and Function  8/17/2024 2034 by Deanna Milner LPN  Outcome: Progressing  8/17/2024 2033 by Deanna Milner LPN  Outcome: Progressing

## 2024-08-19 LAB
ALBUMIN SERPL-MCNC: 1.6 G/DL (ref 3.4–4.8)
BUN SERPL-MCNC: 27.8 MG/DL (ref 9.8–20.1)
CALCIUM SERPL-MCNC: 8 MG/DL (ref 8.4–10.2)
CHLORIDE SERPL-SCNC: 110 MMOL/L (ref 98–107)
CO2 SERPL-SCNC: 18 MMOL/L (ref 23–31)
CREAT SERPL-MCNC: 1.33 MG/DL (ref 0.55–1.02)
CRP SERPL-MCNC: 49 MG/L
ERYTHROCYTE [DISTWIDTH] IN BLOOD BY AUTOMATED COUNT: 18.6 % (ref 11.5–17)
GFR SERPLBLD CREATININE-BSD FMLA CKD-EPI: 39 ML/MIN/1.73/M2
GLUCOSE SERPL-MCNC: 127 MG/DL (ref 82–115)
HCT VFR BLD AUTO: 31.6 % (ref 37–47)
HGB BLD-MCNC: 10.5 G/DL (ref 12–16)
INR PPP: 2.6 (ref 2–3)
MAGNESIUM SERPL-MCNC: 1.5 MG/DL (ref 1.6–2.6)
MCH RBC QN AUTO: 32.3 PG (ref 27–31)
MCHC RBC AUTO-ENTMCNC: 33.2 G/DL (ref 33–36)
MCV RBC AUTO: 97.2 FL (ref 80–94)
NRBC BLD AUTO-RTO: 0 %
OHS QRS DURATION: 112 MS
OHS QTC CALCULATION: 462 MS
PHOSPHATE SERPL-MCNC: 2.6 MG/DL (ref 2.3–4.7)
PLATELET # BLD AUTO: 400 X10(3)/MCL (ref 130–400)
PMV BLD AUTO: 9.3 FL (ref 7.4–10.4)
POTASSIUM SERPL-SCNC: 4.5 MMOL/L (ref 3.5–5.1)
PROTHROMBIN TIME: 28.6 SECONDS (ref 11.7–14.5)
RBC # BLD AUTO: 3.25 X10(6)/MCL (ref 4.2–5.4)
SODIUM SERPL-SCNC: 136 MMOL/L (ref 136–145)
WBC # BLD AUTO: 7.89 X10(3)/MCL (ref 4.5–11.5)

## 2024-08-19 PROCEDURE — 63600175 PHARM REV CODE 636 W HCPCS: Performed by: INTERNAL MEDICINE

## 2024-08-19 PROCEDURE — 93010 ELECTROCARDIOGRAM REPORT: CPT | Mod: ,,, | Performed by: STUDENT IN AN ORGANIZED HEALTH CARE EDUCATION/TRAINING PROGRAM

## 2024-08-19 PROCEDURE — 97530 THERAPEUTIC ACTIVITIES: CPT | Mod: CQ

## 2024-08-19 PROCEDURE — 25000003 PHARM REV CODE 250: Performed by: ORTHOPAEDIC SURGERY

## 2024-08-19 PROCEDURE — 99223 1ST HOSP IP/OBS HIGH 75: CPT | Mod: ,,, | Performed by: GENERAL PRACTICE

## 2024-08-19 PROCEDURE — 85610 PROTHROMBIN TIME: CPT | Performed by: INTERNAL MEDICINE

## 2024-08-19 PROCEDURE — A4216 STERILE WATER/SALINE, 10 ML: HCPCS | Performed by: INTERNAL MEDICINE

## 2024-08-19 PROCEDURE — 85027 COMPLETE CBC AUTOMATED: CPT | Performed by: INTERNAL MEDICINE

## 2024-08-19 PROCEDURE — 93005 ELECTROCARDIOGRAM TRACING: CPT

## 2024-08-19 PROCEDURE — 80069 RENAL FUNCTION PANEL: CPT | Performed by: INTERNAL MEDICINE

## 2024-08-19 PROCEDURE — 36415 COLL VENOUS BLD VENIPUNCTURE: CPT | Performed by: INTERNAL MEDICINE

## 2024-08-19 PROCEDURE — 97605 NEG PRS WND THER DME<=50SQCM: CPT

## 2024-08-19 PROCEDURE — S0179 MEGESTROL 20 MG: HCPCS | Performed by: ORTHOPAEDIC SURGERY

## 2024-08-19 PROCEDURE — 63600175 PHARM REV CODE 636 W HCPCS: Performed by: GENERAL PRACTICE

## 2024-08-19 PROCEDURE — 25000003 PHARM REV CODE 250: Performed by: INTERNAL MEDICINE

## 2024-08-19 PROCEDURE — 83735 ASSAY OF MAGNESIUM: CPT | Performed by: INTERNAL MEDICINE

## 2024-08-19 PROCEDURE — 25000003 PHARM REV CODE 250: Performed by: GENERAL PRACTICE

## 2024-08-19 PROCEDURE — 21400001 HC TELEMETRY ROOM

## 2024-08-19 PROCEDURE — 86140 C-REACTIVE PROTEIN: CPT | Performed by: GENERAL PRACTICE

## 2024-08-19 RX ORDER — MICONAZOLE NITRATE 2 G/100G
POWDER TOPICAL 2 TIMES DAILY
Status: DISCONTINUED | OUTPATIENT
Start: 2024-08-19 | End: 2024-08-21 | Stop reason: HOSPADM

## 2024-08-19 RX ORDER — MAGNESIUM SULFATE HEPTAHYDRATE 40 MG/ML
2 INJECTION, SOLUTION INTRAVENOUS ONCE
Status: COMPLETED | OUTPATIENT
Start: 2024-08-19 | End: 2024-08-19

## 2024-08-19 RX ADMIN — SODIUM CHLORIDE, PRESERVATIVE FREE 10 ML: 5 INJECTION INTRAVENOUS at 11:08

## 2024-08-19 RX ADMIN — TRAMADOL HYDROCHLORIDE 50 MG: 50 TABLET, COATED ORAL at 11:08

## 2024-08-19 RX ADMIN — MAGNESIUM SULFATE HEPTAHYDRATE 2 G: 40 INJECTION, SOLUTION INTRAVENOUS at 10:08

## 2024-08-19 RX ADMIN — MEROPENEM 1 G: 1 INJECTION, POWDER, FOR SOLUTION INTRAVENOUS at 03:08

## 2024-08-19 RX ADMIN — ACETAMINOPHEN 650 MG: 650 SOLUTION ORAL at 01:08

## 2024-08-19 RX ADMIN — SODIUM CHLORIDE, PRESERVATIVE FREE 10 ML: 5 INJECTION INTRAVENOUS at 03:08

## 2024-08-19 RX ADMIN — TRAMADOL HYDROCHLORIDE 50 MG: 50 TABLET, COATED ORAL at 12:08

## 2024-08-19 RX ADMIN — LEVOTHYROXINE SODIUM 125 MCG: 125 TABLET ORAL at 05:08

## 2024-08-19 RX ADMIN — PANTOPRAZOLE SODIUM 40 MG: 40 TABLET, DELAYED RELEASE ORAL at 08:08

## 2024-08-19 RX ADMIN — MEGESTROL ACETATE 200 MG: 40 SUSPENSION ORAL at 12:08

## 2024-08-19 RX ADMIN — SACUBITRIL AND VALSARTAN 1 TABLET: 24; 26 TABLET, FILM COATED ORAL at 08:08

## 2024-08-19 RX ADMIN — TRAMADOL HYDROCHLORIDE 50 MG: 50 TABLET, COATED ORAL at 10:08

## 2024-08-19 RX ADMIN — ACETAMINOPHEN 650 MG: 650 SOLUTION ORAL at 05:08

## 2024-08-19 RX ADMIN — ACETAMINOPHEN 650 MG: 650 SOLUTION ORAL at 12:08

## 2024-08-19 RX ADMIN — ACETAMINOPHEN 650 MG: 650 SOLUTION ORAL at 10:08

## 2024-08-19 RX ADMIN — PRAVASTATIN SODIUM 20 MG: 10 TABLET ORAL at 08:08

## 2024-08-19 RX ADMIN — SODIUM CHLORIDE, PRESERVATIVE FREE 10 ML: 5 INJECTION INTRAVENOUS at 07:08

## 2024-08-19 RX ADMIN — SODIUM CHLORIDE, PRESERVATIVE FREE 10 ML: 5 INJECTION INTRAVENOUS at 06:08

## 2024-08-19 RX ADMIN — FUROSEMIDE 20 MG: 20 TABLET ORAL at 08:08

## 2024-08-19 RX ADMIN — FUROSEMIDE 20 MG: 20 TABLET ORAL at 05:08

## 2024-08-19 RX ADMIN — SODIUM CHLORIDE, PRESERVATIVE FREE 10 ML: 5 INJECTION INTRAVENOUS at 12:08

## 2024-08-19 RX ADMIN — MICONAZOLE NITRATE ANTIFUNGAL POWDER: 2 POWDER TOPICAL at 08:08

## 2024-08-19 RX ADMIN — MEGESTROL ACETATE 200 MG: 40 SUSPENSION ORAL at 08:08

## 2024-08-19 RX ADMIN — CARVEDILOL 3.12 MG: 3.12 TABLET, FILM COATED ORAL at 08:08

## 2024-08-19 NOTE — PROGRESS NOTES
Inpatient Nutrition Assessment    Admit Date: 8/9/2024   Total duration of encounter: 10 days   Patient Age: 88 y.o.    Nutrition Recommendation/Prescription     Continue Regular diet with coumadin restriction as tolerated.    Continue Boost Plus TID Boost Plus (360 kcal, 14 g protein per serving).  Continue 500 mg Vitamin C BID, MVI daily, and 220 mg of Zinc Sulfate daily to aid in healing.   Continue Megace as medically appropriate.   Obtain current weight        Communication of Recommendations:  patient and family    Nutrition Assessment     Malnutrition Assessment/Nutrition-Focused Physical Exam        Malnutrition Context: acute illness or injury (08/12/24 1039)  Malnutrition Level: moderate (08/12/24 1039)  Energy Intake (Malnutrition): less than or equal to 50% for greater than or equal to 1 month (08/12/24 1039)     Orbital Region (Subcutaneous Fat Loss): mild depletion        Muscle Mass (Malnutrition): mild depletion (08/12/24 1039)  [place] (Muscle Loss): mild depletion                                A minimum of two characteristics is recommended for diagnosis of either severe or non-severe malnutrition.    Chart Review    Reason Seen: follow-up    Malnutrition Screening Tool Results   Have you recently lost weight without trying?: Unsure  Have you been eating poorly because of a decreased appetite?: Yes   MST Score: 3   Diagnosis:  Wound drainage.     Relevant Medical History:   A-fib      Chronic cystitis      GERD (gastroesophageal reflux disease)      Graves disease      Hypertension      Hypothyroidism, unspecified      Spinal stenosis          Scheduled Medications:  acetaminophen, 650 mg, Q6H  ascorbic acid (vitamin C), 500 mg, BID  carvediloL, 3.125 mg, BID  furosemide, 20 mg, BID  levothyroxine, 125 mcg, Before breakfast  megestroL, 200 mg, TID WM  meropenem IV (PEDS and ADULTS), 1 g, Q12H  miconazole NITRATE 2 %, , BID  pantoprazole, 40 mg, Daily  pravastatin, 20 mg,  Daily  sacubitriL-valsartan, 1 tablet, BID  sodium chloride 0.9%, 10 mL, Q6H  zinc sulfate, 220 mg, Daily    Continuous Infusions:   PRN Medications:  lactulose, 20 g, Q6H PRN  naloxone, 0.02 mg, PRN  nitroGLYCERIN, 0.4 mg, Q5 Min PRN  ondansetron, 4 mg, Q8H PRN  sodium chloride 0.9%, 10 mL, PRN  traMADoL, 50 mg, Q4H PRN    Calorie Containing IV Medications: no significant kcals from medications at this time    Recent Labs   Lab 08/12/24  1756 08/13/24  0726 08/14/24  0435 08/15/24  0429 08/16/24  0443 08/19/24  0412   NA  --  136 138 137  --  136   K  --  4.7 4.9 4.5  --  4.5   CALCIUM  --  8.3* 7.8* 7.7*  --  8.0*   PHOS  --   --   --  2.6  --  2.6   MG  --   --   --  1.50* 2.00 1.50*   CO2  --  24 22* 21*  --  18*   BUN  --  22.7* 23.6* 24.4*  --  27.8*   CREATININE  --  1.20* 1.35* 1.35*  --  1.33*   EGFRNORACEVR  --  44 38 38  --  39   GLUCOSE  --  87 115 102  --  127*   ALBUMIN  --   --   --  1.8*  --  1.6*   CRP  --   --   --   --   --  49.00*   WBC  --  8.30 7.94 8.29 8.06 7.89   HGB 8.9* 8.4* 6.9* 9.9* 10.3* 10.5*   HCT 27.5* 26.7* 21.9* 29.8* 31.3* 31.6*     Nutrition Orders:  Diet Adult Regular Coumadin Restriction  Dietary nutrition supplements TID; Boost Plus Nutritional Drink - Any flavor    Appetite/Oral Intake: fair/25-50% of meals   Factors Affecting Nutritional Intake: none identified  Social Needs Impacting Access to Food: none identified  Food/Alevism/Cultural Preferences:  likes extra gravy and orange sherbet  Food Allergies: none reported  Last Bowel Movement: 08/19/24  Wound(s):     Wound 08/09/24 1400 Rash Buttocks-Tissue loss description: Not applicable       Wound 08/09/24 1704 Other (comment) Left Heel-Tissue loss description: Not applicable       Wound 08/09/24 1704 Other (comment) Right Heel-Tissue loss description: Not applicable     Comments    8/10:  Noted pt is status post conversion L SILVIA L hip with significant serous drainage and plans are for surgery Monday afternoon per EMR  "notes. Consult received secondary to pt with poor appetite/intake. Unable to complete NFPE at this time. RD to complete at f/u. No recent weight loss or chewing/swallowing issues noted in EMR. Will add Boost Plus, TID, and continue to monitor during stay.     () Pt currently NPO for surgery later today. Met with pt and daughter. Pt with poor appetite and intake since July. She was dx with a UTI at that time. Megace was started a week ago and intake has improved some since receiving. Pt drinks about one ONS per day. Denied N/V/C/D. No difficulties chewing or swallowing. Med/labs reviewed. # per daughter    (8/15) Pt with poor to fair appetite and intake. Consumes 25-50% of meals. Drinks one ONS/day. No reports of N/V/C/D. No difficulties chewing or swallowing. Med/labs reviewed. Continues to receive Megace. No new wt recorded.     () Pt and family reported much better intake. Consumed about 50% of meals. She is drinking about 1 ONS per day. Denied N/V/C/D. Med/labs reviewed. Continues to receive megace, vitamin c, and zinc sulfate. No new wt recorded.     Anthropometrics    Height: 5' 4.02" (162.6 cm), Height Method: Stated  Last Weight: 86 kg (189 lb 9.5 oz) (24), Weight Method: Bed Scale     BMI Classification: obese grade I (BMI 30-34.9)     Ideal Body Weight (IBW), Female: 120.1 lb                          Usual Body Weight (UBW), k.9 kg  % Usual Body Weight: 103.96     Usual Weight Provided By: family/caregiver    Wt Readings from Last 5 Encounters:   24 86 kg (189 lb 9.5 oz)   24 82.9 kg (182 lb 12.2 oz)   24 82.9 kg (182 lb 12.2 oz)   24 70.3 kg (155 lb)   23 67 kg (147 lb 12.8 oz)     Weight Change(s) Since Admission: no new wt recorded  Wt Readings from Last 1 Encounters:   24 86 kg (189 lb 9.5 oz)   Admit Weight: 86 kg (189 lb 9.5 oz) (24), Weight Method: Bed Scale    Estimated Needs    Weight Used For Calorie Calculations: " 86 kg (189 lb 9.5 oz)  Energy Calorie Requirements (kcal): 1550 to 1700 kcals/day (SF 1.2-1.3)  Energy Need Method: Major-St Jeor  Weight Used For Protein Calculations: 86 kg (189 lb 9.5 oz)  Protein Requirements: 70 to 90 g/day (0.8 to 1.0 g/kg/CBW)  Fluid Requirements (mL): 1550 to 1700 mL/day (1mL/kcal) or per MD Guidance.        Enteral Nutrition     Patient not receiving enteral nutrition at this time.    Parenteral Nutrition     Patient not receiving parenteral nutrition support at this time.    Evaluation of Received Nutrient Intake    Calories: not meeting estimated needs  Protein: not meeting estimated needs    Patient Education     Not applicable.    Nutrition Diagnosis     PES: Increased nutrient needs (Vitamin C, protein, and Zinc ) related to increased protein energy demand for wound healing as evidenced by Recent surgery/wounds. (active)       PES: Moderate acute disease or injury related malnutrition related to acute illness as evidenced by less than or equal to 50% needs met for greater than or equal to 1 month, mild fat depletion, and mild muscle depletion. (active)     Nutrition Interventions     Intervention(s): commercial beverage, multivitamin/mineral supplement therapy, and collaboration with other providers    Goal: Meet greater than 80% of nutritional needs by follow-up. (goal progressing)    Nutrition Goals & Monitoring     Dietitian will monitor: food and beverage intake, weight, weight change, electrolyte/renal panel, glucose/endocrine profile, and gastrointestinal profile  Discharge planning: continue Regular/Coumadin diet  Nutrition Risk/Follow-Up: moderate (follow-up in 3-5 days)   Please consult if re-assessment needed sooner.

## 2024-08-19 NOTE — PLAN OF CARE
Problem: Physical Therapy  Goal: Physical Therapy Goal  Description: Pt will improve functional independence by performing:    Bed mobility:   Rolling:max A-MET  Supine to sit: Max A   Sit to supine: Max A  Sit to stand: max A with rolling walker  Bed to chair: max A with Stand Step  with rolling walker   Bed to chair t/f: Max A slide board       Outcome: Progressing

## 2024-08-19 NOTE — NURSING
Nurses Note -- 4 Eyes      8/18/2024   07:40pm      Skin assessed during: Q Shift Change      [] No Altered Skin Integrity Present    []Prevention Measures Documented      [x] Yes- Altered Skin Integrity Present or Discovered   [] LDA Added if Not in Epic (Describe Wound)   [] New Altered Skin Integrity was Present on Admit and Documented in LDA   [] Wound Image Taken    Wound Care Consulted? No    Attending Nurse:  Raoul Quiroz RN/Staff Member:   Lois

## 2024-08-19 NOTE — PT/OT/SLP PROGRESS
Occupational Therapy      Patient Name:  Homa Jaquez   MRN:  [id number]    Patient not seen today. Attempted to see patient at 0936, pt refusal secondary to fatigue following session with PTA. Attempted again at 1330, pt reluctant to participate; resting heart rate at 103. DELMIS Abreu and LAURA Chong notified. Suellen emptied, 1025 cc's. EKG began as therapist left room to speak with NSG staff. Will follow-up tomorrow AM.    8/19/2024

## 2024-08-19 NOTE — PROGRESS NOTES
[facility] - Orthopaedics  Wound Care    Patient Name:  Homa Jaquez   MRN:  [id number]  Date: 8/19/2024  Diagnosis: Post-operative complication    History:     Past Medical History:   Diagnosis Date    A-fib     Chronic cystitis     GERD (gastroesophageal reflux disease)     Graves disease     Hypertension     Hypothyroidism, unspecified     Spinal stenosis        Social History     Socioeconomic History    Marital status:    Tobacco Use    Smoking status: Former     Types: Cigarettes    Smokeless tobacco: Never   Substance and Sexual Activity    Alcohol use: Yes    Drug use: Never    Sexual activity: Not Currently     Social Determinants of Health     Financial Resource Strain: Low Risk  (7/4/2024)    Overall Financial Resource Strain (CARDIA)     Difficulty of Paying Living Expenses: Not hard at all   Food Insecurity: No Food Insecurity (7/4/2024)    Hunger Vital Sign     Worried About Running Out of Food in the Last Year: Never true     Ran Out of Food in the Last Year: Never true   Transportation Needs: No Transportation Needs (7/9/2024)    PRAPARE - Transportation     Lack of Transportation (Medical): No     Lack of Transportation (Non-Medical): No   Stress: No Stress Concern Present (7/4/2024)    [employer] - Occupational Stress Questionnaire     Feeling of Stress : Not at all   Housing Stability: Low Risk  (7/4/2024)    Housing Stability Vital Sign     Unable to Pay for Housing in the Last Year: No     Homeless in the Last Year: No       Precautions:     Allergies as of 08/08/2024 - Reviewed 07/15/2024   Allergen Reaction Noted    Cefadroxil  09/08/2022    Cefuroxime axetil  09/08/2022    Cephalexin  09/08/2022    Ciprofloxacin  09/08/2022    Enoxaparin  09/08/2022    Methenamine hippurate  09/08/2022    Promethazine  09/08/2022       WOC Assessment Details/Treatment      08/19/24 1033   Pain/Comfort/Sleep   POSS (Pasero Opioid-Induced Sed Scale) 1 -  Awake and alert   Pain Reassessment   Pain Rating Prior to Med Admin 6        Negative Pressure Wound Therapy  08/12/24 Left lateral   Placement Date: 08/12/24   Side: Left  Orientation: lateral  Location: Hip   NPWT Type Incision Management   Therapy Setting NPWT Continuous therapy   Pressure Setting NPWT 125 mmHg   Therapy Interventions NPWT Trac pad replaced;Dressing changed;Seal intact        Wound 08/09/24 1400 Rash Buttocks   Date First Assessed/Time First Assessed: 08/09/24 1400   Primary Wound Type: Rash  Location: Buttocks   Distribution regional   Characteristics redness/erythema   Appearance Red  (splotches as candida)   Care   (miconazole)        Wound 08/12/24 1328 Incision Left lateral Hip   Date First Assessed/Time First Assessed: 08/12/24 1328   Primary Wound Type: Incision  Side: Left  Orientation: lateral  Location: Hip  Additional Comments: hemavac, black foam wound vac   Dressing Appearance Intact   Drainage Amount None   Dressing Change Due 08/22/24        Wound 08/18/24 1500 Rash Right lower Breast   Date First Assessed/Time First Assessed: 08/18/24 1500   Primary Wound Type: Rash  Side: Right  Orientation: lower  Location: Breast   Distribution regional   Characteristics redness/erythema   Rash Care antifungal applied        Wound 08/18/24 1500 Rash Left lower Breast   Date First Assessed/Time First Assessed: 08/18/24 1500   Primary Wound Type: Rash  Side: Left  Orientation: lower  Location: Breast   Distribution regional   Characteristics redness/erythema   Rash Care antifungal applied     Left lateral thigh: vac dressing changed.  Bilateral breast folds and bilateral buttocks, ceasar area, and upper inner thighs: ordered Miconazole for candida.  08/19/2024

## 2024-08-19 NOTE — CONSULTS
Infectious Disease  Consult Note    Patient Name: Homa Jaquez   MRN: [id number]   Admission Date: 8/9/2024   Hospital Length of Stay: 10 days  Attending Physician: Mathieu Patel MD   Primary Care Provider: Franklyn Brito MD     Isolation Status: No active isolations       Assessment/Plan:       88-year-old female patient past medical history significant for of A-fib, Chronic cystitis, GERD, Graves disease, Hypertension, Hypothyroidism, essential tremor, and Spinal stenosis, presented to [facility] on 8/9/2024 with a worsening pain in the left hip, she had sustained a left femur fracture after a fall 7/2, underwent IMN 7/3 and [facility]. She reported increasing pain to hip, xrays showing failed intertrochanteric femur fracture repair. Pt underwent conversion left SILVIA 7/15, she was discharged 7/26 to snf.  While there, she reportedly developed pneumonia, received a course of meropenem and vancomycin, had ongoing drainage from the left hip incision and poor healing, was transferred back to the hospital for further evaluation.  She was taken back to the OR on  08/12/2024, underwent I&D of the left hip wound, was found to have significant evidence of infection with large amount of necrotic appearing soft tissues around the prosthesis, prosthesis could not be exchanged, hardware was retained.  ID is consulted for assistance in management      Left SILVIA prosthetic joint infection   Retained hardware  Poor surgical candidate    -intraoperative cultures noted with Pseudomonas and Hafnia species  -due to retained hardware and patient being a poor surgical candidate on the long-term, per my discussion with orthopedic surgeon, long-term of antibiotics is recommended with likely need for long-term suppression.    Recommendations:  -Meropenem 1g IV q12h  -Plan for 8 weeks from surgery 08/12  -anticipated end date 10/07  -Please monitor weekly CBC, CMP, ESR, CRP and Fax results to my office at  [phone]  -Retained hardware and advanced age with aggressive infection and poor surgical candidate, will likely need quinolone suppression however Qtc prolonged on most recent ECG  -EKG repeated, QTC is in a more acceptable range, will tentatively plan for quinolone suppression at outpatient follow-up      Thank you for your consult. ID will continue following. Please contact us with any questions or concerns.    Antibiotics History:  Cefazolin 08/12 - 08/15  Piperacillin/tazobactam 08/15 - 08/16  Meropenem 08/16 - Present    Portions of this note dictated using EMR integrated voice recognition software, and may be subject to voice recognition errors not corrected at proofreading. Please contact writer for clarification if needed.    Subjective:     Principal Problem: Post-operative complication     HPI:   88-year-old female patient past medical history significant for of A-fib, Chronic cystitis, GERD, Graves disease, Hypertension, Hypothyroidism, essential tremor, and Spinal stenosis, presented to [facility] on 8/9/2024 with a worsening pain in the left hip, she had sustained a left femur fracture after a fall 7/2, underwent IMN 7/3 and [facility] She reported increasing pain to hip, xrays showing failed intertrochanteric femur fracture repair. Pt underwent conversion left SILVIA 7/15, she was discharged 7/26 to snf.  While there, she reportedly developed pneumonia, received a course of meropenem and vancomycin, had ongoing drainage from the left hip incision and poor healing, was transferred back to the hospital for further evaluation.  She was taken back to the OR on  08/12/2024, underwent I&D of the left hip wound, was found to have significant evidence of infection with large amount of necrotic appearing soft tissues around the prosthesis, prosthesis could not be exchanged, hardware was retained.  ID is consulted for assistance in management    On my evaluation reports ongoing left hip pain.   Over all some improvement, pain is intermittent.    Past Medical History:   Diagnosis Date    A-fib     Chronic cystitis     GERD (gastroesophageal reflux disease)     Graves disease     Hypertension     Hypothyroidism, unspecified     Spinal stenosis         Past Surgical History:   Procedure Laterality Date    APPENDECTOMY      BLADDER SUSPENSION      CARDIOVERSION  04/01/2015    CATARACT EXTRACTION      CONVERSION ARTHROPLASTY, HIP, POSTERIOR APPROACH Left 7/15/2024    Procedure: CONVERSION ARTHROPLASTY, HIP, POSTERIOR APPROACH - valencia, cell saver, pathology;  Surgeon: Jonny Bains MD;  Location: [facility] OR;  Service: Orthopedics;  Laterality: Left;  valencia, cell saver, pathology    HYSTERECTOMY      INTRAMEDULLARY RODDING OF FEMUR Left 7/3/2024    Procedure: INSERTION, INTRAMEDULLARY MELANIE, FEMUR;  Surgeon: Steve Pierce DO;  Location: [facility] OR;  Service: Orthopedics;  Laterality: Left;  supine hana table c arm synthes    LAPAROSCOPIC CHOLECYSTECTOMY  01/12/2016    REVISION, ARTHROPLASTY, HIP, POSTERIOR APPROACH Left 8/12/2024    Procedure: REVISION,ARTHROPLASTY,HIP,POSTERIOR APPROACH;  Surgeon: Jonny Bains MD;  Location: [facility] OR;  Service: Orthopedics;  Laterality: Left;  I&D L hip - head/liner    SPHINCTEROTOMY OF URETHRA  01/11/2016    TONSILLECTOMY AND ADENOIDECTOMY         Review of patient's allergies indicates:   Allergen Reactions    Cefadroxil      Other reaction(s): Nausea or vomiting    Cefuroxime axetil     Cephalexin      Other reaction(s): NAUSEA AND VOMITING    Ciprofloxacin     Enoxaparin     Methenamine hippurate      Other reaction(s): Unknown    Promethazine         Family History       Problem Relation (Age of Onset)    Cancer Father    Heart attack Father    Stroke Father             Social History     Socioeconomic History    Marital status:    Tobacco Use    Smoking status: Former     Types: Cigarettes    Smokeless tobacco: Never   Substance and Sexual Activity    Alcohol use:  Yes    Drug use: Never    Sexual activity: Not Currently     Social Determinants of Health     Financial Resource Strain: Low Risk  (7/4/2024)    Overall Financial Resource Strain (CARDIA)     Difficulty of Paying Living Expenses: Not hard at all   Food Insecurity: No Food Insecurity (7/4/2024)    Hunger Vital Sign     Worried About Running Out of Food in the Last Year: Never true     Ran Out of Food in the Last Year: Never true   Transportation Needs: No Transportation Needs (7/9/2024)    PRAPARE - Transportation     Lack of Transportation (Medical): No     Lack of Transportation (Non-Medical): No   Stress: No Stress Concern Present (7/4/2024)    [employer] - Occupational Stress Questionnaire     Feeling of Stress : Not at all   Housing Stability: Low Risk  (7/4/2024)    Housing Stability Vital Sign     Unable to Pay for Housing in the Last Year: No     Homeless in the Last Year: No        Lines/Drains/Airways       Peripherally Inserted Central Catheter Line  Duration             PICC Double Lumen right basilic -- days              Drain  Duration                  Urethral Catheter -- days         Closed/Suction Drain 08/12/24 1631 Left;Proximal;Anterior Hip Accordion 6 days         Closed/Suction Drain 08/12/24 1700 Left;Proximal;Anterior Hip Other (Comment) 6 days                     Medication:  Medications Prior to Admission   Medication Sig    acetaminophen (TYLENOL) 500 MG tablet Take 1 tablet (500 mg total) by mouth every 4 (four) hours.    allopurinoL (ZYLOPRIM) 100 MG tablet Take 1 tablet (100 mg total) by mouth once daily.    ascorbic acid, vitamin C, (VITAMIN C) 500 MG tablet Take 1 tablet (500 mg total) by mouth 2 (two) times daily.    carvediloL (COREG) 3.125 MG tablet Take 1 tablet (3.125 mg total) by mouth 2 (two) times daily.    cyanocobalamin 1,000 mcg/mL injection INJECT 1000MCG (1ML) INTRAMUSCULARY ONCE MONTHLY    docusate sodium (COLACE) 100 MG capsule Take 1  capsule (100 mg total) by mouth 2 (two) times daily as needed for Constipation.    levothyroxine (SYNTHROID) 125 MCG tablet TAKE 1 TABLET BY MOUTH ONCE DAILY    lovastatin (MEVACOR) 20 MG tablet Take 20 mg by mouth once daily.    menthol-zinc oxide (CALMOSEPTINE) 0.44-20.6 % Oint Apply topically 2 (two) times a day. To bilateral breast folds and labia.    multivitamin Tab Take 1 tablet by mouth once daily.    polyethylene glycol (GLYCOLAX) 17 gram PwPk Take 17 g by mouth 2 (two) times daily as needed for Constipation.    sacubitriL-valsartan (ENTRESTO) 24-26 mg per tablet Take 1 tablet by mouth 2 (two) times daily.    traMADoL (ULTRAM) 50 mg tablet Take 1 tablet (50 mg total) by mouth every 4 (four) hours as needed (as needed for pain score 1-10).    [] doxycycline (VIBRA-TABS) 100 MG tablet Take 1 tablet (100 mg total) by mouth every 12 (twelve) hours. for 14 days (Patient not taking: Reported on 2024)    furosemide (LASIX) 20 MG tablet Take 1 tablet (20 mg total) by mouth 2 (two) times daily. for 3 days    nitroGLYCERIN (NITROSTAT) 0.4 MG SL tablet Place 1 tablet (0.4 mg total) under the tongue every 5 (five) minutes as needed for Chest pain.    pantoprazole (PROTONIX) 40 MG tablet Take 1 tablet (40 mg total) by mouth once daily.    senna (SENOKOT) 8.6 mg tablet Take 1 tablet by mouth daily as needed for Constipation.    warfarin sodium (COUMADIN ORAL) Take 2 mg by mouth.    zinc sulfate (ZINCATE) 50 mg zinc (220 mg) capsule Take 1 capsule (220 mg total) by mouth once daily.    [DISCONTINUED] DULoxetine (CYMBALTA) 20 MG capsule Take 1 capsule (20 mg total) by mouth once daily.          Antimicrobials:  Antibiotics (From admission, onward)      Start     Stop Route Frequency Ordered    24 1530  meropenem (MERREM) 1 g in 0.9% NaCl 100 mL IVPB (MB+)         -- IV Every 12 hours (non-standard times) 24 1415             Antifungals (From admission, onward)      None            Antivirals (From  admission, onward)      None               Review of Systems   Review of Systems   All other systems reviewed and are negative.        Objective:     Vital Signs (Most Recent):  Temp: 97.4 °F (36.3 °C) (08/19/24 1115)  Pulse: 100 (08/19/24 1115)  Resp: 18 (08/19/24 1033)  BP: 120/72 (08/19/24 1115)  SpO2: 98 % (08/19/24 1115)  Vital Signs (24h Range):  Temp:  [97.3 °F (36.3 °C)-98.1 °F (36.7 °C)] 97.4 °F (36.3 °C)  Pulse:  [] 100  Resp:  [17-18] 18  SpO2:  [97 %-100 %] 98 %  BP: (106-151)/(52-85) 120/72      Weight:   Wt Readings from Last 1 Encounters:   08/09/24 86 kg (189 lb 9.5 oz)      Body mass index is Body mass index is 32.53 kg/m².     Estimated Creatinine Clearance: Estimated Creatinine Clearance: 31.6 mL/min (A) (based on SCr of 1.33 mg/dL (H)).     Physical Exam  Physical Exam  Constitutional:       Appearance: Normal appearance.   HENT:      Head: Normocephalic and atraumatic.      Mouth/Throat:      Pharynx: No oropharyngeal exudate or posterior oropharyngeal erythema.   Eyes:      Extraocular Movements: Extraocular movements intact.      Pupils: Pupils are equal, round, and reactive to light.   Cardiovascular:      Rate and Rhythm: Normal rate and regular rhythm.      Heart sounds: No murmur heard.  Pulmonary:      Effort: No respiratory distress.      Breath sounds: No wheezing, rhonchi or rales.   Abdominal:      General: Bowel sounds are normal. There is no distension.      Palpations: Abdomen is soft.      Tenderness: There is no abdominal tenderness. There is no right CVA tenderness or left CVA tenderness.   Musculoskeletal:         General: No swelling or tenderness.      Cervical back: Neck supple. No rigidity or tenderness.      Comments: Left hip with wound VAC in place   Lymphadenopathy:      Cervical: No cervical adenopathy.   Skin:     Findings: No lesion or rash.   Neurological:      General: No focal deficit present.      Mental Status: She is alert and oriented to person, place,  and time. Mental status is at baseline.      Cranial Nerves: No cranial nerve deficit.      Motor: Weakness (Generalized weakness) present.      Comments: Intentional tremor   Psychiatric:         Mood and Affect: Mood normal.         Behavior: Behavior normal.           Significant Labs: CBC:   Recent Labs   Lab 08/19/24 0412   WBC 7.89   HGB 10.5*   HCT 31.6*        CMP:   Recent Labs   Lab 08/19/24 0412      K 4.5   *   CO2 18*   BUN 27.8*   CREATININE 1.33*   CALCIUM 8.0*   ALBUMIN 1.6*         Microbiology Results (last 7 days)       Procedure Component Value Units Date/Time    Tissue Culture - Aerobic [[id number]]  (Abnormal)  (Susceptibility) Collected: 08/12/24 1616    Order Status: Completed Specimen: Tissue from Hip, Left Updated: 08/16/24 1054     Tissue - Aerobic Culture Moderate HAFNIA SPECIES      Moderate Pseudomonas aeruginosa    Tissue Culture - Aerobic [[id number]]  (Abnormal)  (Susceptibility) Collected: 08/12/24 1616    Order Status: Completed Specimen: Tissue from Hip, Left Updated: 08/16/24 1053     Tissue - Aerobic Culture Few HAFNIA SPECIES      Few Pseudomonas aeruginosa    Tissue Culture - Aerobic [[id number]]  (Abnormal)  (Susceptibility) Collected: 08/12/24 1616    Order Status: Completed Specimen: Tissue from Hip, Left Updated: 08/16/24 1050     Tissue - Aerobic Culture Moderate HAFNIA SPECIES      Moderate Pseudomonas aeruginosa    Anaerobic Culture [[id number]] Collected: 08/12/24 1616    Order Status: Completed Specimen: Tissue from Hip, Left Updated: 08/16/24 0731     Anaerobe Culture No Anaerobes Isolated    Anaerobic Culture [[id number]] Collected: 08/12/24 1616    Order Status: Completed Specimen: Tissue from Hip, Left Updated: 08/16/24 0730     Anaerobe Culture No Anaerobes Isolated    Anaerobic Culture [[id number]] Collected: 08/12/24 1616    Order Status: Completed Specimen: Tissue from Hip, Left Updated: 08/16/24 0729     Anaerobe Culture No Anaerobes  Isolated    Blood Culture [[id number]]  (Normal) Collected: 08/09/24 1433    Order Status: Completed Specimen: Blood from Antecubital, Left Updated: 08/14/24 1902     Blood Culture No Growth at 5 days    Blood Culture [[id number]]  (Normal) Collected: 08/09/24 1436    Order Status: Completed Specimen: Blood from Arm, Left Updated: 08/14/24 1902     Blood Culture No Growth at 5 days    Gram Stain [[id number]] Collected: 08/12/24 1616    Order Status: Completed Specimen: Tissue from Hip, Left Updated: 08/14/24 0843     GRAM STAIN Few WBC observed      Few Gram Negative Rods    Gram Stain [[id number]] Collected: 08/12/24 1616    Order Status: Completed Specimen: Tissue from Hip, Left Updated: 08/13/24 1404     GRAM STAIN Few WBC observed      No bacteria seen    Gram Stain [[id number]] Collected: 08/12/24 1616    Order Status: Completed Specimen: Tissue from Hip, Left Updated: 08/13/24 1404     GRAM STAIN Few WBC observed      No bacteria seen    Anaerobic Culture [[id number]] Collected: 08/12/24 1516    Order Status: Canceled Specimen: Tissue from Hip, Left     Gram Stain [[id number]] Collected: 08/12/24 1516    Order Status: Canceled Specimen: Tissue from Hip, Left     Tissue Culture - Aerobic [[id number]] Collected: 08/12/24 1516    Order Status: Canceled Specimen: Tissue from Hip, Left     Anaerobic Culture [[id number]] Collected: 08/12/24 1324    Order Status: Canceled Specimen: Tissue from Hip, Left     Anaerobic Culture [[id number]] Collected: 08/12/24 1324    Order Status: Canceled Specimen: Tissue from Hip, Left     Gram Stain [[id number]] Collected: 08/12/24 1324    Order Status: Canceled Specimen: Tissue from Hip, Left     Gram Stain [[id number]] Collected: 08/12/24 1324    Order Status: Canceled Specimen: Tissue from Hip, Left     Tissue Culture - Aerobic [[id number]] Collected: 08/12/24 1324    Order Status: Canceled Specimen: Tissue from Hip, Left     Tissue Culture - Aerobic [[id number]] Collected:  08/12/24 1324    Order Status: Canceled Specimen: Tissue from Hip, Left              Significant Imaging: I have reviewed all pertinent imaging results/findings within the past 24 hours.      Gautam Colon MD  Infectious Disease  [facility]

## 2024-08-19 NOTE — PT/OT/SLP PROGRESS
Physical Therapy Treatment    Patient Name:  Homa Jaquez   MRN:  [id number]    Recommendations:     Discharge Recommendations: Moderate Intensity Therapy  Discharge Equipment Recommendations: none  Barriers to discharge: Decreased caregiver support    Assessment:     Homa Jaquez is a 88 y.o. female admitted with a medical diagnosis of Post-operative complication.  She presents with the following impairments/functional limitations: weakness, impaired endurance, impaired sensation, impaired self care skills, impaired functional mobility, gait instability, impaired balance, decreased lower extremity function, decreased coordination, impaired cognition, decreased safety awareness, pain, decreased ROM, impaired coordination, impaired fine motor, impaired skin, edema, orthopedic precautions .    Rehab Prognosis: Fair; patient would benefit from acute skilled PT services to address these deficits and reach maximum level of function.    Recent Surgery: Procedure(s) (LRB):  REVISION,ARTHROPLASTY,HIP,POSTERIOR APPROACH (Left) 7 Days Post-Op    Plan:     During this hospitalization, patient to be seen daily (QD-BID pending pt tolerance) to address the identified rehab impairments via gait training, therapeutic activities, therapeutic exercises and progress toward the following goals:    Plan of Care Expires:  08/19/24    Subjective     Chief Complaint: tired   Patient/Family Comments/goals: to move without pain   Pain/Comfort:  Pain Rating 1: 5/10 (pt stated tolerable pain)  Location - Side 1: Left  Location 1: hip  Pain Addressed 1: Pre-medicate for activity, Distraction      Objective:     Communicated with nursing Lois  prior to session.  Patient found HOB elevated with talley catheter, PICC line, wound vac, Other (comments) (pt in bed) upon PT entry to room.     General Precautions: Standard, fall  Orthopedic Precautions: LLE weight bearing as tolerated, LLE posterior precautions  Braces: N/A  Respiratory Status:  Room air     Functional Mobility:  Bed Mobility:     Rolling Left:  moderate assistance and use of bed rail with pillow b/t BLE for hip pxs to clean large loose stools   Rolling Right: moderate assistance and use of bed rails with pillow b/t BLE for hip pxs to clean large loose stools   Scooting: moderate assistance and vc for safety to scoot EOB  Supine to Sit: moderate assistance, maximal assistance, and with use of bed rails and much increased time for decrease assist assisting with lle vc for tech   Sit to Supine: maximal assistance and assist with lle and to get rtle fully into bed with use of bed rails and touching a for positioning of upper body increased time with vc for tech pain limiting   Transfers:     Sit to Stand:  moderate assistance and of 2 persons with rolling walker and height of bed elevated vc for hand placement and self assist lle wbat increased time slow transition when standing pt had large accidental loose stools       Treatment & Education:  Pt educated on bed mob with use of bed rails and maintaining lle hip pxs   Reviewed lle hip pxs with pt   Pt sat unsupported (close guarding) EOB with vc for hand placement to improve sitting balance ~10min assessing BP and hr during sitting EOB nursing also in room okayed cont tx     Patient left HOB elevated with call button in reach, nursing  notified, CNA  present, and pt carina tx noted to nursing Lois during tx hr inconsistent ranging from  okayed cont tx pt returned supine secondary to large loose stools and needing toilet hygiene also pt fatigue and lowered bp with c/o SOB and dizziness after standing nursing informed   ..    GOALS:   Multidisciplinary Problems       Physical Therapy Goals          Problem: Physical Therapy    Goal Priority Disciplines Outcome Goal Variances Interventions   Physical Therapy Goal     PT, PT/OT Progressing     Description: Pt will improve functional independence by performing:    Bed mobility:   Rolling:max  A-MET  Supine to sit: Max A   Sit to supine: Max A  Sit to stand: max A with rolling walker  Bed to chair: max A with Stand Step  with rolling walker   Bed to chair t/f: Max A slide board                            Time Tracking:     PT Received On: 08/19/24  PT Start Time: 0845     PT Stop Time: 0930  PT Total Time (min): 45 min     Billable Minutes: Therapeutic Activity 45min    Treatment Type: Treatment  PT/PTA: PTA     Number of PTA visits since last PT visit: 4     08/19/2024

## 2024-08-19 NOTE — PROGRESS NOTES
[facility]OPAEDIC  [facility] Medicine Progress Note      Patient Name: Homa Jaquez  MRN: [id number]  Admission Date: 8/9/2024   Length of Stay: 10  Attending Physician: Olvin Huber MD  Primary Care Provider: Franklyn Brito MD  Face-to-Face encounter date: 08/19/2024    Code Status: DNR        Chief Complaint:   Wound drainage        HPI:   Homa Jaquez is a 88 y.o. female who  has a past medical history of A-fib, Chronic cystitis, GERD (gastroesophageal reflux disease), Graves disease, Hypertension, Hypothyroidism, unspecified, and Spinal stenosis. The patient presented to [facility] on 8/9/2024 with a primary complaint of post op complications.  Pt with a left femur fracture after a fall 7/2, underwent IMN 7/3 and transferred to [facility] 7/9. She reported increasing pain to hip, xrays showing failed intertrochanteric femur fracture.  Pt underwent conversion left SILVIA 7/15, she was discharged 7/26 to snf in [place].  She developed pneumonia which was treated with merrem and vancomycin and completed about 3 days ago.  She was started on megace for poor appetite about a week ago and has been eating good since, coumadin held 3 days ago for elevated inr. Doxycline and cymbalta was stopped and she has had lasix on and off with resolution of edema.  She has been awake and alert and improving.  Her wound vac was removed 1 week ago, drainage improved.  She is transferred back for evaluation of incision.  Family says there has been some serosanginous drainage on bandage but mild and decreased from admission where she was draining 1000cc per day.  No f/c.  Inr has been up and down, currently on hold x 3 days.     Overview/Hospital Course:  No notes on file       Interval Hx:   The pt c/o left hip pain 7/10, she was given a pain pill. She had a large bm today, she has needed her stool softeners the last few days.    Review of Systems   All other systems reviewed and  are negative.      Objective/physical exam:  General: In no acute distress, afebrile  Chest: Clear to auscultation bilaterally  Heart: RRR, +S1, S2, no appreciable murmur, picc,   Abdomen: Soft, nontender, BS +  MSK: Warm, +1 b/l lower extremity edema, no clubbing or cyanosis.   Skin: s/p left hip surgery with wound vac in place  Neurologic: Alert and oriented x4  Psych: Calm, cooperative    VITAL SIGNS: 24 HRS MIN & MAX LAST   Temp  Min: 97.3 °F (36.3 °C)  Max: 98.1 °F (36.7 °C) 97.8 °F (36.6 °C)   BP  Min: 106/60  Max: 151/77 (!) 124/52 (after standing trial with c/o SOB and dizziness, nursing informed/aware blanca/Lois)   Pulse  Min: 91  Max: 135  110   Resp  Min: 17  Max: 18 18   SpO2  Min: 97 %  Max: 100 % 98 %       Recent Labs   Lab 08/15/24  0429 08/16/24  0443 08/19/24  0412   WBC 8.29 8.06 7.89   RBC 3.02* 3.17* 3.25*   HGB 9.9* 10.3* 10.5*   HCT 29.8* 31.3* 31.6*   MCV 98.7* 98.7* 97.2*   MCH 32.8* 32.5* 32.3*   MCHC 33.2 32.9* 33.2   RDW 18.3* 18.2* 18.6*    276 400   MPV 9.7 9.4 9.3       Recent Labs   Lab 08/14/24  0435 08/15/24  0429 08/16/24 0443 08/19/24  0412    137  --  136   K 4.9 4.5  --  4.5    110*  --  110*   CO2 22* 21*  --  18*   BUN 23.6* 24.4*  --  27.8*   CREATININE 1.35* 1.35*  --  1.33*   CALCIUM 7.8* 7.7*  --  8.0*   MG  --  1.50* 2.00 1.50*   ALBUMIN  --  1.8*  --  1.6*        Microbiology Results (last 7 days)       Procedure Component Value Units Date/Time    Tissue Culture - Aerobic [[id number]]  (Abnormal)  (Susceptibility) Collected: 08/12/24 1616    Order Status: Completed Specimen: Tissue from Hip, Left Updated: 08/16/24 1054     Tissue - Aerobic Culture Moderate HAFNIA SPECIES      Moderate Pseudomonas aeruginosa    Tissue Culture - Aerobic [[id number]]  (Abnormal)  (Susceptibility) Collected: 08/12/24 1616    Order Status: Completed Specimen: Tissue from Hip, Left Updated: 08/16/24 1053     Tissue - Aerobic Culture Few HAFNIA SPECIES      Few  Pseudomonas aeruginosa    Tissue Culture - Aerobic [[id number]]  (Abnormal)  (Susceptibility) Collected: 08/12/24 1616    Order Status: Completed Specimen: Tissue from Hip, Left Updated: 08/16/24 1050     Tissue - Aerobic Culture Moderate HAFNIA SPECIES      Moderate Pseudomonas aeruginosa    Anaerobic Culture [[id number]] Collected: 08/12/24 1616    Order Status: Completed Specimen: Tissue from Hip, Left Updated: 08/16/24 0731     Anaerobe Culture No Anaerobes Isolated    Anaerobic Culture [[id number]] Collected: 08/12/24 1616    Order Status: Completed Specimen: Tissue from Hip, Left Updated: 08/16/24 0730     Anaerobe Culture No Anaerobes Isolated    Anaerobic Culture [[id number]] Collected: 08/12/24 1616    Order Status: Completed Specimen: Tissue from Hip, Left Updated: 08/16/24 0729     Anaerobe Culture No Anaerobes Isolated    Blood Culture [[id number]]  (Normal) Collected: 08/09/24 1433    Order Status: Completed Specimen: Blood from Antecubital, Left Updated: 08/14/24 1902     Blood Culture No Growth at 5 days    Blood Culture [[id number]]  (Normal) Collected: 08/09/24 1436    Order Status: Completed Specimen: Blood from Arm, Left Updated: 08/14/24 1902     Blood Culture No Growth at 5 days    Gram Stain [[id number]] Collected: 08/12/24 1616    Order Status: Completed Specimen: Tissue from Hip, Left Updated: 08/14/24 0843     GRAM STAIN Few WBC observed      Few Gram Negative Rods    Gram Stain [[id number]] Collected: 08/12/24 1616    Order Status: Completed Specimen: Tissue from Hip, Left Updated: 08/13/24 1404     GRAM STAIN Few WBC observed      No bacteria seen    Gram Stain [[id number]] Collected: 08/12/24 1616    Order Status: Completed Specimen: Tissue from Hip, Left Updated: 08/13/24 1404     GRAM STAIN Few WBC observed      No bacteria seen    Anaerobic Culture [[id number]] Collected: 08/12/24 1516    Order Status: Canceled Specimen: Tissue from Hip, Left     Gram Stain [[id number]] Collected:  08/12/24 1516    Order Status: Canceled Specimen: Tissue from Hip, Left     Tissue Culture - Aerobic [[id number]] Collected: 08/12/24 1516    Order Status: Canceled Specimen: Tissue from Hip, Left     Anaerobic Culture [[id number]] Collected: 08/12/24 1324    Order Status: Canceled Specimen: Tissue from Hip, Left     Anaerobic Culture [[id number]] Collected: 08/12/24 1324    Order Status: Canceled Specimen: Tissue from Hip, Left     Gram Stain [[id number]] Collected: 08/12/24 1324    Order Status: Canceled Specimen: Tissue from Hip, Left     Gram Stain [[id number]] Collected: 08/12/24 1324    Order Status: Canceled Specimen: Tissue from Hip, Left     Tissue Culture - Aerobic [[id number]] Collected: 08/12/24 1324    Order Status: Canceled Specimen: Tissue from Hip, Left     Tissue Culture - Aerobic [[id number]] Collected: 08/12/24 1324    Order Status: Canceled Specimen: Tissue from Hip, Left              Radiology:  X-Ray Hip 2 or 3 views Left with Pelvis when performed  Narrative: EXAMINATION:  XR HIP WITH PELVIS WHEN PERFORMED 2 OR 3 VIEWS LEFT    CLINICAL HISTORY:  Broken internal left hip prosthesis, initial encounter post op;    COMPARISON:  X-rays dated 07/15/2024    FINDINGS:  There is stable alignment following left hip arthroplasty.  There is no new acute fracture identified.  Soft tissue drain is in place.  Impression: Satisfactory alignment following left hip arthroplasty.    Electronically signed by: Alice Shaffer  Date:    08/12/2024  Time:    18:21      Scheduled Med:   acetaminophen  650 mg Oral Q6H    ascorbic acid (vitamin C)  500 mg Oral BID    carvediloL  3.125 mg Oral BID    furosemide  20 mg Oral BID    levothyroxine  125 mcg Oral Before breakfast    magnesium sulfate IVPB  2 g Intravenous Once    megestroL  200 mg Oral TID WM    meropenem IV (PEDS and ADULTS)  1 g Intravenous Q12H    pantoprazole  40 mg Oral Daily    pravastatin  20 mg Oral Daily    sacubitriL-valsartan  1 tablet Oral BID     sodium chloride 0.9%  10 mL Intravenous Q6H    zinc sulfate  220 mg Oral Daily        Continuous Infusions:       PRN Meds:    Current Facility-Administered Medications:     0.9%  NaCl infusion (for blood administration), , Intravenous, Q24H PRN    dextrose 10%, 12.5 g, Intravenous, PRN    dextrose 10%, 25 g, Intravenous, PRN    glucagon (human recombinant), 1 mg, Intramuscular, PRN    glucose, 16 g, Oral, PRN    glucose, 24 g, Oral, PRN    insulin aspart U-100, 0-5 Units, Subcutaneous, QID (AC + HS) PRN    lactulose, 20 g, Oral, Q6H PRN    naloxone, 0.02 mg, Intravenous, PRN    nitroGLYCERIN, 0.4 mg, Sublingual, Q5 Min PRN    ondansetron, 4 mg, Intravenous, Q8H PRN    Flushing PICC/Midline Protocol, , , Until Discontinued **AND** sodium chloride 0.9%, 10 mL, Intravenous, Q6H **AND** sodium chloride 0.9%, 10 mL, Intravenous, PRN    traMADoL, 50 mg, Oral, Q4H PRN     Nutrition Status:      Assessment/Plan:  Left femur IMN 7/3, failed IMN  S/p Conversion SILVIA 7/15/washout 8/13  Postop wound infection -8/13 Wcx Pseudomonas and Hafnia  Acute blood loss anemia s/p 2unit prbc 8/15  Dilated cardiomyopathy  A fib  Htn  Hld  Gout  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  Hypothyroid  Moderate protein calorie malnutrition        Plan  cont lasix PO  cont merrem, plan for 6 wks from 8/16 but defer final abx reccs to ID  Pt has RUE picc line placed on July 22  holding coumadin, INR 2.6  check INR daily  Cont wound care  H/h stable     Dvt proph: coumadin             All diagnosis and differential diagnosis have been reviewed; assessment and plan has been documented; I have personally reviewed the labs and test results that are presently available; I have reviewed the patients medication list; I have reviewed the consulting providers response and recommendations. I have reviewed or attempted to review medical records based upon their availability      _____________________________________________________________________            Olvin Huber MD    08/19/2024

## 2024-08-19 NOTE — PLAN OF CARE
Final ID IV orders with weekly labs sent via [facility] to [facility]. I called and spoke to Eloise at [facility] who stated she will look over the referral and orders. I also told her that per Dr Huber pt may be ready for dc tomorrow.

## 2024-08-19 NOTE — PLAN OF CARE
08/19/24 1407   Medicare Message   Important Message from Medicare regarding Discharge Appeal Rights Given to patient/caregiver;Explained to patient/caregiver     IMM given to Av. IMM explained to Av (son), pt and her .

## 2024-08-19 NOTE — PLAN OF CARE
Problem: Fall Injury Risk  Goal: Absence of Fall and Fall-Related Injury  Outcome: Progressing     Problem: Skin Injury Risk Increased  Goal: Skin Health and Integrity  Outcome: Progressing     Problem: Pain Acute  Goal: Optimal Pain Control and Function  Outcome: Progressing

## 2024-08-19 NOTE — CARE UPDATE
Patient seen and examined, chart reviewed, full note to follow.     -Meropenem 1g IV q12h  -Plan for 8 weeks from surgery 08/12  -anticipated end date 10/07  -Please monitor weekly CBC, CMP, ESR, CRP and Fax results to my office at [phone]  -Retained hardware and advanced age with aggressive infection and poor surgical candidate, will likely need quinolone suppression however Qtc prolonged on most recent ECG  -Concern about Quinolone side effects in elderly patient with prolonged Qtc but suppression may be inevitable, will discuss with Orthopedics surgeon   -Repeat ECG for now    Gautam Colon MD  Infectious Disease  [facility]

## 2024-08-20 LAB
INR PPP: 2.2 (ref 2–3)
PROTHROMBIN TIME: 25.2 SECONDS (ref 11.7–14.5)

## 2024-08-20 PROCEDURE — 25000003 PHARM REV CODE 250: Performed by: INTERNAL MEDICINE

## 2024-08-20 PROCEDURE — 97164 PT RE-EVAL EST PLAN CARE: CPT

## 2024-08-20 PROCEDURE — 97530 THERAPEUTIC ACTIVITIES: CPT

## 2024-08-20 PROCEDURE — S0179 MEGESTROL 20 MG: HCPCS | Performed by: ORTHOPAEDIC SURGERY

## 2024-08-20 PROCEDURE — A4216 STERILE WATER/SALINE, 10 ML: HCPCS | Performed by: INTERNAL MEDICINE

## 2024-08-20 PROCEDURE — 25000003 PHARM REV CODE 250: Performed by: ORTHOPAEDIC SURGERY

## 2024-08-20 PROCEDURE — 85610 PROTHROMBIN TIME: CPT | Performed by: INTERNAL MEDICINE

## 2024-08-20 PROCEDURE — 21400001 HC TELEMETRY ROOM

## 2024-08-20 PROCEDURE — 97168 OT RE-EVAL EST PLAN CARE: CPT

## 2024-08-20 PROCEDURE — 63600175 PHARM REV CODE 636 W HCPCS: Performed by: GENERAL PRACTICE

## 2024-08-20 PROCEDURE — 36415 COLL VENOUS BLD VENIPUNCTURE: CPT | Performed by: INTERNAL MEDICINE

## 2024-08-20 PROCEDURE — 25000003 PHARM REV CODE 250: Performed by: GENERAL PRACTICE

## 2024-08-20 RX ORDER — MEGESTROL ACETATE 20 MG/1
40 TABLET ORAL DAILY
Status: DISCONTINUED | OUTPATIENT
Start: 2024-08-20 | End: 2024-08-21 | Stop reason: HOSPADM

## 2024-08-20 RX ORDER — WARFARIN 1 MG/1
1 TABLET ORAL DAILY
Status: DISCONTINUED | OUTPATIENT
Start: 2024-08-20 | End: 2024-08-21 | Stop reason: HOSPADM

## 2024-08-20 RX ORDER — FUROSEMIDE 20 MG/1
20 TABLET ORAL DAILY
Status: DISCONTINUED | OUTPATIENT
Start: 2024-08-21 | End: 2024-08-21 | Stop reason: HOSPADM

## 2024-08-20 RX ADMIN — CARVEDILOL 3.12 MG: 3.12 TABLET, FILM COATED ORAL at 08:08

## 2024-08-20 RX ADMIN — MICONAZOLE NITRATE ANTIFUNGAL POWDER: 2 POWDER TOPICAL at 09:08

## 2024-08-20 RX ADMIN — ACETAMINOPHEN 650 MG: 650 SOLUTION ORAL at 01:08

## 2024-08-20 RX ADMIN — SODIUM CHLORIDE, PRESERVATIVE FREE 10 ML: 5 INJECTION INTRAVENOUS at 04:08

## 2024-08-20 RX ADMIN — MEROPENEM 1 G: 1 INJECTION, POWDER, FOR SOLUTION INTRAVENOUS at 04:08

## 2024-08-20 RX ADMIN — ACETAMINOPHEN 650 MG: 650 SOLUTION ORAL at 06:08

## 2024-08-20 RX ADMIN — MEROPENEM 1 G: 1 INJECTION, POWDER, FOR SOLUTION INTRAVENOUS at 03:08

## 2024-08-20 RX ADMIN — LEVOTHYROXINE SODIUM 125 MCG: 125 TABLET ORAL at 06:08

## 2024-08-20 RX ADMIN — MEGESTROL ACETATE 200 MG: 40 SUSPENSION ORAL at 08:08

## 2024-08-20 RX ADMIN — PRAVASTATIN SODIUM 20 MG: 10 TABLET ORAL at 08:08

## 2024-08-20 RX ADMIN — SODIUM CHLORIDE, PRESERVATIVE FREE 10 ML: 5 INJECTION INTRAVENOUS at 06:08

## 2024-08-20 RX ADMIN — SODIUM CHLORIDE, PRESERVATIVE FREE 10 ML: 5 INJECTION INTRAVENOUS at 11:08

## 2024-08-20 RX ADMIN — MEGESTROL ACETATE 40 MG: 20 TABLET ORAL at 01:08

## 2024-08-20 RX ADMIN — TRAMADOL HYDROCHLORIDE 50 MG: 50 TABLET, COATED ORAL at 08:08

## 2024-08-20 RX ADMIN — WARFARIN SODIUM 1 MG: 1 TABLET ORAL at 05:08

## 2024-08-20 RX ADMIN — TRAMADOL HYDROCHLORIDE 50 MG: 50 TABLET, COATED ORAL at 03:08

## 2024-08-20 RX ADMIN — PANTOPRAZOLE SODIUM 40 MG: 40 TABLET, DELAYED RELEASE ORAL at 08:08

## 2024-08-20 RX ADMIN — ACETAMINOPHEN 650 MG: 650 SOLUTION ORAL at 11:08

## 2024-08-20 NOTE — NURSING
Nurses Note -- 4 Eyes      8/19/2024   7:22 PM      Skin assessed during: Q Shift Change      [] No Altered Skin Integrity Present    []Prevention Measures Documented      [x] Yes- Altered Skin Integrity Present or Discovered   [] LDA Added if Not in Epic (Describe Wound)   [] New Altered Skin Integrity was Present on Admit and Documented in LDA   [] Wound Image Taken    Wound Care Consulted? Yes    Attending Nurse:  Deanna Quiroz RN/Staff Member:   Lois

## 2024-08-20 NOTE — PT/OT/SLP PROGRESS
Physical Therapy Treatment    Patient Name:  Homa Jaquez   MRN:  [id number]    Recommendations:     Discharge Recommendations: Moderate Intensity Therapy  Discharge Equipment Recommendations: none  Barriers to discharge:  pain management, impaired functional mobility, requires max encouragement for mobility    Assessment:     Homa Jaquez is a 88 y.o. female admitted with a medical diagnosis of Post-operative complication.  She presents with the following impairments/functional limitations: weakness, impaired endurance, impaired sensation, impaired self care skills, impaired functional mobility, gait instability, impaired balance, decreased lower extremity function, decreased coordination, impaired cognition, decreased safety awareness, pain, decreased ROM, impaired coordination, impaired fine motor, impaired skin, edema, orthopedic precautions .    Rehab Prognosis: Fair; patient would benefit from acute skilled PT services to address these deficits and reach maximum level of function.    Recent Surgery: Procedure(s) (LRB):  REVISION,ARTHROPLASTY,HIP,POSTERIOR APPROACH (Left) 8 Days Post-Op    Plan:     During this hospitalization, patient to be seen daily (QD-BID pending pt tolerance) to address the identified rehab impairments via gait training, therapeutic activities, therapeutic exercises and progress toward the following goals:    Plan of Care Expires:  08/26/24    Subjective     Chief Complaint: L hip pain  Patient/Family Comments/goals:   Pain/Comfort:  Location - Side 1: Left  Location 1: hip  Pain Addressed 1: Pre-medicate for activity, Reposition      Objective:     Communicated with nurse prior to session.  Patient found supine with   upon PT entry to room.     General Precautions: Standard, fall  Orthopedic Precautions: LLE weight bearing as tolerated, LLE posterior precautions  Braces: N/A  Respiratory Status: Room air     Functional Mobility:  Bed Mobility:     Rolling Left:  moderate  assistance  Rolling Right: moderate assistance  Sit to Supine: maximal assistance and of 2 persons   Transfers:     Chair to Bed: maximal assistance and of 1 persons, minimal assistance of another with additional person present to assist with lines no AD  using  Squat Pivot; pt with no reports of dizziness        Treatment & Education:  Pt edu on importance of frequent mobility    Patient left supine with wedge at R side with all lines intact, call button in reach, nurse notified, and family present..    GOALS:   Multidisciplinary Problems       Physical Therapy Goals          Problem: Physical Therapy    Goal Priority Disciplines Outcome Goal Variances Interventions   Physical Therapy Goal     PT, PT/OT Progressing     Description: Pt will improve functional independence by performing:    Bed mobility:   Rolling:max A-MET  Supine to sit: Max A   Sit to supine: Max A  Sit to stand: max A with rolling walker  Bed to chair: max A with Stand Step  with rolling walker   Bed to chair t/f: Max A slide board     New Goals (8/20/24)  Rolling: min A                             Time Tracking:     PT Received On:    PT Start Time: 1305     PT Stop Time: 1335  PT Total Time (min): 30 min     Billable Minutes: Therapeutic Activity 15 ; Conference: 15    Treatment Type: Treatment  PT/PTA: PT     Number of PTA visits since last PT visit: 0     08/20/2024

## 2024-08-20 NOTE — PLAN OF CARE
Problem: Physical Therapy  Goal: Physical Therapy Goal  Description: Pt will improve functional independence by performing:    Bed mobility:   Rolling:max A-MET  Supine to sit: Max A   Sit to supine: Max A  Sit to stand: max A with rolling walker  Bed to chair: max A with Stand Step  with rolling walker   Bed to chair t/f: Max A slide board     New Goals (8/20/24)  Rolling: min A        Outcome: Progressing

## 2024-08-20 NOTE — PT/OT/SLP PROGRESS
"Occupational Therapy   Treatment    Name: Homa Jaquez  MRN: [id number]  Admitting Diagnosis:  Post-operative complication  8 Days Post-Op    Recommendations:     Discharge Recommendations: Moderate Intensity Therapy  Discharge Equipment Recommendations:  drop arm commode, wheelchair, walker, rolling, slide board, shower chair, hospital bed  Barriers to discharge:  None    Assessment:     Homa Jaquez is a 88 y.o. female with a medical diagnosis of Post-operative complication.  Performance deficits affecting function are weakness, impaired endurance, impaired self care skills, impaired functional mobility, gait instability, impaired balance, pain, decreased lower extremity function, decreased upper extremity function, orthopedic precautions, impaired muscle length, impaired joint extensibility, edema, impaired skin, impaired coordination, impaired fine motor, decreased ROM.     Rehab Prognosis:  Poor; patient would benefit from acute skilled OT services to address these deficits and reach maximum level of function.       Plan:     Patient to be seen daily (QD-BID) to address the above listed problems via self-care/home management, therapeutic activities, therapeutic exercises  Plan of Care Expires: 08/26/24  Plan of Care Reviewed with: patient, daughter, spouse    Subjective     Chief Complaint: no new complaints  Patient/Family Comments/goals: "She's going to [facility] tomorrow."  Pain/Comfort:  Location - Side 1: Left  Location - Orientation 1: generalized  Location 1: hip  Pain Addressed 1: Distraction, Reposition    Objective:     Communicated with: LORIN prior to session.  Patient found up in chair with talley catheter, peripheral IV, wound vac upon OT entry to room.    General Precautions: Standard, fall    Orthopedic Precautions:LLE weight bearing as tolerated, LLE posterior precautions  Braces: N/A  Respiratory Status: Room air     Occupational Performance:     Bed Mobility:    Patient completed " Scooting/Bridging with moderate assistance  Patient completed Sit to Supine with moderate assistance and 2 persons     Functional Mobility/Transfers:  Patient completed Bed <> Chair Transfer using Stand Pivot technique with total assistance with no assistive device    Treatment & Education:  Towel placed on lateral aspect of L ankle to support LLE from resting in external rotation. PRAFO boot donned on RLE for same reason.     Patient left L sidelying with all lines intact, call button in reach, and daughter present    GOALS:   Multidisciplinary Problems       Occupational Therapy Goals          Problem: Occupational Therapy    Goal Priority Disciplines Outcome Interventions   Occupational Therapy Goal     OT, PT/OT Progressing    Description: Pt will perform LB dressing c AE PRN and Max A by d/c.  Pt will perform toileting Max A by d/c.  Pt will perform toilet t/f Max A to BSC by d/c.                       Time Tracking:     OT Date of Treatment: 08/20/24  OT Start Time: 1306  OT Stop Time: 1336  OT Total Time (min): 30 min    Billable Minutes:Therapeutic Activity 15  Conference 15    OT/EVY: OT     Number of EVY visits since last OT visit: 0    8/20/2024

## 2024-08-20 NOTE — CARE UPDATE
[id number] Spoke with Eloise with [facility] who reported she will have a bed for pt tomorrow. She asked I send a recent chest xray and 142, pass-r

## 2024-08-20 NOTE — PLAN OF CARE
Problem: Physical Therapy  Goal: Physical Therapy Goal  Description: Pt will improve functional independence by performing:    Bed mobility:   Rolling:max A-MET  Supine to sit: Max A   Sit to supine: Max A  Sit to stand: max A with rolling walker  Bed to chair: max A with Stand Step  with rolling walker   Bed to chair t/f: Max A slide board     New Goals (8/20/24)  Rolling: min A        8/20/2024 1356 by Asia Gillespie, PT  Outcome: Progressing

## 2024-08-20 NOTE — PROGRESS NOTES
[facility]THOPAEDIC  [facility] Medicine Progress Note      Patient Name: Homa Jaquez  MRN: [id number]  Admission Date: 8/9/2024   Length of Stay: 11  Attending Physician: Olvin Huber MD  Primary Care Provider: Franklyn Brito MD  Face-to-Face encounter date: 08/20/2024    Code Status: DNR        Chief Complaint:   Wound drainage        HPI:   Homa Jaquez is a 88 y.o. female who  has a past medical history of A-fib, Chronic cystitis, GERD (gastroesophageal reflux disease), Graves disease, Hypertension, Hypothyroidism, unspecified, and Spinal stenosis. The patient presented to [facility] on 8/9/2024 with a primary complaint of post op complications.  Pt with a left femur fracture after a fall 7/2, underwent IMN 7/3 and transferred to [facility] 7/9. She reported increasing pain to hip, xrays showing failed intertrochanteric femur fracture.  Pt underwent conversion left SILVIA 7/15, she was discharged 7/26 to snf in [place].  She developed pneumonia which was treated with merrem and vancomycin and completed about 3 days ago.  She was started on megace for poor appetite about a week ago and has been eating good since, coumadin held 3 days ago for elevated inr. Doxycline and cymbalta was stopped and she has had lasix on and off with resolution of edema.  She has been awake and alert and improving.  Her wound vac was removed 1 week ago, drainage improved.  She is transferred back for evaluation of incision.  Family says there has been some serosanginous drainage on bandage but mild and decreased from admission where she was draining 1000cc per day.  No f/c.  Inr has been up and down, currently on hold x 3 days.     Overview/Hospital Course:  No notes on file       Interval Hx:   The pt has no new c/o. She is in her chair and family at the bedside. Plans for dc tomorrow discussed.    Review of Systems   All other systems reviewed and are  negative.      Objective/physical exam:  General: In no acute distress, afebrile  Chest: Clear to auscultation bilaterally  Heart: RRR, +S1, S2, no appreciable murmur, picc,   Abdomen: Soft, nontender, BS +  MSK: Warm, +1 b/l lower extremity edema, no clubbing or cyanosis.   Skin: s/p left hip surgery with wound vac in place  Neurologic: Alert and oriented x4  Psych: Calm, cooperative    VITAL SIGNS: 24 HRS MIN & MAX LAST   Temp  Min: 97.8 °F (36.6 °C)  Max: 98.2 °F (36.8 °C) 97.8 °F (36.6 °C)   BP  Min: 92/60  Max: 132/75 92/60   Pulse  Min: 92  Max: 106  92   Resp  Min: 16  Max: 18 16   SpO2  Min: 95 %  Max: 98 % 98 %       Recent Labs   Lab 08/15/24  0429 08/16/24  0443 08/19/24  0412   WBC 8.29 8.06 7.89   RBC 3.02* 3.17* 3.25*   HGB 9.9* 10.3* 10.5*   HCT 29.8* 31.3* 31.6*   MCV 98.7* 98.7* 97.2*   MCH 32.8* 32.5* 32.3*   MCHC 33.2 32.9* 33.2   RDW 18.3* 18.2* 18.6*    276 400   MPV 9.7 9.4 9.3       Recent Labs   Lab 08/14/24  0435 08/15/24  0429 08/16/24  0443 08/19/24  0412    137  --  136   K 4.9 4.5  --  4.5    110*  --  110*   CO2 22* 21*  --  18*   BUN 23.6* 24.4*  --  27.8*   CREATININE 1.35* 1.35*  --  1.33*   CALCIUM 7.8* 7.7*  --  8.0*   MG  --  1.50* 2.00 1.50*   ALBUMIN  --  1.8*  --  1.6*        Microbiology Results (last 7 days)       Procedure Component Value Units Date/Time    Tissue Culture - Aerobic [[id number]]  (Abnormal)  (Susceptibility) Collected: 08/12/24 1616    Order Status: Completed Specimen: Tissue from Hip, Left Updated: 08/16/24 1054     Tissue - Aerobic Culture Moderate HAFNIA SPECIES      Moderate Pseudomonas aeruginosa    Tissue Culture - Aerobic [[id number]]  (Abnormal)  (Susceptibility) Collected: 08/12/24 1616    Order Status: Completed Specimen: Tissue from Hip, Left Updated: 08/16/24 1053     Tissue - Aerobic Culture Few HAFNIA SPECIES      Few Pseudomonas aeruginosa    Tissue Culture - Aerobic [[id number]]  (Abnormal)  (Susceptibility) Collected:  08/12/24 1616    Order Status: Completed Specimen: Tissue from Hip, Left Updated: 08/16/24 1050     Tissue - Aerobic Culture Moderate HAFNIA SPECIES      Moderate Pseudomonas aeruginosa    Anaerobic Culture [[id number]] Collected: 08/12/24 1616    Order Status: Completed Specimen: Tissue from Hip, Left Updated: 08/16/24 0731     Anaerobe Culture No Anaerobes Isolated    Anaerobic Culture [[id number]] Collected: 08/12/24 1616    Order Status: Completed Specimen: Tissue from Hip, Left Updated: 08/16/24 0730     Anaerobe Culture No Anaerobes Isolated    Anaerobic Culture [[id number]] Collected: 08/12/24 1616    Order Status: Completed Specimen: Tissue from Hip, Left Updated: 08/16/24 0729     Anaerobe Culture No Anaerobes Isolated    Blood Culture [[id number]]  (Normal) Collected: 08/09/24 1433    Order Status: Completed Specimen: Blood from Antecubital, Left Updated: 08/14/24 1902     Blood Culture No Growth at 5 days    Blood Culture [[id number]]  (Normal) Collected: 08/09/24 1436    Order Status: Completed Specimen: Blood from Arm, Left Updated: 08/14/24 1902     Blood Culture No Growth at 5 days    Gram Stain [[id number]] Collected: 08/12/24 1616    Order Status: Completed Specimen: Tissue from Hip, Left Updated: 08/14/24 0843     GRAM STAIN Few WBC observed      Few Gram Negative Rods    Gram Stain [[id number]] Collected: 08/12/24 1616    Order Status: Completed Specimen: Tissue from Hip, Left Updated: 08/13/24 1404     GRAM STAIN Few WBC observed      No bacteria seen    Gram Stain [[id number]] Collected: 08/12/24 1616    Order Status: Completed Specimen: Tissue from Hip, Left Updated: 08/13/24 1404     GRAM STAIN Few WBC observed      No bacteria seen             Radiology:  X-Ray Hip 2 or 3 views Left with Pelvis when performed  Narrative: EXAMINATION:  XR HIP WITH PELVIS WHEN PERFORMED 2 OR 3 VIEWS LEFT    CLINICAL HISTORY:  Broken internal left hip prosthesis, initial encounter post op;    COMPARISON:  X-rays  dated 07/15/2024    FINDINGS:  There is stable alignment following left hip arthroplasty.  There is no new acute fracture identified.  Soft tissue drain is in place.  Impression: Satisfactory alignment following left hip arthroplasty.    Electronically signed by: Alice Shaffer  Date:    08/12/2024  Time:    18:21      Scheduled Med:   acetaminophen  650 mg Oral Q6H    ascorbic acid (vitamin C)  500 mg Oral BID    carvediloL  3.125 mg Oral BID    furosemide  20 mg Oral BID    levothyroxine  125 mcg Oral Before breakfast    megestroL  200 mg Oral TID WM    meropenem IV (PEDS and ADULTS)  1 g Intravenous Q12H    miconazole NITRATE 2 %   Topical (Top) BID    pantoprazole  40 mg Oral Daily    pravastatin  20 mg Oral Daily    sacubitriL-valsartan  1 tablet Oral BID    sodium chloride 0.9%  10 mL Intravenous Q6H    zinc sulfate  220 mg Oral Daily        Continuous Infusions:       PRN Meds:    Current Facility-Administered Medications:     lactulose, 20 g, Oral, Q6H PRN    naloxone, 0.02 mg, Intravenous, PRN    nitroGLYCERIN, 0.4 mg, Sublingual, Q5 Min PRN    ondansetron, 4 mg, Intravenous, Q8H PRN    Flushing PICC/Midline Protocol, , , Until Discontinued **AND** sodium chloride 0.9%, 10 mL, Intravenous, Q6H **AND** sodium chloride 0.9%, 10 mL, Intravenous, PRN    traMADoL, 50 mg, Oral, Q4H PRN     Nutrition Status:      Assessment/Plan:  Left femur IMN 7/3, failed IMN  S/p Conversion SILVIA 7/15/washout 8/13  Postop wound infection -8/13 Wcx Pseudomonas and Hafnia  Acute blood loss anemia s/p 2unit prbc 8/15  Dilated cardiomyopathy  A fib  Htn  Hld  Gout  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  Hypothyroid  Moderate protein calorie malnutrition        Plan  Case d/w ID Dr. Colon yesterday, tx reccs:  -Meropenem 1g IV q12h  -Plan for 8 weeks from surgery 08/12  -anticipated end date 10/07  -Please monitor weekly CBC, CMP, ESR, CRP and Fax results to his office at [phone]   -Retained hardware and advanced age with aggressive infection and poor  surgical candidate, will likely need quinolone suppression however Qtc prolonged on most recent ECG   resume coumadin, INR 2.2  check INR daily, start with 1mg daily  Cont wound care  Decrease lasix to 20mg daily, cont entresto  DC [place] tomorrow if pt remains stable     Dvt proph: coumadin             All diagnosis and differential diagnosis have been reviewed; assessment and plan has been documented; I have personally reviewed the labs and test results that are presently available; I have reviewed the patients medication list; I have reviewed the consulting providers response and recommendations. I have reviewed or attempted to review medical records based upon their availability      _____________________________________________________________________            Olvin Huber MD   08/20/2024

## 2024-08-20 NOTE — NURSING
Nurses Note -- 4 Eyes      8/19/2024   7:38 PM      Skin assessed during: Q Shift Change      [x] No Altered Skin Integrity Present    [x]Prevention Measures Documented      [] Yes- Altered Skin Integrity Present or Discovered   [] LDA Added if Not in Epic (Describe Wound)   [] New Altered Skin Integrity was Present on Admit and Documented in LDA   [] Wound Image Taken    Wound Care Consulted? No    Attending Nurse:  Lois Quiroz RN/Staff Member:   Arlette

## 2024-08-20 NOTE — CARE UPDATE
[id number] Faxed pass r, 142 and wound care notes to Eloise with [facility] at [phone]   (V5) oriented

## 2024-08-20 NOTE — PT/OT/SLP RE-EVAL
Occupational Therapy   Re-evaluation    Name: Homa Jaquez  MRN: [id number]  Admitting Diagnosis:  Post-operative complication  Recent Surgery: Procedure(s) (LRB):  REVISION,ARTHROPLASTY,HIP,POSTERIOR APPROACH (Left) 8 Days Post-Op    Recommendations:     Discharge Recommendations: Moderate Intensity Therapy  Discharge Equipment Recommendations: drop arm commode, wheelchair, walker, rolling, slide board, shower chair, hospital bed  Barriers to discharge:  None    Assessment:     Homa Jaquez is a 88 y.o. female with a medical diagnosis of Post-operative complication. Performance deficits affecting function are weakness, impaired functional mobility, impaired self care skills, pain, orthopedic precautions, edema, impaired joint extensibility, impaired muscle length, impaired cardiopulmonary response to activity, decreased ROM, impaired coordination, impaired fine motor, decreased safety awareness, decreased lower extremity function, decreased upper extremity function, impaired balance, gait instability.      Rehab Prognosis:  poor; patient would benefit from acute skilled OT services to address these deficits and reach maximum level of function.       Plan:     Patient to be seen daily (QD-BID) to address the above listed problems via self-care/home management, therapeutic activities, therapeutic exercises  Plan of Care Expires: 08/26/24  Plan of Care Reviewed with: patient, daughter    Subjective     Chief Complaint: pain with movement  Patient/Family stated goals: placement; her family really wants her to participate in therapy  Communicated with: LORIN prior to session.  Pain/Comfort:       Objective:     Patient found HOB elevated with: talley catheter, peripheral IV, wound vac upon OT entry to room.    General Precautions: Standard, fall  Orthopedic Precautions: LLE weight bearing as tolerated, LLE posterior precautions  Braces: N/A  Respiratory Status: Room air    Occupational Performance:    Bed Mobility:     Patient completed Rolling/Turning to Right with maximal assistance  Patient completed Scooting/Bridging with moderate assistance  Patient completed Supine to Sit with moderate assistance and 2 persons    Functional Mobility/Transfers:  Patient completed Sit <> Stand Transfer with max A x1, mod A x1, and another person present for line management  with  rolling walker. 2 trials for sit>stand at EOB.   Patient completed Bed <> Chair Transfer using Stand Pivot technique with total assistance with no assistive device. Once in chair, patient performed several partial stands for adjustment of GeoMat under patient for pressure relief.     Activities of Daily Living:  Lower Body Dressing: total assistance to don diaper    Cognitive/Visual Perceptual:  Cognitive/Psychosocial Skills:     -       Oriented to: Person, Place, Time, and Situation   -       Follows Commands/attention:Follows two-step commands  -       Communication: clear/fluent  -       Memory: No Deficits noted  -       Safety awareness/insight to disability: intact   -       Mood/Affect/Coping skills/emotional control: Appropriate to situation, Blunted, and Agitated  Visual/Perceptual:      -Intact      Physical Exam:  Motor Planning:  tremor noted on RUE>LUE  Upper Extremity Strength:  generalized weakness    Patient left up in chair with all lines intact, call button in reach, and daughter present    GOALS:   Multidisciplinary Problems       Occupational Therapy Goals          Problem: Occupational Therapy    Goal Priority Disciplines Outcome Interventions   Occupational Therapy Goal     OT, PT/OT Progressing    Description: Pt will perform LB dressing c AE PRN and Max A by d/c.  Pt will perform toileting Max A by d/c.  Pt will perform toilet t/f Max A to BS by d/c.                       History:     Past Medical History:   Diagnosis Date    A-fib     Chronic cystitis     GERD (gastroesophageal reflux disease)     Graves disease     Hypertension      Hypothyroidism, unspecified     Spinal stenosis          Past Surgical History:   Procedure Laterality Date    APPENDECTOMY      BLADDER SUSPENSION      CARDIOVERSION  04/01/2015    CATARACT EXTRACTION      CONVERSION ARTHROPLASTY, HIP, POSTERIOR APPROACH Left 7/15/2024    Procedure: CONVERSION ARTHROPLASTY, HIP, POSTERIOR APPROACH - valencia, cell saver, pathology;  Surgeon: Jonny Bains MD;  Location: [facility];  Service: Orthopedics;  Laterality: Left;  valencia, cell saver, pathology    HYSTERECTOMY      INTRAMEDULLARY RODDING OF FEMUR Left 7/3/2024    Procedure: INSERTION, INTRAMEDULLARY MELANIE, FEMUR;  Surgeon: Steve Pierce DO;  Location: [facility];  Service: Orthopedics;  Laterality: Left;  supine hana table c arm synthes    LAPAROSCOPIC CHOLECYSTECTOMY  01/12/2016    REVISION, ARTHROPLASTY, HIP, POSTERIOR APPROACH Left 8/12/2024    Procedure: REVISION,ARTHROPLASTY,HIP,POSTERIOR APPROACH;  Surgeon: Jonny Bains MD;  Location: [facility];  Service: Orthopedics;  Laterality: Left;  I&D L hip - head/liner    SPHINCTEROTOMY OF URETHRA  01/11/2016    TONSILLECTOMY AND ADENOIDECTOMY         Time Tracking:     OT Date of Treatment: 08/20/24  OT Start Time: 0939  OT Stop Time: 1025  OT Total Time (min): 46 min    Billable Minutes:Re-eval 15  Therapeutic Activity 15  Conference 16  8/20/2024

## 2024-08-20 NOTE — CARE UPDATE
[id number] Left message for Eloise with [facility] to determine if she was able to review pt's referral and orders

## 2024-08-20 NOTE — PT/OT/SLP RE-EVAL
Physical Therapy Evaluation    Patient Name:  Homa Jaquez   MRN:  [id number]    Recommendations:     Discharge Recommendations: Moderate Intensity Therapy   Discharge Equipment Recommendations: none   Barriers to discharge:  impaired functional mobility, pain management    Assessment:     Homa Jaquez is a 88 y.o. female admitted with a medical diagnosis of Post-operative complication.  She presents with the following impairments/functional limitations: weakness, impaired endurance, impaired sensation, impaired self care skills, impaired functional mobility, gait instability, impaired balance, decreased lower extremity function, decreased coordination, impaired cognition, decreased safety awareness, pain, decreased ROM, impaired coordination, impaired fine motor, impaired skin, edema, orthopedic precautions .    Rehab Prognosis: Fair; patient would benefit from acute skilled PT services to address these deficits and reach maximum level of function.    Recent Surgery: Procedure(s) (LRB):  REVISION,ARTHROPLASTY,HIP,POSTERIOR APPROACH (Left) 8 Days Post-Op    Plan:     During this hospitalization, patient to be seen daily (QD-BID pending pt tolerance) to address the identified rehab impairments via gait training, therapeutic activities, therapeutic exercises and progress toward the following goals:    Plan of Care Expires:  08/26/24    Subjective     Chief Complaint: L hip pain  Patient/Family Comments/goals:   Pain/Comfort:  Location - Side 1: Left  Location 1: hip  Pain Addressed 1: Pre-medicate for activity, Reposition    Patients cultural, spiritual, Quaker conflicts given the current situation: no        Objective:     Communicated with nurse prior to session.  Patient found supine with    upon PT entry to room.    General Precautions: Standard, fall  Orthopedic Precautions:LLE weight bearing as tolerated, LLE posterior precautions   Braces: N/A  Respiratory Status: Room air    Exams:  RLE ROM: Overall  weakness  RLE Strength: Overall weakness  LLE ROM: NT dt sx side  LLE Strength: NT dt sx side    Functional Mobility:  Bed Mobility:     Supine to Sit: moderate assistance and of 2 persons  Transfers:     Sit to Stand:  maximal assistance with rolling walker, moderate assistance of another with additional person present to assist with lines  Bed to Chair: maximal assistance with  rolling walker  using  Stand Pivot, moderate assistance of another with additional person present with line. Pt performed several partial stands for adjustment in chair with maximal assistance, placed geomat under patient for comfort          Treatment & Education:  Pt edu on importance of frequent mobility    Patient left up in chair with all lines intact, call button in reach, nurse notified, and daughter present.    GOALS:   Multidisciplinary Problems       Physical Therapy Goals          Problem: Physical Therapy    Goal Priority Disciplines Outcome Goal Variances Interventions   Physical Therapy Goal     PT, PT/OT Progressing     Description: Pt will improve functional independence by performing:    Bed mobility:   Rolling:max A-MET  Supine to sit: Max A   Sit to supine: Max A  Sit to stand: max A with rolling walker  Bed to chair: max A with Stand Step  with rolling walker   Bed to chair t/f: Max A slide board     New Goals (8/20/24)  Rolling: min A                             History:     Past Medical History:   Diagnosis Date    A-fib     Chronic cystitis     GERD (gastroesophageal reflux disease)     Graves disease     Hypertension     Hypothyroidism, unspecified     Spinal stenosis        Past Surgical History:   Procedure Laterality Date    APPENDECTOMY      BLADDER SUSPENSION      CARDIOVERSION  04/01/2015    CATARACT EXTRACTION      CONVERSION ARTHROPLASTY, HIP, POSTERIOR APPROACH Left 7/15/2024    Procedure: CONVERSION ARTHROPLASTY, HIP, POSTERIOR APPROACH - valencia, cell saver, pathology;  Surgeon: Jonny Bains MD;   Location: [facility] OR;  Service: Orthopedics;  Laterality: Left;  valencia, cell saver, pathology    HYSTERECTOMY      INTRAMEDULLARY RODDING OF FEMUR Left 7/3/2024    Procedure: INSERTION, INTRAMEDULLARY MELANIE, FEMUR;  Surgeon: Steve Pierce DO;  Location: [facility] OR;  Service: Orthopedics;  Laterality: Left;  supine hana table c arm synthes    LAPAROSCOPIC CHOLECYSTECTOMY  01/12/2016    REVISION, ARTHROPLASTY, HIP, POSTERIOR APPROACH Left 8/12/2024    Procedure: REVISION,ARTHROPLASTY,HIP,POSTERIOR APPROACH;  Surgeon: Jonny Bains MD;  Location: [facility] OR;  Service: Orthopedics;  Laterality: Left;  I&D L hip - head/liner    SPHINCTEROTOMY OF URETHRA  01/11/2016    TONSILLECTOMY AND ADENOIDECTOMY         Time Tracking:     PT Received On:    PT Start Time: 0939     PT Stop Time: 1026  PT Total Time (min): 47 min     Billable Minutes: Re-eval 20 and Therapeutic Activity 19 Conf. 8        08/20/2024

## 2024-08-21 VITALS
HEART RATE: 108 BPM | TEMPERATURE: 98 F | WEIGHT: 189.63 LBS | OXYGEN SATURATION: 98 % | SYSTOLIC BLOOD PRESSURE: 129 MMHG | DIASTOLIC BLOOD PRESSURE: 72 MMHG | HEIGHT: 64 IN | BODY MASS INDEX: 32.37 KG/M2 | RESPIRATION RATE: 18 BRPM

## 2024-08-21 PROBLEM — T81.49XA POSTOPERATIVE WOUND INFECTION OF LEFT HIP: Status: ACTIVE | Noted: 2024-08-21

## 2024-08-21 LAB
ANION GAP SERPL CALC-SCNC: 9 MEQ/L
BUN SERPL-MCNC: 29.6 MG/DL (ref 9.8–20.1)
CALCIUM SERPL-MCNC: 8.1 MG/DL (ref 8.4–10.2)
CHLORIDE SERPL-SCNC: 110 MMOL/L (ref 98–107)
CO2 SERPL-SCNC: 18 MMOL/L (ref 23–31)
CREAT SERPL-MCNC: 1.2 MG/DL (ref 0.55–1.02)
CREAT/UREA NIT SERPL: 25
ERYTHROCYTE [DISTWIDTH] IN BLOOD BY AUTOMATED COUNT: 19.1 % (ref 11.5–17)
GFR SERPLBLD CREATININE-BSD FMLA CKD-EPI: 44 ML/MIN/1.73/M2
GLUCOSE SERPL-MCNC: 102 MG/DL (ref 82–115)
HCT VFR BLD AUTO: 33.3 % (ref 37–47)
HGB BLD-MCNC: 10.8 G/DL (ref 12–16)
INR PPP: 2 (ref 2–3)
MCH RBC QN AUTO: 33 PG (ref 27–31)
MCHC RBC AUTO-ENTMCNC: 32.4 G/DL (ref 33–36)
MCV RBC AUTO: 101.8 FL (ref 80–94)
NRBC BLD AUTO-RTO: 0 %
PLATELET # BLD AUTO: 401 X10(3)/MCL (ref 130–400)
PMV BLD AUTO: 9.2 FL (ref 7.4–10.4)
POTASSIUM SERPL-SCNC: 5 MMOL/L (ref 3.5–5.1)
PROTHROMBIN TIME: 23 SECONDS (ref 11.7–14.5)
RBC # BLD AUTO: 3.27 X10(6)/MCL (ref 4.2–5.4)
SODIUM SERPL-SCNC: 137 MMOL/L (ref 136–145)
WBC # BLD AUTO: 11.14 X10(3)/MCL (ref 4.5–11.5)

## 2024-08-21 PROCEDURE — A4216 STERILE WATER/SALINE, 10 ML: HCPCS | Performed by: INTERNAL MEDICINE

## 2024-08-21 PROCEDURE — 25000003 PHARM REV CODE 250: Performed by: ORTHOPAEDIC SURGERY

## 2024-08-21 PROCEDURE — 25000003 PHARM REV CODE 250: Performed by: INTERNAL MEDICINE

## 2024-08-21 PROCEDURE — 36415 COLL VENOUS BLD VENIPUNCTURE: CPT | Performed by: INTERNAL MEDICINE

## 2024-08-21 PROCEDURE — 63600175 PHARM REV CODE 636 W HCPCS: Performed by: GENERAL PRACTICE

## 2024-08-21 PROCEDURE — 25000003 PHARM REV CODE 250: Performed by: GENERAL PRACTICE

## 2024-08-21 PROCEDURE — 80048 BASIC METABOLIC PNL TOTAL CA: CPT | Performed by: INTERNAL MEDICINE

## 2024-08-21 PROCEDURE — 85027 COMPLETE CBC AUTOMATED: CPT | Performed by: INTERNAL MEDICINE

## 2024-08-21 PROCEDURE — 85610 PROTHROMBIN TIME: CPT | Performed by: INTERNAL MEDICINE

## 2024-08-21 RX ORDER — MEGESTROL ACETATE 40 MG/1
40 TABLET ORAL 2 TIMES DAILY
Start: 2024-08-21 | End: 2025-08-21

## 2024-08-21 RX ORDER — ACETAMINOPHEN 650 MG/20.3ML
650 LIQUID ORAL EVERY 6 HOURS
COMMUNITY
Start: 2024-08-21

## 2024-08-21 RX ORDER — FUROSEMIDE 20 MG/1
20 TABLET ORAL DAILY
Start: 2024-08-22 | End: 2025-08-22

## 2024-08-21 RX ORDER — TRAMADOL HYDROCHLORIDE 50 MG/1
50 TABLET ORAL EVERY 6 HOURS PRN
Qty: 28 TABLET | Refills: 0 | Status: SHIPPED | OUTPATIENT
Start: 2024-08-21

## 2024-08-21 RX ORDER — FUROSEMIDE 20 MG/1
20 TABLET ORAL DAILY
Start: 2024-08-22 | End: 2024-08-21

## 2024-08-21 RX ORDER — WARFARIN 1 MG/1
1 TABLET ORAL DAILY
Qty: 30 TABLET | Refills: 11 | Status: SHIPPED | OUTPATIENT
Start: 2024-08-21 | End: 2025-08-21

## 2024-08-21 RX ORDER — MICONAZOLE NITRATE 2 G/100G
POWDER TOPICAL 2 TIMES DAILY
Start: 2024-08-21

## 2024-08-21 RX ADMIN — FUROSEMIDE 20 MG: 20 TABLET ORAL at 09:08

## 2024-08-21 RX ADMIN — PANTOPRAZOLE SODIUM 40 MG: 40 TABLET, DELAYED RELEASE ORAL at 09:08

## 2024-08-21 RX ADMIN — MICONAZOLE NITRATE ANTIFUNGAL POWDER: 2 POWDER TOPICAL at 11:08

## 2024-08-21 RX ADMIN — SODIUM CHLORIDE, PRESERVATIVE FREE 10 ML: 5 INJECTION INTRAVENOUS at 11:08

## 2024-08-21 RX ADMIN — PRAVASTATIN SODIUM 20 MG: 10 TABLET ORAL at 09:08

## 2024-08-21 RX ADMIN — LEVOTHYROXINE SODIUM 125 MCG: 125 TABLET ORAL at 05:08

## 2024-08-21 RX ADMIN — CARVEDILOL 3.12 MG: 3.12 TABLET, FILM COATED ORAL at 09:08

## 2024-08-21 RX ADMIN — MEGESTROL ACETATE 40 MG: 20 TABLET ORAL at 09:08

## 2024-08-21 RX ADMIN — MEROPENEM 1 G: 1 INJECTION, POWDER, FOR SOLUTION INTRAVENOUS at 04:08

## 2024-08-21 RX ADMIN — ACETAMINOPHEN 650 MG: 650 SOLUTION ORAL at 05:08

## 2024-08-21 RX ADMIN — OXYCODONE HYDROCHLORIDE AND ACETAMINOPHEN 500 MG: 500 TABLET ORAL at 09:08

## 2024-08-21 RX ADMIN — SODIUM CHLORIDE, PRESERVATIVE FREE 10 ML: 5 INJECTION INTRAVENOUS at 06:08

## 2024-08-21 RX ADMIN — TRAMADOL HYDROCHLORIDE 50 MG: 50 TABLET, COATED ORAL at 09:08

## 2024-08-21 RX ADMIN — ZINC SULFATE 220 MG (50 MG) CAPSULE 220 MG: CAPSULE at 09:08

## 2024-08-21 RX ADMIN — TRAMADOL HYDROCHLORIDE 50 MG: 50 TABLET, COATED ORAL at 04:08

## 2024-08-21 RX ADMIN — TRAMADOL HYDROCHLORIDE 50 MG: 50 TABLET, COATED ORAL at 01:08

## 2024-08-21 RX ADMIN — SACUBITRIL AND VALSARTAN 1 TABLET: 24; 26 TABLET, FILM COATED ORAL at 09:08

## 2024-08-21 RX ADMIN — ACETAMINOPHEN 650 MG: 650 SOLUTION ORAL at 11:08

## 2024-08-21 NOTE — NURSING
Nurses Note -- 4 Eyes      8/20/2024   7:42 PM      Skin assessed during: Q Shift Change      [x] No Altered Skin Integrity Present    [x]Prevention Measures Documented      [] Yes- Altered Skin Integrity Present or Discovered   [] LDA Added if Not in Epic (Describe Wound)   [] New Altered Skin Integrity was Present on Admit and Documented in LDA   [] Wound Image Taken    Wound Care Consulted? No    Attending Nurse:  Lois Quiroz RN/Staff Member:   Arlette

## 2024-08-21 NOTE — NURSING
Report given to nurse Camacho at [facility]. Verbalized understanding. Patient is awaiting ground ambulance at this time.     Nurse Note:       8/21/2024   12:05 PM      Perception of Care - Joint Replacement Population Total Joint Surgery List: HIP    Patient able to verbalize one way to treat/prevent SWELLING at home   [] Yes   [x] No   [x] Further Education Provided        Attending Nurse:  Katheryn

## 2024-08-21 NOTE — PROGRESS NOTES
"No acute events overnight.  Pain controlled.  Resting in bed.  Out of bed to chair yesterday.  Slight increase in Wound VAC output in the last 4 hours.      Vital Signs  Temp: 97.9 °F (36.6 °C)  Temp Source: Oral  Pulse: 93  Heart Rate Source: Monitor  Resp: 18  SpO2: 99 %  Pulse Oximetry Type: Continuous  Flow (L/min) (Oxygen Therapy): 1  Oxygen Concentration (%): 94  Device (Oxygen Therapy): room air  BP: 130/77  BP Location: Left arm  BP Method: Automatic  Patient Position: Lying  Arousal Level: opens eyes spontaneously  Height and Weight  Height: 5' 4.02" (162.6 cm)  Height Method: Stated  Weight: 86 kg (189 lb 9.5 oz)  Weight Method: Bed Scale  Weight in (lb) to have BMI = 25: 145.4]    +FHL/EHL  BCR distally  Dressing c/d/i  SILT distally    Recent Lab Results         08/21/24  0436        Anion Gap 9.0       BUN 29.6       BUN/CREAT RATIO 25       Calcium 8.1       Chloride 110       CO2 18       Creatinine 1.20       eGFR 44       Glucose 102       Hematocrit 33.3       Hemoglobin 10.8       INR 2.0       MCH 33.0       MCHC 32.4       .8       MPV 9.2       nRBC 0.0       Platelet Count 401       Potassium 5.0       PT 23.0       RBC 3.27       RDW 19.1       Sodium 137       WBC 11.14               A/P:  Status post conversion total hip arthroplasty with postoperative irrigation debridement  Pain controlled  Therapy for mobility and ambulation.  DVT PPx  Continue wound VAC. we will discontinue wound VAC likely in 1-2 weeks in the office.  Continue IV antibiotics per Infectious Disease recommendations.  "

## 2024-08-21 NOTE — DISCHARGE INSTRUCTIONS
[facility]  [facility]  [place] [address]  Phone [phone]       /      Fax [phone]  SURGEON: Dr. Bains    After discharge, all questions or concerns should be handled at your surgeon's office ([phone]). If it is a weekend or after hours, you will get the surgeon on call.     Discharge Medications:    PAIN MANAGEMENT: Next Dose Available   Tylenol/Acetaminophen 500mg- every 4 hours, around the clock (WHILE AWAKE) 6:00pm   Ultram/Tramadol 50mg (Pain Med) - every 4-6 hours AS NEEDED for pain 1:10pm       COMPLICATION PREVENTION MEDS: Next Dose DUE   Restart Coumadin, as prior to surgery for continued post-op blood clot prevention PM on 8/21/24   Miralax 17gm or Senokot S/Farrah-Colace 8.6/50mg 2 tablets - once or twice a day while on narcotics and muscle relaxers for constipation prevention PM on 8/21/24   NOZIN NASAL  - twice a day for 2 weeks or until supply runs out, whichever comes first (Infection prevention) PM on 8/21/24   EXTRAS: Next Dose Available   Meropenem 1gm IV every 12 hours for 8 weeks. Any adjustments per Dr. Colon PM on 3:30pm        Total Hip Replacement                                                                                                                                  PAIN MEDICATIONS/PAIN MANAGEMENT: (Use the medication log in your discharge packet to keep track of your medications)  Tylenol/Acetaminophen 500mg every 4 hours, around the clock (WHILE AWAKE).   Take Ultram (Tramadol) 50mg (pain pill) every 4-6 hours as needed for BREAKTHROUGH pain.    **NO MORE THAN 3000mg OF TYLENOL IN 24 HOURS**.     Robaxin/Methocarbamol 500mg (muscle relaxer)- you can take every 12-24 hours as needed for muscle spasms, thigh pain and stiffness, additional pain control or breakthrough pain medications. This medication is helpful for pain control while lessening your need for narcotics. Please reduce the use gradually as the pain and  spasms lessen. DO NOT TAKE AT THE SAME TIME AS A PAIN PILL. YOU WILL BE BETTER SERVED WITH 2 HOURS BETWEEN PAIN PILL AND MUSCLE RELAXER.     **Other things that help with pain control is WALKING, COMPRESSION WRAP, ICE and ELEVATION!!**    BLOOD CLOT PREVENTION:   Restart Coumdin,  as you were taking Prior to surgery. Start on the evening of 8/21/24.     You need to continuing wearing your compression stocking (DANTE Hose - ThromboEmbolic Disease Prevention Device) for the next 2-6 weeks post-op. It is ok to remove them for hygiene and at bedtime.   Hand wash and Dry. **If the swelling persists in the legs after you stop wearing the Dante hose, continue to wear them until the swelling decreases.**  REMOVE STOCKINGS AT LEAST DAILY FOR SKIN ASSESSMENT.   Do NOT let the stockings roll down, creating a tourniquet around the back of your knee. If you need to, leave the excess at the bottom of the stocking.   The best thing you can do to prevent blood clots is to walk around as much as possible, AT LEAST EVERY 1-2 HOURS.       CONSTIPATION PREVENTION:   Miralax or Senokot S/Farrah-Colace and Stool softeners EVERY DAY while on pain meds.  Use other more aggressive over the counter LAXATIVES as needed for constipation (Examples: Milk of Magnesia, Dulcolax tabs or suppository, Magnesium Citrate, Fleet's Enema...etc.)   Drink lots of water.  Increase Fiber in diet.  Increase walking distance each day  DO NOT GO MORE THAN 2 DAYS WITHOUT HAVING A BOWEL MOVEMENT!    ACTIVITY:   Weight bearing precautions as follows:   Weight bearing as tolerated to operative leg with walker.   HIP PRECAUTIONS:  NO Twisting  NO Leaning past 90 degrees  NO Crossing legs    DO NOT TAKE YOURSELF OFF OF THE WALKER TOO SOON. ALLOW YOUR OUTPATIENT THERAPIST or SURGEON TO GUIDE YOU.   Change positions often throughout the day.   No heavy lifting, pulling, pushing or straining.  Ice the Hip and thigh AS MUCH AS POSSIBLE  Progressive mobilization and bed  mobility. Out of bed as much as possible.       WOUND CARE:     Wound Vac for 2 weeks. Change at least every 72 hours or per facility protocol. For any issues related to the wound vac, call the surgeon's office.     [facility] . YOU WILL NEED IT WHEN YOU GET HOME TO CHARGE THE WOUND VAC.    DO NOT WET WOUND or apply any ointments, creams, lotions or antiseptics.  Ace wrap - apply your compression stocking and apply the ace wrap where the stocking stops for extra added compression to the knee and thigh.   Ok to shower before then if able to keep wound from getting wet (plastic barrier, saran wrap or cling wrap and tape).   DO NOT TOUCH INCISION      URINARY RETENTION:  If you start having difficulty urinating, decrease the use of Pain pills and muscle relaxers and notify your primary care doctor.     PNEUMONIA PREVENTION:  Stay out of bed as much as possible and walk around every 1-2 hours.  Continue breathing exercises (Incentive Spirometry) every 1-2 hours while mobility is limited and while you are on pain pills.    FALL PREVENTION:  Wear sturdy shoes that fit well - Wearing shoes with high heels or slippery soles, or shoes that are too loose, can lead to falls. Walking around in bare feet, or only socks, can also increase your risk of falling.  Use walker as long as your surgeon and therapist recommend it  Use good lighting and  throw rugs, electrical cords, furniture and clutter (anything than can cause you to trip at home.   Non-slip rug in bathroom or shower    INFECTION PREVENTION:  NOZIN ANTISEPTIC NASAL  - twice a day for 2 weeks or until supply runs out, whichever comes first. Shake bottle well, saturate cotton swab with 4 drops of antiseptic solution. Swab right nostril rim 6-8 times clockwise and counterclockwise. Take swab out, apply 2 more drops then swab left nostril rim 6-8 times clockwise and counterclockwise.   Proper handwashing before and after  dressing changes. Do not wet the wound. Wound care instructions as written above. NOTIFY MD OF EXCESSIVE WOUND DRAINAGE.  No alcohol, smoking or tobacco products  Pets should not be allowed around the wound or the dressing.   Treat UTI and skin infections as soon as possible.  Pre-medicate with antibiotics prior to dental or surgical procedures.   If you are diabetic, MAINTAIN GOOD BLOOD SUGAR CONTROL (Below 150) DURING YOUR RECOVERY. If you see high numbers, notify your primary care doctor.     Call your SURGEON'S OFFICE ([phone]) if you experience the following signs and symptoms of infection:   Unusual redness, swelling, excessive, cloudy or foul smelling drainage at the incision site.   Persistent low grade temp OR a temp greater than 102 F, unrelieved by Tylenol  Pain at surgical site, unrelieved by pain meds    Warning signs of a blood clot in your leg: (CALL YOUR SURGEON)  New onset or increasing pain in calf, new onset tenderness or redness above or below the knee or increasing swelling of your calf, ankle, or foot.  Warning signs that a blood clot has traveled to your lungs: (REPORT TO THE ER/CALL 301)  Sudden or increase in Shortness of breath, sudden onset of chest pains, or  Localized chest pain with coughing.       IF ANY ISSUES ARISE AND YOU FEEL THE NEED TO CALL YOUR PRIMARY CARE DOCTOR, PLEASE LET YOUR SURGEON KNOW AS WELL.     For emergencies, please report to OUR ([facility] or [facility] [place]) Emergency department and tell them to call YOUR SURGEON at [phone].     BEFORE MAKING ANY CHANGES TO THE MEDICAL CARE PLAN OR GOING TO THE EMERGENCY ROOM, PLEASE CONTACT THE SURGEON.    After discharge, all questions or concerns should be handled at your surgeon's office ([phone]). If it is a weekend or after hours, you will get the surgeon on call.

## 2024-08-21 NOTE — NURSING
Nurses Note -- 4 Eyes      8/20/2024   10:52 PM      Skin assessed during: Q Shift Change      [x] No Altered Skin Integrity Present    [x]Prevention Measures Documented      [] Yes- Altered Skin Integrity Present or Discovered   [] LDA Added if Not in Epic (Describe Wound)   [] New Altered Skin Integrity was Present on Admit and Documented in LDA   [] Wound Image Taken    Wound Care Consulted? No    Attending Nurse:  Deanna Quiroz RN/Staff Member:   Lois

## 2024-08-21 NOTE — DISCHARGE SUMMARY
Hospital Medicine Discharge Summary    Admit Date: 8/9/2024  Discharge Date and Time: 8/21/202410:47 AM  Admitting Physician: Mathieu Patel MD   Discharge Attending Physician: Mathieu Patel.  Primary Care Physician: Franklyn Brito MD  Consults: Dr. Colon Infectious Disease and Dr Bains Orthopedic Surgery    Discharge Diagnoses:  Left femur IMN 7/3, failed IMN  S/p Conversion SILVIA 7/15/washout 8/13  Postop wound infection -8/13 Wcx Pseudomonas and Hafnia  Acute blood loss anemia s/p 2unit prbc 8/15  Dilated cardiomyopathy  A fib  Htn  Hld  Gout  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  Hypothyroid  Moderate protein calorie malnutrition        Hospital Course:   Homa Jaquez is a 88 y.o. female who has a past medical history of A-fib, Chronic cystitis, GERD (gastroesophageal reflux disease), Graves disease, Hypertension, Hypothyroidism, unspecified, and Spinal stenosis. The patient presented to [facility] on 8/9/2024 with a primary complaint of post op complications. Pt with a left femur fracture after a fall 7/2, underwent IMN 7/3 and transferred to [facility] 7/9. She reported increasing pain to hip, xrays showing failed intertrochanteric femur fracture. Pt underwent conversion left SILVIA 7/15, she was discharged 7/26 to snf in [place]. She developed pneumonia which was treated with merrem and vancomycin and completed about 3 days ago. She was started on megace for poor appetite about a week ago and has been eating good since, coumadin held 3 days ago for elevated inr. Doxycline and cymbalta was stopped and she has had lasix on and off with resolution of edema. She has been awake and alert and improving. Her wound vac was removed 1 week ago, drainage improved. She is transferred back for evaluation of incision. Family says there has been some serosanginous drainage on bandage but mild and decreased from admission where she was draining 1000cc per day. No f/c. Inr has been up and down, currently on hold x 3 days.   Pt  "underwent I&D and washout 8/13, IOP cultures growing Pseudomonas and Hafnia on merrem till 10/7 then po tail with quinolone, weekly labs sent to dr Colon office.  Pt transferring to [facility], will need on going therapy which has been slow.  Appetite improved on megace.  Monitor swelling, currently on lasix daily, coumadin 1mg monitor inr daily.  Wound care with wound vac in place.    /88   Pulse 92   Temp 98.2 °F (36.8 °C) (Oral)   Resp 16   Ht 5' 4.02" (1.626 m)   Wt 86 kg (189 lb 9.5 oz)   SpO2 99%   BMI 32.53 kg/m²    Physical Exam   General: In no acute distress, afebrile  Chest: Clear to auscultation bilaterally  Heart: RRR, +S1, S2, no appreciable murmur, picc,   Abdomen: Soft, nontender, BS +  MSK: Warm, +1 b/l lower extremity edema, no clubbing or cyanosis.   Skin: s/p left hip surgery with wound vac in place  Neurologic: Alert and oriented x4  Psych: Calm, cooperative   Procedures Performed: I&D and washout 8/13    Significant Diagnostic Studies: See Full reports for all details  No results displayed because visit has over 200 results.           X-Ray Chest 1 View    Result Date: 8/20/2024  EXAMINATION: XR CHEST 1 VIEW CLINICAL HISTORY: nursing facility discharge;, . COMPARISON: July 16, 2024 FINDINGS: No alveolar consolidation, effusion, or pneumothorax is seen.   The thoracic aorta is normal  cardiac silhouette, central pulmonary vessels and mediastinum are normal in size and are grossly unremarkable.   visualized osseous structures are grossly unremarkable.     No acute chest disease is identified. Electronically signed by: García Marquez Date:    08/20/2024 Time:    15:55    X-Ray Hip 2 or 3 views Left with Pelvis when performed    Result Date: 8/12/2024  EXAMINATION: XR HIP WITH PELVIS WHEN PERFORMED 2 OR 3 VIEWS LEFT CLINICAL HISTORY: Broken internal left hip prosthesis, initial encounter post op; COMPARISON: X-rays dated 07/15/2024 FINDINGS: There is stable alignment following " left hip arthroplasty.  There is no new acute fracture identified.  Soft tissue drain is in place.     Satisfactory alignment following left hip arthroplasty. Electronically signed by: Alice Shaffer Date:    08/12/2024 Time:    18:21    US Lower Extremity Veins Left    Result Date: 7/24/2024  EXAMINATION: US LOWER EXTREMITY VEINS LEFT CLINICAL HISTORY: swelling, r/o dvt; COMPARISON: None FINDINGS: Sonographic images with color and spectral analysis were obtained of the left lower extremity venous system. The imaged left lower extremity veins demonstrate normal flow, compressibility, and augmentation without evidence of thrombus.  There is some subcutaneous edema around the knee.     Negative exam for left lower extremity thrombus. Electronically signed by: Roger Montague Date:    07/24/2024 Time:    17:00      Microbiology Results (last 7 days)       Procedure Component Value Units Date/Time    Tissue Culture - Aerobic [[id number]]  (Abnormal)  (Susceptibility) Collected: 08/12/24 1616    Order Status: Completed Specimen: Tissue from Hip, Left Updated: 08/16/24 1054     Tissue - Aerobic Culture Moderate HAFNIA SPECIES      Moderate Pseudomonas aeruginosa    Tissue Culture - Aerobic [[id number]]  (Abnormal)  (Susceptibility) Collected: 08/12/24 1616    Order Status: Completed Specimen: Tissue from Hip, Left Updated: 08/16/24 1053     Tissue - Aerobic Culture Few HAFNIA SPECIES      Few Pseudomonas aeruginosa    Tissue Culture - Aerobic [[id number]]  (Abnormal)  (Susceptibility) Collected: 08/12/24 1616    Order Status: Completed Specimen: Tissue from Hip, Left Updated: 08/16/24 1050     Tissue - Aerobic Culture Moderate HAFNIA SPECIES      Moderate Pseudomonas aeruginosa    Anaerobic Culture [[id number]] Collected: 08/12/24 1616    Order Status: Completed Specimen: Tissue from Hip, Left Updated: 08/16/24 0731     Anaerobe Culture No Anaerobes Isolated    Anaerobic Culture [[id number]] Collected: 08/12/24 1616     Order Status: Completed Specimen: Tissue from Hip, Left Updated: 08/16/24 0730     Anaerobe Culture No Anaerobes Isolated    Anaerobic Culture [[id number]] Collected: 08/12/24 1616    Order Status: Completed Specimen: Tissue from Hip, Left Updated: 08/16/24 0729     Anaerobe Culture No Anaerobes Isolated    Blood Culture [[id number]]  (Normal) Collected: 08/09/24 1433    Order Status: Completed Specimen: Blood from Antecubital, Left Updated: 08/14/24 1902     Blood Culture No Growth at 5 days    Blood Culture [[id number]]  (Normal) Collected: 08/09/24 1436    Order Status: Completed Specimen: Blood from Arm, Left Updated: 08/14/24 1902     Blood Culture No Growth at 5 days                Medication List        START taking these medications      0.9% NaCl SolP 100 mL with meropenem 1 gram SolR 1 g  Inject 1 g into the vein every 12 (twelve) hours.     acetaminophen 650 mg/20.3 mL Soln  Commonly known as: TYLENOL  Take 20.3 mLs (650 mg total) by mouth every 6 (six) hours.  Replaces: acetaminophen 500 MG tablet     megestroL 40 MG Tab  Commonly known as: MEGACE  Take 1 tablet (40 mg total) by mouth 2 (two) times daily.     miconazole NITRATE 2 % 2 % top powder  Commonly known as: MICOTIN  Apply topically 2 (two) times daily. Apply to bilateral breast folds, ceasar area, upper inner thighs, and bilateral buttocks.            CHANGE how you take these medications      furosemide 20 MG tablet  Commonly known as: LASIX  Take 1 tablet (20 mg total) by mouth once daily.  Start taking on: August 22, 2024  What changed: when to take this     traMADoL 50 mg tablet  Commonly known as: ULTRAM  Take 1 tablet (50 mg total) by mouth every 6 (six) hours as needed (as needed for pain score 1-10).  What changed: when to take this     warfarin 1 MG tablet  Commonly known as: COUMADIN  Take 1 tablet (1 mg total) by mouth Daily.  What changed:   medication strength  how much to take  when to take this            CONTINUE taking these  medications      allopurinoL 100 MG tablet  Commonly known as: ZYLOPRIM  Take 1 tablet (100 mg total) by mouth once daily.     ascorbic acid (vitamin C) 500 MG tablet  Commonly known as: VITAMIN C  Take 1 tablet (500 mg total) by mouth 2 (two) times daily.     carvediloL 3.125 MG tablet  Commonly known as: COREG  Take 1 tablet (3.125 mg total) by mouth 2 (two) times daily.     cyanocobalamin 1,000 mcg/mL injection  INJECT 1000MCG (1ML) INTRAMUSCULARY ONCE MONTHLY     docusate sodium 100 MG capsule  Commonly known as: COLACE  Take 1 capsule (100 mg total) by mouth 2 (two) times daily as needed for Constipation.     levothyroxine 125 MCG tablet  Commonly known as: SYNTHROID  TAKE 1 TABLET BY MOUTH ONCE DAILY     lovastatin 20 MG tablet  Commonly known as: MEVACOR     multivitamin Tab  Take 1 tablet by mouth once daily.     nitroGLYCERIN 0.4 MG SL tablet  Commonly known as: NITROSTAT  Place 1 tablet (0.4 mg total) under the tongue every 5 (five) minutes as needed for Chest pain.     pantoprazole 40 MG tablet  Commonly known as: PROTONIX  Take 1 tablet (40 mg total) by mouth once daily.     polyethylene glycol 17 gram Pwpk  Commonly known as: GLYCOLAX  Take 17 g by mouth 2 (two) times daily as needed for Constipation.     sacubitriL-valsartan 24-26 mg per tablet  Commonly known as: ENTRESTO  Take 1 tablet by mouth 2 (two) times daily.     senna 8.6 mg tablet  Commonly known as: SENOKOT  Take 1 tablet by mouth daily as needed for Constipation.     zinc sulfate 50 mg zinc (220 mg) capsule  Commonly known as: ZINCATE  Take 1 capsule (220 mg total) by mouth once daily.            STOP taking these medications      acetaminophen 500 MG tablet  Commonly known as: TYLENOL  Replaced by: acetaminophen 650 mg/20.3 mL Soln     doxycycline 100 MG tablet  Commonly known as: VIBRA-TABS     menthol-zinc oxide 0.44-20.6 % Oint  Commonly known as: CALMOSEPTINE               Where to Get Your Medications        These medications were  sent to [facility]  [address] [address]      Phone: [phone]   traMADoL 50 mg tablet  warfarin 1 MG tablet       You can get these medications from any pharmacy    You don't need a prescription for these medications  acetaminophen 650 mg/20.3 mL Soln       Information about where to get these medications is not yet available    Ask your nurse or doctor about these medications  0.9% NaCl SolP 100 mL with meropenem 1 gram SolR 1 g  furosemide 20 MG tablet  megestroL 40 MG Tab  miconazole NITRATE 2 % 2 % top powder         Explained in detail to the patient about the discharge plan, medications, and follow-up visits. Pt understands and agrees with the treatment plan  Discharged Condition: stable  Medications Per DC med rec  Activities as tolerated  Follow-up dr Bains 8/29, dr Colon after antibiotics 10/7 will need po tail, send weekly labs to office.  For further questions contact hospitalist office    Discharge time 40 minutes    For worsening symptoms, chest pain, shortness of breath, increased abdominal pain, high grade fever, stroke or stroke like symptoms, immediately go to the nearest Emergency Room or call 911 as soon as possible.      Mathieu Chang M.D, on 8/21/2024. at 10:47 AM.

## 2024-08-21 NOTE — PLAN OF CARE
F/u with [facility]) -- pt accepted. Dc today. Call report to Janice [phone]   Arrange transport via [facility]- albertina Jaramillo. Will Call.     Handoff communication nurseKatheryn.     Albertina coley pt/ daughter-- verb under & agree.

## 2024-08-21 NOTE — NURSING
Nurses Note -- 4 Eyes      8/21/2024   2:51 PM      Skin assessed during: Q Shift Change      [x] No Altered Skin Integrity Present    [x]Prevention Measures Documented      [] Yes- Altered Skin Integrity Present or Discovered   [] LDA Added if Not in Epic (Describe Wound)   [] New Altered Skin Integrity was Present on Admit and Documented in LDA   [] Wound Image Taken    Wound Care Consulted? No    Attending Nurse:  Katheryn Quiroz RN/Staff Member:   Ella

## 2024-08-21 NOTE — NURSING
Patient AAOx4, nad. PICC left in place. Medical education given to patient and daughter, verbalized understanding. Informed them about fu appt. Patient is being dc'd via ambulance to [facility].

## 2024-08-27 ENCOUNTER — TELEPHONE (OUTPATIENT)
Dept: ORTHOPEDICS | Facility: CLINIC | Age: 89
End: 2024-08-27
Payer: MEDICARE

## 2024-08-27 NOTE — TELEPHONE ENCOUNTER
I returned a call to the treatment nurse at [facility] about he wound vac. Per Doctor it should be at 125 mg of magnesium, changed 2x a week. I transferred to nurse station but no one picked up.      The nurse Malka called back today and spoke to Katheryn and she let them know the orders. The patient has an appt on tomorrow. She said they can keep off until tomorrow and we would reapply if needed. They took wound vac at [facility] and didn't think it was safe to reapply, so they left it off.

## 2024-08-29 ENCOUNTER — OFFICE VISIT (OUTPATIENT)
Dept: ORTHOPEDICS | Facility: CLINIC | Age: 89
End: 2024-08-29
Payer: MEDICARE

## 2024-08-29 ENCOUNTER — HOSPITAL ENCOUNTER (OUTPATIENT)
Dept: RADIOLOGY | Facility: CLINIC | Age: 89
Discharge: HOME | End: 2024-08-29
Attending: NURSE PRACTITIONER
Payer: MEDICARE

## 2024-08-29 VITALS
HEIGHT: 64 IN | DIASTOLIC BLOOD PRESSURE: 72 MMHG | SYSTOLIC BLOOD PRESSURE: 112 MMHG | WEIGHT: 189.63 LBS | BODY MASS INDEX: 32.37 KG/M2 | HEART RATE: 122 BPM

## 2024-08-29 DIAGNOSIS — Z96.642 AFTERCARE FOLLOWING LEFT HIP JOINT REPLACEMENT SURGERY: ICD-10-CM

## 2024-08-29 DIAGNOSIS — Z47.1 AFTERCARE FOLLOWING LEFT HIP JOINT REPLACEMENT SURGERY: Primary | ICD-10-CM

## 2024-08-29 DIAGNOSIS — Z47.1 AFTERCARE FOLLOWING LEFT HIP JOINT REPLACEMENT SURGERY: ICD-10-CM

## 2024-08-29 DIAGNOSIS — Z96.642 AFTERCARE FOLLOWING LEFT HIP JOINT REPLACEMENT SURGERY: Primary | ICD-10-CM

## 2024-08-29 PROCEDURE — 73502 X-RAY EXAM HIP UNI 2-3 VIEWS: CPT | Mod: LT,,, | Performed by: NURSE PRACTITIONER

## 2024-08-29 NOTE — PROGRESS NOTES
Chief Complaint:   Chief Complaint   Patient presents with    Post-op Evaluation     Lt hip revision sx 8/12/24. Xr today.present in wheelchair. Notes scanned into chart. Still states some pain residing in the left hip area. Also notes ongoing drainage - denies blood present in drainage. High pulse - states A fib.       History of present illness:    History of Present Illness  The patient presents for evaluation of her wound. She is accompanied by an adult male.    The accompanying adult male notes that there was no seepage from her wound overnight, but a spot the size of a large pancake was observed in the morning. Her Merrem antibiotic treatment, administered twice daily via a PICC line, has been discontinued.    She reports a positive experience at the [facility], where she has been standing three times daily but has not yet started walking. She mentions difficulty in moving her left foot. The accompanying adult male notes that her therapy sessions involve pedaling on a stationary bicycle before progressing to other activities.    Past Medical History:   Diagnosis Date    A-fib     Chronic cystitis     GERD (gastroesophageal reflux disease)     Graves disease     Hypertension     Hypothyroidism, unspecified     Spinal stenosis        Past Surgical History:   Procedure Laterality Date    APPENDECTOMY      BLADDER SUSPENSION      CARDIOVERSION  04/01/2015    CATARACT EXTRACTION      CONVERSION ARTHROPLASTY, HIP, POSTERIOR APPROACH Left 7/15/2024    Procedure: CONVERSION ARTHROPLASTY, HIP, POSTERIOR APPROACH - valencia, cell saver, pathology;  Surgeon: Jonny Bains MD;  Location: [facility] OR;  Service: Orthopedics;  Laterality: Left;  valencia, cell saver, pathology    HYSTERECTOMY      INTRAMEDULLARY RODDING OF FEMUR Left 7/3/2024    Procedure: INSERTION, INTRAMEDULLARY MELANIE, FEMUR;  Surgeon: Steve Pierce DO;  Location: [facility] OR;  Service: Orthopedics;  Laterality: Left;  supine hana table c  arm synthes    LAPAROSCOPIC CHOLECYSTECTOMY  01/12/2016    REVISION, ARTHROPLASTY, HIP, POSTERIOR APPROACH Left 8/12/2024    Procedure: REVISION,ARTHROPLASTY,HIP,POSTERIOR APPROACH;  Surgeon: Jonny Bains MD;  Location: [facility] OR;  Service: Orthopedics;  Laterality: Left;  I&D L hip - head/liner    SPHINCTEROTOMY OF URETHRA  01/11/2016    TONSILLECTOMY AND ADENOIDECTOMY         Current Outpatient Medications   Medication Sig    0.9% NaCl SolP 100 mL with meropenem 1 gram SolR 1 g Inject 1 g into the vein every 12 (twelve) hours.    acetaminophen (TYLENOL) 650 mg/20.3 mL Soln Take 20.3 mLs (650 mg total) by mouth every 6 (six) hours.    allopurinoL (ZYLOPRIM) 100 MG tablet Take 1 tablet (100 mg total) by mouth once daily.    ascorbic acid, vitamin C, (VITAMIN C) 500 MG tablet Take 1 tablet (500 mg total) by mouth 2 (two) times daily.    carvediloL (COREG) 3.125 MG tablet Take 1 tablet (3.125 mg total) by mouth 2 (two) times daily.    cyanocobalamin 1,000 mcg/mL injection INJECT 1000MCG (1ML) INTRAMUSCULARY ONCE MONTHLY    docusate sodium (COLACE) 100 MG capsule Take 1 capsule (100 mg total) by mouth 2 (two) times daily as needed for Constipation.    furosemide (LASIX) 20 MG tablet Take 1 tablet (20 mg total) by mouth once daily.    levothyroxine (SYNTHROID) 125 MCG tablet TAKE 1 TABLET BY MOUTH ONCE DAILY    lovastatin (MEVACOR) 20 MG tablet Take 20 mg by mouth once daily.    megestroL (MEGACE) 40 MG Tab Take 1 tablet (40 mg total) by mouth 2 (two) times daily.    miconazole NITRATE 2 % (MICOTIN) 2 % top powder Apply topically 2 (two) times daily. Apply to bilateral breast folds, ceasar area, upper inner thighs, and bilateral buttocks.    multivitamin Tab Take 1 tablet by mouth once daily.    nitroGLYCERIN (NITROSTAT) 0.4 MG SL tablet Place 1 tablet (0.4 mg total) under the tongue every 5 (five) minutes as needed for Chest pain.    polyethylene glycol (GLYCOLAX) 17 gram PwPk Take 17 g by mouth 2 (two) times daily as  needed for Constipation.    sacubitriL-valsartan (ENTRESTO) 24-26 mg per tablet Take 1 tablet by mouth 2 (two) times daily.    senna (SENOKOT) 8.6 mg tablet Take 1 tablet by mouth daily as needed for Constipation.    traMADoL (ULTRAM) 50 mg tablet Take 1 tablet (50 mg total) by mouth every 6 (six) hours as needed (as needed for pain score 1-10).    traMADoL (ULTRAM) 50 mg tablet Take 1 tablet (50 mg total) by mouth every 6 (six) hours as needed (as needed for pain score 1-10).    warfarin (COUMADIN) 1 MG tablet Take 1 tablet (1 mg total) by mouth Daily.    zinc sulfate (ZINCATE) 50 mg zinc (220 mg) capsule Take 1 capsule (220 mg total) by mouth once daily.    pantoprazole (PROTONIX) 40 MG tablet Take 1 tablet (40 mg total) by mouth once daily.     No current facility-administered medications for this visit.       Review of patient's allergies indicates:   Allergen Reactions    Cefadroxil      Other reaction(s): Nausea or vomiting    Cefuroxime axetil     Cephalexin      Other reaction(s): NAUSEA AND VOMITING    Ciprofloxacin     Enoxaparin     Methenamine hippurate      Other reaction(s): Unknown    Promethazine        Family History   Problem Relation Name Age of Onset    Cancer Father      Heart attack Father      Stroke Father         Social History     Socioeconomic History    Marital status:    Tobacco Use    Smoking status: Former     Types: Cigarettes    Smokeless tobacco: Never   Substance and Sexual Activity    Alcohol use: Yes    Drug use: Never    Sexual activity: Not Currently     Social Determinants of Health     Financial Resource Strain: Low Risk  (7/4/2024)    Overall Financial Resource Strain (CARDIA)     Difficulty of Paying Living Expenses: Not hard at all   Food Insecurity: No Food Insecurity (7/4/2024)    Hunger Vital Sign     Worried About Running Out of Food in the Last Year: Never true     Ran Out of Food in the Last Year: Never true   Transportation Needs: No Transportation Needs  (7/9/2024)    PRAPARE - Transportation     Lack of Transportation (Medical): No     Lack of Transportation (Non-Medical): No   Stress: No Stress Concern Present (7/4/2024)    [employer] [place] [employer] - Occupational Stress Questionnaire     Feeling of Stress : Not at all   Housing Stability: Low Risk  (7/4/2024)    Housing Stability Vital Sign     Unable to Pay for Housing in the Last Year: No     Homeless in the Last Year: No           Review of Systems:    Constitution: Negative for chills, fever, and sweats.  Negative for unexplained weight loss.    HENT:  Negative for headaches and blurry vision.    Cardiovascular:Negative for chest pain or irregular heart beat. Negative for hypertension.    Respiratory:  Negative for cough and shortness of breath.    Gastrointestinal: Negative for abdominal pain, heartburn, melena, nausea, and vomitting.    Genitourinary:  Negative bladder incontinence and dysuria.    Musculoskeletal:  See HPI    Neurological: Negative for numbness.    Psychiatric/Behavioral: Negative for depression.  The patient is not nervous/anxious.      Endocrine: Negative for polyuria    Hematologic/Lymphatic: Negative for bleeding problem.  Does not bruise/bleed easily.    Skin: Negative for poor would healing and rash      Physical Examination:    Vital Signs:    Vitals:    08/29/24 1320   BP: 112/72   Pulse: (!) 122       Body mass index is 32.52 kg/m².    General: No acute distress, alert and oriented, healthy appearing    HEENT: Head is atraumatic, mucous membranes are moist    Neck: Supples, no JVD    Cardiovascular: Palpable dorsalis pedis and posterior tibial pulses, regular rate and rhythm to those pulses    Lungs: Breathing non-labored    Skin: no rashes appreciated    Neurologic: Can flex and extend knees, ankles, and toes. Sensation is grossly intact    Left hip:  Total hip incision is macerated.  It was otherwise has minimal drainage.  Sutures are in place.    X-rays:  Three  views left hip reviewed.  Patient was status post revision implants well fixed.  Fracture well aligned.     Assessment::  Status post conversion left total hip arthroplasty    Plan:  Betadine paint the incision.  Do this daily.  Dry dressings of the left hip.  Come back in a week for re-evaluation and possible beginning suture removal.    This note was generated with the assistance of ambient listening technology. Verbal consent was obtained by the patient and accompanying visitor(s) for the recording of patient appointment to facilitate this note. I attest to having reviewed and edited the generated note for accuracy, though some syntax or spelling errors may persist. Please contact the author of this note for any clarification.      This note was created using [employer] voice recognition software that occasionally misinterpreted phrases or words.    Consult note is delivered via Epic messaging service.

## 2024-09-05 ENCOUNTER — OFFICE VISIT (OUTPATIENT)
Dept: ORTHOPEDICS | Facility: CLINIC | Age: 89
End: 2024-09-05
Payer: MEDICARE

## 2024-09-05 VITALS
HEIGHT: 64 IN | BODY MASS INDEX: 32.27 KG/M2 | HEART RATE: 118 BPM | SYSTOLIC BLOOD PRESSURE: 138 MMHG | DIASTOLIC BLOOD PRESSURE: 75 MMHG | WEIGHT: 189 LBS

## 2024-09-05 DIAGNOSIS — M17.12 PRIMARY OSTEOARTHRITIS OF LEFT KNEE: Primary | ICD-10-CM

## 2024-09-05 DIAGNOSIS — Z96.642 AFTERCARE FOLLOWING LEFT HIP JOINT REPLACEMENT SURGERY: ICD-10-CM

## 2024-09-05 DIAGNOSIS — Z47.1 AFTERCARE FOLLOWING LEFT HIP JOINT REPLACEMENT SURGERY: ICD-10-CM

## 2024-09-05 RX ORDER — LIDOCAINE HYDROCHLORIDE 20 MG/ML
5 INJECTION, SOLUTION EPIDURAL; INFILTRATION; INTRACAUDAL; PERINEURAL
Status: DISCONTINUED | OUTPATIENT
Start: 2024-09-05 | End: 2024-09-05 | Stop reason: HOSPADM

## 2024-09-05 RX ORDER — BETAMETHASONE SODIUM PHOSPHATE AND BETAMETHASONE ACETATE 3; 3 MG/ML; MG/ML
12 INJECTION, SUSPENSION INTRA-ARTICULAR; INTRALESIONAL; INTRAMUSCULAR; SOFT TISSUE
Status: DISCONTINUED | OUTPATIENT
Start: 2024-09-05 | End: 2024-09-05 | Stop reason: HOSPADM

## 2024-09-05 RX ADMIN — BETAMETHASONE SODIUM PHOSPHATE AND BETAMETHASONE ACETATE 12 MG: 3; 3 INJECTION, SUSPENSION INTRA-ARTICULAR; INTRALESIONAL; INTRAMUSCULAR; SOFT TISSUE at 01:09

## 2024-09-05 RX ADMIN — LIDOCAINE HYDROCHLORIDE 5 ML: 20 INJECTION, SOLUTION EPIDURAL; INFILTRATION; INTRACAUDAL; PERINEURAL at 01:09

## 2024-09-05 NOTE — PROGRESS NOTES
Chief Complaint:   Chief Complaint   Patient presents with    Left Hip - Wound Check     Pt states she is having significant pain today. Pt wasn't medicated prior this appt today. Pt presents for a wound check and a possible suture removal. Pt's son states the drainage has decreased and she is slowly progressing with PT.        History of present illness:    History of Present Illness  The patient presents for evaluation of multiple medical concerns. She is accompanied by an adult male.    She reports that the drainage from her wound has been decreasing, as observed by the nursing staff.     She mentions that she was able to stand up twice this week with assistance, although she experiences a sensation of her knees giving way. The accompanying adult male notes that her therapy sessions focus on lower extremity exercises in the morning and upper extremity exercises in the afternoon. He also mentions that she had a restless night and did not take her pain medication before coming to the clinic.    She expresses a desire for an injection in her left knee to alleviate the pain she is experiencing there.    Past Medical History:   Diagnosis Date    A-fib     Chronic cystitis     GERD (gastroesophageal reflux disease)     Graves disease     Hypertension     Hypothyroidism, unspecified     Spinal stenosis        Past Surgical History:   Procedure Laterality Date    APPENDECTOMY      BLADDER SUSPENSION      CARDIOVERSION  04/01/2015    CATARACT EXTRACTION      CONVERSION ARTHROPLASTY, HIP, POSTERIOR APPROACH Left 7/15/2024    Procedure: CONVERSION ARTHROPLASTY, HIP, POSTERIOR APPROACH - valencia, cell saver, pathology;  Surgeon: Jonny Bains MD;  Location: [facility] OR;  Service: Orthopedics;  Laterality: Left;  valencia, cell saver, pathology    HYSTERECTOMY      INTRAMEDULLARY RODDING OF FEMUR Left 7/3/2024    Procedure: INSERTION, INTRAMEDULLARY MELANIE, FEMUR;  Surgeon: Steve Pierce DO;  Location: [facility] OR;  Service:  Orthopedics;  Laterality: Left;  supine hana table c arm synthes    LAPAROSCOPIC CHOLECYSTECTOMY  01/12/2016    REVISION, ARTHROPLASTY, HIP, POSTERIOR APPROACH Left 8/12/2024    Procedure: REVISION,ARTHROPLASTY,HIP,POSTERIOR APPROACH;  Surgeon: Jonny Bains MD;  Location: [facility];  Service: Orthopedics;  Laterality: Left;  I&D L hip - head/liner    SPHINCTEROTOMY OF URETHRA  01/11/2016    TONSILLECTOMY AND ADENOIDECTOMY         Current Outpatient Medications   Medication Sig    0.9% NaCl SolP 100 mL with meropenem 1 gram SolR 1 g Inject 1 g into the vein every 12 (twelve) hours.    acetaminophen (TYLENOL) 650 mg/20.3 mL Soln Take 20.3 mLs (650 mg total) by mouth every 6 (six) hours.    allopurinoL (ZYLOPRIM) 100 MG tablet Take 1 tablet (100 mg total) by mouth once daily.    ascorbic acid, vitamin C, (VITAMIN C) 500 MG tablet Take 1 tablet (500 mg total) by mouth 2 (two) times daily.    carvediloL (COREG) 3.125 MG tablet Take 1 tablet (3.125 mg total) by mouth 2 (two) times daily.    cyanocobalamin 1,000 mcg/mL injection INJECT 1000MCG (1ML) INTRAMUSCULARY ONCE MONTHLY    docusate sodium (COLACE) 100 MG capsule Take 1 capsule (100 mg total) by mouth 2 (two) times daily as needed for Constipation.    furosemide (LASIX) 20 MG tablet Take 1 tablet (20 mg total) by mouth once daily.    levothyroxine (SYNTHROID) 125 MCG tablet TAKE 1 TABLET BY MOUTH ONCE DAILY    lovastatin (MEVACOR) 20 MG tablet Take 20 mg by mouth once daily.    megestroL (MEGACE) 40 MG Tab Take 1 tablet (40 mg total) by mouth 2 (two) times daily.    miconazole NITRATE 2 % (MICOTIN) 2 % top powder Apply topically 2 (two) times daily. Apply to bilateral breast folds, ceasar area, upper inner thighs, and bilateral buttocks.    multivitamin Tab Take 1 tablet by mouth once daily.    nitroGLYCERIN (NITROSTAT) 0.4 MG SL tablet Place 1 tablet (0.4 mg total) under the tongue every 5 (five) minutes as needed for Chest pain.    polyethylene glycol (GLYCOLAX)  17 gram PwPk Take 17 g by mouth 2 (two) times daily as needed for Constipation.    sacubitriL-valsartan (ENTRESTO) 24-26 mg per tablet Take 1 tablet by mouth 2 (two) times daily.    senna (SENOKOT) 8.6 mg tablet Take 1 tablet by mouth daily as needed for Constipation.    traMADoL (ULTRAM) 50 mg tablet Take 1 tablet (50 mg total) by mouth every 6 (six) hours as needed (as needed for pain score 1-10).    traMADoL (ULTRAM) 50 mg tablet Take 1 tablet (50 mg total) by mouth every 6 (six) hours as needed (as needed for pain score 1-10).    warfarin (COUMADIN) 1 MG tablet Take 1 tablet (1 mg total) by mouth Daily.    zinc sulfate (ZINCATE) 50 mg zinc (220 mg) capsule Take 1 capsule (220 mg total) by mouth once daily.    pantoprazole (PROTONIX) 40 MG tablet Take 1 tablet (40 mg total) by mouth once daily.     No current facility-administered medications for this visit.       Review of patient's allergies indicates:   Allergen Reactions    Cefadroxil      Other reaction(s): Nausea or vomiting    Cefuroxime axetil     Cephalexin      Other reaction(s): NAUSEA AND VOMITING    Ciprofloxacin     Enoxaparin     Methenamine hippurate      Other reaction(s): Unknown    Promethazine        Family History   Problem Relation Name Age of Onset    Cancer Father      Heart attack Father      Stroke Father         Social History     Socioeconomic History    Marital status:    Tobacco Use    Smoking status: Former     Types: Cigarettes    Smokeless tobacco: Never   Substance and Sexual Activity    Alcohol use: Yes    Drug use: Never    Sexual activity: Not Currently     Social Determinants of Health     Financial Resource Strain: Low Risk  (7/4/2024)    Overall Financial Resource Strain (CARDIA)     Difficulty of Paying Living Expenses: Not hard at all   Food Insecurity: No Food Insecurity (7/4/2024)    Hunger Vital Sign     Worried About Running Out of Food in the Last Year: Never true     Ran Out of Food in the Last Year: Never  true   Transportation Needs: No Transportation Needs (7/9/2024)    PRAPARE - Transportation     Lack of Transportation (Medical): No     Lack of Transportation (Non-Medical): No   Stress: No Stress Concern Present (7/4/2024)    [employer] [place] [employer] - Occupational Stress Questionnaire     Feeling of Stress : Not at all   Housing Stability: Low Risk  (7/4/2024)    Housing Stability Vital Sign     Unable to Pay for Housing in the Last Year: No     Homeless in the Last Year: No           Review of Systems:    Constitution: Negative for chills, fever, and sweats.  Negative for unexplained weight loss.    HENT:  Negative for headaches and blurry vision.    Cardiovascular:Negative for chest pain or irregular heart beat. Negative for hypertension.    Respiratory:  Negative for cough and shortness of breath.    Gastrointestinal: Negative for abdominal pain, heartburn, melena, nausea, and vomitting.    Genitourinary:  Negative bladder incontinence and dysuria.    Musculoskeletal:  See HPI    Neurological: Negative for numbness.    Psychiatric/Behavioral: Negative for depression.  The patient is not nervous/anxious.      Endocrine: Negative for polyuria    Hematologic/Lymphatic: Negative for bleeding problem.  Does not bruise/bleed easily.    Skin: Negative for poor would healing and rash      Physical Examination:    Vital Signs:    Vitals:    09/05/24 1317   BP: 138/75   Pulse: (!) 118       Body mass index is 32.44 kg/m².    Left hip:  Patient was varus small amount of serous drainage of the left hip.  Incisions otherwise clean dry.  No significant redness.  No significant erythema.  No induration.  It was willing much improved.  Significant crepitus range of motion of the left knee    X-rays:      Assessment::  Status post left conversion hip arthroplasty  Left knee osteoarthritis    Plan:  Plan to inject her left knee to get her some symptomatic relief.  With regards to the left hip, we will plan to  continue with the current dressings.  We will discontinue every other staple today.  Continue Betadine pain.  Continue compression dressing.    This note was generated with the assistance of ambient listening technology. Verbal consent was obtained by the patient and accompanying visitor(s) for the recording of patient appointment to facilitate this note. I attest to having reviewed and edited the generated note for accuracy, though some syntax or spelling errors may persist. Please contact the author of this note for any clarification.      This note was created using [employer] voice recognition software that occasionally misinterpreted phrases or words.    Consult note is delivered via Epic messaging service.

## 2024-09-05 NOTE — PROCEDURES
Large Joint Aspiration/Injection: L knee    Date/Time: 9/5/2024 1:15 PM    Performed by: Jonny Bains MD  Authorized by: Jonny Bains MD    Consent Done?:  Yes (Verbal)  Indications:  Arthritis and pain  Timeout: prior to procedure the correct patient, procedure, and site was verified    Prep: patient was prepped and draped in usual sterile fashion      Details:  Needle Size:  22 G  Approach:  Anterolateral  Location:  Knee  Site:  L knee  Medications:  5 mL LIDOcaine (PF) 20 mg/mL (2%) 20 mg/mL (2 %); 12 mg betamethasone acetate-betamethasone sodium phosphate 6 mg/mL

## 2024-09-10 ENCOUNTER — OFFICE VISIT (OUTPATIENT)
Dept: INFECTIOUS DISEASES | Facility: CLINIC | Age: 89
End: 2024-09-10
Payer: MEDICARE

## 2024-09-10 VITALS
DIASTOLIC BLOOD PRESSURE: 68 MMHG | HEART RATE: 97 BPM | SYSTOLIC BLOOD PRESSURE: 116 MMHG | BODY MASS INDEX: 32.26 KG/M2 | HEIGHT: 64 IN | OXYGEN SATURATION: 98 % | WEIGHT: 188.94 LBS | RESPIRATION RATE: 18 BRPM

## 2024-09-10 DIAGNOSIS — Z79.2 RECEIVING INTRAVENOUS ANTIBIOTIC TREATMENT AS OUTPATIENT: ICD-10-CM

## 2024-09-10 DIAGNOSIS — T81.49XA POSTOPERATIVE WOUND INFECTION OF LEFT HIP: Primary | ICD-10-CM

## 2024-09-10 DIAGNOSIS — R26.89 DECREASED FUNCTIONAL MOBILITY: ICD-10-CM

## 2024-09-10 DIAGNOSIS — E11.9 TYPE 2 DIABETES MELLITUS WITHOUT COMPLICATION, WITHOUT LONG-TERM CURRENT USE OF INSULIN: ICD-10-CM

## 2024-09-10 PROCEDURE — 99215 OFFICE O/P EST HI 40 MIN: CPT | Mod: PBBFAC

## 2024-09-10 PROCEDURE — 99215 OFFICE O/P EST HI 40 MIN: CPT | Mod: S$PBB,,,

## 2024-09-10 PROCEDURE — 99999 PR PBB SHADOW E&M-EST. PATIENT-LVL V: CPT | Mod: PBBFAC,,,

## 2024-09-10 NOTE — PROGRESS NOTES
Subjective:       Patient ID: Homa Jaquez 88 y.o.     Chief Complaint:   Chief Complaint   Patient presents with    Hospital Follow Up        HPI:  08/19/2024 hospital evaluation:  88-year-old female patient past medical history significant for of A-fib, Chronic cystitis, GERD, Graves disease, Hypertension, Hypothyroidism, essential tremor, and Spinal stenosis, presented to [facility] on 8/9/2024 with a worsening pain in the left hip, she had sustained a left femur fracture after a fall 7/2, underwent IMN 7/3 and transferred to [facility] 7/9. She reported increasing pain to hip, xrays showing failed intertrochanteric femur fracture repair. Pt underwent conversion left SILVIA 7/15, she was discharged 7/26 to snf.  While there, she reportedly developed pneumonia, received a course of meropenem and vancomycin, had ongoing drainage from the left hip incision and poor healing, was transferred back to the hospital for further evaluation.  She was taken back to the OR on  08/12/2024, underwent I&D of the left hip wound, was found to have significant evidence of infection with large amount of necrotic appearing soft tissues around the prosthesis, prosthesis could not be exchanged, hardware was retained.  ID is consulted for assistance in management        Left SILVIA prosthetic joint infection   Retained hardware  Poor surgical candidate     -intraoperative cultures noted with Pseudomonas and Hafnia species  -due to retained hardware and patient being a poor surgical candidate on the long-term, per my discussion with orthopedic surgeon, long-term of antibiotics is recommended with likely need for long-term suppression.     Recommendations:  -Meropenem 1g IV q12h  -Plan for 8 weeks from surgery 08/12  -anticipated end date 10/07  -Please monitor weekly CBC, CMP, ESR, CRP and Fax results to my office at [phone]  -Retained hardware and advanced age with aggressive infection and poor surgical candidate, will likely need  quinolone suppression however Qtc prolonged on most recent ECG  -EKG repeated, QTC is in a more acceptable range, will tentatively plan for quinolone suppression at outpatient follow-up    09/10/2024 office visit:  Mrs. Jaquez presents for follow-up today accompanied by her son. She is currently completing course of IV Meropenem. She is tolerating antimicrobial therapy well. She denies any nausea, vomiting, or diarrhea. She is residing in NH for assistance in administration of antimicrobial therapy and working with PT. No issues with PICC. No fever, chills, or night sweats.     Past Medical History:   Diagnosis Date    A-fib     Chronic cystitis     GERD (gastroesophageal reflux disease)     Graves disease     Hypertension     Hypothyroidism, unspecified     Spinal stenosis         Past Surgical History:   Procedure Laterality Date    APPENDECTOMY      BLADDER SUSPENSION      CARDIOVERSION  04/01/2015    CATARACT EXTRACTION      CONVERSION ARTHROPLASTY, HIP, POSTERIOR APPROACH Left 7/15/2024    Procedure: CONVERSION ARTHROPLASTY, HIP, POSTERIOR APPROACH - valencia, cell saver, pathology;  Surgeon: Jonny Bains MD;  Location: [facility] OR;  Service: Orthopedics;  Laterality: Left;  valencia, cell saver, pathology    HYSTERECTOMY      INTRAMEDULLARY RODDING OF FEMUR Left 7/3/2024    Procedure: INSERTION, INTRAMEDULLARY MELANIE, FEMUR;  Surgeon: Steve Pierce DO;  Location: [facility] OR;  Service: Orthopedics;  Laterality: Left;  supine hana table c arm synthes    LAPAROSCOPIC CHOLECYSTECTOMY  01/12/2016    REVISION, ARTHROPLASTY, HIP, POSTERIOR APPROACH Left 8/12/2024    Procedure: REVISION,ARTHROPLASTY,HIP,POSTERIOR APPROACH;  Surgeon: Jonny Bains MD;  Location: [facility] OR;  Service: Orthopedics;  Laterality: Left;  I&D L hip - head/liner    SPHINCTEROTOMY OF URETHRA  01/11/2016    TONSILLECTOMY AND ADENOIDECTOMY          Social History     Socioeconomic History    Marital status:    Tobacco Use    Smoking status: Former  "    Types: Cigarettes    Smokeless tobacco: Never   Substance and Sexual Activity    Alcohol use: Yes    Drug use: Never    Sexual activity: Not Currently     Social Determinants of Health     Financial Resource Strain: Low Risk  (7/4/2024)    Overall Financial Resource Strain (CARDIA)     Difficulty of Paying Living Expenses: Not hard at all   Food Insecurity: No Food Insecurity (7/4/2024)    Hunger Vital Sign     Worried About Running Out of Food in the Last Year: Never true     Ran Out of Food in the Last Year: Never true   Transportation Needs: No Transportation Needs (7/9/2024)    PRAPARE - Transportation     Lack of Transportation (Medical): No     Lack of Transportation (Non-Medical): No   Stress: No Stress Concern Present (7/4/2024)    [employer] - Occupational Stress Questionnaire     Feeling of Stress : Not at all   Housing Stability: Low Risk  (7/4/2024)    Housing Stability Vital Sign     Unable to Pay for Housing in the Last Year: No     Homeless in the Last Year: No        Family History   Problem Relation Name Age of Onset    Cancer Father      Heart attack Father      Stroke Father          Review of patient's allergies indicates:   Allergen Reactions    Cefadroxil      Other reaction(s): Nausea or vomiting    Cefuroxime axetil     Cephalexin      Other reaction(s): NAUSEA AND VOMITING    Ciprofloxacin     Enoxaparin     Methenamine hippurate      Other reaction(s): Unknown    Promethazine         Immunization History   Administered Date(s) Administered    COVID-19, MRNA, LN-S, PF (Pfizer) (Purple Cap) 08/18/2021    Influenza - High Dose - PF (65 years and older) 11/08/2016, 11/01/2019, 11/09/2020    Influenza - Trivalent - PF (ADULT) 11/04/2019    Pneumococcal Polysaccharide - 23 Valent 02/20/2015        Review of Systems   All other systems reviewed and are negative.         Objective:      /68 (BP Location: Left arm)   Pulse 97   Resp 18   Ht 5' 4" (1.626 m)  "  Wt 85.7 kg (188 lb 15 oz)   SpO2 98%   BMI 32.43 kg/m²      Physical Exam  Vitals reviewed.   Constitutional:       Appearance: Normal appearance. She is not toxic-appearing.      Comments: In wheelchair   Eyes:      Pupils: Pupils are equal, round, and reactive to light.   Cardiovascular:      Rate and Rhythm: Normal rate and regular rhythm.      Pulses: Normal pulses.      Heart sounds: Normal heart sounds. No murmur heard.     Comments: PICC line intact   Pulmonary:      Effort: Pulmonary effort is normal.      Breath sounds: Normal breath sounds.   Abdominal:      General: Bowel sounds are normal.      Palpations: Abdomen is soft.   Musculoskeletal:         General: Normal range of motion.      Cervical back: Normal range of motion.      Comments: Left hip with healing incision  No erythema, edema or dehiscence noted    Skin:     General: Skin is warm and dry.   Neurological:      Mental Status: She is alert and oriented to person, place, and time.   Psychiatric:         Mood and Affect: Mood normal.          Labs: Reviewed most recent relevant labs available, notable results highlighted in this note    Imaging: Reviewed most recent relevant imaging studies available, notable results highlighted in this note    Assessment:       Problem List Items Addressed This Visit          ID    Postoperative wound infection of left hip - Primary    Receiving intravenous antibiotic treatment as outpatient       Endocrine    Diabetes mellitus       Other    Decreased functional mobility          Plan:   Leonid Leger is currently completing 8 week course of  Meropenem 1g IV q12 hours for L hip infection with retained hardware  -cultures with Hafnia species and Pseudomonas aeruginosa  -Plan for 8 weeks from surgery 08/12 with the anticipated end date of 10/07  -continue weekly CBC, CMP, ESR, CRP   -PICC line dressing needs to be changed weekly.   -Retained hardware and advanced age with aggressive infection and poor  surgical candidate, will likely need quinolone suppression however Qtc prolonged on most recent ECG  -labs reviewed on 8/26 and are WNL  -will determine suppression on next visit   -can follow-up again in 4 weeks     Follow up in about 4 weeks (around 10/8/2024).    Portions of this note dictated using EMR integrated voice recognition software, and may be subject to voice recognition errors not corrected at proofreading. Please contact writer for clarification if needed.    45 minutes of total time spent on the encounter, which includes face to face time and non-face to face time preparing to see the patient (eg, review of tests), Obtaining and/or reviewing separately obtained history, Documenting clinical information in the electronic or other health record, Independently interpreting results (not separately reported) and communicating results to the patient/family/caregiver, or Care coordination (not separately reported).

## 2024-09-16 PROBLEM — Z79.2 RECEIVING INTRAVENOUS ANTIBIOTIC TREATMENT AS OUTPATIENT: Status: ACTIVE | Noted: 2024-09-16

## 2024-09-19 ENCOUNTER — OFFICE VISIT (OUTPATIENT)
Dept: ORTHOPEDICS | Facility: CLINIC | Age: 89
End: 2024-09-19
Payer: MEDICARE

## 2024-09-19 ENCOUNTER — HOSPITAL ENCOUNTER (OUTPATIENT)
Dept: RADIOLOGY | Facility: CLINIC | Age: 89
Discharge: HOME | End: 2024-09-19
Attending: NURSE PRACTITIONER
Payer: MEDICARE

## 2024-09-19 VITALS
HEIGHT: 64 IN | SYSTOLIC BLOOD PRESSURE: 117 MMHG | HEART RATE: 96 BPM | BODY MASS INDEX: 32.26 KG/M2 | DIASTOLIC BLOOD PRESSURE: 73 MMHG | WEIGHT: 188.94 LBS

## 2024-09-19 DIAGNOSIS — Z47.1 AFTERCARE FOLLOWING LEFT HIP JOINT REPLACEMENT SURGERY: ICD-10-CM

## 2024-09-19 DIAGNOSIS — Z96.642 AFTERCARE FOLLOWING LEFT HIP JOINT REPLACEMENT SURGERY: ICD-10-CM

## 2024-09-19 DIAGNOSIS — Z47.1 AFTERCARE FOLLOWING LEFT HIP JOINT REPLACEMENT SURGERY: Primary | ICD-10-CM

## 2024-09-19 DIAGNOSIS — Z96.642 AFTERCARE FOLLOWING LEFT HIP JOINT REPLACEMENT SURGERY: Primary | ICD-10-CM

## 2024-09-19 PROCEDURE — 73502 X-RAY EXAM HIP UNI 2-3 VIEWS: CPT | Mod: LT,,, | Performed by: NURSE PRACTITIONER

## 2024-09-19 PROCEDURE — 99024 POSTOP FOLLOW-UP VISIT: CPT | Mod: POP,,, | Performed by: NURSE PRACTITIONER

## 2024-09-19 NOTE — PROGRESS NOTES
Chief Complaint:   Chief Complaint   Patient presents with    Post-op Evaluation     5wk sp Lt hip revision sx 8/12/24. Doing well overall. States some pain still presenting in her hip that comes and goes. Worse with movement. In wheelchair today. Xr today. Able to stand with assistance.        History of present illness: Homa Jaquez is a 88 y.o. female, presents to clinic today in regards to her left hip.  Patient is about 5 weeks out from revision surgery with irrigation and debridement.  Her incision site is healing.  Although she is still does have some erythema and small amount of exudate.  Dressing does have some serous drainage noted.  This has changed yesterday.  No Betadine has been applied.  Patient was working with therapy.  In a wheelchair today here in clinic.      Past Medical History:   Diagnosis Date    A-fib     Chronic cystitis     GERD (gastroesophageal reflux disease)     Graves disease     Hypertension     Hypothyroidism, unspecified     Spinal stenosis        Past Surgical History:   Procedure Laterality Date    APPENDECTOMY      BLADDER SUSPENSION      CARDIOVERSION  04/01/2015    CATARACT EXTRACTION      CONVERSION ARTHROPLASTY, HIP, POSTERIOR APPROACH Left 7/15/2024    Procedure: CONVERSION ARTHROPLASTY, HIP, POSTERIOR APPROACH - valencia, cell saver, pathology;  Surgeon: Jonny Bains MD;  Location: [facility];  Service: Orthopedics;  Laterality: Left;  valencia, cell saver, pathology    HYSTERECTOMY      INTRAMEDULLARY RODDING OF FEMUR Left 7/3/2024    Procedure: INSERTION, INTRAMEDULLARY MELANIE, FEMUR;  Surgeon: Steve Pierce DO;  Location: [facility] OR;  Service: Orthopedics;  Laterality: Left;  supine hana table c arm synthes    LAPAROSCOPIC CHOLECYSTECTOMY  01/12/2016    REVISION, ARTHROPLASTY, HIP, POSTERIOR APPROACH Left 8/12/2024    Procedure: REVISION,ARTHROPLASTY,HIP,POSTERIOR APPROACH;  Surgeon: Jonny Bains MD;  Location: [facility] OR;  Service: Orthopedics;  Laterality: Left;  I&D L hip  - head/liner    SPHINCTEROTOMY OF URETHRA  01/11/2016    TONSILLECTOMY AND ADENOIDECTOMY         Current Outpatient Medications   Medication Sig    0.9% NaCl SolP 100 mL with meropenem 1 gram SolR 1 g Inject 1 g into the vein every 12 (twelve) hours.    acetaminophen (TYLENOL) 650 mg/20.3 mL Soln Take 20.3 mLs (650 mg total) by mouth every 6 (six) hours.    allopurinoL (ZYLOPRIM) 100 MG tablet Take 1 tablet (100 mg total) by mouth once daily.    ascorbic acid, vitamin C, (VITAMIN C) 500 MG tablet Take 1 tablet (500 mg total) by mouth 2 (two) times daily.    carvediloL (COREG) 3.125 MG tablet Take 1 tablet (3.125 mg total) by mouth 2 (two) times daily.    cyanocobalamin 1,000 mcg/mL injection INJECT 1000MCG (1ML) INTRAMUSCULARY ONCE MONTHLY    docusate sodium (COLACE) 100 MG capsule Take 1 capsule (100 mg total) by mouth 2 (two) times daily as needed for Constipation.    furosemide (LASIX) 20 MG tablet Take 1 tablet (20 mg total) by mouth once daily.    levothyroxine (SYNTHROID) 125 MCG tablet TAKE 1 TABLET BY MOUTH ONCE DAILY    lovastatin (MEVACOR) 20 MG tablet Take 20 mg by mouth once daily.    megestroL (MEGACE) 40 MG Tab Take 1 tablet (40 mg total) by mouth 2 (two) times daily.    miconazole NITRATE 2 % (MICOTIN) 2 % top powder Apply topically 2 (two) times daily. Apply to bilateral breast folds, ceasar area, upper inner thighs, and bilateral buttocks.    multivitamin Tab Take 1 tablet by mouth once daily.    nitroGLYCERIN (NITROSTAT) 0.4 MG SL tablet Place 1 tablet (0.4 mg total) under the tongue every 5 (five) minutes as needed for Chest pain.    polyethylene glycol (GLYCOLAX) 17 gram PwPk Take 17 g by mouth 2 (two) times daily as needed for Constipation.    sacubitriL-valsartan (ENTRESTO) 24-26 mg per tablet Take 1 tablet by mouth 2 (two) times daily.    senna (SENOKOT) 8.6 mg tablet Take 1 tablet by mouth daily as needed for Constipation.    traMADoL (ULTRAM) 50 mg tablet Take 1 tablet (50 mg total) by mouth  every 6 (six) hours as needed (as needed for pain score 1-10).    traMADoL (ULTRAM) 50 mg tablet Take 1 tablet (50 mg total) by mouth every 6 (six) hours as needed (as needed for pain score 1-10).    warfarin (COUMADIN) 1 MG tablet Take 1 tablet (1 mg total) by mouth Daily.    zinc sulfate (ZINCATE) 50 mg zinc (220 mg) capsule Take 1 capsule (220 mg total) by mouth once daily.    pantoprazole (PROTONIX) 40 MG tablet Take 1 tablet (40 mg total) by mouth once daily.     No current facility-administered medications for this visit.       Review of patient's allergies indicates:   Allergen Reactions    Cefadroxil      Other reaction(s): Nausea or vomiting    Cefuroxime axetil     Cephalexin      Other reaction(s): NAUSEA AND VOMITING    Ciprofloxacin     Enoxaparin     Methenamine hippurate      Other reaction(s): Unknown    Promethazine        Family History   Problem Relation Name Age of Onset    Cancer Father      Heart attack Father      Stroke Father         Social History     Socioeconomic History    Marital status:    Tobacco Use    Smoking status: Former     Types: Cigarettes    Smokeless tobacco: Never   Substance and Sexual Activity    Alcohol use: Yes    Drug use: Never    Sexual activity: Not Currently     Social Determinants of Health     Financial Resource Strain: Low Risk  (7/4/2024)    Overall Financial Resource Strain (CARDIA)     Difficulty of Paying Living Expenses: Not hard at all   Food Insecurity: No Food Insecurity (7/4/2024)    Hunger Vital Sign     Worried About Running Out of Food in the Last Year: Never true     Ran Out of Food in the Last Year: Never true   Transportation Needs: No Transportation Needs (7/9/2024)    PRAPARE - Transportation     Lack of Transportation (Medical): No     Lack of Transportation (Non-Medical): No   Stress: No Stress Concern Present (7/4/2024)    [employer] - Occupational Stress Questionnaire     Feeling of Stress : Not at all    Housing Stability: Low Risk  (7/4/2024)    Housing Stability Vital Sign     Unable to Pay for Housing in the Last Year: No     Homeless in the Last Year: No           Review of Systems:    Denies fevers, chills, chest pain, shortness of breath. Comprehensive review of systems performed and otherwise negative except as noted in HPI      Physical Examination:    General: awake and alert, no acute distress, healthy appearing  Head and Neck: Head atraumatic/normocephalic. Moist MM  CV: brisk cap refill  Lungs: non-labored breathing, w/o cough or SOB  Skin: no rashes present, warm to touch  Neuro: sensation grossly intact distall     Vital Signs:    Vitals:    09/19/24 1415   BP: 117/73   Pulse: 96       Body mass index is 32.43 kg/m².    Right hip exam:    Right hip incision healing.  Erythema noted to incision site.  No active bleeding.  Very small amount of exudate.  No swelling distally. No signs of DVT  Brisk cap refill right foot  Sensation intact distally to right foot  Positive FHL/EHL/gastrocsoleus/tib ant    X-rays: 3 views of the right hip reviewed. Patient's implants appear well fixed. No signs of loosening or subsidence noted.  Fracture still noted with no shifting or movement.     Assessment::post op status post revision of R SILVIA with I and D    Plan:    Patient presents in regards to her right hip.  We will continue with dressing changes with application of Betadine.  She is also to continue physical therapy for strengthening, range of motion, mobility.  We will have her follow up then a week or so for re-evaluation of incision site.  At that point we will remove suture.  Patient's family members state understanding and agree with plan of care.    This note was created using [employer] voice recognition software that occasionally misinterpreted phrases or words.    Consult note is delivered via Epic messaging service.

## 2024-10-01 ENCOUNTER — OFFICE VISIT (OUTPATIENT)
Dept: ORTHOPEDICS | Facility: CLINIC | Age: 89
End: 2024-10-01
Payer: MEDICARE

## 2024-10-01 VITALS
HEIGHT: 64 IN | SYSTOLIC BLOOD PRESSURE: 127 MMHG | HEART RATE: 69 BPM | DIASTOLIC BLOOD PRESSURE: 79 MMHG | WEIGHT: 188.94 LBS | BODY MASS INDEX: 32.26 KG/M2

## 2024-10-01 DIAGNOSIS — Z47.1 AFTERCARE FOLLOWING LEFT HIP JOINT REPLACEMENT SURGERY: Primary | ICD-10-CM

## 2024-10-01 DIAGNOSIS — Z96.642 AFTERCARE FOLLOWING LEFT HIP JOINT REPLACEMENT SURGERY: Primary | ICD-10-CM

## 2024-10-01 NOTE — PROGRESS NOTES
Chief Complaint:   Chief Complaint   Patient presents with    Follow-up     Left hip revision sx 8/12/24-11/10/24. Doing well overall. Some pain still presenting in her hip but this pain is less every day. In wheelchair today.        History of present illness:    History of Present Illness  The patient presents for evaluation of her hip. She is accompanied by an adult female.    She reports that her hip condition has improved, with increased mobility in her foot. She is currently on Merrem IV and has an upcoming appointment with Infectious Disease on 10/08/2024. Her strength is gradually returning, and she notes a significant improvement in her overall attitude.    Past Medical History:   Diagnosis Date    A-fib     Chronic cystitis     GERD (gastroesophageal reflux disease)     Graves disease     Hypertension     Hypothyroidism, unspecified     Spinal stenosis        Past Surgical History:   Procedure Laterality Date    APPENDECTOMY      BLADDER SUSPENSION      CARDIOVERSION  04/01/2015    CATARACT EXTRACTION      CONVERSION ARTHROPLASTY, HIP, POSTERIOR APPROACH Left 7/15/2024    Procedure: CONVERSION ARTHROPLASTY, HIP, POSTERIOR APPROACH - valencia, cell saver, pathology;  Surgeon: Jonny Bains MD;  [facility]: [facility];  Service: Orthopedics;  Laterality: Left;  valencia, cell saver, pathology    HYSTERECTOMY      INTRAMEDULLARY RODDING OF FEMUR Left 7/3/2024    Procedure: INSERTION, INTRAMEDULLARY MELANIE, FEMUR;  Surgeon: Steve Pierce DO;  Location: [facility] OR;  Service: Orthopedics;  Laterality: Left;  supine hana table c arm synthes    LAPAROSCOPIC CHOLECYSTECTOMY  01/12/2016    REVISION, ARTHROPLASTY, HIP, POSTERIOR APPROACH Left 8/12/2024    Procedure: REVISION,ARTHROPLASTY,HIP,POSTERIOR APPROACH;  Surgeon: Jonny Bains MD;  Location: [facility] OR;  Service: Orthopedics;  Laterality: Left;  I&D L hip - head/liner    SPHINCTEROTOMY OF URETHRA  01/11/2016    TONSILLECTOMY AND ADENOIDECTOMY         Current Outpatient  Medications   Medication Sig    0.9% NaCl SolP 100 mL with meropenem 1 gram SolR 1 g Inject 1 g into the vein every 12 (twelve) hours.    acetaminophen (TYLENOL) 650 mg/20.3 mL Soln Take 20.3 mLs (650 mg total) by mouth every 6 (six) hours.    allopurinoL (ZYLOPRIM) 100 MG tablet Take 1 tablet (100 mg total) by mouth once daily.    ascorbic acid, vitamin C, (VITAMIN C) 500 MG tablet Take 1 tablet (500 mg total) by mouth 2 (two) times daily.    carvediloL (COREG) 3.125 MG tablet Take 1 tablet (3.125 mg total) by mouth 2 (two) times daily.    cyanocobalamin 1,000 mcg/mL injection INJECT 1000MCG (1ML) INTRAMUSCULARY ONCE MONTHLY    docusate sodium (COLACE) 100 MG capsule Take 1 capsule (100 mg total) by mouth 2 (two) times daily as needed for Constipation.    furosemide (LASIX) 20 MG tablet Take 1 tablet (20 mg total) by mouth once daily.    levothyroxine (SYNTHROID) 125 MCG tablet TAKE 1 TABLET BY MOUTH ONCE DAILY    lovastatin (MEVACOR) 20 MG tablet Take 20 mg by mouth once daily.    megestroL (MEGACE) 40 MG Tab Take 1 tablet (40 mg total) by mouth 2 (two) times daily.    miconazole NITRATE 2 % (MICOTIN) 2 % top powder Apply topically 2 (two) times daily. Apply to bilateral breast folds, ceasar area, upper inner thighs, and bilateral buttocks.    multivitamin Tab Take 1 tablet by mouth once daily.    nitroGLYCERIN (NITROSTAT) 0.4 MG SL tablet Place 1 tablet (0.4 mg total) under the tongue every 5 (five) minutes as needed for Chest pain.    polyethylene glycol (GLYCOLAX) 17 gram PwPk Take 17 g by mouth 2 (two) times daily as needed for Constipation.    sacubitriL-valsartan (ENTRESTO) 24-26 mg per tablet Take 1 tablet by mouth 2 (two) times daily.    senna (SENOKOT) 8.6 mg tablet Take 1 tablet by mouth daily as needed for Constipation.    traMADoL (ULTRAM) 50 mg tablet Take 1 tablet (50 mg total) by mouth every 6 (six) hours as needed (as needed for pain score 1-10).    traMADoL (ULTRAM) 50 mg tablet Take 1 tablet (50 mg  total) by mouth every 6 (six) hours as needed (as needed for pain score 1-10).    warfarin (COUMADIN) 1 MG tablet Take 1 tablet (1 mg total) by mouth Daily.    zinc sulfate (ZINCATE) 50 mg zinc (220 mg) capsule Take 1 capsule (220 mg total) by mouth once daily.    pantoprazole (PROTONIX) 40 MG tablet Take 1 tablet (40 mg total) by mouth once daily.     No current facility-administered medications for this visit.       Review of patient's allergies indicates:   Allergen Reactions    Cefadroxil      Other reaction(s): Nausea or vomiting    Cefuroxime axetil     Cephalexin      Other reaction(s): NAUSEA AND VOMITING    Ciprofloxacin     Enoxaparin     Methenamine hippurate      Other reaction(s): Unknown    Promethazine        Family History   Problem Relation Name Age of Onset    Cancer Father      Heart attack Father      Stroke Father         Social History     Socioeconomic History    Marital status:    Tobacco Use    Smoking status: Former     Types: Cigarettes    Smokeless tobacco: Never   Substance and Sexual Activity    Alcohol use: Yes    Drug use: Never    Sexual activity: Not Currently     Social Drivers of Health     Financial Resource Strain: Low Risk  (7/4/2024)    Overall Financial Resource Strain (CARDIA)     Difficulty of Paying Living Expenses: Not hard at all   Food Insecurity: No Food Insecurity (7/4/2024)    Hunger Vital Sign     Worried About Running Out of Food in the Last Year: Never true     Ran Out of Food in the Last Year: Never true   Transportation Needs: No Transportation Needs (7/9/2024)    PRAPARE - Transportation     Lack of Transportation (Medical): No     Lack of Transportation (Non-Medical): No   Stress: No Stress Concern Present (7/4/2024)    [employer] [place] [employer] - Occupational Stress Questionnaire     Feeling of Stress : Not at all   Housing Stability: Low Risk  (7/4/2024)    Housing Stability Vital Sign     Unable to Pay for Housing in the Last Year: No      Homeless in the Last Year: No           Review of Systems:    Constitution: Negative for chills, fever, and sweats.  Negative for unexplained weight loss.    HENT:  Negative for headaches and blurry vision.    Cardiovascular:Negative for chest pain or irregular heart beat. Negative for hypertension.    Respiratory:  Negative for cough and shortness of breath.    Gastrointestinal: Negative for abdominal pain, heartburn, melena, nausea, and vomitting.    Genitourinary:  Negative bladder incontinence and dysuria.    Musculoskeletal:  See HPI    Neurological: Negative for numbness.    Psychiatric/Behavioral: Negative for depression.  The patient is not nervous/anxious.      Endocrine: Negative for polyuria    Hematologic/Lymphatic: Negative for bleeding problem.  Does not bruise/bleed easily.    Skin: Negative for poor would healing and rash      Physical Examination:    Vital Signs:    Vitals:    10/01/24 1306   BP: 127/79   Pulse: 69       Body mass index is 32.43 kg/m².    General: No acute distress, alert and oriented, healthy appearing    HEENT: Head is atraumatic, mucous membranes are moist    Neck: Supples, no JVD    Cardiovascular: Palpable dorsalis pedis and posterior tibial pulses, regular rate and rhythm to those pulses    Lungs: Breathing non-labored    Skin: no rashes appreciated    Neurologic: Can flex and extend knees, ankles, and toes. Sensation is grossly intact    Left hip:  Patient was incision is clean and dry.  It was sutures are still in place.  It was brisk cap refill disappeared sensation intact distally.  Range of motion of the hip without significant or severe pain.    X-rays:      Assessment::  Status post left total hip arthroplasty with postoperative infection    Plan:  Overall patient is doing fairly well.  It was making slow but steady progress.  We are going to discontinue her sutures today.  They can stop Betadine painting her incision.  We will see her back in about a month repeat  x-rays of the left hip.    This note was generated with the assistance of ambient listening technology. Verbal consent was obtained by the patient and accompanying visitor(s) for the recording of patient appointment to facilitate this note. I attest to having reviewed and edited the generated note for accuracy, though some syntax or spelling errors may persist. Please contact the author of this note for any clarification.      This note was created using [employer] voice recognition software that occasionally misinterpreted phrases or words.    Consult note is delivered via Epic messaging service.

## 2024-10-02 ENCOUNTER — TELEPHONE (OUTPATIENT)
Dept: ADMINISTRATIVE | Facility: CLINIC | Age: 89
End: 2024-10-02
Payer: MEDICARE

## 2024-10-08 ENCOUNTER — OFFICE VISIT (OUTPATIENT)
Dept: INFECTIOUS DISEASES | Facility: CLINIC | Age: 89
End: 2024-10-08
Payer: MEDICARE

## 2024-10-08 VITALS
BODY MASS INDEX: 32.26 KG/M2 | DIASTOLIC BLOOD PRESSURE: 76 MMHG | RESPIRATION RATE: 18 BRPM | HEIGHT: 64 IN | WEIGHT: 188.94 LBS | OXYGEN SATURATION: 97 % | SYSTOLIC BLOOD PRESSURE: 125 MMHG | HEART RATE: 84 BPM

## 2024-10-08 DIAGNOSIS — Z95.0 PACEMAKER: Primary | ICD-10-CM

## 2024-10-08 DIAGNOSIS — N17.9 AKI (ACUTE KIDNEY INJURY): ICD-10-CM

## 2024-10-08 DIAGNOSIS — E11.9 TYPE 2 DIABETES MELLITUS WITHOUT COMPLICATION, WITHOUT LONG-TERM CURRENT USE OF INSULIN: ICD-10-CM

## 2024-10-08 DIAGNOSIS — Z79.2 RECEIVING INTRAVENOUS ANTIBIOTIC TREATMENT AS OUTPATIENT: ICD-10-CM

## 2024-10-08 DIAGNOSIS — T81.49XA POSTOPERATIVE WOUND INFECTION OF LEFT HIP: ICD-10-CM

## 2024-10-08 PROCEDURE — 99999 PR PBB SHADOW E&M-EST. PATIENT-LVL V: CPT | Mod: PBBFAC,,,

## 2024-10-08 PROCEDURE — 99215 OFFICE O/P EST HI 40 MIN: CPT | Mod: PBBFAC

## 2024-10-08 PROCEDURE — 99214 OFFICE O/P EST MOD 30 MIN: CPT | Mod: S$PBB,,,

## 2024-10-08 NOTE — PROGRESS NOTES
Subjective:       Patient ID: Homa Jaquez 88 y.o.     Chief Complaint:   Chief Complaint   Patient presents with    F/U Postoperative wound infection of left hip        HPI:  08/19/2024 hospital evaluation:  88-year-old female patient past medical history significant for of A-fib, Chronic cystitis, GERD, Graves disease, Hypertension, Hypothyroidism, essential tremor, and Spinal stenosis, presented to [facility] on 8/9/2024 with a worsening pain in the left hip, she had sustained a left femur fracture after a fall 7/2, underwent IMN 7/3 and [facility]. She reported increasing pain to hip, xrays showing failed intertrochanteric femur fracture repair. Pt underwent conversion left SILVIA 7/15, she was discharged 7/26 to snf.  While there, she reportedly developed pneumonia, received a course of meropenem and vancomycin, had ongoing drainage from the left hip incision and poor healing, was transferred back to the hospital for further evaluation.  She was taken back to the OR on  08/12/2024, underwent I&D of the left hip wound, was found to have significant evidence of infection with large amount of necrotic appearing soft tissues around the prosthesis, prosthesis could not be exchanged, hardware was retained.  ID is consulted for assistance in management        Left SILVIA prosthetic joint infection   Retained hardware  Poor surgical candidate     -intraoperative cultures noted with Pseudomonas and Hafnia species  -due to retained hardware and patient being a poor surgical candidate on the long-term, per my discussion with orthopedic surgeon, long-term of antibiotics is recommended with likely need for long-term suppression.     Recommendations:  -Meropenem 1g IV q12h  -Plan for 8 weeks from surgery 08/12  -anticipated end date 10/07  -Please monitor weekly CBC, CMP, ESR, CRP and Fax results to my office at [phone]  -Retained hardware and advanced age with aggressive infection and poor surgical  candidate, will likely need quinolone suppression however Qtc prolonged on most recent ECG  -EKG repeated, QTC is in a more acceptable range, will tentatively plan for quinolone suppression at outpatient follow-up    09/10/2024 office visit:  Mrs. Jaquez presents for follow-up today accompanied by her son. She is currently completing course of IV Meropenem. She is tolerating antimicrobial therapy well. She denies any nausea, vomiting, or diarrhea. She is residing in NH for assistance in administration of antimicrobial therapy and working with PT. No issues with PICC. No fever, chills, or night sweats.     10/08/2024 office visit:   Mrs. Jaquez presents for follow-up today accompanied by her son. She is currently completing course of IV Meropenem. She is tolerating antimicrobial therapy well. She denies any nausea, vomiting, or diarrhea.  She reports some serous drainage to her hip.  Otherwise doing well.    Past Medical History:   Diagnosis Date    A-fib     Chronic cystitis     GERD (gastroesophageal reflux disease)     Graves disease     Hypertension     Hypothyroidism, unspecified     Spinal stenosis         Past Surgical History:   Procedure Laterality Date    APPENDECTOMY      BLADDER SUSPENSION      CARDIOVERSION  04/01/2015    CATARACT EXTRACTION      CONVERSION ARTHROPLASTY, HIP, POSTERIOR APPROACH Left 7/15/2024    Procedure: CONVERSION ARTHROPLASTY, HIP, POSTERIOR APPROACH - valencia, cell saver, pathology;  Surgeon: Jonny Bains MD;  Location: [facility] OR;  Service: Orthopedics;  Laterality: Left;  valencia, cell saver, pathology    HYSTERECTOMY      INTRAMEDULLARY RODDING OF FEMUR Left 7/3/2024    Procedure: INSERTION, INTRAMEDULLARY MELANIE, FEMUR;  Surgeon: Steve Pierce DO;  Location: [facility] OR;  Service: Orthopedics;  Laterality: Left;  supine hana table c arm synthes    LAPAROSCOPIC CHOLECYSTECTOMY  01/12/2016    REVISION, ARTHROPLASTY, HIP, POSTERIOR APPROACH Left 8/12/2024    Procedure:  REVISION,ARTHROPLASTY,HIP,POSTERIOR APPROACH;  Surgeon: Jonny Bains MD;  Location: [facility];  Service: Orthopedics;  Laterality: Left;  I&D L hip - head/liner    SPHINCTEROTOMY OF URETHRA  01/11/2016    TONSILLECTOMY AND ADENOIDECTOMY          Social History     Socioeconomic History    Marital status:    Tobacco Use    Smoking status: Former     Types: Cigarettes    Smokeless tobacco: Never   Substance and Sexual Activity    Alcohol use: Yes    Drug use: Never    Sexual activity: Not Currently     Social Drivers of Health     Financial Resource Strain: Low Risk  (7/4/2024)    Overall Financial Resource Strain (CARDIA)     Difficulty of Paying Living Expenses: Not hard at all   Food Insecurity: No Food Insecurity (7/4/2024)    Hunger Vital Sign     Worried About Running Out of Food in the Last Year: Never true     Ran Out of Food in the Last Year: Never true   Transportation Needs: No Transportation Needs (7/9/2024)    PRAPARE - Transportation     Lack of Transportation (Medical): No     Lack of Transportation (Non-Medical): No   Stress: No Stress Concern Present (7/4/2024)    [employer] [place] [employer] - Occupational Stress Questionnaire     Feeling of Stress : Not at all   Housing Stability: Low Risk  (7/4/2024)    Housing Stability Vital Sign     Unable to Pay for Housing in the Last Year: No     Homeless in the Last Year: No        Family History   Problem Relation Name Age of Onset    Cancer Father      Heart attack Father      Stroke Father          Review of patient's allergies indicates:   Allergen Reactions    Cefuroxime axetil     Cephalexin      Other reaction(s): NAUSEA AND VOMITING    Enoxaparin Dermatitis     Cellulitis of abdomen     Methenamine hippurate      GI Upset     Promethazine Hallucinations        Immunization History   Administered Date(s) Administered    COVID-19, MRNA, LN-S, PF (Pfizer) (Purple Cap) 08/18/2021    Influenza - Trivalent - Fluarix, Flulaval,  "Fluzone, Afluria - PF 11/04/2019    Influenza - Trivalent - Fluzone High Dose - PF (65 years and older) 11/08/2016, 11/01/2019, 11/09/2020    Pneumococcal Polysaccharide - 23 Valent 02/20/2015        Review of Systems   All other systems reviewed and are negative.         Objective:      /76 (BP Location: Left arm, Patient Position: Sitting)   Pulse 84   Resp 18   Ht 5' 4" (1.626 m)   Wt 85.7 kg (188 lb 15 oz)   SpO2 97%   BMI 32.43 kg/m²      Physical Exam  Vitals reviewed.   Constitutional:       Appearance: Normal appearance. She is not toxic-appearing.      Comments: In wheelchair   Eyes:      Pupils: Pupils are equal, round, and reactive to light.   Cardiovascular:      Rate and Rhythm: Normal rate and regular rhythm.      Pulses: Normal pulses.      Heart sounds: Normal heart sounds. No murmur heard.     Comments: PICC line intact   Pulmonary:      Effort: Pulmonary effort is normal.      Breath sounds: Normal breath sounds.   Abdominal:      General: Bowel sounds are normal.      Palpations: Abdomen is soft.   Musculoskeletal:         General: Normal range of motion.      Cervical back: Normal range of motion.      Comments: Left hip with healing incision  No erythema, edema or dehiscence noted    Skin:     General: Skin is warm and dry.   Neurological:      Mental Status: She is alert and oriented to person, place, and time.   Psychiatric:         Mood and Affect: Mood normal.            Labs: Reviewed most recent relevant labs available, notable results highlighted in this note    Imaging: Reviewed most recent relevant imaging studies available, notable results highlighted in this note    Assessment:       Problem List Items Addressed This Visit          Cardiac/Vascular    Pacemaker - Primary       Renal/    BARRY (acute kidney injury)       ID    Postoperative wound infection of left hip    Receiving intravenous antibiotic treatment as outpatient       Endocrine    Diabetes mellitus          "   Plan:   Leonid Leger completed in 8 week course of  Meropenem 1g IV q12 hours for L hip infection with retained hardware on 10/07/2024.  -cultures with Hafnia species and Pseudomonas aeruginosa  -meropenem completed on 10/07/2024.    -We will transition to ciprofloxacin 500mg po q12 hours for suppression given hardware retention.  -monitor for GI side effects, tendonitis, or palpitations.  -we will give a 500 cc normal saline bolus in attempt to hydrate her prior to pulling PICC line  -Repeat CBC, CMP, ESR, and CRP   -Pull picc after fluid blous and lab draw  -EKG 2 weeks   -written orders given to nursing home  -continue to monitor for systemic or localized signs of infection  -can follow up again in 4 weeks     No follow-ups on file.    Portions of this note dictated using EMR integrated voice recognition software, and may be subject to voice recognition errors not corrected at proofreading. Please contact writer for clarification if needed.    45 minutes of total time spent on the encounter, which includes face to face time and non-face to face time preparing to see the patient (eg, review of tests), Obtaining and/or reviewing separately obtained history, Documenting clinical information in the electronic or other health record, Independently interpreting results (not separately reported) and communicating results to the patient/family/caregiver, or Care coordination (not separately reported).

## 2024-10-09 ENCOUNTER — TELEPHONE (OUTPATIENT)
Dept: ADMINISTRATIVE | Facility: CLINIC | Age: 89
End: 2024-10-09
Payer: MEDICARE

## 2024-10-14 PROBLEM — N17.9 AKI (ACUTE KIDNEY INJURY): Status: RESOLVED | Noted: 2024-07-05 | Resolved: 2024-10-14

## 2024-10-16 ENCOUNTER — TELEPHONE (OUTPATIENT)
Dept: ADMINISTRATIVE | Facility: CLINIC | Age: 89
End: 2024-10-16
Payer: MEDICARE

## 2024-10-24 ENCOUNTER — TELEPHONE (OUTPATIENT)
Dept: ADMINISTRATIVE | Facility: CLINIC | Age: 89
End: 2024-10-24
Payer: MEDICARE

## 2024-10-30 ENCOUNTER — TELEPHONE (OUTPATIENT)
Dept: ADMINISTRATIVE | Facility: CLINIC | Age: 89
End: 2024-10-30
Payer: MEDICARE

## 2024-11-05 ENCOUNTER — TELEPHONE (OUTPATIENT)
Dept: INTERNAL MEDICINE | Facility: CLINIC | Age: 89
End: 2024-11-05
Payer: MEDICARE

## 2024-11-05 ENCOUNTER — OFFICE VISIT (OUTPATIENT)
Dept: ORTHOPEDICS | Facility: CLINIC | Age: 89
End: 2024-11-05
Payer: MEDICARE

## 2024-11-05 ENCOUNTER — HOSPITAL ENCOUNTER (OUTPATIENT)
Dept: RADIOLOGY | Facility: CLINIC | Age: 89
Discharge: HOME | End: 2024-11-05
Attending: ORTHOPAEDIC SURGERY
Payer: MEDICARE

## 2024-11-05 VITALS — HEIGHT: 64 IN | BODY MASS INDEX: 32.26 KG/M2 | WEIGHT: 188.94 LBS

## 2024-11-05 DIAGNOSIS — Z96.642 AFTERCARE FOLLOWING LEFT HIP JOINT REPLACEMENT SURGERY: Primary | ICD-10-CM

## 2024-11-05 DIAGNOSIS — Z47.1 AFTERCARE FOLLOWING LEFT HIP JOINT REPLACEMENT SURGERY: Primary | ICD-10-CM

## 2024-11-05 DIAGNOSIS — Z47.1 AFTERCARE FOLLOWING LEFT HIP JOINT REPLACEMENT SURGERY: ICD-10-CM

## 2024-11-05 DIAGNOSIS — Z96.642 AFTERCARE FOLLOWING LEFT HIP JOINT REPLACEMENT SURGERY: ICD-10-CM

## 2024-11-05 PROCEDURE — 99214 OFFICE O/P EST MOD 30 MIN: CPT | Mod: ,,, | Performed by: ORTHOPAEDIC SURGERY

## 2024-11-05 PROCEDURE — 73502 X-RAY EXAM HIP UNI 2-3 VIEWS: CPT | Mod: LT,,, | Performed by: ORTHOPAEDIC SURGERY

## 2024-11-05 NOTE — TELEPHONE ENCOUNTER
----- Message from View[employer]Gether sent at 11/5/2024 10:50 AM CST -----  Who Called: Homa Jaquez    Caller is requesting assistance/information from provider's office.    Symptoms (please be specific): n/a   How long has patient had these symptoms:  n/a  List of preferred pharmacies on file (remove unneeded): [unfilled]  If different, enter pharmacy into here including location and phone number: n/a      Preferred Method of Contact: Phone Call  Patient's Preferred Phone Number on File: [phone]   Best Call Back Number, if different:##[phone] [email or web address]  Additional Information: medical advice, pt son called to r/s appt on 11/19/24@ 10AM, pt son called to request changing [facility]sp f/u appt rescheduled for an afternoon appt which would be better to have pt ready , please advise, thanks

## 2024-11-05 NOTE — TELEPHONE ENCOUNTER
----- Message from Raheem sent at 11/5/2024  7:43 AM CST -----  [facility]       Caller is requesting a Hospital F/U Appointment     Discharge Location:[facility]   Discharge Date: 11/06/24      Preferred Method of Contact: Phone Call   Patient's Preferred Phone Number on File: [phone]  Best Call Back Number, if different:   Additional Information:

## 2024-11-06 NOTE — PROGRESS NOTES
Chief Complaint:   Chief Complaint   Patient presents with    Follow-up     12wk sp Lt hip revision sx 8/12/24- GL 11/10/24. Xr today. Overall doing well. States pain off and on that is continuing. Working with PT. In wheelchair.       History of present illness:    History of Present Illness  The patient is an 89-year-old female who presents for evaluation of hip pain. She is accompanied by her son.    She reports persistent hip pain, which is exacerbated by movement. Despite undergoing therapy, she struggles with standing due to weakness in her hip and knee. She experiences discomfort when lying flat in bed and often has to remind herself to relax. Alternating between Ultram and Tylenol seems to provide some relief.    Her son notes that she has undergone three major surgeries. While the hardware and skeletal structures appear to be in good condition, the soft tissues seem to be inflamed. He also mentions that she has a tendency to turn her foot outwards when lying supine, and a rigid splint with a kickstand has been provided to prevent this.    She has been taking antibiotics and has not noticed any improvement or worsening of her condition over the past month.    Past Medical History:   Diagnosis Date    A-fib     Chronic cystitis     GERD (gastroesophageal reflux disease)     Graves disease     Hypertension     Hypothyroidism, unspecified     Spinal stenosis        Past Surgical History:   Procedure Laterality Date    APPENDECTOMY      BLADDER SUSPENSION      CARDIOVERSION  04/01/2015    CATARACT EXTRACTION      CONVERSION ARTHROPLASTY, HIP, POSTERIOR APPROACH Left 7/15/2024    Procedure: CONVERSION ARTHROPLASTY, HIP, POSTERIOR APPROACH - valencia, cell saver, pathology;  Surgeon: Jonny Bains MD;  Location: [facility];  Service: Orthopedics;  Laterality: Left;  valencia, cell saver, pathology    HYSTERECTOMY      INTRAMEDULLARY RODDING OF FEMUR Left 7/3/2024    Procedure: INSERTION, INTRAMEDULLARY MELANIE, FEMUR;   Surgeon: Steve Pierce DO;  Location: [facility] OR;  Service: Orthopedics;  Laterality: Left;  supine hana table c arm synthes    LAPAROSCOPIC CHOLECYSTECTOMY  01/12/2016    REVISION, ARTHROPLASTY, HIP, POSTERIOR APPROACH Left 8/12/2024    Procedure: REVISION,ARTHROPLASTY,HIP,POSTERIOR APPROACH;  Surgeon: Jonny Bains MD;  Location: [facility] OR;  Service: Orthopedics;  Laterality: Left;  I&D L hip - head/liner    SPHINCTEROTOMY OF URETHRA  01/11/2016    TONSILLECTOMY AND ADENOIDECTOMY         Current Outpatient Medications   Medication Sig    acetaminophen (TYLENOL) 650 mg/20.3 mL Soln Take 20.3 mLs (650 mg total) by mouth every 6 (six) hours.    allopurinoL (ZYLOPRIM) 100 MG tablet Take 1 tablet (100 mg total) by mouth once daily.    ascorbic acid, vitamin C, (VITAMIN C) 500 MG tablet Take 1 tablet (500 mg total) by mouth 2 (two) times daily.    carvediloL (COREG) 3.125 MG tablet Take 1 tablet (3.125 mg total) by mouth 2 (two) times daily.    cyanocobalamin 1,000 mcg/mL injection INJECT 1000MCG (1ML) INTRAMUSCULARY ONCE MONTHLY    docusate sodium (COLACE) 100 MG capsule Take 1 capsule (100 mg total) by mouth 2 (two) times daily as needed for Constipation.    furosemide (LASIX) 20 MG tablet Take 1 tablet (20 mg total) by mouth once daily.    levothyroxine (SYNTHROID) 125 MCG tablet TAKE 1 TABLET BY MOUTH ONCE DAILY    lovastatin (MEVACOR) 20 MG tablet Take 20 mg by mouth once daily.    megestroL (MEGACE) 40 MG Tab Take 1 tablet (40 mg total) by mouth 2 (two) times daily.    miconazole NITRATE 2 % (MICOTIN) 2 % top powder Apply topically 2 (two) times daily. Apply to bilateral breast folds, ceasar area, upper inner thighs, and bilateral buttocks.    multivitamin Tab Take 1 tablet by mouth once daily.    nitroGLYCERIN (NITROSTAT) 0.4 MG SL tablet Place 1 tablet (0.4 mg total) under the tongue every 5 (five) minutes as needed for Chest pain.    polyethylene glycol (GLYCOLAX) 17 gram PwPk Take 17 g by mouth 2 (two) times  daily as needed for Constipation.    sacubitriL-valsartan (ENTRESTO) 24-26 mg per tablet Take 1 tablet by mouth 2 (two) times daily.    senna (SENOKOT) 8.6 mg tablet Take 1 tablet by mouth daily as needed for Constipation.    traMADoL (ULTRAM) 50 mg tablet Take 1 tablet (50 mg total) by mouth every 6 (six) hours as needed (as needed for pain score 1-10).    traMADoL (ULTRAM) 50 mg tablet Take 1 tablet (50 mg total) by mouth every 6 (six) hours as needed (as needed for pain score 1-10).    warfarin (COUMADIN) 1 MG tablet Take 1 tablet (1 mg total) by mouth Daily.    zinc sulfate (ZINCATE) 50 mg zinc (220 mg) capsule Take 1 capsule (220 mg total) by mouth once daily.    pantoprazole (PROTONIX) 40 MG tablet Take 1 tablet (40 mg total) by mouth once daily.     No current facility-administered medications for this visit.       Review of patient's allergies indicates:   Allergen Reactions    Cefuroxime axetil     Cephalexin      Other reaction(s): NAUSEA AND VOMITING    Enoxaparin Dermatitis     Cellulitis of abdomen     Methenamine hippurate      GI Upset     Promethazine Hallucinations       Family History   Problem Relation Name Age of Onset    Cancer Father      Heart attack Father      Stroke Father         Social History     Socioeconomic History    Marital status:    Tobacco Use    Smoking status: Former     Types: Cigarettes    Smokeless tobacco: Never   Substance and Sexual Activity    Alcohol use: Yes    Drug use: Never    Sexual activity: Not Currently     Social Drivers of Health     Financial Resource Strain: Low Risk  (7/4/2024)    Overall Financial Resource Strain (CARDIA)     Difficulty of Paying Living Expenses: Not hard at all   Food Insecurity: No Food Insecurity (7/4/2024)    Hunger Vital Sign     Worried About Running Out of Food in the Last Year: Never true     Ran Out of Food in the Last Year: Never true   Transportation Needs: No Transportation Needs (7/9/2024)    PRAPARE - Transportation      Lack of Transportation (Medical): No     Lack of Transportation (Non-Medical): No   Stress: No Stress Concern Present (7/4/2024)    [employer] - Occupational Stress Questionnaire     Feeling of Stress : Not at all   Housing Stability: Low Risk  (7/4/2024)    Housing Stability Vital Sign     Unable to Pay for Housing in the Last Year: No     Homeless in the Last Year: No           Review of Systems:    Constitution: Negative for chills, fever, and sweats.  Negative for unexplained weight loss.    HENT:  Negative for headaches and blurry vision.    Cardiovascular:Negative for chest pain or irregular heart beat. Negative for hypertension.    Respiratory:  Negative for cough and shortness of breath.    Gastrointestinal: Negative for abdominal pain, heartburn, melena, nausea, and vomitting.    Genitourinary:  Negative bladder incontinence and dysuria.    Musculoskeletal:  See HPI    Neurological: Negative for numbness.    Psychiatric/Behavioral: Negative for depression.  The patient is not nervous/anxious.      Endocrine: Negative for polyuria    Hematologic/Lymphatic: Negative for bleeding problem.  Does not bruise/bleed easily.    Skin: Negative for poor would healing and rash      Physical Examination:    Vital Signs:  There were no vitals filed for this visit.    Body mass index is 32.43 kg/m².    General: No acute distress, alert and oriented, healthy appearing    HEENT: Head is atraumatic, mucous membranes are moist    Neck: Supples, no JVD    Cardiovascular: Palpable dorsalis pedis and posterior tibial pulses, regular rate and rhythm to those pulses    Lungs: Breathing non-labored    Skin: no rashes appreciated    Neurologic: Can flex and extend knees, ankles, and toes. Sensation is grossly intact    Left hip:  Range of motion left hip does cause the patient's groin pain.  She also has pain to her left knee.  Her incision is clean and dry of the left hip.    X-rays:  Three views of  the left hip reviewed.  Patient's implants appear well fixed.  No significant changes noted.  Implants appear well fixed     Assessment::  Status post conversion left total hip arthroplasty    Plan:  Discussed all treatment with the patient.  Patient was continues to have some pain.  It was a long discussion with the patient was well as her son.  Plan and currently to watch her clinically.  She was making progress from a physical therapy perspective.  She was stiff.  If she continues to have ongoing severe symptoms, we may work her up for continued infection.  Due to her history.  She was still on oral antibiotics and we will plan to continue this.    This note was generated with the assistance of ambient listening technology. Verbal consent was obtained by the patient and accompanying visitor(s) for the recording of patient appointment to facilitate this note. I attest to having reviewed and edited the generated note for accuracy, though some syntax or spelling errors may persist. Please contact the author of this note for any clarification.      This note was created using [employer] voice recognition software that occasionally misinterpreted phrases or words.    Consult note is delivered via Epic messaging service.

## 2024-11-07 ENCOUNTER — TELEPHONE (OUTPATIENT)
Dept: ADMINISTRATIVE | Facility: CLINIC | Age: 89
End: 2024-11-07
Payer: MEDICARE

## 2024-11-07 ENCOUNTER — TELEPHONE (OUTPATIENT)
Dept: INTERNAL MEDICINE | Facility: CLINIC | Age: 89
End: 2024-11-07
Payer: MEDICARE

## 2024-11-07 PROCEDURE — G0180 MD CERTIFICATION HHA PATIENT: HCPCS | Mod: ,,, | Performed by: INTERNAL MEDICINE

## 2024-11-07 NOTE — TELEPHONE ENCOUNTER
----- Message from LAURA Oneill sent at 11/7/2024 10:11 AM CST -----  Regarding: SNF Discharge  This [facility] patient recently discharged home from [facility] on 11/06/2024.    SNF sent orders for Home Health  to [facility].  Spoke with A and care has been initiated.    Patient has an appt scheduled on 11/19/2024 with Dr Franklyn Brito.    Please consider [facility] for HTN and DM.    Please contact the patient directly with any further assistance.    Respectfully,  Mai Diego RN  [facility] Utilization Management

## 2024-11-08 ENCOUNTER — TELEPHONE (OUTPATIENT)
Dept: ORTHOPEDICS | Facility: CLINIC | Age: 89
End: 2024-11-08
Payer: MEDICARE

## 2024-11-08 NOTE — TELEPHONE ENCOUNTER
Jessica with [facility] is calling requesting a weight bearing status and hip precautions?    [facility] is taking over therapy.     I explained that I will route a message to Dr. Bains and someone will give her a call back when we get clarification.   Voiced a clear understanding.

## 2024-11-11 RX ORDER — CARVEDILOL 3.12 MG/1
3.12 TABLET ORAL 2 TIMES DAILY
Start: 2024-11-11 | End: 2024-11-13 | Stop reason: SDUPTHER

## 2024-11-11 RX ORDER — FUROSEMIDE 20 MG/1
20 TABLET ORAL DAILY
Start: 2024-11-11 | End: 2024-11-13 | Stop reason: SDUPTHER

## 2024-11-11 NOTE — TELEPHONE ENCOUNTER
I spoke with Jessica and let her know that DR. Bains said patient is WBAT and no hip precautions at the moment. I also gave her next appointment date.

## 2024-11-11 NOTE — TELEPHONE ENCOUNTER
----- Message from Yuri Park sent at 11/11/2024  3:57 PM CST -----  Regarding: advice  Who Called: [facility] with [facility] in [place]    Caller is requesting assistance/information from provider's office.    Symptoms (please be specific):   How long has patient had these symptoms:    List of preferred pharmacies on file (remove unneeded): [unfilled]  If different, enter pharmacy into here including location and phone number:       Preferred Method of Contact: Phone Call  Patient's Preferred Phone Number on File: [phone]   Best Call Back Number, if different:    Additional Information: Kayli stated she missed a call from Renea in regards to pt. Please call Kayli back at [phone]

## 2024-11-11 NOTE — TELEPHONE ENCOUNTER
Spoke with the pharmacist who stated patient was d/c from [facility] and was not given enough medication to last until her f/u appt with Dr. Brito on 11-19-24.   at [facility] changed her Coreg from 12.5 QD to 3.124 BID and also added Lasis 20mg to her medications.  Pharmacist also stated patient was put on Cipro 250mg 2 po BID indefinitely for post hip sx.  Please advise.  Coreg and Lasis rx proposed for approval, Cipro not on med list.

## 2024-11-11 NOTE — TELEPHONE ENCOUNTER
----- Message from Nurse Kenisha sent at 11/11/2024 11:58 AM CST -----  Regarding: chata Vallejo 11/19 @10:00  No labs needed.

## 2024-11-11 NOTE — TELEPHONE ENCOUNTER
----- Message from Raheem sent at 11/11/2024  2:03 PM CST -----  .Who Called: [facility] [address]    Pharmacy is calling to request assistance with Rx    Pharmacy name and phone number: [facility] [place] [facility] [place]  [facility]  Pharmacy contact: [phone]  Patient Name: Homa Jaquez  Prescription Name: med list   What do they need to clarify?: dosage change        Preferred Method of Contact: Phone Call  Patient's Preferred Phone Number on File: [phone]   Best Call Back Number, if different:  Additional Information:

## 2024-11-13 ENCOUNTER — TELEPHONE (OUTPATIENT)
Dept: INTERNAL MEDICINE | Facility: CLINIC | Age: 89
End: 2024-11-13
Payer: MEDICARE

## 2024-11-13 DIAGNOSIS — T81.49XA POSTOPERATIVE WOUND INFECTION OF LEFT HIP: Primary | ICD-10-CM

## 2024-11-13 DIAGNOSIS — I10 HYPERTENSION, UNSPECIFIED TYPE: Primary | ICD-10-CM

## 2024-11-13 RX ORDER — CARVEDILOL 3.12 MG/1
3.12 TABLET ORAL 2 TIMES DAILY
Qty: 60 TABLET | Refills: 5
Start: 2024-11-13 | End: 2024-11-15 | Stop reason: SDUPTHER

## 2024-11-13 RX ORDER — FUROSEMIDE 20 MG/1
20 TABLET ORAL DAILY
Qty: 30 TABLET | Refills: 5
Start: 2024-11-13 | End: 2024-11-15 | Stop reason: SDUPTHER

## 2024-11-13 RX ORDER — CIPROFLOXACIN 500 MG/1
250 TABLET ORAL 2 TIMES DAILY
COMMUNITY
Start: 2024-10-08 | End: 2024-11-13

## 2024-11-13 RX ORDER — CIPROFLOXACIN 250 MG/1
250 TABLET, FILM COATED ORAL 2 TIMES DAILY
Qty: 120 TABLET | Refills: 5 | Status: SHIPPED | OUTPATIENT
Start: 2024-11-13

## 2024-11-13 NOTE — TELEPHONE ENCOUNTER
----- Message from Patience sent at 11/13/2024 11:39 AM CST -----  .Type:  Patient Returning Call    Who Called:[facility] pharmacy   Who Left Message for Patient:Rhonda   Does the patient know what this is regarding?:medication  Would the patient rather a call back or a response via [facility]? PERRI  Best Call Back Number:[phone]  Additional Information: Please call back about medication refill

## 2024-11-13 NOTE — TELEPHONE ENCOUNTER
Pharmacy stated pt will need refills on Coreg 3.125 BID, Lasix 20 mg daily and Cipro 250 mg(2 tabs BID) indefinitely. She will not have enough to last her until her appointment on 11/19. Please advise.

## 2024-11-13 NOTE — TELEPHONE ENCOUNTER
Rhonda following on previous phone call 1- regarding patient's medication refill request.  Patient was d/c from [facility] without enough medication to last until her upcoming appt with Dr. Brito on 11-19-24.  Please advise.

## 2024-11-14 ENCOUNTER — TELEPHONE (OUTPATIENT)
Dept: INTERNAL MEDICINE | Facility: CLINIC | Age: 89
End: 2024-11-14
Payer: MEDICARE

## 2024-11-14 DIAGNOSIS — I10 HYPERTENSION, UNSPECIFIED TYPE: ICD-10-CM

## 2024-11-14 NOTE — TELEPHONE ENCOUNTER
----- Message from Merry sent at 11/14/2024  8:41 AM CST -----  Regarding: home health  Stephanie coley/ [facility] is requesting to have pt's LOV notes faxed. Pls fax to [phone]. for further questions pls return call to [phone]

## 2024-11-14 NOTE — TELEPHONE ENCOUNTER
----- Message from [employer] sent at 11/13/2024  4:44 PM CST -----  .Type:  Patient Returning Call    Who Called:Stephanie with [facility]  Who Left Message for Patient:Stephanie  Does the patient know what this is regarding?:diagnosis codes  Would the patient rather a call back or a response via [facility]?   Best Call Back Number:[phone]  Additional Information: Please call back about diagnosis codes

## 2024-11-14 NOTE — TELEPHONE ENCOUNTER
----- Message from Ellen sent at 11/14/2024  8:47 AM CST -----  .Who Called:Rhonda from the pharmacy     Pharmacy is calling to request assistance with Rx    Pharmacy name and phone number: [facility]      Pharmacy contact: Rhonda   Patient Name: Homa Jaquez   Prescription Name:  carvediloL (COREG) 3.125 MG tablet and furosemide (LASIX) 20 MG tablet  What do they need to clarify?:states that they never got the refill for the meds listed above. When I looked it shows that it was sent out on yesterday and not received         Preferred Method of Contact: Phone Call  Patient's Preferred Phone Number on File: [phone]  Best Call Back Number, if different:  Additional Information:

## 2024-11-14 NOTE — TELEPHONE ENCOUNTER
Patient's last office visit was 6-29-23.  Next Office Visit 11-19-24 Hospital F/U.  Please advise.

## 2024-11-15 DIAGNOSIS — M10.9 GOUT, UNSPECIFIED CAUSE, UNSPECIFIED CHRONICITY, UNSPECIFIED SITE: ICD-10-CM

## 2024-11-15 DIAGNOSIS — K21.9 GASTROESOPHAGEAL REFLUX DISEASE, UNSPECIFIED WHETHER ESOPHAGITIS PRESENT: Primary | ICD-10-CM

## 2024-11-15 RX ORDER — PANTOPRAZOLE SODIUM 40 MG/1
TABLET, DELAYED RELEASE ORAL
Qty: 30 TABLET | Refills: 4 | Status: SHIPPED | OUTPATIENT
Start: 2024-11-15

## 2024-11-15 RX ORDER — FUROSEMIDE 20 MG/1
20 TABLET ORAL DAILY
Qty: 30 TABLET | Refills: 5 | Status: SHIPPED | OUTPATIENT
Start: 2024-11-15 | End: 2025-05-14

## 2024-11-15 RX ORDER — ALLOPURINOL 100 MG/1
100 TABLET ORAL
Qty: 30 TABLET | Refills: 4 | Status: SHIPPED | OUTPATIENT
Start: 2024-11-15

## 2024-11-15 RX ORDER — CARVEDILOL 3.12 MG/1
3.12 TABLET ORAL 2 TIMES DAILY
Qty: 60 TABLET | Refills: 5 | Status: SHIPPED | OUTPATIENT
Start: 2024-11-15 | End: 2025-05-14

## 2024-11-19 ENCOUNTER — OFFICE VISIT (OUTPATIENT)
Dept: INTERNAL MEDICINE | Facility: CLINIC | Age: 89
End: 2024-11-19
Payer: MEDICARE

## 2024-11-19 VITALS
OXYGEN SATURATION: 100 % | WEIGHT: 185 LBS | RESPIRATION RATE: 18 BRPM | SYSTOLIC BLOOD PRESSURE: 136 MMHG | HEART RATE: 82 BPM | DIASTOLIC BLOOD PRESSURE: 80 MMHG | BODY MASS INDEX: 31.58 KG/M2 | HEIGHT: 64 IN

## 2024-11-19 DIAGNOSIS — Z23 FLU VACCINE NEED: Primary | ICD-10-CM

## 2024-11-19 DIAGNOSIS — T81.49XA POSTOPERATIVE WOUND INFECTION OF LEFT HIP: Primary | ICD-10-CM

## 2024-11-19 DIAGNOSIS — I10 HYPERTENSION, UNSPECIFIED TYPE: ICD-10-CM

## 2024-11-19 DIAGNOSIS — E03.9 HYPOTHYROIDISM, UNSPECIFIED TYPE: ICD-10-CM

## 2024-11-19 DIAGNOSIS — I50.23 ACUTE ON CHRONIC SYSTOLIC HEART FAILURE: ICD-10-CM

## 2024-11-19 DIAGNOSIS — E11.9 TYPE 2 DIABETES MELLITUS WITHOUT COMPLICATION, WITHOUT LONG-TERM CURRENT USE OF INSULIN: ICD-10-CM

## 2024-11-19 DIAGNOSIS — M10.9 GOUT, UNSPECIFIED CAUSE, UNSPECIFIED CHRONICITY, UNSPECIFIED SITE: ICD-10-CM

## 2024-11-19 DIAGNOSIS — Z23 NEED FOR VACCINATION WITH 20-POLYVALENT PNEUMOCOCCAL CONJUGATE VACCINE: ICD-10-CM

## 2024-11-19 DIAGNOSIS — E78.5 HYPERLIPIDEMIA, UNSPECIFIED HYPERLIPIDEMIA TYPE: ICD-10-CM

## 2024-11-19 DIAGNOSIS — M48.00 SPINAL STENOSIS, UNSPECIFIED SPINAL REGION: ICD-10-CM

## 2024-11-19 DIAGNOSIS — Z95.0 PACEMAKER: ICD-10-CM

## 2024-11-19 RX ORDER — FAMOTIDINE 20 MG/1
20 TABLET, FILM COATED ORAL
COMMUNITY

## 2024-11-19 RX ORDER — CIPROFLOXACIN 250 MG/1
500 TABLET, FILM COATED ORAL EVERY 12 HOURS
Start: 2024-11-19

## 2024-11-19 RX ORDER — TRAMADOL HYDROCHLORIDE 50 MG/1
50 TABLET ORAL EVERY 12 HOURS PRN
Qty: 60 TABLET | Refills: 5 | Status: SHIPPED | OUTPATIENT
Start: 2024-11-19

## 2024-11-19 NOTE — PROGRESS NOTES
Patient ID: [id number]      Subjective:     Chief Complaint: Follow-up (Hospital follow-up)      Homa Jaquez is a 89 y.o. female.  The patient is an 89-year-old woman seen in hospital follow-up.  I have not seen her in a few months but when I did see her she was suffered a fracture and had ORIF of the hip.  This failed and eventually had a total hip replacement.  Apparently that became infected with Pseudomonas but she was too frail to have a joint replacement.  She was treated with 6 weeks of Merrem and then transitioned to oral Cipro, 500 b.i.d..  This has for chronic suppression.    She continues to have significant hip pain but overall control with Tylenol and tramadol.  She needs a new prescription for the tramadol.    Follow-up        Review of Systems    Outpatient Medications Marked as Taking for the 11/19/24 encounter (Office Visit) with Franklyn Brito MD   Medication Sig Dispense Refill    acetaminophen (TYLENOL) 650 mg/20.3 mL Soln Take 20.3 mLs (650 mg total) by mouth every 6 (six) hours. (Patient taking differently: Take 500 mg by mouth as needed.)      allopurinoL (ZYLOPRIM) 100 MG tablet TAKE 1 TABLET BY MOUTH ONCE DAILY (Patient taking differently: Take 100 mg by mouth nightly.) 30 tablet 4    carvediloL (COREG) 3.125 MG tablet Take 1 tablet (3.125 mg total) by mouth 2 (two) times daily. 60 tablet 5    cyanocobalamin 1,000 mcg/mL injection INJECT 1000MCG (1ML) INTRAMUSCULARY ONCE MONTHLY 1 mL 11    docusate sodium (COLACE) 100 MG capsule Take 1 capsule (100 mg total) by mouth 2 (two) times daily as needed for Constipation. (Patient taking differently: Take 100 mg by mouth once daily.)      famotidine (PEPCID) 20 MG tablet Take 20 mg by mouth as needed for Heartburn.      furosemide (LASIX) 20 MG tablet Take 1 tablet (20 mg total) by mouth once daily. 30 tablet 5    levothyroxine (SYNTHROID) 125 MCG tablet TAKE 1 TABLET BY MOUTH ONCE DAILY 16 tablet 13    lovastatin (MEVACOR) 20 MG tablet  "Take 20 mg by mouth once daily.      pantoprazole (PROTONIX) 40 MG tablet GIVE ONE TABLET BY MOUTH ONCE DAILY 30 tablet 4    polyethylene glycol (GLYCOLAX) 17 gram PwPk Take 17 g by mouth 2 (two) times daily as needed for Constipation.      sacubitriL-valsartan (ENTRESTO) 24-26 mg per tablet Take 1 tablet by mouth 2 (two) times daily.      senna (SENOKOT) 8.6 mg tablet Take 1 tablet by mouth daily as needed for Constipation. 30 tablet 1    traMADoL (ULTRAM) 50 mg tablet Take 1 tablet (50 mg total) by mouth every 6 (six) hours as needed (as needed for pain score 1-10). 28 tablet 0    warfarin (COUMADIN) 1 MG tablet Take 1 tablet (1 mg total) by mouth Daily. 30 tablet 11    [DISCONTINUED] ciprofloxacin HCl (CIPRO) 250 MG tablet Take 1 tablet (250 mg total) by mouth 2 (two) times daily. 120 tablet 5    [DISCONTINUED] traMADoL (ULTRAM) 50 mg tablet Take 1 tablet (50 mg total) by mouth every 6 (six) hours as needed (as needed for pain score 1-10). 28 tablet 0       Objective:     /80 (BP Location: Right arm, Patient Position: Sitting)   Pulse 82   Resp 18   Ht 5' 4" (1.626 m)   Wt 83.9 kg (185 lb)   SpO2 100%   BMI 31.76 kg/m²     Physical Exam  Vitals reviewed.   Constitutional:       Appearance: Normal appearance.   HENT:      Head: Normocephalic.      Nose: Nose normal.      Mouth/Throat:      Pharynx: Oropharynx is clear.   Eyes:      Pupils: Pupils are equal, round, and reactive to light.   Neck:      Thyroid: No thyromegaly.   Cardiovascular:      Rate and Rhythm: Normal rate and regular rhythm.      Pulses: Normal pulses.   Pulmonary:      Breath sounds: Normal breath sounds.   Abdominal:      General: Abdomen is flat. Bowel sounds are normal.      Palpations: Abdomen is soft. There is no hepatomegaly, splenomegaly or mass.      Tenderness: There is no abdominal tenderness.   Musculoskeletal:      Cervical back: Neck supple.      Comments: 1+ bilateral pretibial edema   Lymphadenopathy:      Cervical: " No cervical adenopathy.   Skin:     General: Skin is warm and dry.   Neurological:      Mental Status: She is alert.     Extensive hospital records reviewed    Assessment:     1. Postop wound infection.  Namely infected hip prosthesis with Pseudomonas.  On suppressive antibiotic therapy     2. Hypertension.  Excellent control    3. Congestive heart failure.  She was systolic dysfunction.  Doing well on Entresto.    4. Diabetes mellitus.  Controlled    5. Hyperlipidemia.  Not really an issue currently     6. History of atrial fib with pacemaker.  On anticoagulation    7. History of gout     8. Advanced age    9. Corrected hypothyroidism      Plan:   Add Jardiance 10 daily.  Insurance does not cover Farxiga.  Check lab work to include CBC CMP TSH sed rate CRP and pro time.  This has be done by home health.  Follow-up with me 6 weeks.  Tramadol prescription renewed.  Problem List Items Addressed This Visit          Neuro    Spinal stenosis    Relevant Orders    CBC Auto Differential    Comprehensive Metabolic Panel    TSH    CRP, High Sensitivity    Sedimentation rate    Protime-INR       Cardiac/Vascular    Acute on chronic systolic heart failure    Relevant Orders    CBC Auto Differential    Comprehensive Metabolic Panel    TSH    CRP, High Sensitivity    Sedimentation rate    Protime-INR    Hyperlipidemia    Relevant Orders    CBC Auto Differential    Comprehensive Metabolic Panel    TSH    CRP, High Sensitivity    Sedimentation rate    Protime-INR    Pacemaker    Relevant Orders    CBC Auto Differential    Comprehensive Metabolic Panel    TSH    CRP, High Sensitivity    Sedimentation rate    Protime-INR       ID    Postoperative wound infection of left hip - Primary    Relevant Medications    ciprofloxacin HCl (CIPRO) 250 MG tablet    traMADoL (ULTRAM) 50 mg tablet    Other Relevant Orders    CBC Auto Differential    Comprehensive Metabolic Panel    TSH    CRP, High Sensitivity    Sedimentation rate    Protime-INR        Endocrine    Diabetes mellitus    Relevant Orders    CBC Auto Differential    Comprehensive Metabolic Panel    TSH    CRP, High Sensitivity    Sedimentation rate    Protime-INR    Hypothyroidism, unspecified    Relevant Orders    CBC Auto Differential    Comprehensive Metabolic Panel    TSH    CRP, High Sensitivity    Sedimentation rate    Protime-INR       Orthopedic    Gout    Relevant Orders    CBC Auto Differential    Comprehensive Metabolic Panel    TSH    CRP, High Sensitivity    Sedimentation rate    Protime-INR     Other Visit Diagnoses       Hypertension, unspecified type        Relevant Orders    CBC Auto Differential    Comprehensive Metabolic Panel    TSH    CRP, High Sensitivity    Sedimentation rate    Protime-INR             Orders Placed This Encounter   Procedures    CBC Auto Differential     Standing Status:   Future     Standing Expiration Date:   2/19/2025    Comprehensive Metabolic Panel     Standing Status:   Future     Standing Expiration Date:   2/19/2025    TSH     Standing Status:   Future     Standing Expiration Date:   2/19/2025    CRP, High Sensitivity     Standing Status:   Future     Standing Expiration Date:   2/17/2026    Sedimentation rate     Standing Status:   Future     Standing Expiration Date:   2/19/2025    Protime-INR     Standing Status:   Future     Standing Expiration Date:   2/17/2026        Medication List with Changes/Refills   New Medications    EMPAGLIFLOZIN (JARDIANCE) 10 MG TABLET    Take 1 tablet (10 mg total) by mouth once daily.       Start Date: 11/19/2024End Date: --   Current Medications    ACETAMINOPHEN (TYLENOL) 650 MG/20.3 ML SOLN    Take 20.3 mLs (650 mg total) by mouth every 6 (six) hours.       Start Date: 8/21/2024 End Date: --    ALLOPURINOL (ZYLOPRIM) 100 MG TABLET    TAKE 1 TABLET BY MOUTH ONCE DAILY       Start Date: 11/15/2024End Date: --    ASCORBIC ACID, VITAMIN C, (VITAMIN C) 500 MG TABLET    Take 1 tablet (500 mg total) by mouth 2 (two)  times daily.       Start Date: 7/26/2024 End Date: --    CARVEDILOL (COREG) 3.125 MG TABLET    Take 1 tablet (3.125 mg total) by mouth 2 (two) times daily.       Start Date: 11/15/2024End Date: 5/14/2025    CYANOCOBALAMIN 1,000 MCG/ML INJECTION    INJECT 1000MCG (1ML) INTRAMUSCULARY ONCE MONTHLY       Start Date: 9/7/2023  End Date: --    DOCUSATE SODIUM (COLACE) 100 MG CAPSULE    Take 1 capsule (100 mg total) by mouth 2 (two) times daily as needed for Constipation.       Start Date: 7/26/2024 End Date: --    FAMOTIDINE (PEPCID) 20 MG TABLET    Take 20 mg by mouth as needed for Heartburn.       Start Date: --        End Date: --    FUROSEMIDE (LASIX) 20 MG TABLET    Take 1 tablet (20 mg total) by mouth once daily.       Start Date: 11/15/2024End Date: 5/14/2025    LEVOTHYROXINE (SYNTHROID) 125 MCG TABLET    TAKE 1 TABLET BY MOUTH ONCE DAILY       Start Date: 6/10/2024 End Date: --    LOVASTATIN (MEVACOR) 20 MG TABLET    Take 20 mg by mouth once daily.       Start Date: 3/21/2022 End Date: --    PANTOPRAZOLE (PROTONIX) 40 MG TABLET    GIVE ONE TABLET BY MOUTH ONCE DAILY       Start Date: 11/15/2024End Date: --    POLYETHYLENE GLYCOL (GLYCOLAX) 17 GRAM PWPK    Take 17 g by mouth 2 (two) times daily as needed for Constipation.       Start Date: 7/9/2024  End Date: --    SACUBITRIL-VALSARTAN (ENTRESTO) 24-26 MG PER TABLET    Take 1 tablet by mouth 2 (two) times daily.       Start Date: 7/9/2024  End Date: --    SENNA (SENOKOT) 8.6 MG TABLET    Take 1 tablet by mouth daily as needed for Constipation.       Start Date: 3/16/2023 End Date: --    TRAMADOL (ULTRAM) 50 MG TABLET    Take 1 tablet (50 mg total) by mouth every 6 (six) hours as needed (as needed for pain score 1-10).       Start Date: 8/21/2024 End Date: --    WARFARIN (COUMADIN) 1 MG TABLET    Take 1 tablet (1 mg total) by mouth Daily.       Start Date: 8/21/2024 End Date: 8/21/2025   Changed and/or Refilled Medications    Modified Medication Previous Medication     CIPROFLOXACIN HCL (CIPRO) 250 MG TABLET ciprofloxacin HCl (CIPRO) 250 MG tablet       Take 2 tablets (500 mg total) by mouth every 12 (twelve) hours.    Take 1 tablet (250 mg total) by mouth 2 (two) times daily.       Start Date: 11/19/2024End Date: --    Start Date: 11/13/2024End Date: 11/19/2024    TRAMADOL (ULTRAM) 50 MG TABLET traMADoL (ULTRAM) 50 mg tablet       Take 1 tablet (50 mg total) by mouth every 12 (twelve) hours as needed (as needed for pain score 1-10).    Take 1 tablet (50 mg total) by mouth every 6 (six) hours as needed (as needed for pain score 1-10).       Start Date: 11/19/2024End Date: --    Start Date: 8/21/2024 End Date: 11/19/2024   Discontinued Medications    MEGESTROL (MEGACE) 40 MG TAB    Take 1 tablet (40 mg total) by mouth 2 (two) times daily.       Start Date: 8/21/2024 End Date: 11/19/2024    MICONAZOLE NITRATE 2 % (MICOTIN) 2 % TOP POWDER    Apply topically 2 (two) times daily. Apply to bilateral breast folds, ceasar area, upper inner thighs, and bilateral buttocks.       Start Date: 8/21/2024 End Date: 11/19/2024    MULTIVITAMIN TAB    Take 1 tablet by mouth once daily.       Start Date: 7/27/2024 End Date: 11/19/2024    NITROGLYCERIN (NITROSTAT) 0.4 MG SL TABLET    Place 1 tablet (0.4 mg total) under the tongue every 5 (five) minutes as needed for Chest pain.       Start Date: 7/26/2024 End Date: 11/19/2024    ZINC SULFATE (ZINCATE) 50 MG ZINC (220 MG) CAPSULE    Take 1 capsule (220 mg total) by mouth once daily.       Start Date: 7/27/2024 End Date: 11/19/2024            Follow up in about 6 weeks (around 12/31/2024). In addition to their scheduled follow up, the patient has also been instructed to follow up on as needed basis.       Franklyn Brito

## 2024-11-20 ENCOUNTER — DOCUMENTATION ONLY (OUTPATIENT)
Dept: INTERNAL MEDICINE | Facility: CLINIC | Age: 89
End: 2024-11-20
Payer: MEDICARE

## 2024-11-20 ENCOUNTER — TELEPHONE (OUTPATIENT)
Dept: INTERNAL MEDICINE | Facility: CLINIC | Age: 89
End: 2024-11-20
Payer: MEDICARE

## 2024-11-20 NOTE — TELEPHONE ENCOUNTER
Please verify that she was taking 1/2 of the 125 mcg Synthroid.  Therefore an increased dose would be 75 mcg daily.

## 2024-11-21 ENCOUNTER — DOCUMENTATION ONLY (OUTPATIENT)
Dept: INTERNAL MEDICINE | Facility: CLINIC | Age: 89
End: 2024-11-21
Payer: MEDICARE

## 2024-11-21 DIAGNOSIS — E03.9 HYPOTHYROIDISM, UNSPECIFIED TYPE: Primary | ICD-10-CM

## 2024-11-21 RX ORDER — LEVOTHYROXINE SODIUM 75 UG/1
75 TABLET ORAL
Qty: 30 TABLET | Refills: 11 | Status: SHIPPED | OUTPATIENT
Start: 2024-11-21 | End: 2025-11-21

## 2024-11-22 ENCOUNTER — DOCUMENTATION ONLY (OUTPATIENT)
Dept: INTERNAL MEDICINE | Facility: CLINIC | Age: 89
End: 2024-11-22
Payer: MEDICARE

## 2024-11-22 ENCOUNTER — TELEPHONE (OUTPATIENT)
Dept: INTERNAL MEDICINE | Facility: CLINIC | Age: 89
End: 2024-11-22
Payer: MEDICARE

## 2024-11-22 NOTE — TELEPHONE ENCOUNTER
----- Message from Franklyn Brito MD sent at 11/21/2024  4:47 PM CST -----  Tell her INR is fine at 2.67.  Continue same dose of Coumadin and check pro time again in 1 month.  ----- Message -----  From: Kenisha Pearson LPN  Sent: 11/21/2024   1:18 PM CST  To: Franklyn Brito MD

## 2024-11-25 ENCOUNTER — TELEPHONE (OUTPATIENT)
Dept: INTERNAL MEDICINE | Facility: CLINIC | Age: 89
End: 2024-11-25
Payer: MEDICARE

## 2024-11-25 ENCOUNTER — DOCUMENTATION ONLY (OUTPATIENT)
Dept: INTERNAL MEDICINE | Facility: CLINIC | Age: 89
End: 2024-11-25
Payer: MEDICARE

## 2024-11-25 NOTE — TELEPHONE ENCOUNTER
Skin tear left arm from a fall.  11-19-24 fax sent regarding wounds.    Spoke with [facility] and was advise they are unsure why the call to Dr. Brito.  They will contact Gaurav and call back.

## 2024-11-25 NOTE — TELEPHONE ENCOUNTER
----- Message from Raheem sent at 11/25/2024  1:05 PM CST -----  .[facility]    Caller is requesting assistance/information from provider's office.    Symptoms (please be specific):    How long has patient had these symptoms:    List of preferred pharmacies on file (remove unneeded):       Preferred Method of Contact: Phone Call  Patient's Preferred Phone Number on File: [phone]  Best Call Back Number, if different:  Additional Information: calling in regards to wound care orders for skin to left arm

## 2024-11-26 ENCOUNTER — TELEPHONE (OUTPATIENT)
Dept: INTERNAL MEDICINE | Facility: CLINIC | Age: 89
End: 2024-11-26
Payer: MEDICARE

## 2024-11-26 NOTE — TELEPHONE ENCOUNTER
----- Message from Ella sent at 11/26/2024 10:09 AM CST -----  Who Called: [facility]    Caller is requesting assistance/information from provider's office.    Symptoms (please be specific):    How long has patient had these symptoms:    List of preferred pharmacies on file (remove unneeded):   Preferred Method of Contact: Phone Call  Patient's Preferred Phone Number on File: [phone]   Best Call Back Number, if different:  Additional Information: Pts  is requesting specialized wound care b/c he is not happy with the wound progress, [facility] refers to [facility] for specialized wound care. Needs Dr. Daryl garcia, can take verbal. Please advise.      Phone: [phone]

## 2024-11-27 ENCOUNTER — TELEPHONE (OUTPATIENT)
Dept: INFECTIOUS DISEASES | Facility: CLINIC | Age: 89
End: 2024-11-27
Payer: MEDICARE

## 2024-11-27 NOTE — TELEPHONE ENCOUNTER
Her son called our clinic to notify us that his mom was waking up the past few night complaining of pain in her hip. Son reports that her hip pain may have increased when it became cold outside. Yesterday had a good day with physical therapy and slept throughout the night without any pain. She continues on Ciprofloxacin 500mg po q12 hours. Not having any adverse side effects. She denies any systemic signs of infection. Recent inflammatory markers from 11/20 and 11/21 are stable. Advised him to continue to monitor and notify clinic if any systemic signs of infection develop. He is in agreement with plan of care.

## 2024-12-04 ENCOUNTER — HOSPITAL ENCOUNTER (INPATIENT)
Facility: HOSPITAL | Age: 89
LOS: 6 days | Discharge: HOME HEALTH | DRG: 467 | End: 2024-12-11
Attending: EMERGENCY MEDICINE | Admitting: INTERNAL MEDICINE
Payer: MEDICARE

## 2024-12-04 ENCOUNTER — TELEPHONE (OUTPATIENT)
Dept: ORTHOPEDICS | Facility: CLINIC | Age: 89
End: 2024-12-04
Payer: MEDICARE

## 2024-12-04 DIAGNOSIS — Z96.649 FAILED TOTAL HIP ARTHROPLASTY WITH DISLOCATION, INITIAL ENCOUNTER: Primary | ICD-10-CM

## 2024-12-04 DIAGNOSIS — T84.028A FAILED TOTAL HIP ARTHROPLASTY WITH DISLOCATION, INITIAL ENCOUNTER: Primary | ICD-10-CM

## 2024-12-04 DIAGNOSIS — L89.90 PRESSURE ULCERS OF SKIN OF MULTIPLE TOPOGRAPHIC SITES: Primary | ICD-10-CM

## 2024-12-04 DIAGNOSIS — R07.9 CHEST PAIN: ICD-10-CM

## 2024-12-04 LAB
ALBUMIN SERPL-MCNC: 2.5 G/DL (ref 3.4–4.8)
ALBUMIN/GLOB SERPL: 0.6 RATIO (ref 1.1–2)
ALP SERPL-CCNC: 122 UNIT/L (ref 40–150)
ALT SERPL-CCNC: 12 UNIT/L (ref 0–55)
ANION GAP SERPL CALC-SCNC: 8 MEQ/L
AST SERPL-CCNC: 29 UNIT/L (ref 5–34)
BASOPHILS # BLD AUTO: 0.1 X10(3)/MCL
BASOPHILS NFR BLD AUTO: 1.3 %
BILIRUB SERPL-MCNC: 0.5 MG/DL
BUN SERPL-MCNC: 23.6 MG/DL (ref 9.8–20.1)
CALCIUM SERPL-MCNC: 8.6 MG/DL (ref 8.4–10.2)
CHLORIDE SERPL-SCNC: 115 MMOL/L (ref 98–107)
CO2 SERPL-SCNC: 19 MMOL/L (ref 23–31)
CREAT SERPL-MCNC: 1.32 MG/DL (ref 0.55–1.02)
CREAT/UREA NIT SERPL: 18
EOSINOPHIL # BLD AUTO: 0.75 X10(3)/MCL (ref 0–0.9)
EOSINOPHIL NFR BLD AUTO: 9.5 %
ERYTHROCYTE [DISTWIDTH] IN BLOOD BY AUTOMATED COUNT: 14.9 % (ref 11.5–17)
GFR SERPLBLD CREATININE-BSD FMLA CKD-EPI: 39 ML/MIN/1.73/M2
GLOBULIN SER-MCNC: 4.4 GM/DL (ref 2.4–3.5)
GLUCOSE SERPL-MCNC: 123 MG/DL (ref 82–115)
HCT VFR BLD AUTO: 35.4 % (ref 37–47)
HGB BLD-MCNC: 11.5 G/DL (ref 12–16)
IMM GRANULOCYTES # BLD AUTO: 0.02 X10(3)/MCL (ref 0–0.04)
IMM GRANULOCYTES NFR BLD AUTO: 0.3 %
LYMPHOCYTES # BLD AUTO: 1.33 X10(3)/MCL (ref 0.6–4.6)
LYMPHOCYTES NFR BLD AUTO: 16.8 %
MCH RBC QN AUTO: 31.9 PG (ref 27–31)
MCHC RBC AUTO-ENTMCNC: 32.5 G/DL (ref 33–36)
MCV RBC AUTO: 98.1 FL (ref 80–94)
MONOCYTES # BLD AUTO: 0.72 X10(3)/MCL (ref 0.1–1.3)
MONOCYTES NFR BLD AUTO: 9.1 %
NEUTROPHILS # BLD AUTO: 5 X10(3)/MCL (ref 2.1–9.2)
NEUTROPHILS NFR BLD AUTO: 63 %
NRBC BLD AUTO-RTO: 0 %
PLATELET # BLD AUTO: 298 X10(3)/MCL (ref 130–400)
PMV BLD AUTO: 9.5 FL (ref 7.4–10.4)
POTASSIUM SERPL-SCNC: 4.1 MMOL/L (ref 3.5–5.1)
PROT SERPL-MCNC: 6.9 GM/DL (ref 5.8–7.6)
RBC # BLD AUTO: 3.61 X10(6)/MCL (ref 4.2–5.4)
SODIUM SERPL-SCNC: 142 MMOL/L (ref 136–145)
WBC # BLD AUTO: 7.92 X10(3)/MCL (ref 4.5–11.5)

## 2024-12-04 PROCEDURE — 99900031 HC PATIENT EDUCATION (STAT)

## 2024-12-04 PROCEDURE — 99285 EMERGENCY DEPT VISIT HI MDM: CPT | Mod: 25

## 2024-12-04 PROCEDURE — G0378 HOSPITAL OBSERVATION PER HR: HCPCS

## 2024-12-04 PROCEDURE — 25000003 PHARM REV CODE 250: Performed by: INTERNAL MEDICINE

## 2024-12-04 PROCEDURE — 85025 COMPLETE CBC W/AUTO DIFF WBC: CPT | Performed by: EMERGENCY MEDICINE

## 2024-12-04 PROCEDURE — 80053 COMPREHEN METABOLIC PANEL: CPT | Performed by: EMERGENCY MEDICINE

## 2024-12-04 PROCEDURE — 94761 N-INVAS EAR/PLS OXIMETRY MLT: CPT

## 2024-12-04 PROCEDURE — 99223 1ST HOSP IP/OBS HIGH 75: CPT | Mod: 57,ICN,, | Performed by: ORTHOPAEDIC SURGERY

## 2024-12-04 RX ORDER — CIPROFLOXACIN 500 MG/1
500 TABLET ORAL
Status: DISCONTINUED | OUTPATIENT
Start: 2024-12-04 | End: 2024-12-11 | Stop reason: HOSPADM

## 2024-12-04 RX ORDER — NALOXONE HCL 0.4 MG/ML
0.02 VIAL (ML) INJECTION
Status: DISCONTINUED | OUTPATIENT
Start: 2024-12-04 | End: 2024-12-11 | Stop reason: HOSPADM

## 2024-12-04 RX ORDER — ACETAMINOPHEN 500 MG
500 TABLET ORAL EVERY 4 HOURS
Status: DISCONTINUED | OUTPATIENT
Start: 2024-12-04 | End: 2024-12-05

## 2024-12-04 RX ORDER — TALC
6 POWDER (GRAM) TOPICAL NIGHTLY PRN
Status: DISCONTINUED | OUTPATIENT
Start: 2024-12-04 | End: 2024-12-06

## 2024-12-04 RX ORDER — SACUBITRIL AND VALSARTAN 24; 26 MG/1; MG/1
1 TABLET, FILM COATED ORAL 2 TIMES DAILY
Status: DISCONTINUED | OUTPATIENT
Start: 2024-12-04 | End: 2024-12-11 | Stop reason: HOSPADM

## 2024-12-04 RX ORDER — IBUPROFEN 200 MG
16 TABLET ORAL
Status: DISCONTINUED | OUTPATIENT
Start: 2024-12-04 | End: 2024-12-11 | Stop reason: HOSPADM

## 2024-12-04 RX ORDER — SODIUM CHLORIDE 0.9 % (FLUSH) 0.9 %
10 SYRINGE (ML) INJECTION
Status: DISCONTINUED | OUTPATIENT
Start: 2024-12-04 | End: 2024-12-11 | Stop reason: HOSPADM

## 2024-12-04 RX ORDER — TRAMADOL HYDROCHLORIDE 50 MG/1
50 TABLET ORAL EVERY 4 HOURS PRN
Status: DISCONTINUED | OUTPATIENT
Start: 2024-12-04 | End: 2024-12-05 | Stop reason: SDUPTHER

## 2024-12-04 RX ORDER — PANTOPRAZOLE SODIUM 40 MG/1
40 TABLET, DELAYED RELEASE ORAL DAILY
Status: DISCONTINUED | OUTPATIENT
Start: 2024-12-05 | End: 2024-12-11 | Stop reason: HOSPADM

## 2024-12-04 RX ORDER — DOCUSATE SODIUM 100 MG/1
100 CAPSULE, LIQUID FILLED ORAL DAILY
Status: DISCONTINUED | OUTPATIENT
Start: 2024-12-05 | End: 2024-12-06

## 2024-12-04 RX ORDER — ACETAMINOPHEN 650 MG/20.3ML
500 LIQUID ORAL EVERY 6 HOURS PRN
Status: DISCONTINUED | OUTPATIENT
Start: 2024-12-04 | End: 2024-12-11 | Stop reason: HOSPADM

## 2024-12-04 RX ORDER — CARVEDILOL 3.12 MG/1
3.12 TABLET ORAL 2 TIMES DAILY
Status: DISCONTINUED | OUTPATIENT
Start: 2024-12-04 | End: 2024-12-11 | Stop reason: HOSPADM

## 2024-12-04 RX ORDER — INSULIN ASPART 100 [IU]/ML
0-5 INJECTION, SOLUTION INTRAVENOUS; SUBCUTANEOUS
Status: DISCONTINUED | OUTPATIENT
Start: 2024-12-04 | End: 2024-12-11 | Stop reason: HOSPADM

## 2024-12-04 RX ORDER — IBUPROFEN 200 MG
24 TABLET ORAL
Status: DISCONTINUED | OUTPATIENT
Start: 2024-12-04 | End: 2024-12-11 | Stop reason: HOSPADM

## 2024-12-04 RX ORDER — FUROSEMIDE 20 MG/1
20 TABLET ORAL DAILY
Status: DISCONTINUED | OUTPATIENT
Start: 2024-12-05 | End: 2024-12-06

## 2024-12-04 RX ORDER — GLUCAGON 1 MG
1 KIT INJECTION
Status: DISCONTINUED | OUTPATIENT
Start: 2024-12-04 | End: 2024-12-11 | Stop reason: HOSPADM

## 2024-12-04 RX ORDER — SODIUM CHLORIDE 0.9 % (FLUSH) 0.9 %
10 SYRINGE (ML) INJECTION EVERY 12 HOURS PRN
Status: DISCONTINUED | OUTPATIENT
Start: 2024-12-04 | End: 2024-12-11 | Stop reason: HOSPADM

## 2024-12-04 RX ORDER — ALLOPURINOL 100 MG/1
100 TABLET ORAL NIGHTLY
Status: DISCONTINUED | OUTPATIENT
Start: 2024-12-04 | End: 2024-12-11 | Stop reason: HOSPADM

## 2024-12-04 RX ADMIN — SACUBITRIL AND VALSARTAN 1 TABLET: 24; 26 TABLET, FILM COATED ORAL at 09:12

## 2024-12-04 RX ADMIN — TRAMADOL HYDROCHLORIDE 50 MG: 50 TABLET, COATED ORAL at 11:12

## 2024-12-04 RX ADMIN — CIPROFLOXACIN 500 MG: 500 TABLET ORAL at 09:12

## 2024-12-04 RX ADMIN — TRAMADOL HYDROCHLORIDE 50 MG: 50 TABLET, COATED ORAL at 06:12

## 2024-12-04 RX ADMIN — CARVEDILOL 3.12 MG: 3.12 TABLET, FILM COATED ORAL at 09:12

## 2024-12-04 RX ADMIN — ALLOPURINOL 100 MG: 100 TABLET ORAL at 09:12

## 2024-12-04 NOTE — TELEPHONE ENCOUNTER
Jessica with [facility] called with a concern for the patient having a dislocation of her left leg she had surgery on. She wanted to order a portable x-ray machine. I let her I would speak to Dr. Bains or Katheryn and call her back.

## 2024-12-04 NOTE — CONSULTS
Chief Complaint:   Chief Complaint   Patient presents with    Hip Pain       History of present illness:  89-year-old female presents today for evaluation of with a.  She was well known to me.  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  Patient was treated with suppression.  Patient was noted some worsening swelling of the it was the leg that it was shoulder of the left hip.  Currently with the office.  Instructed her to go to the ER.  X-rays obtained showed dislocation of the left hip.  She was accompanied by her son.    History of Present Illness      Past Medical History:   Diagnosis Date    A-fib     Chronic cystitis     GERD (gastroesophageal reflux disease)     Graves disease     Hypertension     Hypothyroidism, unspecified     Spinal stenosis        Past Surgical History:   Procedure Laterality Date    APPENDECTOMY      BLADDER SUSPENSION      CARDIOVERSION  04/01/2015    CATARACT EXTRACTION      CONVERSION ARTHROPLASTY, HIP, POSTERIOR APPROACH Left 7/15/2024    Procedure: CONVERSION ARTHROPLASTY, HIP, POSTERIOR APPROACH - valencia, cell saver, pathology;  Surgeon: Jonny Bains MD;  Location: [facility] OR;  Service: Orthopedics;  Laterality: Left;  valencia, cell saver, pathology    HYSTERECTOMY      INTRAMEDULLARY RODDING OF FEMUR Left 7/3/2024    Procedure: INSERTION, INTRAMEDULLARY MELANIE, FEMUR;  Surgeon: Steve Pierce DO;  Location: [facility] OR;  Service: Orthopedics;  Laterality: Left;  supine hana table c arm synthes    LAPAROSCOPIC CHOLECYSTECTOMY  01/12/2016    REVISION, ARTHROPLASTY, HIP, POSTERIOR APPROACH Left 8/12/2024    Procedure: REVISION,ARTHROPLASTY,HIP,POSTERIOR APPROACH;  Surgeon: Jonny Bains MD;  Location: [facility] OR;  Service: Orthopedics;  Laterality: Left;  I&D L hip - head/liner    SPHINCTEROTOMY OF URETHRA  01/11/2016    TONSILLECTOMY AND ADENOIDECTOMY         No current facility-administered medications for this encounter.       Review of patient's  allergies indicates:   Allergen Reactions    Cefuroxime axetil     Cephalexin      Other reaction(s): NAUSEA AND VOMITING    Enoxaparin Dermatitis     Cellulitis of abdomen     Methenamine hippurate      GI Upset     Promethazine Hallucinations       Family History   Problem Relation Name Age of Onset    Cancer Father      Heart attack Father      Stroke Father         Social History     Socioeconomic History    Marital status:    Tobacco Use    Smoking status: Former     Types: Cigarettes    Smokeless tobacco: Never   Substance and Sexual Activity    Alcohol use: Yes    Drug use: Never    Sexual activity: Not Currently     Social Drivers of Health     Financial Resource Strain: Low Risk  (7/4/2024)    Overall Financial Resource Strain (CARDIA)     Difficulty of Paying Living Expenses: Not hard at all   Food Insecurity: No Food Insecurity (7/4/2024)    Hunger Vital Sign     Worried About Running Out of Food in the Last Year: Never true     Ran Out of Food in the Last Year: Never true   Transportation Needs: No Transportation Needs (7/9/2024)    PRAPARE - Transportation     Lack of Transportation (Medical): No     Lack of Transportation (Non-Medical): No   Stress: No Stress Concern Present (7/4/2024)    [employer] - Occupational Stress Questionnaire     Feeling of Stress : Not at all   Housing Stability: Low Risk  (7/4/2024)    Housing Stability Vital Sign     Unable to Pay for Housing in the Last Year: No     Homeless in the Last Year: No           Review of Systems:    Constitution: Negative for chills, fever, and sweats.  Negative for unexplained weight loss.    HENT:  Negative for headaches and blurry vision.    Cardiovascular:Negative for chest pain or irregular heart beat. Negative for hypertension.    Respiratory:  Negative for cough and shortness of breath.    Gastrointestinal: Negative for abdominal pain, heartburn, melena, nausea, and vomitting.    Genitourinary:   Negative bladder incontinence and dysuria.    Musculoskeletal:  See HPI    Neurological: Negative for numbness.    Psychiatric/Behavioral: Negative for depression.  The patient is not nervous/anxious.      Endocrine: Negative for polyuria    Hematologic/Lymphatic: Negative for bleeding problem.  Does not bruise/bleed easily.    Skin: Negative for poor would healing and rash      Physical Examination:    Vital Signs:    Vitals:    12/04/24 1638   BP: 135/76   Pulse: (!) 54   Resp:    Temp: 97.5 °F (36.4 °C)       Body mass index is 31.76 kg/m².    General: No acute distress, alert and oriented, healthy appearing    HEENT: Head is atraumatic, mucous membranes are moist    Neck: Supples, no JVD    Cardiovascular: Palpable dorsalis pedis and posterior tibial pulses, regular rate and rhythm to those pulses    Lungs: Breathing non-labored    Skin: no rashes appreciated    Neurologic: Can flex and extend knees, ankles, and toes. Sensation is grossly intact    Left hip:  Left hip is shortened by about 2 in.  Range of motion not fully checked given underlying dislocation.  It was sensation intact distally.    X-rays:  Three views left hip reviewed.  Patient's implants appear in stable position.  It was hip is posteriorly dislocated.     Assessment::  Status post conversion total hip arthroplasty with failure.    Plan:  Patient was failed total hip arthroplasty with dislocation.  Discussed all treatment options the patient.  Attempts closed versus open reduction tomorrow.  We will brace her postoperatively.  All risks, benefits alternatives discussed in detail.  All questions answered to the patient's satisfaction.  No guarantees made.    This note was generated with the assistance of ambient listening technology. Verbal consent was obtained by the patient and accompanying visitor(s) for the recording of patient appointment to facilitate this note. I attest to having reviewed and edited the generated note for accuracy, though  some syntax or spelling errors may persist. Please contact the author of this note for any clarification.      This note was created using [employer] voice recognition software that occasionally misinterpreted phrases or words.    Consult note is delivered via Epic messaging service.

## 2024-12-04 NOTE — Clinical Note
Diagnosis: Hip dislocation, left [165412]   Future Attending Provider: NIKOLAY GALLAGHER [[id number]]   Admit to which facility:: [facility] [[id number]]   Special Needs:: No Special Needs [1]

## 2024-12-04 NOTE — ED TRIAGE NOTES
Here via aasi with lt hip deformity (dislocation) discovered by home therapist last evening. Hx of lt hip replacement a few months ago

## 2024-12-04 NOTE — TELEPHONE ENCOUNTER
I return call to Jessica to let her know DR. Bains suggest if she is having pain to go to the ED or UC to get x-ray because the machine does not show good images and she needs to be seen. Jessica did let the family know what DR. Bains suggested while I was not the phone. They would plan to try and bring her to hospital but if things change they would call back.

## 2024-12-04 NOTE — ED PROVIDER NOTES
Encounter Date: 12/4/2024       History     Chief Complaint   Patient presents with    Hip Pain     89 year old female presents to the emergency room with concern of possible left hip dislocation.  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   On July 2, 2024 she sustained a femur fracture and had an IM nail on 07/03/2024.  On 07/15/2024 it was noted that the IM nail failed so she had a left total hip replacement.  She went to rehab and developed infection and had a washout on 08/13/2024 positive for Pseudomonas and Hafnia.  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  Family noticed possible left hip dislocation yesterday but she has had no falls or injuries.  She is bed-bound and she only complains of pain when she is moving. Ortho is Dr Bains.  She was given fentanyl 50 mcg IV by [facility] prior to arrival.    The history is provided by the patient and a relative.     Review of patient's allergies indicates:   Allergen Reactions    Cefuroxime axetil     Cephalexin      Other reaction(s): NAUSEA AND VOMITING    Enoxaparin Dermatitis     Cellulitis of abdomen     Methenamine hippurate      GI Upset     Promethazine Hallucinations     Past Medical History:   Diagnosis Date    A-fib     Chronic cystitis     GERD (gastroesophageal reflux disease)     Graves disease     Hypertension     Hypothyroidism, unspecified     Spinal stenosis      Past Surgical History:   Procedure Laterality Date    APPENDECTOMY      BLADDER SUSPENSION      CARDIOVERSION  04/01/2015    CATARACT EXTRACTION      CONVERSION ARTHROPLASTY, HIP, POSTERIOR APPROACH Left 7/15/2024    Procedure: CONVERSION ARTHROPLASTY, HIP, POSTERIOR APPROACH - valencia, cell saver, pathology;  Surgeon: Jonny Bains MD;  Location: [facility];  Service: Orthopedics;  Laterality: Left;  valencia  cell saver, pathology    HYSTERECTOMY      INTRAMEDULLARY RODDING OF FEMUR Left 7/3/2024    Procedure: INSERTION, INTRAMEDULLARY MELANIE, FEMUR;  Surgeon: Steve Pierce DO;  Location: [facility] OR;  Service: Orthopedics;  Laterality: Left;  supine hana table c arm synthes    LAPAROSCOPIC CHOLECYSTECTOMY  01/12/2016    REVISION, ARTHROPLASTY, HIP, POSTERIOR APPROACH Left 8/12/2024    Procedure: REVISION,ARTHROPLASTY,HIP,POSTERIOR APPROACH;  Surgeon: Jonny Bains MD;  Location: [facility] OR;  Service: Orthopedics;  Laterality: Left;  I&D L hip - head/liner    SPHINCTEROTOMY OF URETHRA  01/11/2016    TONSILLECTOMY AND ADENOIDECTOMY       Family History   Problem Relation Name Age of Onset    Cancer Father      Heart attack Father      Stroke Father       Social History     Tobacco Use    Smoking status: Former     Types: Cigarettes    Smokeless tobacco: Never   Substance Use Topics    Alcohol use: Yes    Drug use: Never     Review of Systems   Musculoskeletal:  Positive for arthralgias.   All other systems reviewed and are negative.      Physical Exam     Initial Vitals [12/04/24 1338]   BP Pulse Resp Temp SpO2   116/71 84 16 98.3 °F (36.8 °C) 99 %      MAP       --         Physical Exam    Nursing note and vitals reviewed.  Constitutional: She appears well-developed and well-nourished. She is not diaphoretic. No distress.   HENT:   Head: Normocephalic and atraumatic.   Eyes: Conjunctivae are normal. Pupils are equal, round, and reactive to light.   Neck: Neck supple.   Cardiovascular:  Normal rate, regular rhythm, normal heart sounds and intact distal pulses.           Pulmonary/Chest: Breath sounds normal. No respiratory distress. She has no wheezes. She has no rhonchi. She has no rales.   Abdominal: Abdomen is soft. She exhibits no distension. There is no abdominal tenderness. There is no guarding.   Musculoskeletal:      Cervical back: Neck supple.      Comments: Left leg is externally rotated and shortened.  Patient was  no pain unless you move her left leg.  Incision is well healed, nontender, dislocation is palpable.  Pedal pulse 2 +with normal sensation to the foot.  Brisk capillary refill to the toes.     Neurological: She is alert and oriented to person, place, and time.   Skin: Skin is warm and dry. Capillary refill takes less than 2 seconds. No rash noted.   Psychiatric: She has a normal mood and affect. Thought content normal.         ED Course   Procedures  Labs Reviewed   CBC WITH DIFFERENTIAL - Abnormal       Result Value    WBC 7.92      RBC 3.61 (*)     Hgb 11.5 (*)     Hct 35.4 (*)     MCV 98.1 (*)     MCH 31.9 (*)     MCHC 32.5 (*)     RDW 14.9      Platelet 298      MPV 9.5      Neut % 63.0      Lymph % 16.8      Mono % 9.1      Eos % 9.5      Basophil % 1.3      Lymph # 1.33      Neut # 5.00      Mono # 0.72      Eos # 0.75      Baso # 0.10      IG# 0.02      IG% 0.3      NRBC% 0.0     COMPREHENSIVE METABOLIC PANEL          Imaging Results              X-Ray Hip 2 or 3 views Left with Pelvis when performed (In process)                      Medications - No data to display  Medical Decision Making  See HPI for narrative    Differential diagnosis includes but is not limited to fracture, dislocation, failed hardware, occult injury    Problems Addressed:  Failed total hip arthroplasty with dislocation, initial encounter:     Details: Patient was seen and evaluated in the emergency department with history, physical exam, lab work, x-ray.  She was noted to have a failed total hip arthroplasty with dislocation and will be admitted to the hospitalist service, consult to Dr. Bains.    Amount and/or Complexity of Data Reviewed  Labs: ordered.  Radiology: ordered.  Discussion of management or test interpretation with external provider(s): Case discussed with Dr. Ca.  He requests admission to the hospitalist service at [facility] for hip dislocation reduction in the operating room in the  morning.  Case discussed with Dr. Huber who is agreeable to hospital admit                                      Clinical Impression:  Final diagnoses:  [T84.028A, Z96.649] Failed total hip arthroplasty with dislocation, initial encounter (Primary)          ED Disposition Condition    Observation Stable                Linda Crum MD  12/04/24 9439

## 2024-12-05 ENCOUNTER — ANESTHESIA (OUTPATIENT)
Dept: SURGERY | Facility: HOSPITAL | Age: 89
End: 2024-12-05
Payer: MEDICARE

## 2024-12-05 ENCOUNTER — ANESTHESIA EVENT (OUTPATIENT)
Dept: SURGERY | Facility: HOSPITAL | Age: 89
End: 2024-12-05
Payer: MEDICARE

## 2024-12-05 ENCOUNTER — EXTERNAL HOME HEALTH (OUTPATIENT)
Dept: HOME HEALTH SERVICES | Facility: HOSPITAL | Age: 89
End: 2024-12-05
Payer: MEDICARE

## 2024-12-05 LAB
ANION GAP SERPL CALC-SCNC: 9 MEQ/L
BUN SERPL-MCNC: 22.7 MG/DL (ref 9.8–20.1)
CALCIUM SERPL-MCNC: 8.7 MG/DL (ref 8.4–10.2)
CHLORIDE SERPL-SCNC: 115 MMOL/L (ref 98–107)
CO2 SERPL-SCNC: 18 MMOL/L (ref 23–31)
CREAT SERPL-MCNC: 1.24 MG/DL (ref 0.55–1.02)
CREAT/UREA NIT SERPL: 18
ERYTHROCYTE [DISTWIDTH] IN BLOOD BY AUTOMATED COUNT: 15.1 % (ref 11.5–17)
GFR SERPLBLD CREATININE-BSD FMLA CKD-EPI: 42 ML/MIN/1.73/M2
GLUCOSE SERPL-MCNC: 103 MG/DL (ref 82–115)
GRAM STN SPEC: NORMAL
GROUP & RH: NORMAL
HCT VFR BLD AUTO: 29.9 % (ref 37–47)
HCT VFR BLD AUTO: 35.4 % (ref 37–47)
HGB BLD-MCNC: 11.4 G/DL (ref 12–16)
HGB BLD-MCNC: 9.4 G/DL (ref 12–16)
INDIRECT COOMBS: NORMAL
INR PPP: 2.7 (ref 2–3)
MAGNESIUM SERPL-MCNC: 1.9 MG/DL (ref 1.6–2.6)
MCH RBC QN AUTO: 32.4 PG (ref 27–31)
MCHC RBC AUTO-ENTMCNC: 32.2 G/DL (ref 33–36)
MCV RBC AUTO: 100.6 FL (ref 80–94)
NRBC BLD AUTO-RTO: 0 %
PLATELET # BLD AUTO: 297 X10(3)/MCL (ref 130–400)
PMV BLD AUTO: 9.6 FL (ref 7.4–10.4)
POCT GLUCOSE: 176 MG/DL (ref 70–110)
POTASSIUM SERPL-SCNC: 4.1 MMOL/L (ref 3.5–5.1)
PROTHROMBIN TIME: 29.3 SECONDS (ref 11.7–14.5)
RBC # BLD AUTO: 3.52 X10(6)/MCL (ref 4.2–5.4)
SODIUM SERPL-SCNC: 142 MMOL/L (ref 136–145)
SPECIMEN OUTDATE: NORMAL
WBC # BLD AUTO: 7.52 X10(3)/MCL (ref 4.5–11.5)

## 2024-12-05 PROCEDURE — 25000003 PHARM REV CODE 250: Performed by: INTERNAL MEDICINE

## 2024-12-05 PROCEDURE — 36415 COLL VENOUS BLD VENIPUNCTURE: CPT | Performed by: INTERNAL MEDICINE

## 2024-12-05 PROCEDURE — 36415 COLL VENOUS BLD VENIPUNCTURE: CPT | Performed by: ORTHOPAEDIC SURGERY

## 2024-12-05 PROCEDURE — 0SPB0JZ REMOVAL OF SYNTHETIC SUBSTITUTE FROM LEFT HIP JOINT, OPEN APPROACH: ICD-10-PCS | Performed by: ORTHOPAEDIC SURGERY

## 2024-12-05 PROCEDURE — 94761 N-INVAS EAR/PLS OXIMETRY MLT: CPT

## 2024-12-05 PROCEDURE — 37000008 HC ANESTHESIA 1ST 15 MINUTES: Performed by: ORTHOPAEDIC SURGERY

## 2024-12-05 PROCEDURE — 83735 ASSAY OF MAGNESIUM: CPT | Performed by: INTERNAL MEDICINE

## 2024-12-05 PROCEDURE — 99900031 HC PATIENT EDUCATION (STAT)

## 2024-12-05 PROCEDURE — P9047 ALBUMIN (HUMAN), 25%, 50ML: HCPCS | Mod: JZ,JG

## 2024-12-05 PROCEDURE — 87205 SMEAR GRAM STAIN: CPT | Performed by: ORTHOPAEDIC SURGERY

## 2024-12-05 PROCEDURE — 71000039 HC RECOVERY, EACH ADD'L HOUR: Performed by: ORTHOPAEDIC SURGERY

## 2024-12-05 PROCEDURE — 71000033 HC RECOVERY, INTIAL HOUR: Performed by: ORTHOPAEDIC SURGERY

## 2024-12-05 PROCEDURE — 94799 UNLISTED PULMONARY SVC/PX: CPT

## 2024-12-05 PROCEDURE — 36000711: Performed by: ORTHOPAEDIC SURGERY

## 2024-12-05 PROCEDURE — 27201423 OPTIME MED/SURG SUP & DEVICES STERILE SUPPLY: Performed by: ORTHOPAEDIC SURGERY

## 2024-12-05 PROCEDURE — 86901 BLOOD TYPING SEROLOGIC RH(D): CPT | Performed by: ORTHOPAEDIC SURGERY

## 2024-12-05 PROCEDURE — 85027 COMPLETE CBC AUTOMATED: CPT | Performed by: INTERNAL MEDICINE

## 2024-12-05 PROCEDURE — 27134 REVISE HIP JOINT REPLACEMENT: CPT | Mod: LT,,, | Performed by: ORTHOPAEDIC SURGERY

## 2024-12-05 PROCEDURE — C1776 JOINT DEVICE (IMPLANTABLE): HCPCS | Performed by: ORTHOPAEDIC SURGERY

## 2024-12-05 PROCEDURE — 27134 REVISE HIP JOINT REPLACEMENT: CPT | Mod: AS,LT,, | Performed by: NURSE PRACTITIONER

## 2024-12-05 PROCEDURE — 63600175 PHARM REV CODE 636 W HCPCS: Performed by: ORTHOPAEDIC SURGERY

## 2024-12-05 PROCEDURE — C1713 ANCHOR/SCREW BN/BN,TIS/BN: HCPCS | Performed by: ORTHOPAEDIC SURGERY

## 2024-12-05 PROCEDURE — 25000003 PHARM REV CODE 250

## 2024-12-05 PROCEDURE — 80048 BASIC METABOLIC PNL TOTAL CA: CPT | Performed by: INTERNAL MEDICINE

## 2024-12-05 PROCEDURE — 37000009 HC ANESTHESIA EA ADD 15 MINS: Performed by: ORTHOPAEDIC SURGERY

## 2024-12-05 PROCEDURE — 85610 PROTHROMBIN TIME: CPT | Performed by: INTERNAL MEDICINE

## 2024-12-05 PROCEDURE — 97605 NEG PRS WND THER DME<=50SQCM: CPT | Mod: ,,, | Performed by: ORTHOPAEDIC SURGERY

## 2024-12-05 PROCEDURE — 25000003 PHARM REV CODE 250: Performed by: ORTHOPAEDIC SURGERY

## 2024-12-05 PROCEDURE — 87070 CULTURE OTHR SPECIMN AEROBIC: CPT | Performed by: ORTHOPAEDIC SURGERY

## 2024-12-05 PROCEDURE — 85018 HEMOGLOBIN: CPT | Performed by: ORTHOPAEDIC SURGERY

## 2024-12-05 PROCEDURE — 0SRB0JZ REPLACEMENT OF LEFT HIP JOINT WITH SYNTHETIC SUBSTITUTE, OPEN APPROACH: ICD-10-PCS | Performed by: ORTHOPAEDIC SURGERY

## 2024-12-05 PROCEDURE — 82962 GLUCOSE BLOOD TEST: CPT | Performed by: ORTHOPAEDIC SURGERY

## 2024-12-05 PROCEDURE — 87075 CULTR BACTERIA EXCEPT BLOOD: CPT | Performed by: ORTHOPAEDIC SURGERY

## 2024-12-05 PROCEDURE — 11000001 HC ACUTE MED/SURG PRIVATE ROOM

## 2024-12-05 PROCEDURE — A4247 BETADINE/IODINE SWABS/WIPES: HCPCS | Performed by: ORTHOPAEDIC SURGERY

## 2024-12-05 PROCEDURE — 36000710: Performed by: ORTHOPAEDIC SURGERY

## 2024-12-05 PROCEDURE — 63600175 PHARM REV CODE 636 W HCPCS

## 2024-12-05 DEVICE — CERAMIC V40 FEMORAL HEAD
Type: IMPLANTABLE DEVICE | Site: HIP | Status: FUNCTIONAL
Brand: BIOLOX

## 2024-12-05 DEVICE — 6.5MM LOW PROFILE HEX SCREW 50MM
Type: IMPLANTABLE DEVICE | Site: HIP | Status: FUNCTIONAL
Brand: TRIDENT II

## 2024-12-05 DEVICE — LINER- CEMENTLESS
Type: IMPLANTABLE DEVICE | Site: HIP | Status: FUNCTIONAL
Brand: MDM

## 2024-12-05 DEVICE — MODULAR HIP SYSTEM
Type: IMPLANTABLE DEVICE | Site: HIP | Status: FUNCTIONAL
Brand: RESTORATION

## 2024-12-05 DEVICE — 6.5MM LOW PROFILE HEX SCREW 20MM
Type: IMPLANTABLE DEVICE | Site: HIP | Status: FUNCTIONAL
Brand: TRIDENT II

## 2024-12-05 DEVICE — TRIDENT II TRITANIUM MULTIHOLE ACETABULAR SHELL 50D
Type: IMPLANTABLE DEVICE | Site: HIP | Status: FUNCTIONAL
Brand: TRIDENT II

## 2024-12-05 RX ORDER — ACETAMINOPHEN 10 MG/ML
1000 INJECTION, SOLUTION INTRAVENOUS EVERY 8 HOURS
Status: DISCONTINUED | OUTPATIENT
Start: 2024-12-05 | End: 2024-12-05

## 2024-12-05 RX ORDER — ADHESIVE BANDAGE
30 BANDAGE TOPICAL DAILY PRN
Status: DISCONTINUED | OUTPATIENT
Start: 2024-12-05 | End: 2024-12-11 | Stop reason: HOSPADM

## 2024-12-05 RX ORDER — NAPROXEN SODIUM 220 MG/1
81 TABLET, FILM COATED ORAL 2 TIMES DAILY
Status: DISCONTINUED | OUTPATIENT
Start: 2024-12-06 | End: 2024-12-06

## 2024-12-05 RX ORDER — VANCOMYCIN HYDROCHLORIDE 1 G/20ML
INJECTION, POWDER, LYOPHILIZED, FOR SOLUTION INTRAVENOUS
Status: DISPENSED
Start: 2024-12-05 | End: 2024-12-05

## 2024-12-05 RX ORDER — AMOXICILLIN 250 MG
2 CAPSULE ORAL 2 TIMES DAILY
Status: DISCONTINUED | OUTPATIENT
Start: 2024-12-05 | End: 2024-12-11 | Stop reason: HOSPADM

## 2024-12-05 RX ORDER — KETOROLAC TROMETHAMINE 30 MG/ML
15 INJECTION, SOLUTION INTRAMUSCULAR; INTRAVENOUS EVERY 6 HOURS
Status: DISCONTINUED | OUTPATIENT
Start: 2024-12-05 | End: 2024-12-06

## 2024-12-05 RX ORDER — HYDROCODONE BITARTRATE AND ACETAMINOPHEN 5; 325 MG/1; MG/1
1 TABLET ORAL EVERY 4 HOURS PRN
Status: DISCONTINUED | OUTPATIENT
Start: 2024-12-05 | End: 2024-12-06

## 2024-12-05 RX ORDER — METOCLOPRAMIDE HYDROCHLORIDE 5 MG/ML
10 INJECTION INTRAMUSCULAR; INTRAVENOUS
Status: DISCONTINUED | OUTPATIENT
Start: 2024-12-05 | End: 2024-12-05

## 2024-12-05 RX ORDER — LIDOCAINE HYDROCHLORIDE 20 MG/ML
INJECTION, SOLUTION EPIDURAL; INFILTRATION; INTRACAUDAL; PERINEURAL
Status: DISCONTINUED | OUTPATIENT
Start: 2024-12-05 | End: 2024-12-05

## 2024-12-05 RX ORDER — VANCOMYCIN HYDROCHLORIDE 1 G/20ML
INJECTION, POWDER, LYOPHILIZED, FOR SOLUTION INTRAVENOUS
Status: DISCONTINUED | OUTPATIENT
Start: 2024-12-05 | End: 2024-12-05 | Stop reason: HOSPADM

## 2024-12-05 RX ORDER — PHENYLEPHRINE HYDROCHLORIDE 10 MG/ML
INJECTION INTRAVENOUS
Status: DISCONTINUED | OUTPATIENT
Start: 2024-12-05 | End: 2024-12-05

## 2024-12-05 RX ORDER — PROPOFOL 10 MG/ML
VIAL (ML) INTRAVENOUS
Status: DISCONTINUED | OUTPATIENT
Start: 2024-12-05 | End: 2024-12-05

## 2024-12-05 RX ORDER — ALUMINUM HYDROXIDE, MAGNESIUM HYDROXIDE, AND SIMETHICONE 1200; 120; 1200 MG/30ML; MG/30ML; MG/30ML
30 SUSPENSION ORAL EVERY 6 HOURS PRN
Status: DISCONTINUED | OUTPATIENT
Start: 2024-12-05 | End: 2024-12-11 | Stop reason: HOSPADM

## 2024-12-05 RX ORDER — SUCCINYLCHOLINE CHLORIDE 20 MG/ML
INJECTION INTRAMUSCULAR; INTRAVENOUS
Status: DISCONTINUED | OUTPATIENT
Start: 2024-12-05 | End: 2024-12-05

## 2024-12-05 RX ORDER — TRAMADOL HYDROCHLORIDE 50 MG/1
50 TABLET ORAL EVERY 4 HOURS PRN
Status: DISCONTINUED | OUTPATIENT
Start: 2024-12-05 | End: 2024-12-06

## 2024-12-05 RX ORDER — ACETAMINOPHEN 10 MG/ML
1000 INJECTION, SOLUTION INTRAVENOUS ONCE
Status: COMPLETED | OUTPATIENT
Start: 2024-12-05 | End: 2024-12-05

## 2024-12-05 RX ORDER — POLYETHYLENE GLYCOL 3350 17 G/17G
17 POWDER, FOR SOLUTION ORAL NIGHTLY
Status: DISCONTINUED | OUTPATIENT
Start: 2024-12-05 | End: 2024-12-11 | Stop reason: HOSPADM

## 2024-12-05 RX ORDER — ONDANSETRON HYDROCHLORIDE 2 MG/ML
4 INJECTION, SOLUTION INTRAVENOUS EVERY 6 HOURS PRN
Status: DISCONTINUED | OUTPATIENT
Start: 2024-12-05 | End: 2024-12-11 | Stop reason: HOSPADM

## 2024-12-05 RX ORDER — ACETAMINOPHEN 10 MG/ML
INJECTION, SOLUTION INTRAVENOUS
Status: DISPENSED
Start: 2024-12-05 | End: 2024-12-05

## 2024-12-05 RX ORDER — ALBUMIN HUMAN 250 G/1000ML
SOLUTION INTRAVENOUS
Status: COMPLETED
Start: 2024-12-05 | End: 2024-12-05

## 2024-12-05 RX ORDER — MORPHINE SULFATE 4 MG/ML
4 INJECTION, SOLUTION INTRAMUSCULAR; INTRAVENOUS
Status: DISPENSED | OUTPATIENT
Start: 2024-12-05 | End: 2024-12-06

## 2024-12-05 RX ORDER — CEFAZOLIN 2 G/1
INJECTION, POWDER, FOR SOLUTION INTRAMUSCULAR; INTRAVENOUS
Status: DISCONTINUED | OUTPATIENT
Start: 2024-12-05 | End: 2024-12-05

## 2024-12-05 RX ORDER — ALBUMIN HUMAN 250 G/1000ML
SOLUTION INTRAVENOUS
Status: DISPENSED
Start: 2024-12-05 | End: 2024-12-05

## 2024-12-05 RX ORDER — DEXAMETHASONE SODIUM PHOSPHATE 4 MG/ML
INJECTION, SOLUTION INTRA-ARTICULAR; INTRALESIONAL; INTRAMUSCULAR; INTRAVENOUS; SOFT TISSUE
Status: DISCONTINUED | OUTPATIENT
Start: 2024-12-05 | End: 2024-12-05

## 2024-12-05 RX ORDER — ROCURONIUM BROMIDE 10 MG/ML
INJECTION, SOLUTION INTRAVENOUS
Status: DISCONTINUED | OUTPATIENT
Start: 2024-12-05 | End: 2024-12-05

## 2024-12-05 RX ORDER — ETOMIDATE 2 MG/ML
INJECTION INTRAVENOUS
Status: DISCONTINUED | OUTPATIENT
Start: 2024-12-05 | End: 2024-12-05

## 2024-12-05 RX ORDER — FENTANYL CITRATE 50 UG/ML
INJECTION, SOLUTION INTRAMUSCULAR; INTRAVENOUS
Status: DISCONTINUED | OUTPATIENT
Start: 2024-12-05 | End: 2024-12-05

## 2024-12-05 RX ORDER — ESMOLOL HYDROCHLORIDE 10 MG/ML
INJECTION INTRAVENOUS
Status: DISCONTINUED | OUTPATIENT
Start: 2024-12-05 | End: 2024-12-05

## 2024-12-05 RX ORDER — POVIDONE-IODINE 10 %
SOLUTION, NON-ORAL TOPICAL
Status: DISCONTINUED | OUTPATIENT
Start: 2024-12-05 | End: 2024-12-05 | Stop reason: HOSPADM

## 2024-12-05 RX ORDER — CALCIUM CHLORIDE INJECTION 100 MG/ML
INJECTION, SOLUTION INTRAVENOUS
Status: DISCONTINUED | OUTPATIENT
Start: 2024-12-05 | End: 2024-12-05

## 2024-12-05 RX ORDER — ALBUMIN HUMAN 250 G/1000ML
SOLUTION INTRAVENOUS
Status: DISCONTINUED | OUTPATIENT
Start: 2024-12-05 | End: 2024-12-05

## 2024-12-05 RX ORDER — TALC
6 POWDER (GRAM) TOPICAL NIGHTLY PRN
Status: DISCONTINUED | OUTPATIENT
Start: 2024-12-05 | End: 2024-12-11 | Stop reason: HOSPADM

## 2024-12-05 RX ORDER — GABAPENTIN 300 MG/1
300 CAPSULE ORAL NIGHTLY
Status: DISCONTINUED | OUTPATIENT
Start: 2024-12-05 | End: 2024-12-11 | Stop reason: HOSPADM

## 2024-12-05 RX ORDER — ONDANSETRON HYDROCHLORIDE 2 MG/ML
INJECTION, SOLUTION INTRAVENOUS
Status: DISCONTINUED | OUTPATIENT
Start: 2024-12-05 | End: 2024-12-05

## 2024-12-05 RX ORDER — SODIUM CHLORIDE 9 MG/ML
INJECTION, SOLUTION INTRAVENOUS CONTINUOUS
Status: DISCONTINUED | OUTPATIENT
Start: 2024-12-05 | End: 2024-12-06

## 2024-12-05 RX ORDER — METHOCARBAMOL 750 MG/1
750 TABLET, FILM COATED ORAL EVERY 8 HOURS PRN
Status: DISCONTINUED | OUTPATIENT
Start: 2024-12-05 | End: 2024-12-06

## 2024-12-05 RX ORDER — ACETAMINOPHEN 500 MG
500 TABLET ORAL EVERY 4 HOURS
Status: DISPENSED | OUTPATIENT
Start: 2024-12-05 | End: 2024-12-10

## 2024-12-05 RX ORDER — DOCUSATE SODIUM 100 MG/1
200 CAPSULE, LIQUID FILLED ORAL
Status: DISCONTINUED | OUTPATIENT
Start: 2024-12-06 | End: 2024-12-11 | Stop reason: HOSPADM

## 2024-12-05 RX ORDER — BISACODYL 10 MG/1
10 SUPPOSITORY RECTAL DAILY
Status: ACTIVE | OUTPATIENT
Start: 2024-12-08 | End: 2024-12-09

## 2024-12-05 RX ORDER — CEFAZOLIN 2 G/1
2 INJECTION, POWDER, FOR SOLUTION INTRAMUSCULAR; INTRAVENOUS
Status: COMPLETED | OUTPATIENT
Start: 2024-12-05 | End: 2024-12-06

## 2024-12-05 RX ADMIN — PHENYLEPHRINE HYDROCHLORIDE 200 MCG: 10 INJECTION INTRAVENOUS at 08:12

## 2024-12-05 RX ADMIN — SUGAMMADEX 200 MG: 100 INJECTION, SOLUTION INTRAVENOUS at 09:12

## 2024-12-05 RX ADMIN — ETOMIDATE 8 MG: 2 INJECTION INTRAVENOUS at 07:12

## 2024-12-05 RX ADMIN — ROCURONIUM BROMIDE 10 MG: 10 SOLUTION INTRAVENOUS at 08:12

## 2024-12-05 RX ADMIN — ACETAMINOPHEN 500 MG: 500 TABLET ORAL at 09:12

## 2024-12-05 RX ADMIN — PHENYLEPHRINE HYDROCHLORIDE 100 MCG: 10 INJECTION INTRAVENOUS at 07:12

## 2024-12-05 RX ADMIN — ETOMIDATE 2 MG: 2 INJECTION INTRAVENOUS at 07:12

## 2024-12-05 RX ADMIN — CALCIUM CHLORIDE INJECTION 0.25 G: 100 INJECTION, SOLUTION INTRAVENOUS at 08:12

## 2024-12-05 RX ADMIN — CIPROFLOXACIN 500 MG: 500 TABLET ORAL at 09:12

## 2024-12-05 RX ADMIN — SODIUM CHLORIDE, SODIUM GLUCONATE, SODIUM ACETATE, POTASSIUM CHLORIDE AND MAGNESIUM CHLORIDE: 526; 502; 368; 37; 30 INJECTION, SOLUTION INTRAVENOUS at 07:12

## 2024-12-05 RX ADMIN — ESMOLOL HYDROCHLORIDE 5 MG: 100 INJECTION, SOLUTION INTRAVENOUS at 07:12

## 2024-12-05 RX ADMIN — PHENYLEPHRINE HYDROCHLORIDE 100 MCG: 10 INJECTION INTRAVENOUS at 08:12

## 2024-12-05 RX ADMIN — PROPOFOL 30 MG: 10 INJECTION, EMULSION INTRAVENOUS at 07:12

## 2024-12-05 RX ADMIN — CALCIUM CHLORIDE INJECTION 0.25 G: 100 INJECTION, SOLUTION INTRAVENOUS at 07:12

## 2024-12-05 RX ADMIN — ALBUMIN (HUMAN) 100 ML: 5 SOLUTION INTRAVENOUS at 07:12

## 2024-12-05 RX ADMIN — SUCCINYLCHOLINE CHLORIDE 20 MG: 20 INJECTION, SOLUTION INTRAMUSCULAR; INTRAVENOUS at 07:12

## 2024-12-05 RX ADMIN — ROCURONIUM BROMIDE 50 MG: 10 SOLUTION INTRAVENOUS at 07:12

## 2024-12-05 RX ADMIN — ESMOLOL HYDROCHLORIDE 5 MG: 100 INJECTION, SOLUTION INTRAVENOUS at 09:12

## 2024-12-05 RX ADMIN — ONDANSETRON HYDROCHLORIDE 4 MG: 2 SOLUTION INTRAMUSCULAR; INTRAVENOUS at 09:12

## 2024-12-05 RX ADMIN — GABAPENTIN 300 MG: 300 CAPSULE ORAL at 09:12

## 2024-12-05 RX ADMIN — CEFAZOLIN 2 G: 2 INJECTION, POWDER, FOR SOLUTION INTRAMUSCULAR; INTRAVENOUS at 07:12

## 2024-12-05 RX ADMIN — CALCIUM CHLORIDE INJECTION 0.1 G: 100 INJECTION, SOLUTION INTRAVENOUS at 08:12

## 2024-12-05 RX ADMIN — DEXAMETHASONE SODIUM PHOSPHATE 4 MG: 4 INJECTION, SOLUTION INTRA-ARTICULAR; INTRALESIONAL; INTRAMUSCULAR; INTRAVENOUS; SOFT TISSUE at 07:12

## 2024-12-05 RX ADMIN — ALBUMIN (HUMAN) 100 ML: 5 SOLUTION INTRAVENOUS at 08:12

## 2024-12-05 RX ADMIN — FENTANYL CITRATE 25 MCG: 50 INJECTION, SOLUTION INTRAMUSCULAR; INTRAVENOUS at 09:12

## 2024-12-05 RX ADMIN — ESMOLOL HYDROCHLORIDE 5 MG: 100 INJECTION, SOLUTION INTRAVENOUS at 08:12

## 2024-12-05 RX ADMIN — CEFAZOLIN 2 G: 2 INJECTION, POWDER, FOR SOLUTION INTRAMUSCULAR; INTRAVENOUS at 04:12

## 2024-12-05 RX ADMIN — FENTANYL CITRATE 25 MCG: 50 INJECTION, SOLUTION INTRAMUSCULAR; INTRAVENOUS at 07:12

## 2024-12-05 RX ADMIN — CARVEDILOL 3.12 MG: 3.12 TABLET, FILM COATED ORAL at 11:12

## 2024-12-05 RX ADMIN — TRAMADOL HYDROCHLORIDE 50 MG: 50 TABLET, COATED ORAL at 04:12

## 2024-12-05 RX ADMIN — LIDOCAINE HYDROCHLORIDE 50 MG: 20 INJECTION, SOLUTION EPIDURAL; INFILTRATION; INTRACAUDAL; PERINEURAL at 07:12

## 2024-12-05 RX ADMIN — TRAMADOL HYDROCHLORIDE 50 MG: 50 TABLET, COATED ORAL at 11:12

## 2024-12-05 RX ADMIN — PROPOFOL 20 MG: 10 INJECTION, EMULSION INTRAVENOUS at 07:12

## 2024-12-05 RX ADMIN — SENNOSIDES AND DOCUSATE SODIUM 2 TABLET: 50; 8.6 TABLET ORAL at 09:12

## 2024-12-05 RX ADMIN — CEFAZOLIN 2 G: 2 INJECTION, POWDER, FOR SOLUTION INTRAMUSCULAR; INTRAVENOUS at 09:12

## 2024-12-05 RX ADMIN — ACETAMINOPHEN 1000 MG: 10 INJECTION INTRAVENOUS at 07:12

## 2024-12-05 RX ADMIN — MORPHINE SULFATE 4 MG: 4 INJECTION INTRAVENOUS at 10:12

## 2024-12-05 RX ADMIN — EMPAGLIFLOZIN 10 MG: 10 TABLET, FILM COATED ORAL at 11:12

## 2024-12-05 RX ADMIN — ALLOPURINOL 100 MG: 100 TABLET ORAL at 09:12

## 2024-12-05 RX ADMIN — SODIUM CHLORIDE: 9 INJECTION, SOLUTION INTRAVENOUS at 07:12

## 2024-12-05 RX ADMIN — POLYETHYLENE GLYCOL 3350 17 G: 17 POWDER, FOR SOLUTION ORAL at 09:12

## 2024-12-05 RX ADMIN — KETOROLAC TROMETHAMINE 15 MG: 30 INJECTION, SOLUTION INTRAMUSCULAR at 07:12

## 2024-12-05 RX ADMIN — PHENYLEPHRINE HYDROCHLORIDE 200 MCG: 10 INJECTION INTRAVENOUS at 09:12

## 2024-12-05 NOTE — TRANSFER OF CARE
"Anesthesia Transfer of Care Note    Patient: Homa Jaquez    Procedure(s) Performed: Procedure(s) (LRB):  CLOSED REDUCTION, HIP (Left)  REVISION,ARTHROPLASTY,HIP,POSTERIOR APPROACH (Left)    Patient location: PACU    Anesthesia Type: general    Transport from OR: Transported from OR on room air with adequate spontaneous ventilation    Post pain: adequate analgesia    Post assessment: no apparent anesthetic complications    Post vital signs: stable    Level of consciousness: awake, alert and oriented    Nausea/Vomiting: no nausea/vomiting    Complications: none    Transfer of care protocol was followed      Last vitals: Visit Vitals  /88 (BP Location: Right arm, Patient Position: Lying)   Pulse (!) 139   Temp 36.5 °C (97.7 °F)   Resp (!) 33   Ht 5' 4" (1.626 m)   Wt 83.9 kg (185 lb)   SpO2 98%   Breastfeeding No   BMI 31.76 kg/m²     "

## 2024-12-05 NOTE — BRIEF OP NOTE
[facility] Orthopaedics - Periop Services  Brief Operative Note    SUMMARY     Surgery Date: 12/5/24     Surgeon(s) and Role:     * Jonny Bains MD - Primary    First Assist - Katheryn Bourgeois NP    Pre-op Diagnosis:    Post-Op Diagnosis Codes:     * Failed total hip arthroplasty with dislocation, initial encounter [T84.028A, Z96.649]    Post-op Diagnosis:    Post-Op Diagnosis Codes:     * Failed total hip arthroplasty with dislocation, initial encounter [T84.028A, Z96.649]    Procedure:  Revision L SILVIA - both component  Wound vac left hip    Anesthesia: See Anesthesia Note    Operative Findings: see separately dictated op note    Estimated Blood Loss: 500 ml         Specimens:   Specimen (24h ago, onward)      None            [id number]

## 2024-12-05 NOTE — OP NOTE
OPERATIVE REPORT      Patient: Homa Jaquez   : 1935    MRN: [id number]  Date: 2024      Surgeon: Jonny Bains MD  Assistant: Katheryn Bourgeois NP, [facility].  Certified first assist was necessary as a skilled set of hands and was necessary for proper patient positioning as well as assistance with closure in place with multiple implants.  Preoperative Diagnosis:  Left failed total hip arthroplasty  Postoperative Diagnosis: Same  Procedure:  Revision left total hip arthroplasty both components  Wound VAC left hip  Anesthesiologist: No responsible provider has been recorded for the case.  OR Staff: Circulator: Alberto Escobedo RN; Giana Kelly RN  Relief Scrub: Kalani Guerrero ST  Scrub Person: Alfa Serrano  Implants:   Implant Name Type Inv. Item Serial No.  Lot No. LRB No. Used Action   MULTIHOLE ACETABULAR SHELL D     [id number] Left 1 Implanted   SCREW TRIDENT II LP HEX 6.5X30 - [id number]  SCREW TRIDENT II LP HEX 6.5X30  [employer] JANY. [id number] Left 1 Implanted and Explanted   SCREW TRIDENT II LP HEX 6.5X50 - [id number]  SCREW TRIDENT II LP HEX 6.5X50  [employer] JANY. [id number] Left 1 Implanted   SCREW TRIDENT II LP HEX 6.5X20 - [id number]  SCREW TRIDENT II LP HEX 6.5X20  [employer] JANY. [id number] Left 1 Implanted   SCREW TRIDENT II LP HEX 6.5X30 - [id number]  SCREW TRIDENT II LP HEX 6.5X30  [employer] JANY. [id number] Left 1 Implanted   SCREW TRIDENT II LP HEX 6.5X20 - [id number]  SCREW TRIDENT II LP HEX 6.5X20  [employer] JANY. [id number] Left 1 Implanted   LINER MDM ACET D 38MM - [id number]  LINER MDM ACET D 38MM  [employer] JANY. [id number] Left 1 Implanted   BODY RESTORATION CONE 25MM +20 - [id number]  BODY RESTORATION CONE 25MM +20  [employer] JANY. [id number] Left 1 Implanted   X3 INSERT FOR MDM LINER     [id number] Left 1 Implanted   HEAD  FEMORAL V40 TAPER +4X28MM - [id number]  HEAD FEMORAL V40 TAPER +4X28MM  [employer] JANY. [id number] Left 1 Implanted   HEAD V40 BIOLOX TAPR 28MM -4 - [id number]  HEAD V40 BIOLOX TAPR 28MM -4  [employer] JANY. [id number] Left 1 Explanted   HEAD FEMORAL UHR 24W44EA - [id number]  HEAD FEMORAL UHR 78P23UV  [employer] JANY. [id number] Left 1 Explanted   RESTORATION MODULAR HIP SYSTEM 23MM +20 V40    [employer] [id number] Left 1 Explanted     EBL: See anesthesia note  Complications: None  Disposition: To PACU, stable    Indications: Homa Jaquez is a 89 y.o. female presenting with the aforementioned injuries/findings. The procedure is indicated to stabilize left hip.  Patient has a history of a conversion to total hip arthroplasty on the left side.  Patient treated with a total hip arthroplasty many months ago.  Has a postoperative infection.  Continues to have pain.  Patient with shortening of the left leg.  Presented to the ER noted to have a left hip dislocation.  Discussed all treatment with the patient.  Recommended open versus closed reduction of the left hip.  Risks, benefits alternatives discussed in detail.  All questions answered to patient's satisfaction.  No guarantees made..  The patient is awake and alert. After thorough discussion of the risks, benefits, expected outcomes, and alternatives to surgical intervention, the patient agreed to proceed with surgical treatment.  Specific risks discussed included, but were not limited to: superficial or deep infection, wound healing complications, DVT/PE, significant bleeding requiring transfusion, damage to named anatomic structures in the immediate area including named neurovascular structures, infection, nonunion, malunion and general risks of anesthesia.  The patient voiced understanding and written as well as verbal consent was obtained by myself prior to the procedure.    Procedure  Note:  The patient was brought back to the OR and placed supine on the OR table. After successful induction of anesthesia by anesthesia staff, the patient was positioned in the supine position and all bony prominences were padded appropriately.  The surgical field was then provisionally cleansed and then prepped and draped in the usual sterile fashion.    At this time a time-out was performed, with the correct patient, site, and procedure identified.  The universal time out as well as sign your site protocols were followed.  Preoperative antibiotics were verified as administered.     Initially began by performing an attempted closed reduction left hip.  Under fluoroscopic guidance, direct traction as well as internal and external rotation was performed of the left hip.  We are able to get the hip Perch over the acetabulum however never we had able to get it reduced.  It appeared to be a space-occupying lesion.  Given her failure of closed reduction, we elected to perform open reduction.  Patient was left lateral.  She is place with a pegboard.  She was prepped him with a normal sterile fashion.  Repeated incision.  Carried through skin subcutaneous tissue.  Full-thickness skin flaps were made mediolaterally.  Carried down through the fascia.  Charnley retractor was placed.  Hip was internally rotated.  It is noted to have the dislocated femoral component.  Femoral head was removed.  Femoral cone was identified.  It was appropriately anteverted.  It was appropriately placed.  There was no significant laxity or loosening.  Did have some laxity of her greater trochanter.  Plates and wires were in place however did have some motion of the greater trochanter.  We attempted open reduction with increasing head length however the patient continues to be significantly unstable.  She had a large amount of previously and a seen wear on the superior posterior acetabulum allowing this to dislocate.  At that has point we elected  to place an acetabular component to give her better coverage.  Placed retractors around the acetabulum.  We had removed the previous femoral roll component including the proximal body.  Conical stem remained.  Retractors were placed.  Acetabulum was identified.  We reamed initially with a 45 down with the medial wall.  Reamed sequentially up to a 49.  Had good cortical contact with the 49.  Patient has a fixed significant and extreme amount of osteoporosis.  Selected a 50 cup.  Impacted it found to be down.  It had appropriate anteversion roughly 45° of inclination 20° of anteversion.  Improved her coverage posteriorly and superiorly.  Placed 4 screws 1 anterior superior 1 posterior disappeared 1 anterior inferior 1 posterior inferior.  Screws had appropriate purchase.  We had selected with the MDM cup we placed an MDM liner into position.  Went back with a 25+ 20 body increasing her offset to try to improve her stability.  Reached trialed and selected a 0 head.  0 MDM head was impacted into a 38 D MDM liner.  It was impacted onto a clean trunnion and found to be down.  Hip was taken through full range of motion.  Good stability.  No significant instability.  No significant Shuck.    The incision(s) was/were then irrigated using copious sterile saline as well as Betadine lavage. The surgical wound was closed in layered fashion.  The surgical site(s) was/were were sterilely cleansed and dressed. Due to revision nature and poor wound healing capability, prevena wound vac applied to the incision to assist with wound closure and decrease infection risk.        The patient was then subsequently transferred to to PACU in a stable condition.    All sponge and needle counts were correct at the end of the case.  I was present and participated in all aspects of the procedure.    Prognosis:  The patient will be kept PWB on the ipsilateral extremity for approximately 6 weeks .  Patient will receive DVT prophylaxis .       This  note/OR report was created with the assistance of  voice recognition software or phone  dictation.  There may be transcription errors as a result of using this technology however minimal. Effort has been made to assure accuracy of transcription but any obvious errors or omissions should be clarified with the author of the document.

## 2024-12-05 NOTE — H&P
[facility]    Hospital Medicine History & Physical Examination       Patient Name: Homa Jaquez  MRN: [id number]  Patient Class: OP- Observation   Admission Date: 12/4/2024   Admitting Physician: CT Service   Length of Stay: 0  Attending Physician: Olvin Huber MD  Primary Care Provider: Franklyn Brito MD  Face-to-Face encounter date: 12/04/2024    Code Status: Full Code    Chief Complaint: Hip Pain          HISTORY OF PRESENT ILLNESS:   Homa Jaquez is a 89 y.o. female who  has a past medical history of A-fib, Chronic cystitis, GERD (gastroesophageal reflux disease), Graves disease, Hypertension, Hypothyroidism, unspecified, and Spinal stenosis.. The patient presented to [facility] on 12/4/2024 with a primary complaint of left hip pain. I have cared for her in the past. The patient was found to have a dislocation of her left hip. She has a history of a chronic infection of the same hip. She has no other c/o. Her son is at the bedside. He denies any trauma that led to the dislocation. She had done well with the last 2 days.    PAST MEDICAL HISTORY:     Past Medical History:   Diagnosis Date    A-fib     Chronic cystitis     GERD (gastroesophageal reflux disease)     Graves disease     Hypertension     Hypothyroidism, unspecified     Spinal stenosis        PAST SURGICAL HISTORY:     Past Surgical History:   Procedure Laterality Date    APPENDECTOMY      BLADDER SUSPENSION      CARDIOVERSION  04/01/2015    CATARACT EXTRACTION      CONVERSION ARTHROPLASTY, HIP, POSTERIOR APPROACH Left 7/15/2024    Procedure: CONVERSION ARTHROPLASTY, HIP, POSTERIOR APPROACH - valencia, cell saver, pathology;  Surgeon: Jonny Bains MD;  Location: [facility] OR;  Service: Orthopedics;  Laterality: Left;  valencia, cell saver, pathology    HYSTERECTOMY      INTRAMEDULLARY RODDING OF FEMUR Left 7/3/2024    Procedure: INSERTION, INTRAMEDULLARY MELANIE, FEMUR;  Surgeon: Steve Pierce DO;  Location:  [facility] OR;  Service: Orthopedics;  Laterality: Left;  supine hana table c arm synthes    LAPAROSCOPIC CHOLECYSTECTOMY  01/12/2016    REVISION, ARTHROPLASTY, HIP, POSTERIOR APPROACH Left 8/12/2024    Procedure: REVISION,ARTHROPLASTY,HIP,POSTERIOR APPROACH;  Surgeon: Jonny Bains MD;  Location: [facility] OR;  Service: Orthopedics;  Laterality: Left;  I&D L hip - head/liner    SPHINCTEROTOMY OF URETHRA  01/11/2016    TONSILLECTOMY AND ADENOIDECTOMY         ALLERGIES:   Cefuroxime axetil, Cephalexin, Enoxaparin, Methenamine hippurate, and Promethazine    FAMILY HISTORY:   family history includes Cancer in her father; Heart attack in her father; Stroke in her father.    SOCIAL HISTORY:     Social History     Tobacco Use    Smoking status: Former     Types: Cigarettes    Smokeless tobacco: Never   Substance Use Topics    Alcohol use: Yes        HOME MEDICATIONS:     Prior to Admission medications    Medication Sig Start Date End Date Taking? Authorizing Provider   acetaminophen (TYLENOL) 650 mg/20.3 mL Soln Take 20.3 mLs (650 mg total) by mouth every 6 (six) hours.  Patient taking differently: Take 500 mg by mouth as needed. 8/21/24  Yes Mathieu Patel MD   allopurinoL (ZYLOPRIM) 100 MG tablet TAKE 1 TABLET BY MOUTH ONCE DAILY  Patient taking differently: Take 100 mg by mouth nightly. 11/15/24  Yes Franklyn Brito MD   carvediloL (COREG) 3.125 MG tablet Take 1 tablet (3.125 mg total) by mouth 2 (two) times daily. 11/15/24 5/14/25 Yes Franklyn Brito MD   ciprofloxacin HCl (CIPRO) 250 MG tablet Take 2 tablets (500 mg total) by mouth every 12 (twelve) hours. 11/19/24  Yes Franklyn Brito MD   cyanocobalamin 1,000 mcg/mL injection INJECT 1000MCG (1ML) INTRAMUSCULARY ONCE MONTHLY 9/7/23  Yes Franklyn Brito MD   docusate sodium (COLACE) 100 MG capsule Take 1 capsule (100 mg total) by mouth 2 (two) times daily as needed for Constipation.  Patient taking differently: Take 100 mg by mouth once daily.  7/26/24  Yes Mathieu Patel MD   empagliflozin (JARDIANCE) 10 mg tablet Take 1 tablet (10 mg total) by mouth once daily. 11/19/24  Yes Franklyn Brito MD   levothyroxine (SYNTHROID) 75 MCG tablet Take 1 tablet (75 mcg total) by mouth before breakfast. 11/21/24 11/21/25 Yes Franklyn Brito MD   lovastatin (MEVACOR) 20 MG tablet Take 20 mg by mouth once daily. 3/21/22  Yes Provider, Historical   pantoprazole (PROTONIX) 40 MG tablet GIVE ONE TABLET BY MOUTH ONCE DAILY 11/15/24  Yes Franklyn Brito MD   polyethylene glycol (GLYCOLAX) 17 gram PwPk Take 17 g by mouth 2 (two) times daily as needed for Constipation. 7/9/24  Yes Franklyn Brito MD   sacubitriL-valsartan (ENTRESTO) 24-26 mg per tablet Take 1 tablet by mouth 2 (two) times daily. 7/9/24  Yes Franklyn Brito MD   senna (SENOKOT) 8.6 mg tablet Take 1 tablet by mouth daily as needed for Constipation. 3/16/23  Yes Franklyn Brito MD   traMADoL (ULTRAM) 50 mg tablet Take 1 tablet (50 mg total) by mouth every 12 (twelve) hours as needed (as needed for pain score 1-10). 11/19/24  Yes Franklyn Brito MD   warfarin (COUMADIN) 1 MG tablet Take 1 tablet (1 mg total) by mouth Daily. 8/21/24 8/21/25 Yes Mathieu Patel MD   ascorbic acid, vitamin C, (VITAMIN C) 500 MG tablet Take 1 tablet (500 mg total) by mouth 2 (two) times daily.  Patient not taking: Reported on 11/19/2024 7/26/24   Mathieu Patel MD   famotidine (PEPCID) 20 MG tablet Take 20 mg by mouth as needed for Heartburn.    Provider, Historical   furosemide (LASIX) 20 MG tablet Take 1 tablet (20 mg total) by mouth once daily. 11/15/24 5/14/25  Franklyn Brito MD   traMADoL (ULTRAM) 50 mg tablet Take 1 tablet (50 mg total) by mouth every 6 (six) hours as needed (as needed for pain score 1-10). 8/21/24   Mathieu Patel MD       REVIEW OF SYSTEMS:   Except as documented, all other systems reviewed and negative   Review of Systems   All other systems  reviewed and are negative.        PHYSICAL EXAM:     VITAL SIGNS: 24 HRS MIN & MAX LAST   Temp  Min: 97.5 °F (36.4 °C)  Max: 98.3 °F (36.8 °C) 97.5 °F (36.4 °C)   BP  Min: 116/71  Max: 135/76 135/76   Pulse  Min: 54  Max: 84  (!) 54   Resp  Min: 16  Max: 16 16   SpO2  Min: 99 %  Max: 99 % 99 %       General appearance: Well-developed, well-nourished female in no apparent distress.  HENT: Atraumatic head. Moist mucous membranes of oral cavity.  Eyes: Normal extraocular movements.   Neck: Supple.   Lungs: Clear to auscultation bilaterally. No wheezing present.   Heart: Regular rate and rhythm. S1 and S2 present with no murmurs/gallop/rub. No pedal edema. No JVD present.   Abdomen: Soft, non-distended, non-tender. No rebound tenderness/guarding. Bowel sounds are normal.   Extremities: No cyanosis, clubbing, or edema. Left hip shortened.  Skin: No Rash.   Neuro: Motor and sensory exams grossly intact. Good tone.   Psych/mental status: Appropriate mood and affect. Responds appropriately to questions.     LABS AND IMAGING:     Recent Labs   Lab 12/04/24  1453   WBC 7.92   RBC 3.61*   HGB 11.5*   HCT 35.4*   MCV 98.1*   MCH 31.9*   MCHC 32.5*   RDW 14.9      MPV 9.5       Recent Labs   Lab 12/04/24  1453      K 4.1   *   CO2 19*   BUN 23.6*   CREATININE 1.32*   CALCIUM 8.6   ALBUMIN 2.5*   ALKPHOS 122   ALT 12   AST 29   BILITOT 0.5       Microbiology Results (last 7 days)       ** No results found for the last 168 hours. **             X-Ray Hip 2 or 3 views Left with Pelvis when performed  See Provider Notes for results.     IMPRESSION: Please see provider office/clinic notes for radiology   interpretation    This procedure was auto-finalized by: Virtual Radiologist      Recent Results (from the past 24 hours)   CBC with Differential    Collection Time: 12/04/24  2:53 PM   Result Value Ref Range    WBC 7.92 4.50 - 11.50 x10(3)/mcL    RBC 3.61 (L) 4.20 - 5.40 x10(6)/mcL    Hgb 11.5 (L) 12.0 - 16.0 g/dL     Hct 35.4 (L) 37.0 - 47.0 %    MCV 98.1 (H) 80.0 - 94.0 fL    MCH 31.9 (H) 27.0 - 31.0 pg    MCHC 32.5 (L) 33.0 - 36.0 g/dL    RDW 14.9 11.5 - 17.0 %    Platelet 298 130 - 400 x10(3)/mcL    MPV 9.5 7.4 - 10.4 fL    Neut % 63.0 %    Lymph % 16.8 %    Mono % 9.1 %    Eos % 9.5 %    Basophil % 1.3 %    Lymph # 1.33 0.6 - 4.6 x10(3)/mcL    Neut # 5.00 2.1 - 9.2 x10(3)/mcL    Mono # 0.72 0.1 - 1.3 x10(3)/mcL    Eos # 0.75 0 - 0.9 x10(3)/mcL    Baso # 0.10 <=0.2 x10(3)/mcL    IG# 0.02 0 - 0.04 x10(3)/mcL    IG% 0.3 %    NRBC% 0.0 %   Comprehensive Metabolic Panel    Collection Time: 12/04/24  2:53 PM   Result Value Ref Range    Sodium 142 136 - 145 mmol/L    Potassium 4.1 3.5 - 5.1 mmol/L    Chloride 115 (H) 98 - 107 mmol/L    CO2 19 (L) 23 - 31 mmol/L    Glucose 123 (H) 82 - 115 mg/dL    Blood Urea Nitrogen 23.6 (H) 9.8 - 20.1 mg/dL    Creatinine 1.32 (H) 0.55 - 1.02 mg/dL    Calcium 8.6 8.4 - 10.2 mg/dL    Protein Total 6.9 5.8 - 7.6 gm/dL    Albumin 2.5 (L) 3.4 - 4.8 g/dL    Globulin 4.4 (H) 2.4 - 3.5 gm/dL    Albumin/Globulin Ratio 0.6 (L) 1.1 - 2.0 ratio    Bilirubin Total 0.5 <=1.5 mg/dL     40 - 150 unit/L    ALT 12 0 - 55 unit/L    AST 29 5 - 34 unit/L    eGFR 39 mL/min/1.73/m2    Anion Gap 8.0 mEq/L    BUN/Creatinine Ratio 18      __________________________________________________________________________  INPATIENT LIST OF MEDICATIONS     Scheduled Meds:   acetaminophen  500 mg Oral Q4H               ASSESSMENT & PLAN:     Left hip dislocation, hx of SILVIA  Afib  HTN  Hypothyroidism  DM 2  Dilated cardiomyopathy   Chronic left hip infection    Plan  Ortho consulted  Pt to OR in am  Npo past MN  Check INR  Check labs in am  Monitor CBGs  Cont cipro for chronic left hip infection      Olvin Huber MD   12/04/2024

## 2024-12-05 NOTE — ANESTHESIA PREPROCEDURE EVALUATION
12/05/2024  Homa Jaquez is a 89 y.o., female.      Homa Jaquez    Pre-op Diagnosis: Failed total hip arthroplasty with dislocation, initial encounter [T84.028A, Z96.649]    Procedure(s): CLOSED REDUCTION, HIP   PRESENTED WITH HIP DISLOCATION 12/4/2024; FOLLOWING FROM PRIOR HIP SURGERY          Current Outpatient Medications   Medication Instructions    acetaminophen (TYLENOL) 650 mg, Oral, Every 6 hours    allopurinoL (ZYLOPRIM) 100 mg, Oral    ascorbic acid (vitamin C) (VITAMIN C) 500 mg, Oral, 2 times daily    carvediloL (COREG) 3.125 mg, Oral, 2 times daily    ciprofloxacin HCl (CIPRO) 500 mg, Oral, Every 12 hours    cyanocobalamin 1,000 mcg/mL injection INJECT 1000MCG (1ML) INTRAMUSCULARY ONCE MONTHLY    docusate sodium (COLACE) 100 mg, Oral, 2 times daily PRN    empagliflozin (JARDIANCE) 10 mg, Oral, Daily    famotidine (PEPCID) 20 mg, As needed (PRN)    furosemide (LASIX) 20 mg, Oral, Daily    levothyroxine (SYNTHROID) 75 mcg, Oral, Before breakfast    lovastatin (MEVACOR) 20 mg, Daily    pantoprazole (PROTONIX) 40 MG tablet GIVE ONE TABLET BY MOUTH ONCE DAILY    polyethylene glycol (GLYCOLAX) 17 g, Oral, 2 times daily PRN    sacubitriL-valsartan (ENTRESTO) 24-26 mg per tablet 1 tablet, Oral, 2 times daily    senna (SENOKOT) 8.6 mg tablet 1 tablet, Oral, Daily PRN    traMADoL (ULTRAM) 50 mg, Oral, Every 6 hours PRN    traMADoL (ULTRAM) 50 mg, Oral, Every 12 hours PRN    warfarin (COUMADIN) 1 mg, Oral, Daily           Past Medical History:   Diagnosis Date    A-fib     Chronic cystitis     GERD (gastroesophageal reflux disease)     Graves disease     Hypertension     Hypothyroidism, unspecified     Spinal stenosis        Past Surgical History:   Procedure Laterality Date    APPENDECTOMY      BLADDER SUSPENSION      CARDIOVERSION  04/01/2015    CATARACT EXTRACTION      CONVERSION ARTHROPLASTY,  HIP, POSTERIOR APPROACH Left 7/15/2024    Procedure: CONVERSION ARTHROPLASTY, HIP, POSTERIOR APPROACH - valencia, cell saver, pathology;  Surgeon: Jonny Bains MD;  Location: [facility] OR;  Service: Orthopedics;  Laterality: Left;  valencia, cell saver, pathology    HYSTERECTOMY      INTRAMEDULLARY RODDING OF FEMUR Left 7/3/2024    Procedure: INSERTION, INTRAMEDULLARY MELANIE, FEMUR;  Surgeon: Steve Pierce DO;  Location: [facility] OR;  Service: Orthopedics;  Laterality: Left;  supine hana table c arm synthes    LAPAROSCOPIC CHOLECYSTECTOMY  01/12/2016    REVISION, ARTHROPLASTY, HIP, POSTERIOR APPROACH Left 8/12/2024    Procedure: REVISION,ARTHROPLASTY,HIP,POSTERIOR APPROACH;  Surgeon: Jonny Bains MD;  Location: [facility] OR;  Service: Orthopedics;  Laterality: Left;  I&D L hip - head/liner    SPHINCTEROTOMY OF URETHRA  01/11/2016    TONSILLECTOMY AND ADENOIDECTOMY       Recent Labs   Lab 12/04/24  1453 12/05/24  0436   WBC 7.92 7.52   RBC 3.61* 3.52*   HGB 11.5* 11.4*   HCT 35.4* 35.4*   MCV 98.1* 100.6*   MCH 31.9* 32.4*   MCHC 32.5* 32.2*   RDW 14.9 15.1    297   MPV 9.5 9.6       Recent Labs   Lab 12/04/24  1453 12/05/24  0436    142   K 4.1 4.1   * 115*   CO2 19* 18*   BUN 23.6* 22.7*   CREATININE 1.32* 1.24*   CALCIUM 8.6 8.7   MG  --  1.90   ALBUMIN 2.5*  --    ALKPHOS 122  --    ALT 12  --    AST 29  --    BILITOT 0.5  --        Recent Labs   Lab 12/05/24  0436   INR 2.7      Pre-op Assessment    I have reviewed the Patient Summary Reports.    I have reviewed the NPO Status.   I have reviewed the Medications.     Review of Systems  Anesthesia Hx:  No problems with previous Anesthesia             Denies Family Hx of Anesthesia complications.    Denies Personal Hx of Anesthesia complications.                    Social:  Non-Smoker       Cardiovascular:  Exercise tolerance: poor   Hypertension    Dysrhythmias atrial fibrillation  Denies Angina. CHF  Denies Orthopnea.  Denies PND.    Denies ZAMAN.   Dilated  Cardiomyopathy (most recent EF 35-40%),  Systolic CHF (improved on Entresto)   Functional Capacity low / < 4 METS                         Renal/:  Chronic Renal Disease, CKD   Chronic Cystitis  Mild CRI             Hepatic/GI:     GERD                Musculoskeletal:  Arthritis               Neurological:  TIA,  Denies CVA.        TIA 9 yrs ago                                                      Endocrine:  Diabetes Hypothyroidism          Psych:  Psychiatric Normal                    Physical Exam  General: Well nourished, Alert and Oriented    Airway:  Mallampati: III   Mouth Opening: Normal  TM Distance: Normal  Tongue: Normal  Neck ROM: Normal ROM    Dental:  Intact    Chest/Lungs:  Clear to auscultation    Heart:  Rate: Normal  Rhythm: Regular Rhythm  No pretibial edema  No carotid bruits      Anesthesia Plan  Type of Anesthesia, risks & benefits discussed:    Anesthesia Type: Gen Supraglottic Airway  Intra-op Monitoring Plan: Standard ASA Monitors  Post Op Pain Control Plan: multimodal analgesia  Induction:  IV  Airway Plan: Direct, Post-Induction  Informed Consent: Informed consent signed with the Patient and all parties understand the risks and agree with anesthesia plan.  All questions answered. Patient consented to blood products? No  ASA Score: 3  Day of Surgery Review of History & Physical: H&P Update referred to the surgeon/provider.    Ready For Surgery From Anesthesia Perspective.     .

## 2024-12-05 NOTE — PLAN OF CARE
Problem: Adult Inpatient Plan of Care  Goal: Plan of Care Review  Outcome: Progressing  Goal: Patient-Specific Goal (Individualized)  Outcome: Progressing  Goal: Absence of Hospital-Acquired Illness or Injury  Outcome: Progressing  Goal: Optimal Comfort and Wellbeing  Outcome: Progressing  Goal: Readiness for Transition of Care  Outcome: Progressing     Problem: Skin Injury Risk Increased  Goal: Skin Health and Integrity  Outcome: Progressing     Problem: Diabetes Comorbidity  Goal: Blood Glucose Level Within Targeted Range  Outcome: Progressing     Problem: Acute Kidney Injury/Impairment  Goal: Fluid and Electrolyte Balance  Outcome: Progressing  Goal: Improved Oral Intake  Outcome: Progressing  Goal: Effective Renal Function  Outcome: Progressing     Problem: Wound  Goal: Optimal Coping  Outcome: Progressing  Goal: Optimal Functional Ability  Outcome: Progressing  Goal: Absence of Infection Signs and Symptoms  Outcome: Progressing  Goal: Improved Oral Intake  Outcome: Progressing  Goal: Optimal Pain Control and Function  Outcome: Progressing  Goal: Skin Health and Integrity  Outcome: Progressing  Goal: Optimal Wound Healing  Outcome: Progressing     Problem: Fall Injury Risk  Goal: Absence of Fall and Fall-Related Injury  Outcome: Progressing

## 2024-12-05 NOTE — PROGRESS NOTES
[facility] - Orthopaedics  Wound Care    Patient Name:  Homa Jaquez   MRN:  [id number]  Date: 12/5/2024  Diagnosis: Failed total hip arthroplasty with dislocation, initial encounter    History:     Past Medical History:   Diagnosis Date    A-fib     Chronic cystitis     GERD (gastroesophageal reflux disease)     Graves disease     Hypertension     Hypothyroidism, unspecified     Spinal stenosis        Social History     Socioeconomic History    Marital status:    Tobacco Use    Smoking status: Former     Types: Cigarettes    Smokeless tobacco: Never   Substance and Sexual Activity    Alcohol use: Yes    Drug use: Never    Sexual activity: Not Currently     Social Drivers of Health     Financial Resource Strain: Low Risk  (7/4/2024)    Overall Financial Resource Strain (CARDIA)     Difficulty of Paying Living Expenses: Not hard at all   Food Insecurity: No Food Insecurity (7/4/2024)    Hunger Vital Sign     Worried About Running Out of Food in the Last Year: Never true     Ran Out of Food in the Last Year: Never true   Transportation Needs: No Transportation Needs (7/9/2024)    PRAPARE - Transportation     Lack of Transportation (Medical): No     Lack of Transportation (Non-Medical): No   Stress: No Stress Concern Present (7/4/2024)    [place] [employer] - Occupational Stress Questionnaire     Feeling of Stress : Not at all   Housing Stability: Low Risk  (7/4/2024)    Housing Stability Vital Sign     Unable to Pay for Housing in the Last Year: No     Homeless in the Last Year: No       Precautions:     Allergies as of 12/04/2024 - Reviewed 12/04/2024   Allergen Reaction Noted    Cefuroxime axetil  09/08/2022    Cephalexin  09/08/2022    Enoxaparin Dermatitis 09/08/2022    Methenamine hippurate  09/08/2022    Promethazine Hallucinations 09/08/2022       WOC Assessment Details/Treatment      12/05/24 1106   ECG   Pulse 107   Pressure Injury Prevention    Heel  protection technique Heel boot        Wound 07/02/24 2230 Skin Tear Left anterior;proximal;upper Arm   Date First Assessed/Time First Assessed: 07/02/24 2230   Present on Original Admission: Yes  Primary Wound Type: Skin Tear  Side: Left  Orientation: anterior;proximal;upper  Location: Arm   Dressing Appearance Open to air   Appearance Epithelialization;Ecchymotic   Dressing   (leave open to air)        Wound 12/04/24 1837 Pressure Injury Sacral spine   Date First Assessed/Time First Assessed: 12/04/24 1837   Present on Original Admission: Yes  Primary Wound Type: Pressure Injury  Location: Sacral spine   Appearance Intact;Pink;Epithelialization  (healed)        Wound 12/04/24 1837 Pressure Injury Left Heel   Date First Assessed/Time First Assessed: 12/04/24 1837   Present on Original Admission: Yes  Primary Wound Type: Pressure Injury  Side: Left  Location: Heel   Appearance Pink;Epithelialization  (healed)   Off Loading Off loading shoe        12/05/24 1106   ECG   Pulse 107   Pressure Injury Prevention    Heel protection technique Heel boot        Wound 07/02/24 2230 Skin Tear Left anterior;proximal;upper Arm   Date First Assessed/Time First Assessed: 07/02/24 2230   Present on Original Admission: Yes  Primary Wound Type: Skin Tear  Side: Left  Orientation: anterior;proximal;upper  Location: Arm   Dressing Appearance Open to air   Appearance Epithelialization;Ecchymotic   Dressing   (leave open to air)        Wound 12/04/24 1837 Pressure Injury Sacral spine   Date First Assessed/Time First Assessed: 12/04/24 1837   Present on Original Admission: Yes  Primary Wound Type: Pressure Injury  Location: Sacral spine   Appearance Intact;Pink;Epithelialization  (healed)        Wound 12/04/24 1837 Pressure Injury Left Heel   Date First Assessed/Time First Assessed: 12/04/24 1837   Present on Original Admission: Yes  Primary Wound Type: Pressure Injury  Side: Left  Location: Heel   Appearance  Pink;Epithelialization  (healed)   Off Loading Off loading shoe     Sacral and Right bilateral heel is clear.  Left heel is blanchable red.  Continue with bilateral heel boots.  Turn q 2- use wedge.

## 2024-12-05 NOTE — ANESTHESIA PROCEDURE NOTES
Intubation    Date/Time: 12/5/2024 7:24 AM    Performed by: Radha Sam CRNA  Authorized by: Bk Brandt MD    Intubation:     Induction:  Intravenous    Intubated:  Postinduction    Mask Ventilation:  Easy mask    Attempts:  1    Attempted By:  CRNA    Method of Intubation:  Direct    Blade:  Enamorado 2    Laryngeal View Grade: Grade I - full view of cords      Difficult Airway Encountered?: No      Complications:  None    Airway Device:  Oral endotracheal tube    Airway Device Size:  7.0    Style/Cuff Inflation:  Cuffed (inflated to minimal occlusive pressure)    Inflation Amount (mL):  6    Tube secured:  21    Secured at:  The lips    Placement Verified By:  Capnometry    Complicating Factors:  None    Findings Post-Intubation:  BS equal bilateral and atraumatic/condition of teeth unchanged

## 2024-12-05 NOTE — PLAN OF CARE
12/05/24 1039   Discharge Assessment   Assessment Type Discharge Planning Assessment   Source of Information family;health record   If unable to respond/provide information was family/caregiver contacted? Yes   Contact Name/Number Av - jamel - [phone]   Does patient/caregiver understand observation status Yes   Communicated AURELIO with patient/caregiver Yes   Reason For Admission dislocated hip   People in Home child(claribel), adult;spouse   Do you expect to return to your current living situation? Yes   Do you have help at home or someone to help you manage your care at home? Yes   Who are your caregiver(s) and their phone number(s)? son & sitters   Walking or Climbing Stairs Difficulty yes   Dressing/Bathing Difficulty yes   Do you have any problems with: Errands/Grocery;Needs other help   Equipment Currently Used at Home walker, rolling;lift device;bedside commode;wheelchair;shower chair;grab bar   Readmission within 30 days? No   Patient currently being followed by outpatient case management? No   Do you currently have service(s) that help you manage your care at home? No   Do you take prescription medications? Yes   Do you have prescription coverage? Yes   Do you have any problems affording any of your prescribed medications? No   Is the patient taking medications as prescribed? yes   Who is going to help you get home at discharge? son or ambulance   How do you get to doctors appointments? health plan transportation;family or friend will provide   Are you on dialysis? No   Do you take coumadin? No   Discharge Plan A Home with family;Home Health   Discharge Plan B Skilled Nursing Facility   DME Needed Upon Discharge  walker, rolling;wheelchair;hip kit;bedside commode;lift device   Discharge Plan discussed with: Adult children   Transition of Care Barriers Mobility     Pt known to me from previous admissions.   Admitted w dislocated hip. Pt in surgery.     Spk w pt's son (Av) @ bedside -- std pt discharged [facility] about [facility] weeks ago & was home w sitters & hh. Current with [facility]. Son arranged for sitters during day; then son is primary caregiver for pt & pt's  during evening/ night. Pt has home dme - including RW, bsc, & electric lift chair. Std pt was min asst w sit to std, but was not transferring or ambulating.   Discuss dcp options - home w hh vs snf. Will f/u.     Contact # Av [phone]

## 2024-12-05 NOTE — HPI
Homa Jaquez is a 89 y.o. female who  has a past medical history of A-fib, Chronic cystitis, GERD (gastroesophageal reflux disease), Graves disease, Hypertension, Hypothyroidism, unspecified, and Spinal stenosis.. The patient presented to [facility] on 12/4/2024 with a primary complaint of left hip pain. I have cared for her in the past. The patient was found to have a dislocation of her left hip. She has a history of a chronic infection of the same hip. She has no other c/o. Her son is at the bedside. He denies any trauma that led to the dislocation. She had done well with the last 2 days.

## 2024-12-05 NOTE — ANESTHESIA POSTPROCEDURE EVALUATION
Anesthesia Post Evaluation    Patient: Homa Jaquez    Procedure(s) Performed: Procedure(s) (LRB):  CLOSED REDUCTION, HIP (Left)  REVISION,ARTHROPLASTY,HIP,POSTERIOR APPROACH (Left)    Final Anesthesia Type: general      Patient location during evaluation: PACU  Patient participation: Yes- Able to Participate  Level of consciousness: awake and alert and oriented  Post-procedure vital signs: reviewed and stable  Pain management: adequate  Airway patency: patent    PONV status at discharge: No PONV  Anesthetic complications: no      Cardiovascular status: hemodynamically stable  Respiratory status: unassisted  Hydration status: euvolemic  Follow-up not needed.              Vitals Value Taken Time   /76 12/05/24 1204   Temp 36.5 °C (97.7 °F) 12/05/24 0929   Pulse 94 12/05/24 1322   Resp 16 12/05/24 1142   SpO2 95 % 12/05/24 1400         Event Time   Out of Recovery 12/05/2024 10:26:23         Pain/Mo Score: Pain Rating Prior to Med Admin: 4 (12/5/2024 11:42 AM)  Pain Rating Post Med Admin: 0 (12/5/2024  5:19 AM)  Mo Score: 9 (12/5/2024 10:25 AM)

## 2024-12-05 NOTE — PROGRESS NOTES
[facility]  [facility] Medicine Progress Note      Patient Name: Homa Jaquez  MRN: [id number]  Admission Date: 12/4/2024   Length of Stay: 0  Attending Physician: Olvin Huber MD  Primary Care Provider: Franklyn Brito MD  Face-to-Face encounter date: 12/05/2024    Code Status: DNR        Chief Complaint:   Hip Pain        HPI:   Homa Jaquez is a 89 y.o. female who  has a past medical history of A-fib, Chronic cystitis, GERD (gastroesophageal reflux disease), Graves disease, Hypertension, Hypothyroidism, unspecified, and Spinal stenosis.. The patient presented to [facility] on 12/4/2024 with a primary complaint of left hip pain. I have cared for her in the past. The patient was found to have a dislocation of her left hip. She has a history of a chronic infection of the same hip. She has no other c/o. Her son is at the bedside. He denies any trauma that led to the dislocation. She had done well with the last 2 days.      Overview/Hospital Course:  No notes on file       Interval Hx:   The patient has no c/o, denies pain. Her son is at the bedside.    Review of Systems   All other systems reviewed and are negative.      Objective/physical exam:  General: In no acute distress, afebrile  Chest: Clear to auscultation bilaterally  Heart: RRR, +S1, S2, no appreciable murmur  Abdomen: Soft, nontender, BS +  MSK: Warm, no lower extremity edema, no clubbing or cyanosis, s/p left hip surgery, wound vac in place  Neurologic: Alert and oriented x4  Psych/mental status: Appropriate mood and affect. Responds appropriately to questions.     VITAL SIGNS: 24 HRS MIN & MAX LAST   Temp  Min: 97.5 °F (36.4 °C)  Max: 97.9 °F (36.6 °C) 97.7 °F (36.5 °C)   BP  Min: 95/66  Max: 140/88 118/76   Pulse  Min: 54  Max: 139  94   Resp  Min: 12  Max: 33 16   SpO2  Min: 84 %  Max: 100 % 95 %       Recent Labs   Lab 12/04/24  1453 12/05/24  0436 12/05/24  0930   WBC 7.92 7.52  --    RBC  3.61* 3.52*  --    HGB 11.5* 11.4* 9.4*   HCT 35.4* 35.4* 29.9*   MCV 98.1* 100.6*  --    MCH 31.9* 32.4*  --    MCHC 32.5* 32.2*  --    RDW 14.9 15.1  --     297  --    MPV 9.5 9.6  --        Recent Labs   Lab 12/04/24  1453 12/05/24  0436    142   K 4.1 4.1   * 115*   CO2 19* 18*   BUN 23.6* 22.7*   CREATININE 1.32* 1.24*   CALCIUM 8.6 8.7   MG  --  1.90   ALBUMIN 2.5*  --    ALKPHOS 122  --    ALT 12  --    AST 29  --    BILITOT 0.5  --         Recent Results (from the past 24 hours)   Basic Metabolic Panel    Collection Time: 12/05/24  4:36 AM   Result Value Ref Range    Sodium 142 136 - 145 mmol/L    Potassium 4.1 3.5 - 5.1 mmol/L    Chloride 115 (H) 98 - 107 mmol/L    CO2 18 (L) 23 - 31 mmol/L    Glucose 103 82 - 115 mg/dL    Blood Urea Nitrogen 22.7 (H) 9.8 - 20.1 mg/dL    Creatinine 1.24 (H) 0.55 - 1.02 mg/dL    BUN/Creatinine Ratio 18     Calcium 8.7 8.4 - 10.2 mg/dL    Anion Gap 9.0 mEq/L    eGFR 42 mL/min/1.73/m2   CBC Without Differential    Collection Time: 12/05/24  4:36 AM   Result Value Ref Range    WBC 7.52 4.50 - 11.50 x10(3)/mcL    RBC 3.52 (L) 4.20 - 5.40 x10(6)/mcL    Hgb 11.4 (L) 12.0 - 16.0 g/dL    Hct 35.4 (L) 37.0 - 47.0 %    .6 (H) 80.0 - 94.0 fL    MCH 32.4 (H) 27.0 - 31.0 pg    MCHC 32.2 (L) 33.0 - 36.0 g/dL    RDW 15.1 11.5 - 17.0 %    Platelet 297 130 - 400 x10(3)/mcL    MPV 9.6 7.4 - 10.4 fL    NRBC% 0.0 %   Magnesium    Collection Time: 12/05/24  4:36 AM   Result Value Ref Range    Magnesium Level 1.90 1.60 - 2.60 mg/dL   Protime-INR    Collection Time: 12/05/24  4:36 AM   Result Value Ref Range    PT 29.3 (H) 11.7 - 14.5 seconds    INR 2.7 2.0 - 3.0   Gram Stain    Collection Time: 12/05/24  8:00 AM    Specimen: Hip, Left; Tissue   Result Value Ref Range    GRAM STAIN Rare WBC observed     GRAM STAIN No bacteria seen    Gram Stain    Collection Time: 12/05/24  8:01 AM    Specimen: Hip, Left; Tissue   Result Value Ref Range    GRAM STAIN Rare WBC observed      GRAM STAIN No bacteria seen    Type & Screen    Collection Time: 12/05/24  9:08 AM   Result Value Ref Range    Group & Rh A POS     Indirect Debra GEL NEG     Specimen Outdate 12/08/2024 23:59    Hemoglobin and Hematocrit    Collection Time: 12/05/24  9:30 AM   Result Value Ref Range    Hgb 9.4 (L) 12.0 - 16.0 g/dL    Hct 29.9 (L) 37.0 - 47.0 %   POCT glucose    Collection Time: 12/05/24 11:14 AM   Result Value Ref Range    POCT Glucose 176 (H) 70 - 110 mg/dL       Microbiology Results (last 7 days)       Procedure Component Value Units Date/Time    Gram Stain [[id number]] Collected: 12/05/24 0800    Order Status: Completed Specimen: Tissue from Hip, Left Updated: 12/05/24 1443     GRAM STAIN Rare WBC observed      No bacteria seen    Gram Stain [[id number]] Collected: 12/05/24 0801    Order Status: Completed Specimen: Tissue from Hip, Left Updated: 12/05/24 1442     GRAM STAIN Rare WBC observed      No bacteria seen    Anaerobic Culture [[id number]] Collected: 12/05/24 0801    Order Status: Sent Specimen: Tissue from Hip, Left Updated: 12/05/24 0852    Anaerobic Culture [[id number]] Collected: 12/05/24 0800    Order Status: Sent Specimen: Tissue from Hip, Left Updated: 12/05/24 0852    Tissue Culture - Aerobic [[id number]] Collected: 12/05/24 0801    Order Status: Sent Specimen: Tissue from Hip, Left Updated: 12/05/24 0851    Tissue Culture - Aerobic [[id number]] Collected: 12/05/24 0800    Order Status: Sent Specimen: Tissue from Hip, Left Updated: 12/05/24 0851             Radiology:  X-Ray Hip 1 View Left (with Pelvis when performed)  Narrative: EXAMINATION:  XR HIP 1 VIEW LEFT (WITH PELVIS WHEN PERFORMED)    CLINICAL HISTORY:  Post-op;  Dislocation of other internal joint prosthesis, initial encounter    COMPARISON:  Yesterday    FINDINGS:  Two views left hip.  Interval reduction of the dislocation with revision of the arthroplasty.  Impression: As above.    Electronically signed by: Roger  Eddi  Date:    12/05/2024  Time:    10:10      Scheduled Med:   acetaminophen        acetaminophen  1,000 mg Intravenous Once    Followed by    acetaminophen  500 mg Oral Q4H    acetaminophen  1,000 mg Intravenous Q8H    acetaminophen  500 mg Oral Q4H    albumin human 25%        allopurinoL  100 mg Oral Nightly    [START ON 12/6/2024] aspirin  81 mg Oral BID    [START ON 12/8/2024] bisacodyL  10 mg Rectal Daily    carvediloL  3.125 mg Oral BID    ceFAZolin (Ancef) IV (PEDS and ADULTS)  2 g Intravenous Q6H    ciprofloxacin HCl  500 mg Oral Q24H    docusate sodium  100 mg Oral Daily    [START ON 12/6/2024] docusate sodium  200 mg Oral Before breakfast    empagliflozin  10 mg Oral Daily    furosemide  20 mg Oral Daily    gabapentin  300 mg Oral QHS    ketorolac  15 mg Intravenous Q6H    levothyroxine  75 mcg Oral Before breakfast    pantoprazole  40 mg Oral Daily    polyethylene glycol  17 g Oral QHS    sacubitriL-valsartan  1 tablet Oral BID    senna-docusate 8.6-50 mg  2 tablet Oral BID    vancomycin                Nutrition Status:      Assessment/Plan:    Left hip dislocation, hx of SILVIA  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 I have reviewed or attempted to review medical records based upon their availability      _____________________________________________________________________            Olvin Huber MD   12/05/2024

## 2024-12-06 LAB
ABO + RH BLD: NORMAL
ABO + RH BLD: NORMAL
ANION GAP SERPL CALC-SCNC: 11 MEQ/L
BLD PROD TYP BPU: NORMAL
BLD PROD TYP BPU: NORMAL
BLOOD UNIT EXPIRATION DATE: NORMAL
BLOOD UNIT EXPIRATION DATE: NORMAL
BLOOD UNIT TYPE CODE: 6200
BLOOD UNIT TYPE CODE: 6200
BUN SERPL-MCNC: 31.8 MG/DL (ref 9.8–20.1)
CALCIUM SERPL-MCNC: 8.9 MG/DL (ref 8.4–10.2)
CHLORIDE SERPL-SCNC: 108 MMOL/L (ref 98–107)
CO2 SERPL-SCNC: 18 MMOL/L (ref 23–31)
CREAT SERPL-MCNC: 2.06 MG/DL (ref 0.55–1.02)
CREAT/UREA NIT SERPL: 15
CROSSMATCH INTERPRETATION: NORMAL
CROSSMATCH INTERPRETATION: NORMAL
DISPENSE STATUS: NORMAL
DISPENSE STATUS: NORMAL
ERYTHROCYTE [DISTWIDTH] IN BLOOD BY AUTOMATED COUNT: 15 % (ref 11.5–17)
GFR SERPLBLD CREATININE-BSD FMLA CKD-EPI: 23 ML/MIN/1.73/M2
GLUCOSE SERPL-MCNC: 180 MG/DL (ref 82–115)
HCT VFR BLD AUTO: 26.7 % (ref 37–47)
HGB BLD-MCNC: 8.4 G/DL (ref 12–16)
INR PPP: 3.6 (ref 2–3)
MCH RBC QN AUTO: 32.3 PG (ref 27–31)
MCHC RBC AUTO-ENTMCNC: 31.5 G/DL (ref 33–36)
MCV RBC AUTO: 102.7 FL (ref 80–94)
NRBC BLD AUTO-RTO: 0 %
PLATELET # BLD AUTO: 232 X10(3)/MCL (ref 130–400)
PMV BLD AUTO: 9.7 FL (ref 7.4–10.4)
POCT GLUCOSE: 153 MG/DL (ref 70–110)
POCT GLUCOSE: 203 MG/DL (ref 70–110)
POCT GLUCOSE: 218 MG/DL (ref 70–110)
POTASSIUM SERPL-SCNC: 4.5 MMOL/L (ref 3.5–5.1)
PROTHROMBIN TIME: 36.7 SECONDS (ref 11.7–14.5)
RBC # BLD AUTO: 2.6 X10(6)/MCL (ref 4.2–5.4)
SODIUM SERPL-SCNC: 137 MMOL/L (ref 136–145)
UNIT NUMBER: NORMAL
UNIT NUMBER: NORMAL
WBC # BLD AUTO: 13.9 X10(3)/MCL (ref 4.5–11.5)

## 2024-12-06 PROCEDURE — P9016 RBC LEUKOCYTES REDUCED: HCPCS | Performed by: INTERNAL MEDICINE

## 2024-12-06 PROCEDURE — 63600175 PHARM REV CODE 636 W HCPCS: Performed by: ORTHOPAEDIC SURGERY

## 2024-12-06 PROCEDURE — 11000001 HC ACUTE MED/SURG PRIVATE ROOM

## 2024-12-06 PROCEDURE — 99900031 HC PATIENT EDUCATION (STAT)

## 2024-12-06 PROCEDURE — 80048 BASIC METABOLIC PNL TOTAL CA: CPT | Performed by: ORTHOPAEDIC SURGERY

## 2024-12-06 PROCEDURE — 85027 COMPLETE CBC AUTOMATED: CPT | Performed by: ORTHOPAEDIC SURGERY

## 2024-12-06 PROCEDURE — 25000003 PHARM REV CODE 250: Performed by: ORTHOPAEDIC SURGERY

## 2024-12-06 PROCEDURE — 86923 COMPATIBILITY TEST ELECTRIC: CPT | Mod: 91 | Performed by: INTERNAL MEDICINE

## 2024-12-06 PROCEDURE — 36415 COLL VENOUS BLD VENIPUNCTURE: CPT | Performed by: INTERNAL MEDICINE

## 2024-12-06 PROCEDURE — 94799 UNLISTED PULMONARY SVC/PX: CPT

## 2024-12-06 PROCEDURE — 85610 PROTHROMBIN TIME: CPT | Performed by: INTERNAL MEDICINE

## 2024-12-06 PROCEDURE — 94761 N-INVAS EAR/PLS OXIMETRY MLT: CPT

## 2024-12-06 PROCEDURE — 97162 PT EVAL MOD COMPLEX 30 MIN: CPT

## 2024-12-06 PROCEDURE — 97166 OT EVAL MOD COMPLEX 45 MIN: CPT

## 2024-12-06 PROCEDURE — 36430 TRANSFUSION BLD/BLD COMPNT: CPT

## 2024-12-06 RX ORDER — HYDROCODONE BITARTRATE AND ACETAMINOPHEN 500; 5 MG/1; MG/1
TABLET ORAL
Status: DISCONTINUED | OUTPATIENT
Start: 2024-12-06 | End: 2024-12-11 | Stop reason: HOSPADM

## 2024-12-06 RX ORDER — METHOCARBAMOL 500 MG/1
500 TABLET, FILM COATED ORAL EVERY 8 HOURS PRN
Status: DISCONTINUED | OUTPATIENT
Start: 2024-12-06 | End: 2024-12-11 | Stop reason: HOSPADM

## 2024-12-06 RX ORDER — TRAMADOL HYDROCHLORIDE 50 MG/1
50 TABLET ORAL EVERY 6 HOURS PRN
Status: DISCONTINUED | OUTPATIENT
Start: 2024-12-06 | End: 2024-12-11 | Stop reason: HOSPADM

## 2024-12-06 RX ADMIN — ACETAMINOPHEN 500 MG: 500 TABLET ORAL at 01:12

## 2024-12-06 RX ADMIN — PANTOPRAZOLE SODIUM 40 MG: 40 TABLET, DELAYED RELEASE ORAL at 09:12

## 2024-12-06 RX ADMIN — DOCUSATE SODIUM 200 MG: 100 CAPSULE, LIQUID FILLED ORAL at 05:12

## 2024-12-06 RX ADMIN — ALLOPURINOL 100 MG: 100 TABLET ORAL at 08:12

## 2024-12-06 RX ADMIN — INSULIN ASPART 1 UNITS: 100 INJECTION, SOLUTION INTRAVENOUS; SUBCUTANEOUS at 07:12

## 2024-12-06 RX ADMIN — SENNOSIDES AND DOCUSATE SODIUM 2 TABLET: 50; 8.6 TABLET ORAL at 09:12

## 2024-12-06 RX ADMIN — CIPROFLOXACIN 500 MG: 500 TABLET ORAL at 08:12

## 2024-12-06 RX ADMIN — GABAPENTIN 300 MG: 300 CAPSULE ORAL at 08:12

## 2024-12-06 RX ADMIN — POLYETHYLENE GLYCOL 3350 17 G: 17 POWDER, FOR SOLUTION ORAL at 08:12

## 2024-12-06 RX ADMIN — SENNOSIDES AND DOCUSATE SODIUM 2 TABLET: 50; 8.6 TABLET ORAL at 08:12

## 2024-12-06 RX ADMIN — ACETAMINOPHEN 500 MG: 500 TABLET ORAL at 05:12

## 2024-12-06 RX ADMIN — CEFAZOLIN 2 G: 2 INJECTION, POWDER, FOR SOLUTION INTRAMUSCULAR; INTRAVENOUS at 03:12

## 2024-12-06 RX ADMIN — ACETAMINOPHEN 500 MG: 500 TABLET ORAL at 09:12

## 2024-12-06 RX ADMIN — KETOROLAC TROMETHAMINE 15 MG: 30 INJECTION, SOLUTION INTRAMUSCULAR at 01:12

## 2024-12-06 RX ADMIN — EMPAGLIFLOZIN 10 MG: 10 TABLET, FILM COATED ORAL at 09:12

## 2024-12-06 RX ADMIN — KETOROLAC TROMETHAMINE 15 MG: 30 INJECTION, SOLUTION INTRAMUSCULAR at 05:12

## 2024-12-06 RX ADMIN — LEVOTHYROXINE SODIUM 75 MCG: 0.05 TABLET ORAL at 05:12

## 2024-12-06 RX ADMIN — INSULIN ASPART 2 UNITS: 100 INJECTION, SOLUTION INTRAVENOUS; SUBCUTANEOUS at 12:12

## 2024-12-06 NOTE — PT/OT/SLP EVAL
Physical Therapy Evaluation    Patient Name:  Homa Jaquez   MRN:  [id number]    Recommendations:     Discharge Recommendations: Moderate Intensity Therapy   Discharge Equipment Recommendations: walker, rolling, other (see comments), lift device (TBD)   Barriers to discharge:  impaired functional mobility    Assessment:     Homa Jaquez is a 89 y.o. female admitted with a medical diagnosis of Failed total hip arthroplasty with dislocation, initial encounter.  She presents with the following impairments/functional limitations: weakness, impaired endurance, impaired self care skills, impaired functional mobility, gait instability, impaired balance, decreased upper extremity function, decreased lower extremity function, pain, decreased ROM, impaired skin, edema, orthopedic precautions . Jim present for assistance with donning hip abduction brace    Rehab Prognosis: Fair; patient would benefit from acute skilled PT services to address these deficits and reach maximum level of function.    Recent Surgery: Procedure(s) (LRB):  CLOSED REDUCTION, HIP (Left)  REVISION,ARTHROPLASTY,HIP,POSTERIOR APPROACH (Left) 1 Day Post-Op    Plan:     During this hospitalization, patient to be seen daily (QD-BID) to address the identified rehab impairments via gait training, therapeutic activities, therapeutic exercises and progress toward the following goals:    Plan of Care Expires:  12/12/24    Subjective     Chief Complaint: L hip pain  Patient/Family Comments/goals:   Pain/Comfort:  Location - Side 1: Left  Location 1: hip  Pain Addressed 1: Pre-medicate for activity, Reposition    Patients cultural, spiritual, Anabaptism conflicts given the current situation:      Living Environment:  Pt lives in single story home with , ramp to enter home  Prior to admission, patients level of function was dependent with assistance from family/ and use of lift chair for standing/transfer.  Equipment used at home: walker,  rolling, lift device, bedside commode, wheelchair, shower chair, grab bar.  DME owned (not currently used): none.  Upon discharge, patient will have assistance from /family.    Objective:     Communicated with nurse prior to session.  Patient found supine with peripheral IV, telemetry, talley catheter, hip abduction brace  upon PT entry to room.    General Precautions: Standard, fall  Orthopedic Precautions:LLE partial weight bearing, LLE posterior precautions (50% WB)   Braces: Hip abduction brace  Respiratory Status: Room air    Exams:  RLE ROM: WFL  RLE Strength: General Weakness  LLE ROM: NT dt sx side  LLE Strength: NT dt sx side    Functional Mobility:  Bed Mobility:     Rolling Left:  moderate assistance and of 1 persons, min of another person; pt able to assist with use of railings for rolling as Jim assisted with fitting hip abduction brace appropriately  Rolling Right: moderate assistance and of 1 persons, min of another person  Supine to Sit: maximal assistance and of 2 persons; pt able to tolerate a few minutes of sitting edge of bed before becoming fatigued  Sit to Supine: maximal assistance and of 2 persons        Treatment & Education:  Pt edu on total hip precautions, partial WB status and importance of frequent mobility    Patient left supine with all lines intact, call button in reach, nurse notified, and nurse present.    GOALS:   Multidisciplinary Problems       Physical Therapy Goals          Problem: Physical Therapy    Goal Priority Disciplines Outcome Interventions   Physical Therapy Goal     PT, PT/OT Progressing    Description: Pt will improve functional independence by performing:    Bed mobility: mod A  Sit to stand: max A with rolling walker  Bed to chair t/f: max A with Stand Pivot  with rolling walker  Independent with total hip HEP                        History:     Past Medical History:   Diagnosis Date    A-fib     Chronic cystitis     GERD (gastroesophageal reflux  disease)     Graves disease     Hypertension     Hypothyroidism, unspecified     Spinal stenosis        Past Surgical History:   Procedure Laterality Date    APPENDECTOMY      BLADDER SUSPENSION      CARDIOVERSION  04/01/2015    CATARACT EXTRACTION      CLOSED REDUCTION OF INJURY OF HIP Left 12/5/2024    Procedure: CLOSED REDUCTION, HIP;  Surgeon: Jonny Bains MD;  Location: [facility];  Service: Orthopedics;  Laterality: Left;    CONVERSION ARTHROPLASTY, HIP, POSTERIOR APPROACH Left 7/15/2024    Procedure: CONVERSION ARTHROPLASTY, HIP, POSTERIOR APPROACH - valencia, cell saver, pathology;  Surgeon: Jonny Bains MD;  Location: [facility];  Service: Orthopedics;  Laterality: Left;  valencia, cell saver, pathology    HYSTERECTOMY      INTRAMEDULLARY RODDING OF FEMUR Left 7/3/2024    Procedure: INSERTION, INTRAMEDULLARY MELANIE, FEMUR;  Surgeon: Steve Pierce DO;  Location: [facility];  Service: Orthopedics;  Laterality: Left;  supine hana table c arm synthes    LAPAROSCOPIC CHOLECYSTECTOMY  01/12/2016    REVISION, ARTHROPLASTY, HIP, POSTERIOR APPROACH Left 8/12/2024    Procedure: REVISION,ARTHROPLASTY,HIP,POSTERIOR APPROACH;  Surgeon: Jonny Bains MD;  Location: [facility];  Service: Orthopedics;  Laterality: Left;  I&D L hip - head/liner    REVISION, ARTHROPLASTY, HIP, POSTERIOR APPROACH Left 12/5/2024    Procedure: REVISION,ARTHROPLASTY,HIP,POSTERIOR APPROACH;  Surgeon: Jonny Bains MD;  Location: [facility];  Service: Orthopedics;  Laterality: Left;    SPHINCTEROTOMY OF URETHRA  01/11/2016    TONSILLECTOMY AND ADENOIDECTOMY         Time Tracking:     PT Received On:    PT Start Time: 0905     PT Stop Time: 0943  PT Total Time (min): 38 min     Billable Minutes: Evaluation 30 and Therapeutic Activity 8      12/06/2024

## 2024-12-06 NOTE — PROGRESS NOTES
"No acute events overnight.  Pain controlled.  Resting in bed. Feeling well this AM.    Vital Signs  Temp: 97.6 °F (36.4 °C)  Temp Source: Oral  Pulse: 83  Heart Rate Source: Monitor  Resp: 18  SpO2: 97 %  Pulse Oximetry Type: Intermittent  Flow (L/min) (Oxygen Therapy): 10  Oxygen Concentration (%): 80  Device (Oxygen Therapy): Face tent  BP: 110/73  BP Location: Left arm  BP Method: Automatic  Patient Position: Lying  Height and Weight  Height: 5' 4" (162.6 cm)  Height Method: Estimated  Weight: 83.9 kg (185 lb)  Weight Method: Estimated  Dosing Weight: 83.9 kg (185 lb)  BSA (Calculated - sq m): 1.95 sq meters  BMI (Calculated): 31.7  Weight in (lb) to have BMI = 25: 145.3]    +FHL/EHL  BCR distally  Dressing c/d/i  SILT distally    Recent Lab Results  (Last 5 results in the past 24 hours)        12/06/24  0453   12/05/24  1114   12/05/24  0945   12/05/24  0930   12/05/24  0908        Anion Gap 11.0               BUN 31.8               BUN/CREAT RATIO 15               Calcium 8.9               Chloride 108               CO2 18               Creatinine 2.06               eGFR 23               Glucose 180               Group & Rh         A POS       Hematocrit 26.7       29.9         Hemoglobin 8.4       9.4         Indirect Debra GEL         NEG       INR 3.6               MCH 32.3               MCHC 31.5               .7               MPV 9.7               nRBC 0.0               Platelet Count 232               POCT Glucose   176   153           Potassium 4.5               PT 36.7               RBC 2.60               RDW 15.0               Sodium 137               Specimen Outdate         12/08/2024 23:59       WBC 13.90                                      A/P:  Status post SILVIA revision  Pain controlled  Overall patient doing well.  Therapy for mobility and ambulation.  DVT PPx  F/u cultures  50% WB  "

## 2024-12-06 NOTE — PT/OT/SLP PROGRESS
Occupational Therapy      Patient Name:  Homa Jaquez   MRN:  [id number]    Patient not seen this afternoon secondary to pt refusal, she reports she is waiting for them to start her blood. Will follow-up tomorrow AM.    12/6/2024

## 2024-12-06 NOTE — PLAN OF CARE
Problem: Occupational Therapy  Goal: Occupational Therapy Goal  Description: Pt will perform LB dressing max A by d/c.  Pt will perform toileting max A by d/c.  Pt will perform toilet t/f max A with BSC by d/c.  Pt will perform tub t/f max assist c TTB by d/c.  Pt will perform car t/f max assist in adherence to pxns by d/c.   Outcome: Progressing

## 2024-12-06 NOTE — PT/OT/SLP EVAL
"Occupational Therapy   Evaluation    Name: Homa Jaquez  MRN: [id number]  Admitting Diagnosis: Failed total hip arthroplasty with dislocation, initial encounter  Recent Surgery: Procedure(s) (LRB):  CLOSED REDUCTION, HIP (Left)  REVISION,ARTHROPLASTY,HIP,POSTERIOR APPROACH (Left) 1 Day Post-Op    Recommendations:     Discharge Recommendations: Moderate Intensity Therapy  Discharge Equipment Recommendations:  lift device, walker, rolling  Barriers to discharge:   (increased level of assist for transitions and transfers)    Assessment:     Homa Jaquez is a 89 y.o. female with a medical diagnosis of Failed total hip arthroplasty with dislocation, initial encounter. Performance deficits affecting function: weakness, orthopedic precautions, impaired joint extensibility, edema, impaired skin, decreased ROM, impaired endurance, impaired self care skills, impaired functional mobility, decreased lower extremity function, decreased safety awareness, impaired balance, gait instability.      Rehab Prognosis: Poor; patient would benefit from acute skilled OT services to address these deficits and reach maximum level of function.       Plan:     Patient to be seen  (QD-BID) to address the above listed problems via self-care/home management, therapeutic activities, therapeutic exercises  Plan of Care Expires: 12/12/24  Plan of Care Reviewed with: patient    Subjective     Chief Complaint: fatigue  Patient/Family Comments/goals: "I wanna sleep some more."     Occupational Profile:  Living Environment: patient lives in [facility] with ramp to enter, lives with .  Previous level of function: dependent, assistance from  and family. Had lift chair for sit<>stand transfers.   Equipment Used at Home: walker, rolling, bedside commode  Assistance upon Discharge: family/    Pain/Comfort:  Pain Rating 1:  (unrated)    Patients cultural, spiritual, Druze conflicts given the current situation: no    Objective: " "    Communicated with: NSG prior to session.  Patient found HOB elevated with peripheral IV, wound vac, talley catheter, telemetry upon OT entry to room.    General Precautions: Standard, fall  Orthopedic Precautions: LLE partial weight bearing, LLE posterior precautions (50%)  Braces: Hip abduction brace  Respiratory Status: Room air    Occupational Performance:    Bed Mobility:    Patient completed Scooting/Bridging with maximal assistance  Patient completed Supine to Sit with maximal assistance  Patient completed Sit to Supine with maximal assistance    Functional Mobility/Transfers:  Refused any transfers at this time due to fatigue - pt reporting "I am just not very stable this morning." Endorsed feeling light-headed while sitting EOB    Cognitive/Visual Perceptual:  Cognitive/Psychosocial Skills:     -       Oriented to: Person, Place, and Situation   -       Follows Commands/attention:Follows one-step commands  -       Communication: clear/fluent  -       Memory: No Deficits noted  -       Safety awareness/insight to disability: intact   -       Mood/Affect/Coping skills/emotional control: Appropriate to situation and Cooperative  Visual/Perceptual:      -Intact      Physical Exam:  Motor Planning: -       intact  Generalized weakness    Treatment & Education:  Patient educated on roles and goals of occupational therapy, POC for acute stay    Patient left HOB elevated with all lines intact, call button in reach, and CNA present    GOALS:   Multidisciplinary Problems       Occupational Therapy Goals          Problem: Occupational Therapy    Goal Priority Disciplines Outcome Interventions   Occupational Therapy Goal     OT, PT/OT Progressing    Description: Pt will perform LB dressing max A by d/c.  Pt will perform toileting max A by d/c.  Pt will perform toilet t/f max A with BSC by d/c.  Pt will perform tub t/f max assist c TTB by d/c.  Pt will perform car t/f max assist in adherence to pxns by d/c.          "               History:     Past Medical History:   Diagnosis Date    A-fib     Chronic cystitis     GERD (gastroesophageal reflux disease)     Graves disease     Hypertension     Hypothyroidism, unspecified     Spinal stenosis          Past Surgical History:   Procedure Laterality Date    APPENDECTOMY      BLADDER SUSPENSION      CARDIOVERSION  04/01/2015    CATARACT EXTRACTION      CLOSED REDUCTION OF INJURY OF HIP Left 12/5/2024    Procedure: CLOSED REDUCTION, HIP;  Surgeon: Jonny Bains MD;  Location: [facility];  Service: Orthopedics;  Laterality: Left;    CONVERSION ARTHROPLASTY, HIP, POSTERIOR APPROACH Left 7/15/2024    Procedure: CONVERSION ARTHROPLASTY, HIP, POSTERIOR APPROACH - valencia, cell saver, pathology;  Surgeon: Jonny Bains MD;  Location: [facility];  Service: Orthopedics;  Laterality: Left;  valencia, cell saver, pathology    HYSTERECTOMY      INTRAMEDULLARY RODDING OF FEMUR Left 7/3/2024    Procedure: INSERTION, INTRAMEDULLARY MELANIE, FEMUR;  Surgeon: Steve Pierce DO;  Location: [facility];  Service: Orthopedics;  Laterality: Left;  supine hana table c arm synthes    LAPAROSCOPIC CHOLECYSTECTOMY  01/12/2016    REVISION, ARTHROPLASTY, HIP, POSTERIOR APPROACH Left 8/12/2024    Procedure: REVISION,ARTHROPLASTY,HIP,POSTERIOR APPROACH;  Surgeon: Jonny Bains MD;  Location: [facility] OR;  Service: Orthopedics;  Laterality: Left;  I&D L hip - head/liner    REVISION, ARTHROPLASTY, HIP, POSTERIOR APPROACH Left 12/5/2024    Procedure: REVISION,ARTHROPLASTY,HIP,POSTERIOR APPROACH;  Surgeon: Jonny Bains MD;  Location: [facility];  Service: Orthopedics;  Laterality: Left;    SPHINCTEROTOMY OF URETHRA  01/11/2016    TONSILLECTOMY AND ADENOIDECTOMY         Time Tracking:     OT Date of Treatment: 12/06/24  OT Start Time: 1107  OT Stop Time: 1127  OT Total Time (min): 20 min    Billable Minutes:Evaluation 20 12/6/2024

## 2024-12-06 NOTE — PROGRESS NOTES
[facility]  [facility] Medicine Progress Note      Patient Name: Homa Jaquez  MRN: [id number]  Admission Date: 12/4/2024   Length of Stay: 1  Attending Physician: Olvin Huber MD  Primary Care Provider: Franklyn Brito MD  Face-to-Face encounter date: 12/06/2024    Code Status: DNR        Chief Complaint:   Hip Pain        HPI:   Homa Jaquez is a 89 y.o. female who  has a past medical history of A-fib, Chronic cystitis, GERD (gastroesophageal reflux disease), Graves disease, Hypertension, Hypothyroidism, unspecified, and Spinal stenosis.. The patient presented to [facility] on 12/4/2024 with a primary complaint of left hip pain. I have cared for her in the past. The patient was found to have a dislocation of her left hip. She has a history of a chronic infection of the same hip. She has no other c/o. Her son is at the bedside. He denies any trauma that led to the dislocation. She had done well with the last 2 days.      Overview/Hospital Course:  No notes on file       Interval Hx:   The patient has no c/o, pain controlled. She did have bloody drainage in her wound vac this am. Her BP was low this am so her bp meds were held. Her updated home med list shows she is no longer on lasix.    Review of Systems   All other systems reviewed and are negative.      Objective/physical exam:  General: In no acute distress, afebrile  Chest: Clear to auscultation bilaterally  Heart: RRR, +S1, S2, no appreciable murmur  Abdomen: Soft, nontender, BS +  MSK: Warm, no lower extremity edema, no clubbing or cyanosis, s/p left hip surgery, wound vac in place  Neurologic: Alert and oriented x4  Psych/mental status: Appropriate mood and affect. Responds appropriately to questions.     VITAL SIGNS: 24 HRS MIN & MAX LAST   Temp  Min: 97.3 °F (36.3 °C)  Max: 97.7 °F (36.5 °C) 97.3 °F (36.3 °C)   BP  Min: 88/54  Max: 118/76 108/66   Pulse  Min: 82  Max: 112  96   Resp  Min: 18  Max:  18 18   SpO2  Min: 95 %  Max: 100 % 95 %       Recent Labs   Lab 12/04/24  1453 12/05/24  0436 12/05/24  0930 12/06/24  0453   WBC 7.92 7.52  --  13.90*   RBC 3.61* 3.52*  --  2.60*   HGB 11.5* 11.4* 9.4* 8.4*   HCT 35.4* 35.4* 29.9* 26.7*   MCV 98.1* 100.6*  --  102.7*   MCH 31.9* 32.4*  --  32.3*   MCHC 32.5* 32.2*  --  31.5*   RDW 14.9 15.1  --  15.0    297  --  232   MPV 9.5 9.6  --  9.7       Recent Labs   Lab 12/04/24  1453 12/05/24  0436 12/06/24 0453    142 137   K 4.1 4.1 4.5   * 115* 108*   CO2 19* 18* 18*   BUN 23.6* 22.7* 31.8*   CREATININE 1.32* 1.24* 2.06*   CALCIUM 8.6 8.7 8.9   MG  --  1.90  --    ALBUMIN 2.5*  --   --    ALKPHOS 122  --   --    ALT 12  --   --    AST 29  --   --    BILITOT 0.5  --   --         Recent Results (from the past 24 hours)   Basic metabolic panel    Collection Time: 12/06/24  4:53 AM   Result Value Ref Range    Sodium 137 136 - 145 mmol/L    Potassium 4.5 3.5 - 5.1 mmol/L    Chloride 108 (H) 98 - 107 mmol/L    CO2 18 (L) 23 - 31 mmol/L    Glucose 180 (H) 82 - 115 mg/dL    Blood Urea Nitrogen 31.8 (H) 9.8 - 20.1 mg/dL    Creatinine 2.06 (H) 0.55 - 1.02 mg/dL    BUN/Creatinine Ratio 15     Calcium 8.9 8.4 - 10.2 mg/dL    Anion Gap 11.0 mEq/L    eGFR 23 mL/min/1.73/m2   CBC Without Differential    Collection Time: 12/06/24  4:53 AM   Result Value Ref Range    WBC 13.90 (H) 4.50 - 11.50 x10(3)/mcL    RBC 2.60 (L) 4.20 - 5.40 x10(6)/mcL    Hgb 8.4 (L) 12.0 - 16.0 g/dL    Hct 26.7 (L) 37.0 - 47.0 %    .7 (H) 80.0 - 94.0 fL    MCH 32.3 (H) 27.0 - 31.0 pg    MCHC 31.5 (L) 33.0 - 36.0 g/dL    RDW 15.0 11.5 - 17.0 %    Platelet 232 130 - 400 x10(3)/mcL    MPV 9.7 7.4 - 10.4 fL    NRBC% 0.0 %   Protime-INR    Collection Time: 12/06/24  4:53 AM   Result Value Ref Range    PT 36.7 (H) 11.7 - 14.5 seconds    INR 3.6 (H) 2.0 - 3.0       Microbiology Results (last 7 days)       Procedure Component Value Units Date/Time    Tissue Culture - Aerobic [[id number]]  Collected: 12/05/24 0800    Order Status: Completed Specimen: Tissue from Hip, Left Updated: 12/06/24 0643     Tissue - Aerobic Culture No Growth At 24 Hours    Tissue Culture - Aerobic [[id number]] Collected: 12/05/24 0801    Order Status: Completed Specimen: Tissue from Hip, Left Updated: 12/06/24 0643     Tissue - Aerobic Culture No Growth At 24 Hours    Gram Stain [[id number]] Collected: 12/05/24 0800    Order Status: Completed Specimen: Tissue from Hip, Left Updated: 12/05/24 1443     GRAM STAIN Rare WBC observed      No bacteria seen    Gram Stain [[id number]] Collected: 12/05/24 0801    Order Status: Completed Specimen: Tissue from Hip, Left Updated: 12/05/24 1442     GRAM STAIN Rare WBC observed      No bacteria seen    Anaerobic Culture [[id number]] Collected: 12/05/24 0801    Order Status: Sent Specimen: Tissue from Hip, Left Updated: 12/05/24 0852    Anaerobic Culture [[id number]] Collected: 12/05/24 0800    Order Status: Sent Specimen: Tissue from Hip, Left Updated: 12/05/24 0852             Radiology:  X-Ray Hip 1 View Left (with Pelvis when performed)  Narrative: EXAMINATION:  XR HIP 1 VIEW LEFT (WITH PELVIS WHEN PERFORMED)    CLINICAL HISTORY:  Post-op;  Dislocation of other internal joint prosthesis, initial encounter    COMPARISON:  Yesterday    FINDINGS:  Two views left hip.  Interval reduction of the dislocation with revision of the arthroplasty.  Impression: As above.    Electronically signed by: Roger Montague  Date:    12/05/2024  Time:    10:10      Scheduled Med:   acetaminophen  500 mg Oral Q4H    allopurinoL  100 mg Oral Nightly    [START ON 12/8/2024] bisacodyL  10 mg Rectal Daily    carvediloL  3.125 mg Oral BID    ciprofloxacin HCl  500 mg Oral Q24H    docusate sodium  200 mg Oral Before breakfast    empagliflozin  10 mg Oral Daily    furosemide  20 mg Oral Daily    gabapentin  300 mg Oral QHS    levothyroxine  75 mcg Oral Before breakfast    pantoprazole  40 mg Oral Daily    polyethylene  glycol  17 g Oral QHS    sacubitriL-valsartan  1 tablet Oral BID    senna-docusate 8.6-50 mg  2 tablet Oral BID            Nutrition Status:      Assessment/Plan:    Left hip dislocation, hx of SILVIA  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 I have reviewed or attempted to review medical records based upon their availability      _____________________________________________________________________            Olvin Huber MD   12/06/2024

## 2024-12-06 NOTE — PLAN OF CARE
Problem: Physical Therapy  Goal: Physical Therapy Goal  Description: Pt will improve functional independence by performing:    Bed mobility: mod A  Sit to stand: max A with rolling walker  Bed to chair t/f: max A with Stand Pivot  with rolling walker  Independent with total hip HEP   Outcome: Progressing

## 2024-12-07 LAB
ANION GAP SERPL CALC-SCNC: 11 MEQ/L
BACTERIA #/AREA URNS AUTO: ABNORMAL /HPF
BILIRUB UR QL STRIP.AUTO: NEGATIVE
BUN SERPL-MCNC: 40.7 MG/DL (ref 9.8–20.1)
CALCIUM SERPL-MCNC: 8.6 MG/DL (ref 8.4–10.2)
CHLORIDE SERPL-SCNC: 106 MMOL/L (ref 98–107)
CLARITY UR: ABNORMAL
CO2 SERPL-SCNC: 17 MMOL/L (ref 23–31)
COLOR UR AUTO: YELLOW
CREAT SERPL-MCNC: 2.48 MG/DL (ref 0.55–1.02)
CREAT/UREA NIT SERPL: 16
ERYTHROCYTE [DISTWIDTH] IN BLOOD BY AUTOMATED COUNT: 17 % (ref 11.5–17)
GFR SERPLBLD CREATININE-BSD FMLA CKD-EPI: 18 ML/MIN/1.73/M2
GLUCOSE SERPL-MCNC: 131 MG/DL (ref 82–115)
GLUCOSE UR QL STRIP: >=1000
HCT VFR BLD AUTO: 34.6 % (ref 37–47)
HGB BLD-MCNC: 11.5 G/DL (ref 12–16)
HGB UR QL STRIP: ABNORMAL
KETONES UR QL STRIP: NEGATIVE
LEUKOCYTE ESTERASE UR QL STRIP: ABNORMAL
MCH RBC QN AUTO: 30.8 PG (ref 27–31)
MCHC RBC AUTO-ENTMCNC: 33.2 G/DL (ref 33–36)
MCV RBC AUTO: 92.8 FL (ref 80–94)
NITRITE UR QL STRIP: NEGATIVE
NRBC BLD AUTO-RTO: 0 %
PH UR STRIP: 5.5 [PH]
PLATELET # BLD AUTO: 236 X10(3)/MCL (ref 130–400)
PMV BLD AUTO: 9.7 FL (ref 7.4–10.4)
POCT GLUCOSE: 154 MG/DL (ref 70–110)
POCT GLUCOSE: 159 MG/DL (ref 70–110)
POCT GLUCOSE: 166 MG/DL (ref 70–110)
POTASSIUM SERPL-SCNC: 4.5 MMOL/L (ref 3.5–5.1)
PROT UR QL STRIP: ABNORMAL
RBC # BLD AUTO: 3.73 X10(6)/MCL (ref 4.2–5.4)
RBC #/AREA URNS AUTO: ABNORMAL /HPF
SODIUM SERPL-SCNC: 134 MMOL/L (ref 136–145)
SP GR UR STRIP.AUTO: 1.01 (ref 1–1.03)
SQUAMOUS #/AREA URNS AUTO: ABNORMAL /HPF
UROBILINOGEN UR STRIP-ACNC: 0.2
WBC # BLD AUTO: 9.5 X10(3)/MCL (ref 4.5–11.5)
WBC #/AREA URNS AUTO: ABNORMAL /HPF
YEAST UR QL AUTO: ABNORMAL /HPF

## 2024-12-07 PROCEDURE — 36415 COLL VENOUS BLD VENIPUNCTURE: CPT | Performed by: ORTHOPAEDIC SURGERY

## 2024-12-07 PROCEDURE — 11000001 HC ACUTE MED/SURG PRIVATE ROOM

## 2024-12-07 PROCEDURE — 25000003 PHARM REV CODE 250: Performed by: ORTHOPAEDIC SURGERY

## 2024-12-07 PROCEDURE — 97530 THERAPEUTIC ACTIVITIES: CPT | Mod: CQ

## 2024-12-07 PROCEDURE — 80048 BASIC METABOLIC PNL TOTAL CA: CPT | Performed by: ORTHOPAEDIC SURGERY

## 2024-12-07 PROCEDURE — 94761 N-INVAS EAR/PLS OXIMETRY MLT: CPT

## 2024-12-07 PROCEDURE — 85027 COMPLETE CBC AUTOMATED: CPT | Performed by: ORTHOPAEDIC SURGERY

## 2024-12-07 PROCEDURE — 97530 THERAPEUTIC ACTIVITIES: CPT | Mod: CO

## 2024-12-07 PROCEDURE — 81015 MICROSCOPIC EXAM OF URINE: CPT | Performed by: INTERNAL MEDICINE

## 2024-12-07 PROCEDURE — 94799 UNLISTED PULMONARY SVC/PX: CPT

## 2024-12-07 PROCEDURE — 99900031 HC PATIENT EDUCATION (STAT)

## 2024-12-07 RX ADMIN — ACETAMINOPHEN 500 MG: 500 TABLET ORAL at 05:12

## 2024-12-07 RX ADMIN — CARVEDILOL 3.12 MG: 3.12 TABLET, FILM COATED ORAL at 09:12

## 2024-12-07 RX ADMIN — ACETAMINOPHEN 499.51 MG: 160 SOLUTION ORAL at 11:12

## 2024-12-07 RX ADMIN — SACUBITRIL AND VALSARTAN 1 TABLET: 24; 26 TABLET, FILM COATED ORAL at 09:12

## 2024-12-07 RX ADMIN — CIPROFLOXACIN 500 MG: 500 TABLET ORAL at 09:12

## 2024-12-07 RX ADMIN — GABAPENTIN 300 MG: 300 CAPSULE ORAL at 09:12

## 2024-12-07 RX ADMIN — ALLOPURINOL 100 MG: 100 TABLET ORAL at 09:12

## 2024-12-07 RX ADMIN — CARVEDILOL 3.12 MG: 3.12 TABLET, FILM COATED ORAL at 08:12

## 2024-12-07 RX ADMIN — DOCUSATE SODIUM 200 MG: 100 CAPSULE, LIQUID FILLED ORAL at 05:12

## 2024-12-07 RX ADMIN — PANTOPRAZOLE SODIUM 40 MG: 40 TABLET, DELAYED RELEASE ORAL at 08:12

## 2024-12-07 RX ADMIN — EMPAGLIFLOZIN 10 MG: 10 TABLET, FILM COATED ORAL at 08:12

## 2024-12-07 RX ADMIN — LEVOTHYROXINE SODIUM 75 MCG: 0.05 TABLET ORAL at 05:12

## 2024-12-07 RX ADMIN — SENNOSIDES AND DOCUSATE SODIUM 2 TABLET: 50; 8.6 TABLET ORAL at 08:12

## 2024-12-07 NOTE — PROGRESS NOTES
[facility]  [facility] Medicine Progress Note      Patient Name: Homa Jaquez  MRN: [id number]  Admission Date: 12/4/2024   Length of Stay: 2  Attending Physician: Olvin Huber MD  Primary Care Provider: Franklyn Brito MD  Face-to-Face encounter date: 12/07/2024    Code Status: DNR        Chief Complaint:   Hip Pain        HPI:   Homa Jaquez is a 89 y.o. female who  has a past medical history of A-fib, Chronic cystitis, GERD (gastroesophageal reflux disease), Graves disease, Hypertension, Hypothyroidism, unspecified, and Spinal stenosis.. The patient presented to [facility] on 12/4/2024 with a primary complaint of left hip pain. I have cared for her in the past. The patient was found to have a dislocation of her left hip. She has a history of a chronic infection of the same hip. She has no other c/o. Her son is at the bedside. He denies any trauma that led to the dislocation. She had done well with the last 2 days.      Overview/Hospital Course:  No notes on file       Interval Hx:   The patient is asleep but her family is at the bedside. She reports the pt had no c/o. Her pain is well controlled. She was encouraged to drink more liquids.     Review of Systems   All other systems reviewed and are negative.      Objective/physical exam:  General: In no acute distress, afebrile  Chest: Clear to auscultation bilaterally  Heart: RRR, +S1, S2, no appreciable murmur  Abdomen: Soft, nontender, BS +, +talley  MSK: Warm, no lower extremity edema, no clubbing or cyanosis, s/p left hip surgery, wound vac in place  Neurologic: Alert and oriented x4  Psych/mental status: Appropriate mood and affect. Responds appropriately to questions.     VITAL SIGNS: 24 HRS MIN & MAX LAST   Temp  Min: 97.3 °F (36.3 °C)  Max: 98 °F (36.7 °C) 97.3 °F (36.3 °C)   BP  Min: 85/49  Max: 129/80 129/80   Pulse  Min: 65  Max: 106  65   Resp  Min: 16  Max: 19 18   SpO2  Min: 95 %  Max: 100 % 98  %       Recent Labs   Lab 12/05/24  0436 12/05/24  0930 12/06/24  0453 12/07/24  0716   WBC 7.52  --  13.90* 9.50   RBC 3.52*  --  2.60* 3.73*   HGB 11.4* 9.4* 8.4* 11.5*   HCT 35.4* 29.9* 26.7* 34.6*   .6*  --  102.7* 92.8   MCH 32.4*  --  32.3* 30.8   MCHC 32.2*  --  31.5* 33.2   RDW 15.1  --  15.0 17.0     --  232 236   MPV 9.6  --  9.7 9.7       Recent Labs   Lab 12/04/24  1453 12/05/24  0436 12/06/24  0453 12/07/24  0621    142 137 134*   K 4.1 4.1 4.5 4.5   * 115* 108* 106   CO2 19* 18* 18* 17*   BUN 23.6* 22.7* 31.8* 40.7*   CREATININE 1.32* 1.24* 2.06* 2.48*   CALCIUM 8.6 8.7 8.9 8.6   MG  --  1.90  --   --    ALBUMIN 2.5*  --   --   --    ALKPHOS 122  --   --   --    ALT 12  --   --   --    AST 29  --   --   --    BILITOT 0.5  --   --   --         Recent Results (from the past 24 hours)   POCT glucose    Collection Time: 12/06/24  7:37 PM   Result Value Ref Range    POCT Glucose 203 (H) 70 - 110 mg/dL   Basic metabolic panel    Collection Time: 12/07/24  6:21 AM   Result Value Ref Range    Sodium 134 (L) 136 - 145 mmol/L    Potassium 4.5 3.5 - 5.1 mmol/L    Chloride 106 98 - 107 mmol/L    CO2 17 (L) 23 - 31 mmol/L    Glucose 131 (H) 82 - 115 mg/dL    Blood Urea Nitrogen 40.7 (H) 9.8 - 20.1 mg/dL    Creatinine 2.48 (H) 0.55 - 1.02 mg/dL    BUN/Creatinine Ratio 16     Calcium 8.6 8.4 - 10.2 mg/dL    Anion Gap 11.0 mEq/L    eGFR 18 mL/min/1.73/m2   POCT glucose    Collection Time: 12/07/24  6:32 AM   Result Value Ref Range    POCT Glucose 154 (H) 70 - 110 mg/dL   CBC Without Differential    Collection Time: 12/07/24  7:16 AM   Result Value Ref Range    WBC 9.50 4.50 - 11.50 x10(3)/mcL    RBC 3.73 (L) 4.20 - 5.40 x10(6)/mcL    Hgb 11.5 (L) 12.0 - 16.0 g/dL    Hct 34.6 (L) 37.0 - 47.0 %    MCV 92.8 80.0 - 94.0 fL    MCH 30.8 27.0 - 31.0 pg    MCHC 33.2 33.0 - 36.0 g/dL    RDW 17.0 11.5 - 17.0 %    Platelet 236 130 - 400 x10(3)/mcL    MPV 9.7 7.4 - 10.4 fL    NRBC% 0.0 %   POCT glucose     Collection Time: 12/07/24 11:16 AM   Result Value Ref Range    POCT Glucose 159 (H) 70 - 110 mg/dL       Microbiology Results (last 7 days)       Procedure Component Value Units Date/Time    Tissue Culture - Aerobic [[id number]] Collected: 12/05/24 0800    Order Status: Completed Specimen: Tissue from Hip, Left Updated: 12/07/24 0810     Tissue - Aerobic Culture No Growth At 48 Hours    Tissue Culture - Aerobic [[id number]] Collected: 12/05/24 0801    Order Status: Completed Specimen: Tissue from Hip, Left Updated: 12/07/24 0809     Tissue - Aerobic Culture No Growth At 48 Hours    Anaerobic Culture [[id number]] Collected: 12/05/24 0801    Order Status: Completed Specimen: Tissue from Hip, Left Updated: 12/07/24 0807     Anaerobe Culture No Anaerobes Isolated    Anaerobic Culture [[id number]] Collected: 12/05/24 0800    Order Status: Completed Specimen: Tissue from Hip, Left Updated: 12/07/24 0807     Anaerobe Culture No Anaerobes Isolated    Gram Stain [[id number]] Collected: 12/05/24 0800    Order Status: Completed Specimen: Tissue from Hip, Left Updated: 12/05/24 1443     GRAM STAIN Rare WBC observed      No bacteria seen    Gram Stain [[id number]] Collected: 12/05/24 0801    Order Status: Completed Specimen: Tissue from Hip, Left Updated: 12/05/24 1442     GRAM STAIN Rare WBC observed      No bacteria seen             Radiology:  X-Ray Hip 1 View Left (with Pelvis when performed)  Narrative: EXAMINATION:  XR HIP 1 VIEW LEFT (WITH PELVIS WHEN PERFORMED)    CLINICAL HISTORY:  Post-op;  Dislocation of other internal joint prosthesis, initial encounter    COMPARISON:  Yesterday    FINDINGS:  Two views left hip.  Interval reduction of the dislocation with revision of the arthroplasty.  Impression: As above.    Electronically signed by: Roger Montague  Date:    12/05/2024  Time:    10:10      Scheduled Med:   acetaminophen  500 mg Oral Q4H    allopurinoL  100 mg Oral Nightly    [START ON 12/8/2024] bisacodyL  10 mg  Rectal Daily    carvediloL  3.125 mg Oral BID    ciprofloxacin HCl  500 mg Oral Q24H    docusate sodium  200 mg Oral Before breakfast    empagliflozin  10 mg Oral Daily    gabapentin  300 mg Oral QHS    levothyroxine  75 mcg Oral Before breakfast    pantoprazole  40 mg Oral Daily    polyethylene glycol  17 g Oral QHS    sacubitriL-valsartan  1 tablet Oral BID    senna-docusate 8.6-50 mg  2 tablet Oral BID            Nutrition Status:      Assessment/Plan:    Left hip dislocation, hx of SILVIA  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  Wound VAC left hip 12/5/24  Afib -HR controlled  HTN  Hypothyroidism  DM 2  Dilated cardiomyopathy   Chronic left hip infection  BARRY  Acute blood loss anemia -improved     Plan  follow INR, holding coumadin  H/h improved after pt transfused 2 units PRBC  Lasix dced due to BARRY, increase po fluid intake  DC talley  Cont cipro for chronic hip infection  Wound cx negative so far  Monitor cbgs    All diagnosis and differential diagnosis have been reviewed; assessment and plan has been documented; I have personally reviewed the labs and test results that are presently available; I have reviewed the patients medication list; I have reviewed the consulting providers response and recommendations. I have reviewed or attempted to review medical records based upon their availability      _____________________________________________________________________            Olvin Huber MD   12/07/2024

## 2024-12-07 NOTE — PLAN OF CARE
Problem: Adult Inpatient Plan of Care  Goal: Plan of Care Review  Outcome: Progressing  Goal: Patient-Specific Goal (Individualized)  Outcome: Progressing  Goal: Absence of Hospital-Acquired Illness or Injury  Outcome: Progressing  Goal: Optimal Comfort and Wellbeing  Outcome: Progressing  Goal: Readiness for Transition of Care  Outcome: Progressing     Problem: Skin Injury Risk Increased  Goal: Skin Health and Integrity  Outcome: Progressing     Problem: Diabetes Comorbidity  Goal: Blood Glucose Level Within Targeted Range  Outcome: Progressing     Problem: Acute Kidney Injury/Impairment  Goal: Fluid and Electrolyte Balance  Outcome: Progressing  Goal: Improved Oral Intake  Outcome: Progressing  Goal: Effective Renal Function  Outcome: Progressing     Problem: Wound  Goal: Optimal Coping  Outcome: Progressing  Goal: Optimal Functional Ability  Outcome: Progressing  Goal: Absence of Infection Signs and Symptoms  Outcome: Progressing  Goal: Improved Oral Intake  Outcome: Progressing  Goal: Optimal Pain Control and Function  Outcome: Progressing  Goal: Skin Health and Integrity  Outcome: Progressing  Goal: Optimal Wound Healing  Outcome: Progressing     Problem: Fall Injury Risk  Goal: Absence of Fall and Fall-Related Injury  Outcome: Progressing     Problem: Infection  Goal: Absence of Infection Signs and Symptoms  Outcome: Progressing

## 2024-12-07 NOTE — PT/OT/SLP PROGRESS
"Occupational Therapy   Treatment    Name: Homa Jaquez  MRN: [id number]  Admitting Diagnosis:  Failed total hip arthroplasty with dislocation, initial encounter  2 Days Post-Op    Recommendations:     Discharge Recommendations: Moderate Intensity Therapy  Discharge Equipment Recommendations:  lift device, walker, rolling  Barriers to discharge:       Assessment:     Homa Jaquez is a 89 y.o. female with a medical diagnosis of Failed total hip arthroplasty with dislocation, initial encounter. Performance deficits affecting function are weakness, impaired endurance, impaired self care skills, impaired functional mobility, impaired balance, visual deficits, decreased lower extremity function, pain, decreased ROM, orthopedic precautions.     Rehab Prognosis:  Fair; patient would benefit from acute skilled OT services to address these deficits and reach maximum level of function.       Plan:     Patient to be seen  (QD-BID) to address the above listed problems via self-care/home management, therapeutic activities, therapeutic exercises  Plan of Care Expires: 12/12/24  Plan of Care Reviewed with: patient, jamel    Subjective     Chief Complaint: none  Patient/Family Comments/goals: "get better"  Pain/Comfort:  Pain Rating 1: 3/10  Location - Side 1: Left  Location 1: knee    Objective:     Communicated with: nurse prior to session.  Patient found supine with peripheral IV, SCD, telemetry, talley catheter wound vac upon OT entry to room.    General Precautions: Standard, fall    Orthopedic Precautions:LLE partial weight bearing, LLE posterior precautions (50% WB)  Braces: Hip abduction brace  Respiratory Status: Room air     Occupational Performance:     Bed Mobility:    Patient completed Rolling/Turning to Left with  maximal assistance and 2 persons  Patient completed Supine to Sit with maximal assistance and 2 persons  Patient completed Sit to Supine with maximal assistance and 2 persons     Treatment focused on " static/dyn sitting balance and attempting to laterally scoot along EOB. Pt able to minimally scoot with max x2 assist.   Overall required min/mod assist for sitting balance, had some bouts of CGA with cueing for feet/hand placement        Patient left supine with all lines intact, call button in reach, and son present    GOALS:   Multidisciplinary Problems       Occupational Therapy Goals          Problem: Occupational Therapy    Goal Priority Disciplines Outcome Interventions   Occupational Therapy Goal     OT, PT/OT Progressing    Description: Pt will perform LB dressing max A by d/c.  Pt will perform toileting max A by d/c.  Pt will perform toilet t/f max A with BSC by d/c.  Pt will perform tub t/f max assist c TTB by d/c.  Pt will perform car t/f max assist in adherence to pxns by d/c.                        Time Tracking:     OT Date of Treatment: 12/07/24  OT Start Time: 0914  OT Stop Time: 0945  OT Total Time (min): 31 min    Billable Minutes:Therapeutic Activity 31    OT/EVY: EVY          12/7/2024

## 2024-12-07 NOTE — PROGRESS NOTES
"No acute events overnight.  Pain controlled.  Resting in bed. Feeling better today.    Vital Signs  Temp: 97.7 °F (36.5 °C)  Temp Source: Oral  Pulse: 106  Heart Rate Source: Monitor  Resp: 18  SpO2: 96 %  Pulse Oximetry Type: Intermittent  Flow (L/min) (Oxygen Therapy): 10  Oxygen Concentration (%): 80  Device (Oxygen Therapy): room air  BP: 120/68  BP Location: Left arm  BP Method: Automatic  Patient Position: Lying  Height and Weight  Height: 5' 4" (162.6 cm)  Height Method: Measured  Weight: 83.9 kg (184 lb 15.5 oz)  Weight Method: Bed Scale  Dosing Weight: 83.9 kg (185 lb)  BSA (Calculated - sq m): 1.95 sq meters  BMI (Calculated): 31.7  Weight in (lb) to have BMI = 25: 145.3]    +FHL/EHL  BCR distally  Dressing c/d/i  SILT distally    Recent Lab Results         12/07/24  0716   12/07/24  0621   12/06/24  1937   12/06/24  1147        Anion Gap   11.0           BUN   40.7           BUN/CREAT RATIO   16           Calcium   8.6           Chloride   106           CO2   17           Creatinine   2.48           eGFR   18           Glucose   131           Hematocrit 34.6             Hemoglobin 11.5             MCH 30.8             MCHC 33.2             MCV 92.8             MPV 9.7             nRBC 0.0             Platelet Count 236             POCT Glucose     203   218       Potassium   4.5           RBC 3.73             RDW 17.0             Sodium   134           WBC 9.50                     A/P:  Status post revision SILVIA  Pain controlled  Overall patient doing well.  Therapy for mobility and ambulation.  DVT PPx  Slowly improving  50% WB LLE  "

## 2024-12-07 NOTE — PT/OT/SLP PROGRESS
Physical Therapy Treatment    Patient Name:  Homa Jaquez   MRN:  [id number]    Recommendations:     Discharge Recommendations: Moderate Intensity Therapy  Discharge Equipment Recommendations: walker, rolling, other (see comments), lift device (TBD)  Barriers to discharge:  impaired functional mobility    Assessment:     Homa Jaquez is a 89 y.o. female admitted with a medical diagnosis of Failed total hip arthroplasty with dislocation, initial encounter.  She presents with the following impairments/functional limitations: weakness, impaired endurance, impaired functional mobility, impaired balance, decreased lower extremity function, decreased ROM, edema, orthopedic precautions .    Rehab Prognosis: Fair; patient would benefit from acute skilled PT services to address these deficits and reach maximum level of function.    Recent Surgery: Procedure(s) (LRB):  CLOSED REDUCTION, HIP (Left)  REVISION,ARTHROPLASTY,HIP,POSTERIOR APPROACH (Left) 2 Days Post-Op    Plan:     During this hospitalization, patient to be seen daily (QD-BID) to address the identified rehab impairments via gait training, therapeutic activities, therapeutic exercises and progress toward the following goals:    Plan of Care Expires:  12/12/24    Subjective     Chief Complaint: none  Patient/Family Comments/goals:   Pain/Comfort:  Pain Rating 1: 0/10  Pain Addressed 1: Pre-medicate for activity  Pain Rating Post-Intervention 1: 0/10      Objective:     Communicated with NSG prior to session.  Patient found supine with peripheral IV, SCD, talley catheter, wound vac upon PT entry to room.     General Precautions: Standard, fall  Orthopedic Precautions: LLE partial weight bearing, LLE posterior precautions  Braces: Hip abduction brace  Respiratory Status: Room air     Functional Mobility:  Bed Mobility:     Supine to Sit: maximal assistance  Sit to Supine: maximal assistance  Static and dynamic sitting balance EOB with BUE support > 10 mins.  requiring intermittent min.a.-mod.a. for trunk stability with cues for increasing ant. lean    Treatment & Education:  Seated BLE there-ex EOB x 10-15 each: LAQ. Marching, hip abd./add. and ankle pumps (limited ROM LLE)    Patient left supine with all lines intact, call button in reach, and son and daughter  present..    GOALS:   Multidisciplinary Problems       Physical Therapy Goals          Problem: Physical Therapy    Goal Priority Disciplines Outcome Interventions   Physical Therapy Goal     PT, PT/OT Progressing    Description: Pt will improve functional independence by performing:    Bed mobility: mod A  Sit to stand: max A with rolling walker  Bed to chair t/f: max A with Stand Pivot  with rolling walker  Independent with total hip HEP                        Time Tracking:     PT Received On:    PT Start Time: 1120     PT Stop Time: 1155  PT Total Time (min): 35 min     Billable Minutes: Therapeutic Activity 35    Treatment Type: Treatment  PT/PTA: PTA     Number of PTA visits since last PT visit: 0     12/07/2024

## 2024-12-08 LAB
ALBUMIN SERPL-MCNC: 2.4 G/DL (ref 3.4–4.8)
BACTERIA SPEC ANAEROBE CULT: NORMAL
BACTERIA SPEC ANAEROBE CULT: NORMAL
BUN SERPL-MCNC: 43.1 MG/DL (ref 9.8–20.1)
CALCIUM SERPL-MCNC: 8.4 MG/DL (ref 8.4–10.2)
CHLORIDE SERPL-SCNC: 108 MMOL/L (ref 98–107)
CO2 SERPL-SCNC: 18 MMOL/L (ref 23–31)
CREAT SERPL-MCNC: 2.51 MG/DL (ref 0.55–1.02)
ERYTHROCYTE [DISTWIDTH] IN BLOOD BY AUTOMATED COUNT: 17.2 % (ref 11.5–17)
GFR SERPLBLD CREATININE-BSD FMLA CKD-EPI: 18 ML/MIN/1.73/M2
GLUCOSE SERPL-MCNC: 100 MG/DL (ref 82–115)
HCT VFR BLD AUTO: 32.1 % (ref 37–47)
HGB BLD-MCNC: 10.7 G/DL (ref 12–16)
INR PPP: 2.6 (ref 2–3)
MAGNESIUM SERPL-MCNC: 1.9 MG/DL (ref 1.6–2.6)
MCH RBC QN AUTO: 30.8 PG (ref 27–31)
MCHC RBC AUTO-ENTMCNC: 33.3 G/DL (ref 33–36)
MCV RBC AUTO: 92.5 FL (ref 80–94)
NRBC BLD AUTO-RTO: 0 %
PHOSPHATE SERPL-MCNC: 2.5 MG/DL (ref 2.3–4.7)
PLATELET # BLD AUTO: 240 X10(3)/MCL (ref 130–400)
PMV BLD AUTO: 9.6 FL (ref 7.4–10.4)
POTASSIUM SERPL-SCNC: 5 MMOL/L (ref 3.5–5.1)
PROTHROMBIN TIME: 28.8 SECONDS (ref 11.7–14.5)
RBC # BLD AUTO: 3.47 X10(6)/MCL (ref 4.2–5.4)
SODIUM SERPL-SCNC: 136 MMOL/L (ref 136–145)
WBC # BLD AUTO: 8.12 X10(3)/MCL (ref 4.5–11.5)

## 2024-12-08 PROCEDURE — 99900031 HC PATIENT EDUCATION (STAT)

## 2024-12-08 PROCEDURE — 80069 RENAL FUNCTION PANEL: CPT | Performed by: INTERNAL MEDICINE

## 2024-12-08 PROCEDURE — 85610 PROTHROMBIN TIME: CPT | Performed by: INTERNAL MEDICINE

## 2024-12-08 PROCEDURE — 25000003 PHARM REV CODE 250: Performed by: INTERNAL MEDICINE

## 2024-12-08 PROCEDURE — 85027 COMPLETE CBC AUTOMATED: CPT | Performed by: INTERNAL MEDICINE

## 2024-12-08 PROCEDURE — 25000003 PHARM REV CODE 250: Performed by: ORTHOPAEDIC SURGERY

## 2024-12-08 PROCEDURE — 97110 THERAPEUTIC EXERCISES: CPT

## 2024-12-08 PROCEDURE — 97535 SELF CARE MNGMENT TRAINING: CPT

## 2024-12-08 PROCEDURE — 11000001 HC ACUTE MED/SURG PRIVATE ROOM

## 2024-12-08 PROCEDURE — 83735 ASSAY OF MAGNESIUM: CPT | Performed by: INTERNAL MEDICINE

## 2024-12-08 PROCEDURE — 36415 COLL VENOUS BLD VENIPUNCTURE: CPT | Performed by: INTERNAL MEDICINE

## 2024-12-08 PROCEDURE — 94761 N-INVAS EAR/PLS OXIMETRY MLT: CPT

## 2024-12-08 PROCEDURE — 94799 UNLISTED PULMONARY SVC/PX: CPT

## 2024-12-08 RX ORDER — WARFARIN 1 MG/1
1 TABLET ORAL DAILY
Status: DISCONTINUED | OUTPATIENT
Start: 2024-12-08 | End: 2024-12-11 | Stop reason: HOSPADM

## 2024-12-08 RX ORDER — SODIUM CHLORIDE 9 MG/ML
INJECTION, SOLUTION INTRAVENOUS CONTINUOUS
Status: ACTIVE | OUTPATIENT
Start: 2024-12-08 | End: 2024-12-08

## 2024-12-08 RX ADMIN — WARFARIN SODIUM 1 MG: 1 TABLET ORAL at 05:12

## 2024-12-08 RX ADMIN — CARVEDILOL 3.12 MG: 3.12 TABLET, FILM COATED ORAL at 08:12

## 2024-12-08 RX ADMIN — SODIUM CHLORIDE: 9 INJECTION, SOLUTION INTRAVENOUS at 01:12

## 2024-12-08 RX ADMIN — POLYETHYLENE GLYCOL 3350 17 G: 17 POWDER, FOR SOLUTION ORAL at 09:12

## 2024-12-08 RX ADMIN — LEVOTHYROXINE SODIUM 75 MCG: 0.05 TABLET ORAL at 06:12

## 2024-12-08 RX ADMIN — EMPAGLIFLOZIN 10 MG: 10 TABLET, FILM COATED ORAL at 08:12

## 2024-12-08 RX ADMIN — GABAPENTIN 300 MG: 300 CAPSULE ORAL at 09:12

## 2024-12-08 RX ADMIN — ACETAMINOPHEN 499.51 MG: 160 SOLUTION ORAL at 10:12

## 2024-12-08 RX ADMIN — SACUBITRIL AND VALSARTAN 1 TABLET: 24; 26 TABLET, FILM COATED ORAL at 09:12

## 2024-12-08 RX ADMIN — ACETAMINOPHEN 499.51 MG: 160 SOLUTION ORAL at 05:12

## 2024-12-08 RX ADMIN — ALLOPURINOL 100 MG: 100 TABLET ORAL at 09:12

## 2024-12-08 RX ADMIN — CARVEDILOL 3.12 MG: 3.12 TABLET, FILM COATED ORAL at 09:12

## 2024-12-08 RX ADMIN — CIPROFLOXACIN 500 MG: 500 TABLET ORAL at 09:12

## 2024-12-08 RX ADMIN — SENNOSIDES AND DOCUSATE SODIUM 2 TABLET: 50; 8.6 TABLET ORAL at 09:12

## 2024-12-08 RX ADMIN — PANTOPRAZOLE SODIUM 40 MG: 40 TABLET, DELAYED RELEASE ORAL at 08:12

## 2024-12-08 RX ADMIN — ACETAMINOPHEN 499.51 MG: 160 SOLUTION ORAL at 08:12

## 2024-12-08 NOTE — PT/OT/SLP PROGRESS
Occupational Therapy   Treatment    Name: Homa Jaquez  MRN: [id number]  Admitting Diagnosis:  Failed total hip arthroplasty with dislocation, initial encounter  3 Days Post-Op    Recommendations:     Discharge Recommendations: Moderate Intensity Therapy  Discharge Equipment Recommendations:  lift device, walker, rolling    Assessment:     Homa Jaquez is a 89 y.o. female with a medical diagnosis of Failed total hip arthroplasty with dislocation, initial encounter.  Performance deficits affecting function are weakness, impaired endurance, impaired self care skills, impaired functional mobility, gait instability, impaired balance, decreased coordination, decreased upper extremity function, decreased lower extremity function, pain, decreased ROM, impaired coordination, impaired fine motor, orthopedic precautions.     Rehab Prognosis:  Fair; patient would benefit from acute skilled OT services to address these deficits and reach maximum level of function.       Plan:     Patient to be seen  (QD-BID) to address the above listed problems via self-care/home management, therapeutic activities, therapeutic exercises  Plan of Care Expires: 12/12/24  Plan of Care Reviewed with: patientjamel    Subjective     Chief Complaint: No new complaints   Patient/Family Comments/goals: To get some rest     Objective:     Communicated with: nursing prior to session.  Patient found supine with peripheral IV, wound vac upon OT entry to room.    General Precautions: Standard, fall    Orthopedic Precautions:LLE partial weight bearing, LLE posterior precautions  Braces: Hip abduction brace  Respiratory Status: Room air     Occupational Performance:     Bed Mobility:    Patient completed Supine to Sit with moderate assistance and 2 persons  Patient completed Sit to Supine with moderate assistance and 2 persons     Activities of Daily Living:  Grooming: moderate assistance Pt brushed her teeth and hair while seated EOB. Mod A for trunk  support when performing task.     Treatment & Education:  Pt sat EOB for entire treatment session. Mod-max A provided for trunk support.   Pt completed reaching activity to address core strength, dynamic reaching, and overall endurance. Mod-max trunk support provided during task.   Pt completed 1x10 reps shoulder rolls and scapular retractions.   Passive stretching performed on R UE due to pain and stiffness in shoulder.      Patient left supine with all lines intact, call button in reach, and son present    GOALS:   Multidisciplinary Problems       Occupational Therapy Goals          Problem: Occupational Therapy    Goal Priority Disciplines Outcome Interventions   Occupational Therapy Goal     OT, PT/OT Progressing    Description: Pt will perform LB dressing max A by d/c.  Pt will perform toileting max A by d/c.  Pt will perform toilet t/f max A with BSC by d/c.  Pt will perform tub t/f max assist c TTB by d/c.  Pt will perform car t/f max assist in adherence to pxns by d/c.                        Time Tracking:     OT Date of Treatment: 12/08/24  OT Start Time: 1050  OT Stop Time: 1128  OT Total Time (min): 38 min    Billable Minutes:Self Care/Home Management 15  Therapeutic Exercise 23               12/8/2024

## 2024-12-08 NOTE — PROGRESS NOTES
[facility]  [facility] Medicine Progress Note      Patient Name: Homa Jaquez  MRN: [id number]  Admission Date: 12/4/2024   Length of Stay: 3  Attending Physician: Olvin Huber MD  Primary Care Provider: Franklyn Brito MD  Face-to-Face encounter date: 12/08/2024    Code Status: DNR        Chief Complaint:   Hip Pain        HPI:   Homa Jaquez is a 89 y.o. female who  has a past medical history of A-fib, Chronic cystitis, GERD (gastroesophageal reflux disease), Graves disease, Hypertension, Hypothyroidism, unspecified, and Spinal stenosis.. The patient presented to [facility] on 12/4/2024 with a primary complaint of left hip pain. I have cared for her in the past. The patient was found to have a dislocation of her left hip. She has a history of a chronic infection of the same hip. She has no other c/o. Her son is at the bedside. He denies any trauma that led to the dislocation. She had done well with the last 2 days.      Overview/Hospital Course:  No notes on file       Interval Hx:   The patient has no new c/o. Her pain is well controlled. Her son is at the bedside. All questions answered.     Review of Systems   All other systems reviewed and are negative.      Objective/physical exam:  General: In no acute distress, afebrile  Chest: Clear to auscultation bilaterally  Heart: RRR, +S1, S2, no appreciable murmur  Abdomen: Soft, nontender, BS +  MSK: Warm, no lower extremity edema, no clubbing or cyanosis, s/p left hip surgery, wound vac in place, minimal blood noted.  Neurologic: Alert and oriented x4  Psych/mental status: Appropriate mood and affect. Responds appropriately to questions.     VITAL SIGNS: 24 HRS MIN & MAX LAST   Temp  Min: 97.7 °F (36.5 °C)  Max: 98.1 °F (36.7 °C) 98.1 °F (36.7 °C)   BP  Min: 116/65  Max: 124/74 122/62   Pulse  Min: 87  Max: 97  97   Resp  Min: 16  Max: 18 16   SpO2  Min: 95 %  Max: 100 % 100 %       Recent Labs   Lab  12/06/24  0453 12/07/24  0716 12/08/24  0554   WBC 13.90* 9.50 8.12   RBC 2.60* 3.73* 3.47*   HGB 8.4* 11.5* 10.7*   HCT 26.7* 34.6* 32.1*   .7* 92.8 92.5   MCH 32.3* 30.8 30.8   MCHC 31.5* 33.2 33.3   RDW 15.0 17.0 17.2*    236 240   MPV 9.7 9.7 9.6       Recent Labs   Lab 12/04/24  1453 12/05/24  0436 12/06/24  0453 12/07/24  0621 12/08/24  0554    142 137 134* 136   K 4.1 4.1 4.5 4.5 5.0   * 115* 108* 106 108*   CO2 19* 18* 18* 17* 18*   BUN 23.6* 22.7* 31.8* 40.7* 43.1*   CREATININE 1.32* 1.24* 2.06* 2.48* 2.51*   CALCIUM 8.6 8.7 8.9 8.6 8.4   MG  --  1.90  --   --  1.90   ALBUMIN 2.5*  --   --   --  2.4*   ALKPHOS 122  --   --   --   --    ALT 12  --   --   --   --    AST 29  --   --   --   --    BILITOT 0.5  --   --   --   --         Recent Results (from the past 24 hours)   Urinalysis, Reflex to Urine Culture    Collection Time: 12/07/24  3:30 PM    Specimen: Urine   Result Value Ref Range    Color, UA Yellow Yellow, Light-Yellow, Dark Yellow, Keke, Straw    Appearance, UA Slightly Cloudy (A) Clear    Specific Gravity, UA 1.015 1.005 - 1.030    pH, UA 5.5 5.0 - 8.5    Protein, UA Trace (A) Negative    Glucose, UA >=1000 (A) Negative, Normal    Ketones, UA Negative Negative    Blood, UA Small (A) Negative    Bilirubin, UA Negative Negative    Urobilinogen, UA 0.2 0.2, 1.0, Normal    Nitrites, UA Negative Negative    Leukocyte Esterase, UA Small (A) Negative   Urinalysis, Microscopic    Collection Time: 12/07/24  3:30 PM   Result Value Ref Range    Bacteria, UA None Seen None Seen, Rare, Occasional /HPF    Yeast, UA Moderate (A) None Seen /HPF    RBC, UA 0-2 None Seen, 0-2, 3-5, 0-5 /HPF    WBC, UA 3-5 None Seen, 0-2, 3-5, 0-5 /HPF    Squamous Epithelial Cells, UA None Seen None Seen, Rare, Occasional, Occ /HPF   POCT glucose    Collection Time: 12/07/24  9:02 PM   Result Value Ref Range    POCT Glucose 166 (H) 70 - 110 mg/dL   Magnesium    Collection Time: 12/08/24  5:54 AM   Result  Value Ref Range    Magnesium Level 1.90 1.60 - 2.60 mg/dL   Renal Function Panel    Collection Time: 12/08/24  5:54 AM   Result Value Ref Range    Sodium 136 136 - 145 mmol/L    Potassium 5.0 3.5 - 5.1 mmol/L    Chloride 108 (H) 98 - 107 mmol/L    CO2 18 (L) 23 - 31 mmol/L    Glucose 100 82 - 115 mg/dL    Blood Urea Nitrogen 43.1 (H) 9.8 - 20.1 mg/dL    Creatinine 2.51 (H) 0.55 - 1.02 mg/dL    Calcium 8.4 8.4 - 10.2 mg/dL    Albumin 2.4 (L) 3.4 - 4.8 g/dL    Phosphorus Level 2.5 2.3 - 4.7 mg/dL    eGFR 18 mL/min/1.73/m2   CBC Without Differential    Collection Time: 12/08/24  5:54 AM   Result Value Ref Range    WBC 8.12 4.50 - 11.50 x10(3)/mcL    RBC 3.47 (L) 4.20 - 5.40 x10(6)/mcL    Hgb 10.7 (L) 12.0 - 16.0 g/dL    Hct 32.1 (L) 37.0 - 47.0 %    MCV 92.5 80.0 - 94.0 fL    MCH 30.8 27.0 - 31.0 pg    MCHC 33.3 33.0 - 36.0 g/dL    RDW 17.2 (H) 11.5 - 17.0 %    Platelet 240 130 - 400 x10(3)/mcL    MPV 9.6 7.4 - 10.4 fL    NRBC% 0.0 %   Protime-INR    Collection Time: 12/08/24  5:54 AM   Result Value Ref Range    PT 28.8 (H) 11.7 - 14.5 seconds    INR 2.6 2.0 - 3.0       Microbiology Results (last 7 days)       Procedure Component Value Units Date/Time    Tissue Culture - Aerobic [[id number]] Collected: 12/05/24 0800    Order Status: Completed Specimen: Tissue from Hip, Left Updated: 12/08/24 0839     Tissue - Aerobic Culture No Growth At 72 Hours    Tissue Culture - Aerobic [[id number]] Collected: 12/05/24 0801    Order Status: Completed Specimen: Tissue from Hip, Left Updated: 12/08/24 0838     Tissue - Aerobic Culture No Growth At 72 Hours    Anaerobic Culture [[id number]] Collected: 12/05/24 0801    Order Status: Completed Specimen: Tissue from Hip, Left Updated: 12/08/24 0807     Anaerobe Culture No Anaerobes Isolated    Anaerobic Culture [[id number]] Collected: 12/05/24 0800    Order Status: Completed Specimen: Tissue from Hip, Left Updated: 12/08/24 0806     Anaerobe Culture No Anaerobes Isolated    Gram Stain  [[id number]] Collected: 12/05/24 0800    Order Status: Completed Specimen: Tissue from Hip, Left Updated: 12/05/24 1443     GRAM STAIN Rare WBC observed      No bacteria seen    Gram Stain [[id number]] Collected: 12/05/24 0801    Order Status: Completed Specimen: Tissue from Hip, Left Updated: 12/05/24 1442     GRAM STAIN Rare WBC observed      No bacteria seen             Radiology:  X-Ray Hip 1 View Left (with Pelvis when performed)  Narrative: EXAMINATION:  XR HIP 1 VIEW LEFT (WITH PELVIS WHEN PERFORMED)    CLINICAL HISTORY:  Post-op;  Dislocation of other internal joint prosthesis, initial encounter    COMPARISON:  Yesterday    FINDINGS:  Two views left hip.  Interval reduction of the dislocation with revision of the arthroplasty.  Impression: As above.    Electronically signed by: Roger Montague  Date:    12/05/2024  Time:    10:10      Scheduled Med:   acetaminophen  500 mg Oral Q4H    allopurinoL  100 mg Oral Nightly    bisacodyL  10 mg Rectal Daily    carvediloL  3.125 mg Oral BID    ciprofloxacin HCl  500 mg Oral Q24H    docusate sodium  200 mg Oral Before breakfast    empagliflozin  10 mg Oral Daily    gabapentin  300 mg Oral QHS    levothyroxine  75 mcg Oral Before breakfast    pantoprazole  40 mg Oral Daily    polyethylene glycol  17 g Oral QHS    sacubitriL-valsartan  1 tablet Oral BID    senna-docusate 8.6-50 mg  2 tablet Oral BID            Nutrition Status:      Assessment/Plan:    Left hip dislocation, hx of SILVIA  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  Wound VAC left hip 12/5/24  Afib -HR controlled  HTN  Hypothyroidism  DM 2  Dilated cardiomyopathy   Chronic left hip infection  BARRY  Acute blood loss anemia -s/p 2 unit prbc     Plan  follow INR, resume coumadin  Order gentle hydration with IVF  Lasix dced due to BARRY, encouraged po fluid intake  Cont cipro for chronic hip infection  Wound cx negative so far  change cbgs to BID  Dc tele monitoring  DC dispo, home with home health when  medically stable    All diagnosis and differential diagnosis have been reviewed; assessment and plan has been documented; I have personally reviewed the labs and test results that are presently available; I have reviewed the patients medication list; I have reviewed the consulting providers response and recommendations. I have reviewed or attempted to review medical records based upon their availability      _____________________________________________________________________            Olvin Huber MD   12/08/2024

## 2024-12-08 NOTE — PT/OT/SLP PROGRESS
"Physical Therapy Treatment    Patient Name:  Homa Jaquez   MRN:  [id number]    Recommendations:     Discharge Recommendations: Low Intensity Therapy  Discharge Equipment Recommendations: walker, rolling, other (see comments), lift device (TBD)  Barriers to discharge:  Decreased functional mobility and Severity of deficits    Assessment:     Homa Jaquez is a 89 y.o. female admitted with a medical diagnosis of Failed total hip arthroplasty with dislocation, initial encounter.  She presents with the following impairments/functional limitations: weakness, impaired endurance, impaired self care skills, impaired functional mobility, gait instability, impaired balance, decreased lower extremity function, pain, decreased ROM, orthopedic precautions.    Rehab Prognosis: Good; patient would benefit from acute skilled PT services to address these deficits and reach maximum level of function.    Recent Surgery: Procedure(s) (LRB):  CLOSED REDUCTION, HIP (Left)  REVISION,ARTHROPLASTY,HIP,POSTERIOR APPROACH (Left) 3 Days Post-Op    Plan:     During this hospitalization, patient to be seen daily to address the identified rehab impairments via gait training, therapeutic activities, therapeutic exercises, wheelchair management/training and progress toward the following goals:    Plan of Care Expires:  12/14/24    Subjective     Chief Complaint: "I don't want to sit up. I will do exercises in bed."  Patient/Family Comments/goals: to get better  Pain/Comfort:  Pain Rating 1: 0/10      Objective:     Communicated with nurse prior to session.  Patient found supine with wound vac, hip abduction brace upon PT entry to room.     General Precautions: Standard, fall  Orthopedic Precautions: LLE partial weight bearing, LLE posterior precautions  Braces: Hip abduction brace  Respiratory Status: Room air     Functional Mobility:  Bed Mobility:     Rolling Left:  maximal assistance  Rolling Right: maximal assistance    Treatment & " Education:  AROM to AAROM ex done in the B lower extremities x 10 reps within patient tolerance. HEP reviewed with patient and daughter, and both verbalized understanding.     Patient left supine with all lines intact, call button in reach, and daughter, son and nurse  present..    GOALS:   Multidisciplinary Problems       Physical Therapy Goals          Problem: Physical Therapy    Goal Priority Disciplines Outcome Interventions   Physical Therapy Goal     PT, PT/OT Progressing    Description: Pt will improve functional independence by performing:    Bed mobility: mod A  Sit to stand: max A with rolling walker  Bed to chair t/f: max A with Stand Pivot  with rolling walker  Independent with total hip HEP                        Time Tracking:     PT Received On: 12/08/24  PT Start Time: 1330     PT Stop Time: 1345  PT Total Time (min): 15 min     Billable Minutes: Therapeutic Exercise 15    Treatment Type: Treatment  PT/PTA: PT     Number of PTA visits since last PT visit: 0     12/08/2024

## 2024-12-08 NOTE — PROGRESS NOTES
"No acute events overnight.  Pain controlled.  Resting in bed. Feeling better today.    Vital Signs  Temp: 98.1 °F (36.7 °C)  Temp Source: Oral  Pulse: 97  Heart Rate Source: Monitor  Resp: 16  SpO2: 100 %  Pulse Oximetry Type: Intermittent  Flow (L/min) (Oxygen Therapy): 10  Oxygen Concentration (%): 80  Device (Oxygen Therapy): room air  BP: 122/62  BP Location: Left arm  BP Method: Automatic  Patient Position: Lying  Height and Weight  Height: 5' 4" (162.6 cm)  Height Method: Measured  Weight: 83.9 kg (184 lb 15.5 oz)  Weight Method: Bed Scale  Dosing Weight: 83.9 kg (185 lb)  BSA (Calculated - sq m): 1.95 sq meters  BMI (Calculated): 31.7  Weight in (lb) to have BMI = 25: 145.3]    LLE:  +FHL/EHL  BCR distally  Dressing c/d/i  SILT distally    Recent Lab Results         12/08/24  0554   12/07/24  2102   12/07/24  1530   12/07/24  1116        Albumin 2.4             Appearance, UA     Slightly Cloudy         Bacteria, UA     None Seen         Bilirubin (UA)     Negative         BUN 43.1             Calcium 8.4             Chloride 108             CO2 18             Color, UA     Yellow         Creatinine 2.51             eGFR 18             Glucose 100             Glucose, UA     >=1000         Hematocrit 32.1             Hemoglobin 10.7             INR 2.6             Ketones, UA     Negative         Leukocyte Esterase, UA     Small         Magnesium  1.90             MCH 30.8             MCHC 33.3             MCV 92.5             MPV 9.6             NITRITE UA     Negative         nRBC 0.0             Blood, UA     Small         pH, UA     5.5         Phosphorus Level 2.5             Platelet Count 240             POCT Glucose   166     159       Potassium 5.0             Protein, UA     Trace         PT 28.8             RBC 3.47             RBC, UA     0-2         RDW 17.2             Sodium 136             Specific Gravity,UA     1.015         Squam Epithel, UA     None Seen         Urobilinogen, UA     0.2    "      WBC, UA     3-5         WBC 8.12             Yeast, UA     Moderate                 A/P:  Status post revision SILVIA  Pain controlled  Overall patient doing well.  Therapy for mobility and ambulation.  DVT PPx  Slowly improving  50% WB LLE  Home vs placement dependent on PT eval

## 2024-12-08 NOTE — PLAN OF CARE
Problem: Adult Inpatient Plan of Care  Goal: Plan of Care Review  Outcome: Progressing  Flowsheets (Taken 12/8/2024 0154)  Plan of Care Reviewed With: patient  Goal: Absence of Hospital-Acquired Illness or Injury  Outcome: Progressing  Goal: Optimal Comfort and Wellbeing  Outcome: Progressing  Goal: Readiness for Transition of Care  Outcome: Progressing     Problem: Skin Injury Risk Increased  Goal: Skin Health and Integrity  Outcome: Progressing     Problem: Diabetes Comorbidity  Goal: Blood Glucose Level Within Targeted Range  Outcome: Progressing     Problem: Acute Kidney Injury/Impairment  Goal: Fluid and Electrolyte Balance  Outcome: Progressing  Goal: Improved Oral Intake  Outcome: Progressing     Problem: Fall Injury Risk  Goal: Absence of Fall and Fall-Related Injury  Outcome: Progressing     Problem: Pain Acute  Goal: Optimal Pain Control and Function  Outcome: Progressing

## 2024-12-09 LAB
ANION GAP SERPL CALC-SCNC: 8 MEQ/L
BUN SERPL-MCNC: 39.7 MG/DL (ref 9.8–20.1)
CALCIUM SERPL-MCNC: 8.1 MG/DL (ref 8.4–10.2)
CHLORIDE SERPL-SCNC: 115 MMOL/L (ref 98–107)
CO2 SERPL-SCNC: 15 MMOL/L (ref 23–31)
CREAT SERPL-MCNC: 2.16 MG/DL (ref 0.55–1.02)
CREAT/UREA NIT SERPL: 18
EST. AVERAGE GLUCOSE BLD GHB EST-MCNC: 108.3 MG/DL
GFR SERPLBLD CREATININE-BSD FMLA CKD-EPI: 22 ML/MIN/1.73/M2
GLUCOSE SERPL-MCNC: 98 MG/DL (ref 82–115)
HBA1C MFR BLD: 5.4 %
INR PPP: 2.2 (ref 2–3)
MAGNESIUM SERPL-MCNC: 1.9 MG/DL (ref 1.6–2.6)
POCT GLUCOSE: 107 MG/DL (ref 70–110)
POCT GLUCOSE: 168 MG/DL (ref 70–110)
POTASSIUM SERPL-SCNC: 4.9 MMOL/L (ref 3.5–5.1)
PROTHROMBIN TIME: 24.9 SECONDS (ref 11.7–14.5)
SODIUM SERPL-SCNC: 138 MMOL/L (ref 136–145)

## 2024-12-09 PROCEDURE — 97116 GAIT TRAINING THERAPY: CPT | Mod: CQ

## 2024-12-09 PROCEDURE — 80048 BASIC METABOLIC PNL TOTAL CA: CPT | Performed by: INTERNAL MEDICINE

## 2024-12-09 PROCEDURE — 25000003 PHARM REV CODE 250: Performed by: INTERNAL MEDICINE

## 2024-12-09 PROCEDURE — 11000001 HC ACUTE MED/SURG PRIVATE ROOM

## 2024-12-09 PROCEDURE — 97530 THERAPEUTIC ACTIVITIES: CPT | Mod: CQ

## 2024-12-09 PROCEDURE — 83735 ASSAY OF MAGNESIUM: CPT | Performed by: INTERNAL MEDICINE

## 2024-12-09 PROCEDURE — 36415 COLL VENOUS BLD VENIPUNCTURE: CPT | Performed by: INTERNAL MEDICINE

## 2024-12-09 PROCEDURE — 25000003 PHARM REV CODE 250: Performed by: ORTHOPAEDIC SURGERY

## 2024-12-09 PROCEDURE — 94799 UNLISTED PULMONARY SVC/PX: CPT

## 2024-12-09 PROCEDURE — 94761 N-INVAS EAR/PLS OXIMETRY MLT: CPT

## 2024-12-09 PROCEDURE — 83036 HEMOGLOBIN GLYCOSYLATED A1C: CPT | Performed by: INTERNAL MEDICINE

## 2024-12-09 PROCEDURE — 97535 SELF CARE MNGMENT TRAINING: CPT

## 2024-12-09 PROCEDURE — 85610 PROTHROMBIN TIME: CPT | Performed by: INTERNAL MEDICINE

## 2024-12-09 PROCEDURE — 99900031 HC PATIENT EDUCATION (STAT)

## 2024-12-09 RX ADMIN — SACUBITRIL AND VALSARTAN 1 TABLET: 24; 26 TABLET, FILM COATED ORAL at 08:12

## 2024-12-09 RX ADMIN — CIPROFLOXACIN 500 MG: 500 TABLET ORAL at 08:12

## 2024-12-09 RX ADMIN — GABAPENTIN 300 MG: 300 CAPSULE ORAL at 08:12

## 2024-12-09 RX ADMIN — ACETAMINOPHEN 499.51 MG: 160 SOLUTION ORAL at 02:12

## 2024-12-09 RX ADMIN — EMPAGLIFLOZIN 10 MG: 10 TABLET, FILM COATED ORAL at 09:12

## 2024-12-09 RX ADMIN — ACETAMINOPHEN 499.51 MG: 160 SOLUTION ORAL at 05:12

## 2024-12-09 RX ADMIN — DOCUSATE SODIUM 200 MG: 100 CAPSULE, LIQUID FILLED ORAL at 05:12

## 2024-12-09 RX ADMIN — LEVOTHYROXINE SODIUM 75 MCG: 0.05 TABLET ORAL at 05:12

## 2024-12-09 RX ADMIN — PANTOPRAZOLE SODIUM 40 MG: 40 TABLET, DELAYED RELEASE ORAL at 09:12

## 2024-12-09 RX ADMIN — CARVEDILOL 3.12 MG: 3.12 TABLET, FILM COATED ORAL at 08:12

## 2024-12-09 RX ADMIN — ACETAMINOPHEN 499.51 MG: 160 SOLUTION ORAL at 08:12

## 2024-12-09 RX ADMIN — ALLOPURINOL 100 MG: 100 TABLET ORAL at 08:12

## 2024-12-09 RX ADMIN — CARVEDILOL 3.12 MG: 3.12 TABLET, FILM COATED ORAL at 09:12

## 2024-12-09 RX ADMIN — WARFARIN SODIUM 1 MG: 1 TABLET ORAL at 04:12

## 2024-12-09 NOTE — PT/OT/SLP PROGRESS
Physical Therapy Treatment    Patient Name:  Homa Jaquez   MRN:  [id number]    Recommendations:     Discharge Recommendations: Low Intensity Therapy  Discharge Equipment Recommendations: walker, rolling, other (see comments), lift device (TBD)  Barriers to discharge:  impaired functional mobility and severity of deficits    Assessment:     Homa Jaquez is a 89 y.o. female admitted with a medical diagnosis of Failed total hip arthroplasty with dislocation, initial encounter.  She presents with the following impairments/functional limitations: weakness, impaired endurance, impaired self care skills, impaired functional mobility, gait instability, impaired balance, decreased lower extremity function, pain, decreased ROM, orthopedic precautions .    Rehab Prognosis: Fair; patient would benefit from acute skilled PT services to address these deficits and reach maximum level of function.    Recent Surgery: Procedure(s) (LRB):  CLOSED REDUCTION, HIP (Left)  REVISION,ARTHROPLASTY,HIP,POSTERIOR APPROACH (Left) 4 Days Post-Op    Plan:     During this hospitalization, patient to be seen daily to address the identified rehab impairments via gait training, therapeutic activities, therapeutic exercises, wheelchair management/training and progress toward the following goals:    Plan of Care Expires:  12/14/24    Subjective     Chief Complaint: pain in arc of left foot  Patient/Family Comments/goals: discharge home  Pain/Comfort:  Pain Rating 1: 3/10  Location - Side 1: Left  Location - Orientation 1: posterior  Location 1: foot  Pain Addressed 1: Reposition, Distraction, Nurse notified  Pain Rating Post-Intervention 1: 3/10      Objective:     Communicated with nsg prior to session.  Patient found HOB elevated with   upon PT entry to room.     General Precautions: Standard, fall  Orthopedic Precautions: LLE partial weight bearing, LLE posterior precautions  Braces: Hip abduction brace  Respiratory Status: Room air      Functional Mobility:  Bed Mobility:     Rolling Right: maximal assistance    Treatment & Education:  Pt educated on importance of out of bed mobility and frequent ambulation  Pt educated on management of swelling through RICE method and ambulation  Pt performed 1 set of 10 reps of the following supine exercises with BLE: glute sets, quad sets, hip abduction and ankle pumps; pt required active assist with Left hip abduction;   Pt performed 10 reps of short arc quads with RLE    Patient left HOB elevated with call button in reach and nsg present..    GOALS:   Multidisciplinary Problems       Physical Therapy Goals          Problem: Physical Therapy    Goal Priority Disciplines Outcome Interventions   Physical Therapy Goal     PT, PT/OT Progressing    Description: Pt will improve functional independence by performing:    Bed mobility: mod A  Sit to stand: max A with rolling walker  Bed to chair t/f: max A with Stand Pivot  with rolling walker  Independent with total hip HEP                        Time Tracking:     PT Received On:    PT Start Time: 1346     PT Stop Time: 1413  PT Total Time (min): 27 min     Billable Minutes: Therapeutic Exercise 27 min    Treatment Type: Treatment  PT/PTA: PTA     Number of PTA visits since last PT visit: 1 12/09/2024

## 2024-12-09 NOTE — PROGRESS NOTES
[facility]  [facility] Medicine Progress Note      Patient Name: Homa Jaquez  MRN: [id number]  Admission Date: 12/4/2024   Length of Stay: 4  Attending Physician: Olvin Huber MD  Primary Care Provider: Franklyn Brito MD  Face-to-Face encounter date: 12/09/2024    Code Status: DNR        Chief Complaint:   Hip Pain        HPI:   Homa Jaquez is a 89 y.o. female who  has a past medical history of A-fib, Chronic cystitis, GERD (gastroesophageal reflux disease), Graves disease, Hypertension, Hypothyroidism, unspecified, and Spinal stenosis.. The patient presented to [facility] on 12/4/2024 with a primary complaint of left hip pain. I have cared for her in the past. The patient was found to have a dislocation of her left hip. She has a history of a chronic infection of the same hip. She has no other c/o. Her son is at the bedside. He denies any trauma that led to the dislocation. She had done well with the last 2 days.      Overview/Hospital Course:  No notes on file       Interval Hx:   The patient has no c/o. Case d/w RN, no acute issues reported.    Review of Systems   All other systems reviewed and are negative.      Objective/physical exam:  General: In no acute distress, afebrile  Chest: Clear to auscultation bilaterally  Heart: RRR, +S1, S2, no appreciable murmur  Abdomen: Soft, nontender, BS +  MSK: Warm, no lower extremity edema, no clubbing or cyanosis, s/p left hip surgery, wound vac in place, minimal blood noted.  Neurologic: Alert and oriented x4  Psych/mental status: Appropriate mood and affect. Responds appropriately to questions.     VITAL SIGNS: 24 HRS MIN & MAX LAST   Temp  Min: 98 °F (36.7 °C)  Max: 99 °F (37.2 °C) 98.2 °F (36.8 °C)   BP  Min: 112/67  Max: 126/75 (!) 113/53   Pulse  Min: 82  Max: 108  97   Resp  Min: 18  Max: 18 18   SpO2  Min: 97 %  Max: 100 % 97 %       Recent Labs   Lab 12/06/24  0453 12/07/24  0716 12/08/24  0554   WBC  8/17/2020  PLAN:  Patient is compliant with CPAP usage and is reporting benefits of improved sleep, decreased fatigue.  Reviewed schedule of cleaning equipment.  Reviewed schedule of changing equipment.  Encouraged patient to continue to use with all sleeping so as to improve daytime sleepiness.   Recommend increasing humidity to 2. Follow with ENT closely. May need to consider nasal mask or full face mask to help decrease bleeding.  Return in about 1 year (around 8/17/2021) for SHANNAN.         Is this an oncology patient? No  Imaging completed prior to appointment?           PFT:  No results found for this or any previous visit.         Chest CT:  n/a         Chest Xray: n/a                                 Echo:  No results found for this or any previous visit.         Swallow Study:  No results found for this or any previous visit.           Sleep Study:  No results found for this or any previous visit.                               No results found for this or any previous visit.                                  Results for orders placed or performed in visit on 08/26/19   SLEEP STUDY HOME 4 CHANNEL PORTABLE UNATTENDED    Impression    Portable polysomnography    63 years old underwent portable polysomnography with clinical impression of obstructive sleep apnea    Technical data  Variables measured and recorded during this polysomnography included airflow monitored via nasal cannula, oxygen saturation and heart rate monitored via continuous pulse oximeter. Body position and activity was also monitored. Chest and abdomen movements were monitored by chest and abdomen belts via inductive plethysmography.  RDI ( Respiratory Disturbance Index) in the summary includes apneas and hypopneas and is a surrogate for AHI. Respiratory effort related arousals are not included. Apneas are counted when there is a cessation of air flow for over 10 seconds, and Hypopneas are counted when there is at least a 30% reduction of air flow  13.90* 9.50 8.12   RBC 2.60* 3.73* 3.47*   HGB 8.4* 11.5* 10.7*   HCT 26.7* 34.6* 32.1*   .7* 92.8 92.5   MCH 32.3* 30.8 30.8   MCHC 31.5* 33.2 33.3   RDW 15.0 17.0 17.2*    236 240   MPV 9.7 9.7 9.6       Recent Labs   Lab 12/04/24  1453 12/05/24  0436 12/06/24  0453 12/07/24  0621 12/08/24  0554 12/09/24  0457    142   < > 134* 136 138   K 4.1 4.1   < > 4.5 5.0 4.9   * 115*   < > 106 108* 115*   CO2 19* 18*   < > 17* 18* 15*   BUN 23.6* 22.7*   < > 40.7* 43.1* 39.7*   CREATININE 1.32* 1.24*   < > 2.48* 2.51* 2.16*   CALCIUM 8.6 8.7   < > 8.6 8.4 8.1*   MG  --  1.90  --   --  1.90 1.90   ALBUMIN 2.5*  --   --   --  2.4*  --    ALKPHOS 122  --   --   --   --   --    ALT 12  --   --   --   --   --    AST 29  --   --   --   --   --    BILITOT 0.5  --   --   --   --   --     < > = values in this interval not displayed.        Recent Results (from the past 24 hours)   Basic Metabolic Panel    Collection Time: 12/09/24  4:57 AM   Result Value Ref Range    Sodium 138 136 - 145 mmol/L    Potassium 4.9 3.5 - 5.1 mmol/L    Chloride 115 (H) 98 - 107 mmol/L    CO2 15 (L) 23 - 31 mmol/L    Glucose 98 82 - 115 mg/dL    Blood Urea Nitrogen 39.7 (H) 9.8 - 20.1 mg/dL    Creatinine 2.16 (H) 0.55 - 1.02 mg/dL    BUN/Creatinine Ratio 18     Calcium 8.1 (L) 8.4 - 10.2 mg/dL    Anion Gap 8.0 mEq/L    eGFR 22 mL/min/1.73/m2   Magnesium    Collection Time: 12/09/24  4:57 AM   Result Value Ref Range    Magnesium Level 1.90 1.60 - 2.60 mg/dL   Protime-INR    Collection Time: 12/09/24  4:57 AM   Result Value Ref Range    PT 24.9 (H) 11.7 - 14.5 seconds    INR 2.2 2.0 - 3.0   Hemoglobin A1C    Collection Time: 12/09/24  4:57 AM   Result Value Ref Range    Hemoglobin A1c 5.4 <=7.0 %    Estimated Average Glucose 108.3 mg/dL   POCT glucose    Collection Time: 12/09/24  9:16 AM   Result Value Ref Range    POCT Glucose 168 (H) 70 - 110 mg/dL       Microbiology Results (last 7 days)       Procedure Component Value Units  associated with a %4 desaturations.    Study results  Total recording time 8h , monitored time analyzed, (MT) was  7 h 51 min  , during which patient seemed to have reasonably good sleep quality based on activity monitor. There was 31 minutes of supine sleep time, 440 minutes of nonsupine sleep. Snoring recorded. In terms of respiratory events there was 357 apneas, and 80 hypopeneas. Overall RDI was 55. Desaturations noted with minimum of 71% . Heart rate ranged from 53-97.    Summary  Severe obstructive sleep apnea with desaturations and snoring. RDI 55    Recommendation  Clinical followup recommended, consider Autopap.    Electronically signed by:  David Morales MD  08/27/19    Addendum for clarification purposes:  Hypopnea is defined as an abnormal respiratory event lasting at least 10 seconds associated with at least a 30% reduction in thoracoabdominal movement or airflow as compared to baseline, with at least a 4% decrease in oxygen saturation.     Electronically signed   David Morales MD  10/03/22  NPI # [id number]               Pet Scan:  No results found for this or any previous visit.           cpap and supplies through [facility]          Were external records obtained No    Appt. instructions given:    Remind pt to bring a CPAP equipment for downloads.   Obtain CME information if needed.        Date/Time    Tissue Culture - Aerobic [[id number]] Collected: 12/05/24 0801    Order Status: Completed Specimen: Tissue from Hip, Left Updated: 12/09/24 0851     Tissue - Aerobic Culture No growth at 4 days    Tissue Culture - Aerobic [[id number]] Collected: 12/05/24 0800    Order Status: Completed Specimen: Tissue from Hip, Left Updated: 12/09/24 0851     Tissue - Aerobic Culture No growth at 4 days    Anaerobic Culture [[id number]] Collected: 12/05/24 0801    Order Status: Completed Specimen: Tissue from Hip, Left Updated: 12/08/24 0807     Anaerobe Culture No Anaerobes Isolated    Anaerobic Culture [[id number]] Collected: 12/05/24 0800    Order Status: Completed Specimen: Tissue from Hip, Left Updated: 12/08/24 0806     Anaerobe Culture No Anaerobes Isolated    Gram Stain [[id number]] Collected: 12/05/24 0800    Order Status: Completed Specimen: Tissue from Hip, Left Updated: 12/05/24 1443     GRAM STAIN Rare WBC observed      No bacteria seen    Gram Stain [[id number]] Collected: 12/05/24 0801    Order Status: Completed Specimen: Tissue from Hip, Left Updated: 12/05/24 1442     GRAM STAIN Rare WBC observed      No bacteria seen             Radiology:  X-Ray Hip 1 View Left (with Pelvis when performed)  Narrative: EXAMINATION:  XR HIP 1 VIEW LEFT (WITH PELVIS WHEN PERFORMED)    CLINICAL HISTORY:  Post-op;  Dislocation of other internal joint prosthesis, initial encounter    COMPARISON:  Yesterday    FINDINGS:  Two views left hip.  Interval reduction of the dislocation with revision of the arthroplasty.  Impression: As above.    Electronically signed by: Roger Montague  Date:    12/05/2024  Time:    10:10      Scheduled Med:   acetaminophen  500 mg Oral Q4H    allopurinoL  100 mg Oral Nightly    carvediloL  3.125 mg Oral BID    ciprofloxacin HCl  500 mg Oral Q24H    docusate sodium  200 mg Oral Before breakfast    empagliflozin  10 mg Oral Daily    gabapentin  300 mg Oral QHS    levothyroxine  75 mcg Oral Before  breakfast    pantoprazole  40 mg Oral Daily    polyethylene glycol  17 g Oral QHS    sacubitriL-valsartan  1 tablet Oral BID    senna-docusate 8.6-50 mg  2 tablet Oral BID    warfarin  1 mg Oral Daily            Nutrition Status:      Assessment/Plan:    Left hip dislocation, hx of SILVIA  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  Wound VAC left hip 12/5/24  Afib -HR controlled  HTN  Hypothyroidism  DM 2 A1c 5.4  Dilated cardiomyopathy   Chronic left hip infection  BARRY -CR improved  Acute blood loss anemia -s/p 2 unit prbc     Plan  follow INR, resume coumadin  CR better, give another 500ml NS  Lasix dced due to BARRY  Cont cipro for chronic hip infection  Possible dc home tomorrow    All diagnosis and differential diagnosis have been reviewed; assessment and plan has been documented; I have personally reviewed the labs and test results that are presently available; I have reviewed the patients medication list; I have reviewed the consulting providers response and recommendations. I have reviewed or attempted to review medical records based upon their availability      _____________________________________________________________________            Olvin Huber MD   12/09/2024

## 2024-12-09 NOTE — PT/OT/SLP PROGRESS
Physical Therapy Treatment    Patient Name:  Homa Jaquez   MRN:  [id number]    Recommendations:     Discharge Recommendations: Low Intensity Therapy  Discharge Equipment Recommendations: walker, rolling, other (see comments), lift device (TBD)  Barriers to discharge:  impaired functional mobility and severity of deficits    Assessment:     Homa Jaquez is a 89 y.o. female admitted with a medical diagnosis of Failed total hip arthroplasty with dislocation, initial encounter.  She presents with the following impairments/functional limitations: weakness, impaired endurance, impaired self care skills, impaired functional mobility, gait instability, impaired balance, decreased lower extremity function, pain, decreased ROM, orthopedic precautions .    Rehab Prognosis: Fair; patient would benefit from acute skilled PT services to address these deficits and reach maximum level of function.    Recent Surgery: Procedure(s) (LRB):  CLOSED REDUCTION, HIP (Left)  REVISION,ARTHROPLASTY,HIP,POSTERIOR APPROACH (Left) 4 Days Post-Op    Plan:     During this hospitalization, patient to be seen daily to address the identified rehab impairments via gait training, therapeutic activities, therapeutic exercises, wheelchair management/training and progress toward the following goals:    Plan of Care Expires:  12/14/24    Subjective     Chief Complaint: medial pain in left thigh with movement  Patient/Family Comments/goals: discharge home  Pain/Comfort:  Pain Rating 1: 0/10  Pain Addressed 1: Pre-medicate for activity, Reposition, Distraction  Pain Rating Post-Intervention 1: 0/10      Objective:     Communicated with nsg prior to session.  Patient found HOB elevated with   upon PT entry to room.     General Precautions: Standard, fall  Orthopedic Precautions: LLE partial weight bearing, LLE posterior precautions  Braces: Hip abduction brace  Respiratory Status: Room air     Functional Mobility:  Bed Mobility:     Rolling Left:   maximal assistance with use of Bed rails for changing soiled diaper  Rolling Right: maximal assistance with use of Bed rails for changing soiled diaper  Supine to Sit: maximal assistance for trunk and LLE - increased time and vc required  Sit to Supine: maximal assistance for trunk support and LLE increased time and vc required    Treatment & Education:  Pt performed 2 trials of unsupported static sitting at edge of bed.  Pt educated on importance of out of bed mobility    Patient left HOB elevated with call button in reach and nsg present..    GOALS:   Multidisciplinary Problems       Physical Therapy Goals          Problem: Physical Therapy    Goal Priority Disciplines Outcome Interventions   Physical Therapy Goal     PT, PT/OT Progressing    Description: Pt will improve functional independence by performing:    Bed mobility: mod A  Sit to stand: max A with rolling walker  Bed to chair t/f: max A with Stand Pivot  with rolling walker  Independent with total hip HEP                        Time Tracking:     PT Received On:    PT Start Time: 0839     PT Stop Time: 0906  PT Total Time (min): 27 min     Billable Minutes: Therapeutic Activity 27 min    Treatment Type: Treatment  PT/PTA: PTA     Number of PTA visits since last PT visit: 1 12/09/2024

## 2024-12-09 NOTE — PLAN OF CARE
Problem: Physical Therapy  Goal: Physical Therapy Goal  Description: Pt will improve functional independence by performing:    Bed mobility: mod A  Sit to stand: max A with rolling walker  Bed to chair t/f: max A with Stand Pivot  with rolling walker  Independent with total hip HEP   12/9/2024 1119 by Meaghan Fernandez, FELIPA  Outcome: Progressing

## 2024-12-09 NOTE — PLAN OF CARE
Plan dc home w hh.   Per Dr Huber, pt not medically cleared. Plan dc 1-2 days. Monitoring renal functions.

## 2024-12-09 NOTE — PT/OT/SLP PROGRESS
"Occupational Therapy   Treatment    Name: Homa Jaquez  MRN: [id number]  Admitting Diagnosis:  Failed total hip arthroplasty with dislocation, initial encounter  4 Days Post-Op    Recommendations:     Discharge Recommendations: Moderate Intensity Therapy  Discharge Equipment Recommendations:  lift device, walker, rolling  Barriers to discharge:   (increased level of assist for all transitions)    Assessment:     Homa Jaquez is a 89 y.o. female with a medical diagnosis of Failed total hip arthroplasty with dislocation, initial encounter. Performance deficits affecting function are weakness, impaired endurance, impaired self care skills, impaired functional mobility, gait instability, impaired balance, pain, decreased safety awareness, decreased lower extremity function, decreased upper extremity function, orthopedic precautions, impaired joint extensibility, edema, impaired skin, decreased ROM.     Rehab Prognosis:  Fair; patient would benefit from acute skilled OT services to address these deficits and reach maximum level of function.       Plan:     Patient to be seen  (QD-BID) to address the above listed problems via self-care/home management, therapeutic activities, therapeutic exercises  Plan of Care Expires: 12/12/24  Plan of Care Reviewed with: patient    Subjective     Chief Complaint: fatigue  Patient/Family Comments/goals: "I can't stand."  Pain/Comfort:  Pain Rating 1: 0/10    Objective:     Communicated with: NSG prior to session.  Patient found HOB elevated with peripheral IV, hip abduction brace, wound vac upon OT entry to room.    General Precautions: Standard, fall    Orthopedic Precautions:LLE partial weight bearing, LLE posterior precautions (50%)  Braces: Hip abduction brace  Respiratory Status: Room air     Occupational Performance:     Bed Mobility:    Patient completed Rolling/Turning to Right with maximal assistance  Patient completed Scooting/Bridging with maximal assistance  Patient " completed Supine to Sit with maximal assistance  Patient completed Sit to Supine with moderate assistance     Treatment & Education:  Patient tolerated EOB sitting for ~15 minutes, working on hand placement to maintain upright sitting posture. CGA-min A for maintaining static sitting balance.     Patient left right sidelying with all lines intact and call button in reach    GOALS:   Multidisciplinary Problems       Occupational Therapy Goals          Problem: Occupational Therapy    Goal Priority Disciplines Outcome Interventions   Occupational Therapy Goal     OT, PT/OT Progressing    Description: Pt will perform LB dressing max A by d/c.  Pt will perform toileting max A by d/c.  Pt will perform toilet t/f max A with BSC by d/c.  Pt will perform tub t/f max assist c TTB by d/c.  Pt will perform car t/f max assist in adherence to pxns by d/c.                        Time Tracking:     OT Date of Treatment: 12/09/24  OT Start Time: 0947  OT Stop Time: 1009  OT Total Time (min): 22 min    Billable Minutes:Therapeutic Activity 22    OT/EVY: OT          12/9/2024

## 2024-12-09 NOTE — PLAN OF CARE
Problem: Adult Inpatient Plan of Care  Goal: Plan of Care Review  Outcome: Progressing  Goal: Patient-Specific Goal (Individualized)  Outcome: Progressing  Goal: Absence of Hospital-Acquired Illness or Injury  Outcome: Progressing  Goal: Optimal Comfort and Wellbeing  Outcome: Progressing  Goal: Readiness for Transition of Care  Outcome: Progressing     Problem: Skin Injury Risk Increased  Goal: Skin Health and Integrity  Outcome: Progressing     Problem: Diabetes Comorbidity  Goal: Blood Glucose Level Within Targeted Range  Outcome: Progressing     Problem: Acute Kidney Injury/Impairment  Goal: Fluid and Electrolyte Balance  Outcome: Progressing  Goal: Improved Oral Intake  Outcome: Progressing  Goal: Effective Renal Function  Outcome: Progressing     Problem: Wound  Goal: Optimal Coping  Outcome: Progressing  Goal: Optimal Functional Ability  Outcome: Progressing  Goal: Absence of Infection Signs and Symptoms  Outcome: Progressing  Goal: Improved Oral Intake  Outcome: Progressing  Goal: Optimal Pain Control and Function  Outcome: Progressing  Goal: Skin Health and Integrity  Outcome: Progressing  Goal: Optimal Wound Healing  Outcome: Progressing     Problem: Fall Injury Risk  Goal: Absence of Fall and Fall-Related Injury  Outcome: Progressing     Problem: Infection  Goal: Absence of Infection Signs and Symptoms  Outcome: Progressing     Problem: Pain Acute  Goal: Optimal Pain Control and Function  Outcome: Progressing

## 2024-12-09 NOTE — PT/OT/SLP PROGRESS
Occupational Therapy      Patient Name:  Homa Jaquez   MRN:  [id number]    Patient not seen this afternoon secondary to pt fatigue, unarousable. Will follow-up tomorrow AM.    12/9/2024

## 2024-12-09 NOTE — DISCHARGE INSTRUCTIONS
[facility]  [address] [place] [address] [id number]  [place] [address]  Phone [phone]       /      Fax [phone]  SURGEON: Dr. Bains    After discharge, all questions or concerns should be handled at your surgeon's office ([phone]). If it is a weekend or after hours, you will get the surgeon on call.     Discharge Medications:    PAIN MANAGEMENT: Next Dose Available   Tylenol/Acetaminophen 500mg- every 4 hours, around the clock (WHILE AWAKE) 6 PM on 12/11/24   Ultram/Tramadol 50mg (Pain Med) - every 4-6 hours AS NEEDED for BREAKTHROUGH pain ONLY Anytime as needed on 12/11/24   Robaxin/Methocarbamol 500mg (Muscle Relaxer) - Every 6-8 hours AS NEEDED for muscle spasms, thigh pain or additional pain control Anytime as needed on 12/11/24   COMPLICATION PREVENTION MEDS: Next Dose DUE   Restart Coumadin 1mg as you were taking prior to surgery for continued blood clot prevention PM on 12/11/24   Miralax 17gm or Senokot S/Farrah-Colace 8.6/50mg 2 tablets - once or twice a day while on narcotics and muscle relaxers for constipation prevention PM on 12/11/24   NOZIN NASAL  - twice a day for 2 weeks or until supply runs out, whichever comes first (Infection prevention) PM on 12/11/24   Cipro 500mg (Antibiotic) - restart home regimen   PM on 12/11/24     Total Hip Replacement                                                                                                                                  PAIN MEDICATIONS/PAIN MANAGEMENT: (Use the medication log in your discharge packet to keep track of your medications)  Tylenol/Acetaminophen 500mg every 4 hours, around the clock (WHILE AWAKE).   Take Ultram (Tramadol) 50mg (pain pill) every 4-6 hours as needed for BREAKTHROUGH pain.      **NO MORE THAN 3000mg OF TYLENOL IN 24 HOURS**.     Robaxin/Methocarbamol 500mg (muscle relaxer)- you can take every 12-24 hours as needed for muscle spasms, thigh pain and stiffness, additional pain  control or breakthrough pain medications. This medication is helpful for pain control while lessening your need for narcotics. Please reduce the use gradually as the pain and spasms lessen. DO NOT TAKE AT THE SAME TIME AS A PAIN PILL. YOU WILL BE BETTER SERVED WITH 2 HOURS BETWEEN PAIN PILL AND MUSCLE RELAXER.       **Other things that help with pain control is WALKING, COMPRESSION WRAP, ICE and ELEVATION!!**    BLOOD CLOT PREVENTION:     Restart Coumadin 1mg,  as you were taking Prior to surgery. Start on 12/11/24.     You need to continuing wearing your compression stocking (DANTE Hose - ThromboEmbolic Disease Prevention Device) for the next 2-6 weeks post-op. It is ok to remove them for hygiene and at bedtime.   Hand wash and Dry. **If the swelling persists in the legs after you stop wearing the Dante hose, continue to wear them until the swelling decreases.**  REMOVE STOCKINGS AT LEAST DAILY FOR SKIN ASSESSMENT.   Do NOT let the stockings roll down, creating a tourniquet around the back of your knee. If you need to, leave the excess at the bottom of the stocking.   The best thing you can do to prevent blood clots is to walk around as much as possible, AT LEAST EVERY 1-2 HOURS.       CONSTIPATION PREVENTION:   Miralax or Senokot S/Farrah-Colace and Stool softeners EVERY DAY while on pain meds.  Use other more aggressive over the counter LAXATIVES as needed for constipation (Examples: Milk of Magnesia, Dulcolax tabs or suppository, Magnesium Citrate, Fleet's Enema...etc.)   Drink lots of water.  Increase Fiber in diet.  Increase walking distance each day  DO NOT GO MORE THAN 2 DAYS WITHOUT HAVING A BOWEL MOVEMENT!    ACTIVITY:   Weight bearing precautions as follows:  50% weight bearing to operative leg with walker.   HIP PRECAUTIONS:  NO Twisting  NO Leaning past 90 degrees  NO Crossing legs  HIP ORTHOSIS BRACE AT ALL TIMES - skin assessment as often as possible, pad any areas of possible or suspected skin  irritation.    DO NOT TAKE YOURSELF OFF OF THE WALKER TOO SOON. ALLOW YOUR OUTPATIENT THERAPIST or SURGEON TO GUIDE YOU.   Change positions often throughout the day.   Walk around at least every 1-2 hours while awake.   No heavy lifting, pulling, pushing or straining.  Ice the Hip and thigh AS MUCH AS POSSIBLE  Home Health Physical Therapy at least 3 times per week.       WOUND CARE:     Remove Prevena wound vac on 12/12/24 and discard the entire system. Once removed, apply an occlusive (coverlet) dressing to the wound and change every other day and as needed, until your follow-up appointment with Dr. Bains. For any issues related to the wound vac, call your surgeon. SPECIFIC INSTRUCTIONS AND TROUBLESHOOTING INFORMATION CAN ALSO BE FOUND IN YOUR DISCHARGE PACKET.   DON'T FORGET YOUR PREVENA WOUND VAC . YOU WILL NEED IT WHEN YOU GET HOME TO CHARGE THE WOUND VAC.    DO NOT WET WOUND or apply any ointments, creams, lotions or antiseptics.  Ace wrap - apply your compression stocking and apply the ace wrap where the stocking stops for extra added compression to the knee and thigh.   Ok to shower before then if able to keep wound from getting wet (plastic barrier, saran wrap or cling wrap and tape).   DO NOT TOUCH INCISION      URINARY RETENTION:  If you start having difficulty urinating, decrease the use of Pain pills and muscle relaxers and notify your primary care doctor.     PNEUMONIA PREVENTION:  Stay out of bed as much as possible and walk around every 1-2 hours.  Continue breathing exercises (Incentive Spirometry) every 1-2 hours while mobility is limited and while you are on pain pills.    FALL PREVENTION:  Wear sturdy shoes that fit well - Wearing shoes with high heels or slippery soles, or shoes that are too loose, can lead to falls. Walking around in bare feet, or only socks, can also increase your risk of falling.  Use walker as long as your surgeon and therapist recommend it  Use good lighting and   throw rugs, electrical cords, furniture and clutter (anything than can cause you to trip at home.   Non-slip rug in bathroom or shower    INFECTION PREVENTION:  NOZIN ANTISEPTIC NASAL  - twice a day for 2 weeks or until supply runs out, whichever comes first. Shake bottle well, saturate cotton swab with 4 drops of antiseptic solution. Swab right nostril rim 6-8 times clockwise and counterclockwise. Take swab out, apply 2 more drops then swab left nostril rim 6-8 times clockwise and counterclockwise.   Continue Cipro as prior to surgery  Proper handwashing before and after dressing changes. Do not wet the wound. Wound care instructions as written above. NOTIFY MD OF EXCESSIVE WOUND DRAINAGE.  No alcohol, smoking or tobacco products  Pets should not be allowed around the wound or the dressing.   Treat UTI and skin infections as soon as possible.  Pre-medicate with antibiotics prior to dental or surgical procedures.   If you are diabetic, MAINTAIN GOOD BLOOD SUGAR CONTROL (Below 150) DURING YOUR RECOVERY. If you see high numbers, notify your primary care doctor.     Call your SURGEON'S OFFICE ([phone]) if you experience the following signs and symptoms of infection:   Unusual redness, swelling, excessive, cloudy or foul smelling drainage at the incision site.   Persistent low grade temp OR a temp greater than 102 F, unrelieved by Tylenol  Pain at surgical site, unrelieved by pain meds    Warning signs of a blood clot in your leg: (CALL YOUR SURGEON)  New onset or increasing pain in calf, new onset tenderness or redness above or below the knee or increasing swelling of your calf, ankle, or foot.  Warning signs that a blood clot has traveled to your lungs: (REPORT TO THE ER/CALL [phone])  Sudden or increase in Shortness of breath, sudden onset of chest pains, or  Localized chest pain with coughing.       IF ANY ISSUES ARISE AND YOU FEEL THE NEED TO CALL YOUR PRIMARY CARE DOCTOR, PLEASE LET YOUR SURGEON KNOW  AS WELL.     For emergencies, please report to OUR ([facility] or [facility] main [facility]) Emergency department and tell them to call YOUR SURGEON at [phone].     BEFORE MAKING ANY CHANGES TO THE MEDICAL CARE PLAN OR GOING TO THE EMERGENCY ROOM, PLEASE CONTACT THE SURGEON.    After discharge, all questions or concerns should be handled at your surgeon's office ([phone]). If it is a weekend or after hours, you will get the surgeon on call.     Susana Lancaster RN, nurse navigator, available for questions or issues after discharge at [phone].

## 2024-12-10 ENCOUNTER — DOCUMENT SCAN (OUTPATIENT)
Dept: HOME HEALTH SERVICES | Facility: HOSPITAL | Age: 89
End: 2024-12-10
Payer: MEDICARE

## 2024-12-10 LAB
ANION GAP SERPL CALC-SCNC: 6 MEQ/L
BACTERIA TISS AEROBE CULT: NORMAL
BACTERIA TISS AEROBE CULT: NORMAL
BUN SERPL-MCNC: 37.3 MG/DL (ref 9.8–20.1)
CALCIUM SERPL-MCNC: 7.8 MG/DL (ref 8.4–10.2)
CHLORIDE SERPL-SCNC: 118 MMOL/L (ref 98–107)
CO2 SERPL-SCNC: 16 MMOL/L (ref 23–31)
CREAT SERPL-MCNC: 1.84 MG/DL (ref 0.55–1.02)
CREAT/UREA NIT SERPL: 20
ERYTHROCYTE [DISTWIDTH] IN BLOOD BY AUTOMATED COUNT: 17.2 % (ref 11.5–17)
GFR SERPLBLD CREATININE-BSD FMLA CKD-EPI: 26 ML/MIN/1.73/M2
GLUCOSE SERPL-MCNC: 111 MG/DL (ref 82–115)
HCT VFR BLD AUTO: 30.5 % (ref 37–47)
HGB BLD-MCNC: 9.9 G/DL (ref 12–16)
INR PPP: 2.5 (ref 2–3)
MAGNESIUM SERPL-MCNC: 1.8 MG/DL (ref 1.6–2.6)
MCH RBC QN AUTO: 31.2 PG (ref 27–31)
MCHC RBC AUTO-ENTMCNC: 32.5 G/DL (ref 33–36)
MCV RBC AUTO: 96.2 FL (ref 80–94)
NRBC BLD AUTO-RTO: 0 %
PLATELET # BLD AUTO: 254 X10(3)/MCL (ref 130–400)
PMV BLD AUTO: 9.5 FL (ref 7.4–10.4)
POCT GLUCOSE: 162 MG/DL (ref 70–110)
POCT GLUCOSE: 180 MG/DL (ref 70–110)
POTASSIUM SERPL-SCNC: 4.4 MMOL/L (ref 3.5–5.1)
PROTHROMBIN TIME: 27.8 SECONDS (ref 11.7–14.5)
RBC # BLD AUTO: 3.17 X10(6)/MCL (ref 4.2–5.4)
SODIUM SERPL-SCNC: 140 MMOL/L (ref 136–145)
WBC # BLD AUTO: 8.37 X10(3)/MCL (ref 4.5–11.5)

## 2024-12-10 PROCEDURE — 94799 UNLISTED PULMONARY SVC/PX: CPT

## 2024-12-10 PROCEDURE — 85610 PROTHROMBIN TIME: CPT | Performed by: INTERNAL MEDICINE

## 2024-12-10 PROCEDURE — 11000001 HC ACUTE MED/SURG PRIVATE ROOM

## 2024-12-10 PROCEDURE — 25000003 PHARM REV CODE 250: Performed by: INTERNAL MEDICINE

## 2024-12-10 PROCEDURE — 99900031 HC PATIENT EDUCATION (STAT)

## 2024-12-10 PROCEDURE — 94761 N-INVAS EAR/PLS OXIMETRY MLT: CPT

## 2024-12-10 PROCEDURE — 83735 ASSAY OF MAGNESIUM: CPT | Performed by: INTERNAL MEDICINE

## 2024-12-10 PROCEDURE — 80048 BASIC METABOLIC PNL TOTAL CA: CPT | Performed by: INTERNAL MEDICINE

## 2024-12-10 PROCEDURE — 85027 COMPLETE CBC AUTOMATED: CPT | Performed by: INTERNAL MEDICINE

## 2024-12-10 PROCEDURE — 97530 THERAPEUTIC ACTIVITIES: CPT | Mod: CQ

## 2024-12-10 PROCEDURE — 97110 THERAPEUTIC EXERCISES: CPT | Mod: CQ

## 2024-12-10 PROCEDURE — 36415 COLL VENOUS BLD VENIPUNCTURE: CPT | Performed by: INTERNAL MEDICINE

## 2024-12-10 PROCEDURE — 25000003 PHARM REV CODE 250: Performed by: ORTHOPAEDIC SURGERY

## 2024-12-10 PROCEDURE — 97530 THERAPEUTIC ACTIVITIES: CPT

## 2024-12-10 RX ORDER — ACETAMINOPHEN 500 MG
500 TABLET ORAL EVERY 6 HOURS PRN
Start: 2024-12-10 | End: 2024-12-20

## 2024-12-10 RX ORDER — ASCORBIC ACID 500 MG
500 TABLET ORAL 2 TIMES DAILY
Start: 2024-12-10 | End: 2025-01-09

## 2024-12-10 RX ADMIN — ACETAMINOPHEN 499.51 MG: 160 SOLUTION ORAL at 09:12

## 2024-12-10 RX ADMIN — ALLOPURINOL 100 MG: 100 TABLET ORAL at 10:12

## 2024-12-10 RX ADMIN — PANTOPRAZOLE SODIUM 40 MG: 40 TABLET, DELAYED RELEASE ORAL at 09:12

## 2024-12-10 RX ADMIN — GABAPENTIN 300 MG: 300 CAPSULE ORAL at 10:12

## 2024-12-10 RX ADMIN — CIPROFLOXACIN 500 MG: 500 TABLET ORAL at 10:12

## 2024-12-10 RX ADMIN — WARFARIN SODIUM 1 MG: 1 TABLET ORAL at 05:12

## 2024-12-10 RX ADMIN — SACUBITRIL AND VALSARTAN 1 TABLET: 24; 26 TABLET, FILM COATED ORAL at 09:12

## 2024-12-10 RX ADMIN — EMPAGLIFLOZIN 10 MG: 10 TABLET, FILM COATED ORAL at 09:12

## 2024-12-10 RX ADMIN — DOCUSATE SODIUM 200 MG: 100 CAPSULE, LIQUID FILLED ORAL at 06:12

## 2024-12-10 RX ADMIN — LEVOTHYROXINE SODIUM 75 MCG: 0.05 TABLET ORAL at 06:12

## 2024-12-10 RX ADMIN — SACUBITRIL AND VALSARTAN 1 TABLET: 24; 26 TABLET, FILM COATED ORAL at 10:12

## 2024-12-10 RX ADMIN — CARVEDILOL 3.12 MG: 3.12 TABLET, FILM COATED ORAL at 09:12

## 2024-12-10 RX ADMIN — ACETAMINOPHEN 499.51 MG: 160 SOLUTION ORAL at 06:12

## 2024-12-10 RX ADMIN — CARVEDILOL 3.12 MG: 3.12 TABLET, FILM COATED ORAL at 10:12

## 2024-12-10 NOTE — PROGRESS NOTES
Inpatient Nutrition Evaluation    Admit Date: 12/4/2024   Total duration of encounter: 6 days   Patient Age: 89 y.o.    Nutrition Recommendation/Prescription     2000 kcal Diabetic Diet.  Will continue to monitor wt, labs, and po intake.    Nutrition Assessment     Chart Review    Reason Seen: length of stay    Malnutrition Screening Tool Results   Have you recently lost weight without trying?: No  Have you been eating poorly because of a decreased appetite?: No   MST Score: 0   Diagnosis:  Failed total hip arthroplasty with dislocation, initial encounter     Relevant Medical History: A-fib, Chronic cystitis, GERD (gastroesophageal reflux disease), Graves disease, Hypertension, Hypothyroidism, unspecified, and Spinal stenosis     Scheduled Medications:  acetaminophen, 500 mg, Q4H  allopurinoL, 100 mg, Nightly  carvediloL, 3.125 mg, BID  ciprofloxacin HCl, 500 mg, Q24H  docusate sodium, 200 mg, Before breakfast  empagliflozin, 10 mg, Daily  gabapentin, 300 mg, QHS  levothyroxine, 75 mcg, Before breakfast  pantoprazole, 40 mg, Daily  polyethylene glycol, 17 g, QHS  sacubitriL-valsartan, 1 tablet, BID  senna-docusate 8.6-50 mg, 2 tablet, BID  warfarin, 1 mg, Daily    Continuous Infusions:   PRN Medications:   Current Facility-Administered Medications:     0.9%  NaCl infusion (for blood administration), , Intravenous, Q24H PRN    acetaminophen, 499.5074 mg, Oral, Q6H PRN    aluminum-magnesium hydroxide-simethicone, 30 mL, Oral, Q6H PRN    dextrose 10%, 12.5 g, Intravenous, PRN    dextrose 10%, 25 g, Intravenous, PRN    glucagon (human recombinant), 1 mg, Intramuscular, PRN    glucose, 16 g, Oral, PRN    glucose, 24 g, Oral, PRN    insulin aspart U-100, 0-5 Units, Subcutaneous, QID (AC + HS) PRN    magnesium hydroxide 400 mg/5 ml, 30 mL, Oral, Daily PRN    melatonin, 6 mg, Oral, Nightly PRN    methocarbamoL, 500 mg, Oral, Q8H PRN    naloxone, 0.02 mg, Intravenous, PRN    ondansetron, 4 mg, Intravenous, Q6H PRN    sodium  "chloride 0.9%, 10 mL, Intravenous, PRN    sodium chloride 0.9%, 10 mL, Intravenous, Q12H PRN    traMADoL, 50 mg, Oral, Q6H PRN    Recent Labs   Lab 12/04/24  1453 12/05/24  0436 12/05/24  0930 12/06/24  0453 12/07/24  0621 12/07/24  0716 12/08/24  0554 12/09/24  0457 12/10/24  0426    142  --  137 134*  --  136 138 140   K 4.1 4.1  --  4.5 4.5  --  5.0 4.9 4.4   CALCIUM 8.6 8.7  --  8.9 8.6  --  8.4 8.1* 7.8*   PHOS  --   --   --   --   --   --  2.5  --   --    MG  --  1.90  --   --   --   --  1.90 1.90 1.80   * 115*  --  108* 106  --  108* 115* 118*   CO2 19* 18*  --  18* 17*  --  18* 15* 16*   BUN 23.6* 22.7*  --  31.8* 40.7*  --  43.1* 39.7* 37.3*   CREATININE 1.32* 1.24*  --  2.06* 2.48*  --  2.51* 2.16* 1.84*   EGFRNORACEVR 39 42  --  23 18  --  18 22 26   GLUCOSE 123* 103  --  180* 131*  --  100 98 111   BILITOT 0.5  --   --   --   --   --   --   --   --    ALKPHOS 122  --   --   --   --   --   --   --   --    ALT 12  --   --   --   --   --   --   --   --    AST 29  --   --   --   --   --   --   --   --    ALBUMIN 2.5*  --   --   --   --   --  2.4*  --   --    HGBA1C  --   --   --   --   --   --   --  5.4  --    WBC 7.92 7.52  --  13.90*  --  9.50 8.12  --  8.37   HGB 11.5* 11.4* 9.4* 8.4*  --  11.5* 10.7*  --  9.9*   HCT 35.4* 35.4* 29.9* 26.7*  --  34.6* 32.1*  --  30.5*     Nutrition Orders:  Diet diabetic 2000 Calories (up to 75 gm per meal)      Appetite/Oral Intake: good/% of meals  Factors Affecting Nutritional Intake: none identified  Food/Advent/Cultural Preferences: none reported  Food Allergies: no known food allergies  Last Bowel Movement: 12/09/24  Wound(s):  noted    Comments    12/10/24: Pt reports good appetite and intake. Denies issues c/s and denies n/v/d/c. Last BM yesterday. Pt unsure of UBW; wt stable per EMR wt hx.  Labs and meds reviewed.    Anthropometrics    Height: 5' 4" (162.6 cm), Height Method: Measured  Last Weight: 83.9 kg (184 lb 15.5 oz) (12/06/24 1921), " Weight Method: Bed Scale  BMI (Calculated): 31.7  BMI Classification: obese grade I (BMI 30-34.9)     Ideal Body Weight (IBW), Female: 120 lb     % Ideal Body Weight, Female (lb): 154.14 %                             Usual Weight Provided By: EMR weight history    Wt Readings from Last 5 Encounters:   12/06/24 83.9 kg (184 lb 15.5 oz)   11/19/24 83.9 kg (185 lb)   11/05/24 85.7 kg (188 lb 15 oz)   10/08/24 85.7 kg (188 lb 15 oz)   10/01/24 85.7 kg (188 lb 15 oz)     Weight Change(s) Since Admission: stable  Wt Readings from Last 1 Encounters:   12/06/24 1921 83.9 kg (184 lb 15.5 oz)   12/04/24 1338 83.9 kg (185 lb)   Admit Weight: 83.9 kg (185 lb) (12/04/24 1338), Weight Method: Estimated    Patient Education     Not applicable.    Nutrition Goals & Monitoring     Dietitian will monitor: food and beverage intake, weight, and glucose/endocrine profile    Nutrition Risk/Follow-Up: low (follow-up in 5-7 days)  Patients assigned 'low nutrition risk' status do not qualify for a full nutritional assessment but will be monitored and re-evaluated in a 5-7 day time period. Please consult if re-evaluation needed sooner.

## 2024-12-10 NOTE — PROGRESS NOTES
[facility]  [facility] Medicine Progress Note      Patient Name: Homa Jaquez  MRN: [id number]  Admission Date: 12/4/2024   Length of Stay: 5  Attending Physician: Olvin Huber MD  Primary Care Provider: Franklyn Brito MD  Face-to-Face encounter date: 12/10/2024    Code Status: DNR        Chief Complaint:   Hip Pain        HPI:   Homa Jaquez is a 89 y.o. female who  has a past medical history of A-fib, Chronic cystitis, GERD (gastroesophageal reflux disease), Graves disease, Hypertension, Hypothyroidism, unspecified, and Spinal stenosis.. The patient presented to [facility] on 12/4/2024 with a primary complaint of left hip pain. I have cared for her in the past. The patient was found to have a dislocation of her left hip. She has a history of a chronic infection of the same hip. She has no other c/o. Her son is at the bedside. He denies any trauma that led to the dislocation. She had done well with the last 2 days.      Overview/Hospital Course:  No notes on file       Interval Hx:   The patient has no c/o. Case d/w RN, no acute issues reported.    Review of Systems   All other systems reviewed and are negative.      Objective/physical exam:  General: In no acute distress, afebrile  Chest: Clear to auscultation bilaterally  Heart: RRR, +S1, S2, no appreciable murmur  Abdomen: Soft, nontender, BS +  MSK: Warm, no lower extremity edema, no clubbing or cyanosis, s/p left hip surgery, wound vac in place, minimal blood noted.  Neurologic: Alert and oriented x4  Psych/mental status: Appropriate mood and affect. Responds appropriately to questions.     VITAL SIGNS: 24 HRS MIN & MAX LAST   Temp  Min: 97.6 °F (36.4 °C)  Max: 98.9 °F (37.2 °C) 98.6 °F (37 °C)   BP  Min: 108/64  Max: 142/85 129/80   Pulse  Min: 88  Max: 91  88   Resp  Min: 18  Max: 18 18   SpO2  Min: 97 %  Max: 99 % 98 %       Recent Labs   Lab 12/07/24  0716 12/08/24  0554 12/10/24  0426   WBC 9.50  8.12 8.37   RBC 3.73* 3.47* 3.17*   HGB 11.5* 10.7* 9.9*   HCT 34.6* 32.1* 30.5*   MCV 92.8 92.5 96.2*   MCH 30.8 30.8 31.2*   MCHC 33.2 33.3 32.5*   RDW 17.0 17.2* 17.2*    240 254   MPV 9.7 9.6 9.5       Recent Labs   Lab 12/04/24  1453 12/05/24  0436 12/08/24  0554 12/09/24  0457 12/10/24  0426      < > 136 138 140   K 4.1   < > 5.0 4.9 4.4   *   < > 108* 115* 118*   CO2 19*   < > 18* 15* 16*   BUN 23.6*   < > 43.1* 39.7* 37.3*   CREATININE 1.32*   < > 2.51* 2.16* 1.84*   CALCIUM 8.6   < > 8.4 8.1* 7.8*   MG  --    < > 1.90 1.90 1.80   ALBUMIN 2.5*  --  2.4*  --   --    ALKPHOS 122  --   --   --   --    ALT 12  --   --   --   --    AST 29  --   --   --   --    BILITOT 0.5  --   --   --   --     < > = values in this interval not displayed.        Recent Results (from the past 24 hours)   Basic Metabolic Panel    Collection Time: 12/10/24  4:26 AM   Result Value Ref Range    Sodium 140 136 - 145 mmol/L    Potassium 4.4 3.5 - 5.1 mmol/L    Chloride 118 (H) 98 - 107 mmol/L    CO2 16 (L) 23 - 31 mmol/L    Glucose 111 82 - 115 mg/dL    Blood Urea Nitrogen 37.3 (H) 9.8 - 20.1 mg/dL    Creatinine 1.84 (H) 0.55 - 1.02 mg/dL    BUN/Creatinine Ratio 20     Calcium 7.8 (L) 8.4 - 10.2 mg/dL    Anion Gap 6.0 mEq/L    eGFR 26 mL/min/1.73/m2   Magnesium    Collection Time: 12/10/24  4:26 AM   Result Value Ref Range    Magnesium Level 1.80 1.60 - 2.60 mg/dL   CBC Without Differential    Collection Time: 12/10/24  4:26 AM   Result Value Ref Range    WBC 8.37 4.50 - 11.50 x10(3)/mcL    RBC 3.17 (L) 4.20 - 5.40 x10(6)/mcL    Hgb 9.9 (L) 12.0 - 16.0 g/dL    Hct 30.5 (L) 37.0 - 47.0 %    MCV 96.2 (H) 80.0 - 94.0 fL    MCH 31.2 (H) 27.0 - 31.0 pg    MCHC 32.5 (L) 33.0 - 36.0 g/dL    RDW 17.2 (H) 11.5 - 17.0 %    Platelet 254 130 - 400 x10(3)/mcL    MPV 9.5 7.4 - 10.4 fL    NRBC% 0.0 %   Protime-INR    Collection Time: 12/10/24  4:26 AM   Result Value Ref Range    PT 27.8 (H) 11.7 - 14.5 seconds    INR 2.5 2.0 - 3.0    POCT glucose    Collection Time: 12/10/24  9:22 AM   Result Value Ref Range    POCT Glucose 180 (H) 70 - 110 mg/dL       Microbiology Results (last 7 days)       Procedure Component Value Units Date/Time    Tissue Culture - Aerobic [[id number]] Collected: 12/05/24 0800    Order Status: Completed Specimen: Tissue from Hip, Left Updated: 12/10/24 0845     Tissue - Aerobic Culture No Growth at 5 days    Tissue Culture - Aerobic [[id number]] Collected: 12/05/24 0801    Order Status: Completed Specimen: Tissue from Hip, Left Updated: 12/10/24 0845     Tissue - Aerobic Culture No Growth at 5 days    Anaerobic Culture [[id number]] Collected: 12/05/24 0801    Order Status: Completed Specimen: Tissue from Hip, Left Updated: 12/08/24 0807     Anaerobe Culture No Anaerobes Isolated    Anaerobic Culture [[id number]] Collected: 12/05/24 0800    Order Status: Completed Specimen: Tissue from Hip, Left Updated: 12/08/24 0806     Anaerobe Culture No Anaerobes Isolated    Gram Stain [[id number]] Collected: 12/05/24 0800    Order Status: Completed Specimen: Tissue from Hip, Left Updated: 12/05/24 1443     GRAM STAIN Rare WBC observed      No bacteria seen    Gram Stain [[id number]] Collected: 12/05/24 0801    Order Status: Completed Specimen: Tissue from Hip, Left Updated: 12/05/24 1442     GRAM STAIN Rare WBC observed      No bacteria seen             Radiology:  X-Ray Hip 1 View Left (with Pelvis when performed)  Narrative: EXAMINATION:  XR HIP 1 VIEW LEFT (WITH PELVIS WHEN PERFORMED)    CLINICAL HISTORY:  Post-op;  Dislocation of other internal joint prosthesis, initial encounter    COMPARISON:  Yesterday    FINDINGS:  Two views left hip.  Interval reduction of the dislocation with revision of the arthroplasty.  Impression: As above.    Electronically signed by: Roger Montague  Date:    12/05/2024  Time:    10:10      Scheduled Med:   acetaminophen  500 mg Oral Q4H    allopurinoL  100 mg Oral Nightly    carvediloL  3.125 mg Oral  BID    ciprofloxacin HCl  500 mg Oral Q24H    docusate sodium  200 mg Oral Before breakfast    empagliflozin  10 mg Oral Daily    gabapentin  300 mg Oral QHS    levothyroxine  75 mcg Oral Before breakfast    pantoprazole  40 mg Oral Daily    polyethylene glycol  17 g Oral QHS    sacubitriL-valsartan  1 tablet Oral BID    senna-docusate 8.6-50 mg  2 tablet Oral BID    warfarin  1 mg Oral Daily            Nutrition Status:      Assessment/Plan:    Left hip dislocation, hx of SILVIA  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  Wound VAC left hip 12/5/24  Afib -HR controlled  HTN  Hypothyroidism  DM 2 A1c 5.4  Dilated cardiomyopathy   Chronic left hip infection  BARRY -CR improved  Acute blood loss anemia -s/p 2 unit prbc     Plan  DC plans d/w CM  follow INR, cont coumadin  CR better  Lasix held due to BARRY  Cont cipro for chronic hip infection  dc home tomorrow    All diagnosis and differential diagnosis have been reviewed; assessment and plan has been documented; I have personally reviewed the labs and test results that are presently available; I have reviewed the patients medication list; I have reviewed the consulting providers response and recommendations. I have reviewed or attempted to review medical records based upon their availability      _____________________________________________________________________            Olvin Huber MD   12/10/2024

## 2024-12-10 NOTE — PT/OT/SLP PROGRESS
Physical Therapy Treatment    Patient Name:  Homa Jaquez   MRN:  [id number]    Recommendations:     Discharge Recommendations: Low Intensity Therapy  Discharge Equipment Recommendations: walker, rolling, other (see comments), lift device (TBD)  Barriers to discharge:  impaired functional mobility and severity of deficits    Assessment:     Homa Jaquez is a 89 y.o. female admitted with a medical diagnosis of Failed total hip arthroplasty with dislocation, initial encounter.  She presents with the following impairments/functional limitations: weakness, impaired endurance, impaired self care skills, impaired functional mobility, gait instability, impaired balance, decreased lower extremity function, pain, decreased ROM, orthopedic precautions .    Rehab Prognosis: Good; patient would benefit from acute skilled PT services to address these deficits and reach maximum level of function.    Recent Surgery: Procedure(s) (LRB):  CLOSED REDUCTION, HIP (Left)  REVISION,ARTHROPLASTY,HIP,POSTERIOR APPROACH (Left) 5 Days Post-Op    Plan:     During this hospitalization, patient to be seen daily to address the identified rehab impairments via gait training, therapeutic activities, therapeutic exercises, wheelchair management/training and progress toward the following goals:    Plan of Care Expires:  12/14/24    Subjective     Chief Complaint: right elbow bursitis/pain with weight bearing  Patient/Family Comments/goals: discharge home  Pain/Comfort:  Pain Rating 1: 0/10  Pain Addressed 1: Pre-medicate for activity, Reposition, Distraction  Pain Rating Post-Intervention 1: 0/10      Objective:     Communicated with nsg prior to session.  Patient found HOB elevated with   upon PT entry to room.     General Precautions: Standard, fall  Orthopedic Precautions: LLE partial weight bearing, LLE posterior precautions  Braces: Hip abduction brace  Respiratory Status: Room air     Functional Mobility:  Bed Mobility:     Supine to  Sit: maximal assistance for trunk and LLE, increased time;   Sit to Supine: maximal assistance for trunk and LLE, increased time    Treatment & Education:  Pt attempted to sit at edge of bed but was unable to put weight into RUE and required Tate to sit.  Pt performed 20 ankle pumps with BLE, and 15 reps of glute sets and quad sets.  Pt performed 15 reps of short-arc quads, hip abduction, and heel slides with BLE - active assist with LLE.    Patient left HOB elevated with call button in reach..    GOALS:   Multidisciplinary Problems       Physical Therapy Goals          Problem: Physical Therapy    Goal Priority Disciplines Outcome Interventions   Physical Therapy Goal     PT, PT/OT Progressing    Description: Pt will improve functional independence by performing:    Bed mobility: mod A  Sit to stand: max A with rolling walker  Bed to chair t/f: max A with Stand Pivot  with rolling walker  Independent with total hip HEP                        Time Tracking:     PT Received On:    PT Start Time: 0928     PT Stop Time: 0958  PT Total Time (min): 30 min     Billable Minutes: Therapeutic Activity 10 min and Therapeutic Exercise 20 min    Treatment Type: Treatment  PT/PTA: PTA     Number of PTA visits since last PT visit: 2     12/10/2024

## 2024-12-10 NOTE — PT/OT/SLP PROGRESS
Occupational Therapy   Treatment    Name: Homa Jaquez  MRN: [id number]  Admitting Diagnosis:  Failed total hip arthroplasty with dislocation, initial encounter  5 Days Post-Op    Recommendations:     Discharge Recommendations: Moderate Intensity Therapy  Discharge Equipment Recommendations:  lift device, walker, rolling  Barriers to discharge:  time since onset    Assessment:     Homa Jaquez is a 89 y.o. female with a medical diagnosis of Failed total hip arthroplasty with dislocation, initial encounter. Performance deficits affecting function are weakness, impaired endurance, impaired self care skills, impaired functional mobility, gait instability, impaired balance.     Rehab Prognosis:  Fair; patient would benefit from acute skilled OT services to address these deficits and reach maximum level of function.       Plan:     Patient to be seen  (QD-BID) to address the above listed problems via self-care/home management, therapeutic exercises, therapeutic activities  Plan of Care Expires: 12/12/24  Plan of Care Reviewed with: patient    Subjective     Chief Complaint: No complaints at this time  Pain/Comfort:  Pain Rating 1: 0/10    Objective:     Communicated with: LAURA Yepez prior to session.  Patient found supine with peripheral IV, wound vac, hip abduction brace upon OT entry to room.    General Precautions: Standard, fall    Orthopedic Precautions: (50% PWB LLE, posterior pxns)  Braces: Hip abduction brace  Respiratory Status: Room air     Occupational Performance:     Bed Mobility:    Patient completed Rolling/Turning to Left with  moderate assistance  Patient completed Rolling/Turning to Right with moderate assistance  Patient completed Supine to Sit with maximal assistance  Patient completed Sit to Supine with maximal assistance   Pt rolled L <> R for OT to readjust hip abduction brace and to complete toileting due to soiled diaper upon arrival.   Pt maintained static sitting at EOB ~3 minutes before  being unable to tolerate position much longer, mostly due to discomfort with hip abduction brace. It appeared to hike up pt's torso once sitting at EOB making it difficult for pt to come to a complete upright sitting posture.     Functional Mobility/Transfers:  N/A at this time    Activities of Daily Living:  With bed in chair position, pt brushed hair with independence then applied a headband.    Treatment & Education:  Patient left with bed in chair position with all lines intact and call button in reach    GOALS:   Multidisciplinary Problems       Occupational Therapy Goals          Problem: Occupational Therapy    Goal Priority Disciplines Outcome Interventions   Occupational Therapy Goal     OT, PT/OT Progressing    Description: Pt will perform LB dressing max A by d/c.  Pt will perform toileting max A by d/c.  Pt will perform toilet t/f max A with BSC by d/c.  Pt will perform tub t/f max assist c TTB by d/c.  Pt will perform car t/f max assist in adherence to pxns by d/c.                        Time Tracking:     OT Date of Treatment: 12/10/24  OT Start Time: 0824  OT Stop Time: 0853  OT Total Time (min): 29 min    Billable Minutes:Therapeutic Activity 29    OT/EVY: OT          12/10/2024

## 2024-12-10 NOTE — PLAN OF CARE
Problem: Physical Therapy  Goal: Physical Therapy Goal  Description: Pt will improve functional independence by performing:    Bed mobility: mod A  Sit to stand: max A with rolling walker  Bed to chair t/f: max A with Stand Pivot  with rolling walker  Independent with total hip HEP   12/10/2024 1204 by Meaghan Fernandez, FELIPA  Outcome: Progressing

## 2024-12-10 NOTE — PLAN OF CARE
Per Dr Huber, plan dc tomorrow.   Updated pt & son, Av via phone -- verb under     Recvd notification from [facility] -- ambulance dc transport approved by Kalyn RIVERO   Called [facility] -- shirin Manuel [phone] put in Will Call for tomorrow.     Faxed hh orders to [facility] re: resumption.

## 2024-12-10 NOTE — PT/OT/SLP PROGRESS
Occupational Therapy      Patient Name:  Homa Jaquez   MRN:  [id number]    Patient sleeping heavily upon OT arrival. Patient not seen today secondary to increased fatigue. Will follow-up tomorrow when time permits.    12/10/2024

## 2024-12-10 NOTE — PT/OT/SLP PROGRESS
Physical Therapy Treatment    Patient Name:  Homa Jaquez   MRN:  [id number]    Recommendations:     Discharge Recommendations: Low Intensity Therapy  Discharge Equipment Recommendations: walker, rolling, other (see comments), lift device (TBD)  Barriers to discharge:  impaired functional mobility and severity of deficits    Assessment:     Homa Jaquez is a 89 y.o. female admitted with a medical diagnosis of Failed total hip arthroplasty with dislocation, initial encounter.  She presents with the following impairments/functional limitations: weakness, impaired endurance, impaired self care skills, impaired functional mobility, gait instability, impaired balance, decreased lower extremity function, pain, decreased ROM, orthopedic precautions .    Rehab Prognosis: Good; patient would benefit from acute skilled PT services to address these deficits and reach maximum level of function.    Recent Surgery: Procedure(s) (LRB):  CLOSED REDUCTION, HIP (Left)  REVISION,ARTHROPLASTY,HIP,POSTERIOR APPROACH (Left) 5 Days Post-Op    Plan:     During this hospitalization, patient to be seen daily to address the identified rehab impairments via gait training, therapeutic activities, therapeutic exercises, wheelchair management/training and progress toward the following goals:    Plan of Care Expires:  12/14/24    Subjective     Chief Complaint: left hip discomfort  Patient/Family Comments/goals: discharge tomorrow  Pain/Comfort:  Pain Rating 1: 0/10  Pain Addressed 1: Reposition, Distraction, Pre-medicate for activity  Pain Rating Post-Intervention 1: 0/10      Objective:     Communicated with nsg prior to session.  Patient found HOB elevated with   upon PT entry to room.     General Precautions: Standard, fall  Orthopedic Precautions: LLE partial weight bearing, LLE posterior precautions  Braces: Hip abduction brace  Respiratory Status: Room air     Functional Mobility:  Bed Mobility:     Rolling Left:  maximal assistance  for changing soiled diaper  Rolling Right: maximal assistance for changing soiled diaper    Treatment & Education:  Pt educated on management of swelling through RICE method and ambulation.  Pt performed the following exercises: 20 bilateral ankle pumps; 15 glute sets; 15 quad sets - BLE; 10 hip abductions with BLE, passive ROM with LLE; 15 short-arc quads with BLE - Active assist with LLE.    Patient left up in chair with call button in reach..    GOALS:   Multidisciplinary Problems       Physical Therapy Goals          Problem: Physical Therapy    Goal Priority Disciplines Outcome Interventions   Physical Therapy Goal     PT, PT/OT Progressing    Description: Pt will improve functional independence by performing:    Bed mobility: mod A  Sit to stand: max A with rolling walker  Bed to chair t/f: max A with Stand Pivot  with rolling walker  Independent with total hip HEP                        Time Tracking:     PT Received On:    PT Start Time: 1346     PT Stop Time: 1412  PT Total Time (min): 26 min     Billable Minutes: Therapeutic Activity 10 min and Therapeutic Exercise 16 min    Treatment Type: Treatment  PT/PTA: PTA     Number of PTA visits since last PT visit: 1     12/10/2024

## 2024-12-11 VITALS
TEMPERATURE: 98 F | BODY MASS INDEX: 31.57 KG/M2 | HEIGHT: 64 IN | RESPIRATION RATE: 16 BRPM | HEART RATE: 83 BPM | WEIGHT: 184.94 LBS | OXYGEN SATURATION: 99 % | DIASTOLIC BLOOD PRESSURE: 68 MMHG | SYSTOLIC BLOOD PRESSURE: 128 MMHG

## 2024-12-11 LAB
ANION GAP SERPL CALC-SCNC: 7 MEQ/L
BUN SERPL-MCNC: 31.9 MG/DL (ref 9.8–20.1)
CALCIUM SERPL-MCNC: 8.2 MG/DL (ref 8.4–10.2)
CHLORIDE SERPL-SCNC: 118 MMOL/L (ref 98–107)
CO2 SERPL-SCNC: 16 MMOL/L (ref 23–31)
CREAT SERPL-MCNC: 1.76 MG/DL (ref 0.55–1.02)
CREAT/UREA NIT SERPL: 18
ERYTHROCYTE [DISTWIDTH] IN BLOOD BY AUTOMATED COUNT: 17.5 % (ref 11.5–17)
GFR SERPLBLD CREATININE-BSD FMLA CKD-EPI: 28 ML/MIN/1.73/M2
GLUCOSE SERPL-MCNC: 90 MG/DL (ref 82–115)
HCT VFR BLD AUTO: 29 % (ref 37–47)
HGB BLD-MCNC: 9.6 G/DL (ref 12–16)
INR PPP: 2.3 (ref 2–3)
MCH RBC QN AUTO: 31.4 PG (ref 27–31)
MCHC RBC AUTO-ENTMCNC: 33.1 G/DL (ref 33–36)
MCV RBC AUTO: 94.8 FL (ref 80–94)
NRBC BLD AUTO-RTO: 0 %
PLATELET # BLD AUTO: 292 X10(3)/MCL (ref 130–400)
PMV BLD AUTO: 9.1 FL (ref 7.4–10.4)
POCT GLUCOSE: 149 MG/DL (ref 70–110)
POTASSIUM SERPL-SCNC: 4.9 MMOL/L (ref 3.5–5.1)
PROTHROMBIN TIME: 25.9 SECONDS (ref 11.7–14.5)
RBC # BLD AUTO: 3.06 X10(6)/MCL (ref 4.2–5.4)
SODIUM SERPL-SCNC: 141 MMOL/L (ref 136–145)
WBC # BLD AUTO: 7.66 X10(3)/MCL (ref 4.5–11.5)

## 2024-12-11 PROCEDURE — 85027 COMPLETE CBC AUTOMATED: CPT | Performed by: INTERNAL MEDICINE

## 2024-12-11 PROCEDURE — 85610 PROTHROMBIN TIME: CPT | Performed by: INTERNAL MEDICINE

## 2024-12-11 PROCEDURE — 94799 UNLISTED PULMONARY SVC/PX: CPT

## 2024-12-11 PROCEDURE — 99900031 HC PATIENT EDUCATION (STAT)

## 2024-12-11 PROCEDURE — 97530 THERAPEUTIC ACTIVITIES: CPT | Mod: CQ

## 2024-12-11 PROCEDURE — 36415 COLL VENOUS BLD VENIPUNCTURE: CPT | Performed by: INTERNAL MEDICINE

## 2024-12-11 PROCEDURE — 97110 THERAPEUTIC EXERCISES: CPT | Mod: CQ

## 2024-12-11 PROCEDURE — 80048 BASIC METABOLIC PNL TOTAL CA: CPT | Performed by: INTERNAL MEDICINE

## 2024-12-11 PROCEDURE — 25000003 PHARM REV CODE 250: Performed by: ORTHOPAEDIC SURGERY

## 2024-12-11 PROCEDURE — 94761 N-INVAS EAR/PLS OXIMETRY MLT: CPT

## 2024-12-11 RX ADMIN — LEVOTHYROXINE SODIUM 75 MCG: 0.05 TABLET ORAL at 06:12

## 2024-12-11 RX ADMIN — ACETAMINOPHEN 499.51 MG: 160 SOLUTION ORAL at 06:12

## 2024-12-11 RX ADMIN — ACETAMINOPHEN 499.51 MG: 160 SOLUTION ORAL at 02:12

## 2024-12-11 RX ADMIN — CARVEDILOL 3.12 MG: 3.12 TABLET, FILM COATED ORAL at 08:12

## 2024-12-11 RX ADMIN — EMPAGLIFLOZIN 10 MG: 10 TABLET, FILM COATED ORAL at 08:12

## 2024-12-11 RX ADMIN — PANTOPRAZOLE SODIUM 40 MG: 40 TABLET, DELAYED RELEASE ORAL at 08:12

## 2024-12-11 NOTE — DISCHARGE SUMMARY
"  Hospital Medicine Discharge Summary    Admit Date: 12/4/2024  Discharge Date and Time: 12/11/20241:26 PM  Admitting Physician: Olvin Huber MD   Discharge Attending Physician: Mathieu Patel.  Primary Care Physician: Franklyn Brito MD  Consults: Dr. Bains Orthopedic Surgery    Discharge Diagnoses:  Active Hospital Problems    Diagnosis  POA    *Failed total hip arthroplasty with dislocation, initial encounter [T84.028A, Z96.649]  Yes      Resolved Hospital Problems   No resolved problems to display.        Hospital Course:   Homa Jaquez is a 89 y.o. female who  has a past medical history of A-fib, Chronic cystitis, GERD (gastroesophageal reflux disease), Graves disease, Hypertension, Hypothyroidism, unspecified, and Spinal stenosis.. The patient presented to [facility] on 12/4/2024 with a primary complaint of left hip pain. I have cared for her in the past. The patient was found to have a dislocation of her left hip. She has a history of a chronic infection of the same hip. She has no other c/o. Her son is at the bedside. He denies any trauma that led to the dislocation. She had done well with the last 2 days.    Pt underwent Revision L SILVIA - both component/Wound vac left hip.  Vital signs stable.  Lasix held for elevated cr which is  trending down.  I will cont to hold and can resume if worsening edema/sob otherwise can hold until seen by pcp in 1 week-discussed with RN.      BP (!) 122/59   Pulse 100   Temp 98.2 °F (36.8 °C) (Oral)   Resp 18   Ht 5' 4" (1.626 m)   Wt 83.9 kg (184 lb 15.5 oz)   SpO2 99%   Breastfeeding No   BMI 31.75 kg/m²    Physical Exam   General: In no acute distress, afebrile  Chest: Clear to auscultation bilaterally  Heart: RRR, +S1, S2, no appreciable murmur  Abdomen: Soft, nontender, BS +  MSK: Warm, no lower extremity edema, no clubbing or cyanosis, s/p left hip surgery, wound vac in place, minimal blood noted.  Neurologic: Alert and oriented x4  Psych/mental status: " Appropriate mood and affect. Responds appropriately to questions.  Procedures Performed:  Revision L SILVIA - both component/Wound vac left hip.     Significant Diagnostic Studies: See Full reports for all details  No results displayed because visit has over 200 results.           X-Ray Hip 1 View Left (with Pelvis when performed)    Result Date: 12/5/2024  EXAMINATION: XR HIP 1 VIEW LEFT (WITH PELVIS WHEN PERFORMED) CLINICAL HISTORY: Post-op;  Dislocation of other internal joint prosthesis, initial encounter COMPARISON: Yesterday FINDINGS: Two views left hip.  Interval reduction of the dislocation with revision of the arthroplasty.     As above. Electronically signed by: Roger Montague Date:    12/05/2024 Time:    10:10    X-Ray Hip 2 or 3 views Left with Pelvis when performed    Result Date: 12/4/2024  EXAMINATION XR HIP WITH PELVIS WHEN PERFORMED 2 OR 3 VIEWS LEFT CLINICAL HISTORY apparent dislocation; TECHNIQUE A total of 4 images submitted of the left hip/pelvis. COMPARISON 5 November 2024 FINDINGS Left hip arthroplasty is again noted, with superior dislocation of the acetabular cup and femoral stem components.  There is unchanged appearance of left remaining visualized osseous structures are without evidence of new or worsened focal abnormality.  Degenerative changes are again appreciated.  Pelvic ring structures are intact.  Lesser trochanter displaced fracture. There is no convincing acute soft tissue abnormality.  Widespread vascular calcification again noted. IMPRESSION 1. Interval superior dislocation of left acetabular and femoral arthroplasty hardware. 2. Similar appearance of mildly displaced left lesser trochanter fracture. Electronically signed by: Akil Grider Date:    12/04/2024 Time:    19:09      Microbiology Results (last 7 days)       Procedure Component Value Units Date/Time    Tissue Culture - Aerobic [[id number]] Collected: 12/05/24 0800    Order Status: Completed Specimen: Tissue from Hip, Left  Updated: 12/10/24 0845     Tissue - Aerobic Culture No Growth at 5 days    Tissue Culture - Aerobic [[id number]] Collected: 12/05/24 0801    Order Status: Completed Specimen: Tissue from Hip, Left Updated: 12/10/24 0845     Tissue - Aerobic Culture No Growth at 5 days    Anaerobic Culture [[id number]] Collected: 12/05/24 0801    Order Status: Completed Specimen: Tissue from Hip, Left Updated: 12/08/24 0807     Anaerobe Culture No Anaerobes Isolated    Anaerobic Culture [[id number]] Collected: 12/05/24 0800    Order Status: Completed Specimen: Tissue from Hip, Left Updated: 12/08/24 0806     Anaerobe Culture No Anaerobes Isolated    Gram Stain [[id number]] Collected: 12/05/24 0800    Order Status: Completed Specimen: Tissue from Hip, Left Updated: 12/05/24 1443     GRAM STAIN Rare WBC observed      No bacteria seen    Gram Stain [[id number]] Collected: 12/05/24 0801    Order Status: Completed Specimen: Tissue from Hip, Left Updated: 12/05/24 1442     GRAM STAIN Rare WBC observed      No bacteria seen                Medication List        START taking these medications      acetaminophen 500 MG tablet  Commonly known as: TYLENOL  Take 1 tablet (500 mg total) by mouth every 6 (six) hours as needed for Pain.  Replaces: acetaminophen 650 mg/20.3 mL Soln            CHANGE how you take these medications      traMADoL 50 mg tablet  Commonly known as: ULTRAM  Take 1 tablet (50 mg total) by mouth every 12 (twelve) hours as needed (as needed for pain score 1-10).  What changed: Another medication with the same name was removed. Continue taking this medication, and follow the directions you see here.            CONTINUE taking these medications      allopurinoL 100 MG tablet  Commonly known as: ZYLOPRIM  TAKE 1 TABLET BY MOUTH ONCE DAILY     ascorbic acid (vitamin C) 500 MG tablet  Commonly known as: VITAMIN C  Take 1 tablet (500 mg total) by mouth 2 (two) times daily.     carvediloL 3.125 MG tablet  Commonly known as:  COREG  Take 1 tablet (3.125 mg total) by mouth 2 (two) times daily.     ciprofloxacin HCl 250 MG tablet  Commonly known as: CIPRO  Take 2 tablets (500 mg total) by mouth every 12 (twelve) hours.     cyanocobalamin 1,000 mcg/mL injection  INJECT 1000MCG (1ML) INTRAMUSCULARY ONCE MONTHLY     docusate sodium 100 MG capsule  Commonly known as: COLACE  Take 1 capsule (100 mg total) by mouth 2 (two) times daily as needed for Constipation.     empagliflozin 10 mg tablet  Commonly known as: JARDIANCE  Take 1 tablet (10 mg total) by mouth once daily.     famotidine 20 MG tablet  Commonly known as: PEPCID     furosemide 20 MG tablet  Commonly known as: LASIX  Take 1 tablet (20 mg total) by mouth once daily.     levothyroxine 75 MCG tablet  Commonly known as: SYNTHROID  Take 1 tablet (75 mcg total) by mouth before breakfast.     lovastatin 20 MG tablet  Commonly known as: MEVACOR     pantoprazole 40 MG tablet  Commonly known as: PROTONIX  GIVE ONE TABLET BY MOUTH ONCE DAILY     polyethylene glycol 17 gram Pwpk  Commonly known as: GLYCOLAX  Take 17 g by mouth 2 (two) times daily as needed for Constipation.     sacubitriL-valsartan 24-26 mg per tablet  Commonly known as: ENTRESTO  Take 1 tablet by mouth 2 (two) times daily.     senna 8.6 mg tablet  Commonly known as: SENOKOT  Take 1 tablet by mouth daily as needed for Constipation.     warfarin 1 MG tablet  Commonly known as: COUMADIN  Take 1 tablet (1 mg total) by mouth Daily.            STOP taking these medications      acetaminophen 650 mg/20.3 mL Soln  Commonly known as: TYLENOL  Replaced by: acetaminophen 500 MG tablet               Where to Get Your Medications        Information about where to get these medications is not yet available    Ask your nurse or doctor about these medications  acetaminophen 500 MG tablet  ascorbic acid (vitamin C) 500 MG tablet         Explained in detail to the patient about the discharge plan, medications, and follow-up visits. Pt  understands and agrees with the treatment plan  Discharged Condition: stable  Medications Per DC med rec  Activities as tolerated  Follow-up Dr Bains 12/19, Dr Brito 1 week, [facility]  For further questions contact hospitalist office    Discharge time 30 minutes    For worsening symptoms, chest pain, shortness of breath, increased abdominal pain, high grade fever, stroke or stroke like symptoms, immediately go to the nearest Emergency Room or call 911 as soon as possible.      Mathieu Chang M.D, on 12/11/2024. at 1:26 PM.

## 2024-12-11 NOTE — PT/OT/SLP PROGRESS
Physical Therapy Treatment    Patient Name:  Homa Jaquez   MRN:  [id number]    Recommendations:     Discharge Recommendations: Low Intensity Therapy  Discharge Equipment Recommendations: walker, rolling, other (see comments), lift device (TBD)  Barriers to discharge:  impaired functional mobility and severity of deficits    Assessment:     Homa Jaquez is a 89 y.o. female admitted with a medical diagnosis of Failed total hip arthroplasty with dislocation, initial encounter.  She presents with the following impairments/functional limitations: weakness, impaired endurance, impaired self care skills, impaired functional mobility, gait instability, impaired balance, decreased lower extremity function, pain, decreased ROM, orthopedic precautions .    Rehab Prognosis: Good; patient would benefit from acute skilled PT services to address these deficits and reach maximum level of function.    Recent Surgery: Procedure(s) (LRB):  CLOSED REDUCTION, HIP (Left)  REVISION,ARTHROPLASTY,HIP,POSTERIOR APPROACH (Left) 6 Days Post-Op    Plan:     During this hospitalization, patient to be seen daily to address the identified rehab impairments via gait training, therapeutic activities, therapeutic exercises, wheelchair management/training and progress toward the following goals:    Plan of Care Expires:  12/14/24    Subjective     Chief Complaint: pain in left leg below the knee and left ankle  Patient/Family Comments/goals: discharge today  Pain/Comfort:  Pain Rating 1: 4/10  Location - Side 1: Left  Location - Orientation 1: lower  Location 1: leg  Pain Addressed 1: Pre-medicate for activity, Reposition, Distraction      Objective:     Communicated with nsg prior to session.  Patient found HOB elevated with   upon PT entry to room.     General Precautions: Standard, fall  Orthopedic Precautions: LLE partial weight bearing, LLE posterior precautions  Braces: Hip abduction brace  Respiratory Status: Room air     Functional  Mobility:  Bed Mobility:     Rolling Left:  maximal assistance - increased time holding position for bathing and changing dressing    Treatment & Education:  Pt educated on out of bed mobility.  Pt performed 1 set of 10 reps with BLE: glute sets, quad sets, ankle pumps, and hip/knee flexion, requiring active assist with hip/knee flexion with RLE.  Pt performed 10 reps of short arc quads with RLE.    Patient left HOB elevated with call button in reach and CNA and NSG present..    GOALS:   Multidisciplinary Problems       Physical Therapy Goals          Problem: Physical Therapy    Goal Priority Disciplines Outcome Interventions   Physical Therapy Goal     PT, PT/OT Progressing    Description: Pt will improve functional independence by performing:    Bed mobility: mod A  Sit to stand: max A with rolling walker  Bed to chair t/f: max A with Stand Pivot  with rolling walker  Independent with total hip HEP                        Time Tracking:     PT Received On:    PT Start Time: 0904     PT Stop Time: 0930  PT Total Time (min): 26 min     Billable Minutes: Therapeutic Activity 12 min and Therapeutic Exercise 14 min    Treatment Type: Treatment  PT/PTA: PTA     Number of PTA visits since last PT visit: 3     12/11/2024

## 2024-12-11 NOTE — PT/OT/SLP PROGRESS
Occupational Therapy      Patient Name:  Homa Jaquez   MRN:  [id number]    Patient not seen this morning secondary to patient sleeping heavily, attempted to wake patient up, unarousable. Will follow-up this afternoon.    12/11/2024

## 2024-12-11 NOTE — NURSING
Discharge instructions provided to patient. Regular island dressings given to patient for at home changes once wound vac removed.   Patient verbalized understanding and had no follow up questions.

## 2024-12-12 ENCOUNTER — PATIENT OUTREACH (OUTPATIENT)
Dept: ADMINISTRATIVE | Facility: CLINIC | Age: 89
End: 2024-12-12
Payer: MEDICARE

## 2024-12-12 NOTE — PROGRESS NOTES
2nd attempt- C3 nurse attempted to contact Homa Jaquez  for a TCC post hospital discharge follow up call. The patient is unable to conduct the call @ this time. The patient  requested a callback.    The patient has a scheduled [facility] appointment with Jonny Bains MD (Orthopedic Surgery) on 12/19/2024 @2pm

## 2024-12-12 NOTE — PROGRESS NOTES
C3 nurse attempted to contact Homa Jaquez  for a TCC post hospital discharge follow up call. No answer. Left voicemail with callback information. The patient has a scheduled [facility] appointment with Jonny Bains MD (Orthopedic Surgery) on 12/19/2024 @2pm

## 2024-12-13 ENCOUNTER — DOCUMENT SCAN (OUTPATIENT)
Dept: HOME HEALTH SERVICES | Facility: HOSPITAL | Age: 89
End: 2024-12-13
Payer: MEDICARE

## 2024-12-13 NOTE — PROGRESS NOTES
3rd attempt made to reach patient for TCC call. Left voicemail please call [phone] leave first name, last, and date of birth for Colleen. I will return your call.

## 2024-12-17 ENCOUNTER — DOCUMENT SCAN (OUTPATIENT)
Dept: HOME HEALTH SERVICES | Facility: HOSPITAL | Age: 89
End: 2024-12-17
Payer: MEDICARE

## 2024-12-18 ENCOUNTER — TELEPHONE (OUTPATIENT)
Dept: ORTHOPEDICS | Facility: CLINIC | Age: 89
End: 2024-12-18
Payer: MEDICARE

## 2024-12-18 ENCOUNTER — OUTPATIENT CASE MANAGEMENT (OUTPATIENT)
Dept: ADMINISTRATIVE | Facility: OTHER | Age: 89
End: 2024-12-18
Payer: MEDICARE

## 2024-12-18 NOTE — LETTER
December 19, 2024             Dear Homa,    Welcome to [facility] Complex Care Management Program.  It was a pleasure talking with you today.  My name is Mariah Rangel, and I look forward to being your Care Manager.  My goal is to help you function at the healthiest and highest level possible.  You can contact me directly at [phone].    As an [facility] patient, some of the services we may be able to provide include:     Development of an individualized care plan with a Registered Nurse   Connection with a   Connection with available resources and services    Coordinate communication among your care team members   Provide coaching and education   Help you understand your doctors treatment plan  Help you obtain information about your insurance coverage.     All services provided by [facility] Complex Care Managers and other care team members are coordinated with and communicated to your primary care team.      As part of your enrollment, you will be receiving education materials and more information about these services in your [facility] account, by phone or through the mail.  If you do not wish to participate or receive information, please contact our office at [phone].      Sincerely,        Mariah Rangel, RN  [facility]   Out-patient RN Complex Care Manager

## 2024-12-18 NOTE — TELEPHONE ENCOUNTER
Patient's son (Av) called and lvm to reschedule pts appt for 12/19/24.    Attempted to call back to reschedule. No answer, lvm.

## 2024-12-18 NOTE — TELEPHONE ENCOUNTER
Spoke with pts son (Av) regarding rescheduling pts appointment. He states that he will be able to arrange transportation for pt easier after the first of the year.    Rescheduled pts appt for 1/07/25. Pts son (Av) is asking if he will need to remove pts staples or if they can stay in until appt? Pt also has home health. Pt will be 1month+2 days post op at appt.    Please advise.    Informed pts son I would send a message to Dr. Bains and call him back with what he recommends.

## 2024-12-18 NOTE — LETTER
December 19, 2024             Dear Homa,    Welcome to [facility] Complex [facility] Management [facility].  It was a pleasure talking with you today.  My name is Mariah Rangel, and I look forward to being your Care Manager.  My goal is to help you function at the healthiest and highest level possible.  You can contact me directly at [phone].    As an [facility] patient, some of the services we may be able to provide include:     Development of an individualized care plan with a Registered Nurse   Connection with a   Connection with available resources and services    Coordinate communication among your care team members   Provide coaching and education   Help you understand your doctors treatment plan  Help you obtain information about your insurance coverage.     All services provided by [facility] Complex [facility] Managers and other care team members are coordinated with and communicated to your primary care team.      As part of your enrollment, you will be receiving education materials and more information about these services in your [facility] account, by phone or through the mail.  If you do not wish to participate or receive information, please contact our office at [phone].      Sincerely,        Mariah Rangel, RN  [facility]   Out-patient RN Complex Care Manager

## 2024-12-19 ENCOUNTER — TELEPHONE (OUTPATIENT)
Dept: INTERNAL MEDICINE | Facility: CLINIC | Age: 89
End: 2024-12-19
Payer: MEDICARE

## 2024-12-19 ENCOUNTER — TELEPHONE (OUTPATIENT)
Dept: PHARMACY | Facility: CLINIC | Age: 89
End: 2024-12-19
Payer: MEDICARE

## 2024-12-19 NOTE — LETTER
December 19, 2024    Homa Jaquez  [address] [place] [id number]               Dear Ms. Jaquez,      My name is Lashawn Lebron  I am reaching out on behalf of [facility] Patient Assistance Team in regards to your request for medication assistance. Our goal is to assist qualified [facility] patients with obtaining financial assistance for prescribed medications.     Please note that enrollment into available support may require the following documents:      Proof of household Income (such as social security statement, pension statement,most recently filed taxes or 3 consecutive pay stubs)  Copy of all insurance cards (front and back)  Print out from your insurance or pharmacy showing how much you have spent on prescriptions for the current year  Completed Medication Access Center Authorization Forms       Please reach out to my phone number below if you are still in need of assistance with your medications. Follow-up call will be made in 5 business days. We look forward to hearing from you soon!    Thank you for choosing [facility] for your healthcare needs      Sincerely  Lashawn MARCELINO @[phone]  Pharmacy Patient Assistance Team  [address] [place] [address]  [id number] [place] [id number]  Fax: [phone]  Email: [email or web address]

## 2024-12-19 NOTE — TELEPHONE ENCOUNTER
We have reached out to MsLloyd Leonid via letter to inform her of the [employer] and [employer] application process for Entresto and Jardiance. A follow-up phone call will be made in 5 business days.    Lashawn Lebron  Pharmacy Patient Assistance Team

## 2024-12-19 NOTE — PROGRESS NOTES
Outpatient Care Management  Initial Patient Assessment    Patient: Homa Jaquez  MRN: [id number]  Date of Service: 12/18/2024  Completed by: Mariah Rangel RN  Referral Date: 12/10/2024  Date of Eligibility: 12/9/2024  Program:   High Risk  Status: Ongoing  Effective Dates: 12/18/2024 - present  Responsible Staff: Mariah Rangel RN        Reason for Visit   Patient presents with    OPCM Enrollment Call    Nursing Assessment    OPCM Chart Review    Other     Reach out to [facility] and [employer]       Brief Summary:  Homa Jaquez was referred by DEIDRA Pringle for OPCM. Patient qualifies for program based on Risk Score of 63.9% on 12-10-24 and also Need for Education Of Post Failure of (L) SILVIA.   Active problem list, medical, surgical and social history reviewed. 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 Areas of need identified by patient include Education on Importance of Following thru with Post SILVIA as directed by providers. CM requested prescription from PCP for wheelchair, request from [employer] for Adult Briefs and Pharmacy Assistance to aid in purchasing medications along with information on MOW.   Next steps: Patient and spouse agree to follow up in approximately 1 week.     Disability Status  Is the patient alert and oriented (person, place, time, and situation)?: Alert and oriented x 4  Hearing Difficulty or Deaf: yes  Hearing Management: -- (Patient wears hearing aides.)  Visual Difficulty or Blind: yes  Visual and Hearing Needs Conclusion: Patient wears corrective lense for visual acuity and has follow ups. Also has hearing aides which assist  Difficulty Concentrating, Remembering or Making Decisions: no  Communication Difficulty: no  Eating/Swallowing Difficulty: no  Walking or Climbing Stairs Difficulty: yes  Walking or Climbing Stairs: ambulation difficulty, requires  equipment; ambulation difficulty, assistance 1 person; stair climbing difficulty, requires equipment; stair climbing difficulty, assistance 1 person; transferring difficulty, requires equipment; transferring difficulty, assistance 1 person  Mobility Management: Currently Non weight bearing so uses wheelchair  Dressing/Bathing Difficulty: yes  Dressing/Bathing: bathing difficulty, assistance 1 person; bathing difficulty, requires equipment; dressing difficulty, requires equipment; dressing difficulty, assistance 1 person  Dressing/Bathing Management: Family has hired 2 caregivers that assist Monday-Friday  Toileting : Independent  Continence : Incontience - Bladder (Due to unable to quickly get to [facility]SC due to (L) SILVIA and uses pure wick at nite along with Adult Depends.)  Difficulty Managing Errands Independently: yes  Errands Management: Patient's son and  tends to her errands  Equipment Currently Used at Home: bedside commode; grab bar; lift device; shower chair; slide board; wheelchair; other (see comments) (ramp, purewick and hospital bed)  ADL Conclusion Statement: -- (Patient is dependent on family for all DMEs at this time. Family has hired 2 caregivers to assist patient. Patient is currently non weight bearing to E and has hospital bed and lift to assist patient in and out of bed.)  Change in Functional Status Since Onset of Current Illness/Injury: yes        Spiritual Beliefs  Spiritual, Cultural Beliefs, Buddhism Practices, Values that Affect Care: no      Social History     Socioeconomic History    Marital status:    Tobacco Use    Smoking status: Former     Types: Cigarettes    Smokeless tobacco: Never   Substance and Sexual Activity    Alcohol use: Yes    Drug use: Never    Sexual activity: Not Currently     Social Drivers of Health     Financial Resource Strain: Low Risk  (12/19/2024)    Overall Financial Resource Strain (CARDIA)     Difficulty of Paying Living Expenses: Not very hard    Food Insecurity: No Food Insecurity (12/19/2024)    Hunger Vital Sign     Worried About Running Out of Food in the Last Year: Never true     Ran Out of Food in the Last Year: Never true   Transportation Needs: No Transportation Needs (12/19/2024)    TRANSPORTATION NEEDS     Transportation : No   Physical Activity: Inactive (12/19/2024)    Exercise Vital Sign     Days of Exercise per Week: 0 days     Minutes of Exercise per Session: 0 min   Stress: No Stress Concern Present (12/19/2024)    [employer] - Occupational Stress Questionnaire     Feeling of Stress : Not at all   Housing Stability: Low Risk  (12/19/2024)    Housing Stability Vital Sign     Unable to Pay for Housing in the Last Year: No     Homeless in the Last Year: No       Roles and Relationships  Primary Source of Support/Comfort: spouse  Name of Support/Comfort Primary Source: Levi Jaquez Sr  Primary Roles/Responsibilities: wage earner, full-time; parent  Secondary Source of Support/Comfort: child(claribel)  Name of Support/Comfort Secondary Source: Av Jaquez - son ([place])      Advance Directives (For Healthcare)  Advance Directive  (If Adv Dir status is received, view document under Adv Dir in header or Chart Review Media tab): Patient does not have Advance Directive, declines information.        Patient Reported Insurance  Verified current insurance plan:: Medicare            12/19/2024     1:18 PM 11/19/2024    10:06 AM 10/8/2024     2:54 PM 9/10/2024    12:53 PM 3/30/2023     2:32 PM 9/13/2022     3:26 PM   Depression Patient Health Questionnaire   Over the last two weeks how often have you been bothered by little interest or pleasure in doing things Several days Not at all Not at all Not at all Not at all Not at all   Over the last two weeks how often have you been bothered by feeling down, depressed or hopeless Several days Not at all Not at all Not at all Not at all Not at all   PHQ-2 Total Score 2 0 0 0 0 0        Learning Assessment       12/19/2024 1324 [facility] (12/18/2024 - Present)   Created by Mariah Rangel RN -  (Nurse) Status: Complete                 PRIMARY LEARNER     Primary Learner Name:  Levi Jaquez Sr  - 12/19/2024 1324    Relationship:  Significant Other  - 12/19/2024 1324    Does the primary learner have any barriers to learning?:  No Barriers  - 12/19/2024 1324    What is the preferred language of the primary learner?:  English [facility] 12/19/2024 1324    Is an  required?:  No  - 12/19/2024 1324    How does the primary learner prefer to learn new concepts?:  Listening  - 12/19/2024 1324    How often do you need to have someone help you read instructions, pamphlets, or written material from your doctor or pharmacy?:  Never  - 12/19/2024 1324        CO-LEARNER #1     Co-Learner Name (if applicable):  Av Jaquez  - 12/19/2024 1324    Relationship:  Family  - 12/19/2024 1324    Does the co-learner have any barriers to learning?:  No Barriers  - 12/19/2024 1324    What is the preferred language of the co-learner?:  English [facility] 12/19/2024 1324    Is an  required?:  No [facility] 12/19/2024 1324    How does the co-learner prefer to learn new concepts?:  Listening [facility] 12/19/2024 1324        CO-LEARNER #2     No question answered        SPECIAL TOPICS     No question answered        ANSWERED BY:     No question answered        Comments         Edit History       Mariah Rangel, RN -  (Nurse)   12/19/2024 1324

## 2024-12-19 NOTE — TELEPHONE ENCOUNTER
----- Message from  Mariah sent at 12/19/2024  1:55 PM CST -----  Regarding: Prescription for Wheelchair  Good afternoon, I'm the CM for above patient and while speaking with her son, Av Jaquez he voiced she's using an old wheelchair that was given to her. He mentioned it doesn't roll well and would like a prescription for a wheelchair in order to get one that works properly. Patient is post op Failed (L) SILVIA and is non weight bearing to LLE.  If needed you can contact him at ([phone]). Thanks

## 2024-12-19 NOTE — TELEPHONE ENCOUNTER
----- Message from  Mariah sent at 12/19/2024  1:31 PM CST -----  Regarding: Medication Cost Assistance  Good afternoon, the  (Levi Mathew) of above patient voiced concern of Out of Pocket cost for her with 2 medications:  1- Empagliflozin (Jardiance)  10 mg  1 daily costing her about $100/month and  2- Entresto 14-26mg  1 twice a day costing around $400/month.  Any assistance if possible would be appreciated. Thanks in advance.

## 2024-12-20 NOTE — TELEPHONE ENCOUNTER
----- Message from Ella sent at 12/20/2024 10:05 AM CST -----  Who Called: [email or web address]    Caller is requesting assistance/information from provider's office.    Symptoms (please be specific): n/a   How long has patient had these symptoms:  n/a  List of preferred pharmacies on file (remove unneeded): n/a    Preferred Method of Contact: Phone Call  Patient's Preferred Phone Number on File: [phone]   Best Call Back Number, if different:  Additional Information: caller stated that they only received a rx for a wheelchair cushion not a wheelchair. Please advise.    Phone: [phone]

## 2024-12-27 ENCOUNTER — TELEPHONE (OUTPATIENT)
Dept: INTERNAL MEDICINE | Facility: CLINIC | Age: 89
End: 2024-12-27
Payer: MEDICARE

## 2024-12-27 NOTE — TELEPHONE ENCOUNTER
----- Message from Nurse Kenisha sent at 12/24/2024 11:47 AM CST -----  Regarding: chata Vallejo 01/02 @2:20  Non fasting labs needed.

## 2024-12-30 ENCOUNTER — TELEPHONE (OUTPATIENT)
Dept: INTERNAL MEDICINE | Facility: CLINIC | Age: 89
End: 2024-12-30
Payer: MEDICARE

## 2024-12-30 NOTE — TELEPHONE ENCOUNTER
----- Message from Ella sent at 12/30/2024  9:02 AM CST -----  Who Called:Av Leonid-Son      Caller is requesting assistance/information from provider's office.    Symptoms (please be specific): n/a   How long has patient had these symptoms:  n/a  List of preferred pharmacies on file (remove unneeded): n/a      Preferred Method of Contact: Phone Call  Patient's Preferred Phone Number on File: [phone]   Best Call Back Number, if different:[phone]  Additional Information: Pts  son stated that they were able to have a van for transportation on the 1/7/25, requesting to have an appt that day, I did not see any on my end. Please advise.

## 2024-12-30 NOTE — TELEPHONE ENCOUNTER
----- Message from Patience sent at 12/27/2024 12:01 PM CST -----  .Type:  Patient Returning Call    Who Called:Levi   Who Left Message for Patient:  Does the patient know what this is regarding?:labs  Would the patient rather a call back or a response via [facility]?   Best Call Back Number:[phone]  Additional Information: Please send lab order to [facility]

## 2024-12-31 ENCOUNTER — OUTPATIENT CASE MANAGEMENT (OUTPATIENT)
Dept: ADMINISTRATIVE | Facility: OTHER | Age: 89
End: 2024-12-31
Payer: MEDICARE

## 2024-12-31 NOTE — PROGRESS NOTES
Outpatient Care Management  Plan of Care Follow Up Visit    Patient: Homa Jaquez  MRN: [id number]  Date of Service: 12/31/2024  Completed by: Mariah Rangel RN  Referral Date: 12/10/2024    Reason for Visit   Patient presents with    OPCM RN Follow Up Call    OPCM Chart Review    Other     [facility] - [employer]       Brief Summary: OPCM follow up call completed with spouse/caregiver. Continued Education on Importance of Healthy Nutrition and Avoiding Skin Breakdown.   Next Steps: Cargiver/Spouse agrees to follow up call in approximately 1 week.

## 2025-01-02 ENCOUNTER — DOCUMENT SCAN (OUTPATIENT)
Dept: HOME HEALTH SERVICES | Facility: HOSPITAL | Age: 89
End: 2025-01-02
Payer: MEDICARE

## 2025-01-03 ENCOUNTER — TELEPHONE (OUTPATIENT)
Dept: ORTHOPEDICS | Facility: CLINIC | Age: 89
End: 2025-01-03
Payer: MEDICARE

## 2025-01-03 ENCOUNTER — OUTPATIENT CASE MANAGEMENT (OUTPATIENT)
Dept: ADMINISTRATIVE | Facility: OTHER | Age: 89
End: 2025-01-03
Payer: MEDICARE

## 2025-01-03 NOTE — TELEPHONE ENCOUNTER
Pts son called asking if it were possible to move pts scheduled appt to an earlier time on 1/07. Informed Av (son) that we do not have any earlier available times.    Pts son voiced a clear understanding.

## 2025-01-03 NOTE — PROGRESS NOTES
1-3-25  Spoke with [facility] and they have a prescription for a wheelchair for patient. They will deliver DME this afternoon. SILVIA phoned Av (son) and informed him of delivery. Av expressed sincere gratitude what services his mom has recevied so far.

## 2025-01-06 ENCOUNTER — TELEPHONE (OUTPATIENT)
Dept: INTERNAL MEDICINE | Facility: CLINIC | Age: 89
End: 2025-01-06
Payer: MEDICARE

## 2025-01-06 NOTE — TELEPHONE ENCOUNTER
----- Message from Alfred sent at 1/6/2025  8:11 AM CST -----  .Who Called: Av Jaquez (son)    What order is the patient requesting: Wellness Labs     When does the expect the orders to be performed? [facility]    Preferred Method of Contact: Phone Call    Patient's Preferred Phone Number on File: [phone]     Best Call Back Number, if different: [phone] (son)    Additional Information: Was informed by home health nurse, the lab hasn't received pt lab order. Also, would like wellness order faxed to [facility] [phone]. Son would like a call once faxed. Please advise, thank you.

## 2025-01-06 NOTE — TELEPHONE ENCOUNTER
LOV 11-19-24 Hospital F/U, 6 week recheck scheduled.  No lab orders in the system.  Please advise.

## 2025-01-07 ENCOUNTER — DOCUMENTATION ONLY (OUTPATIENT)
Dept: INTERNAL MEDICINE | Facility: CLINIC | Age: 89
End: 2025-01-07

## 2025-01-07 ENCOUNTER — HOSPITAL ENCOUNTER (OUTPATIENT)
Dept: RADIOLOGY | Facility: CLINIC | Age: 89
Discharge: HOME | End: 2025-01-07
Attending: ORTHOPAEDIC SURGERY
Payer: MEDICARE

## 2025-01-07 ENCOUNTER — OFFICE VISIT (OUTPATIENT)
Dept: ORTHOPEDICS | Facility: CLINIC | Age: 89
End: 2025-01-07
Payer: MEDICARE

## 2025-01-07 ENCOUNTER — OFFICE VISIT (OUTPATIENT)
Dept: INTERNAL MEDICINE | Facility: CLINIC | Age: 89
End: 2025-01-07
Payer: MEDICARE

## 2025-01-07 VITALS
HEIGHT: 64 IN | DIASTOLIC BLOOD PRESSURE: 78 MMHG | HEART RATE: 88 BPM | TEMPERATURE: 97 F | OXYGEN SATURATION: 99 % | WEIGHT: 182 LBS | RESPIRATION RATE: 16 BRPM | SYSTOLIC BLOOD PRESSURE: 122 MMHG | BODY MASS INDEX: 31.07 KG/M2

## 2025-01-07 VITALS
DIASTOLIC BLOOD PRESSURE: 79 MMHG | HEART RATE: 75 BPM | BODY MASS INDEX: 31.09 KG/M2 | HEIGHT: 64 IN | WEIGHT: 182.13 LBS | SYSTOLIC BLOOD PRESSURE: 124 MMHG

## 2025-01-07 DIAGNOSIS — Z95.0 PACEMAKER: ICD-10-CM

## 2025-01-07 DIAGNOSIS — I51.9 SYSTOLIC DYSFUNCTION: ICD-10-CM

## 2025-01-07 DIAGNOSIS — Z47.1 AFTERCARE FOLLOWING LEFT HIP JOINT REPLACEMENT SURGERY: ICD-10-CM

## 2025-01-07 DIAGNOSIS — Z96.642 AFTERCARE FOLLOWING LEFT HIP JOINT REPLACEMENT SURGERY: ICD-10-CM

## 2025-01-07 DIAGNOSIS — E03.9 HYPOTHYROIDISM, UNSPECIFIED TYPE: ICD-10-CM

## 2025-01-07 DIAGNOSIS — Z47.1 AFTERCARE FOLLOWING LEFT HIP JOINT REPLACEMENT SURGERY: Primary | ICD-10-CM

## 2025-01-07 DIAGNOSIS — I10 HYPERTENSION, UNSPECIFIED TYPE: ICD-10-CM

## 2025-01-07 DIAGNOSIS — I48.91 ATRIAL FIBRILLATION, UNSPECIFIED TYPE: ICD-10-CM

## 2025-01-07 DIAGNOSIS — Z96.642 AFTERCARE FOLLOWING LEFT HIP JOINT REPLACEMENT SURGERY: Primary | ICD-10-CM

## 2025-01-07 DIAGNOSIS — E78.5 HYPERLIPIDEMIA, UNSPECIFIED HYPERLIPIDEMIA TYPE: ICD-10-CM

## 2025-01-07 DIAGNOSIS — E11.9 TYPE 2 DIABETES MELLITUS WITHOUT COMPLICATION, WITHOUT LONG-TERM CURRENT USE OF INSULIN: Primary | ICD-10-CM

## 2025-01-07 PROCEDURE — 73502 X-RAY EXAM HIP UNI 2-3 VIEWS: CPT | Mod: LT,,, | Performed by: ORTHOPAEDIC SURGERY

## 2025-01-07 PROCEDURE — 99024 POSTOP FOLLOW-UP VISIT: CPT | Mod: ,,, | Performed by: ORTHOPAEDIC SURGERY

## 2025-01-07 NOTE — PROGRESS NOTES
Patient ID: [id number]      Subjective:     Chief Complaint: Follow-up (6 week/)      Homa Jaquez is a 89 y.o. female.  The patient is an 89-year-old woman with numerous problems in for follow-up of multiple issues.  When she was here last time I added Jardiance for his systolic dysfunction.  She also has diabetes mellitus that was however well controlled.  Her  who is a retired pharmacist has been reducing a dose of Jardiance to 10 mg 3 days a week instead of daily.  Apparently he has not convinced of the heart failure benefits in his also concerned about the cost.    She is having no problems with breathing or swelling.  Really do not know if her weight has changed because she rarely gets to way because she does not stand much.    Follow-up        Review of Systems    Outpatient Medications Marked as Taking for the 1/7/25 encounter (Office Visit) with Franklyn Brito MD   Medication Sig Dispense Refill    allopurinoL (ZYLOPRIM) 100 MG tablet TAKE 1 TABLET BY MOUTH ONCE DAILY 30 tablet 4    ascorbic acid, vitamin C, (VITAMIN C) 500 MG tablet Take 1 tablet (500 mg total) by mouth 2 (two) times daily.      carvediloL (COREG) 3.125 MG tablet Take 1 tablet (3.125 mg total) by mouth 2 (two) times daily. 60 tablet 5    ciprofloxacin HCl (CIPRO) 250 MG tablet Take 2 tablets (500 mg total) by mouth every 12 (twelve) hours.      cyanocobalamin 1,000 mcg/mL injection INJECT 1000MCG (1ML) INTRAMUSCULARY ONCE MONTHLY 1 mL 11    docusate sodium (COLACE) 100 MG capsule Take 1 capsule (100 mg total) by mouth 2 (two) times daily as needed for Constipation.      empagliflozin (JARDIANCE) 10 mg tablet Take 1 tablet (10 mg total) by mouth once daily. 30 tablet 11    famotidine (PEPCID) 20 MG tablet Take 20 mg by mouth as needed for Heartburn.      furosemide (LASIX) 20 MG tablet Take 1 tablet (20 mg total) by mouth once daily. 30 tablet 5    levothyroxine (SYNTHROID) 75 MCG tablet Take 1 tablet (75 mcg total) by mouth  "before breakfast. 30 tablet 11    lovastatin (MEVACOR) 20 MG tablet Take 20 mg by mouth once daily.      pantoprazole (PROTONIX) 40 MG tablet GIVE ONE TABLET BY MOUTH ONCE DAILY 30 tablet 4    polyethylene glycol (GLYCOLAX) 17 gram PwPk Take 17 g by mouth 2 (two) times daily as needed for Constipation.      sacubitriL-valsartan (ENTRESTO) 24-26 mg per tablet Take 1 tablet by mouth 2 (two) times daily.      senna (SENOKOT) 8.6 mg tablet Take 1 tablet by mouth daily as needed for Constipation. 30 tablet 1    traMADoL (ULTRAM) 50 mg tablet Take 1 tablet (50 mg total) by mouth every 12 (twelve) hours as needed (as needed for pain score 1-10). 60 tablet 5    warfarin (COUMADIN) 1 MG tablet Take 1 tablet (1 mg total) by mouth Daily. 30 tablet 11       Objective:     /78 (BP Location: Right arm, Patient Position: Sitting)   Pulse 88   Temp 97.4 °F (36.3 °C) (Temporal)   Resp 16   Ht 5' 4" (1.626 m)   Wt 82.6 kg (182 lb)   SpO2 99%   BMI 31.24 kg/m²     Physical Exam  Vitals reviewed.   Constitutional:       Appearance: Normal appearance.   HENT:      Head: Normocephalic.      Nose: Nose normal.      Mouth/Throat:      Pharynx: Oropharynx is clear.   Eyes:      Pupils: Pupils are equal, round, and reactive to light.   Neck:      Thyroid: No thyromegaly.   Cardiovascular:      Rate and Rhythm: Normal rate. Rhythm irregular.      Pulses: Normal pulses.   Pulmonary:      Breath sounds: Normal breath sounds.   Abdominal:      General: Abdomen is flat. Bowel sounds are normal.      Palpations: Abdomen is soft. There is no hepatomegaly, splenomegaly or mass.      Tenderness: There is no abdominal tenderness.   Musculoskeletal:      Cervical back: Neck supple.      Comments: One to 2+ pretibial edema bilaterally.   Lymphadenopathy:      Cervical: No cervical adenopathy.   Skin:     General: Skin is warm and dry.   Neurological:      Mental Status: She is alert.     Lab work reviewed    Assessment:     1. 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  Clinically stable.  Perhaps better on current treatment.  This includes Entresto in a subtherapeutic dose of Jardiance     2. Diabetes mellitus.  Excellent control    3. 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  Numbers are stable     5. Chronic atrial fib     6. Hyperlipidemia.  On statin therapy     7. Hypertension.  Excellent control      Plan:   Continue same meds TLC etc..  Encourage compliance with Jardiance.  Follow-up with me 3 months with CBC CMP lipid A1c TSH sed rate and CRP and BNP.  Problem List Items Addressed This Visit          Cardiac/Vascular    Atrial fibrillation    Relevant Orders    CBC Auto Differential    Comprehensive Metabolic Panel    Lipid Panel    TSH    Hemoglobin A1C    BNP    Sedimentation rate    CRP, High Sensitivity    Hyperlipidemia    Relevant Orders    CBC Auto Differential    Comprehensive Metabolic Panel    Lipid Panel    TSH    Hemoglobin A1C    BNP    Sedimentation rate    CRP, High Sensitivity    Pacemaker    Relevant Orders    CBC Auto Differential    Comprehensive Metabolic Panel    Lipid Panel    TSH    Hemoglobin A1C    BNP    Sedimentation rate    CRP, High Sensitivity       Endocrine    Diabetes mellitus - Primary    Relevant Orders    CBC Auto Differential    Comprehensive Metabolic Panel    Lipid Panel    TSH    Hemoglobin A1C    BNP    Sedimentation rate    CRP, High Sensitivity    Hypothyroidism, unspecified    Relevant Orders    CBC Auto Differential    Comprehensive Metabolic Panel    Lipid Panel    TSH    Hemoglobin A1C    BNP    Sedimentation rate    CRP, High Sensitivity     Other Visit Diagnoses       Hypertension, unspecified type        Relevant Orders    CBC Auto Differential    Comprehensive Metabolic Panel    Lipid Panel    TSH    Hemoglobin A1C    BNP    Sedimentation rate    CRP, High Sensitivity    Systolic dysfunction        Relevant Orders    CBC Auto  Differential    Comprehensive Metabolic Panel    Lipid Panel    TSH    Hemoglobin A1C    BNP    Sedimentation rate    CRP, High Sensitivity             Orders Placed This Encounter   Procedures    CBC Auto Differential     Standing Status:   Future     Standing Expiration Date:   1/7/2026    Comprehensive Metabolic Panel     Standing Status:   Future     Standing Expiration Date:   1/7/2026    Lipid Panel     Standing Status:   Future     Standing Expiration Date:   1/7/2026    TSH     Standing Status:   Future     Standing Expiration Date:   1/7/2026    Hemoglobin A1C     Standing Status:   Future     Standing Expiration Date:   1/7/2026    BNP     Standing Status:   Future     Standing Expiration Date:   4/7/2026    Sedimentation rate     Standing Status:   Future     Standing Expiration Date:   4/7/2026    CRP, High Sensitivity     Standing Status:   Future     Standing Expiration Date:   4/7/2026        Medication List with Changes/Refills   Current Medications    ALLOPURINOL (ZYLOPRIM) 100 MG TABLET    TAKE 1 TABLET BY MOUTH ONCE DAILY       Start Date: 11/15/2024End Date: --    ASCORBIC ACID, VITAMIN C, (VITAMIN C) 500 MG TABLET    Take 1 tablet (500 mg total) by mouth 2 (two) times daily.       Start Date: 12/10/2024End Date: 1/9/2025    CARVEDILOL (COREG) 3.125 MG TABLET    Take 1 tablet (3.125 mg total) by mouth 2 (two) times daily.       Start Date: 11/15/2024End Date: 5/14/2025    CIPROFLOXACIN HCL (CIPRO) 250 MG TABLET    Take 2 tablets (500 mg total) by mouth every 12 (twelve) hours.       Start Date: 11/19/2024End Date: --    CYANOCOBALAMIN 1,000 MCG/ML INJECTION    INJECT 1000MCG (1ML) INTRAMUSCULARY ONCE MONTHLY       Start Date: 9/7/2023  End Date: --    DOCUSATE SODIUM (COLACE) 100 MG CAPSULE    Take 1 capsule (100 mg total) by mouth 2 (two) times daily as needed for Constipation.       Start Date: 7/26/2024 End Date: --    EMPAGLIFLOZIN (JARDIANCE) 10 MG TABLET    Take 1 tablet (10 mg total) by  mouth once daily.       Start Date: 11/19/2024End Date: --    FAMOTIDINE (PEPCID) 20 MG TABLET    Take 20 mg by mouth as needed for Heartburn.       Start Date: --        End Date: --    FUROSEMIDE (LASIX) 20 MG TABLET    Take 1 tablet (20 mg total) by mouth once daily.       Start Date: 11/15/2024End Date: 5/14/2025    LEVOTHYROXINE (SYNTHROID) 75 MCG TABLET    Take 1 tablet (75 mcg total) by mouth before breakfast.       Start Date: 11/21/2024End Date: 11/21/2025    LOVASTATIN (MEVACOR) 20 MG TABLET    Take 20 mg by mouth once daily.       Start Date: 3/21/2022 End Date: --    PANTOPRAZOLE (PROTONIX) 40 MG TABLET    GIVE ONE TABLET BY MOUTH ONCE DAILY       Start Date: 11/15/2024End Date: --    POLYETHYLENE GLYCOL (GLYCOLAX) 17 GRAM PWPK    Take 17 g by mouth 2 (two) times daily as needed for Constipation.       Start Date: 7/9/2024  End Date: --    SACUBITRIL-VALSARTAN (ENTRESTO) 24-26 MG PER TABLET    Take 1 tablet by mouth 2 (two) times daily.       Start Date: 7/9/2024  End Date: --    SENNA (SENOKOT) 8.6 MG TABLET    Take 1 tablet by mouth daily as needed for Constipation.       Start Date: 3/16/2023 End Date: --    TRAMADOL (ULTRAM) 50 MG TABLET    Take 1 tablet (50 mg total) by mouth every 12 (twelve) hours as needed (as needed for pain score 1-10).       Start Date: 11/19/2024End Date: --    WARFARIN (COUMADIN) 1 MG TABLET    Take 1 tablet (1 mg total) by mouth Daily.       Start Date: 8/21/2024 End Date: 8/21/2025            Follow up in about 3 months (around 4/7/2025). In addition to their scheduled follow up, the patient has also been instructed to follow up on as needed basis.       Franklyn Brito

## 2025-01-07 NOTE — PROGRESS NOTES
Chief Complaint:   Chief Complaint   Patient presents with    Left Hip - Follow-up     Patient is here for Lt SILVIA revision Sx 12/05/24. Patient has PT 2x a week with home health.       History of present illness:    History of Present Illness  The patient presents for evaluation of hip pain.    She reports a significant improvement in her hip condition, with no current complaints of pain. Her mobility has been gradually improving, as evidenced by her ability to stand up twice last week and four times the previous week. It is believed that she could have potentially taken a few steps if the physical therapy sessions had not been interrupted due to the holiday season. Prior to this, she was unable to stand due to severe pain. She has been adhering to the use of a brace during sleep and ensures that her legs remain apart and do not cross. The brace is temporarily removed during clothing changes, during which pillows are used for support. The brace is also removed during physical therapy sessions. She has been using a wheelchair for transportation, but it has been challenging to find a suitable vehicle for transport. She is currently bearing 50 percent of her weight. She has not expressed any complaints about her hip since the fourth surgery.    She is experiencing knee pain, which is attributed to arthritis.    MEDICATIONS  Cipro    Past Medical History:   Diagnosis Date    A-fib     Chronic cystitis     GERD (gastroesophageal reflux disease)     Graves disease     Hypertension     Hypothyroidism, unspecified     Spinal stenosis        Past Surgical History:   Procedure Laterality Date    APPENDECTOMY      BLADDER SUSPENSION      CARDIOVERSION  04/01/2015    CATARACT EXTRACTION      CLOSED REDUCTION OF INJURY OF HIP Left 12/5/2024    Procedure: CLOSED REDUCTION, HIP;  Surgeon: Jonny Bains MD;  Location: [facility];  Service: Orthopedics;  Laterality: Left;    CONVERSION ARTHROPLASTY, HIP, POSTERIOR APPROACH Left  7/15/2024    Procedure: CONVERSION ARTHROPLASTY, HIP, POSTERIOR APPROACH - valencia, cell saver, pathology;  Surgeon: Jonny Bains MD;  Location: [facility] OR;  Service: Orthopedics;  Laterality: Left;  valencia, cell saver, pathology    HYSTERECTOMY      INTRAMEDULLARY RODDING OF FEMUR Left 7/3/2024    Procedure: INSERTION, INTRAMEDULLARY MELANIE, FEMUR;  Surgeon: Steve Pierce DO;  Location: [facility] OR;  Service: Orthopedics;  Laterality: Left;  supine hana table c arm synthes    LAPAROSCOPIC CHOLECYSTECTOMY  01/12/2016    REVISION, ARTHROPLASTY, HIP, POSTERIOR APPROACH Left 8/12/2024    Procedure: REVISION,ARTHROPLASTY,HIP,POSTERIOR APPROACH;  Surgeon: Jonny Bains MD;  Location: [facility] OR;  Service: Orthopedics;  Laterality: Left;  I&D L hip - head/liner    REVISION, ARTHROPLASTY, HIP, POSTERIOR APPROACH Left 12/5/2024    Procedure: REVISION,ARTHROPLASTY,HIP,POSTERIOR APPROACH;  Surgeon: Jonny Bains MD;  Location: [facility] OR;  Service: Orthopedics;  Laterality: Left;    SPHINCTEROTOMY OF URETHRA  01/11/2016    TONSILLECTOMY AND ADENOIDECTOMY         Current Outpatient Medications   Medication Sig    allopurinoL (ZYLOPRIM) 100 MG tablet TAKE 1 TABLET BY MOUTH ONCE DAILY    ascorbic acid, vitamin C, (VITAMIN C) 500 MG tablet Take 1 tablet (500 mg total) by mouth 2 (two) times daily.    carvediloL (COREG) 3.125 MG tablet Take 1 tablet (3.125 mg total) by mouth 2 (two) times daily.    ciprofloxacin HCl (CIPRO) 250 MG tablet Take 2 tablets (500 mg total) by mouth every 12 (twelve) hours.    cyanocobalamin 1,000 mcg/mL injection INJECT 1000MCG (1ML) INTRAMUSCULARY ONCE MONTHLY    docusate sodium (COLACE) 100 MG capsule Take 1 capsule (100 mg total) by mouth 2 (two) times daily as needed for Constipation.    empagliflozin (JARDIANCE) 10 mg tablet Take 1 tablet (10 mg total) by mouth once daily.    famotidine (PEPCID) 20 MG tablet Take 20 mg by mouth as needed for Heartburn.    furosemide (LASIX) 20 MG tablet Take 1 tablet (20  mg total) by mouth once daily.    levothyroxine (SYNTHROID) 75 MCG tablet Take 1 tablet (75 mcg total) by mouth before breakfast.    lovastatin (MEVACOR) 20 MG tablet Take 20 mg by mouth once daily.    pantoprazole (PROTONIX) 40 MG tablet GIVE ONE TABLET BY MOUTH ONCE DAILY    polyethylene glycol (GLYCOLAX) 17 gram PwPk Take 17 g by mouth 2 (two) times daily as needed for Constipation.    sacubitriL-valsartan (ENTRESTO) 24-26 mg per tablet Take 1 tablet by mouth 2 (two) times daily.    senna (SENOKOT) 8.6 mg tablet Take 1 tablet by mouth daily as needed for Constipation.    traMADoL (ULTRAM) 50 mg tablet Take 1 tablet (50 mg total) by mouth every 12 (twelve) hours as needed (as needed for pain score 1-10).    warfarin (COUMADIN) 1 MG tablet Take 1 tablet (1 mg total) by mouth Daily.     No current facility-administered medications for this visit.       Review of patient's allergies indicates:   Allergen Reactions    Enoxaparin Dermatitis     Cellulitis of abdomen     Promethazine Hallucinations       Family History   Problem Relation Name Age of Onset    Cancer Father      Heart attack Father      Stroke Father         Social History     Socioeconomic History    Marital status:    Tobacco Use    Smoking status: Former     Types: Cigarettes    Smokeless tobacco: Never   Substance and Sexual Activity    Alcohol use: Yes    Drug use: Never    Sexual activity: Not Currently     Social Drivers of Health     Financial Resource Strain: Low Risk  (12/19/2024)    Overall Financial Resource Strain (CARDIA)     Difficulty of Paying Living Expenses: Not very hard   Food Insecurity: No Food Insecurity (12/19/2024)    Hunger Vital Sign     Worried About Running Out of Food in the Last Year: Never true     Ran Out of Food in the Last Year: Never true   Transportation Needs: No Transportation Needs (12/19/2024)    TRANSPORTATION NEEDS     Transportation : No   Physical Activity: Inactive (12/19/2024)    Exercise Vital Sign      Days of Exercise per Week: 0 days     Minutes of Exercise per Session: 0 min   Stress: No Stress Concern Present (12/19/2024)    [place] [place] [employer] - Occupational Stress Questionnaire     Feeling of Stress : Not at all   Housing Stability: Low Risk  (12/19/2024)    Housing Stability Vital Sign     Unable to Pay for Housing in the Last Year: No     Homeless in the Last Year: No           Review of Systems:    Constitution: Negative for chills, fever, and sweats.  Negative for unexplained weight loss.    HENT:  Negative for headaches and blurry vision.    Cardiovascular:Negative for chest pain or irregular heart beat. Negative for hypertension.    Respiratory:  Negative for cough and shortness of breath.    Gastrointestinal: Negative for abdominal pain, heartburn, melena, nausea, and vomitting.    Genitourinary:  Negative bladder incontinence and dysuria.    Musculoskeletal:  See HPI    Neurological: Negative for numbness.    Psychiatric/Behavioral: Negative for depression.  The patient is not nervous/anxious.      Endocrine: Negative for polyuria    Hematologic/Lymphatic: Negative for bleeding problem.  Does not bruise/bleed easily.    Skin: Negative for poor would healing and rash      Physical Examination:    Vital Signs:    Vitals:    01/07/25 1519   BP: 124/79   Pulse: 75       Body mass index is 31.26 kg/m².    General: No acute distress, alert and oriented, healthy appearing    HEENT: Head is atraumatic, mucous membranes are moist    Neck: Supples, no JVD    Cardiovascular: Palpable dorsalis pedis and posterior tibial pulses, regular rate and rhythm to those pulses    Lungs: Breathing non-labored    Skin: no rashes appreciated    Neurologic: Can flex and extend knees, ankles, and toes. Sensation is grossly intact    Left hip:  Range of motion left hip without significant severe pain.  Brisk cap refill distally.  Incision clean and dry.    X-rays:  Three views left hip reviewed with the  patient's implants appear well fixed with no signs of loosening or subsidence noted.     Assessment::  Status post left total hip arthroplasty revision    Plan:  Overall patient is much improved.  She is happy with her recovery.  She can progress to full weight-bearing of the left hip.  We will see her back in a couple of months repeat x-rays of the left hip.    This note was generated with the assistance of ambient listening technology. Verbal consent was obtained by the patient and accompanying visitor(s) for the recording of patient appointment to facilitate this note. I attest to having reviewed and edited the generated note for accuracy, though some syntax or spelling errors may persist. Please contact the author of this note for any clarification.      This note was created using adSage voice recognition software that occasionally misinterpreted phrases or words.    Consult note is delivered via Epic messaging service.

## 2025-01-08 ENCOUNTER — TELEPHONE (OUTPATIENT)
Dept: INTERNAL MEDICINE | Facility: CLINIC | Age: 89
End: 2025-01-08
Payer: MEDICARE

## 2025-01-08 ENCOUNTER — DOCUMENT SCAN (OUTPATIENT)
Dept: HOME HEALTH SERVICES | Facility: HOSPITAL | Age: 89
End: 2025-01-08
Payer: MEDICARE

## 2025-01-08 ENCOUNTER — DOCUMENTATION ONLY (OUTPATIENT)
Dept: INTERNAL MEDICINE | Facility: CLINIC | Age: 89
End: 2025-01-08
Payer: MEDICARE

## 2025-01-08 RX ORDER — LEVOTHYROXINE SODIUM 150 UG/1
150 TABLET ORAL
Qty: 30 TABLET | Refills: 11 | Status: SHIPPED | OUTPATIENT
Start: 2025-01-22 | End: 2026-01-22

## 2025-01-13 ENCOUNTER — OUTPATIENT CASE MANAGEMENT (OUTPATIENT)
Dept: ADMINISTRATIVE | Facility: OTHER | Age: 89
End: 2025-01-13
Payer: MEDICARE

## 2025-01-14 ENCOUNTER — DOCUMENT SCAN (OUTPATIENT)
Dept: HOME HEALTH SERVICES | Facility: HOSPITAL | Age: 89
End: 2025-01-14
Payer: MEDICARE

## 2025-01-22 ENCOUNTER — DOCUMENT SCAN (OUTPATIENT)
Dept: HOME HEALTH SERVICES | Facility: HOSPITAL | Age: 89
End: 2025-01-22
Payer: MEDICARE

## 2025-01-27 ENCOUNTER — OUTPATIENT CASE MANAGEMENT (OUTPATIENT)
Dept: ADMINISTRATIVE | Facility: OTHER | Age: 89
End: 2025-01-27
Payer: MEDICARE

## 2025-01-27 NOTE — PROGRESS NOTES
Outpatient Care Management  Plan of Care Follow Up Visit    Patient: Homa Jaquez  MRN: [id number]  Date of Service: 01/27/2025  Completed by: Mariah Rangel RN  Referral Date: 12/10/2024    Reason for Visit   Patient presents with    OPCM RN Follow Up Call    OPCM Chart Review    Other     CM dropped Adult diapers to patient       Brief Summary: OPCM follow up call completed with Patient's Spouse, Av. Continued Importance of Monitoring closely for potential skin breakdown.  Next Steps: Caregiver agrees to follow up in approximately 2 weeks.

## 2025-01-28 ENCOUNTER — DOCUMENT SCAN (OUTPATIENT)
Dept: HOME HEALTH SERVICES | Facility: HOSPITAL | Age: 89
End: 2025-01-28
Payer: MEDICARE

## 2025-01-30 ENCOUNTER — DOCUMENT SCAN (OUTPATIENT)
Dept: HOME HEALTH SERVICES | Facility: HOSPITAL | Age: 89
End: 2025-01-30
Payer: MEDICARE

## 2025-02-10 ENCOUNTER — TELEPHONE (OUTPATIENT)
Dept: INTERNAL MEDICINE | Facility: CLINIC | Age: 89
End: 2025-02-10
Payer: MEDICARE

## 2025-02-10 NOTE — TELEPHONE ENCOUNTER
Patient tested positive for COVID on Friday.  Recommend Paxlovid 150/100 b.i.d. x5 days.  She should stop taking the lovastatin while on Paxlovid.

## 2025-02-11 ENCOUNTER — OUTPATIENT CASE MANAGEMENT (OUTPATIENT)
Dept: ADMINISTRATIVE | Facility: OTHER | Age: 89
End: 2025-02-11
Payer: MEDICARE

## 2025-02-11 NOTE — PROGRESS NOTES
Outpatient Care Management  Plan of Care Follow Up Visit    Patient: Homa Jaquez  MRN: [id number]  Date of Service: 02/11/2025  Completed by: Mariah Rangel RN  Referral Date: 12/10/2024    Reason for Visit   Patient presents with    OPCM RN Follow Up Call    OPCM Chart Review       Brief Summary: OPCM follow up call completed with Caregiver, Levi who phoned CM. Continued Education on Preventing Skin Breakdown and Keeping Patient active with her PT exercises.  Next Steps: Caregiver agrees to follow up in approximately 2 weeks.

## 2025-02-17 ENCOUNTER — EXTERNAL HOME HEALTH (OUTPATIENT)
Dept: HOME HEALTH SERVICES | Facility: HOSPITAL | Age: 89
End: 2025-02-17
Payer: MEDICARE

## 2025-02-17 ENCOUNTER — TELEPHONE (OUTPATIENT)
Dept: INTERNAL MEDICINE | Facility: CLINIC | Age: 89
End: 2025-02-17
Payer: MEDICARE

## 2025-02-17 DIAGNOSIS — I10 HYPERTENSION, UNSPECIFIED TYPE: Primary | ICD-10-CM

## 2025-02-17 RX ORDER — SACUBITRIL AND VALSARTAN 24; 26 MG/1; MG/1
1 TABLET, FILM COATED ORAL 2 TIMES DAILY
Start: 2025-02-17 | End: 2025-02-21 | Stop reason: SDUPTHER

## 2025-02-17 NOTE — TELEPHONE ENCOUNTER
----- Message from Merry sent at 2/17/2025 10:37 AM CST -----  Regarding: refill  Who Called: Homa Marquezefill or New Rx:RefillRX Name and Strength:sacubitriL-valsartan (ENTRESTO) 24-26 mg per tabletHow is the patient currently taking it? (ex. 1XDay):2x dayIs this a 30 day or 90 day RX:90Local or Mail Order:localList of preferred pharmacies on file (remove unneeded): [unfilled]If different Pharmacy is requested, enter Pharmacy information here including location and phone number:  [facility] Phone: [phone]: [phone] Provider:Preferred Method of Contact: Phone CallPatient's Preferred Phone Number on File: [phone] Best Call Back Number, if different:Additional Information:

## 2025-02-21 ENCOUNTER — TELEPHONE (OUTPATIENT)
Dept: INTERNAL MEDICINE | Facility: CLINIC | Age: 89
End: 2025-02-21
Payer: MEDICARE

## 2025-02-21 DIAGNOSIS — I10 HYPERTENSION, UNSPECIFIED TYPE: ICD-10-CM

## 2025-02-21 RX ORDER — SACUBITRIL AND VALSARTAN 24; 26 MG/1; MG/1
1 TABLET, FILM COATED ORAL 2 TIMES DAILY
Qty: 56 TABLET | Refills: 11 | OUTPATIENT
Start: 2025-02-21

## 2025-02-21 RX ORDER — SACUBITRIL AND VALSARTAN 24; 26 MG/1; MG/1
1 TABLET, FILM COATED ORAL 2 TIMES DAILY
Qty: 180 TABLET | Refills: 3 | Status: SHIPPED | OUTPATIENT
Start: 2025-02-21

## 2025-02-21 NOTE — TELEPHONE ENCOUNTER
----- Message from Nurse De sent at 2/21/2025  8:42 AM CST -----  Regarding: FW: call back  See attached message  ----- Message -----  From: Rebecca Kaur  Sent: 2/21/2025   8:20 AM CST  To: Daryl KERN Staff  Subject: call back                                        Who Called: Av Jaquez - sonPatient is returning phone callWho Left Message for Patient:  Ms. Busch the patient know what this is regarding?:Telamed apt Preferred Method of Contact: Phone CallPatient's Preferred Phone Number on File: [phone] Best Call Back Number, if different:Additional Information: Pt's son spoke to Aby about changing apt to telemed and she told him to set up portal and call her back and she would set it up as a teleamed apt.

## 2025-02-21 NOTE — TELEPHONE ENCOUNTER
----- Message from Nurse Dwyer sent at 2/21/2025 10:35 AM CST -----  Regarding: RE: call back    ----- Message -----  From: Rebecca Kaur  Sent: 2/21/2025   8:20 AM CST  To: Daryl KERN Staff  Subject: call back                                        Who Called: Av Jaquez - sonPatient is returning phone callWho Left Message for Patient:  Ms. Busch the patient know what this is regarding?:Telamed apt Preferred Method of Contact: Phone CallPatient's Preferred Phone Number on File: [phone] Best Call Back Number, if different:Additional Information: Pt's son spoke to Aby about changing apt to telemed and she told him to set up portal and call her back and she would set it up as a teleamed apt.

## 2025-02-21 NOTE — TELEPHONE ENCOUNTER
----- Message from Nurse Dwyer sent at 2/21/2025 10:51 AM CST -----    ----- Message -----  From: Ruma Quigley  Sent: 2/21/2025  10:50 AM CST  To: Daryl KERN Staff    Who Called: [facility]  pharmacy  [facility]  ([place])Caller is requesting assistance/information from provider's office.Symptoms (please be specific):  How long has patient had these symptoms:  List of preferred pharmacies on file (remove unneeded): [unfilled]If different, enter pharmacy into here including location and phone number: Patient's Preferred Phone Number on File: Best Call Back Number, if different: [phone] Information: called to determine why medication was denied ,  sacubitriL-valsartan (ENTRESTO) 24-26 mg per tablet, asked for a call back

## 2025-02-24 ENCOUNTER — DOCUMENT SCAN (OUTPATIENT)
Dept: HOME HEALTH SERVICES | Facility: HOSPITAL | Age: 89
End: 2025-02-24
Payer: MEDICARE

## 2025-02-24 ENCOUNTER — OUTPATIENT CASE MANAGEMENT (OUTPATIENT)
Dept: ADMINISTRATIVE | Facility: OTHER | Age: 89
End: 2025-02-24
Payer: MEDICARE

## 2025-02-24 NOTE — PROGRESS NOTES
Outpatient Care Management  Plan of Care Follow Up Visit    Patient: Homa Jaquez  MRN: [id number]  Date of Service: 02/24/2025  Completed by: Mariah Rangel RN  Referral Date: 12/10/2024    Reason for Visit   Patient presents with    OPCM RN Follow Up Call    OPCM Chart Review    Other     Need for adult diapers       Brief Summary: OPCM follow up call completed with Levi caregiver of Patient. Continued Education on Importance of Strengthening and PT sessions involvement.   Next Steps: Caregiver agrees to follow up in approximately 3 weeks.

## 2025-02-25 ENCOUNTER — DOCUMENT SCAN (OUTPATIENT)
Dept: HOME HEALTH SERVICES | Facility: HOSPITAL | Age: 89
End: 2025-02-25
Payer: MEDICARE

## 2025-03-13 ENCOUNTER — OFFICE VISIT (OUTPATIENT)
Dept: ORTHOPEDICS | Facility: CLINIC | Age: 89
End: 2025-03-13
Payer: MEDICARE

## 2025-03-13 ENCOUNTER — HOSPITAL ENCOUNTER (OUTPATIENT)
Dept: RADIOLOGY | Facility: CLINIC | Age: 89
Discharge: HOME | End: 2025-03-13
Attending: ORTHOPAEDIC SURGERY
Payer: MEDICARE

## 2025-03-13 VITALS
BODY MASS INDEX: 31.09 KG/M2 | WEIGHT: 182.13 LBS | HEIGHT: 64 IN | SYSTOLIC BLOOD PRESSURE: 146 MMHG | HEART RATE: 82 BPM | DIASTOLIC BLOOD PRESSURE: 80 MMHG

## 2025-03-13 DIAGNOSIS — Z96.642 AFTERCARE FOLLOWING LEFT HIP JOINT REPLACEMENT SURGERY: ICD-10-CM

## 2025-03-13 DIAGNOSIS — Z96.642 AFTERCARE FOLLOWING LEFT HIP JOINT REPLACEMENT SURGERY: Primary | ICD-10-CM

## 2025-03-13 DIAGNOSIS — Z47.1 AFTERCARE FOLLOWING LEFT HIP JOINT REPLACEMENT SURGERY: Primary | ICD-10-CM

## 2025-03-13 DIAGNOSIS — Z47.1 AFTERCARE FOLLOWING LEFT HIP JOINT REPLACEMENT SURGERY: ICD-10-CM

## 2025-03-13 PROCEDURE — 73502 X-RAY EXAM HIP UNI 2-3 VIEWS: CPT | Mod: LT,,, | Performed by: ORTHOPAEDIC SURGERY

## 2025-03-14 NOTE — PROGRESS NOTES
Chief Complaint:   Chief Complaint   Patient presents with    Left Hip - Follow-up     3 months sp Left SILVIA revision Sx 12/05/24 gl 3/05/25, present in wheelchair, denies concerns or pain       History of present illness:    History of Present Illness  The patient is an 89-year-old female who presents for evaluation of left foot pain. She is accompanied by her daughter.    She reports a gradual improvement in her condition, with no current complaints of pain. However, she experiences occasional swelling in her left foot, which has been causing discomfort today. Despite this, she was able to independently enter and exit a vehicle, a significant achievement considering her mobility issues. Her medical journey began on 07/02/2024, with her first surgery taking place on 07/03/2024. She had 4 surgeries within a span of 4.5 months. The most recent surgery was performed around Thanksgiving, marking the start of her recovery progress. She has been receiving home health care and physical therapy, which have been instrumental in her recovery. She has also been receiving occupational therapy, with the first session taking place last week. She has been using a bedside commode but is now able to reach the toilet independently. She can also stand up unaided during showers. She has been using hearing aids.    Past Medical History:   Diagnosis Date    A-fib     Chronic cystitis     GERD (gastroesophageal reflux disease)     Graves disease     Hypertension     Hypothyroidism, unspecified     Spinal stenosis        Past Surgical History:   Procedure Laterality Date    APPENDECTOMY      BLADDER SUSPENSION      CARDIOVERSION  04/01/2015    CATARACT EXTRACTION      CLOSED REDUCTION OF INJURY OF HIP Left 12/5/2024    Procedure: CLOSED REDUCTION, HIP;  Surgeon: Jonny Bains MD;  Location: [facility];  Service: Orthopedics;  Laterality: Left;    CONVERSION ARTHROPLASTY, HIP, POSTERIOR APPROACH Left 7/15/2024    Procedure: CONVERSION  ARTHROPLASTY, HIP, POSTERIOR APPROACH - valencia, cell saver, pathology;  Surgeon: Jonny Bains MD;  Location: [facility] OR;  Service: Orthopedics;  Laterality: Left;  valencia, cell saver, pathology    HYSTERECTOMY      INTRAMEDULLARY RODDING OF FEMUR Left 7/3/2024    Procedure: INSERTION, INTRAMEDULLARY MELANIE, FEMUR;  Surgeon: Steve Pierce DO;  Location: [facility] OR;  Service: Orthopedics;  Laterality: Left;  supine hana table c arm synthes    LAPAROSCOPIC CHOLECYSTECTOMY  01/12/2016    REVISION, ARTHROPLASTY, HIP, POSTERIOR APPROACH Left 8/12/2024    Procedure: REVISION,ARTHROPLASTY,HIP,POSTERIOR APPROACH;  Surgeon: Jonny Bains MD;  Location: [facility] OR;  Service: Orthopedics;  Laterality: Left;  I&D L hip - head/liner    REVISION, ARTHROPLASTY, HIP, POSTERIOR APPROACH Left 12/5/2024    Procedure: REVISION,ARTHROPLASTY,HIP,POSTERIOR APPROACH;  Surgeon: Jonny Bains MD;  Location: [facility] OR;  Service: Orthopedics;  Laterality: Left;    SPHINCTEROTOMY OF URETHRA  01/11/2016    TONSILLECTOMY AND ADENOIDECTOMY         Current Outpatient Medications   Medication Sig    allopurinoL (ZYLOPRIM) 100 MG tablet TAKE 1 TABLET BY MOUTH ONCE DAILY    carvediloL (COREG) 3.125 MG tablet Take 1 tablet (3.125 mg total) by mouth 2 (two) times daily.    ciprofloxacin HCl (CIPRO) 250 MG tablet Take 2 tablets (500 mg total) by mouth every 12 (twelve) hours.    cyanocobalamin 1,000 mcg/mL injection INJECT 1000MCG (1ML) INTRAMUSCULARY ONCE MONTHLY    docusate sodium (COLACE) 100 MG capsule Take 1 capsule (100 mg total) by mouth 2 (two) times daily as needed for Constipation.    famotidine (PEPCID) 20 MG tablet Take 20 mg by mouth as needed for Heartburn.    furosemide (LASIX) 20 MG tablet Take 1 tablet (20 mg total) by mouth once daily.    levothyroxine (SYNTHROID) 150 MCG tablet Take 1 tablet (150 mcg total) by mouth before breakfast.    lovastatin (MEVACOR) 20 MG tablet Take 20 mg by mouth once daily.    pantoprazole (PROTONIX) 40 MG tablet  GIVE ONE TABLET BY MOUTH ONCE DAILY    polyethylene glycol (GLYCOLAX) 17 gram PwPk Take 17 g by mouth 2 (two) times daily as needed for Constipation.    sacubitriL-valsartan (ENTRESTO) 24-26 mg per tablet Take 1 tablet by mouth 2 (two) times daily.    senna (SENOKOT) 8.6 mg tablet Take 1 tablet by mouth daily as needed for Constipation.    traMADoL (ULTRAM) 50 mg tablet Take 1 tablet (50 mg total) by mouth every 12 (twelve) hours as needed (as needed for pain score 1-10).    warfarin (COUMADIN) 1 MG tablet Take 1 tablet (1 mg total) by mouth Daily.    empagliflozin (JARDIANCE) 10 mg tablet Take 1 tablet (10 mg total) by mouth once daily. (Patient not taking: Reported on 3/13/2025)     No current facility-administered medications for this visit.       Review of patient's allergies indicates:   Allergen Reactions    Enoxaparin Dermatitis     Cellulitis of abdomen     Promethazine Hallucinations       Family History   Problem Relation Name Age of Onset    Cancer Father      Heart attack Father      Stroke Father         Social History[1]        Review of Systems:    Constitution: Negative for chills, fever, and sweats.  Negative for unexplained weight loss.    HENT:  Negative for headaches and blurry vision.    Cardiovascular:Negative for chest pain or irregular heart beat. Negative for hypertension.    Respiratory:  Negative for cough and shortness of breath.    Gastrointestinal: Negative for abdominal pain, heartburn, melena, nausea, and vomitting.    Genitourinary:  Negative bladder incontinence and dysuria.    Musculoskeletal:  See HPI    Neurological: Negative for numbness.    Psychiatric/Behavioral: Negative for depression.  The patient is not nervous/anxious.      Endocrine: Negative for polyuria    Hematologic/Lymphatic: Negative for bleeding problem.  Does not bruise/bleed easily.    Skin: Negative for poor would healing and rash      Physical Examination:    Vital Signs:    Vitals:    03/13/25 1327   BP: (!)  146/80   Pulse: 82       Body mass index is 31.26 kg/m².    General: No acute distress, alert and oriented, healthy appearing    HEENT: Head is atraumatic, mucous membranes are moist    Neck: Supples, no JVD    Cardiovascular: Palpable dorsalis pedis and posterior tibial pulses, regular rate and rhythm to those pulses    Lungs: Breathing non-labored    Skin: no rashes appreciated    Neurologic: Can flex and extend knees, ankles, and toes. Sensation is grossly intact    Left hip:  Range of motion left hip without significant severe pain.  Incision clean and dry.    X-rays:  Three views left hip reviewed.  Patient's implants appear well fixed with no signs of loosening or motion     Assessment::  Status post left total hip arthroplasty revision    Plan:  Discussed all treatment with the patient.  Patient overall is doing well.  Plan to see her back in 6 months with repeat x-rays of the left hip or sooner should there be any joint issues.    This note was generated with the assistance of ambient listening technology. Verbal consent was obtained by the patient and accompanying visitor(s) for the recording of patient appointment to facilitate this note. I attest to having reviewed and edited the generated note for accuracy, though some syntax or spelling errors may persist. Please contact the author of this note for any clarification.      This note was created using [employer] voice recognition software that occasionally misinterpreted phrases or words.    Consult note is delivered via Epic messaging service.         [1]   Social History  Socioeconomic History    Marital status:    Tobacco Use    Smoking status: Former     Types: Cigarettes    Smokeless tobacco: Never   Substance and Sexual Activity    Alcohol use: Yes    Drug use: Never    Sexual activity: Not Currently     Social Drivers of Health     Financial Resource Strain: Low Risk  (12/19/2024)    Overall Financial Resource Strain (CARDIA)     Difficulty of  Paying Living Expenses: Not very hard   Food Insecurity: No Food Insecurity (12/19/2024)    Hunger Vital Sign     Worried About Running Out of Food in the Last Year: Never true     Ran Out of Food in the Last Year: Never true   Transportation Needs: No Transportation Needs (12/19/2024)    TRANSPORTATION NEEDS     Transportation : No   Physical Activity: Inactive (12/19/2024)    Exercise Vital Sign     Days of Exercise per Week: 0 days     Minutes of Exercise per Session: 0 min   Stress: No Stress Concern Present (12/19/2024)    [employer] [place] [employer] - Occupational Stress Questionnaire     Feeling of Stress : Not at all   Housing Stability: Unknown (12/19/2024)    Housing Stability Vital Sign     Unable to Pay for Housing in the Last Year: No     Homeless in the Last Year: No

## 2025-03-25 ENCOUNTER — OUTPATIENT CASE MANAGEMENT (OUTPATIENT)
Dept: ADMINISTRATIVE | Facility: OTHER | Age: 89
End: 2025-03-25
Payer: COMMERCIAL

## 2025-03-25 ENCOUNTER — DOCUMENT SCAN (OUTPATIENT)
Dept: HOME HEALTH SERVICES | Facility: HOSPITAL | Age: 89
End: 2025-03-25
Payer: COMMERCIAL

## 2025-03-25 NOTE — PROGRESS NOTES
Outpatient Care Management  Plan of Care Follow Up Visit    Patient: Homa Jaquez  MRN: [id number]  Date of Service: 03/25/2025  Completed by: Mariah aRngel RN  Referral Date: 12/10/2024    Reason for Visit   Patient presents with    OPCM RN Follow Up Call    OPCM Chart Review    Other     Delivered adult briefs        Brief Summary: OPCM follow up call completed with Levi, spouse of Patient. Continued Education on Safety when mobile and continuing to consume healthy nutritious foods.  Next Steps: Levi (spouse) agrees to follow up in approximately 3 weeks for possible Closure.

## 2025-03-28 ENCOUNTER — DOCUMENT SCAN (OUTPATIENT)
Dept: HOME HEALTH SERVICES | Facility: HOSPITAL | Age: 89
End: 2025-03-28
Payer: COMMERCIAL

## 2025-04-01 ENCOUNTER — TELEPHONE (OUTPATIENT)
Dept: INTERNAL MEDICINE | Facility: CLINIC | Age: 89
End: 2025-04-01
Payer: COMMERCIAL

## 2025-04-01 NOTE — TELEPHONE ENCOUNTER
ARE THERE ANY OUTSTANDING TASKS IN THE CHART? YES FASTING LAB    IS THERE ANY DOCUMENTATION OF TASKS?    HAS PATIENT SEEN ANOTHER PHYSICIAN, BEEN TO THE ER, UCC, OR ADMITTED TO HOSPITAL SINCE LAST VISIT?    HAS THE PATIENT DONE BLOOD WORK OR IMAGING SINCE LAST VISIT?    PLEASE HAVE PATIENT BRING A LIST OF MEDICATIONS TO APPT

## 2025-04-01 NOTE — TELEPHONE ENCOUNTER
Copied from CRM #[id number]. Topic: General Inquiry - Information Request  >> Apr 1, 2025  2:58 PM Ruma wrote:  Who Called: Homa Jaquez    Patient is returning phone call    Who Left Message for Patient:  Does the patient know what this is regarding?:      Patient's Preferred Phone Number on File: [phone]   Best Call Back Number, if different:  Additional Information: returning missed call - upcoming appt confirmed please follow up if something further is needed

## 2025-04-04 ENCOUNTER — DOCUMENT SCAN (OUTPATIENT)
Dept: HOME HEALTH SERVICES | Facility: HOSPITAL | Age: 89
End: 2025-04-04
Payer: COMMERCIAL

## 2025-04-05 LAB
CHOLEST SERPL-MSCNC: 124 MG/DL (ref 0–200)
HBA1C MFR BLD: 6.2 % (ref 4–6)
HDLC SERPL-MCNC: 48 MG/DL (ref 35–70)
LDLC SERPL CALC-MCNC: 62 MG/DL (ref 0–160)
TRIGL SERPL-MCNC: 68 MG/DL (ref 40–160)

## 2025-04-07 ENCOUNTER — TELEPHONE (OUTPATIENT)
Dept: INTERNAL MEDICINE | Facility: CLINIC | Age: 89
End: 2025-04-07

## 2025-04-07 ENCOUNTER — DOCUMENTATION ONLY (OUTPATIENT)
Dept: INTERNAL MEDICINE | Facility: CLINIC | Age: 89
End: 2025-04-07

## 2025-04-07 ENCOUNTER — OFFICE VISIT (OUTPATIENT)
Dept: INTERNAL MEDICINE | Facility: CLINIC | Age: 89
End: 2025-04-07
Payer: MEDICARE

## 2025-04-07 VITALS — HEART RATE: 85 BPM | SYSTOLIC BLOOD PRESSURE: 116 MMHG | DIASTOLIC BLOOD PRESSURE: 70 MMHG

## 2025-04-07 DIAGNOSIS — I50.23 ACUTE ON CHRONIC SYSTOLIC HEART FAILURE: ICD-10-CM

## 2025-04-07 DIAGNOSIS — E03.9 ACQUIRED HYPOTHYROIDISM: ICD-10-CM

## 2025-04-07 DIAGNOSIS — I11.0 HYPERTENSIVE HEART DISEASE WITH HEART FAILURE: ICD-10-CM

## 2025-04-07 DIAGNOSIS — M25.511 ACUTE PAIN OF RIGHT SHOULDER: Primary | ICD-10-CM

## 2025-04-07 DIAGNOSIS — N18.6 END STAGE RENAL DISEASE: ICD-10-CM

## 2025-04-07 DIAGNOSIS — E87.0 SERUM SODIUM ELEVATED: ICD-10-CM

## 2025-04-07 PROCEDURE — 98006 SYNCH AUDIO-VIDEO EST MOD 30: CPT | Mod: 95,,, | Performed by: NURSE PRACTITIONER

## 2025-04-07 RX ORDER — LEVOTHYROXINE SODIUM 175 UG/1
175 TABLET ORAL
Qty: 30 TABLET | Refills: 11 | Status: SHIPPED | OUTPATIENT
Start: 2025-04-07 | End: 2026-04-07

## 2025-04-07 NOTE — ASSESSMENT & PLAN NOTE
HYPERTENSION RECOMMENDATIONS:  Continue current treatment regimen.  Dietary sodium restriction.  Regular aerobic exercise.  Weight loss.  Reduce stress.  Goal BP <130/80; Encouraged to monitor blood pressure at home

## 2025-04-07 NOTE — TELEPHONE ENCOUNTER
Please let patient know that I discussed labs and he would like to proceed with inc in levothyroxine to 175mcg daily. RX in system sent to pharmacy. Repeat nonfasting BMP and TSH in 6-8 weeks.     Siria, please schedule follow up OV with Dr. Brito in 3 months. (I tried to send link,  but would not allow me to do so).

## 2025-04-07 NOTE — ASSESSMENT & PLAN NOTE
Improving renal function noted  Will repeat BMP with next lab draw  Encouraged adequate fluid intake

## 2025-04-07 NOTE — PROGRESS NOTES
Patient ID: [id number]     Chief Complaint: Follow-up (3 month)      HPI:   The patient location is: her home with sitter present  The chief complaint leading to consultation is: 3 month follow up    Visit type: audiovisual    Face to Face time with patient: 12 minutes  30 minutes of total time spent on the encounter, which includes face to face time and non-face to face time preparing to see the patient (eg, review of tests), Obtaining and/or reviewing separately obtained history, Documenting clinical information in the electronic or other health record, Independently interpreting results (not separately reported) and communicating results to the patient/family/caregiver, or Care coordination (not separately reported).         Each patient to whom he or she provides medical services by telemedicine is:  (1) informed of the relationship between the physician and patient and the respective role of any other health care provider with respect to management of the patient; and (2) notified that he or she may decline to receive medical services by telemedicine and may withdraw from such care at any time.      Homa Jaquez is a 89 y.o. female here today for a telemedicine visit. She is doing well overall. Issues with mobility. She did recently have R THR surgery per Dr. Banis taking place on 07/03/2024. She had 4 surgeries within a span of 4.5 months. The most recent surgery was performed around Thanksgiving, marking the start of her recovery progress. Follow up visit noted/reviewed from 3/13 with stable hip XR noted at that time. 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 On Entrestro and Jardiance 3 days/week for HF. Denies CP, palpitations, SOB, appetite/bowel/urinary changes. Sleeping well at night. Some occ shoulder pain that resolves with positional changes. She does not feel the need to take anything for this. Undergoing PT.        -------------------------------------    A-fib     Chronic cystitis    GERD (gastroesophageal reflux disease)    Graves disease    Hypertension    Hypothyroidism, unspecified    Spinal stenosis        Past Surgical History:   Procedure Laterality Date    APPENDECTOMY      BLADDER SUSPENSION      CARDIOVERSION  04/01/2015    CATARACT EXTRACTION      CHOLECYSTECTOMY      CLOSED REDUCTION OF INJURY OF HIP Left 12/05/2024    Procedure: CLOSED REDUCTION, HIP;  Surgeon: Jonny Bains MD;  Location: [facility] OR;  Service: Orthopedics;  Laterality: Left;    CONVERSION ARTHROPLASTY, HIP, POSTERIOR APPROACH Left 07/15/2024    Procedure: CONVERSION ARTHROPLASTY, HIP, POSTERIOR APPROACH - valencia, cell saver, pathology;  Surgeon: Jonny Bains MD;  Location: [facility] OR;  Service: Orthopedics;  Laterality: Left;  valencia, cell saver, pathology    EYE SURGERY      FRACTURE SURGERY  07/03/24    ORIF Left Hip    HYSTERECTOMY      INTRAMEDULLARY RODDING OF FEMUR Left 07/03/2024    Procedure: INSERTION, INTRAMEDULLARY MELANIE, FEMUR;  Surgeon: Steve Pierce DO;  Location: [facility] OR;  Service: Orthopedics;  Laterality: Left;  supine hana table c arm synthes    JOINT REPLACEMENT      ORIF Left Hip    LAPAROSCOPIC CHOLECYSTECTOMY  01/12/2016    REVISION, ARTHROPLASTY, HIP, POSTERIOR APPROACH Left 08/12/2024    Procedure: REVISION,ARTHROPLASTY,HIP,POSTERIOR APPROACH;  Surgeon: Jonny Bains MD;  Location: [facility] OR;  Service: Orthopedics;  Laterality: Left;  I&D L hip - head/liner    REVISION, ARTHROPLASTY, HIP, POSTERIOR APPROACH Left 12/05/2024    Procedure: REVISION,ARTHROPLASTY,HIP,POSTERIOR APPROACH;  Surgeon: Jonny Bains MD;  Location: [facility] OR;  Service: Orthopedics;  Laterality: Left;    SPHINCTEROTOMY OF URETHRA  01/11/2016    TONSILLECTOMY AND ADENOIDECTOMY         Review of patient's allergies indicates:   Allergen Reactions    Enoxaparin Dermatitis     Cellulitis of abdomen     Promethazine Hallucinations       Outpatient Medications Marked as Taking for the 4/7/25 encounter  (Office Visit) with Valerie Lancaster NP   Medication Sig Dispense Refill    allopurinoL (ZYLOPRIM) 100 MG tablet TAKE 1 TABLET BY MOUTH ONCE DAILY 30 tablet 4    carvediloL (COREG) 3.125 MG tablet Take 1 tablet (3.125 mg total) by mouth 2 (two) times daily. 60 tablet 5    ciprofloxacin HCl (CIPRO) 250 MG tablet Take 2 tablets (500 mg total) by mouth every 12 (twelve) hours.      cyanocobalamin 1,000 mcg/mL injection INJECT 1000MCG (1ML) INTRAMUSCULARY ONCE MONTHLY 1 mL 11    docusate sodium (COLACE) 100 MG capsule Take 1 capsule (100 mg total) by mouth 2 (two) times daily as needed for Constipation.      empagliflozin (JARDIANCE) 10 mg tablet Take 1 tablet (10 mg total) by mouth once daily. 30 tablet 11    famotidine (PEPCID) 20 MG tablet Take 20 mg by mouth as needed for Heartburn.      furosemide (LASIX) 20 MG tablet Take 1 tablet (20 mg total) by mouth once daily. 30 tablet 5    lovastatin (MEVACOR) 20 MG tablet Take 20 mg by mouth once daily.      pantoprazole (PROTONIX) 40 MG tablet GIVE ONE TABLET BY MOUTH ONCE DAILY 30 tablet 4    polyethylene glycol (GLYCOLAX) 17 gram PwPk Take 17 g by mouth 2 (two) times daily as needed for Constipation.      sacubitriL-valsartan (ENTRESTO) 24-26 mg per tablet Take 1 tablet by mouth 2 (two) times daily. 180 tablet 3    senna (SENOKOT) 8.6 mg tablet Take 1 tablet by mouth daily as needed for Constipation. 30 tablet 1    traMADoL (ULTRAM) 50 mg tablet Take 1 tablet (50 mg total) by mouth every 12 (twelve) hours as needed (as needed for pain score 1-10). 60 tablet 5    warfarin (COUMADIN) 1 MG tablet Take 1 tablet (1 mg total) by mouth Daily. 30 tablet 11    [DISCONTINUED] levothyroxine (SYNTHROID) 150 MCG tablet Take 1 tablet (150 mcg total) by mouth before breakfast. 30 tablet 11       Social History[1]     Family History   Problem Relation Name Age of Onset    Cancer Father JS Benson     Heart attack Father JS Benson     Stroke Father JS Benson     Arthritis Father  JS Benson     Hearing loss Mother Albina Knowles         Patient Care Team:  Franklyn Brito MD as PCP - General (Internal Medicine)  Franklyn Brito MD Lantier, Lisa, RN as Outpatient       Subjective:       Review of Systems   HENT:  Positive for hearing loss.    Eyes:  Negative for discharge.   Respiratory:  Negative for wheezing.    Cardiovascular:  Negative for chest pain and palpitations.   Gastrointestinal:  Negative for blood in stool, constipation, diarrhea and vomiting.   Genitourinary:  Negative for dysuria and hematuria.   Musculoskeletal:  Negative for neck pain.   Neurological:  Negative for weakness and headaches.   Endo/Heme/Allergies:  Negative for polydipsia.     Answers submitted by the patient for this visit:  Review of Systems Questionnaire (Submitted on 4/7/2025)  activity change: No  unexpected weight change: No  rhinorrhea: No  trouble swallowing: No  visual disturbance: No  chest tightness: No  polyuria: No  difficulty urinating: No  menstrual problem: No  joint swelling: No  arthralgias: No  confusion: No  dysphoric mood: No    See HPI for details    Constitutional: Denies Change in appetite. Denies Chills. Denies Fever. Denies Night sweats.  Eye: Denies Blurred vision. Denies Discharge. Denies Eye pain.  ENT: Denies Decreased hearing. Denies Sore throat. Denies Swollen glands.  Respiratory: Denies Cough. Denies Shortness of breath. Denies Shortness of breath with exertion. Denies Wheezing.  Cardiovascular: DeniesChest pain at rest. Denies Chest pain with exertion. Denies Irregular heartbeat. Denies Palpitations.  Gastrointestinal: Denies Abdominal pain. DeniesDiarrhea. Denies Nausea. Denies Vomiting. Denies Hematemesis or Hematochezia.  Genitourinary: Denies Dysuria. Denies Urinary frequency. Denies Urinary urgency. Denies Blood in urine.  Endocrine: Denies Cold intolerance. Denies Excessive thirst. Denies Heat intolerance. Denies Weight loss. Denies  Weight gain.  Musculoskeletal: Denies Painful joints. Denies Weakness.  Integumentary: Denies Rash. Denies Itching. Denies Dry skin.  Neurologic: Denies Dizziness. Denies Fainting. Denies Headache.  Psychiatric: Denies Depression. Denies Anxiety. Denies Suicidal/Homicidal ideations.    All Other ROS: Negative except as stated in HPI.       Objective:     /70   Pulse 85     Physical Exam    Physical Exam: LIMITED DUE TO TELEMEDICINE RESTRICTIONS.  General: Alert and oriented, No acute distress.  Head: Normocephalic, Atraumatic.  Eye: Sclera non-icteric.  Neck/Thyroid:  Full range of motion.  Respiratory: Non-labored respirations, Symmetrical chest wall expansion.  Musculoskeletal: Normal range of motion.  Integumentary: Warm, Dry, Intact, No visible suspicious lesions or rashes. No diaphoresis.   Neurologic: No focal deficits  Psychiatric: Normal interaction, Coherent speech, Euthymic mood, Appropriate affect       Assessment:       ICD-10-CM ICD-9-CM   1. Acute pain of right shoulder  M25.511 719.41   2. Acquired hypothyroidism  E03.9 244.9   3. Acute on chronic systolic heart failure  I50.23 428.23   4. Hypertensive heart disease with heart failure  I11.0 402.91     428.9   5. End stage renal disease  N18.6 585.6   6. Serum sodium elevated  E87.0 276.0        Plan:     1. Acute pain of right shoulder  Assessment & Plan:  Stable  Int s/s  Tylenol as needed      2. Acquired hypothyroidism  Assessment & Plan:  Will inc levothyroxine to 175mcg daily  Repeat TSH in 6-8 weeks    Orders:  -     Basic Metabolic Panel; Future; Expected date: 05/19/2025  -     TSH; Future; Expected date: 05/19/2025  -     levothyroxine (SYNTHROID) 175 MCG tablet; Take 1 tablet (175 mcg total) by mouth before breakfast.  Dispense: 30 tablet; Refill: 11    3. Acute on chronic systolic heart failure  Assessment & Plan:  Compensated  Denies acute concerning symptoms  Stable on current RX regimen      4. Hypertensive heart disease with  heart failure  Assessment & Plan:  HYPERTENSION RECOMMENDATIONS:  Continue current treatment regimen.  Dietary sodium restriction.  Regular aerobic exercise.  Weight loss.  Reduce stress.  Goal BP <130/80; Encouraged to monitor blood pressure at home         5. End stage renal disease  Assessment & Plan:  Improving renal function noted  Will repeat BMP with next lab draw  Encouraged adequate fluid intake    Orders:  -     Basic Metabolic Panel; Future; Expected date: 05/19/2025  -     TSH; Future; Expected date: 05/19/2025    6. Serum sodium elevated  Assessment & Plan:  Update BMP     Orders:  -     Basic Metabolic Panel; Future; Expected date: 05/19/2025  -     TSH; Future; Expected date: 05/19/2025           Medication List with Changes/Refills   Current Medications    ALLOPURINOL (ZYLOPRIM) 100 MG TABLET    TAKE 1 TABLET BY MOUTH ONCE DAILY       Start Date: 11/15/2024End Date: --    CARVEDILOL (COREG) 3.125 MG TABLET    Take 1 tablet (3.125 mg total) by mouth 2 (two) times daily.       Start Date: 11/15/2024End Date: 5/14/2025    CIPROFLOXACIN HCL (CIPRO) 250 MG TABLET    Take 2 tablets (500 mg total) by mouth every 12 (twelve) hours.       Start Date: 11/19/2024End Date: --    CYANOCOBALAMIN 1,000 MCG/ML INJECTION    INJECT 1000MCG (1ML) INTRAMUSCULARY ONCE MONTHLY       Start Date: 9/7/2023  End Date: --    DOCUSATE SODIUM (COLACE) 100 MG CAPSULE    Take 1 capsule (100 mg total) by mouth 2 (two) times daily as needed for Constipation.       Start Date: 7/26/2024 End Date: --    EMPAGLIFLOZIN (JARDIANCE) 10 MG TABLET    Take 1 tablet (10 mg total) by mouth once daily.       Start Date: 11/19/2024End Date: --    FAMOTIDINE (PEPCID) 20 MG TABLET    Take 20 mg by mouth as needed for Heartburn.       Start Date: --        End Date: --    FUROSEMIDE (LASIX) 20 MG TABLET    Take 1 tablet (20 mg total) by mouth once daily.       Start Date: 11/15/2024End Date: 5/14/2025    LOVASTATIN (MEVACOR) 20 MG TABLET    Take 20  mg by mouth once daily.       Start Date: 3/21/2022 End Date: --    PANTOPRAZOLE (PROTONIX) 40 MG TABLET    GIVE ONE TABLET BY MOUTH ONCE DAILY       Start Date: 11/15/2024End Date: --    POLYETHYLENE GLYCOL (GLYCOLAX) 17 GRAM PWPK    Take 17 g by mouth 2 (two) times daily as needed for Constipation.       Start Date: 7/9/2024  End Date: --    SACUBITRIL-VALSARTAN (ENTRESTO) 24-26 MG PER TABLET    Take 1 tablet by mouth 2 (two) times daily.       Start Date: 2/21/2025 End Date: --    SENNA (SENOKOT) 8.6 MG TABLET    Take 1 tablet by mouth daily as needed for Constipation.       Start Date: 3/16/2023 End Date: --    TRAMADOL (ULTRAM) 50 MG TABLET    Take 1 tablet (50 mg total) by mouth every 12 (twelve) hours as needed (as needed for pain score 1-10).       Start Date: 11/19/2024End Date: --    WARFARIN (COUMADIN) 1 MG TABLET    Take 1 tablet (1 mg total) by mouth Daily.       Start Date: 8/21/2024 End Date: 8/21/2025   Changed and/or Refilled Medications    Modified Medication Previous Medication    LEVOTHYROXINE (SYNTHROID) 175 MCG TABLET levothyroxine (SYNTHROID) 150 MCG tablet       Take 1 tablet (175 mcg total) by mouth before breakfast.    Take 1 tablet (150 mcg total) by mouth before breakfast.       Start Date: 4/7/2025  End Date: 4/7/2026    Start Date: 1/22/2025 End Date: 4/7/2025          Follow up in about 3 months (around 7/7/2025) for with Dr. Brito. In addition to their scheduled follow up, the patient has also been instructed to follow up on as needed basis.       Video Time Documentation:  Spent 10 minutes with patient face to face discussed health concerns. More than 50% of this time was spent in counseling and coordination of care.         [1]   Social History  Socioeconomic History    Marital status:    Tobacco Use    Smoking status: Former     Current packs/day: 0.00     Types: Cigarettes    Smokeless tobacco: Never   Substance and Sexual Activity    Alcohol use: Not Currently    Drug  use: Never    Sexual activity: Not Currently     Social Drivers of Health     Financial Resource Strain: Low Risk  (4/7/2025)    Overall Financial Resource Strain (CARDIA)     Difficulty of Paying Living Expenses: Not hard at all   Food Insecurity: No Food Insecurity (4/7/2025)    Hunger Vital Sign     Worried About Running Out of Food in the Last Year: Never true     Ran Out of Food in the Last Year: Never true   Transportation Needs: Unknown (4/7/2025)    PRAPARE - Transportation     Lack of Transportation (Medical): No     Lack of Transportation (Non-Medical): Patient declined   Physical Activity: Inactive (4/7/2025)    Exercise Vital Sign     Days of Exercise per Week: 0 days     Minutes of Exercise per Session: 0 min   Stress: No Stress Concern Present (4/7/2025)    Citizen of the [place] [employer] - Occupational Stress Questionnaire     Feeling of Stress : Not at all   Housing Stability: Patient Declined (4/7/2025)    Housing Stability Vital Sign     Unable to Pay for Housing in the Last Year: Patient declined     Number of Times Moved in the Last Year: 0     Homeless in the Last Year: Patient declined

## 2025-04-08 ENCOUNTER — DOCUMENTATION ONLY (OUTPATIENT)
Dept: INTERNAL MEDICINE | Facility: CLINIC | Age: 89
End: 2025-04-08
Payer: COMMERCIAL

## 2025-04-15 ENCOUNTER — OUTPATIENT CASE MANAGEMENT (OUTPATIENT)
Dept: ADMINISTRATIVE | Facility: OTHER | Age: 89
End: 2025-04-15
Payer: COMMERCIAL

## 2025-04-15 NOTE — PROGRESS NOTES
Outpatient Care Management  Plan of Care Follow Up Visit    Patient: Homa Jaquez  MRN: [id number]  Date of Service: 04/15/2025  Completed by: Mariah Rangel RN  Referral Date: 12/10/2024    Reason for Visit   Patient presents with    OPCM Chart Review    OPCM RN Follow Up Call    Other     Mailed Graduate Certificate    Case Closure    Goals Complete       Brief Summary: OPCM follow up call completed with Patient and spouse. Continued Education on Importance of Engaging in PT exercise and Nutritional Eating.   Next Steps: CM to close chart as Patient has met goal.

## 2025-04-26 ENCOUNTER — DOCUMENT SCAN (OUTPATIENT)
Dept: HOME HEALTH SERVICES | Facility: HOSPITAL | Age: 89
End: 2025-04-26
Payer: COMMERCIAL

## 2025-04-28 ENCOUNTER — DOCUMENT SCAN (OUTPATIENT)
Dept: HOME HEALTH SERVICES | Facility: HOSPITAL | Age: 89
End: 2025-04-28
Payer: COMMERCIAL

## 2025-04-29 ENCOUNTER — DOCUMENT SCAN (OUTPATIENT)
Dept: HOME HEALTH SERVICES | Facility: HOSPITAL | Age: 89
End: 2025-04-29
Payer: COMMERCIAL

## 2025-04-29 ENCOUNTER — EXTERNAL HOME HEALTH (OUTPATIENT)
Dept: HOME HEALTH SERVICES | Facility: HOSPITAL | Age: 89
End: 2025-04-29
Payer: COMMERCIAL

## 2025-04-30 DIAGNOSIS — T81.49XA POSTOPERATIVE WOUND INFECTION OF LEFT HIP: ICD-10-CM

## 2025-04-30 RX ORDER — CIPROFLOXACIN 250 MG/1
TABLET, FILM COATED ORAL
Qty: 80 TABLET | Refills: 11 | Status: SHIPPED | OUTPATIENT
Start: 2025-04-30

## 2025-05-08 ENCOUNTER — DOCUMENT SCAN (OUTPATIENT)
Dept: HOME HEALTH SERVICES | Facility: HOSPITAL | Age: 89
End: 2025-05-08
Payer: COMMERCIAL

## 2025-06-20 ENCOUNTER — TELEPHONE (OUTPATIENT)
Dept: INTERNAL MEDICINE | Facility: CLINIC | Age: 89
End: 2025-06-20
Payer: COMMERCIAL

## 2025-06-20 NOTE — TELEPHONE ENCOUNTER
Copied from CRM #[id number]. Topic: Appointments - Amb Referral  >> Jun 20, 2025  8:34 AM Alfred wrote:  .Who Called: Emmanuel with [facility]    Caller is requesting assistance/information from provider's office.    Symptoms (please be specific): N/A   How long has patient had these symptoms:  N/A  List of preferred pharmacies on file (remove unneeded): [unfilled]  If different, enter pharmacy into here including location and phone number: N/A    Preferred Method of Contact: Phone Call    Patient's Preferred Phone Number on File: [phone]     Best Call Back Number, if different: [phone] (Emmanuel)    Additional Information: Called stating pt's wrist band was not scanned, so she has no pt information, including order. Requesting order from 04/05/2025 with CRP, High Sensitivity, A1c, BNP, CMP, CBC w/ Auto Differential, Lipid Panel and TSH. Would like order faxed to [phone]. Please advise, thank you.

## 2025-06-23 ENCOUNTER — DOCUMENT SCAN (OUTPATIENT)
Dept: HOME HEALTH SERVICES | Facility: HOSPITAL | Age: 89
End: 2025-06-23
Payer: COMMERCIAL

## 2025-07-02 ENCOUNTER — TELEPHONE (OUTPATIENT)
Dept: INTERNAL MEDICINE | Facility: CLINIC | Age: 89
End: 2025-07-02
Payer: COMMERCIAL

## 2025-07-02 NOTE — TELEPHONE ENCOUNTER
----- Message from Nurse Kenisha sent at 7/1/2025 10:52 AM CDT -----  Regarding: chata Vallejo 07/09 @3:20  Fasting labs needed.

## 2025-07-09 ENCOUNTER — OFFICE VISIT (OUTPATIENT)
Dept: INTERNAL MEDICINE | Facility: CLINIC | Age: 89
End: 2025-07-09
Payer: MEDICARE

## 2025-07-09 VITALS
WEIGHT: 176.38 LBS | RESPIRATION RATE: 18 BRPM | BODY MASS INDEX: 30.11 KG/M2 | HEIGHT: 64 IN | HEART RATE: 72 BPM | DIASTOLIC BLOOD PRESSURE: 60 MMHG | SYSTOLIC BLOOD PRESSURE: 122 MMHG | OXYGEN SATURATION: 100 %

## 2025-07-09 DIAGNOSIS — I50.23 ACUTE ON CHRONIC SYSTOLIC HEART FAILURE: Primary | ICD-10-CM

## 2025-07-09 DIAGNOSIS — E11.9 TYPE 2 DIABETES MELLITUS WITHOUT COMPLICATION, WITHOUT LONG-TERM CURRENT USE OF INSULIN: ICD-10-CM

## 2025-07-09 DIAGNOSIS — E03.9 HYPOTHYROIDISM, UNSPECIFIED TYPE: ICD-10-CM

## 2025-07-09 DIAGNOSIS — I48.91 ATRIAL FIBRILLATION, UNSPECIFIED TYPE: ICD-10-CM

## 2025-07-09 DIAGNOSIS — I10 HYPERTENSION, UNSPECIFIED TYPE: ICD-10-CM

## 2025-07-09 DIAGNOSIS — E03.9 ACQUIRED HYPOTHYROIDISM: ICD-10-CM

## 2025-07-09 DIAGNOSIS — Z95.0 PACEMAKER: ICD-10-CM

## 2025-07-09 PROCEDURE — 99214 OFFICE O/P EST MOD 30 MIN: CPT | Mod: ,,, | Performed by: INTERNAL MEDICINE

## 2025-07-09 NOTE — PROGRESS NOTES
Patient ID: [id number]      Subjective:     Chief Complaint: Follow-up      Homa Jaquez is a 89 y.o. female.  The patient is an 89-year-old woman in for follow-up of multiple problems.  She complains of generalized fatigue.  Also problems swelling of the lower extremities.  It tends to resolve overnight.    In discussing the use of Jardiance the  states he is not taking it at all.  He seemed to think she does not need it.  I did explain the benefits of heart failure therapy in the importance thereof.    Follow-up        Review of Systems    Outpatient Medications Marked as Taking for the 7/9/25 encounter (Office Visit) with Franklyn Brito MD   Medication Sig Dispense Refill    allopurinoL (ZYLOPRIM) 100 MG tablet TAKE 1 TABLET BY MOUTH ONCE DAILY 30 tablet 4    ciprofloxacin HCl (CIPRO) 250 MG tablet GIVE TWO TABLETS (500MG) BY MOUTH TWICE DAILY 80 tablet 11    cyanocobalamin 1,000 mcg/mL injection INJECT 1000MCG (1ML) INTRAMUSCULARY ONCE MONTHLY 1 mL 11    docusate sodium (COLACE) 100 MG capsule Take 1 capsule (100 mg total) by mouth 2 (two) times daily as needed for Constipation.      famotidine (PEPCID) 20 MG tablet Take 20 mg by mouth as needed for Heartburn.      levothyroxine (SYNTHROID) 175 MCG tablet Take 1 tablet (175 mcg total) by mouth before breakfast. 30 tablet 11    lovastatin (MEVACOR) 20 MG tablet Take 20 mg by mouth once daily.      pantoprazole (PROTONIX) 40 MG tablet GIVE ONE TABLET BY MOUTH ONCE DAILY 30 tablet 4    polyethylene glycol (GLYCOLAX) 17 gram PwPk Take 17 g by mouth 2 (two) times daily as needed for Constipation.      sacubitriL-valsartan (ENTRESTO) 24-26 mg per tablet Take 1 tablet by mouth 2 (two) times daily. 180 tablet 3    senna (SENOKOT) 8.6 mg tablet Take 1 tablet by mouth daily as needed for Constipation. 30 tablet 1    traMADoL (ULTRAM) 50 mg tablet Take 1 tablet (50 mg total) by mouth every 12 (twelve) hours as needed (as needed for pain score 1-10). 60  "tablet 5    warfarin (COUMADIN) 1 MG tablet Take 1 tablet (1 mg total) by mouth Daily. 30 tablet 11    [DISCONTINUED] empagliflozin (JARDIANCE) 10 mg tablet Take 1 tablet (10 mg total) by mouth once daily. 30 tablet 11       Objective:     /60 (BP Location: Right arm, Patient Position: Sitting)   Pulse 72   Resp 18   Ht 5' 4" (1.626 m)   Wt 80 kg (176 lb 6.4 oz)   SpO2 100%   BMI 30.28 kg/m²     Physical Exam  Vitals reviewed.   Constitutional:       Appearance: Normal appearance.   HENT:      Head: Normocephalic.      Nose: Nose normal.      Mouth/Throat:      Pharynx: Oropharynx is clear.   Eyes:      Pupils: Pupils are equal, round, and reactive to light.   Neck:      Thyroid: No thyromegaly.   Cardiovascular:      Rate and Rhythm: Normal rate. Rhythm irregular.      Pulses: Normal pulses.   Abdominal:      General: Abdomen is flat. Bowel sounds are normal.      Palpations: Abdomen is soft. There is no hepatomegaly, splenomegaly or mass.      Tenderness: There is no abdominal tenderness.   Musculoskeletal:      Cervical back: Neck supple.      Comments: One to 2+ pretibial edema bilaterally   Lymphadenopathy:      Cervical: No cervical adenopathy.   Skin:     General: Skin is warm and dry.   Neurological:      Mental Status: She is alert.         Assessment:     1. Chronic heart failure.  Systolic dysfunction.  Fairly well compensated.  She would likely do better on Jardiance along with the other meds.      2. Corrected hypothyroidism, at least clinically.    3. Diabetes mellitus.  Hopefully control     4. Chronic atrial fib with pacemaker    5. Chronic joint infection on suppressive antibiotic therapy     6. Chronic renal insufficiency.  Stable at last check.    Plan:   Await results of blood work done 2 days ago.  Done at [facility].      Follow-up with me 3 months with CBC CMP lipid TSH A1c prior.  Problem List Items Addressed This Visit          Cardiac/Vascular    Acute on chronic systolic " heart failure - Primary    Relevant Orders    CBC Auto Differential    Comprehensive Metabolic Panel    Lipid Panel    TSH    Hemoglobin A1C    Atrial fibrillation    Relevant Orders    CBC Auto Differential    Comprehensive Metabolic Panel    Lipid Panel    TSH    Hemoglobin A1C    Pacemaker    Relevant Orders    CBC Auto Differential    Comprehensive Metabolic Panel    Lipid Panel    TSH    Hemoglobin A1C       Endocrine    Diabetes mellitus    Relevant Orders    CBC Auto Differential    Comprehensive Metabolic Panel    Lipid Panel    TSH    Hemoglobin A1C    Acquired hypothyroidism    Relevant Orders    CBC Auto Differential    Comprehensive Metabolic Panel    Lipid Panel    TSH    Hemoglobin A1C     Other Visit Diagnoses         Hypertension, unspecified type        Relevant Orders    CBC Auto Differential    Comprehensive Metabolic Panel    Lipid Panel    TSH    Hemoglobin A1C      Hypothyroidism, unspecified type        Relevant Orders    CBC Auto Differential    Comprehensive Metabolic Panel    Lipid Panel    TSH    Hemoglobin A1C             Orders Placed This Encounter   Procedures    CBC Auto Differential     Standing Status:   Future     Expected Date:   10/9/2025     Expiration Date:   7/9/2026    Comprehensive Metabolic Panel     Standing Status:   Future     Expected Date:   10/9/2025     Expiration Date:   7/9/2026    Lipid Panel     Standing Status:   Future     Expected Date:   10/9/2025     Expiration Date:   7/9/2026    TSH     Standing Status:   Future     Expected Date:   10/9/2025     Expiration Date:   7/9/2026    Hemoglobin A1C     Standing Status:   Future     Expected Date:   10/9/2025     Expiration Date:   7/9/2026        Medication List with Changes/Refills   Current Medications    ALLOPURINOL (ZYLOPRIM) 100 MG TABLET    TAKE 1 TABLET BY MOUTH ONCE DAILY       Start Date: 11/15/2024End Date: --    CARVEDILOL (COREG) 3.125 MG TABLET    Take 1 tablet (3.125 mg total) by mouth 2 (two) times  daily.       Start Date: 11/15/2024End Date: 5/14/2025    CIPROFLOXACIN HCL (CIPRO) 250 MG TABLET    GIVE TWO TABLETS (500MG) BY MOUTH TWICE DAILY       Start Date: 4/30/2025 End Date: --    CYANOCOBALAMIN 1,000 MCG/ML INJECTION    INJECT 1000MCG (1ML) INTRAMUSCULARY ONCE MONTHLY       Start Date: 9/7/2023  End Date: --    DOCUSATE SODIUM (COLACE) 100 MG CAPSULE    Take 1 capsule (100 mg total) by mouth 2 (two) times daily as needed for Constipation.       Start Date: 7/26/2024 End Date: --    FAMOTIDINE (PEPCID) 20 MG TABLET    Take 20 mg by mouth as needed for Heartburn.       Start Date: --        End Date: --    FUROSEMIDE (LASIX) 20 MG TABLET    Take 1 tablet (20 mg total) by mouth once daily.       Start Date: 11/15/2024End Date: 5/14/2025    LEVOTHYROXINE (SYNTHROID) 175 MCG TABLET    Take 1 tablet (175 mcg total) by mouth before breakfast.       Start Date: 4/7/2025  End Date: 4/7/2026    LOVASTATIN (MEVACOR) 20 MG TABLET    Take 20 mg by mouth once daily.       Start Date: 3/21/2022 End Date: --    PANTOPRAZOLE (PROTONIX) 40 MG TABLET    GIVE ONE TABLET BY MOUTH ONCE DAILY       Start Date: 11/15/2024End Date: --    POLYETHYLENE GLYCOL (GLYCOLAX) 17 GRAM PWPK    Take 17 g by mouth 2 (two) times daily as needed for Constipation.       Start Date: 7/9/2024  End Date: --    SACUBITRIL-VALSARTAN (ENTRESTO) 24-26 MG PER TABLET    Take 1 tablet by mouth 2 (two) times daily.       Start Date: 2/21/2025 End Date: --    SENNA (SENOKOT) 8.6 MG TABLET    Take 1 tablet by mouth daily as needed for Constipation.       Start Date: 3/16/2023 End Date: --    TRAMADOL (ULTRAM) 50 MG TABLET    Take 1 tablet (50 mg total) by mouth every 12 (twelve) hours as needed (as needed for pain score 1-10).       Start Date: 11/19/2024End Date: --    WARFARIN (COUMADIN) 1 MG TABLET    Take 1 tablet (1 mg total) by mouth Daily.       Start Date: 8/21/2024 End Date: 8/21/2025   Discontinued Medications    EMPAGLIFLOZIN (JARDIANCE) 10 MG  TABLET    Take 1 tablet (10 mg total) by mouth once daily.       Start Date: 11/19/2024End Date: 7/9/2025            Follow up in about 3 months (around 10/9/2025). In addition to their scheduled follow up, the patient has also been instructed to follow up on as needed basis.       Franklyn Brito

## 2025-07-14 ENCOUNTER — TELEPHONE (OUTPATIENT)
Dept: INTERNAL MEDICINE | Facility: CLINIC | Age: 89
End: 2025-07-14
Payer: COMMERCIAL

## 2025-07-14 ENCOUNTER — DOCUMENTATION ONLY (OUTPATIENT)
Dept: INTERNAL MEDICINE | Facility: CLINIC | Age: 89
End: 2025-07-14
Payer: COMMERCIAL

## 2025-07-14 NOTE — TELEPHONE ENCOUNTER
Copied from CRM #[id number]. Topic: General Inquiry - Information Request  >> Jul 14, 2025  8:56 AM Janice wrote:  Who Called: Av    Caller is requesting assistance/information from provider's office.    Symptoms (please be specific):   How long has patient had these symptoms:    List of preferred pharmacies on file (remove unneeded): [unfilled]  If different, enter pharmacy into here including location and phone number:       Preferred Method of Contact: Phone Call  Patient's Preferred Phone Number on File: [phone]   Best Call Back Number, if different:  Additional Information: Pt son Av is requesting a callback from [facility]

## 2025-07-14 NOTE — TELEPHONE ENCOUNTER
Copied from CRM #[id number]. Topic: General Inquiry - Patient Advice  >> Jul 14, 2025  8:54 AM Altagracia wrote:  Who Called: charles in regards to Homa MCCARTHY Leonid    Caller is requesting assistance/information from provider's office.    Symptoms (please be specific): NA   How long has patient had these symptoms:  N A  List of preferred pharmacies on file (remove unneeded): [unfilled]  If different, enter pharmacy into here including location and phone number: NA      Preferred Method of Contact: Phone Call  Patient's Preferred Phone Number on File: [phone]   Best Call Back Number, if different:[facility]  [phone]     Additional Information: medical advice, charles real  [phone], called to confirm if fax was received for lab results:  BMP, CBC TSH, sed rate, A1C faxed on 7/8/25, please advise, thanks

## 2025-07-14 NOTE — TELEPHONE ENCOUNTER
Copied from CRM #[id number]. Topic: General Inquiry - Test Results  >> Jul 14, 2025 11:25 AM Alfred wrote:  .Who Called: Levi ()    Caller is requesting information on test results.    Name of Test (Lab/Mammo/Etc):  Blood work    Date of Test:  07/07/2025    Where the test was performed: [facility]    Ordering Provider: Dr. Brito    Preferred Method of Contact: Phone Call    Patient's Preferred Phone Number on File: [phone]     Best Call Back Number, if different: [phone] ()    Additional Information: N/A

## (undated) DEVICE — Device

## (undated) DEVICE — STAPLER SKIN PROXIMATE WIDE

## (undated) DEVICE — GLOVE PROTEXIS NEU-THERA SZ6

## (undated) DEVICE — NDL ANES SPINAL 18X3.5ST 18G

## (undated) DEVICE — PILLOW HIP ABDUCTION MED

## (undated) DEVICE — PILLOW ABDUCTION FOAM MED

## (undated) DEVICE — DRAPE STERI U-SHAPED 47X51IN

## (undated) DEVICE — SPONGE COTTON TRAY 4X4IN

## (undated) DEVICE — 6.5MM LOW PROFILE HEX SCREW 30MM
Type: IMPLANTABLE DEVICE | Site: HIP | Status: NON-FUNCTIONAL
Brand: TRIDENT II
Removed: 2024-12-05

## (undated) DEVICE — GLOVE SENSICARE PI GRN 8.5

## (undated) DEVICE — PAD ABDOMINAL STERILE 8X10IN

## (undated) DEVICE — TAPE ADH MEDIPORE 4 X 10YDS

## (undated) DEVICE — SOL NACL IRR 1000ML BTL

## (undated) DEVICE — CONTAINER SPECIMEN SCREW 4OZ

## (undated) DEVICE — DRAPE FULL SHEET 70X100IN

## (undated) DEVICE — GLOVE SENSICARE PI GRN 7

## (undated) DEVICE — KIT TOTAL HIP HLGC

## (undated) DEVICE — SUT VICRYL BR 1 GEN 27 CT-1

## (undated) DEVICE — DRAPE SURG W/TWL 17 5/8X23

## (undated) DEVICE — GLOVE SENSICARE PI ORTHO LT 8

## (undated) DEVICE — GOWN POLY REINF X-LONG 2XL

## (undated) DEVICE — GOWN SMARTGOWN 3XL XLONG

## (undated) DEVICE — SCREW XL25 IM NAIL 38X5MM
Type: IMPLANTABLE DEVICE | Site: FEMUR | Status: NON-FUNCTIONAL
Removed: 2024-07-03

## (undated) DEVICE — SYR 10CC LUER LOCK

## (undated) DEVICE — DRESSING TELFA + BARR 4X6IN

## (undated) DEVICE — TAPE SILK 3IN

## (undated) DEVICE — DRAPE C-ARMOR EQUIPMENT COVER

## (undated) DEVICE — SUT 1 36IN PDS II

## (undated) DEVICE — ELECTRODE REM POLYHESIVE II

## (undated) DEVICE — GLOVE SENSICARE PI MICRO 6.5

## (undated) DEVICE — DEVICE STRATAFIX SYMMETRIC +

## (undated) DEVICE — APPLICATOR CHLORAPREP ORN 26ML

## (undated) DEVICE — BLADE SAG DUAL CUT 18X90X1.35

## (undated) DEVICE — BIT DRILL 4.2MM 3 FLUTD 145MM

## (undated) DEVICE — ELECTRODE PATIENT RETURN DISP

## (undated) DEVICE — GLOVE SIGNATURE ESSNTL LTX 8.5

## (undated) DEVICE — COVER HD BACK TABLE 6FT

## (undated) DEVICE — SUT MCRYL PLUS 2-0 CT-1 36IN

## (undated) DEVICE — RETRIEVER SUTURE HEWSON DISP

## (undated) DEVICE — DRAPE INCISE IOBAN 2 23X23IN

## (undated) DEVICE — DRESSING XEROFORM 5X9IN

## (undated) DEVICE — SOL NACL IRR 3000ML

## (undated) DEVICE — SOL 1L ACD-A

## (undated) DEVICE — KIT SURGICAL TURNOVER

## (undated) DEVICE — KIT DRAIN WOUND RND SPRNG RESV

## (undated) DEVICE — IMPLANTABLE DEVICE
Type: IMPLANTABLE DEVICE | Site: FEMUR | Status: NON-FUNCTIONAL
Removed: 2024-07-03

## (undated) DEVICE — WIRE GUIDE 3.2MM 400MM
Type: IMPLANTABLE DEVICE | Site: FEMUR | Status: NON-FUNCTIONAL
Removed: 2024-07-03

## (undated) DEVICE — SUT 1 36IN PDS II VIO MONO

## (undated) DEVICE — DRESSING XEROFORM NONADH 1X8IN

## (undated) DEVICE — SUT VICRYL 2-0 36 CT-1

## (undated) DEVICE — GLOVE SIGNATURE MICRO LTX 6

## (undated) DEVICE — DRAPE IOBAN 2 STERI

## (undated) DEVICE — SUT 2-0 ETHILON 18 FS

## (undated) DEVICE — REAMER MOD BIXCUT 8X48MM STER.

## (undated) DEVICE — COVER FULLGUARD SHOE HIGH-TOP

## (undated) DEVICE — SOL POVIDONE IODINE PCH 3/4OZ

## (undated) DEVICE — SUT BLU BR 2 TAPERD NDL 1/2

## (undated) DEVICE — BLADE SURG CARBON STEEL #10

## (undated) DEVICE — SUT MONO 2-0 CT-1 VIL

## (undated) DEVICE — GLOVE PROTEXIS BLUE LATEX 9

## (undated) DEVICE — GLOVE PROTEXIS HYDROGEL SZ9

## (undated) DEVICE — SUT CTD VICRYL CT-1 27

## (undated) DEVICE — KIT C.A.T.S. FAST START

## (undated) DEVICE — COVER TABLE HVY DTY 60X90IN

## (undated) DEVICE — GUIDE WIRE, BALL-TIPPED, STERILE
Type: IMPLANTABLE DEVICE | Site: FEMUR | Status: NON-FUNCTIONAL
Removed: 2024-07-15